# Patient Record
Sex: MALE | Race: WHITE | NOT HISPANIC OR LATINO | Employment: OTHER | URBAN - METROPOLITAN AREA
[De-identification: names, ages, dates, MRNs, and addresses within clinical notes are randomized per-mention and may not be internally consistent; named-entity substitution may affect disease eponyms.]

---

## 2017-01-16 DIAGNOSIS — N52.9 ERECTILE DYSFUNCTION, UNSPECIFIED ERECTILE DYSFUNCTION TYPE: Primary | ICD-10-CM

## 2017-01-16 NOTE — TELEPHONE ENCOUNTER
Sildenafil 20 mg      Last Written Prescription Date: 11/22/16  Last Fill Quantity: 30,  # refills: 0   Last Office Visit with [facility], P or [facility] prescribing provider: 11/15/16                                             Message:  **urgent** Patient is out of medication. Please high priority a refill/new rx; if denied please return request with denial and reason, thank you.

## 2017-01-18 RX ORDER — SILDENAFIL CITRATE 20 MG/1
40-100 TABLET ORAL PRN
Qty: 30 TABLET | Refills: 2 | Status: SHIPPED | OUTPATIENT
Start: 2017-01-18 | End: 2017-04-12

## 2017-01-18 NOTE — TELEPHONE ENCOUNTER
Prescription approved per [facility] Refill Protocol.  Stella Barker RN

## 2017-04-12 ENCOUNTER — DOCUMENTATION ONLY (OUTPATIENT)
Dept: CARDIOLOGY | Facility: CLINIC | Age: 62
End: 2017-04-12

## 2017-04-12 DIAGNOSIS — N52.9 ERECTILE DYSFUNCTION, UNSPECIFIED ERECTILE DYSFUNCTION TYPE: ICD-10-CM

## 2017-04-12 DIAGNOSIS — E78.2 MIXED HYPERLIPIDEMIA: Primary | ICD-10-CM

## 2017-04-13 RX ORDER — SILDENAFIL CITRATE 20 MG/1
TABLET ORAL
Qty: 90 TABLET | Refills: 1 | Status: SHIPPED | OUTPATIENT
Start: 2017-04-13 | End: 2017-08-17

## 2017-04-13 NOTE — TELEPHONE ENCOUNTER
sildenafil (REVATIO/VIAGRA) 20 MG      Last Written Prescription Date: 1/18/17  Last Fill Quantity: 30,  # refills: 2   Last Office Visit with [facility] or [facility] prescribing provider: 11/15/16                                         Next 5 appointments (look out 90 days)     May 02, 2017  8:45 AM CDT   Return Visit with Sander Galicia MD   [facility] ([facility])    [address] [place] [id number] [phone]

## 2017-04-13 NOTE — TELEPHONE ENCOUNTER
Prescription approved per [facility] Refill Protocol.  Patient requesting 90 days.  Heather Leach, RN  Triage Nurse

## 2017-04-26 ENCOUNTER — HOSPITAL ENCOUNTER (OUTPATIENT)
Dept: CARDIOLOGY | Facility: CLINIC | Age: 62
Discharge: HOME | End: 2017-04-26
Attending: INTERNAL MEDICINE | Admitting: INTERNAL MEDICINE
Payer: COMMERCIAL

## 2017-04-26 DIAGNOSIS — I35.0 AORTIC STENOSIS: ICD-10-CM

## 2017-04-26 DIAGNOSIS — E78.2 MIXED HYPERLIPIDEMIA: ICD-10-CM

## 2017-04-26 LAB
ALT SERPL W P-5'-P-CCNC: 43 U/L (ref 0–70)
CHOLEST SERPL-MCNC: 203 MG/DL
HDLC SERPL-MCNC: 67 MG/DL
LDLC SERPL CALC-MCNC: 124 MG/DL
NONHDLC SERPL-MCNC: 136 MG/DL
TRIGL SERPL-MCNC: 58 MG/DL

## 2017-04-26 PROCEDURE — 36415 COLL VENOUS BLD VENIPUNCTURE: CPT | Performed by: INTERNAL MEDICINE

## 2017-04-26 PROCEDURE — 93306 TTE W/DOPPLER COMPLETE: CPT

## 2017-04-26 PROCEDURE — 93306 TTE W/DOPPLER COMPLETE: CPT | Mod: 26 | Performed by: INTERNAL MEDICINE

## 2017-04-26 PROCEDURE — 80061 LIPID PANEL: CPT | Performed by: INTERNAL MEDICINE

## 2017-04-26 PROCEDURE — 84460 ALANINE AMINO (ALT) (SGPT): CPT | Performed by: INTERNAL MEDICINE

## 2017-05-02 ENCOUNTER — OFFICE VISIT (OUTPATIENT)
Dept: CARDIOLOGY | Facility: CLINIC | Age: 62
End: 2017-05-02
Attending: INTERNAL MEDICINE
Payer: COMMERCIAL

## 2017-05-02 VITALS
DIASTOLIC BLOOD PRESSURE: 66 MMHG | SYSTOLIC BLOOD PRESSURE: 110 MMHG | WEIGHT: 167 LBS | HEART RATE: 68 BPM | HEIGHT: 66 IN | BODY MASS INDEX: 26.84 KG/M2

## 2017-05-02 DIAGNOSIS — E78.2 MIXED HYPERLIPIDEMIA: Primary | ICD-10-CM

## 2017-05-02 DIAGNOSIS — I35.0 NONRHEUMATIC AORTIC VALVE STENOSIS: ICD-10-CM

## 2017-05-02 PROCEDURE — 99214 OFFICE O/P EST MOD 30 MIN: CPT | Performed by: INTERNAL MEDICINE

## 2017-05-02 NOTE — LETTER
5/2/2017    [facility], [place]  [address] [facility] [address]    RE: Johan FERNANDEZ Ramon       Dear Colleague,    I had the pleasure of seeing Johan Navarro in the [facility].    I had the pleasure of seeing Mr. Navarro in followup at the [facility] today.  He is a very pleasant 62-year-old gentleman who I last saw in 05/2016.  He has a history of coronary artery disease and is status post coronary angiography in 1998 at which point he underwent stenting of the distal and ostial circumflex as well as the first obtuse marginal.  His LDL was noted to be markedly elevated at 220 and he was seen at the [facility] where consideration was made for plasmapheresis.  Ultimately he was placed on Vytorin and niacin but for the last year or so has been on rosuvastatin and Zetia as well as niacin for optimal LDL reduction.      At his last office visit, he was doing well overall from a cardiac standpoint.  Given his subtherapeutic LDL, I did recommend initiation of PCSK9 inhibitor therapy.  Ultimately, the patient elected not to pursue this option.  He has been taking of his other medications as prescribed.      From a functional standpoint, he remains very active, taking care of his 18-year-old grandson.  He does walk on a fairly regular basis and denies any chest discomfort or shortness of breath while doing so.  He has gained about 7 pounds over the past year, however.  He denies any syncope or presyncope.      PHYSICAL EXAMINATION:   GENERAL:  Alert and oriented.   VITAL SIGNS:  Blood pressure was 110/66, pulse 68.   NECK:  Revealed no jugular venous distention or carotid bruits.   CHEST:  Clear to auscultation.   CARDIOVASCULAR:  Rhythm was appreciated.   ABDOMEN:  Soft, nontender.   EXTREMITIES:  Demonstrated no edema.      His lipid panel on a fasting lipid panel 04/26/2013 demonstrated total cholesterol 203, LDL of 124, HDL 67, triglycerides 458.      He had an  echocardiogram performed on 04/26/2017 which demonstrated normal left ventricular size and low normal systolic function with an LVEF of 55%.  Mild aortic stenosis was noted.      Of note, the patient has undergone stress echocardiography in 2015, which was negative for ischemia.  He exercised for 12 minutes according to Gab protocol at that time.     Outpatient Encounter Prescriptions as of 5/2/2017   Medication Sig Dispense Refill     [DISCONTINUED] evolocumab (REPATHA) 140 MG/ML prefilled autoinjector Inject 1 mL (140 mg) Subcutaneous every 14 days 2 mL 3     sildenafil (REVATIO/VIAGRA) 20 MG tablet TAKE TWO TO FIVE TABLETS BY MOUTH AS NEEDED PRIOR TO SXUAL INTERCOURSE NEVER USE WITH NITROGLYCERIN TERAZOSIN OR DOXAZOCIN 90 tablet 1     niacin (NIASPAN) 1000 MG CR tablet        CIALIS 20 MG tablet TK ONE T PRIOR TO ANTICIPATED SEXUAL ACTIVITY MAX PER DAY  11     tadalafil (CIALIS) 20 MG tablet Take 1 tablet (20 mg) by mouth daily as needed for erectile dysfunction 6 tablet 1     ezetimibe (ZETIA) 10 MG tablet Take 1 tablet (10 mg) by mouth daily 90 tablet 3     rosuvastatin (CRESTOR) 40 MG tablet Take 1 tablet (40 mg) by mouth daily 90 tablet 3     NEURONTIN 600 MG OR TABS 1 tab q day       ASPIRIN 81 MG OR TABS 1 tab po QD (Once per day)       No facility-administered encounter medications on file as of 5/2/2017.       IMPRESSION:   1.  Coronary artery disease, status post PCI to the circumflex and obtuse marginal in 1998.   2.  Significant dyslipidemia.      Mr. Navarro doing well overall from a cardiac standpoint.  There is no evidence of angina or congestive heart failure.  We again discussed the indications of PCSK9 inhibitors and the patient is agreeable to a therapeutic trial in this regard.  I have given him a prescription for Repatha to be taken every 2 weeks.  We will look into insurance coverage, but given that he is on maximal lipid-lowering therapy with subtherapeutic LDL, I believe this should be  authorized.                    It was a pleasure seeing him in followup today.  Assuming his clinical status remains stable, I will plan on followup in 1 year.      Sincerely,    Sander Galicia MD     [facility]

## 2017-05-02 NOTE — PROGRESS NOTES
HISTORY OF PRESENT ILLNESS:  I had the pleasure of seeing Mr. Navarro in followup at the [facility] today.  He is a very pleasant 62-year-old gentleman who I last saw in 05/2016.  He has a history of coronary artery disease and is status post coronary angiography in 1998 at which point he underwent stenting of the distal and ostial circumflex as well as the first obtuse marginal.  His LDL was noted to be markedly elevated at 220 and he was seen at the [facility] where consideration was made for plasmapheresis.  Ultimately he was placed on Vytorin and niacin but for the last year or so has been on rosuvastatin and Zetia as well as niacin for optimal LDL reduction.      At his last office visit, he was doing well overall from a cardiac standpoint.  Given his subtherapeutic LDL, I did recommend initiation of PCSK9 inhibitor therapy.  Ultimately, the patient elected not to pursue this option.  He has been taking of his other medications as prescribed.      From a functional standpoint, he remains very active, taking care of his 18-year-old grandson.  He does walk on a fairly regular basis and denies any chest discomfort or shortness of breath while doing so.  He has gained about 7 pounds over the past year, however.  He denies any syncope or presyncope.      PHYSICAL EXAMINATION:   GENERAL:  Alert and oriented.   VITAL SIGNS:  Blood pressure was 110/66, pulse 68.   NECK:  Revealed no jugular venous distention or carotid bruits.   CHEST:  Clear to auscultation.   CARDIOVASCULAR:  Rhythm was appreciated.   ABDOMEN:  Soft, nontender.   EXTREMITIES:  Demonstrated no edema.      His lipid panel on a fasting lipid panel 04/26/2013 demonstrated total cholesterol 203, LDL of 124, HDL 67, triglycerides 458.      He had an echocardiogram performed on 04/26/2017 which demonstrated normal left ventricular size and low normal systolic function with an LVEF of 55%.  Mild aortic stenosis was noted.      Of  note, the patient has undergone stress echocardiography in , which was negative for ischemia.  He exercised for 12 minutes according to Gab protocol at that time.      IMPRESSION:   1.  Coronary artery disease, status post PCI to the circumflex and obtuse marginal in .   2.  Significant dyslipidemia.      Mr. Chapa doing well overall from a cardiac standpoint.  There is no evidence of angina or congestive heart failure.  We again discussed the indications of PCSK9 inhibitors and the patient is agreeable to a therapeutic trial in this regard.  I have given him a prescription for Repatha to be taken every 2 weeks.  We will look into insurance coverage, but given that he is on maximal lipid-lowering therapy with subtherapeutic LDL, I believe this should be authorized.      It was a pleasure seeing him in followup today.  Assuming his clinical status remains stable, I will plan on followup in 1 year.         LATONYA NAPIER MD             D: 2017 09:44   T: 2017 14:13   MT: AVA      Name:     MARLENY CHAPA   MRN:      [id number]        Account:      [id number]   :      1955           Service Date: 2017      Document: [id number]

## 2017-05-02 NOTE — MR AVS SNAPSHOT
After Visit Summary   5/2/2017    Johan Navarro    MRN: [id number]           Patient Information     Date Of Birth          1955        Visit Information        Provider Department      5/2/2017 8:45 AM Sander Galicia MD [facility]        Today's Diagnoses     Mixed hyperlipidemia    -  1    Nonrheumatic aortic valve stenosis           Follow-ups after your visit        Additional Services     Follow-Up with Cardiologist                 Future tests that were ordered for you today     Open Future Orders        Priority Expected Expires Ordered    Echocardiogram Routine 5/2/2018 6/6/2018 5/2/2017    Follow-Up with Cardiologist Routine 5/2/2018 9/14/2018 5/2/2017            Who to contact     If you have questions or need follow up information about today's clinic visit or your schedule please contact [facility] directly at [phone].  Normal or non-critical lab and imaging results will be communicated to you by MyChart, letter or phone within 4 business days after the clinic has received the results. If you do not hear from us within 7 days, please contact the clinic through [email or web address] or phone. If you have a critical or abnormal lab result, we will notify you by phone as soon as possible.  Submit refill requests through [employer] or call your pharmacy and they will forward the refill request to us. Please allow 3 business days for your refill to be completed.          Additional Information About Your Visit        MyChart Information     People[employer] gives you secure access to your electronic health record. If you see a primary care provider, you can also send messages to your care team and make appointments. If you have questions, please call your primary care clinic.  If you do not have a primary care provider, please call [phone] and they will assist you.        Care EveryWhere ID     This is your Care  "EveryWhere ID. This could be used by other organizations to access your [place] medical records  [id number]        Your Vitals Were     Pulse Height BMI (Body Mass Index)             68 1.676 m (5' 6\") 26.95 kg/m2          Blood Pressure from Last 3 Encounters:   05/02/17 110/66   11/15/16 110/60   05/05/16 104/64    Weight from Last 3 Encounters:   05/02/17 75.8 kg (167 lb)   11/15/16 72.6 kg (160 lb)   05/05/16 74.4 kg (164 lb)              We Performed the Following     Follow-Up with Cardiologist          Today's Medication Changes          These changes are accurate as of: 5/2/17  9:17 AM.  If you have any questions, ask your nurse or doctor.               Start taking these medicines.        Dose/Directions    evolocumab 140 MG/ML prefilled autoinjector   Commonly known as:  REPATHA   Used for:  Mixed hyperlipidemia   Started by:  Sander Galicia MD        Dose:  140 mg   Inject 1 mL (140 mg) Subcutaneous every 14 days   Quantity:  2 mL   Refills:  3            Where to get your medicines      These medications were sent to [facility]  [place] - [address]  [address] [place] [address]     Phone:  [phone]     evolocumab 140 MG/ML prefilled autoinjector                Primary Care Provider Office Phone # Fax #    Darius Moon [phone]       [address] [place] [id number]        Thank you!     Thank you for choosing [facility] [place]  for your care. Our goal is always to provide you with excellent care. Hearing back from our patients is one way we can continue to improve our services. Please take a few minutes to complete the written survey that you may receive in the mail after your visit with us. Thank you!             Your Updated Medication List - Protect others around you: Learn how to safely use, store and throw away your medicines at www.disposemymeds.org.       "    This list is accurate as of: 5/2/17  9:17 AM.  Always use your most recent med list.                   Brand Name Dispense Instructions for use    aspirin 81 MG tablet      1 tab po QD (Once per day)       * CIALIS 20 MG tablet   Generic drug:  tadalafil      TK ONE T PRIOR TO ANTICIPATED SEXUAL ACTIVITY MAX PER DAY       * tadalafil 20 MG tablet    CIALIS    6 tablet    Take 1 tablet (20 mg) by mouth daily as needed for erectile dysfunction       evolocumab 140 MG/ML prefilled autoinjector    REPATHA    2 mL    Inject 1 mL (140 mg) Subcutaneous every 14 days       ezetimibe 10 MG tablet    ZETIA    90 tablet    Take 1 tablet (10 mg) by mouth daily       NEURONTIN 600 MG tablet   Generic drug:  gabapentin      1 tab q day       niacin 1000 MG CR tablet    NIASPAN         rosuvastatin 40 MG tablet    CRESTOR    90 tablet    Take 1 tablet (40 mg) by mouth daily       sildenafil 20 MG tablet    REVATIO/VIAGRA    90 tablet    TAKE TWO TO FIVE TABLETS BY MOUTH AS NEEDED PRIOR TO SXUAL INTERCOURSE NEVER USE WITH NITROGLYCERIN TERAZOSIN OR DOXAZOCIN       * Notice:  This list has 2 medication(s) that are the same as other medications prescribed for you. Read the directions carefully, and ask your doctor or other care provider to review them with you.

## 2017-05-02 NOTE — PROGRESS NOTES
HPI and Plan:   See dictation    No orders of the defined types were placed in this encounter.      Orders Placed This Encounter   Medications     evolocumab (REPATHA) 140 MG/ML prefilled autoinjector     Sig: Inject 1 mL (140 mg) Subcutaneous every 14 days     Dispense:  2 mL     Refill:  3       There are no discontinued medications.      Encounter Diagnoses   Name Primary?     Nonrheumatic aortic valve stenosis      Mixed hyperlipidemia Yes       CURRENT MEDICATIONS:  Current Outpatient Prescriptions   Medication Sig Dispense Refill     evolocumab (REPATHA) 140 MG/ML prefilled autoinjector Inject 1 mL (140 mg) Subcutaneous every 14 days 2 mL 3     sildenafil (REVATIO/VIAGRA) 20 MG tablet TAKE TWO TO FIVE TABLETS BY MOUTH AS NEEDED PRIOR TO SXUAL INTERCOURSE NEVER USE WITH NITROGLYCERIN TERAZOSIN OR DOXAZOCIN 90 tablet 1     niacin (NIASPAN) 1000 MG CR tablet        CIALIS 20 MG tablet TK ONE T PRIOR TO ANTICIPATED SEXUAL ACTIVITY MAX PER DAY  11     tadalafil (CIALIS) 20 MG tablet Take 1 tablet (20 mg) by mouth daily as needed for erectile dysfunction 6 tablet 1     ezetimibe (ZETIA) 10 MG tablet Take 1 tablet (10 mg) by mouth daily 90 tablet 3     rosuvastatin (CRESTOR) 40 MG tablet Take 1 tablet (40 mg) by mouth daily 90 tablet 3     NEURONTIN 600 MG OR TABS 1 tab q day       ASPIRIN 81 MG OR TABS 1 tab po QD (Once per day)         ALLERGIES     Allergies   Allergen Reactions     Dust Mites      Pollen Extract        PAST MEDICAL HISTORY:  Past Medical History:   Diagnosis Date     Aortic stenosis      CAD (coronary artery disease)     cardiac cath 1998: stents x 2 to circumflex     Carotid stenosis     left     Family history of early CAD      Hyperlipidaemia LDL goal < 70     familial hyperlipidemia with marked hypercholesterolemia before treatment; has been on some form of cholesterol-lowering medication since the 1970s       PAST SURGICAL HISTORY:  Past Surgical History:   Procedure Laterality Date      "CARDIAC SURGERY      coronary stents placed      DAVINC PROSTATECTOMY      2010     SURGICAL HISTORY OF -       Right hand Xanthoma removal     SURGICAL HISTORY OF -       Angioplasty/Stent placement x 2     SURGICAL HISTORY OF -       Right heel surgery - post football injury     SURGICAL HISTORY OF -       Stress Echo (-)       FAMILY HISTORY:  Family History   Problem Relation Age of Onset     Lipids Mother      C.A.D. Mother       at age 49 of MI     C.A.D. Sister      MI at age 48     Cancer - colorectal No family hx of      Prostate Cancer No family hx of        SOCIAL HISTORY:  Social History     Social History     Marital status:      Spouse name: N/A     Number of children: N/A     Years of education: N/A     Social History Main Topics     Smoking status: Former Smoker     Smokeless tobacco: None     Alcohol use Yes      Comment: very rare     Drug use: No     Sexual activity: Yes     Partners: Female     Other Topics Concern     Caffeine Concern Yes     coffee and soda     Special Diet No     Exercise Yes     regular      Seat Belt Yes     Parent/Sibling W/ Cabg, Mi Or Angioplasty Before 65f 55m? Yes     49     Social History Narrative       Review of Systems:  Skin:  Negative       Eyes:  Negative      ENT:  Negative      Respiratory:  Negative       Cardiovascular:  Negative;palpitations;chest pain;edema;lightheadedness;dizziness      Gastroenterology: Negative      Genitourinary:  Negative      Musculoskeletal:  Negative      Neurologic:  Negative      Psychiatric:  Negative      Heme/Lymph/Imm:  Positive for allergies seasonal   Endocrine:  Negative        Physical Exam:  Vitals: /66  Pulse 68  Ht 1.676 m (5' 6\")  Wt 75.8 kg (167 lb)  BMI 26.95 kg/m2    Constitutional:  cooperative        Skin:  warm and dry to the touch        Head:  normocephalic        Eyes:  pupils equal and round        ENT:  no pallor or cyanosis        Neck:  carotid pulses are full and equal " bilaterally        Chest:  clear to auscultation          Cardiac: regular rhythm       systolic murmur          Abdomen:  abdomen soft        Vascular: pulses full and equal, no bruits auscultated                                        Extremities and Back:  no edema              Neurological:  affect appropriate, oriented to time, person and place              CC  Sander Galicia MD   [facility]  [address]  [place] [address]

## 2017-05-04 ENCOUNTER — TELEPHONE (OUTPATIENT)
Dept: CARDIOLOGY | Facility: CLINIC | Age: 62
End: 2017-05-04

## 2017-05-04 DIAGNOSIS — E78.2 MIXED HYPERLIPIDEMIA: ICD-10-CM

## 2017-05-04 NOTE — TELEPHONE ENCOUNTER
Received fax from [facility] [address], [place] that Repatha is not covered on the plan and a PA needs to be started. I checked and Praluent isn't a covered med either. Call placed to [facility] beds at the [facility] at [phone] and they are to fax us the PA form to fill out. Awaiting same.

## 2017-05-09 ENCOUNTER — DOCUMENTATION ONLY (OUTPATIENT)
Dept: CARDIOLOGY | Facility: CLINIC | Age: 62
End: 2017-05-09

## 2017-05-09 NOTE — PROGRESS NOTES
Message from Dr. Galicia: n we look into the cost of repatha for this patient? He is on maximum therapy with a subtherapeutic LDL. Thanks.  Routed to Team 1/repatha team for f/u

## 2017-06-15 NOTE — TELEPHONE ENCOUNTER
PA Initiation    Medication: Repatha - PA PENDING  Insurance Company: [employer] - Phone [phone] Fax [phone]  Pharmacy Filling the Rx:    Filling Pharmacy Phone:    Filling Pharmacy Fax:    Start Date: 6/15/2017    I left a message for Johan to discuss filling his Repatha at  [facility] to having to fill [facility]. Initiated PA for Repatha through [employer] on Cover my meds.

## 2017-06-20 NOTE — TELEPHONE ENCOUNTER
Phoned Connor at [phone] stated his PA is still in progress and should have a response no later than 6/29 (can take up to 14 business days to review).

## 2017-06-22 NOTE — TELEPHONE ENCOUNTER
Prior Authorization Approval    Authorization Effective Date: 5/15/2017  Authorization Expiration Date: 9/15/2017  Medication: Repatha - PA Approved  Approved Dose/Quantity: 2 ML  Reference #: [id number] [id number]   Insurance Company: "[employer]" - Phone [phone] Fax [phone]  Expected CoPay:       CoPay Card Available:      Foundation Assistance Needed:    Which Pharmacy is filling the prescription (Not needed for infusion/clinic administered): [facility] - [place] [address]   Pharmacy Notified:  Yes   Patient Notified:  Yes    PA Approved for 3 months. The patient will need to come back in for lab work to prove his LDL has improved by 35%, then a continuation of therapy PA will need to be completed and submitted with supportive documentation at that time. Please, send a new Repatha RX to [facility].

## 2017-06-27 NOTE — TELEPHONE ENCOUNTER
Repatha [employer] approved. New Rx sent to  Specialty pharmacy so med can be filled, as requested by PA Team.

## 2017-07-26 ENCOUNTER — TELEPHONE (OUTPATIENT)
Dept: CARDIOLOGY | Facility: CLINIC | Age: 62
End: 2017-07-26

## 2017-07-26 DIAGNOSIS — E78.2 MIXED HYPERLIPIDEMIA: Primary | ICD-10-CM

## 2017-07-26 NOTE — TELEPHONE ENCOUNTER
He should stop the niacin and zetia, and cut back on the rosuvastatin to 20 MG daily. We can reassess this once his FLP is back. Thanks.

## 2017-07-26 NOTE — TELEPHONE ENCOUNTER
Patient called, Just received his Repatha injections and wondering if he continues taking the Crestor and zetia while using the Repatha or does he stop the Crestor and zetia?  After starting Repatha first FLP's due in 1 month, then 3 months, 6 months, and 9 months per insurance requirements.

## 2017-07-27 RX ORDER — ROSUVASTATIN CALCIUM 40 MG/1
20 TABLET, COATED ORAL DAILY
Qty: 1 TABLET | Refills: 0 | COMMUNITY
Start: 2017-07-27 | End: 2017-08-08

## 2017-07-27 NOTE — TELEPHONE ENCOUNTER
Spoke with patient and recommendation given to stop the Niacin and zetia and decrease the Rosuvastatin to 20 mg daily. Patient will start Repatha on 7-28-17.   First 1 month FLP's due 8-28-17. And per 's protocol repeat FLP's due every 3 months while on the medication. Patient aware of frequent lab draw. Patient states wife knows how to do the injections and is comfortable given it to him. Patient will call Repatha or clinic with any questions or concerns.

## 2017-08-08 DIAGNOSIS — E78.2 MIXED HYPERLIPIDEMIA: Primary | ICD-10-CM

## 2017-08-08 RX ORDER — ROSUVASTATIN CALCIUM 40 MG/1
20 TABLET, COATED ORAL DAILY
Qty: 45 TABLET | Refills: 3 | Status: SHIPPED | OUTPATIENT
Start: 2017-08-08 | End: 2017-12-11 | Stop reason: SINTOL

## 2017-08-10 DIAGNOSIS — N52.9 ERECTILE DYSFUNCTION, UNSPECIFIED ERECTILE DYSFUNCTION TYPE: ICD-10-CM

## 2017-08-10 NOTE — TELEPHONE ENCOUNTER
sildenafil (REVATIO/VIAGRA) 20 MG      Last Written Prescription Date: 4/13/17  Last Fill Quantity: 90,  # refills: 1   Last Office Visit with [facility], [facility] or [facility] prescribing provider: 11/15/16                                         Next 5 appointments (look out 90 days)     Aug 17, 2017  9:00 AM CDT   Pre-Op physical with [place] Yang PA-C   [facility] ([address] [address] [phone]

## 2017-08-11 RX ORDER — SILDENAFIL CITRATE 20 MG/1
TABLET ORAL
Qty: 30 TABLET | Refills: 2 | Status: SHIPPED | OUTPATIENT
Start: 2017-08-11 | End: 2017-08-17

## 2017-08-11 NOTE — TELEPHONE ENCOUNTER
Prescription approved per [facility] Refill Protocol.  Heather Leach, RN  Triage Nurse

## 2017-08-17 ENCOUNTER — OFFICE VISIT (OUTPATIENT)
Dept: FAMILY MEDICINE | Facility: CLINIC | Age: 62
End: 2017-08-17
Payer: COMMERCIAL

## 2017-08-17 VITALS
SYSTOLIC BLOOD PRESSURE: 104 MMHG | HEIGHT: 66 IN | HEART RATE: 73 BPM | DIASTOLIC BLOOD PRESSURE: 66 MMHG | BODY MASS INDEX: 26.41 KG/M2 | OXYGEN SATURATION: 95 % | WEIGHT: 164.3 LBS | TEMPERATURE: 98.1 F

## 2017-08-17 DIAGNOSIS — I25.10 CORONARY ARTERY DISEASE INVOLVING NATIVE CORONARY ARTERY OF NATIVE HEART WITHOUT ANGINA PECTORIS: ICD-10-CM

## 2017-08-17 DIAGNOSIS — Z01.818 PREOP GENERAL PHYSICAL EXAM: Primary | ICD-10-CM

## 2017-08-17 DIAGNOSIS — H02.403 PTOSIS, BILATERAL: ICD-10-CM

## 2017-08-17 DIAGNOSIS — Z12.11 SPECIAL SCREENING FOR MALIGNANT NEOPLASMS, COLON: ICD-10-CM

## 2017-08-17 PROBLEM — C61 MALIGNANT NEOPLASM OF PROSTATE (H): Status: ACTIVE | Noted: 2017-08-17

## 2017-08-17 PROCEDURE — 99214 OFFICE O/P EST MOD 30 MIN: CPT | Performed by: PHYSICIAN ASSISTANT

## 2017-08-17 PROCEDURE — 93000 ELECTROCARDIOGRAM COMPLETE: CPT | Performed by: PHYSICIAN ASSISTANT

## 2017-08-17 NOTE — NURSING NOTE
"Chief Complaint   Patient presents with     Pre-Op Exam       Initial /66 (BP Location: Right arm, Cuff Size: Adult Regular)  Pulse 73  Temp 98.1  F (36.7  C) (Oral)  Ht 5' 6\" (1.676 m)  Wt 164 lb 4.8 oz (74.5 kg)  SpO2 95%  BMI 26.52 kg/m2 Estimated body mass index is 26.52 kg/(m^2) as calculated from the following:    Height as of this encounter: 5' 6\" (1.676 m).    Weight as of this encounter: 164 lb 4.8 oz (74.5 kg).  Medication Reconciliation: complete   Ken Lester CMA      "

## 2017-08-17 NOTE — MR AVS SNAPSHOT
After Visit Summary   8/17/2017    Johan Navarro    MRN: [id number]           Patient Information     Date Of Birth          1955        Visit Information        Provider Department      8/17/2017 9:00 AM Washington Yang PA-C [facility]        Today's Diagnoses     Preop general physical exam    -  1    Ptosis, bilateral        Coronary artery disease involving native coronary artery of native heart without angina pectoris        Special screening for malignant neoplasms, colon          Care Instructions      Before Your Surgery      Call your surgeon if there is any change in your health. This includes signs of a cold or flu (such as a sore throat, runny nose, cough, rash or fever).    Do not smoke, drink alcohol or take over the counter medicine (unless your surgeon or primary care doctor tells you to) for the 24 hours before and after surgery.    If you take prescribed drugs: Follow your doctor s orders about which medicines to take and which to stop until after surgery.    Eating and drinking prior to surgery: follow the instructions from your surgeon    Take a shower or bath the night before surgery. Use the soap your surgeon gave you to gently clean your skin. If you do not have soap from your surgeon, use your regular soap. Do not shave or scrub the surgery site.  Wear clean pajamas and have clean sheets on your bed.           Follow-ups after your visit        Additional Services     GASTROENTEROLOGY ADULT REF PROCEDURE ONLY       Last Lab Result: Creatinine (mg/dL)       Date                     Value                 11/15/2016               1.01             ----------  Body mass index is 26.52 kg/(m^2).     Needed:  No  Language:  English    Patient will be contacted to schedule procedure.     Please be aware that coverage of these services is subject to the terms and limitations of your health insurance plan.  Call member services at your health plan  "with any benefit or coverage questions.  Any procedures must be performed at a [place] facility OR coordinated by your clinic's referral office.    Please bring the following with you to your appointment:    (1) Any X-Rays, CTs or MRIs which have been performed.  Contact the facility where they were done to arrange for  prior to your scheduled appointment.    (2) List of current medications   (3) This referral request   (4) Any documents/labs given to you for this referral                  Who to contact     If you have questions or need follow up information about today's clinic visit or your schedule please contact [facility] directly at [phone].  Normal or non-critical lab and imaging results will be communicated to you by MyChart, letter or phone within 4 business days after the clinic has received the results. If you do not hear from us within 7 days, please contact the clinic through [employer] or phone. If you have a critical or abnormal lab result, we will notify you by phone as soon as possible.  Submit refill requests through [employer] or call your pharmacy and they will forward the refill request to us. Please allow 3 business days for your refill to be completed.          Additional Information About Your Visit        [employer] Information     [employer] gives you secure access to your electronic health record. If you see a primary care provider, you can also send messages to your care team and make appointments. If you have questions, please call your primary care clinic.  If you do not have a primary care provider, please call [phone] and they will assist you.        Care EveryWhere ID     This is your Care EveryWhere ID. This could be used by other organizations to access your [place] medical records  [id number]        Your Vitals Were     Pulse Temperature Height Pulse Oximetry BMI (Body Mass Index)       73 98.1  F (36.7  C) (Oral) 5' 6\" (1.676 m) 95% 26.52 kg/m2        " Blood Pressure from Last 3 Encounters:   08/17/17 104/66   05/02/17 110/66   11/15/16 110/60    Weight from Last 3 Encounters:   08/17/17 164 lb 4.8 oz (74.5 kg)   05/02/17 167 lb (75.8 kg)   11/15/16 160 lb (72.6 kg)              We Performed the Following     EKG 12-lead complete w/read - Clinics     GASTROENTEROLOGY ADULT REF PROCEDURE ONLY        Primary Care Provider Office Phone # Fax #    Washington Yang PA-C [phone] [address] [facility] [address]        Equal Access to Services     [facility]: Hadii aad ku hadasho Soomaali, waaxda luqadaha, qaybta kaalmada adeegyada, waxay idiin hayaan leticia becerril . So [facility] [phone].    ATENCIÓN: Si habla español, tiene a polo disposición servicios gratuitos de asistencia lingüística. [facility] [phone].    We comply with applicable federal civil rights laws and [place] laws. We do not discriminate on the basis of race, color, national origin, age, disability sex, sexual orientation or gender identity.            Thank you!     Thank you for choosing [facility]  for your care. Our goal is always to provide you with excellent care. Hearing back from our patients is one way we can continue to improve our services. Please take a few minutes to complete the written survey that you may receive in the mail after your visit with us. Thank you!             Your Updated Medication List - Protect others around you: Learn how to safely use, store and throw away your medicines at www.disposemymeds.org.          This list is accurate as of: 8/17/17  9:54 AM.  Always use your most recent med list.                   Brand Name Dispense Instructions for use Diagnosis    aspirin 81 MG tablet      1 tab po QD (Once per day)        CIALIS 20 MG tablet   Generic drug:  tadalafil      TK ONE T PRIOR TO ANTICIPATED SEXUAL ACTIVITY MAX PER DAY        evolocumab 140 MG/ML prefilled autoinjector    REPATHA    2 mL    Inject 1  mL (140 mg) Subcutaneous every 14 days    Mixed hyperlipidemia       NEURONTIN 600 MG tablet   Generic drug:  gabapentin      1 tab q day        rosuvastatin 40 MG tablet    CRESTOR    45 tablet    Take 0.5 tablets (20 mg) by mouth daily    Mixed hyperlipidemia

## 2017-09-01 ENCOUNTER — NURSE TRIAGE (OUTPATIENT)
Dept: NURSING | Facility: CLINIC | Age: 62
End: 2017-09-01

## 2017-09-01 ENCOUNTER — RADIANT APPOINTMENT (OUTPATIENT)
Dept: GENERAL RADIOLOGY | Facility: CLINIC | Age: 62
End: 2017-09-01
Attending: PHYSICIAN ASSISTANT
Payer: COMMERCIAL

## 2017-09-01 ENCOUNTER — OFFICE VISIT (OUTPATIENT)
Dept: URGENT CARE | Facility: URGENT CARE | Age: 62
End: 2017-09-01
Payer: COMMERCIAL

## 2017-09-01 VITALS
TEMPERATURE: 98.3 F | DIASTOLIC BLOOD PRESSURE: 68 MMHG | SYSTOLIC BLOOD PRESSURE: 102 MMHG | HEART RATE: 87 BPM | OXYGEN SATURATION: 94 %

## 2017-09-01 DIAGNOSIS — J18.9 PNEUMONIA OF RIGHT LOWER LOBE DUE TO INFECTIOUS ORGANISM: Primary | ICD-10-CM

## 2017-09-01 DIAGNOSIS — R05.9 COUGH: ICD-10-CM

## 2017-09-01 PROCEDURE — 94640 AIRWAY INHALATION TREATMENT: CPT | Performed by: PHYSICIAN ASSISTANT

## 2017-09-01 PROCEDURE — 99214 OFFICE O/P EST MOD 30 MIN: CPT | Mod: 25 | Performed by: PHYSICIAN ASSISTANT

## 2017-09-01 PROCEDURE — 71020 XR CHEST 2 VW: CPT

## 2017-09-01 RX ORDER — ALBUTEROL SULFATE 0.83 MG/ML
1 SOLUTION RESPIRATORY (INHALATION) EVERY 6 HOURS
Qty: 1 BOX | Refills: 0
Start: 2017-09-01 | End: 2018-05-22

## 2017-09-01 RX ORDER — PREDNISONE 20 MG/1
40 TABLET ORAL DAILY
Qty: 10 TABLET | Refills: 0 | Status: SHIPPED | OUTPATIENT
Start: 2017-09-01 | End: 2017-09-06

## 2017-09-01 RX ORDER — ALBUTEROL SULFATE 0.83 MG/ML
1 SOLUTION RESPIRATORY (INHALATION) ONCE
Qty: 3 ML | Refills: 0 | Status: CANCELLED
Start: 2017-09-01 | End: 2017-09-01

## 2017-09-01 RX ORDER — IPRATROPIUM BROMIDE AND ALBUTEROL SULFATE 2.5; .5 MG/3ML; MG/3ML
1 SOLUTION RESPIRATORY (INHALATION) ONCE
Qty: 1 VIAL | Refills: 0
Start: 2017-09-01 | End: 2018-05-22

## 2017-09-01 RX ORDER — AZITHROMYCIN 250 MG/1
TABLET, FILM COATED ORAL
Qty: 6 TABLET | Refills: 0 | Status: SHIPPED | OUTPATIENT
Start: 2017-09-01 | End: 2018-05-22

## 2017-09-01 RX ORDER — DOXYCYCLINE 100 MG/1
100 CAPSULE ORAL 2 TIMES DAILY
Qty: 20 CAPSULE | Refills: 0 | Status: CANCELLED | OUTPATIENT
Start: 2017-09-01

## 2017-09-01 NOTE — MR AVS SNAPSHOT
After Visit Summary   9/1/2017    Johan Navarro    MRN: [id number]           Patient Information     Date Of Birth          1955        Visit Information        Provider Department      9/1/2017 4:55 PM Malinda Boone PA-C [facility]        Today's Diagnoses     Cough    -  1    Pneumonia of right lower lobe due to infectious organism (H)          Care Instructions      Treating Pneumonia  Pneumonia is an infection of one or both of the lungs. Pneumonia:    Is usually caused by either a virus or a bacteria    Can be very serious, especially in infants, young children, and older adults. It s also serious for those with other long-term health problems or weakened immune systems.    Is sometimes treated at home and sometimes in the hospital  Antibiotic medicines  Antibiotics may be prescribed for pneumonia caused by bacteria. They may be pills (oral medicines), or shots (injections). Or they may be given by IV (intravenously) into a vein. If you are taking oral medicines at home:    Fill your prescription and start taking your medicine as soon as you can.    You will likely start to feel better in a day or 2, but don t stop taking the antibiotic.    Use a pill organizer to help you remember to take your medicine.    Let your healthcare provider know if you have side effects.    Take your medicine exactly as directed on the label. Talk to your provider or pharmacist if you have any questions.  Antiviral medicines  Antiviral medicine may be prescribed for pneumonia caused by a virus. For example, antiviral medicine may be prescribed for pneumonia caused by the flu virus. Antibiotics do not work against viruses. If you are taking antiviral medicine at home:    Fill your prescription and start taking your medicine as soon as you can.    Talk with your provider or pharmacist about possible side effects.    Take the medicine exactly as instructed.  To relieve symptoms  There are many  medicines that can help relieve symptoms of pneumonia. Some are prescription and some are over-the-counter.  Your healthcare provider may recommend:    Acetaminophen or ibuprofen to lower your fever and to lessen headache or other pain    Cough medicine to loosen mucus or to reduce coughing  Make sure you check with your healthcare provider or pharmacist before taking any over-the-counter medicines.  Special treatments  If you are hospitalized for pneumonia, you may have other therapies, including:    Inhaled medicines to help with breathing or chest congestion    Supplemental oxygen to increase low oxygen levels  Drink fluids and eat healthy  You should eat healthy to help your body fight the infection. Drinking a lot of fluids helps to replace fluids lost from fever and to loosen mucus in your chest.    Diet. Make healthy food choices, including fruits and vegetables, lean meats and other proteins, 100% whole grain and low- or no-fat dairy products.    Fluids. Drink at least 6 to 8 tall glasses a day. Water and 100% fruit or vegetable juice are best.  Get plenty of rest and sleep  You may be more tired than usual for a while. It is important to get enough sleep at night. It s also important to rest during the day. Talk with your healthcare provider if coughing or other symptoms are interfering with your sleep.  Preventing the spread of germs  The best thing you can do to prevent spreading germs is to wash your hands often. You should:    Rub your hand with soap and water for 20 to 30 seconds.    Clean in between your fingers, the backs of your hands, and around your nails.    Dry your hands on a separate towel or use paper towels.  You should also:    Keep alcohol-based hand  nearby.    Make sure you also clean surfaces that you touch. Use a product that kills all types of germs.    Stay away from others until you are feeling better.  When to call your healthcare provider  Call your healthcare provider if  you have any of the following:    Symptoms get worse    Fever continues    Shortness of breath gets worse    Increased mucus or mucus that is darker in color    Coughing gets worse    Lips or fingers are bluish in color    Side effects from your medicine   Date Last Reviewed: 12/1/2016 2000-2017 [employer]. [address] [place] [address]. All rights reserved. This information is not intended as a substitute for professional medical care. Always follow your healthcare professional's instructions.                Follow-ups after your visit        Your next 10 appointments already scheduled     Sep 07, 2017 10:15 AM CDT   LAB with RU LAB   [facility]YSIC[facility] [place] ([facility])    [address]  [facility] [address] [phone]           Patient must bring picture ID. Patient should be prepared to give a urine specimen  Please do not eat 10-12 hours before your appointment if you are coming in fasting for labs on lipids, cholesterol, or glucose (sugar). Pregnant women should follow their Care Team instructions. Water with medications is okay. Do not drink coffee or other fluids. If you have concerns about taking  your medications, please ask at office or if scheduling via [employer], send a message by clicking on Secure Messaging, Message Your Care Team.            Sep 13, 2017   Procedure with Edson Orozco MD   [facility] ([facility])    [address] [place] [id number] [phone]           [facility] is located at [address] [place]              Who to contact     If you have questions or need follow up information about today's clinic visit or your schedule please contact [facility] directly at [phone].  Normal or non-critical lab and imaging results will be communicated to you by MyChart, letter or phone within 4 business days after the  clinic has received the results. If you do not hear from us within 7 days, please contact the clinic through [employer] or phone. If you have a critical or abnormal lab result, we will notify you by phone as soon as possible.  Submit refill requests through [employer] or call your pharmacy and they will forward the refill request to us. Please allow 3 business days for your refill to be completed.          Additional Information About Your Visit        [employer] Information     [employer] gives you secure access to your electronic health record. If you see a primary care provider, you can also send messages to your care team and make appointments. If you have questions, please call your primary care clinic.  If you do not have a primary care provider, please call [phone] and they will assist you.        Care EveryWhere ID     This is your Care EveryWhere ID. This could be used by other organizations to access your [place] medical records  [id number]        Your Vitals Were     Pulse Temperature Pulse Oximetry             87 98.3  F (36.8  C) (Tympanic) 94%          Blood Pressure from Last 3 Encounters:   09/01/17 102/68   08/17/17 104/66   05/02/17 110/66    Weight from Last 3 Encounters:   08/17/17 164 lb 4.8 oz (74.5 kg)   05/02/17 167 lb (75.8 kg)   11/15/16 160 lb (72.6 kg)              We Performed the Following     INHALATION/NEBULIZER TREATMENT, INITIAL          Today's Medication Changes          These changes are accurate as of: 9/1/17  6:50 PM.  If you have any questions, ask your nurse or doctor.               Start taking these medicines.        Dose/Directions    albuterol (2.5 MG/3ML) 0.083% neb solution   Used for:  Pneumonia of right lower lobe due to infectious organism (H)   Started by:  Malinda Boone PA-C        Dose:  1 vial   Take 1 vial (2.5 mg) by nebulization every 6 hours   Quantity:  1 Box   Refills:  0       azithromycin 250 MG tablet   Commonly known as:  ZITHROMAX   Used for:   Pneumonia of right lower lobe due to infectious organism (H)   Started by:  Malinda Boone PA-C        Two tablets first day, then one tablet daily for four days.   Quantity:  6 tablet   Refills:  0       ipratropium - albuterol 0.5 mg/2.5 mg/3 mL 0.5-2.5 (3) MG/3ML neb solution   Commonly known as:  DUONEB   Used for:  Cough   Started by:  Malinda Boone PA-C        Dose:  1 vial   Take 1 vial (3 mLs) by nebulization once for 1 dose   Quantity:  1 vial   Refills:  0       predniSONE 20 MG tablet   Commonly known as:  DELTASONE   Used for:  Pneumonia of right lower lobe due to infectious organism (H)   Started by:  Malinda Boone PA-C        Dose:  40 mg   Take 2 tablets (40 mg) by mouth daily for 5 days   Quantity:  10 tablet   Refills:  0            Where to get your medicines      These medications were sent to [id number]  KARRI SANCHEZ - [address] AT [facility] OF [address]  [address], [place] [address]     Phone:  [phone]     azithromycin 250 MG tablet    predniSONE 20 MG tablet         Some of these will need a paper prescription and others can be bought over the counter.  Ask your nurse if you have questions.     You don't need a prescription for these medications     albuterol (2.5 MG/3ML) 0.083% neb solution    ipratropium - albuterol 0.5 mg/2.5 mg/3 mL 0.5-2.5 (3) MG/3ML neb solution                Primary Care Provider Office Phone # Fax #    Washington Kannan Yang PA-C [phone] [address]  [facility] [address]        Equal Access to Services     [facility]: Bonnie Londono, wafredoda genevieve, qarheata kaalmada aderichda, carolyn Monzon [facility] [phone].    ATENCIÓN: Si habla español, tiene a polo disposición servicios gratuitos de asistencia lingüística. Llame al [phone].    We comply with applicable federal civil rights laws and [place] laws. We do not  discriminate on the basis of race, color, national origin, age, disability sex, sexual orientation or gender identity.            Thank you!     Thank you for choosing [facility]  for your care. Our goal is always to provide you with excellent care. Hearing back from our patients is one way we can continue to improve our services. Please take a few minutes to complete the written survey that you may receive in the mail after your visit with us. Thank you!             Your Updated Medication List - Protect others around you: Learn how to safely use, store and throw away your medicines at www.disposemymeds.org.          This list is accurate as of: 9/1/17  6:50 PM.  Always use your most recent med list.                   Brand Name Dispense Instructions for use Diagnosis    albuterol (2.5 MG/3ML) 0.083% neb solution     1 Box    Take 1 vial (2.5 mg) by nebulization every 6 hours    Pneumonia of right lower lobe due to infectious organism (H)       aspirin 81 MG tablet      1 tab po QD (Once per day)        azithromycin 250 MG tablet    ZITHROMAX    6 tablet    Two tablets first day, then one tablet daily for four days.    Pneumonia of right lower lobe due to infectious organism (H)       CIALIS 20 MG tablet   Generic drug:  tadalafil      TK ONE T PRIOR TO ANTICIPATED SEXUAL ACTIVITY MAX PER DAY        evolocumab 140 MG/ML prefilled autoinjector    REPATHA    2 mL    Inject 1 mL (140 mg) Subcutaneous every 14 days    Mixed hyperlipidemia       ipratropium - albuterol 0.5 mg/2.5 mg/3 mL 0.5-2.5 (3) MG/3ML neb solution    DUONEB    1 vial    Take 1 vial (3 mLs) by nebulization once for 1 dose    Cough       NEURONTIN 600 MG tablet   Generic drug:  gabapentin      1 tab q day        predniSONE 20 MG tablet    DELTASONE    10 tablet    Take 2 tablets (40 mg) by mouth daily for 5 days    Pneumonia of right lower lobe due to infectious organism (H)       rosuvastatin 40 MG tablet    CRESTOR    45 tablet     Take 0.5 tablets (20 mg) by mouth daily    Mixed hyperlipidemia

## 2017-09-01 NOTE — PROGRESS NOTES
SUBJECTIVE:  Johan Navarro is a 62 year old male who presents to the clinic today with a chief complaint of cough , shortness of breath. and wheezing. for 4 day(s).  His cough is described as persistent and productive of yellow sputum.    The patient's symptoms are moderate and worsening.  Associated symptoms include wheezing. The patient's symptoms are exacerbated by no particular triggers  Patient has been using one dose of Augmentin  to improve symptoms. He recently had eyelid surgery on Monday, and is unsure of whether or not he was intubated. He felt feverish the first two days. Those symptoms have resolved and he is now experiencing mostly cough with wheezing. He does not have a history of asthma.     Past Medical History:   Diagnosis Date     Aortic stenosis      CAD (coronary artery disease)     cardiac cath 1998: stents x 2 to circumflex     Carotid stenosis     left     Family history of early CAD      Hyperlipidaemia LDL goal < 70     familial hyperlipidemia with marked hypercholesterolemia before treatment; has been on some form of cholesterol-lowering medication since the 1970s       Current Outpatient Prescriptions   Medication Sig Dispense Refill     ipratropium - albuterol 0.5 mg/2.5 mg/3 mL (DUONEB) 0.5-2.5 (3) MG/3ML neb solution Take 1 vial (3 mLs) by nebulization once for 1 dose 1 vial 0     azithromycin (ZITHROMAX) 250 MG tablet Two tablets first day, then one tablet daily for four days. 6 tablet 0     predniSONE (DELTASONE) 20 MG tablet Take 2 tablets (40 mg) by mouth daily for 5 days 10 tablet 0     albuterol (2.5 MG/3ML) 0.083% neb solution Take 1 vial (2.5 mg) by nebulization every 6 hours 1 Box 0     rosuvastatin (CRESTOR) 40 MG tablet Take 0.5 tablets (20 mg) by mouth daily 45 tablet 3     evolocumab (REPATHA) 140 MG/ML prefilled autoinjector Inject 1 mL (140 mg) Subcutaneous every 14 days 2 mL 3     CIALIS 20 MG tablet TK ONE T PRIOR TO ANTICIPATED SEXUAL ACTIVITY MAX PER DAY  11      NEURONTIN 600 MG OR TABS 1 tab q day       ASPIRIN 81 MG OR TABS 1 tab po QD (Once per day) (Patient not taking: Reported on 9/1/2017)         Social History   Substance Use Topics     Smoking status: Former Smoker     Smokeless tobacco: Never Used     Alcohol use Yes      Comment: very rare       ROS  Review of systems negative except as stated above.    OBJECTIVE:  /68 (BP Location: Right arm, Patient Position: Chair, Cuff Size: Adult Regular)  Pulse 87  Temp 98.3  F (36.8  C) (Tympanic)  SpO2 94%  GENERAL APPEARANCE: healthy, alert and no distress  EYES: EOMI,  PERRL, conjunctiva clear  HENT: ear canals and TM's normal.  Nose and mouth without ulcers, erythema or lesions  NECK: supple, nontender, no lymphadenopathy  RESP: expiratory wheezes R upper anterior, R upper posterior, L upper anterior and L upper posterior. Cleared with DUO NEB  CV: regular rates and rhythm, normal S1 S2, no murmur noted  NEURO: Normal strength and tone, sensory exam grossly normal,  normal speech and mentation  SKIN: no suspicious lesions or rashes  DUO neb given in office -- patient is much improved following administration.     ASSESSMENT/PLAN:  1. Pneumonia of right lower lobe due to infectious organism (H)  Went over RED FLAG symptoms for pneumonia  Push fluids and rest   - azithromycin (ZITHROMAX) 250 MG tablet; Two tablets first day, then one tablet daily for four days.  Dispense: 6 tablet; Refill: 0  - predniSONE (DELTASONE) 20 MG tablet; Take 2 tablets (40 mg) by mouth daily for 5 days  Dispense: 10 tablet; Refill: 0  - albuterol (2.5 MG/3ML) 0.083% neb solution; Take 1 vial (2.5 mg) by nebulization every 6 hours  Dispense: 1 Box; Refill: 0    2. Cough  - XR Chest 2 Views; Future  - INHALATION/NEBULIZER TREATMENT, INITIAL  - ipratropium - albuterol 0.5 mg/2.5 mg/3 mL (DUONEB) 0.5-2.5 (3) MG/3ML neb solution; Take 1 vial (3 mLs) by nebulization once for 1 dose  Dispense: 1 vial; Refill: 0    Malinda Boone PA-C

## 2017-09-01 NOTE — NURSING NOTE
"Chief Complaint   Patient presents with     Urgent Care     URI     Eye surgery on monday, Congested yellow and green phlegm, constipated, SOB, gets tired really easily. Respiratory wheezing, chills, sweats tx: augmentin . Patient also has 3 stents in heart.       Initial /68 (BP Location: Right arm, Patient Position: Chair, Cuff Size: Adult Regular)  Pulse 87  Temp 98.3  F (36.8  C) (Tympanic)  SpO2 94% Estimated body mass index is 26.52 kg/(m^2) as calculated from the following:    Height as of 8/17/17: 5' 6\" (1.676 m).    Weight as of 8/17/17: 164 lb 4.8 oz (74.5 kg).  Medication Reconciliation: unable or not appropriate to perform   Philip Mansfield Medical Assistant      "

## 2017-09-01 NOTE — PATIENT INSTRUCTIONS
Treating Pneumonia  Pneumonia is an infection of one or both of the lungs. Pneumonia:    Is usually caused by either a virus or a bacteria    Can be very serious, especially in infants, young children, and older adults. It s also serious for those with other long-term health problems or weakened immune systems.    Is sometimes treated at home and sometimes in the hospital  Antibiotic medicines  Antibiotics may be prescribed for pneumonia caused by bacteria. They may be pills (oral medicines), or shots (injections). Or they may be given by IV (intravenously) into a vein. If you are taking oral medicines at home:    Fill your prescription and start taking your medicine as soon as you can.    You will likely start to feel better in a day or 2, but don t stop taking the antibiotic.    Use a pill organizer to help you remember to take your medicine.    Let your healthcare provider know if you have side effects.    Take your medicine exactly as directed on the label. Talk to your provider or pharmacist if you have any questions.  Antiviral medicines  Antiviral medicine may be prescribed for pneumonia caused by a virus. For example, antiviral medicine may be prescribed for pneumonia caused by the flu virus. Antibiotics do not work against viruses. If you are taking antiviral medicine at home:    Fill your prescription and start taking your medicine as soon as you can.    Talk with your provider or pharmacist about possible side effects.    Take the medicine exactly as instructed.  To relieve symptoms  There are many medicines that can help relieve symptoms of pneumonia. Some are prescription and some are over-the-counter.  Your healthcare provider may recommend:    Acetaminophen or ibuprofen to lower your fever and to lessen headache or other pain    Cough medicine to loosen mucus or to reduce coughing  Make sure you check with your healthcare provider or pharmacist before taking any over-the-counter medicines.  Special  treatments  If you are hospitalized for pneumonia, you may have other therapies, including:    Inhaled medicines to help with breathing or chest congestion    Supplemental oxygen to increase low oxygen levels  Drink fluids and eat healthy  You should eat healthy to help your body fight the infection. Drinking a lot of fluids helps to replace fluids lost from fever and to loosen mucus in your chest.    Diet. Make healthy food choices, including fruits and vegetables, lean meats and other proteins, 100% whole grain and low- or no-fat dairy products.    Fluids. Drink at least 6 to 8 tall glasses a day. Water and 100% fruit or vegetable juice are best.  Get plenty of rest and sleep  You may be more tired than usual for a while. It is important to get enough sleep at night. It s also important to rest during the day. Talk with your healthcare provider if coughing or other symptoms are interfering with your sleep.  Preventing the spread of germs  The best thing you can do to prevent spreading germs is to wash your hands often. You should:    Rub your hand with soap and water for 20 to 30 seconds.    Clean in between your fingers, the backs of your hands, and around your nails.    Dry your hands on a separate towel or use paper towels.  You should also:    Keep alcohol-based hand  nearby.    Make sure you also clean surfaces that you touch. Use a product that kills all types of germs.    Stay away from others until you are feeling better.  When to call your healthcare provider  Call your healthcare provider if you have any of the following:    Symptoms get worse    Fever continues    Shortness of breath gets worse    Increased mucus or mucus that is darker in color    Coughing gets worse    Lips or fingers are bluish in color    Side effects from your medicine   Date Last Reviewed: 12/1/2016 2000-2017 [employer]. [address] [place] [address], [place] [address]. All rights reserved. This information is  not intended as a substitute for professional medical care. Always follow your healthcare professional's instructions.

## 2017-09-02 NOTE — TELEPHONE ENCOUNTER
Wife at pharmacy seeking RX prescribed in  [facility]; Review of [facility] EMR reveals RX for albuterol neb that did not print or escribe from clinic;   Pharmacy called by FNA and telephone order read to pharmacist.      Additional Information    Pharmacy calling with prescription question and triager answers question    Protocols used: MEDICATION QUESTION CALL-ADULT-JERAMY Nj RN  FNA

## 2017-09-08 ENCOUNTER — OFFICE VISIT (OUTPATIENT)
Dept: FAMILY MEDICINE | Facility: CLINIC | Age: 62
End: 2017-09-08
Payer: COMMERCIAL

## 2017-09-08 VITALS
SYSTOLIC BLOOD PRESSURE: 116 MMHG | WEIGHT: 159.9 LBS | HEART RATE: 84 BPM | DIASTOLIC BLOOD PRESSURE: 66 MMHG | OXYGEN SATURATION: 96 % | BODY MASS INDEX: 25.7 KG/M2 | HEIGHT: 66 IN | TEMPERATURE: 98.1 F

## 2017-09-08 DIAGNOSIS — Z87.01 HISTORY OF PNEUMONIA: Primary | ICD-10-CM

## 2017-09-08 PROCEDURE — 99213 OFFICE O/P EST LOW 20 MIN: CPT | Performed by: PHYSICIAN ASSISTANT

## 2017-09-08 NOTE — MR AVS SNAPSHOT
After Visit Summary   9/8/2017    Johan Navarro    MRN: [id number]           Patient Information     Date Of Birth          1955        Visit Information        Provider Department      9/8/2017 10:40 AM Washington Yang PA-C [facility]        Today's Diagnoses     History of pneumonia    -  1       Follow-ups after your visit        Your next 10 appointments already scheduled     Sep 12, 2017 10:30 AM CDT   LAB with RU LAB   [facility]    [address]  [place] [address] [phone]           Patient must bring picture ID. Patient should be prepared to give a urine specimen  Please do not eat 10-12 hours before your appointment if you are coming in fasting for labs on lipids, cholesterol, or glucose (sugar). Pregnant women should follow their Care Team instructions. Water with medications is okay. Do not drink coffee or other fluids. If you have concerns about taking  your medications, please ask at office or if scheduling via I and love and you, send a message by clicking on Secure Messaging, Message Your Care Team.              Who to contact     If you have questions or need follow up information about today's clinic visit or your schedule please contact [facility] directly at [phone].  Normal or non-critical lab and imaging results will be communicated to you by MyChart, letter or phone within 4 business days after the clinic has received the results. If you do not hear from us within 7 days, please contact the clinic through [employer] or phone. If you have a critical or abnormal lab result, we will notify you by phone as soon as possible.  Submit refill requests through I and love and you or call your pharmacy and they will forward the refill request to us. Please allow 3 business days for your refill to be completed.          Additional Information About Your Visit        [facility] [employer] [facility]  "Information     [employer] gives you secure access to your electronic health record. If you see a primary care provider, you can also send messages to your care team and make appointments. If you have questions, please call your primary care clinic.  If you do not have a primary care provider, please call [phone] and they will assist you.        Care EveryWhere ID     This is your Care EveryWhere ID. This could be used by other organizations to access your [facility] medical records  [id number]        Your Vitals Were     Pulse Temperature Height Pulse Oximetry BMI (Body Mass Index)       84 98.1  F (36.7  C) (Oral) 5' 6\" (1.676 m) 96% 25.81 kg/m2        Blood Pressure from Last 3 Encounters:   09/08/17 116/66   09/01/17 102/68   08/17/17 104/66    Weight from Last 3 Encounters:   09/08/17 159 lb 14.4 oz (72.5 kg)   08/17/17 164 lb 4.8 oz (74.5 kg)   05/02/17 167 lb (75.8 kg)              Today, you had the following     No orders found for display       Primary Care Provider Office Phone # Fax #    Washington Yang PA-C [phone]       [address] [facility] [address]        Equal Access to Services     GEENA MONCADA AH: Hadii aad ku hadasho Soomaali, waaxda luqadaha, qaybta kaalmada adeegyada, waxay idiin hayaan adeeg kharash la'aan . So [facility] [phone].    ATENCIÓN: Si habla español, tiene a polo disposición servicios gratuitos de asistencia lingüística. Llame al [phone].    We comply with applicable federal civil rights laws and [place] laws. We do not discriminate on the basis of race, color, national origin, age, disability sex, sexual orientation or gender identity.            Thank you!     Thank you for choosing [facility]  for your care. Our goal is always to provide you with excellent care. Hearing back from our patients is one way we can continue to improve our services. Please take a few minutes to complete the written survey that you may receive in the " mail after your visit with us. Thank you!             Your Updated Medication List - Protect others around you: Learn how to safely use, store and throw away your medicines at www.disposemymeds.org.          This list is accurate as of: 9/8/17 11:22 AM.  Always use your most recent med list.                   Brand Name Dispense Instructions for use Diagnosis    albuterol (2.5 MG/3ML) 0.083% neb solution     1 Box    Take 1 vial (2.5 mg) by nebulization every 6 hours    Pneumonia of right lower lobe due to infectious organism (H)       aspirin 81 MG tablet      1 tab po QD (Once per day)        azithromycin 250 MG tablet    ZITHROMAX    6 tablet    Two tablets first day, then one tablet daily for four days.    Pneumonia of right lower lobe due to infectious organism (H)       CIALIS 20 MG tablet   Generic drug:  tadalafil      TK ONE T PRIOR TO ANTICIPATED SEXUAL ACTIVITY MAX PER DAY        evolocumab 140 MG/ML prefilled autoinjector    REPATHA    2 mL    Inject 1 mL (140 mg) Subcutaneous every 14 days    Mixed hyperlipidemia       ipratropium - albuterol 0.5 mg/2.5 mg/3 mL 0.5-2.5 (3) MG/3ML neb solution    DUONEB    1 vial    Take 1 vial (3 mLs) by nebulization once for 1 dose    Cough       NEURONTIN 600 MG tablet   Generic drug:  gabapentin      1 tab q day        rosuvastatin 40 MG tablet    CRESTOR    45 tablet    Take 0.5 tablets (20 mg) by mouth daily    Mixed hyperlipidemia

## 2017-09-08 NOTE — NURSING NOTE
"Chief Complaint   Patient presents with     Cough       Initial /66 (BP Location: Right arm, Cuff Size: Adult Regular)  Pulse 84  Temp 98.1  F (36.7  C) (Oral)  Ht 5' 6\" (1.676 m)  Wt 159 lb 14.4 oz (72.5 kg)  SpO2 96%  BMI 25.81 kg/m2 Estimated body mass index is 25.81 kg/(m^2) as calculated from the following:    Height as of this encounter: 5' 6\" (1.676 m).    Weight as of this encounter: 159 lb 14.4 oz (72.5 kg).  Medication Reconciliation: complete   Ken Lester CMA      "

## 2017-09-08 NOTE — PROGRESS NOTES
SUBJECTIVE:   Johan Navarro is a 62 year old male who presents to clinic today for the following health issues:    RESPIRATORY SYMPTOMS      Duration: 10 days    Description  cough and sweats    Severity: moderate    Accompanying signs and symptoms: SOB    History (predisposing factors):  none    Precipitating or alleviating factors: None    Therapies tried and outcome:  Prednisone, Z-pack, neb solution    Patient presents for follow up after dx of pneumonia  Was diagnosed on 17, started on zpack, prednisone and albuterol neb  He has continued on the nebs  Finished both zpack and prednisone, well tolerated  Shortness of breath is improved, still mildly symptomatic   -did actually mow the lawn a few days ago  -Coughing still at night, but improved  -no fevers or chills      Problem list and histories reviewed & adjusted, as indicated.  Additional history: as documented    Patient Active Problem List   Diagnosis     Depressive disorder, not elsewhere classified     Elevated prostate specific antigen (PSA)     Osteoarthritis of Hand      Mixed hyperlipidemia     Family history of early CAD     Aortic stenosis     Carotid stenosis     Coronary artery disease involving native coronary artery of native heart without angina pectoris     Malignant neoplasm of prostate (H)     Past Surgical History:   Procedure Laterality Date     CARDIAC SURGERY      coronary stents placed      DAVINCI PROSTATECTOMY           SURGICAL HISTORY OF -       Right hand Xanthoma removal     SURGICAL HISTORY OF -       Angioplasty/Stent placement x 2     SURGICAL HISTORY OF -       Right heel surgery - post football injury     SURGICAL HISTORY OF -       Stress Echo (-)       Social History   Substance Use Topics     Smoking status: Former Smoker     Smokeless tobacco: Never Used     Alcohol use Yes      Comment: very rare     Family History   Problem Relation Age of Onset     Lipids Mother      C.A.D. Mother       at age 49 of  "MI     C.A.D. Sister      MI at age 48     Cancer - colorectal No family hx of      Prostate Cancer No family hx of              Reviewed and updated as needed this visit by clinical staffTobacco  Allergies  Med Hx  Surg Hx  Fam Hx  Soc Hx      Reviewed and updated as needed this visit by Provider         ROS:  Constitutional, HEENT, cardiovascular, pulmonary, gi and gu systems are negative, except as otherwise noted.      OBJECTIVE:   /66 (BP Location: Right arm, Cuff Size: Adult Regular)  Pulse 84  Temp 98.1  F (36.7  C) (Oral)  Ht 5' 6\" (1.676 m)  Wt 159 lb 14.4 oz (72.5 kg)  SpO2 96%  BMI 25.81 kg/m2  Body mass index is 25.81 kg/(m^2).  GENERAL: healthy, alert and no distress  EYES: Eyes grossly normal to inspection, PERRL and conjunctivae and sclerae normal  HENT: ear canals and TM's normal, nose and mouth without ulcers or lesions  RESP: lungs clear to auscultation - no rales, rhonchi or wheezes  CV: regular rates and rhythm and soft systolic murmur    Diagnostic Test Results:  none     ASSESSMENT/PLAN:   1. History of pneumonia  Dx 9/1/17. Still mildly symptomatic but overall improved. Lungs clear today. His vitals look excellent. We'll have him add mucinex to his regimen and can continue albuterol nebs prn. He'll follow up if worsening or with any changes.     Washington Yang PA-C  [facility]  "

## 2017-09-12 ENCOUNTER — DOCUMENTATION ONLY (OUTPATIENT)
Dept: CARDIOLOGY | Facility: CLINIC | Age: 62
End: 2017-09-12

## 2017-09-12 DIAGNOSIS — E78.2 MIXED HYPERLIPIDEMIA: ICD-10-CM

## 2017-09-12 LAB
ALT SERPL W P-5'-P-CCNC: 26 U/L (ref 0–70)
CHOLEST SERPL-MCNC: 176 MG/DL
HDLC SERPL-MCNC: 60 MG/DL
LDLC SERPL CALC-MCNC: 94 MG/DL
NONHDLC SERPL-MCNC: 116 MG/DL
TRIGL SERPL-MCNC: 109 MG/DL

## 2017-09-12 PROCEDURE — 84460 ALANINE AMINO (ALT) (SGPT): CPT | Performed by: INTERNAL MEDICINE

## 2017-09-12 PROCEDURE — 80061 LIPID PANEL: CPT | Performed by: INTERNAL MEDICINE

## 2017-09-12 PROCEDURE — 36415 COLL VENOUS BLD VENIPUNCTURE: CPT | Performed by: INTERNAL MEDICINE

## 2017-09-12 NOTE — LETTER
September 12, 2017       TO: Johan Navarro   [address]  [place] [address]       Dear Mr. Navarro,    The results of your recent Laboratory tests.    Results for orders placed or performed in visit on 09/12/17   Lipid Profile   Result Value Ref Range    Cholesterol 176 <200 mg/dL    Triglycerides 109 <150 mg/dL    HDL Cholesterol 60 >39 mg/dL    LDL Cholesterol Calculated 94 <100 mg/dL    Non HDL Cholesterol 116 <130 mg/dL   ALT   Result Value Ref Range    ALT 26 0 - 70 U/L       Per Dr. Galicia - good results    Sincerely,    [facility]

## 2017-09-12 NOTE — PROGRESS NOTES
Lipids 9/12/17 noted, ordered for 3 month f/u of repatha therapy  Will message Dr. Galicia to review    Results letter mailed to patient

## 2017-09-25 DIAGNOSIS — E78.2 MIXED HYPERLIPIDEMIA: ICD-10-CM

## 2017-09-26 ENCOUNTER — TELEPHONE (OUTPATIENT)
Dept: PHARMACY | Facility: OTHER | Age: 62
End: 2017-09-26

## 2017-09-26 NOTE — TELEPHONE ENCOUNTER
Prior Authorization Approval    Authorization Effective Date: 8/20/2017  Authorization Expiration Date: 9/20/2018  Medication: Repatha  Approved Dose/Quantity:   Reference #:     Insurance Company: [employer] - Phone [phone] Fax [phone]  Expected CoPay:       CoPay Card Available:      Foundation Assistance Needed:    Which Pharmacy is filling the prescription (Not needed for infusion/clinic administered): [facility] - [place], [place] - [address]  Pharmacy Notified:    Patient Notified:

## 2017-10-11 ENCOUNTER — TELEPHONE (OUTPATIENT)
Dept: FAMILY MEDICINE | Facility: CLINIC | Age: 62
End: 2017-10-11

## 2017-10-11 NOTE — TELEPHONE ENCOUNTER
Patient requesting a refill of this arthitis medication, states that his [facility] provider previously prescribed this for him but would like Arben Yang to take it over if possible. I could not find the medication on his list.    DICLOFENAC SODIUM TOPICAL GEL 1% for Arthritis    -Zuri Pizarro

## 2017-10-11 NOTE — TELEPHONE ENCOUNTER
[facility] policy requires evisit or phone visit (or in person) for new medications. Please help set up. Thanks!

## 2017-10-27 ENCOUNTER — TELEPHONE (OUTPATIENT)
Dept: CARDIOLOGY | Facility: CLINIC | Age: 62
End: 2017-10-27

## 2017-10-27 NOTE — TELEPHONE ENCOUNTER
Patient approved for Repatha. RN called and left a VM to determine if he required assistance with injections. Left contact information.   Patient called back. He is on Repatha and is doing well. His wife is administering his injections. RN provided RepathaReady information to obtain the $5 co-pay card. Patient will call back with any difficulties obtaining the co-pay card.

## 2017-11-07 DIAGNOSIS — N52.9 ERECTILE DYSFUNCTION, UNSPECIFIED ERECTILE DYSFUNCTION TYPE: ICD-10-CM

## 2017-11-09 NOTE — TELEPHONE ENCOUNTER
Routing refill request to provider for review/approval because:  Drug not active on patient's medication list, note at last refill  Says therapy completed? Prescribed as Cialis in the past.   Please sign if ok.    Heather Leach RN  Triage Nurse

## 2017-11-10 RX ORDER — SILDENAFIL CITRATE 20 MG/1
TABLET ORAL
Qty: 30 TABLET | Refills: 1 | Status: SHIPPED | OUTPATIENT
Start: 2017-11-10 | End: 2018-01-08

## 2017-11-18 ENCOUNTER — HEALTH MAINTENANCE LETTER (OUTPATIENT)
Age: 62
End: 2017-11-18

## 2017-12-04 ENCOUNTER — DOCUMENTATION ONLY (OUTPATIENT)
Dept: CARDIOLOGY | Facility: CLINIC | Age: 62
End: 2017-12-04

## 2017-12-04 DIAGNOSIS — I25.10 CORONARY ARTERY DISEASE INVOLVING NATIVE CORONARY ARTERY OF NATIVE HEART WITHOUT ANGINA PECTORIS: Primary | ICD-10-CM

## 2017-12-04 DIAGNOSIS — E78.2 MIXED HYPERLIPIDEMIA: ICD-10-CM

## 2017-12-04 LAB
CHOLEST SERPL-MCNC: 399 MG/DL
HDLC SERPL-MCNC: 53 MG/DL
LDLC SERPL CALC-MCNC: 307 MG/DL
NONHDLC SERPL-MCNC: 346 MG/DL
TRIGL SERPL-MCNC: 197 MG/DL

## 2017-12-04 PROCEDURE — 80061 LIPID PANEL: CPT | Performed by: INTERNAL MEDICINE

## 2017-12-04 PROCEDURE — 36415 COLL VENOUS BLD VENIPUNCTURE: CPT | Performed by: INTERNAL MEDICINE

## 2017-12-04 NOTE — PROGRESS NOTES
Lipid panel 12/4/17 noted, ordered to f/u repatha therapy  Attempted to contact patient to verify if he has been consistent with medications, left message for patient to call back

## 2017-12-04 NOTE — PROGRESS NOTES
Lipids results:   Chol HDL LDL zoë Chol/HDL TG   12/04/17 0808 399 53 307 -- 197   09/12/17 1037 176 60 94 -- 109   04/26/17 0918 203 67 124 -- 58   Patient returned call, Patient states he is not taking the Crestor along with the Repatha , which may account for the value changes.   Per telephone note 7-26-17 = Spoke with patient and recommendation given to stop the Niacin and zetia and decrease the Rosuvastatin to 20 mg daily. Patient will start Repatha on 7-28-17.   Spoke with patient who states on his own in Sept he stopped the Crestor 20 mg daily due to some bloating. Bloating resolved after stopping. Patient states he has watched his diet somewhat but has been eating a lot of chocalate, does not drink ETOH, and has been doing some exercise.   Patient wonders if he should resume Crestor or change to back to  atorvastatin which he did not bloat ?.   Will message Dr. Galicia.

## 2017-12-10 NOTE — TELEPHONE ENCOUNTER
He should resume the crestor as soon as possible, and we should get a repeat lipid panel in 6 weeks after he restarts it. Thanks.

## 2017-12-11 RX ORDER — ATORVASTATIN CALCIUM 80 MG/1
80 TABLET, FILM COATED ORAL DAILY
Qty: 90 TABLET | Refills: 3 | COMMUNITY
Start: 2017-12-11 | End: 2017-12-12

## 2017-12-11 NOTE — PROGRESS NOTES
"Per Dr. Galicia's recommendation \"He should resume the crestor as soon as possible, and we should get a repeat lipid panel in 6 weeks after he restarts it. \"  Attempted to contact patient with Dr. Galicia's reply regarding last week's lipids results, left message for patient to call back    Spoke with patient, he states he felt very bloated and nauseous using crestor and asks if he can go back to lipitor, he was using lipitor 80mg before moving to crestor and thinks he tolerated that much better.  Will message Dr. Galicia to review    Per Dr. Galicia's recommendation \"Atorvastatin is fine\"    Attempted to contact patient with approval to return to lipitor 80mg daily with his repatha and lipids panel in 6 weeks. Left message to patient to call back and let us know if he needs a new script sent in.  Order placed for lipids, med list updated    12/12/17 Attempted to contact patient with approval to return to lipitor 80mg daily with his repatha and lipids panel in 6 weeks  Spoke with patient to review return to lipitor 80mg with repatha. He will recheck flp on 1/19/17 prior to trip to [place]. Rx escripted and labwork scheduled.    "

## 2017-12-12 RX ORDER — ATORVASTATIN CALCIUM 80 MG/1
80 TABLET, FILM COATED ORAL DAILY
Qty: 90 TABLET | Refills: 3 | Status: SHIPPED | OUTPATIENT
Start: 2017-12-12 | End: 2018-11-28

## 2017-12-29 DIAGNOSIS — E78.2 MIXED HYPERLIPIDEMIA: Primary | ICD-10-CM

## 2018-01-08 DIAGNOSIS — N52.9 ERECTILE DYSFUNCTION, UNSPECIFIED ERECTILE DYSFUNCTION TYPE: ICD-10-CM

## 2018-01-08 NOTE — TELEPHONE ENCOUNTER
Requested Prescriptions   Pending Prescriptions Disp Refills     sildenafil (REVATIO) 20 MG tablet [Pharmacy Med Name: SILDENAFIL 20MG TABLETS]  Last Written Prescription Date:  11/10/17  Last Fill Quantity: 30,  # refills: 1   Last Office Visit with [facility], [facility] or [facility] prescribing provider:  9/8/2017     Future Office Visit:      30 tablet 0     Sig: TAKE TWO TO FIVE TABLETS BY MOUTH AS NEEDED PRIOR TO SXUAL INTERCOURSE NEVER USE WITH NITROGLYCERIN TERAZOSIN OR DOXAZOCIN    Erectile Dysfuction Protocol Passed    1/8/2018  3:26 AM       Passed - Absence of nitrates on medication list       Passed - Absence of Alpha Blockers on Med list       Passed - Recent or future visit with authorizing provider    Patient had office visit in the last year or has a visit in the next 30 days with authorizing provider.  See chart review.              Passed - Patient is age 18 or older

## 2018-01-10 RX ORDER — SILDENAFIL CITRATE 20 MG/1
TABLET ORAL
Qty: 30 TABLET | Refills: 3 | Status: SHIPPED | OUTPATIENT
Start: 2018-01-10 | End: 2018-05-16

## 2018-01-10 NOTE — TELEPHONE ENCOUNTER
Prescription approved per [facility] Refill Protocol.  Heather Leach, RN  Triage Nurse

## 2018-01-19 ENCOUNTER — DOCUMENTATION ONLY (OUTPATIENT)
Dept: CARDIOLOGY | Facility: CLINIC | Age: 63
End: 2018-01-19

## 2018-01-19 DIAGNOSIS — I25.10 CORONARY ARTERY DISEASE INVOLVING NATIVE CORONARY ARTERY OF NATIVE HEART WITHOUT ANGINA PECTORIS: ICD-10-CM

## 2018-01-19 LAB
CHOLEST SERPL-MCNC: 146 MG/DL
HDLC SERPL-MCNC: 60 MG/DL
LDLC SERPL CALC-MCNC: 68 MG/DL
NONHDLC SERPL-MCNC: 86 MG/DL
TRIGL SERPL-MCNC: 91 MG/DL

## 2018-01-19 PROCEDURE — 80061 LIPID PANEL: CPT | Performed by: INTERNAL MEDICINE

## 2018-01-19 PROCEDURE — 36415 COLL VENOUS BLD VENIPUNCTURE: CPT | Performed by: INTERNAL MEDICINE

## 2018-01-19 NOTE — PROGRESS NOTES
FLP 1/19/18 noted:  Chol HDL LDL zoë Chol/HDL TG    01/19/18 0755 146 60 68 -- 91   12/04/17 0808 399 53 307 -- 197     Done after patient changed to atorvastatin 80 mg in combination with Repatha. Will message Dr. Galicia for review.

## 2018-01-19 NOTE — PROGRESS NOTES
Dr. Galciia's reply - Look much better. Thanks.   Patient called with results. Patient states he is tolerating the Lipitor better than the Crestor. Repatha is going fine. Patient states happiness with lab values. Will continue on current treatment.

## 2018-04-17 ENCOUNTER — TELEPHONE (OUTPATIENT)
Dept: CARDIOLOGY | Facility: CLINIC | Age: 63
End: 2018-04-17

## 2018-04-17 DIAGNOSIS — I35.0 NONRHEUMATIC AORTIC VALVE STENOSIS: Primary | ICD-10-CM

## 2018-05-16 ENCOUNTER — TELEPHONE (OUTPATIENT)
Dept: FAMILY MEDICINE | Facility: CLINIC | Age: 63
End: 2018-05-16

## 2018-05-16 DIAGNOSIS — N52.9 ERECTILE DYSFUNCTION, UNSPECIFIED ERECTILE DYSFUNCTION TYPE: ICD-10-CM

## 2018-05-16 RX ORDER — SILDENAFIL CITRATE 20 MG/1
TABLET ORAL
Qty: 30 TABLET | Refills: 0 | Status: SHIPPED | OUTPATIENT
Start: 2018-05-16 | End: 2018-10-04

## 2018-05-16 NOTE — TELEPHONE ENCOUNTER
"Requested Prescriptions   Pending Prescriptions Disp Refills     sildenafil (REVATIO) 20 MG tablet [Pharmacy Med Name: SILDENAFIL 20MG TABLETS]    Last Written Prescription Date:  1/10/2018  Last Fill Quantity: 30,  # refills: 3   Last office visit: 9/8/2017 with prescribing provider:  Washington Yang     Future Office Visit:     30 tablet 0     Sig: TAKE TWO TO FIVE TABLETS BY MOUTH AS NEEDED PRIOR TO SXUAL INTERCOURSE NEVER USE WITH NITROGLYCERIN TERAZOSIN OR DOXAZOCIN    Erectile Dysfuction Protocol Passed    5/16/2018  3:26 AM       Passed - Absence of nitrates on medication list       Passed - Absence of Alpha Blockers on Med list       Passed - Recent (12 mo) or future (30 days) visit within the authorizing provider's specialty    Patient had office visit in the last 12 months or has a visit in the next 30 days with authorizing provider or within the authorizing provider's specialty.  See \"Patient Info\" tab in inbasket, or \"Choose Columns\" in Meds & Orders section of the refill encounter.           Passed - Patient is age 18 or older          "

## 2018-05-16 NOTE — TELEPHONE ENCOUNTER
Medication is being filled for 1 time refill only due to:  due for annual physical      Prescription approved per [facility] Refill Protocol.    Taniya FLORES RN, BSN, PHN  [place] [facility] RN

## 2018-05-22 ENCOUNTER — OFFICE VISIT (OUTPATIENT)
Dept: FAMILY MEDICINE | Facility: CLINIC | Age: 63
End: 2018-05-22
Payer: COMMERCIAL

## 2018-05-22 VITALS
HEART RATE: 73 BPM | BODY MASS INDEX: 25.84 KG/M2 | DIASTOLIC BLOOD PRESSURE: 78 MMHG | OXYGEN SATURATION: 97 % | WEIGHT: 160.8 LBS | SYSTOLIC BLOOD PRESSURE: 119 MMHG | RESPIRATION RATE: 18 BRPM | TEMPERATURE: 97 F | HEIGHT: 66 IN

## 2018-05-22 DIAGNOSIS — C61 MALIGNANT NEOPLASM OF PROSTATE (H): ICD-10-CM

## 2018-05-22 DIAGNOSIS — Z00.00 ENCOUNTER FOR ROUTINE ADULT HEALTH EXAMINATION WITHOUT ABNORMAL FINDINGS: Primary | ICD-10-CM

## 2018-05-22 DIAGNOSIS — I25.10 CORONARY ARTERY DISEASE INVOLVING NATIVE CORONARY ARTERY OF NATIVE HEART WITHOUT ANGINA PECTORIS: ICD-10-CM

## 2018-05-22 DIAGNOSIS — N52.31 ERECTILE DYSFUNCTION FOLLOWING RADICAL PROSTATECTOMY: ICD-10-CM

## 2018-05-22 DIAGNOSIS — M79.644 PAIN OF FINGER OF RIGHT HAND: ICD-10-CM

## 2018-05-22 DIAGNOSIS — M79.671 FOOT PAIN, RIGHT: ICD-10-CM

## 2018-05-22 DIAGNOSIS — Z23 NEED FOR VACCINATION: ICD-10-CM

## 2018-05-22 PROCEDURE — 99213 OFFICE O/P EST LOW 20 MIN: CPT | Mod: 25 | Performed by: PHYSICIAN ASSISTANT

## 2018-05-22 PROCEDURE — 99396 PREV VISIT EST AGE 40-64: CPT | Performed by: PHYSICIAN ASSISTANT

## 2018-05-22 ASSESSMENT — ENCOUNTER SYMPTOMS
HEMATURIA: 0
ABDOMINAL PAIN: 0
DIZZINESS: 0
COUGH: 0
HEMATOCHEZIA: 0
NERVOUS/ANXIOUS: 0
DIARRHEA: 0
FEVER: 0
CONSTIPATION: 0
CHILLS: 0
EYE PAIN: 0
FREQUENCY: 0

## 2018-05-22 NOTE — PROGRESS NOTES
SUBJECTIVE:   CC: Johan Navarro is an 63 year old male who presents for preventative health visit.     Physical   Annual:     Getting at least 3 servings of Calcium per day::  Yes    Bi-annual eye exam::  Yes    Dental care twice a year::  Yes    Sleep apnea or symptoms of sleep apnea::  None    Diet::  Low fat/cholesterol    Taking medications regularly::  Yes    Additional concerns today::  YES (Medication refills, pain in right foot )          -Patient presents for annual physical  -Overall feels healthy  -No further follow up needed fr prostate  -He does have some aches/pains in the fingers/hands   -has had injections previously but now using diclofenac gel   -using off an on with good effect  -Remains active in the yard, no specific   -Also wondering about a painful lateral aspect of his right foot  -there is a bump, making wearing shoes difficult  -denies any trauma      Today's PHQ-2 Score:   PHQ-2 ( 1999 [employer]) 5/22/2018   Q1: Little interest or pleasure in doing things 0   Q2: Feeling down, depressed or hopeless 0   PHQ-2 Score 0   Q1: Little interest or pleasure in doing things Not at all   Q2: Feeling down, depressed or hopeless Not at all   PHQ-2 Score 0       Abuse: Current or Past(Physical, Sexual or Emotional)- No  Do you feel safe in your environment - Yes    Social History   Substance Use Topics     Smoking status: Former Smoker     Smokeless tobacco: Never Used     Alcohol use Yes      Comment: very rare     Alcohol Use 5/22/2018   If you drink alcohol do you typically have greater than 3 drinks per day OR greater than 7 drinks per week? Not Applicable       Last PSA: No results found for: PSA    Reviewed orders with patient. Reviewed health maintenance and updated orders accordingly - Yes  Labs reviewed in EPIC    Reviewed and updated as needed this visit by clinical staff  Tobacco  Allergies  Meds  Med Hx  Surg Hx  Fam Hx  Soc Hx        Reviewed and updated as needed this visit by  "Provider            Review of Systems   Constitutional: Negative for chills and fever.   HENT: Positive for congestion. Negative for ear pain.    Eyes: Negative for pain.   Respiratory: Negative for cough.    Cardiovascular: Negative for chest pain.   Gastrointestinal: Negative for abdominal pain, constipation, diarrhea and hematochezia.   Genitourinary: Negative for frequency, genital sores and hematuria.   Neurological: Negative for dizziness.   Psychiatric/Behavioral: The patient is not nervous/anxious.        OBJECTIVE:   /78 (BP Location: Right arm, Patient Position: Chair, Cuff Size: Adult Large)  Pulse 73  Temp 97  F (36.1  C) (Tympanic)  Resp 18  Ht 5' 5.5\" (1.664 m)  Wt 160 lb 12.8 oz (72.9 kg)  SpO2 97%  BMI 26.35 kg/m2    Physical Exam  GENERAL: healthy, alert and no distress  EYES: Eyes grossly normal to inspection, PERRL and conjunctivae and sclerae normal  HENT: ear canals and TM's normal, nose and mouth without ulcers or lesions  NECK: no adenopathy, no asymmetry, masses, or scars and thyroid normal to palpation  RESP: lungs clear to auscultation - no rales, rhonchi or wheezes  CV: regular rate and rhythm, normal S1 S2, no S3 or S4, no murmur, click or rub, no peripheral edema and peripheral pulses strong  ABDOMEN: soft, nontender, no hepatosplenomegaly, no masses and bowel sounds normal  RECTAL (male): deferred  MS: along the right lateral aspect of the foot along the 5th metatarsal is a tender, hard bony prominence  SKIN: no suspicious lesions or rashes  NEURO: Normal strength and tone, mentation intact and speech normal  PSYCH: mentation appears normal, affect normal/bright    ASSESSMENT/PLAN:   1. Encounter for routine adult health examination without abnormal findings  64yo male in stable health.. Updating colonoscopy in 5/30/18. Needs updated cmp.   - **Comprehensive metabolic panel FUTURE 2mo; Future    2. Coronary artery disease involving native coronary artery of native heart " "without angina pectoris  He does need a statin in addition to repatha as his labs have shown recently. Continue management with Dr. Galicia  - **Comprehensive metabolic panel FUTURE 2mo; Future    3. Erectile dysfunction following radical prostatectomy  4. Malignant neoplasm of prostate (H)  On further questioning he has not followed up with urology in quite some time. He is asymptomatic. We'll screen PSA today but i've asked him to check with his urologist to confirm he has no further follow up needed  - Prostate spec antigen screen; Future    5. Pain of finger of right hand  There is some arthritic component in the past, saw ortho and rec'd injections previously but then started doing gel. Refilling. Reviewed risk factors  - diclofenac (VOLTAREN) 1 % GEL topical gel; Apply 2 grams to hands/fingers four times daily using enclosed dosing card.  Dispense: 100 g; Refill: 1    6. Foot pain, right  It is unclear it this is a bunionette or if too proximal for that dx. I will hold on xray and defer to podiatry. It is tender to touch and causing him to change choices of footwear so certainly it is warranted to figure this out  - ORTHO  REFERRAL    7. Need for vaccination  Recommend Shingrix      COUNSELING:   Reviewed preventive health counseling, as reflected in patient instructions       Regular exercise       Healthy diet/nutrition       Consider Hep C screening for patients born between 1945 and 1965       HIV screeninx in teen years, 1x in adult years, and at intervals if high risk       Colon cancer screening       Prostate cancer screening         reports that he has quit smoking. He has never used smokeless tobacco.    Estimated body mass index is 26.35 kg/(m^2) as calculated from the following:    Height as of this encounter: 5' 5.5\" (1.664 m).    Weight as of this encounter: 160 lb 12.8 oz (72.9 kg).   Weight management plan: Discussed healthy diet and exercise guidelines and patient will follow up in 12 " months in clinic to re-evaluate.    Counseling Resources:  ATP IV Guidelines  Pooled Cohorts Equation Calculator  FRAX Risk Assessment  ICSI Preventive Guidelines  Dietary Guidelines for Americans, 2010  USDA's MyPlate  ASA Prophylaxis  Lung CA Screening    Washington Yang PA-C  [facility]  Answers for HPI/ROS submitted by the patient on 5/22/2018   PHQ-2 Score: 0

## 2018-05-22 NOTE — MR AVS SNAPSHOT
After Visit Summary   5/22/2018    Johan Navarro    MRN: [id number]           Patient Information     Date Of Birth          1955        Visit Information        Provider Department      5/22/2018 9:00 AM Washington Yang PA-C [facility]        Today's Diagnoses     Encounter for routine adult health examination without abnormal findings    -  1    Coronary artery disease involving native coronary artery of native heart without angina pectoris        Erectile dysfunction following radical prostatectomy        Malignant neoplasm of prostate (H)        Pain of finger of right hand        Foot pain, right        Need for vaccination          Care Instructions      Preventive Health Recommendations  Male Ages 50 - 64    Yearly exam:             See your health care provider every year in order to  o   Review health changes.   o   Discuss preventive care.    o   Review your medicines if your doctor has prescribed any.     Have a cholesterol test every 5 years, or more frequently if you are at risk for high cholesterol/heart disease.     Have a diabetes test (fasting glucose) every three years. If you are at risk for diabetes, you should have this test more often.     Have a colonoscopy at age 50, or have a yearly FIT test (stool test). These exams will check for colon cancer.      Talk with your health care provider about whether or not a prostate cancer screening test (PSA) is right for you.    You should be tested each year for STDs (sexually transmitted diseases), if you re at risk.     Shots: Get a flu shot each year. Get a tetanus shot every 10 years.     Nutrition:    Eat at least 5 servings of fruits and vegetables daily.     Eat whole-grain bread, whole-wheat pasta and brown rice instead of white grains and rice.     Talk to your provider about Calcium and Vitamin D.     Lifestyle    Exercise for at least 150 minutes a week (30 minutes a day, 5 days a week). This will help  you control your weight and prevent disease.     Limit alcohol to one drink per day.     No smoking.     Wear sunscreen to prevent skin cancer.     See your dentist every six months for an exam and cleaning.     See your eye doctor every 1 to 2 years.            Follow-ups after your visit        Additional Services     ORTHO  REFERRAL       [facility] is referring you to the Orthopedic  Services at [facility].       The  Representative will assist you in the coordination of your Orthopedic and Musculoskeletal Care as prescribed by your physician.    The  Representative will call you within 1 business day to help schedule your appointment, or you may contact the  Representative at:    All areas ~ [phone]     Type of Referral : [facility]       Timeframe requested: Routine    Coverage of these services is subject to the terms and limitations of your health insurance plan.  Please call member services at your health plan with any benefit or coverage questions.      If X-rays, CT or MRI's have been performed, please contact the facility where they were done to arrange for , prior to your scheduled appointment.  Please bring this referral request to your appointment and present it to your specialist.                  Follow-up notes from your care team     Return in about 1 year (around 5/22/2019) for Physical Exam.      Your next 10 appointments already scheduled     May 30, 2018   Procedure with Edson Orozco MD   [facility] ([facility]    [address] [facility] [address]   [phone]           [facility] is located at [address] [place]            Aug 13, 2018  8:45 AM CDT   Ech Complete with [id number]   [facility]chocardi[facility] ([facility])    [address]  [place]  [address]  [facility] [address] [phone]           1. Please bring or wear a comfortable two-piece outfit. 2. You may eat, drink and take your normal medicines. 3. For any questions that cannot be answered, please contact the ordering physician            Aug 20, 2018  9:45 AM CDT   Return Visit with Sander Galicia MD   [facility] ([facility])    [address]  [facility] [address] [phone] OPT 2              Future tests that were ordered for you today     Open Future Orders        Priority Expected Expires Ordered    **Comprehensive metabolic panel FUTURE 2mo Routine 7/21/2018 9/19/2018 5/22/2018            Who to contact     If you have questions or need follow up information about today's clinic visit or your schedule please contact [facility] directly at [phone].  Normal or non-critical lab and imaging results will be communicated to you by MyChart, letter or phone within 4 business days after the clinic has received the results. If you do not hear from us within 7 days, please contact the clinic through [employer] or phone. If you have a critical or abnormal lab result, we will notify you by phone as soon as possible.  Submit refill requests through [employer] or call your pharmacy and they will forward the refill request to us. Please allow 3 business days for your refill to be completed.          Additional Information About Your Visit        [employer] Information     [employer] gives you secure access to your electronic health record. If you see a primary care provider, you can also send messages to your care team and make appointments. If you have questions, please call your primary care clinic.  If you do not have a primary care provider, please call [phone] and they will assist you.        Care EveryWhere ID     This is your Care EveryWhere ID. This could be used by other organizations to access your  "[place] medical records  [id number]        Your Vitals Were     Pulse Temperature Respirations Height Pulse Oximetry BMI (Body Mass Index)    73 97  F (36.1  C) (Tympanic) 18 5' 5.5\" (1.664 m) 97% 26.35 kg/m2       Blood Pressure from Last 3 Encounters:   05/22/18 119/78   09/08/17 116/66   09/01/17 102/68    Weight from Last 3 Encounters:   05/22/18 160 lb 12.8 oz (72.9 kg)   09/08/17 159 lb 14.4 oz (72.5 kg)   08/17/17 164 lb 4.8 oz (74.5 kg)              We Performed the Following     ORTHO  REFERRAL     Prostate spec antigen screen          Today's Medication Changes          These changes are accurate as of 5/22/18  9:30 AM.  If you have any questions, ask your nurse or doctor.               Start taking these medicines.        Dose/Directions    diclofenac 1 % Gel topical gel   Commonly known as:  VOLTAREN   Used for:  Pain of finger of right hand   Started by:  Washington Yang PA-C        Apply 2 grams to hands/fingers four times daily using enclosed dosing card.   Quantity:  100 g   Refills:  1            Where to get your medicines      These medications were sent to [id number]  [place] - [address]  [address], [place] [address]     Phone:  [phone]     diclofenac 1 % Gel topical gel                Primary Care Provider Office Phone # Fax #    Washington Yang PA-C [phone] [address]  [facility] [address]        Equal Access to Services     [facility] AH: Hadii aad ku hadasho Soomaali, waaxda luqadaha, qaybta kaalmada adeegyada, carolyn barkley. So [facility] [phone].    ATENCIÓN: Si habla español, tiene a polo disposición servicios gratuitos de asistencia lingüística. Llame al [phone].    We comply with applicable federal civil rights laws and [place] laws. We do not discriminate on the basis of race, color, national origin, age, disability, sex, " sexual orientation, or gender identity.            Thank you!     Thank you for choosing [facility]  for your care. Our goal is always to provide you with excellent care. Hearing back from our patients is one way we can continue to improve our services. Please take a few minutes to complete the written survey that you may receive in the mail after your visit with us. Thank you!             Your Updated Medication List - Protect others around you: Learn how to safely use, store and throw away your medicines at www.disposemymeds.org.          This list is accurate as of 5/22/18  9:30 AM.  Always use your most recent med list.                   Brand Name Dispense Instructions for use Diagnosis    aspirin 81 MG tablet      1 tab po QD (Once per day)        atorvastatin 80 MG tablet    LIPITOR    90 tablet    Take 1 tablet (80 mg) by mouth daily    Coronary artery disease involving native coronary artery of native heart without angina pectoris       CIALIS 20 MG tablet   Generic drug:  tadalafil      TK ONE T PRIOR TO ANTICIPATED SEXUAL ACTIVITY MAX PER DAY        diclofenac 1 % Gel topical gel    VOLTAREN    100 g    Apply 2 grams to hands/fingers four times daily using enclosed dosing card.    Pain of finger of right hand       * evolocumab 140 MG/ML prefilled autoinjector    REPATHA    2 mL    Inject 1 mL (140 mg) Subcutaneous every 14 days    Mixed hyperlipidemia       * evolocumab 140 MG/ML prefilled autoinjector    REPATHA    2 mL    Inject 1 mL (140 mg) Subcutaneous every 14 days    Mixed hyperlipidemia       NEURONTIN 600 MG tablet   Generic drug:  gabapentin      1 tab q day        sildenafil 20 MG tablet    REVATIO    30 tablet    TAKE TWO TO FIVE TABLETS BY MOUTH AS NEEDED PRIOR TO SXUAL INTERCOURSE NEVER USE WITH NITROGLYCERIN TERAZOSIN OR DOXAZOCIN    Erectile dysfunction, unspecified erectile dysfunction type       * Notice:  This list has 2 medication(s) that are the same as other  medications prescribed for you. Read the directions carefully, and ask your doctor or other care provider to review them with you.

## 2018-05-29 DIAGNOSIS — I25.10 CORONARY ARTERY DISEASE INVOLVING NATIVE CORONARY ARTERY OF NATIVE HEART WITHOUT ANGINA PECTORIS: Primary | ICD-10-CM

## 2018-05-29 NOTE — TELEPHONE ENCOUNTER
Requested Prescriptions   Pending Prescriptions Disp Refills     gabapentin (NEURONTIN) 600 MG tablet [Pharmacy Med Name: GABAPENTIN 600MG TABLETS]  Last Written Prescription Date:  historical  Last Fill Quantity: na,  # refills: na   Last office visit: 5/22/2018 with prescribing provider:  5/22/2018     Future Office Visit:   Next 5 appointments (look out 90 days)     Aug 20, 2018  9:45 AM CDT   Return Visit with Sander Galicia MD   [facility]    [address] [place] [id number] [phone] OPT 2                  180 tablet 0     Sig: TAKE 1 TABLET BY MOUTH TWICE DAILY    There is no refill protocol information for this order

## 2018-05-30 ENCOUNTER — HOSPITAL ENCOUNTER (OUTPATIENT)
Facility: CLINIC | Age: 63
Discharge: HOME | End: 2018-05-30
Attending: INTERNAL MEDICINE | Admitting: INTERNAL MEDICINE
Payer: COMMERCIAL

## 2018-05-30 VITALS
RESPIRATION RATE: 12 BRPM | OXYGEN SATURATION: 95 % | SYSTOLIC BLOOD PRESSURE: 118 MMHG | DIASTOLIC BLOOD PRESSURE: 73 MMHG

## 2018-05-30 LAB — COLONOSCOPY: NORMAL

## 2018-05-30 PROCEDURE — 25000128 H RX IP 250 OP 636: Performed by: INTERNAL MEDICINE

## 2018-05-30 PROCEDURE — 88305 TISSUE EXAM BY PATHOLOGIST: CPT | Performed by: INTERNAL MEDICINE

## 2018-05-30 PROCEDURE — 45385 COLONOSCOPY W/LESION REMOVAL: CPT | Mod: PT | Performed by: INTERNAL MEDICINE

## 2018-05-30 PROCEDURE — 88305 TISSUE EXAM BY PATHOLOGIST: CPT | Mod: 26 | Performed by: INTERNAL MEDICINE

## 2018-05-30 PROCEDURE — G0500 MOD SEDAT ENDO SERVICE >5YRS: HCPCS | Performed by: INTERNAL MEDICINE

## 2018-05-30 RX ORDER — ONDANSETRON 4 MG/1
4 TABLET, ORALLY DISINTEGRATING ORAL EVERY 6 HOURS PRN
Status: DISCONTINUED | OUTPATIENT
Start: 2018-05-30 | End: 2018-05-30 | Stop reason: HOSPADM

## 2018-05-30 RX ORDER — NALOXONE HYDROCHLORIDE 0.4 MG/ML
.1-.4 INJECTION, SOLUTION INTRAMUSCULAR; INTRAVENOUS; SUBCUTANEOUS
Status: DISCONTINUED | OUTPATIENT
Start: 2018-05-30 | End: 2018-05-30 | Stop reason: HOSPADM

## 2018-05-30 RX ORDER — LIDOCAINE 40 MG/G
CREAM TOPICAL
Status: DISCONTINUED | OUTPATIENT
Start: 2018-05-30 | End: 2018-05-30 | Stop reason: HOSPADM

## 2018-05-30 RX ORDER — FENTANYL CITRATE 50 UG/ML
INJECTION, SOLUTION INTRAMUSCULAR; INTRAVENOUS PRN
Status: DISCONTINUED | OUTPATIENT
Start: 2018-05-30 | End: 2018-05-30 | Stop reason: HOSPADM

## 2018-05-30 RX ORDER — ONDANSETRON 2 MG/ML
4 INJECTION INTRAMUSCULAR; INTRAVENOUS
Status: DISCONTINUED | OUTPATIENT
Start: 2018-05-30 | End: 2018-05-30 | Stop reason: HOSPADM

## 2018-05-30 RX ORDER — FLUMAZENIL 0.1 MG/ML
0.2 INJECTION, SOLUTION INTRAVENOUS
Status: DISCONTINUED | OUTPATIENT
Start: 2018-05-30 | End: 2018-05-30 | Stop reason: HOSPADM

## 2018-05-30 RX ORDER — ONDANSETRON 2 MG/ML
4 INJECTION INTRAMUSCULAR; INTRAVENOUS EVERY 6 HOURS PRN
Status: DISCONTINUED | OUTPATIENT
Start: 2018-05-30 | End: 2018-05-30 | Stop reason: HOSPADM

## 2018-05-30 NOTE — DISCHARGE INSTRUCTIONS
Understanding Colon and Rectal Polyps     The colon has a smooth lining composed of millions of cells.     The colon (also called the large intestine) is a muscular tube that forms the last part of the digestive tract. It absorbs water and stores food waste. The colon is about 4 to 6 feet long. The rectum is the last 6 inches of the colon. The colon and rectum have a smooth lining composed of millions of cells. Changes in these cells can lead to growths in the colon that can become cancerous and should be removed.     When the Colon Lining Changes    Understanding Diverticulosis and Diverticulitis     Pouches or diverticula usually occur in the lower part of the colon called the sigmoid.      Diverticulitis occurs when the pouches become inflamed.     The colon (large intestine) is the last part of the digestive tract. It absorbs water from stool and changes it from a liquid to a solid. In certain cases, small pouches called diverticula can form in the colon wall. This condition is called diverticulosis. The pouches can become infected. If this happens, it becomes a more serious problem called diverticulitis. These problems can be painful. But they can be managed.   Managing Your Condition  Diet changes or taking medications are often tried first. These may be enough to bring relief. If the case is bad, surgery may be done. You and your doctor can discuss the plan that is best for you.  If You Have Diverticulosis  Diet changes are often enough to control symptoms. The main changes are adding fiber (roughage) and drinking more water. Fiber absorbs water as it travels through your colon. This helps your stool stay soft and move smoothly. Water helps this process. If needed, you may be told to take over-the-counter stool softeners. To help relieve pain, antispasmodic medications may be prescribed.  If You Have Diverticulitis  Treatment depends on how bad your symptoms are.  For mild symptoms: You may be put on a  liquid diet for a short time. You may also be prescribed antibiotics. If these two steps relieve your symptoms, you may then be prescribed a high-fiber diet. If you still have symptoms, your doctor will discuss further treatment options with you.  For severe symptoms: You may need to be admitted to the hospital. There, you can be given IV antibiotics and fluids. Once symptoms are under control, the above treatments may be tried. If these don t control your condition, your doctor may discuss the option of having surgery with you.  [place] to Colon Health  Help keep your colon healthy with a diet that includes plenty of high-fiber fruits, vegetables, and whole grains. Drink plenty of liquids like water and juice. Your doctor may also recommend avoiding seeds and nuts.          2487-2616 [facility] [place], [address] [place] [address], [place] [address]. All rights reserved. This information is not intended as a substitute for professional medical care. Always follow your healthcare professional's instructions.  Changes that occur in the cells that line the colon or rectum can lead to growths called polyps. Over a period of years, polyps can turn cancerous. Removing polyps early may prevent cancer from ever forming.      Polyps  Polyps are fleshy clumps of tissue that form on the lining of the colon or rectum. Small polyps are usually benign (not cancerous). However, over time, cells in a polyp can change and become cancerous. The larger a polyp grows, the more likely this is to happen. Also, certain types of polyps known as adenomatous polyps are considered premalignant. This means that they will almost always become cancerous if they re not removed.          Cancer  Almost all colorectal cancers start when polyp cells begin growing abnormally. As a cancerous tumor grows, it may involve more and more of the colon or rectum. In time, cancer can also grow beyond the colon or rectum and spread to nearby organs or to glands called  lymph nodes. The cells can also travel to other parts of the body. This is known as metastasis. The earlier a cancerous tumor is removed, the better the chance of preventing its spread.        9830-0943 [employer], [address] [place] [address], [place] [address]. All rights reserved. This information is not intended as a substitute for professional medical care. Always follow your healthcare professional's instructions.

## 2018-05-30 NOTE — IP AVS SNAPSHOT
MRN:[id number]                      After Visit Summary   5/30/2018    Johan Navarro    MRN: [id number]           Thank you!     Thank you for choosing [facility] for your care. Our goal is always to provide you with excellent care. Hearing back from our patients is one way we can continue to improve our services. Please take a few minutes to complete the written survey that you may receive in the mail after you visit. If you would like to speak to someone directly about your visit please contact Patient Relations at [phone]. Thank you!          Patient Information     Date Of Birth          1955        About your hospital stay     You were admitted on:  May 30, 2018 You last received care in the:  [facility]    You were discharged on:  May 30, 2018       Who to Call     For medical emergencies, please call 911.  For non-urgent questions about your medical care, please call your primary care provider or clinic, [phone]  For questions related to your surgery, please call your surgery clinic        Attending Provider     Provider Specialty    Edson Orozco MD Gastroenterology       Primary Care Provider Office Phone # Fax #    Washington Kannan Yang PA-C [phone]      Your next 10 appointments already scheduled     Jun 06, 2018  9:00 AM CDT   New Visit with Alber Tinsley DPM   [facility]    [address] [place] [address] [address]  [facility] [address] [phone]            Aug 13, 2018  8:45 AM CDT   Ech Complete with [id number]   [facility] CV Echocardiography ([facility])    [address] [place] [id number] [phone]           1. Please bring or wear a comfortable two-piece outfit. 2. You may eat, drink and take your normal medicines. 3. For any questions that cannot be answered, please contact the ordering  physician            Aug 20, 2018  9:45 AM CDT   Return Visit with Sander Galicia MD   [facility] ([facility])    [address]  [place] [address] [phone] OPT 2              Further instructions from your care team         Understanding Colon and Rectal Polyps     The colon has a smooth lining composed of millions of cells.     The colon (also called the large intestine) is a muscular tube that forms the last part of the digestive tract. It absorbs water and stores food waste. The colon is about 4 to 6 feet long. The rectum is the last 6 inches of the colon. The colon and rectum have a smooth lining composed of millions of cells. Changes in these cells can lead to growths in the colon that can become cancerous and should be removed.     When the Colon Lining Changes    Understanding Diverticulosis and Diverticulitis     Pouches or diverticula usually occur in the lower part of the colon called the sigmoid.      Diverticulitis occurs when the pouches become inflamed.     The colon (large intestine) is the last part of the digestive tract. It absorbs water from stool and changes it from a liquid to a solid. In certain cases, small pouches called diverticula can form in the colon wall. This condition is called diverticulosis. The pouches can become infected. If this happens, it becomes a more serious problem called diverticulitis. These problems can be painful. But they can be managed.   Managing Your Condition  Diet changes or taking medications are often tried first. These may be enough to bring relief. If the case is bad, surgery may be done. You and your doctor can discuss the plan that is best for you.  If You Have Diverticulosis  Diet changes are often enough to control symptoms. The main changes are adding fiber (roughage) and drinking more water. Fiber absorbs water as it travels through your colon. This helps your stool stay soft  and move smoothly. Water helps this process. If needed, you may be told to take over-the-counter stool softeners. To help relieve pain, antispasmodic medications may be prescribed.  If You Have Diverticulitis  Treatment depends on how bad your symptoms are.  For mild symptoms: You may be put on a liquid diet for a short time. You may also be prescribed antibiotics. If these two steps relieve your symptoms, you may then be prescribed a high-fiber diet. If you still have symptoms, your doctor will discuss further treatment options with you.  For severe symptoms: You may need to be admitted to the hospital. There, you can be given IV antibiotics and fluids. Once symptoms are under control, the above treatments may be tried. If these don t control your condition, your doctor may discuss the option of having surgery with you.  Chunky to Colon Health  Help keep your colon healthy with a diet that includes plenty of high-fiber fruits, vegetables, and whole grains. Drink plenty of liquids like water and juice. Your doctor may also recommend avoiding seeds and nuts.          7265-3135 [facility], [address] [place] [address], [place] [address]. All rights reserved. This information is not intended as a substitute for professional medical care. Always follow your healthcare professional's instructions.  Changes that occur in the cells that line the colon or rectum can lead to growths called polyps. Over a period of years, polyps can turn cancerous. Removing polyps early may prevent cancer from ever forming.      Polyps  Polyps are fleshy clumps of tissue that form on the lining of the colon or rectum. Small polyps are usually benign (not cancerous). However, over time, cells in a polyp can change and become cancerous. The larger a polyp grows, the more likely this is to happen. Also, certain types of polyps known as adenomatous polyps are considered premalignant. This means that they will almost always become cancerous if  they re not removed.          Cancer  Almost all colorectal cancers start when polyp cells begin growing abnormally. As a cancerous tumor grows, it may involve more and more of the colon or rectum. In time, cancer can also grow beyond the colon or rectum and spread to nearby organs or to glands called lymph nodes. The cells can also travel to other parts of the body. This is known as metastasis. The earlier a cancerous tumor is removed, the better the chance of preventing its spread.        5530-5021 [facility], [address] [place] [address], [place] [address]. All rights reserved. This information is not intended as a substitute for professional medical care. Always follow your healthcare professional's instructions.    Pending Results     No orders found from 5/28/2018 to 5/31/2018.            Admission Information     Date & Time Provider Department Dept. Phone    5/30/2018 Edson Orozco MD [facility] [phone]      Your Vitals Were     Blood Pressure Respirations Pulse Oximetry             115/77 12 97%         MyChart Information     [employer] gives you secure access to your electronic health record. If you see a primary care provider, you can also send messages to your care team and make appointments. If you have questions, please call your primary care clinic.  If you do not have a primary care provider, please call [phone] and they will assist you.        Care EveryWhere ID     This is your Care EveryWhere ID. This could be used by other organizations to access your [facility] medical records  [id number]        Equal Access to Services     GEENA MONCADA : Hadii hyun Londono, waaxda lualessandroadaha, qaybta kaalmacarolyn roberts . So [facility] [phone].    ATENCIÓN: Si habla español, tiene a polo disposición servicios gratuitos de asistencia lingüística. Llame al [phone].    We comply with applicable federal civil rights laws and [place]  laws. We do not discriminate on the basis of race, color, national origin, age, disability, sex, sexual orientation, or gender identity.               Review of your medicines      CONTINUE these medicines which have NOT CHANGED        Dose / Directions    aspirin 81 MG tablet        1 tab po QD (Once per day)   Refills:  0       atorvastatin 80 MG tablet   Commonly known as:  LIPITOR   Used for:  Coronary artery disease involving native coronary artery of native heart without angina pectoris        Dose:  80 mg   Take 1 tablet (80 mg) by mouth daily   Quantity:  90 tablet   Refills:  3       CIALIS 20 MG tablet   Generic drug:  tadalafil        TK ONE T PRIOR TO ANTICIPATED SEXUAL ACTIVITY MAX PER DAY   Refills:  11       diclofenac 1 % Gel topical gel   Commonly known as:  VOLTAREN   Used for:  Pain of finger of right hand        Apply 2 grams to hands/fingers four times daily using enclosed dosing card.   Quantity:  100 g   Refills:  1       * evolocumab 140 MG/ML prefilled autoinjector   Commonly known as:  REPATHA   Used for:  Mixed hyperlipidemia        Dose:  140 mg   Inject 1 mL (140 mg) Subcutaneous every 14 days   Quantity:  2 mL   Refills:  3       * evolocumab 140 MG/ML prefilled autoinjector   Commonly known as:  REPATHA   Used for:  Mixed hyperlipidemia        Dose:  140 mg   Inject 1 mL (140 mg) Subcutaneous every 14 days   Quantity:  2 mL   Refills:  5       NEURONTIN 600 MG tablet   Generic drug:  gabapentin        1 tab q day   Refills:  0       sildenafil 20 MG tablet   Commonly known as:  REVATIO   Used for:  Erectile dysfunction, unspecified erectile dysfunction type        TAKE TWO TO FIVE TABLETS BY MOUTH AS NEEDED PRIOR TO SXUAL INTERCOURSE NEVER USE WITH NITROGLYCERIN TERAZOSIN OR DOXAZOCIN   Quantity:  30 tablet   Refills:  0       * Notice:  This list has 2 medication(s) that are the same as other medications prescribed for you. Read the directions carefully, and ask your doctor or other  care provider to review them with you.             Protect others around you: Learn how to safely use, store and throw away your medicines at www.disposemymeds.org.             Medication List: This is a list of all your medications and when to take them. Check marks below indicate your daily home schedule. Keep this list as a reference.      Medications           Morning Afternoon Evening Bedtime As Needed    aspirin 81 MG tablet   1 tab po QD (Once per day)                                atorvastatin 80 MG tablet   Commonly known as:  LIPITOR   Take 1 tablet (80 mg) by mouth daily                                CIALIS 20 MG tablet   TK ONE T PRIOR TO ANTICIPATED SEXUAL ACTIVITY MAX PER DAY   Generic drug:  tadalafil                                diclofenac 1 % Gel topical gel   Commonly known as:  VOLTAREN   Apply 2 grams to hands/fingers four times daily using enclosed dosing card.                                * evolocumab 140 MG/ML prefilled autoinjector   Commonly known as:  REPATHA   Inject 1 mL (140 mg) Subcutaneous every 14 days                                * evolocumab 140 MG/ML prefilled autoinjector   Commonly known as:  REPATHA   Inject 1 mL (140 mg) Subcutaneous every 14 days                                NEURONTIN 600 MG tablet   1 tab q day   Generic drug:  gabapentin                                sildenafil 20 MG tablet   Commonly known as:  REVATIO   TAKE TWO TO FIVE TABLETS BY MOUTH AS NEEDED PRIOR TO SXUAL INTERCOURSE NEVER USE WITH NITROGLYCERIN TERAZOSIN OR DOXAZOCIN                                * Notice:  This list has 2 medication(s) that are the same as other medications prescribed for you. Read the directions carefully, and ask your doctor or other care provider to review them with you.

## 2018-05-30 NOTE — H&P
Pre-Endoscopy History and Physical     Johan Navarro MRN# [id number]   YOB: 1955 Age: 63 year old     Date of Procedure: 5/30/2018  Primary care provider: Washington Yang  Type of Endoscopy: Colonoscopy with possible biopsy, possible polypectomy  Reason for Procedure: screen  Type of Anesthesia Anticipated: Conscious Sedation    HPI:    Johan is a 63 year old male who will be undergoing the above procedure.      A history and physical has been performed. The patient's medications and allergies have been reviewed. The risks and benefits of the procedure and the sedation options and risks were discussed with the patient.  All questions were answered and informed consent was obtained.      He denies a personal or family history of anesthesia complications or bleeding disorders.     Patient Active Problem List   Diagnosis     Depressive disorder, not elsewhere classified     Elevated prostate specific antigen (PSA)     Osteoarthritis of Hand      Mixed hyperlipidemia     Family history of early CAD     Aortic stenosis     Carotid stenosis     Coronary artery disease involving native coronary artery of native heart without angina pectoris     Malignant neoplasm of prostate (H)        Past Medical History:   Diagnosis Date     Aortic stenosis      CAD (coronary artery disease)     cardiac cath 1998: stents x 2 to circumflex     Carotid stenosis     left     Family history of early CAD      Hyperlipidaemia LDL goal < 70     familial hyperlipidemia with marked hypercholesterolemia before treatment; has been on some form of cholesterol-lowering medication since the 1970s        Past Surgical History:   Procedure Laterality Date     CARDIAC SURGERY      coronary stents placed 1998     DAVINCI PROSTATECTOMY      2010     SURGICAL HISTORY OF -       Right hand Xanthoma removal     SURGICAL HISTORY OF -       Angioplasty/Stent placement x 2     SURGICAL HISTORY OF -       Right heel surgery - post football  injury     SURGICAL HISTORY OF -       Stress Echo (-)       Social History   Substance Use Topics     Smoking status: Former Smoker     Smokeless tobacco: Never Used     Alcohol use Yes      Comment: very rare       Family History   Problem Relation Age of Onset     Lipids Mother      C.A.D. Mother       at age 49 of MI     C.A.D. Sister      MI at age 48     Cancer - colorectal No family hx of      Prostate Cancer No family hx of      Colon Cancer No family hx of        Prior to Admission medications    Medication Sig Start Date End Date Taking? Authorizing Provider   atorvastatin (LIPITOR) 80 MG tablet Take 1 tablet (80 mg) by mouth daily 17  Yes Sander Galicia MD   NEURONTIN 600 MG OR TABS 1 tab q day   Yes    ASPIRIN 81 MG OR TABS 1 tab po QD (Once per day)       CIALIS 20 MG tablet TK ONE T PRIOR TO ANTICIPATED SEXUAL ACTIVITY MAX PER DAY 16   Reported, Patient   diclofenac (VOLTAREN) 1 % GEL topical gel Apply 2 grams to hands/fingers four times daily using enclosed dosing card. 18   Washington Yang PA-C   evolocumab (REPATHA) 140 MG/ML prefilled autoinjector Inject 1 mL (140 mg) Subcutaneous every 14 days 17   Sander Galicia MD   evolocumab (REPATHA) 140 MG/ML prefilled autoinjector Inject 1 mL (140 mg) Subcutaneous every 14 days 17   Sander Galicia MD   sildenafil (REVATIO) 20 MG tablet TAKE TWO TO FIVE TABLETS BY MOUTH AS NEEDED PRIOR TO SXUAL INTERCOURSE NEVER USE WITH NITROGLYCERIN TERAZOSIN OR DOXAZOCIN 18   Washington Yang PA-C       Allergies   Allergen Reactions     Crestor [Rosuvastatin] GI Disturbance     Dust Mites      Pollen Extract         REVIEW OF SYSTEMS:   5 point ROS negative except as noted above in HPI, including Gen., Resp., CV, GI &  system review.    PHYSICAL EXAM:   There were no vitals taken for this visit. Estimated body mass index is 26.35 kg/(m^2) as calculated from the following:    Height as of 18: 1.664 m  "(5' 5.5\").    Weight as of 5/22/18: 72.9 kg (160 lb 12.8 oz).   GENERAL APPEARANCE: alert, and oriented  MENTAL STATUS: alert  AIRWAY EXAM: Mallampatti Class I (visualization of the soft palate, fauces, uvula, anterior and posterior pillars)  RESP: lungs clear to auscultation - no rales, rhonchi or wheezes  CV: regular rates and rhythm  DIAGNOSTICS:    Not indicated    IMPRESSION   ASA Class 1 - Healthy patient, no medical problems    PLAN:   Plan for Colonoscopy with possible biopsy, possible polypectomy. We discussed the risks, benefits and alternatives and the patient wished to proceed.    The above has been forwarded to the consulting provider.      Signed Electronically by: Edson Orozco  May 30, 2018          "

## 2018-05-31 LAB — COPATH REPORT: NORMAL

## 2018-05-31 RX ORDER — GABAPENTIN 600 MG/1
TABLET ORAL
Qty: 180 TABLET | Refills: 0 | Status: SHIPPED | OUTPATIENT
Start: 2018-05-31 | End: 2018-08-26

## 2018-05-31 NOTE — TELEPHONE ENCOUNTER
Called patient and LM to return call to clinic regarding refill of medication.   Emily Peoples MA

## 2018-05-31 NOTE — TELEPHONE ENCOUNTER
Started taking after heart attack in 1998 to deal with the emotional aspects, anxiety, makes him happy.  He originally took 3 a day but now he takes 2 a day.    PATIENT IS OUT OF MEDS SO PLS STILL FILL TODAY IF POSSIBLE

## 2018-06-06 ENCOUNTER — RADIANT APPOINTMENT (OUTPATIENT)
Dept: GENERAL RADIOLOGY | Facility: CLINIC | Age: 63
End: 2018-06-06
Attending: PODIATRIST
Payer: COMMERCIAL

## 2018-06-06 ENCOUNTER — OFFICE VISIT (OUTPATIENT)
Dept: PODIATRY | Facility: CLINIC | Age: 63
End: 2018-06-06
Payer: COMMERCIAL

## 2018-06-06 VITALS
DIASTOLIC BLOOD PRESSURE: 70 MMHG | WEIGHT: 160.8 LBS | SYSTOLIC BLOOD PRESSURE: 116 MMHG | BODY MASS INDEX: 25.84 KG/M2 | HEIGHT: 66 IN

## 2018-06-06 DIAGNOSIS — M20.5X1 HALLUX LIMITUS OF RIGHT FOOT: ICD-10-CM

## 2018-06-06 DIAGNOSIS — G58.9 MONONEURITIS: ICD-10-CM

## 2018-06-06 DIAGNOSIS — M79.671 RIGHT FOOT PAIN: Primary | ICD-10-CM

## 2018-06-06 PROCEDURE — 73630 X-RAY EXAM OF FOOT: CPT | Mod: RT

## 2018-06-06 PROCEDURE — 99203 OFFICE O/P NEW LOW 30 MIN: CPT | Performed by: PODIATRIST

## 2018-06-06 NOTE — LETTER
2018         RE: Johan Navarro  [address]  [place] [address]        Dear Colleague,    [facility] Please see a copy of my visit note below.      ASSESSMENT/PLAN:    Encounter Diagnoses   Name Primary?     Right foot pain Yes     Hallux limitus of right foot      Mononeuritis, right foot    Body mass index is 25.95 kg/(m^2).    We reviewed the x-ray images together. The relevant anatomy was discussed.  The hallux limitus is not limiting activity.  I simply discussed what it is, showed him on x-ray and suggested stiffer soled shoes.      I think his chief complaint is sural neuritis secondary to pressure over the base of the right 5th metatarsal.  This too does not limit his activity. Pain only at night.      Foam heel lift suspension boot  Alter sleeping position  Ice and/or NSAIDs before bed    Steroid injection offered.  He will consider this.     Alber Tinsley, JULIO CESAR, FACFAS, MS    [place] [facility] Pod[facility]/Foot & Ankle Surgery      ____________________________________________________________________    HPI:       I was asked by Washington Yang PA-C  to evaluate this patient regarding right foot pain.     Chief Complaint: problems with bone protrusion  Onset of problem: 12 months  Pain/ discomfort is described as:  Throbbing, shooting  Ratin/10   Frequency:  nightly    The pain is made worse with sleeping  Previous treatment: wider shoes    *  Patient Active Problem List   Diagnosis     Depressive disorder, not elsewhere classified     Elevated prostate specific antigen (PSA)     Osteoarthritis of Hand      Mixed hyperlipidemia     Family history of early CAD     Aortic stenosis     Carotid stenosis     Coronary artery disease involving native coronary artery of native heart without angina pectoris     Malignant neoplasm of prostate (H)   *  *  Past Surgical History:   Procedure Laterality Date     CARDIAC SURGERY       coronary stents placed 1998     DAVINCI PROSTATECTOMY      2010     SURGICAL HISTORY OF -       Right hand Xanthoma removal     SURGICAL HISTORY OF -       Angioplasty/Stent placement x 2     SURGICAL HISTORY OF -       Right heel surgery - post football injury     SURGICAL HISTORY OF -       Stress Echo (-)   *  *  Current Outpatient Prescriptions   Medication Sig Dispense Refill     ASPIRIN 81 MG OR TABS 1 tab po QD (Once per day)       atorvastatin (LIPITOR) 80 MG tablet Take 1 tablet (80 mg) by mouth daily 90 tablet 3     CIALIS 20 MG tablet TK ONE T PRIOR TO ANTICIPATED SEXUAL ACTIVITY MAX PER DAY  11     diclofenac (VOLTAREN) 1 % GEL topical gel Apply 2 grams to hands/fingers four times daily using enclosed dosing card. 100 g 1     evolocumab (REPATHA) 140 MG/ML prefilled autoinjector Inject 1 mL (140 mg) Subcutaneous every 14 days 2 mL 5     evolocumab (REPATHA) 140 MG/ML prefilled autoinjector Inject 1 mL (140 mg) Subcutaneous every 14 days 2 mL 3     gabapentin (NEURONTIN) 600 MG tablet TAKE 1 TABLET BY MOUTH TWICE DAILY 180 tablet 0     sildenafil (REVATIO) 20 MG tablet TAKE TWO TO FIVE TABLETS BY MOUTH AS NEEDED PRIOR TO SXUAL INTERCOURSE NEVER USE WITH NITROGLYCERIN TERAZOSIN OR DOXAZOCIN 30 tablet 0       ROS:     A 10-point review of systems was performed and is positive for that noted in the HPI and as seen below.  All other areas are negative.     Numbness in feet?  no   Calf pain with walking? no  Recent foot/ankle injury? no  Weight change over past 12 months? no  Self perception as overweight? no  Recent flu-like symptoms? no  Joint pain other than feet ? arthritis    Social History: Employment:  retired;  Exercise/Physical activity:  golf;  Tobacco use:  no  Social History     Social History     Marital status:      Spouse name: N/A     Number of children: N/A     Years of education: N/A     Occupational History     Not on file.     Social History Main Topics     Smoking status: Former  "Smoker     Smokeless tobacco: Never Used     Alcohol use Yes      Comment: very rare     Drug use: No     Sexual activity: Yes     Partners: Female     Other Topics Concern     Caffeine Concern Yes     coffee and soda     Special Diet No     Exercise Yes     regular      Seat Belt Yes     Parent/Sibling W/ Cabg, Mi Or Angioplasty Before 65f 55m? Yes     49     Social History Narrative       Family history:  Family History   Problem Relation Age of Onset     Lipids Mother      C.A.D. Mother       at age 49 of MI     C.A.D. Sister      MI at age 48     Cancer - colorectal No family hx of      Prostate Cancer No family hx of      Colon Cancer No family hx of        Rheumatoid arthritis:  no  Foot Problems: no  Diabetes: no      EXAM:    Vitals: /70  Ht 5' 6\" (1.676 m)  Wt 160 lb 12.8 oz (72.9 kg)  BMI 25.95 kg/m2  BMI: Body mass index is 25.95 kg/(m^2).  Height: 5' 6\"    Constitutional/ general:  Pt is in no apparent distress, appears well-nourished.  Cooperative with history and physical exam.     Vascular:  Pedal pulses are palpable bilaterally for both the DP and PT arteries.  CFT < 3 sec.  No edema.  Pedal hair growth noted.     Neuro:  Alert and oriented x 3. Coordinated gait.  Light touch sensation is intact to the L4, L5, S1 distributions. No obvious deficits.  No evidence of neurological-based weakness, spasticity, or contracture in the lower extremities.     Derm: Normal texture and turgor.  No erythema, ecchymosis, or cyanosis.  No open lesions.     Musculoskeletal:    Lower extremity muscle strength is normal.  Patient is ambulatory without an assistive device or brace .  No gross deformities.  Fairly prominent styloid process, base of the right 5th met.  No pain with testing the peroneus brevis tendon.  Severely limited right 1st metatarsophalangeal joint dorsiflexion with dorsal eminence.      Radiographic Exam:  X-Ray Findings:  I personally reviewed the right foot films.  Degenerative " changes of the 1st metatarsophalangeal joint.  No osseous pathology seen near the base of th 5th meteatarsal.       Alber Tinsley DPM, JENNIFER, MS    [place] [facility] of Podiatry/Foot & Ankle Surgery                Again, thank you for allowing me to participate in the care of your patient.        Sincerely,        Alber Tinsley DPM

## 2018-06-06 NOTE — MR AVS SNAPSHOT
After Visit Summary   6/6/2018    Johan Navarro    MRN: [id number]           Patient Information     Date Of Birth          1955        Visit Information        Provider Department      6/6/2018 9:00 AM Alber Tinsley DPM [facility]        Today's Diagnoses     Right foot pain    -  1    Hallux limitus of right foot        Mononeuritis, right foot           Follow-ups after your visit        Your next 10 appointments already scheduled     Aug 13, 2018  8:45 AM CDT   Ech Complete with [id number]   [facility] CV Echocardiography (Card[facility]ovascular [facility] at [facility])    [address]  [address]  [facility] [id number] [phone]           1. Please bring or wear a comfortable two-piece outfit. 2. You may eat, drink and take your normal medicines. 3. For any questions that cannot be answered, please contact the ordering physician            Aug 20, 2018  9:45 AM CDT   Return Visit with Sander Galicia MD   [facility] ([facility])    [address]  [place] [id number] [phone] OPT 2              Who to contact     If you have questions or need follow up information about today's clinic visit or your schedule please contact [facility] directly at [phone].  Normal or non-critical lab and imaging results will be communicated to you by MyChart, letter or phone within 4 business days after the clinic has received the results. If you do not hear from us within 7 days, please contact the clinic through MyChart or phone. If you have a critical or abnormal lab result, we will notify you by phone as soon as possible.  Submit refill requests through [employer] or call your pharmacy and they will forward the refill request to us. Please allow 3 business days for your refill to be completed.          Additional Information About Your Visit        MyChart Information     [employer]  "gives you secure access to your electronic health record. If you see a primary care provider, you can also send messages to your care team and make appointments. If you have questions, please call your primary care clinic.  If you do not have a primary care provider, please call [phone] and they will assist you.        Care EveryWhere ID     This is your Care EveryWhere ID. This could be used by other organizations to access your [place] medical records  [id number]        Your Vitals Were     Height BMI (Body Mass Index)                5' 6\" (1.676 m) 25.95 kg/m2           Blood Pressure from Last 3 Encounters:   06/06/18 116/70   05/30/18 118/73   05/22/18 119/78    Weight from Last 3 Encounters:   06/06/18 160 lb 12.8 oz (72.9 kg)   05/22/18 160 lb 12.8 oz (72.9 kg)   09/08/17 159 lb 14.4 oz (72.5 kg)              We Performed the Following     XR Foot Right G/E 3 Views          Today's Medication Changes          These changes are accurate as of 6/6/18  9:40 AM.  If you have any questions, ask your nurse or doctor.               Start taking these medicines.        Dose/Directions    order for DME   Used for:  Right foot pain, Mononeuritis   Started by:  Alber iTnsley DPM        Equipment being ordered: Heel lift suspension boot - all foam boot to offload base of right 5ht metatarsal at night.   Quantity:  1 Device   Refills:  0            Where to get your medicines      Some of these will need a paper prescription and others can be bought over the counter.  Ask your nurse if you have questions.     Bring a paper prescription for each of these medications     order for DME                Primary Care Provider Office Phone # Fax #    Washington Yang PA-C [phone]       [id number]  [facility] [address]        Equal Access to Services     GEENA MONCADA AH: Hadii hyun ku hadasho Soomaali, waaxda luqadaha, qaybta kaalmada leticiayamacry, carolyn barkley. So " [facility] [phone].    ATENCIÓN: Si li luciano, tiene a polo disposición servicios gratuitos de asistencia lingüística. Maninder bethea [phone].    We comply with applicable federal civil rights laws and [place] laws. We do not discriminate on the basis of race, color, national origin, age, disability, sex, sexual orientation, or gender identity.            Thank you!     Thank you for choosing [facility]  for your care. Our goal is always to provide you with excellent care. Hearing back from our patients is one way we can continue to improve our services. Please take a few minutes to complete the written survey that you may receive in the mail after your visit with us. Thank you!             Your Updated Medication List - Protect others around you: Learn how to safely use, store and throw away your medicines at www.disposemymeds.org.          This list is accurate as of 6/6/18  9:40 AM.  Always use your most recent med list.                   Brand Name Dispense Instructions for use Diagnosis    aspirin 81 MG tablet      1 tab po QD (Once per day)        atorvastatin 80 MG tablet    LIPITOR    90 tablet    Take 1 tablet (80 mg) by mouth daily    Coronary artery disease involving native coronary artery of native heart without angina pectoris       CIALIS 20 MG tablet   Generic drug:  tadalafil      TK ONE T PRIOR TO ANTICIPATED SEXUAL ACTIVITY MAX PER DAY        diclofenac 1 % Gel topical gel    VOLTAREN    100 g    Apply 2 grams to hands/fingers four times daily using enclosed dosing card.    Pain of finger of right hand       * evolocumab 140 MG/ML prefilled autoinjector    REPATHA    2 mL    Inject 1 mL (140 mg) Subcutaneous every 14 days    Mixed hyperlipidemia       * evolocumab 140 MG/ML prefilled autoinjector    REPATHA    2 mL    Inject 1 mL (140 mg) Subcutaneous every 14 days    Mixed hyperlipidemia       gabapentin 600 MG tablet    NEURONTIN    180 tablet    TAKE 1 TABLET BY MOUTH TWICE DAILY     Coronary artery disease involving native coronary artery of native heart without angina pectoris       order for DME     1 Device    Equipment being ordered: Heel lift suspension boot - all foam boot to offload base of right 5ht metatarsal at night.    Right foot pain, Mononeuritis       sildenafil 20 MG tablet    REVATIO    30 tablet    TAKE TWO TO FIVE TABLETS BY MOUTH AS NEEDED PRIOR TO SXUAL INTERCOURSE NEVER USE WITH NITROGLYCERIN TERAZOSIN OR DOXAZOCIN    Erectile dysfunction, unspecified erectile dysfunction type       * Notice:  This list has 2 medication(s) that are the same as other medications prescribed for you. Read the directions carefully, and ask your doctor or other care provider to review them with you.

## 2018-06-06 NOTE — PROGRESS NOTES
ASSESSMENT/PLAN:    Encounter Diagnoses   Name Primary?     Right foot pain Yes     Hallux limitus of right foot      Mononeuritis, right foot    Body mass index is 25.95 kg/(m^2).    We reviewed the x-ray images together. The relevant anatomy was discussed.  The hallux limitus is not limiting activity.  I simply discussed what it is, showed him on x-ray and suggested stiffer soled shoes.      I think his chief complaint is sural neuritis secondary to pressure over the base of the right 5th metatarsal.  This too does not limit his activity. Pain only at night.      Foam heel lift suspension boot  Alter sleeping position  Ice and/or NSAIDs before bed    Steroid injection offered.  He will consider this.     Alber Tinsley DPM, FACFAS, MS    [facility] of Podiatry/Foot & Ankle Surgery      ____________________________________________________________________    HPI:       I was asked by Washington Yang PA-C  to evaluate this patient regarding right foot pain.     Chief Complaint: problems with bone protrusion  Onset of problem: 12 months  Pain/ discomfort is described as:  Throbbing, shooting  Ratin/10   Frequency:  nightly    The pain is made worse with sleeping  Previous treatment: wider shoes    *  Patient Active Problem List   Diagnosis     Depressive disorder, not elsewhere classified     Elevated prostate specific antigen (PSA)     Osteoarthritis of Hand      Mixed hyperlipidemia     Family history of early CAD     Aortic stenosis     Carotid stenosis     Coronary artery disease involving native coronary artery of native heart without angina pectoris     Malignant neoplasm of prostate (H)   *  *  Past Surgical History:   Procedure Laterality Date     CARDIAC SURGERY      coronary stents placed      DAVINCI PROSTATECTOMY           SURGICAL HISTORY OF -       Right hand Xanthoma removal     SURGICAL HISTORY OF -       Angioplasty/Stent placement x 2     SURGICAL HISTORY OF -       Right heel  surgery - post football injury     SURGICAL HISTORY OF -       Stress Echo (-)   *  *  Current Outpatient Prescriptions   Medication Sig Dispense Refill     ASPIRIN 81 MG OR TABS 1 tab po QD (Once per day)       atorvastatin (LIPITOR) 80 MG tablet Take 1 tablet (80 mg) by mouth daily 90 tablet 3     CIALIS 20 MG tablet TK ONE T PRIOR TO ANTICIPATED SEXUAL ACTIVITY MAX PER DAY  11     diclofenac (VOLTAREN) 1 % GEL topical gel Apply 2 grams to hands/fingers four times daily using enclosed dosing card. 100 g 1     evolocumab (REPATHA) 140 MG/ML prefilled autoinjector Inject 1 mL (140 mg) Subcutaneous every 14 days 2 mL 5     evolocumab (REPATHA) 140 MG/ML prefilled autoinjector Inject 1 mL (140 mg) Subcutaneous every 14 days 2 mL 3     gabapentin (NEURONTIN) 600 MG tablet TAKE 1 TABLET BY MOUTH TWICE DAILY 180 tablet 0     sildenafil (REVATIO) 20 MG tablet TAKE TWO TO FIVE TABLETS BY MOUTH AS NEEDED PRIOR TO SXUAL INTERCOURSE NEVER USE WITH NITROGLYCERIN TERAZOSIN OR DOXAZOCIN 30 tablet 0       ROS:     A 10-point review of systems was performed and is positive for that noted in the HPI and as seen below.  All other areas are negative.     Numbness in feet?  no   Calf pain with walking? no  Recent foot/ankle injury? no  Weight change over past 12 months? no  Self perception as overweight? no  Recent flu-like symptoms? no  Joint pain other than feet ? arthritis    Social History: Employment:  retired;  Exercise/Physical activity:  golf;  Tobacco use:  no  Social History     Social History     Marital status:      Spouse name: N/A     Number of children: N/A     Years of education: N/A     Occupational History     Not on file.     Social History Main Topics     Smoking status: Former Smoker     Smokeless tobacco: Never Used     Alcohol use Yes      Comment: very rare     Drug use: No     Sexual activity: Yes     Partners: Female     Other Topics Concern     Caffeine Concern Yes     coffee and soda     Special Diet  "No     Exercise Yes     regular      Seat Belt Yes     Parent/Sibling W/ Cabg, Mi Or Angioplasty Before 65f 55m? Yes     49     Social History Narrative       Family history:  Family History   Problem Relation Age of Onset     Lipids Mother      C.A.D. Mother       at age 49 of MI     C.A.D. Sister      MI at age 48     Cancer - colorectal No family hx of      Prostate Cancer No family hx of      Colon Cancer No family hx of        Rheumatoid arthritis:  no  Foot Problems: no  Diabetes: no      EXAM:    Vitals: /70  Ht 5' 6\" (1.676 m)  Wt 160 lb 12.8 oz (72.9 kg)  BMI 25.95 kg/m2  BMI: Body mass index is 25.95 kg/(m^2).  Height: 5' 6\"    Constitutional/ general:  Pt is in no apparent distress, appears well-nourished.  Cooperative with history and physical exam.     Vascular:  Pedal pulses are palpable bilaterally for both the DP and PT arteries.  CFT < 3 sec.  No edema.  Pedal hair growth noted.     Neuro:  Alert and oriented x 3. Coordinated gait.  Light touch sensation is intact to the L4, L5, S1 distributions. No obvious deficits.  No evidence of neurological-based weakness, spasticity, or contracture in the lower extremities.     Derm: Normal texture and turgor.  No erythema, ecchymosis, or cyanosis.  No open lesions.     Musculoskeletal:    Lower extremity muscle strength is normal.  Patient is ambulatory without an assistive device or brace .  No gross deformities.  Fairly prominent styloid process, base of the right 5th met.  No pain with testing the peroneus brevis tendon.  Severely limited right 1st metatarsophalangeal joint dorsiflexion with dorsal eminence.      Radiographic Exam:  X-Ray Findings:  I personally reviewed the right foot films.  Degenerative changes of the 1st metatarsophalangeal joint.  No osseous pathology seen near the base of th 5th meteatarsal.       Alber Tinsley DPM, FACANGIE, MS    [facility] of Podiatry/Foot & Ankle Surgery              "

## 2018-06-21 DIAGNOSIS — E78.2 MIXED HYPERLIPIDEMIA: ICD-10-CM

## 2018-07-12 ENCOUNTER — OFFICE VISIT (OUTPATIENT)
Dept: PEDIATRICS | Facility: CLINIC | Age: 63
End: 2018-07-12
Payer: COMMERCIAL

## 2018-07-12 VITALS
OXYGEN SATURATION: 97 % | HEIGHT: 66 IN | SYSTOLIC BLOOD PRESSURE: 100 MMHG | BODY MASS INDEX: 24.86 KG/M2 | DIASTOLIC BLOOD PRESSURE: 80 MMHG | HEART RATE: 64 BPM | TEMPERATURE: 97.9 F | WEIGHT: 154.7 LBS

## 2018-07-12 DIAGNOSIS — E78.2 MIXED HYPERLIPIDEMIA: ICD-10-CM

## 2018-07-12 DIAGNOSIS — S46.211A BICEPS RUPTURE, DISTAL, RIGHT, INITIAL ENCOUNTER: Primary | ICD-10-CM

## 2018-07-12 PROCEDURE — 99213 OFFICE O/P EST LOW 20 MIN: CPT | Mod: GE | Performed by: STUDENT IN AN ORGANIZED HEALTH CARE EDUCATION/TRAINING PROGRAM

## 2018-07-12 NOTE — PROGRESS NOTES
SUBJECTIVE:   Johan Navarro is a 63 year old male who presents to clinic today for the following health issues:      Joint Pain    Onset: 4 days ago    Description:   Location: right bicep   Character: sharp pain 10/10 with movement, no pain at rest.     Intensity: moderate to severe    Progression of Symptoms: no    Accompanying Signs & Symptoms:  Other symptoms: radiation of pain to up to right shoulder, weakness of right arm, visible asymmetry of bicep muscles right compared to left    History:   Previous similar pain: no       Precipitating factors:   Trauma or overuse: YES- trauma    Alleviating factors:  Improved by: rest     Therapies Tried and outcome: ice and Ibuprofen, did help    Patient cleaning up large tree branches over the weekend. Branch snapped back and he felt his arm hyperextended and felt pop in the arm. This happened twice. Had immediate pain. Bruise over the olecranon fossa. He can tell that the the biceps muscle on of the right arm is looser and hanging. He has good ROM still but has significantly decreased strength. He is otherwise in good health.       Problem list and histories reviewed & adjusted, as indicated.  Additional history: as documented    Patient Active Problem List   Diagnosis     Depressive disorder, not elsewhere classified     Elevated prostate specific antigen (PSA)     Osteoarthritis of Hand      Mixed hyperlipidemia     Family history of early CAD     Aortic stenosis     Carotid stenosis     Coronary artery disease involving native coronary artery of native heart without angina pectoris     Malignant neoplasm of prostate (H)     Past Surgical History:   Procedure Laterality Date     CARDIAC SURGERY      coronary stents placed 1998     DAVINCI PROSTATECTOMY      2010     SURGICAL HISTORY OF -       Right hand Xanthoma removal     SURGICAL HISTORY OF -       Angioplasty/Stent placement x 2     SURGICAL HISTORY OF -       Right heel surgery - post football injury      SURGICAL HISTORY OF -       Stress Echo (-)       Social History   Substance Use Topics     Smoking status: Former Smoker     Smokeless tobacco: Never Used     Alcohol use Yes      Comment: very rare     Family History   Problem Relation Age of Onset     Lipids Mother      C.A.D. Mother       at age 49 of MI     C.A.D. Sister      MI at age 48     Cancer - colorectal No family hx of      Prostate Cancer No family hx of      Colon Cancer No family hx of          Current Outpatient Prescriptions   Medication Sig Dispense Refill     ASPIRIN 81 MG OR TABS 1 tab po QD (Once per day)       atorvastatin (LIPITOR) 80 MG tablet Take 1 tablet (80 mg) by mouth daily 90 tablet 3     CIALIS 20 MG tablet TK ONE T PRIOR TO ANTICIPATED SEXUAL ACTIVITY MAX PER DAY  11     diclofenac (VOLTAREN) 1 % GEL topical gel Apply 2 grams to hands/fingers four times daily using enclosed dosing card. 100 g 1     evolocumab (REPATHA) 140 MG/ML prefilled autoinjector Inject 1 mL (140 mg) Subcutaneous every 14 days 2 mL 5     evolocumab (REPATHA) 140 MG/ML prefilled autoinjector Inject 1 mL (140 mg) Subcutaneous every 14 days 2 mL 4     gabapentin (NEURONTIN) 600 MG tablet TAKE 1 TABLET BY MOUTH TWICE DAILY 180 tablet 0     order for DME Equipment being ordered: Heel lift suspension boot - all foam boot to offload base of right 5ht metatarsal at night. 1 Device 0     sildenafil (REVATIO) 20 MG tablet TAKE TWO TO FIVE TABLETS BY MOUTH AS NEEDED PRIOR TO SXUAL INTERCOURSE NEVER USE WITH NITROGLYCERIN TERAZOSIN OR DOXAZOCIN 30 tablet 0     Allergies   Allergen Reactions     Crestor [Rosuvastatin] GI Disturbance     Dust Mites      Pollen Extract      BP Readings from Last 3 Encounters:   18 100/80   18 116/70   18 118/73    Wt Readings from Last 3 Encounters:   18 154 lb 11.2 oz (70.2 kg)   18 160 lb 12.8 oz (72.9 kg)   18 160 lb 12.8 oz (72.9 kg)                    ROS:  Constitutional, HEENT, cardiovascular,  "pulmonary, GI, , musculoskeletal, neuro, skin, endocrine and psych systems are negative, except as otherwise noted.    OBJECTIVE:     /80 (BP Location: Left arm, Patient Position: Chair, Cuff Size: Adult Regular)  Pulse 64  Temp 97.9  F (36.6  C) (Oral)  Ht 5' 6\" (1.676 m)  Wt 154 lb 11.2 oz (70.2 kg)  SpO2 97%  BMI 24.97 kg/m2  Body mass index is 24.97 kg/(m^2).    Physical Exam  General: awake, alert, in no acute distress  HEENT: NCAT, PERRL, EOMI, sclera anicteric, no nasal discharge, MMM, posterior pharynx without erythema or exudates, no cervical lymphadenopathy  CV: RRR, no murmurs noted, peripheral pulses strong  Resp: CTAB, no increased WOB  Abd: Soft, nondistended  MSK: No peripheral edema, extremities warm and well perfused, normal pulses. Right bicep muscle bulging, asymmetry noted compared to left. Ecchymosis over right olecranon fossa.   Skin: warm, dry, no jaundice  Neuro: CN II-XII grossly intact. No focal deficits. Alert and oriented x4.      Diagnostic Test Results:  none     ASSESSMENT/PLAN:     1. Biceps rupture, distal, right, initial encounter  Likely full or partial biceps tendon rupture.   - ORTHO  REFERRAL - may treat conservatively or may need surgery, will defer imaging to Orthopedics  - NSAIDs for pain management PRN    CONSULTATION/REFERRAL to Ortho    Patient was seen and discussed with attending, Dr. Richard Guerin, who agrees with the above assessment and plan.    Enriqueta Crouch MD  PGY - 3   Internal Medicine/Pediatrics  Pager [phone]  [facility]    ===========  STAFF NOTE:  Patient discussed with resident physician today.  We discussed [place] portions of the visit and I participated in the evaluation and management of the patient today.     Edson Guerin MD    "

## 2018-07-12 NOTE — PATIENT INSTRUCTIONS
1. Partial or full distal bicep tendon rupture  2. Orthopedic  referral made, should have appointment in 1-2 days  3. Pain control with ibuprofen, Tylenol as needed

## 2018-07-12 NOTE — MR AVS SNAPSHOT
After Visit Summary   7/12/2018    Johan Navarro    MRN: [id number]           Patient Information     Date Of Birth          1955        Visit Information        Provider Department      7/12/2018 11:15 AM Carole Crouch MD [facility] [place]        Today's Diagnoses     Biceps rupture, distal, right, initial encounter    -  1      Care Instructions    1. Partial or full distal bicep tendon rupture  2. Orthopedic  referral made, should have appointment in 1-2 days  3. Pain control with ibuprofen, Tylenol as needed            Follow-ups after your visit        Additional Services     ORTHO  REFERRAL       [facility] is referring you to the Orthopedic  Services at [facility].       The  Representative will assist you in the coordination of your Orthopedic and Musculoskeletal Care as prescribed by your physician.    The  Representative will call you within 1 business day to help schedule your appointment, or you may contact the  Representative at:    All areas ~ [phone]     Type of Referral : Surgical / Specialist       Timeframe requested: 1 - 2 days    Coverage of these services is subject to the terms and limitations of your health insurance plan.  Please call member services at your health plan with any benefit or coverage questions.      If X-rays, CT or MRI's have been performed, please contact the facility where they were done to arrange for , prior to your scheduled appointment.  Please bring this referral request to your appointment and present it to your specialist.                  Follow-up notes from your care team     Return in about 4 weeks (around 8/9/2018), or if symptoms worsen or fail to improve, for Follow up today's problem.      Your next 10 appointments already scheduled     Aug 13, 2018  8:45 AM CDT   Ech Complete with [id number]   [facility] (Cardiovascular Imaging at [facility])    [address]  [address]  [place] [address] [phone]           1.  Please bring or wear a comfortable two-piece outfit. 2.  You may eat, drink and take your normal medicines. 3.  For any questions that cannot be answered, please contact the ordering physician 4.  Please do not wear perfumes or scented lotions on the day of your exam.            Aug 20, 2018  9:45 AM CDT   Return Visit with Sander Galicia MD   [facility] ([facility])    [address]  [facility] [address] [phone] OPT 2              Who to contact     If you have questions or need follow up information about today's clinic visit or your schedule please contact [facility] directly at [phone].  Normal or non-critical lab and imaging results will be communicated to you by MyChart, letter or phone within 4 business days after the clinic has received the results. If you do not hear from us within 7 days, please contact the clinic through MSI[employer] or phone. If you have a critical or abnormal lab result, we will notify you by phone as soon as possible.  Submit refill requests through [employer] or call your pharmacy and they will forward the refill request to us. Please allow 3 business days for your refill to be completed.          Additional Information About Your Visit        MyChart Information     [employer] gives you secure access to your electronic health record. If you see a primary care provider, you can also send messages to your care team and make appointments. If you have questions, please call your primary care clinic.  If you do not have a primary care provider, please call [phone] and they will assist you.        Care EveryWhere ID     This is your Care EveryWhere ID. This could be used by other organizations to access your [place] medical records  [id number]    "     Your Vitals Were     Pulse Temperature Height Pulse Oximetry BMI (Body Mass Index)       64 97.9  F (36.6  C) (Oral) 5' 6\" (1.676 m) 97% 24.97 kg/m2        Blood Pressure from Last 3 Encounters:   07/12/18 100/80   06/06/18 116/70   05/30/18 118/73    Weight from Last 3 Encounters:   07/12/18 154 lb 11.2 oz (70.2 kg)   06/06/18 160 lb 12.8 oz (72.9 kg)   05/22/18 160 lb 12.8 oz (72.9 kg)              We Performed the Following     ORTHO  REFERRAL          Where to get your medicines      These medications were sent to [facility]  [address] [address]     Phone:  [phone]     evolocumab 140 MG/ML prefilled autoinjector          Primary Care Provider Office Phone # Fax #    [place] Kannan Yang PA-C [phone] [address] [facility] [address]        Equal Access to Services     [facility] AH: Hadii aad ku hadasho Soomaali, waaxda luqadaha, qaybta kaalmada adeegyada, carolyn becerril . So [facility] [phone].    ATENCIÓN: Si habla español, tiene a polo disposición servicios gratuitos de asistencia lingüística. [facility] [phone].    We comply with applicable federal civil rights laws and [place] laws. We do not discriminate on the basis of race, color, national origin, age, disability, sex, sexual orientation, or gender identity.            Thank you!     Thank you for choosing [facility]  for your care. Our goal is always to provide you with excellent care. Hearing back from our patients is one way we can continue to improve our services. Please take a few minutes to complete the written survey that you may receive in the mail after your visit with us. Thank you!             Your Updated Medication List - Protect others around you: Learn how to safely use, store and throw away your medicines at www.disposemymeds.org.          This list is accurate as of 7/12/18 " 12:14 PM.  Always use your most recent med list.                   Brand Name Dispense Instructions for use Diagnosis    aspirin 81 MG tablet      1 tab po QD (Once per day)        atorvastatin 80 MG tablet    LIPITOR    90 tablet    Take 1 tablet (80 mg) by mouth daily    Coronary artery disease involving native coronary artery of native heart without angina pectoris       CIALIS 20 MG tablet   Generic drug:  tadalafil      TK ONE T PRIOR TO ANTICIPATED SEXUAL ACTIVITY MAX PER DAY        diclofenac 1 % Gel topical gel    VOLTAREN    100 g    Apply 2 grams to hands/fingers four times daily using enclosed dosing card.    Pain of finger of right hand       * evolocumab 140 MG/ML prefilled autoinjector    REPATHA    2 mL    Inject 1 mL (140 mg) Subcutaneous every 14 days    Mixed hyperlipidemia       * evolocumab 140 MG/ML prefilled autoinjector    REPATHA    2 mL    Inject 1 mL (140 mg) Subcutaneous every 14 days    Mixed hyperlipidemia       gabapentin 600 MG tablet    NEURONTIN    180 tablet    TAKE 1 TABLET BY MOUTH TWICE DAILY    Coronary artery disease involving native coronary artery of native heart without angina pectoris       order for DME     1 Device    Equipment being ordered: Heel lift suspension boot - all foam boot to offload base of right 5ht metatarsal at night.    Right foot pain, Mononeuritis       sildenafil 20 MG tablet    REVATIO    30 tablet    TAKE TWO TO FIVE TABLETS BY MOUTH AS NEEDED PRIOR TO SXUAL INTERCOURSE NEVER USE WITH NITROGLYCERIN TERAZOSIN OR DOXAZOCIN    Erectile dysfunction, unspecified erectile dysfunction type       * Notice:  This list has 2 medication(s) that are the same as other medications prescribed for you. Read the directions carefully, and ask your doctor or other care provider to review them with you.

## 2018-07-13 ENCOUNTER — TELEPHONE (OUTPATIENT)
Dept: ORTHOPEDICS | Facility: CLINIC | Age: 63
End: 2018-07-13

## 2018-07-13 ENCOUNTER — RADIANT APPOINTMENT (OUTPATIENT)
Dept: GENERAL RADIOLOGY | Facility: CLINIC | Age: 63
End: 2018-07-13
Attending: ORTHOPAEDIC SURGERY
Payer: COMMERCIAL

## 2018-07-13 ENCOUNTER — TELEPHONE (OUTPATIENT)
Dept: FAMILY MEDICINE | Facility: CLINIC | Age: 63
End: 2018-07-13

## 2018-07-13 ENCOUNTER — OFFICE VISIT (OUTPATIENT)
Dept: FAMILY MEDICINE | Facility: CLINIC | Age: 63
End: 2018-07-13
Payer: COMMERCIAL

## 2018-07-13 ENCOUNTER — OFFICE VISIT (OUTPATIENT)
Dept: ORTHOPEDICS | Facility: CLINIC | Age: 63
End: 2018-07-13
Payer: COMMERCIAL

## 2018-07-13 VITALS
DIASTOLIC BLOOD PRESSURE: 68 MMHG | WEIGHT: 151.8 LBS | BODY MASS INDEX: 24.4 KG/M2 | HEIGHT: 66 IN | SYSTOLIC BLOOD PRESSURE: 110 MMHG | OXYGEN SATURATION: 97 % | HEART RATE: 65 BPM | TEMPERATURE: 97.9 F | RESPIRATION RATE: 15 BRPM

## 2018-07-13 VITALS — DIASTOLIC BLOOD PRESSURE: 60 MMHG | BODY MASS INDEX: 24.86 KG/M2 | WEIGHT: 154 LBS | SYSTOLIC BLOOD PRESSURE: 112 MMHG

## 2018-07-13 DIAGNOSIS — S46.211A TEAR OF RIGHT BICEPS MUSCLE, INITIAL ENCOUNTER: ICD-10-CM

## 2018-07-13 DIAGNOSIS — S46.211A TEAR OF BICEPS MUSCLE, RIGHT, INITIAL ENCOUNTER: ICD-10-CM

## 2018-07-13 DIAGNOSIS — I35.0 NONRHEUMATIC AORTIC VALVE STENOSIS: ICD-10-CM

## 2018-07-13 DIAGNOSIS — M25.521 RIGHT ELBOW PAIN: ICD-10-CM

## 2018-07-13 DIAGNOSIS — M25.521 RIGHT ELBOW PAIN: Primary | ICD-10-CM

## 2018-07-13 DIAGNOSIS — I25.10 CORONARY ARTERY DISEASE INVOLVING NATIVE CORONARY ARTERY OF NATIVE HEART WITHOUT ANGINA PECTORIS: ICD-10-CM

## 2018-07-13 DIAGNOSIS — Z01.818 PREOP GENERAL PHYSICAL EXAM: Primary | ICD-10-CM

## 2018-07-13 LAB
ERYTHROCYTE [DISTWIDTH] IN BLOOD BY AUTOMATED COUNT: 13.1 % (ref 10–15)
HCT VFR BLD AUTO: 41.8 % (ref 40–53)
HGB BLD-MCNC: 14.3 G/DL (ref 13.3–17.7)
MCH RBC QN AUTO: 29.7 PG (ref 26.5–33)
MCHC RBC AUTO-ENTMCNC: 34.2 G/DL (ref 31.5–36.5)
MCV RBC AUTO: 87 FL (ref 78–100)
PLATELET # BLD AUTO: 231 10E9/L (ref 150–450)
RBC # BLD AUTO: 4.82 10E12/L (ref 4.4–5.9)
WBC # BLD AUTO: 6 10E9/L (ref 4–11)

## 2018-07-13 PROCEDURE — 93000 ELECTROCARDIOGRAM COMPLETE: CPT | Performed by: FAMILY MEDICINE

## 2018-07-13 PROCEDURE — 36415 COLL VENOUS BLD VENIPUNCTURE: CPT | Performed by: FAMILY MEDICINE

## 2018-07-13 PROCEDURE — 99214 OFFICE O/P EST MOD 30 MIN: CPT | Performed by: FAMILY MEDICINE

## 2018-07-13 PROCEDURE — 73080 X-RAY EXAM OF ELBOW: CPT | Mod: RT

## 2018-07-13 PROCEDURE — 99203 OFFICE O/P NEW LOW 30 MIN: CPT | Performed by: ORTHOPAEDIC SURGERY

## 2018-07-13 PROCEDURE — 85027 COMPLETE CBC AUTOMATED: CPT | Performed by: FAMILY MEDICINE

## 2018-07-13 NOTE — MR AVS SNAPSHOT
After Visit Summary   7/13/2018    Johan Navarro    MRN: [id number]           Patient Information     Date Of Birth          1955        Visit Information        Provider Department      7/13/2018 4:10 PM Rabia Warner MD [facility]        Today's Diagnoses     Preop general physical exam    -  1      Care Instructions      Before Your Surgery      Call your surgeon if there is any change in your health. This includes signs of a cold or flu (such as a sore throat, runny nose, cough, rash or fever).    Do not smoke, drink alcohol or take over the counter medicine (unless your surgeon or primary care doctor tells you to) for the 24 hours before and after surgery.    If you take prescribed drugs: Follow your doctor s orders about which medicines to take and which to stop until after surgery.    Eating and drinking prior to surgery: follow the instructions from your surgeon    Take a shower or bath the night before surgery. Use the soap your surgeon gave you to gently clean your skin. If you do not have soap from your surgeon, use your regular soap. Do not shave or scrub the surgery site.  Wear clean pajamas and have clean sheets on your bed.           Follow-ups after your visit        Follow-up notes from your care team     Return in about 11 months (around 6/13/2019) for Physical Exam.      Your next 10 appointments already scheduled     Jul 16, 2018   Procedure with Alber Zabala MD   [facility] ([facility]    [address] [facility] [address]   [phone]            Jul 30, 2018  1:20 PM CDT   Return Visit with Victor M Pugh PA-C   [facility]    [address]  [address]  [facility] [address]   [phone]            Aug 13, 2018  8:45 AM CDT   Ech Complete with [id number]   [facility] Echocardiography (Cardiovascular Imaging at [facility])     [address]  [address]  [place] [address] [phone]           1.  Please bring or wear a comfortable two-piece outfit. 2.  You may eat, drink and take your normal medicines. 3.  For any questions that cannot be answered, please contact the ordering physician 4.  Please do not wear perfumes or scented lotions on the day of your exam.            Aug 20, 2018  9:45 AM CDT   Return Visit with Sander Galicia MD   [facility] ([facility])    [address]  [place] [place] [address] [phone] OPT 2              Who to contact     If you have questions or need follow up information about today's clinic visit or your schedule please contact [facility] directly at [phone].  Normal or non-critical lab and imaging results will be communicated to you by MyChart, letter or phone within 4 business days after the clinic has received the results. If you do not hear from us within 7 days, please contact the clinic through Youtop[employer]art or phone. If you have a critical or abnormal lab result, we will notify you by phone as soon as possible.  Submit refill requests through [employer] or call your pharmacy and they will forward the refill request to us. Please allow 3 business days for your refill to be completed.          Additional Information About Your Visit        Youtopiahart Information     [employer] gives you secure access to your electronic health record. If you see a primary care provider, you can also send messages to your care team and make appointments. If you have questions, please call your primary care clinic.  If you do not have a primary care provider, please call [phone] and they will assist you.        Care EveryWhere ID     This is your Care EveryWhere ID. This could be used by other organizations to access your [place] medical records  [id number]        Your Vitals Were     Pulse Temperature Respirations Height Pulse  "Oximetry BMI (Body Mass Index)    65 97.9  F (36.6  C) (Oral) 15 5' 6\" (1.676 m) 97% 24.5 kg/m2       Blood Pressure from Last 3 Encounters:   07/13/18 110/68   07/13/18 112/60   07/12/18 100/80    Weight from Last 3 Encounters:   07/13/18 151 lb 12.8 oz (68.9 kg)   07/13/18 154 lb (69.9 kg)   07/12/18 154 lb 11.2 oz (70.2 kg)              We Performed the Following     CBC with platelets     EKG 12-lead complete w/read - Clinics          Today's Medication Changes          These changes are accurate as of 7/13/18  4:54 PM.  If you have any questions, ask your nurse or doctor.               These medicines have changed or have updated prescriptions.        Dose/Directions    evolocumab 140 MG/ML prefilled autoinjector   Commonly known as:  REPATHA   This may have changed:  Another medication with the same name was removed. Continue taking this medication, and follow the directions you see here.   Used for:  Mixed hyperlipidemia   Changed by:  Rabia Warner MD        Dose:  140 mg   Inject 1 mL (140 mg) Subcutaneous every 14 days   Quantity:  2 mL   Refills:  5                Primary Care Provider Office Phone # Fax #    [place] Kannan Yang PA-C [phone] [address] [address] [address]        Equal Access to Services     [facility] AH: Hadii hyun bowers hadasho Somiko, waaxda luqadaha, qaybta kaalmada gurpreet, carolyn barkley. So [facility] [phone].    ATENCIÓN: Si habla español, tiene a polo disposición servicios gratuitos de asistencia lingüística. [facility] al [phone].    We comply with applicable federal civil rights laws and [place] laws. We do not discriminate on the basis of race, color, national origin, age, disability, sex, sexual orientation, or gender identity.            Thank you!     Thank you for choosing [facility]  for your care. Our goal is always to provide you with excellent care. Hearing back from our patients is one way we " can continue to improve our services. Please take a few minutes to complete the written survey that you may receive in the mail after your visit with us. Thank you!             Your Updated Medication List - Protect others around you: Learn how to safely use, store and throw away your medicines at www.disposemymeds.org.          This list is accurate as of 7/13/18  4:54 PM.  Always use your most recent med list.                   Brand Name Dispense Instructions for use Diagnosis    atorvastatin 80 MG tablet    LIPITOR    90 tablet    Take 1 tablet (80 mg) by mouth daily    Coronary artery disease involving native coronary artery of native heart without angina pectoris       diclofenac 1 % Gel topical gel    VOLTAREN    100 g    Apply 2 grams to hands/fingers four times daily using enclosed dosing card.    Pain of finger of right hand       evolocumab 140 MG/ML prefilled autoinjector    REPATHA    2 mL    Inject 1 mL (140 mg) Subcutaneous every 14 days    Mixed hyperlipidemia       gabapentin 600 MG tablet    NEURONTIN    180 tablet    TAKE 1 TABLET BY MOUTH TWICE DAILY    Coronary artery disease involving native coronary artery of native heart without angina pectoris       order for DME     1 Device    Equipment being ordered: Heel lift suspension boot - all foam boot to offload base of right 5ht metatarsal at night.    Right foot pain, Mononeuritis       sildenafil 20 MG tablet    REVATIO    30 tablet    TAKE TWO TO FIVE TABLETS BY MOUTH AS NEEDED PRIOR TO SXUAL INTERCOURSE NEVER USE WITH NITROGLYCERIN TERAZOSIN OR DOXAZOCIN    Erectile dysfunction, unspecified erectile dysfunction type

## 2018-07-13 NOTE — PROGRESS NOTES
[facility]  [address] [facility] [id number] [phone]  Dept: [phone]    PRE-OP EVALUATION:  Today's date: 2018    Johan Navarro (: 1955) presents for pre-operative evaluation assessment as requested by Dr. Santos.  He requires evaluation and anesthesia risk assessment prior to undergoing surgery/procedure for treatment of elbow tendon reattachment .    Fax number for surgical facility: [facility]   Primary Physician: Washington Yang  Type of Anesthesia Anticipated: General    Patient has a Health Care Directive or Living Will:  YES, Does not have it with him.    Preop Questions 2018   Who is doing your surgery? maulik   What are you having done? elbow tendon reattachment   Date of Surgery/Procedure: 2018   Facility or Hospital where procedure/surgery will be performed: [facility]   1.  Do you have a history of Heart attack, stroke, stent, coronary bypass surgery, or other heart surgery? YES  - in    2.  Do you ever have any pain or discomfort in your chest? No   3.  Do you have a history of  Heart Failure? No   4.   Are you troubled by shortness of breath when:  walking on a level surface, or up a slight hill, or at night? No   5.  Do you currently have a cold, bronchitis or other respiratory infection? No   6.  Do you have a cough, shortness of breath, or wheezing? No   7.  Do you sometimes get pains in the calves of your legs when you walk? No   8. Do you or anyone in your family have previous history of blood clots? No   9.  Do you or does anyone in your family have a serious bleeding problem such as prolonged bleeding following surgeries or cuts? No   10. Have you ever had problems with anemia or been told to take iron pills? No   11. Have you had any abnormal blood loss such as black, tarry or bloody stools? No   12. Have you ever had a blood transfusion? No   13. Have you or any of your relatives ever had  problems with anesthesia? No   14. Do you have sleep apnea, excessive snoring or daytime drowsiness? No   15. Do you have any prosthetic heart valves? No   16. Do you have prosthetic joints? No         HPI:     HPI related to upcoming procedure:   Right elbow injury while working with a tree branch.   Some pain, mild. Does note some weakness.     See problem list for active medical problems.  Problems all longstanding and stable, except as noted/documented.  See ROS for pertinent symptoms related to these conditions.                                                                                                                                                          .    MEDICAL HISTORY:     Patient Active Problem List    Diagnosis Date Noted     Malignant neoplasm of prostate (H) 08/17/2017     Priority: Medium     Hx of prostatectomy       Mixed hyperlipidemia      Priority: Medium     familial hyperlipidemia with marked hypercholesterolemia before treatment; has been on some form of cholesterol-lowering medication since the 1970s  Diagnosis updated by automated process. Provider to review and confirm.       Family history of early CAD      Priority: Medium     Aortic stenosis      Priority: Medium     Carotid stenosis      Priority: Medium     left       Coronary artery disease involving native coronary artery of native heart without angina pectoris      Priority: Medium     cardiac cath 1998: stents x 2 to circumflex       Depressive disorder, not elsewhere classified 03/02/2007     Priority: Medium              Elevated prostate specific antigen (PSA) 03/02/2007     Priority: Medium     Osteoarthritis of Hand  03/02/2007     Priority: Medium      Past Medical History:   Diagnosis Date     Aortic stenosis      Arthritis     hands     CAD (coronary artery disease)     cardiac cath 1998: stents x 2 to circumflex     Carotid stenosis     left     Family history of early CAD      Hyperlipidaemia LDL goal < 70      familial hyperlipidemia with marked hypercholesterolemia before treatment; has been on some form of cholesterol-lowering medication since the 1970s     Stented coronary artery      Past Surgical History:   Procedure Laterality Date     CARDIAC SURGERY  1998    coronary stents x2 placed 1998; angioplasty     DAVINCI PROSTATECTOMY      2010     EYE SURGERY Bilateral 2017    eyelids     ORTHOPEDIC SURGERY Right     achilles tendon; post football injury     ORTHOPEDIC SURGERY Right     hand; near thumb. has wires in there. cysts     SURGICAL HISTORY OF -       Right hand Xanthoma removal     SURGICAL HISTORY OF -       Stress Echo (-)     Current Outpatient Prescriptions   Medication Sig Dispense Refill     atorvastatin (LIPITOR) 80 MG tablet Take 1 tablet (80 mg) by mouth daily 90 tablet 3     diclofenac (VOLTAREN) 1 % GEL topical gel Apply 2 grams to hands/fingers four times daily using enclosed dosing card. 100 g 1     evolocumab (REPATHA) 140 MG/ML prefilled autoinjector Inject 1 mL (140 mg) Subcutaneous every 14 days 2 mL 5     gabapentin (NEURONTIN) 600 MG tablet TAKE 1 TABLET BY MOUTH TWICE DAILY 180 tablet 0     order for DME Equipment being ordered: Heel lift suspension boot - all foam boot to offload base of right 5ht metatarsal at night. 1 Device 0     sildenafil (REVATIO) 20 MG tablet TAKE TWO TO FIVE TABLETS BY MOUTH AS NEEDED PRIOR TO SXUAL INTERCOURSE NEVER USE WITH NITROGLYCERIN TERAZOSIN OR DOXAZOCIN 30 tablet 0     OTC products: some aleve this week.    Allergies   Allergen Reactions     Crestor [Rosuvastatin] GI Disturbance     Dust Mites      Pollen Extract       Latex Allergy: NO    Social History   Substance Use Topics     Smoking status: Former Smoker     Smokeless tobacco: Never Used     Alcohol use Yes      Comment: very rare     History   Drug Use No       REVIEW OF SYSTEMS:   CONSTITUTIONAL: NEGATIVE for fever, chills, change in weight  ENT/MOUTH: NEGATIVE for ear, mouth and throat problems.  "Mild chronic congestion related to allergies.   RESP: NEGATIVE for significant cough or SOB  CV: NEGATIVE for chest pain, palpitations or peripheral edema  No nausea, vomitting or change in bowel habits.  No urinary symptoms.    Huge yard. Does take 2 hours of steady pushing.        EXAM:   /68 (BP Location: Left arm, Patient Position: Chair, Cuff Size: Adult Regular)  Pulse 65  Temp 97.9  F (36.6  C) (Oral)  Resp 15  Ht 5' 6\" (1.676 m)  Wt 151 lb 12.8 oz (68.9 kg)  SpO2 97%  BMI 24.5 kg/m2  GENERAL APPEARANCE: alert and no distress  HENT: ear canals and TM's normal and nose and mouth without ulcers or lesions  RESP: lungs clear to auscultation - no rales, rhonchi or wheezes  CV: regular rates and rhythm  ABDOMEN: soft, nontender, no HSM or masses and bowel sounds normal  MS: he does have a bulge in his right upper arm with bruising near the elbow  NEURO: Normal strength and tone, sensory exam grossly normal, mentation intact and speech normal  PSYCH: mentation appears normal and affect normal/bright    DIAGNOSTICS:     EKG: appears normal, NSR, normal axis, normal intervals, no acute ST/T changes c/w ischemia, no LVH by voltage criteria  Labs Resulted Today:   Results for orders placed or performed in visit on 07/13/18   CBC with platelets   Result Value Ref Range    WBC 6.0 4.0 - 11.0 10e9/L    RBC Count 4.82 4.4 - 5.9 10e12/L    Hemoglobin 14.3 13.3 - 17.7 g/dL    Hematocrit 41.8 40.0 - 53.0 %    MCV 87 78 - 100 fl    MCH 29.7 26.5 - 33.0 pg    MCHC 34.2 31.5 - 36.5 g/dL    RDW 13.1 10.0 - 15.0 %    Platelet Count 231 150 - 450 10e9/L       Recent Labs   Lab Test  11/15/16   1028  04/17/15   0917  10/19/11   1255   HGB   --   13.5  14.3   PLT   --   194  218   NA  140  138  133   POTASSIUM  4.4  4.1  4.2   CR  1.01  0.93  0.97      Reviewed last Cardiology note.  Reviewed last ECHO report.    IMPRESSION:   Reason for surgery/procedure: tear of right bicepts tendon    The proposed surgical procedure " is considered INTERMEDIATE risk.    REVISED CARDIAC RISK INDEX  The patient has the following serious cardiovascular risks for perioperative complications such as (MI, PE, VFib and 3  AV Block):  Coronary Artery Disease (MI, positive stress test, angina, Qs on EKG)  INTERPRETATION: 1 risks: Class II (low risk - 0.9% complication rate)    The patient has the following additional risks for perioperative complications:  No identified additional risks    Preop general physical exam    - EKG 12-lead complete w/read - Clinics  - CBC with platelets    Tear of right biceps muscle, initial encounter  Anticipating surgery.    Nonrheumatic aortic valve stenosis  Noted to be mild last year on ECHO.  He does have a follow up with Cardiology in August.   He has no symptoms of dyspnea on exertion or palpitations. Very active; mows a large yard with push mower that takes 2 hours with no rest. OK to proceed with surgery.    Coronary artery disease involving native coronary artery of native heart without angina pectoris  As above. OK to proceed with surgery.   (His lawn sounds like a stress test equivalent.)      RECOMMENDATIONS:         He does not need to take any morning medications.   Recommend to avoid NSAID over the weekend    APPROVAL GIVEN to proceed with proposed procedure, without further diagnostic evaluation       Signed Electronically by: Rabia Warner MD, MD    Copy of this evaluation report is provided to requesting physician.    [place] Preop Guidelines    Revised Cardiac Risk Index

## 2018-07-13 NOTE — TELEPHONE ENCOUNTER
Patient has been scheduled for surgery. Details are below.    Surgery: Repair right bicep tendon  [facility]  Date/Time: 07/16/2018 @ 11:20AM  Surgeon: Dr. Zabala  Surgery Consult: N/A  Pre-Op Physical: 7/13/18 - Dr. Warner  Post-Op: 07/30/2018   Packet Mailed: Yes  Consent Faxed: yes  Added to [place]: yes      Felipe Garcia CMA (AAMA)  Podiatry / Foot & Ankle Surgery  [facility]

## 2018-07-13 NOTE — PROGRESS NOTES
HISTORY OF PRESENT ILLNESS:    Johan Navarro is a 63 year old male who is seen in consultation at the request of Dr. Crouch for acute right biceps rupture. Injury occurred 5 days ago while bending a tree branch back, the branch snapped back causing the arm to hyperextend.  Patient stated he felt a pop and tear.   Patient is right hand dominant, and retired.    Present symptoms:  Mild bruising at anterior and medial elbow, moderate swelling of elbow.  Dull anterior elbow pain, increased pain with use and palpation.  Noted deformity of biceps region. Generalized anterior and superior shoudler pain since date of injury. Current pain level 3/10, pain increases with use of arm. Denies numbness and tingling.  Treatments tried to this point: icing, Aleve, Tylenol, activity modification.   Orthopedic PMH: none.    Past Medical History:   Diagnosis Date     Aortic stenosis      CAD (coronary artery disease)     cardiac cath : stents x 2 to circumflex     Carotid stenosis     left     Family history of early CAD      Hyperlipidaemia LDL goal < 70     familial hyperlipidemia with marked hypercholesterolemia before treatment; has been on some form of cholesterol-lowering medication since the        Past Surgical History:   Procedure Laterality Date     CARDIAC SURGERY      coronary stents placed      DAVINCI PROSTATECTOMY           SURGICAL HISTORY OF -       Right hand Xanthoma removal     SURGICAL HISTORY OF -       Angioplasty/Stent placement x 2     SURGICAL HISTORY OF -       Right heel surgery - post football injury     SURGICAL HISTORY OF -       Stress Echo (-)       Family History   Problem Relation Age of Onset     Lipids Mother      C.A.D. Mother       at age 49 of MI     C.A.D. Sister      MI at age 48     Cancer - colorectal No family hx of      Prostate Cancer No family hx of      Colon Cancer No family hx of        Social History     Social History     Marital status:      Spouse name:  N/A     Number of children: N/A     Years of education: N/A     Occupational History     Not on file.     Social History Main Topics     Smoking status: Former Smoker     Smokeless tobacco: Never Used     Alcohol use Yes      Comment: very rare     Drug use: No     Sexual activity: Yes     Partners: Female     Other Topics Concern     Caffeine Concern Yes     coffee and soda     Special Diet No     Exercise Yes     regular      Seat Belt Yes     Parent/Sibling W/ Cabg, Mi Or Angioplasty Before 65f 55m? Yes     49     Social History Narrative       Current Outpatient Prescriptions   Medication Sig Dispense Refill     ASPIRIN 81 MG OR TABS 1 tab po QD (Once per day)       atorvastatin (LIPITOR) 80 MG tablet Take 1 tablet (80 mg) by mouth daily 90 tablet 3     CIALIS 20 MG tablet TK ONE T PRIOR TO ANTICIPATED SEXUAL ACTIVITY MAX PER DAY  11     diclofenac (VOLTAREN) 1 % GEL topical gel Apply 2 grams to hands/fingers four times daily using enclosed dosing card. 100 g 1     evolocumab (REPATHA) 140 MG/ML prefilled autoinjector Inject 1 mL (140 mg) Subcutaneous every 14 days 2 mL 5     evolocumab (REPATHA) 140 MG/ML prefilled autoinjector Inject 1 mL (140 mg) Subcutaneous every 14 days 2 mL 4     gabapentin (NEURONTIN) 600 MG tablet TAKE 1 TABLET BY MOUTH TWICE DAILY 180 tablet 0     order for DME Equipment being ordered: Heel lift suspension boot - all foam boot to offload base of right 5ht metatarsal at night. 1 Device 0     sildenafil (REVATIO) 20 MG tablet TAKE TWO TO FIVE TABLETS BY MOUTH AS NEEDED PRIOR TO SXUAL INTERCOURSE NEVER USE WITH NITROGLYCERIN TERAZOSIN OR DOXAZOCIN 30 tablet 0       Allergies   Allergen Reactions     Crestor [Rosuvastatin] GI Disturbance     Dust Mites      Pollen Extract        REVIEW OF SYSTEMS:  CONSTITUTIONAL:  NEGATIVE for fever, chills, change in weight  INTEGUMENTARY/SKIN:  NEGATIVE for worrisome rashes, moles or lesions  EYES:  NEGATIVE for vision changes or irritation  ENT/MOUTH:   NEGATIVE for ear, mouth and throat problems  RESP:  NEGATIVE for significant cough or SOB  BREAST:  NEGATIVE for masses, tenderness or discharge  CV:  NEGATIVE for chest pain, palpitations or peripheral edema  GI:  NEGATIVE for nausea, abdominal pain, heartburn, or change in bowel habits  :  Negative   MUSCULOSKELETAL:  See HPI above  NEURO:  NEGATIVE for weakness, dizziness or paresthesias  ENDOCRINE:  NEGATIVE for temperature intolerance, skin/hair changes  HEME/ALLERGY/IMMUNE:  NEGATIVE for bleeding problems  PSYCHIATRIC:  NEGATIVE for changes in mood or affect      PHYSICAL EXAM:  /60 (BP Location: Right arm, Patient Position: Chair, Cuff Size: Adult Regular)  Wt 154 lb (69.9 kg)  BMI 24.86 kg/m2  Body mass index is 24.86 kg/(m^2).   GENERAL APPEARANCE: healthy, alert and no distress   SKIN: no suspicious lesions or rashes  NEURO: Normal strength and tone, mentation intact and speech normal  VASCULAR: Good pulses, and capillary refill   LYMPH: no lymphadenopathy   PSYCH:  mentation appears normal and affect normal/bright    MSK:  Examination of his right elbow reveals moderate antecubital ecchymosis and tenderness to palpation.  He has mildly limited range of motion of the elbow in flexion and extension.  He has pain with pronation and supination about the radial head.  CMS is intact to his fingertips.               ASSESSMENT / PLAN: Right distal biceps rupture.  I had a long discussion with Otis regarding the pathology and treatment options.  I suggested that he undergo a distal biceps repair, describing the potential risks as well as benefits of this.  He agrees and will schedule this at his convenience.      Imaging Interpretation:         Chaparro Zabala MD  Department of Orthopedic Surgery

## 2018-07-13 NOTE — TELEPHONE ENCOUNTER
Patient calling in need pre-op appt for surgery on Monday of bicep tendon repair right elbow.    Huddled with Dr. Warner. Ok to add to schedule today. Scheduled in 4:10 slot.    Surgery at [facility].      Wilver Austin RN

## 2018-07-16 ENCOUNTER — ANESTHESIA EVENT (OUTPATIENT)
Dept: SURGERY | Facility: CLINIC | Age: 63
End: 2018-07-16
Payer: COMMERCIAL

## 2018-07-16 ENCOUNTER — HOSPITAL ENCOUNTER (OUTPATIENT)
Facility: CLINIC | Age: 63
Discharge: HOME | End: 2018-07-16
Attending: ORTHOPAEDIC SURGERY | Admitting: ORTHOPAEDIC SURGERY
Payer: COMMERCIAL

## 2018-07-16 ENCOUNTER — SURGERY (OUTPATIENT)
Age: 63
End: 2018-07-16

## 2018-07-16 ENCOUNTER — ANESTHESIA (OUTPATIENT)
Dept: SURGERY | Facility: CLINIC | Age: 63
End: 2018-07-16
Payer: COMMERCIAL

## 2018-07-16 VITALS
RESPIRATION RATE: 16 BRPM | TEMPERATURE: 97.6 F | SYSTOLIC BLOOD PRESSURE: 135 MMHG | HEIGHT: 66 IN | OXYGEN SATURATION: 96 % | WEIGHT: 150 LBS | DIASTOLIC BLOOD PRESSURE: 84 MMHG | BODY MASS INDEX: 24.11 KG/M2

## 2018-07-16 DIAGNOSIS — S46.219A TEAR OF BICEPS TENDON: Primary | ICD-10-CM

## 2018-07-16 PROCEDURE — 71000027 ZZH RECOVERY PHASE 2 EACH 15 MINS: Performed by: ORTHOPAEDIC SURGERY

## 2018-07-16 PROCEDURE — 36000058 ZZH SURGERY LEVEL 3 EA 15 ADDTL MIN: Performed by: ORTHOPAEDIC SURGERY

## 2018-07-16 PROCEDURE — 25000128 H RX IP 250 OP 636: Performed by: NURSE ANESTHETIST, CERTIFIED REGISTERED

## 2018-07-16 PROCEDURE — 37000008 ZZH ANESTHESIA TECHNICAL FEE, 1ST 30 MIN: Performed by: ORTHOPAEDIC SURGERY

## 2018-07-16 PROCEDURE — 27210995 ZZH RX 272: Performed by: ORTHOPAEDIC SURGERY

## 2018-07-16 PROCEDURE — 25000125 ZZHC RX 250: Performed by: NURSE ANESTHETIST, CERTIFIED REGISTERED

## 2018-07-16 PROCEDURE — 37000009 ZZH ANESTHESIA TECHNICAL FEE, EACH ADDTL 15 MIN: Performed by: ORTHOPAEDIC SURGERY

## 2018-07-16 PROCEDURE — 25000128 H RX IP 250 OP 636: Performed by: ANESTHESIOLOGY

## 2018-07-16 PROCEDURE — 24341 RPR TDN/MUSC UPR A/E EACH: CPT | Mod: RT | Performed by: ORTHOPAEDIC SURGERY

## 2018-07-16 PROCEDURE — 25000132 ZZH RX MED GY IP 250 OP 250 PS 637: Performed by: ORTHOPAEDIC SURGERY

## 2018-07-16 PROCEDURE — 71000013 ZZH RECOVERY PHASE 1 LEVEL 1 EA ADDTL HR: Performed by: ORTHOPAEDIC SURGERY

## 2018-07-16 PROCEDURE — 25000566 ZZH SEVOFLURANE, EA 15 MIN: Performed by: ORTHOPAEDIC SURGERY

## 2018-07-16 PROCEDURE — 25000128 H RX IP 250 OP 636: Performed by: ORTHOPAEDIC SURGERY

## 2018-07-16 PROCEDURE — 36000056 ZZH SURGERY LEVEL 3 1ST 30 MIN: Performed by: ORTHOPAEDIC SURGERY

## 2018-07-16 PROCEDURE — 71000012 ZZH RECOVERY PHASE 1 LEVEL 1 FIRST HR: Performed by: ORTHOPAEDIC SURGERY

## 2018-07-16 PROCEDURE — 40000306 ZZH STATISTIC PRE PROC ASSESS II: Performed by: ORTHOPAEDIC SURGERY

## 2018-07-16 PROCEDURE — 27210794 ZZH OR GENERAL SUPPLY STERILE: Performed by: ORTHOPAEDIC SURGERY

## 2018-07-16 RX ORDER — GLYCOPYRROLATE 0.2 MG/ML
INJECTION, SOLUTION INTRAMUSCULAR; INTRAVENOUS PRN
Status: DISCONTINUED | OUTPATIENT
Start: 2018-07-16 | End: 2018-07-16

## 2018-07-16 RX ORDER — LIDOCAINE 40 MG/G
CREAM TOPICAL
Status: DISCONTINUED | OUTPATIENT
Start: 2018-07-16 | End: 2018-07-16 | Stop reason: HOSPADM

## 2018-07-16 RX ORDER — SODIUM CHLORIDE, SODIUM LACTATE, POTASSIUM CHLORIDE, CALCIUM CHLORIDE 600; 310; 30; 20 MG/100ML; MG/100ML; MG/100ML; MG/100ML
INJECTION, SOLUTION INTRAVENOUS CONTINUOUS
Status: DISCONTINUED | OUTPATIENT
Start: 2018-07-16 | End: 2018-07-16 | Stop reason: HOSPADM

## 2018-07-16 RX ORDER — CEFAZOLIN SODIUM 2 G/100ML
2 INJECTION, SOLUTION INTRAVENOUS
Status: COMPLETED | OUTPATIENT
Start: 2018-07-16 | End: 2018-07-16

## 2018-07-16 RX ORDER — EPHEDRINE SULFATE 50 MG/ML
INJECTION, SOLUTION INTRAMUSCULAR; INTRAVENOUS; SUBCUTANEOUS PRN
Status: DISCONTINUED | OUTPATIENT
Start: 2018-07-16 | End: 2018-07-16

## 2018-07-16 RX ORDER — LABETALOL HYDROCHLORIDE 5 MG/ML
10 INJECTION, SOLUTION INTRAVENOUS
Status: DISCONTINUED | OUTPATIENT
Start: 2018-07-16 | End: 2018-07-16 | Stop reason: HOSPADM

## 2018-07-16 RX ORDER — CEFAZOLIN SODIUM 1 G/3ML
1 INJECTION, POWDER, FOR SOLUTION INTRAMUSCULAR; INTRAVENOUS SEE ADMIN INSTRUCTIONS
Status: DISCONTINUED | OUTPATIENT
Start: 2018-07-16 | End: 2018-07-16 | Stop reason: HOSPADM

## 2018-07-16 RX ORDER — FENTANYL CITRATE 50 UG/ML
25-50 INJECTION, SOLUTION INTRAMUSCULAR; INTRAVENOUS
Status: DISCONTINUED | OUTPATIENT
Start: 2018-07-16 | End: 2018-07-16 | Stop reason: HOSPADM

## 2018-07-16 RX ORDER — KETOROLAC TROMETHAMINE 30 MG/ML
INJECTION, SOLUTION INTRAMUSCULAR; INTRAVENOUS PRN
Status: DISCONTINUED | OUTPATIENT
Start: 2018-07-16 | End: 2018-07-16

## 2018-07-16 RX ORDER — PROPOFOL 10 MG/ML
INJECTION, EMULSION INTRAVENOUS PRN
Status: DISCONTINUED | OUTPATIENT
Start: 2018-07-16 | End: 2018-07-16

## 2018-07-16 RX ORDER — OXYCODONE AND ACETAMINOPHEN 5; 325 MG/1; MG/1
1 TABLET ORAL ONCE
Status: COMPLETED | OUTPATIENT
Start: 2018-07-16 | End: 2018-07-16

## 2018-07-16 RX ORDER — ONDANSETRON 4 MG/1
4 TABLET, ORALLY DISINTEGRATING ORAL EVERY 30 MIN PRN
Status: DISCONTINUED | OUTPATIENT
Start: 2018-07-16 | End: 2018-07-16 | Stop reason: HOSPADM

## 2018-07-16 RX ORDER — METOCLOPRAMIDE 10 MG/1
10 TABLET ORAL EVERY 6 HOURS PRN
Status: DISCONTINUED | OUTPATIENT
Start: 2018-07-16 | End: 2018-07-16 | Stop reason: HOSPADM

## 2018-07-16 RX ORDER — ONDANSETRON 2 MG/ML
INJECTION INTRAMUSCULAR; INTRAVENOUS PRN
Status: DISCONTINUED | OUTPATIENT
Start: 2018-07-16 | End: 2018-07-16

## 2018-07-16 RX ORDER — METOCLOPRAMIDE HYDROCHLORIDE 5 MG/ML
10 INJECTION INTRAMUSCULAR; INTRAVENOUS EVERY 6 HOURS PRN
Status: DISCONTINUED | OUTPATIENT
Start: 2018-07-16 | End: 2018-07-16 | Stop reason: HOSPADM

## 2018-07-16 RX ORDER — DIMENHYDRINATE 50 MG/ML
25 INJECTION, SOLUTION INTRAMUSCULAR; INTRAVENOUS
Status: DISCONTINUED | OUTPATIENT
Start: 2018-07-16 | End: 2018-07-16 | Stop reason: HOSPADM

## 2018-07-16 RX ORDER — OXYCODONE AND ACETAMINOPHEN 5; 325 MG/1; MG/1
1-2 TABLET ORAL EVERY 4 HOURS PRN
Qty: 30 TABLET | Refills: 0 | Status: SHIPPED | OUTPATIENT
Start: 2018-07-16 | End: 2019-07-16

## 2018-07-16 RX ORDER — HYDROMORPHONE HYDROCHLORIDE 1 MG/ML
.3-.5 INJECTION, SOLUTION INTRAMUSCULAR; INTRAVENOUS; SUBCUTANEOUS EVERY 10 MIN PRN
Status: DISCONTINUED | OUTPATIENT
Start: 2018-07-16 | End: 2018-07-16 | Stop reason: HOSPADM

## 2018-07-16 RX ORDER — NALOXONE HYDROCHLORIDE 0.4 MG/ML
.1-.4 INJECTION, SOLUTION INTRAMUSCULAR; INTRAVENOUS; SUBCUTANEOUS
Status: DISCONTINUED | OUTPATIENT
Start: 2018-07-16 | End: 2018-07-16 | Stop reason: HOSPADM

## 2018-07-16 RX ORDER — KETOROLAC TROMETHAMINE 30 MG/ML
30 INJECTION, SOLUTION INTRAMUSCULAR; INTRAVENOUS EVERY 6 HOURS PRN
Status: DISCONTINUED | OUTPATIENT
Start: 2018-07-16 | End: 2018-07-16 | Stop reason: HOSPADM

## 2018-07-16 RX ORDER — MAGNESIUM HYDROXIDE 1200 MG/15ML
LIQUID ORAL PRN
Status: DISCONTINUED | OUTPATIENT
Start: 2018-07-16 | End: 2018-07-16 | Stop reason: HOSPADM

## 2018-07-16 RX ORDER — LIDOCAINE HYDROCHLORIDE 10 MG/ML
INJECTION, SOLUTION INFILTRATION; PERINEURAL PRN
Status: DISCONTINUED | OUTPATIENT
Start: 2018-07-16 | End: 2018-07-16

## 2018-07-16 RX ORDER — DEXAMETHASONE SODIUM PHOSPHATE 4 MG/ML
INJECTION, SOLUTION INTRA-ARTICULAR; INTRALESIONAL; INTRAMUSCULAR; INTRAVENOUS; SOFT TISSUE PRN
Status: DISCONTINUED | OUTPATIENT
Start: 2018-07-16 | End: 2018-07-16

## 2018-07-16 RX ORDER — MEPERIDINE HYDROCHLORIDE 50 MG/ML
12.5 INJECTION INTRAMUSCULAR; INTRAVENOUS; SUBCUTANEOUS
Status: DISCONTINUED | OUTPATIENT
Start: 2018-07-16 | End: 2018-07-16 | Stop reason: HOSPADM

## 2018-07-16 RX ORDER — ONDANSETRON 2 MG/ML
4 INJECTION INTRAMUSCULAR; INTRAVENOUS EVERY 30 MIN PRN
Status: DISCONTINUED | OUTPATIENT
Start: 2018-07-16 | End: 2018-07-16 | Stop reason: HOSPADM

## 2018-07-16 RX ORDER — DEXAMETHASONE SODIUM PHOSPHATE 4 MG/ML
4 INJECTION, SOLUTION INTRA-ARTICULAR; INTRALESIONAL; INTRAMUSCULAR; INTRAVENOUS; SOFT TISSUE EVERY 10 MIN PRN
Status: DISCONTINUED | OUTPATIENT
Start: 2018-07-16 | End: 2018-07-16 | Stop reason: HOSPADM

## 2018-07-16 RX ORDER — HYDRALAZINE HYDROCHLORIDE 20 MG/ML
2.5-5 INJECTION INTRAMUSCULAR; INTRAVENOUS EVERY 10 MIN PRN
Status: DISCONTINUED | OUTPATIENT
Start: 2018-07-16 | End: 2018-07-16 | Stop reason: HOSPADM

## 2018-07-16 RX ORDER — FENTANYL CITRATE 50 UG/ML
INJECTION, SOLUTION INTRAMUSCULAR; INTRAVENOUS PRN
Status: DISCONTINUED | OUTPATIENT
Start: 2018-07-16 | End: 2018-07-16

## 2018-07-16 RX ADMIN — FENTANYL CITRATE 50 MCG: 50 INJECTION, SOLUTION INTRAMUSCULAR; INTRAVENOUS at 14:02

## 2018-07-16 RX ADMIN — MIDAZOLAM 2 MG: 1 INJECTION INTRAMUSCULAR; INTRAVENOUS at 11:36

## 2018-07-16 RX ADMIN — Medication 5 MG: at 11:47

## 2018-07-16 RX ADMIN — HYDRALAZINE HYDROCHLORIDE 5 MG: 20 INJECTION INTRAMUSCULAR; INTRAVENOUS at 13:48

## 2018-07-16 RX ADMIN — DEXAMETHASONE SODIUM PHOSPHATE 4 MG: 4 INJECTION, SOLUTION INTRA-ARTICULAR; INTRALESIONAL; INTRAMUSCULAR; INTRAVENOUS; SOFT TISSUE at 11:44

## 2018-07-16 RX ADMIN — Medication 5 MG: at 11:59

## 2018-07-16 RX ADMIN — ONDANSETRON 4 MG: 2 INJECTION INTRAMUSCULAR; INTRAVENOUS at 12:50

## 2018-07-16 RX ADMIN — GLYCOPYRROLATE 0.2 MG: 0.2 INJECTION, SOLUTION INTRAMUSCULAR; INTRAVENOUS at 11:50

## 2018-07-16 RX ADMIN — FENTANYL CITRATE 50 MCG: 50 INJECTION, SOLUTION INTRAMUSCULAR; INTRAVENOUS at 12:05

## 2018-07-16 RX ADMIN — PROPOFOL 200 MG: 10 INJECTION, EMULSION INTRAVENOUS at 11:44

## 2018-07-16 RX ADMIN — KETOROLAC TROMETHAMINE 30 MG: 30 INJECTION, SOLUTION INTRAMUSCULAR at 12:53

## 2018-07-16 RX ADMIN — OXYCODONE HYDROCHLORIDE AND ACETAMINOPHEN 1 TABLET: 5; 325 TABLET ORAL at 14:04

## 2018-07-16 RX ADMIN — SODIUM CHLORIDE, POTASSIUM CHLORIDE, SODIUM LACTATE AND CALCIUM CHLORIDE: 600; 310; 30; 20 INJECTION, SOLUTION INTRAVENOUS at 12:56

## 2018-07-16 RX ADMIN — CEFAZOLIN SODIUM 2 G: 2 INJECTION, SOLUTION INTRAVENOUS at 11:30

## 2018-07-16 RX ADMIN — HYDROCODONE BITARTRATE AND ACETAMINOPHEN 250 ML: 500; 5 TABLET ORAL at 12:50

## 2018-07-16 RX ADMIN — FENTANYL CITRATE 50 MCG: 50 INJECTION, SOLUTION INTRAMUSCULAR; INTRAVENOUS at 13:44

## 2018-07-16 RX ADMIN — FENTANYL CITRATE 50 MCG: 50 INJECTION, SOLUTION INTRAMUSCULAR; INTRAVENOUS at 13:26

## 2018-07-16 RX ADMIN — LIDOCAINE HYDROCHLORIDE 30 MG: 10 INJECTION, SOLUTION INFILTRATION; PERINEURAL at 11:43

## 2018-07-16 RX ADMIN — SODIUM CHLORIDE, POTASSIUM CHLORIDE, SODIUM LACTATE AND CALCIUM CHLORIDE: 600; 310; 30; 20 INJECTION, SOLUTION INTRAVENOUS at 11:30

## 2018-07-16 RX ADMIN — FENTANYL CITRATE 50 MCG: 50 INJECTION, SOLUTION INTRAMUSCULAR; INTRAVENOUS at 12:22

## 2018-07-16 RX ADMIN — HYDROMORPHONE HYDROCHLORIDE 0.5 MG: 1 INJECTION, SOLUTION INTRAMUSCULAR; INTRAVENOUS; SUBCUTANEOUS at 11:54

## 2018-07-16 RX ADMIN — FENTANYL CITRATE 100 MCG: 50 INJECTION, SOLUTION INTRAMUSCULAR; INTRAVENOUS at 11:43

## 2018-07-16 RX ADMIN — HYDROMORPHONE HYDROCHLORIDE 0.5 MG: 1 INJECTION, SOLUTION INTRAMUSCULAR; INTRAVENOUS; SUBCUTANEOUS at 11:57

## 2018-07-16 RX ADMIN — Medication 5 MG: at 11:52

## 2018-07-16 RX ADMIN — Medication 5 MG: at 12:14

## 2018-07-16 RX ADMIN — FENTANYL CITRATE 50 MCG: 50 INJECTION, SOLUTION INTRAMUSCULAR; INTRAVENOUS at 13:17

## 2018-07-16 NOTE — H&P (VIEW-ONLY)
[facility]  [address] [address] [address] [phone]  Dept: [phone]    PRE-OP EVALUATION:  Today's date: 2018    Johan Navarro (: 1955) presents for pre-operative evaluation assessment as requested by Dr. Santos.  He requires evaluation and anesthesia risk assessment prior to undergoing surgery/procedure for treatment of elbow tendon reattachment .    Fax number for surgical facility: [facility]   Primary Physician: Washington Yang  Type of Anesthesia Anticipated: General    Patient has a Health Care Directive or Living Will:  YES, Does not have it with him.    Preop Questions 2018   Who is doing your surgery? maulik   What are you having done? elbow tendon reattachment   Date of Surgery/Procedure: 2018   Facility or Hospital where procedure/surgery will be performed: [facility]   1.  Do you have a history of Heart attack, stroke, stent, coronary bypass surgery, or other heart surgery? YES  - in    2.  Do you ever have any pain or discomfort in your chest? No   3.  Do you have a history of  Heart Failure? No   4.   Are you troubled by shortness of breath when:  walking on a level surface, or up a slight hill, or at night? No   5.  Do you currently have a cold, bronchitis or other respiratory infection? No   6.  Do you have a cough, shortness of breath, or wheezing? No   7.  Do you sometimes get pains in the calves of your legs when you walk? No   8. Do you or anyone in your family have previous history of blood clots? No   9.  Do you or does anyone in your family have a serious bleeding problem such as prolonged bleeding following surgeries or cuts? No   10. Have you ever had problems with anemia or been told to take iron pills? No   11. Have you had any abnormal blood loss such as black, tarry or bloody stools? No   12. Have you ever had a blood transfusion? No   13. Have you or any of your relatives ever had  problems with anesthesia? No   14. Do you have sleep apnea, excessive snoring or daytime drowsiness? No   15. Do you have any prosthetic heart valves? No   16. Do you have prosthetic joints? No         HPI:     HPI related to upcoming procedure:   Right elbow injury while working with a tree branch.   Some pain, mild. Does note some weakness.     See problem list for active medical problems.  Problems all longstanding and stable, except as noted/documented.  See ROS for pertinent symptoms related to these conditions.                                                                                                                                                          .    MEDICAL HISTORY:     Patient Active Problem List    Diagnosis Date Noted     Malignant neoplasm of prostate (H) 08/17/2017     Priority: Medium     Hx of prostatectomy       Mixed hyperlipidemia      Priority: Medium     familial hyperlipidemia with marked hypercholesterolemia before treatment; has been on some form of cholesterol-lowering medication since the 1970s  Diagnosis updated by automated process. Provider to review and confirm.       Family history of early CAD      Priority: Medium     Aortic stenosis      Priority: Medium     Carotid stenosis      Priority: Medium     left       Coronary artery disease involving native coronary artery of native heart without angina pectoris      Priority: Medium     cardiac cath 1998: stents x 2 to circumflex       Depressive disorder, not elsewhere classified 03/02/2007     Priority: Medium              Elevated prostate specific antigen (PSA) 03/02/2007     Priority: Medium     Osteoarthritis of Hand  03/02/2007     Priority: Medium      Past Medical History:   Diagnosis Date     Aortic stenosis      Arthritis     hands     CAD (coronary artery disease)     cardiac cath 1998: stents x 2 to circumflex     Carotid stenosis     left     Family history of early CAD      Hyperlipidaemia LDL goal < 70      familial hyperlipidemia with marked hypercholesterolemia before treatment; has been on some form of cholesterol-lowering medication since the 1970s     Stented coronary artery      Past Surgical History:   Procedure Laterality Date     CARDIAC SURGERY  1998    coronary stents x2 placed 1998; angioplasty     DAVINCI PROSTATECTOMY      2010     EYE SURGERY Bilateral 2017    eyelids     ORTHOPEDIC SURGERY Right     achilles tendon; post football injury     ORTHOPEDIC SURGERY Right     hand; near thumb. has wires in there. cysts     SURGICAL HISTORY OF -       Right hand Xanthoma removal     SURGICAL HISTORY OF -       Stress Echo (-)     Current Outpatient Prescriptions   Medication Sig Dispense Refill     atorvastatin (LIPITOR) 80 MG tablet Take 1 tablet (80 mg) by mouth daily 90 tablet 3     diclofenac (VOLTAREN) 1 % GEL topical gel Apply 2 grams to hands/fingers four times daily using enclosed dosing card. 100 g 1     evolocumab (REPATHA) 140 MG/ML prefilled autoinjector Inject 1 mL (140 mg) Subcutaneous every 14 days 2 mL 5     gabapentin (NEURONTIN) 600 MG tablet TAKE 1 TABLET BY MOUTH TWICE DAILY 180 tablet 0     order for DME Equipment being ordered: Heel lift suspension boot - all foam boot to offload base of right 5ht metatarsal at night. 1 Device 0     sildenafil (REVATIO) 20 MG tablet TAKE TWO TO FIVE TABLETS BY MOUTH AS NEEDED PRIOR TO SXUAL INTERCOURSE NEVER USE WITH NITROGLYCERIN TERAZOSIN OR DOXAZOCIN 30 tablet 0     OTC products: some aleve this week.    Allergies   Allergen Reactions     Crestor [Rosuvastatin] GI Disturbance     Dust Mites      Pollen Extract       Latex Allergy: NO    Social History   Substance Use Topics     Smoking status: Former Smoker     Smokeless tobacco: Never Used     Alcohol use Yes      Comment: very rare     History   Drug Use No       REVIEW OF SYSTEMS:   CONSTITUTIONAL: NEGATIVE for fever, chills, change in weight  ENT/MOUTH: NEGATIVE for ear, mouth and throat problems.  "Mild chronic congestion related to allergies.   RESP: NEGATIVE for significant cough or SOB  CV: NEGATIVE for chest pain, palpitations or peripheral edema  No nausea, vomitting or change in bowel habits.  No urinary symptoms.    Huge yard. Does take 2 hours of steady pushing.        EXAM:   /68 (BP Location: Left arm, Patient Position: Chair, Cuff Size: Adult Regular)  Pulse 65  Temp 97.9  F (36.6  C) (Oral)  Resp 15  Ht 5' 6\" (1.676 m)  Wt 151 lb 12.8 oz (68.9 kg)  SpO2 97%  BMI 24.5 kg/m2  GENERAL APPEARANCE: alert and no distress  HENT: ear canals and TM's normal and nose and mouth without ulcers or lesions  RESP: lungs clear to auscultation - no rales, rhonchi or wheezes  CV: regular rates and rhythm  ABDOMEN: soft, nontender, no HSM or masses and bowel sounds normal  MS: he does have a bulge in his right upper arm with bruising near the elbow  NEURO: Normal strength and tone, sensory exam grossly normal, mentation intact and speech normal  PSYCH: mentation appears normal and affect normal/bright    DIAGNOSTICS:     EKG: appears normal, NSR, normal axis, normal intervals, no acute ST/T changes c/w ischemia, no LVH by voltage criteria  Labs Resulted Today:   Results for orders placed or performed in visit on 07/13/18   CBC with platelets   Result Value Ref Range    WBC 6.0 4.0 - 11.0 10e9/L    RBC Count 4.82 4.4 - 5.9 10e12/L    Hemoglobin 14.3 13.3 - 17.7 g/dL    Hematocrit 41.8 40.0 - 53.0 %    MCV 87 78 - 100 fl    MCH 29.7 26.5 - 33.0 pg    MCHC 34.2 31.5 - 36.5 g/dL    RDW 13.1 10.0 - 15.0 %    Platelet Count 231 150 - 450 10e9/L       Recent Labs   Lab Test  11/15/16   1028  04/17/15   0917  10/19/11   1255   HGB   --   13.5  14.3   PLT   --   194  218   NA  140  138  133   POTASSIUM  4.4  4.1  4.2   CR  1.01  0.93  0.97      Reviewed last Cardiology note.  Reviewed last ECHO report.    IMPRESSION:   Reason for surgery/procedure: tear of right bicepts tendon    The proposed surgical procedure " is considered INTERMEDIATE risk.    REVISED CARDIAC RISK INDEX  The patient has the following serious cardiovascular risks for perioperative complications such as (MI, PE, VFib and 3  AV Block):  Coronary Artery Disease (MI, positive stress test, angina, Qs on EKG)  INTERPRETATION: 1 risks: Class II (low risk - 0.9% complication rate)    The patient has the following additional risks for perioperative complications:  No identified additional risks    Preop general physical exam    - EKG 12-lead complete w/read - Clinics  - CBC with platelets    Tear of right biceps muscle, initial encounter  Anticipating surgery.    Nonrheumatic aortic valve stenosis  Noted to be mild last year on ECHO.  He does have a follow up with Cardiology in August.   He has no symptoms of dyspnea on exertion or palpitations. Very active; mows a large yard with push mower that takes 2 hours with no rest. OK to proceed with surgery.    Coronary artery disease involving native coronary artery of native heart without angina pectoris  As above. OK to proceed with surgery.   (His lawn sounds like a stress test equivalent.)      RECOMMENDATIONS:         He does not need to take any morning medications.   Recommend to avoid NSAID over the weekend    APPROVAL GIVEN to proceed with proposed procedure, without further diagnostic evaluation       Signed Electronically by: Rabia Warner MD, MD    Copy of this evaluation report is provided to requesting physician.    [place] Preop Guidelines    Revised Cardiac Risk Index

## 2018-07-16 NOTE — IP AVS SNAPSHOT
[facility] PreOP/PostOP    [address]    [facility] [address]    Phone:  [phone]    Fax:  [phone]                                       After Visit Summary   7/16/2018    Johan Navarro    MRN: [id number]           After Visit Summary Signature Page     I have received my discharge instructions, and my questions have been answered. I have discussed any challenges I see with this plan with the nurse or doctor.    ..........................................................................................................................................  Patient/Patient Representative Signature      ..........................................................................................................................................  Patient Representative Print Name and Relationship to Patient    ..................................................               ................................................  Date                                            Time    ..........................................................................................................................................  Reviewed by Signature/Title    ...................................................              ..............................................  Date                                                            Time

## 2018-07-16 NOTE — DISCHARGE INSTRUCTIONS
DISCHARGE INSTRUCTIONS  DR. BARRINGTON AMATO M.D.  [phone]    WOUND CARE:  May remove dressing and shower on post operative day 1 with incision uncovered.  Do not soak incision in water until healed (7-10 days)  Daily dressing changes starting post op day 1.  Cover incision(s) with bandaids or gauze and tape.  If ace wrap present after surgery, remove next day - no need to re-wrap incision with ace wrap.    ACTIVITY:  Activity as tolerated unless specific activity restrictions have been ordered.    FOLLOW-UP:  Call Dr. Amato s office to schedule a follow-up appointment (if not already scheduled)  Someone from Dr. Amato s office will call you tomorrow to see how you are doing.  If any questions come up after you are discharged, you will have an opportunity to discuss them during this phone call tomorrow.          GENERAL ANESTHESIA OR SEDATION ADULT DISCHARGE INSTRUCTIONS   SPECIAL PRECAUTIONS FOR 24 HOURS AFTER SURGERY    IT IS NOT UNUSUAL TO FEEL LIGHT-HEADED OR FAINT, UP TO 24 HOURS AFTER SURGERY OR WHILE TAKING PAIN MEDICATION.  IF YOU HAVE THESE SYMPTOMS; SIT FOR A FEW MINUTES BEFORE STANDING AND HAVE SOMEONE ASSIST YOU WHEN YOU GET UP TO WALK OR USE THE BATHROOM.    YOU SHOULD REST AND RELAX FOR THE NEXT 24 HOURS AND YOU MUST MAKE ARRANGEMENTS TO HAVE SOMEONE STAY WITH YOU FOR AT LEAST 24 HOURS AFTER YOUR DISCHARGE.  AVOID HAZARDOUS AND STRENUOUS ACTIVITIES.  DO NOT MAKE IMPORTANT DECISIONS FOR 24 HOURS.    DO NOT DRIVE ANY VEHICLE OR OPERATE MECHANICAL EQUIPMENT FOR 24 HOURS FOLLOWING THE END OF YOUR SURGERY.  EVEN THOUGH YOU MAY FEEL NORMAL, YOUR REACTIONS MAY BE AFFECTED BY THE MEDICATION YOU HAVE RECEIVED.    DO NOT DRINK ALCOHOLIC BEVERAGES FOR 24 HOURS FOLLOWING YOUR SURGERY.    DRINK CLEAR LIQUIDS (APPLE JUICE, GINGER ALE, 7-UP, BROTH, ETC.).  PROGRESS TO YOUR REGULAR DIET AS YOU FEEL ABLE.    YOU MAY HAVE A DRY MOUTH, A SORE THROAT, MUSCLES ACHES OR TROUBLE SLEEPING.  THESE SHOULD GO AWAY AFTER 24  HOURS.    CALL YOUR DOCTOR FOR ANY OF THE FOLLOWING:  SIGNS OF INFECTION (FEVER, GROWING TENDERNESS AT THE SURGERY SITE, A LARGE AMOUNT OF DRAINAGE OR BLEEDING, SEVERE PAIN, FOUL-SMELLING DRAINAGE, REDNESS OR SWELLING.    IT HAS BEEN OVER 8 TO 10 HOURS SINCE SURGERY AND YOU ARE STILL NOT ABLE TO URINATE (PASS WATER).       MEDICATION YOU RECEIVED:  You took 1 Percocet at 2:05 PM.   **This  Medication has Tylenol (acetominophen) in it. Maximum acetaminophen (Tylenol) dose from all      sources should not exceed 4 grams (4000 mg) per day.

## 2018-07-16 NOTE — BRIEF OP NOTE
[facility]  Orthopedics Brief Operative Note    Pre-operative diagnosis: Biceps tear   Post-operative diagnosis Same   Procedure: Procedure(s):  right open bicep tendon repair   - Wound Class: I-Clean   Surgeon: Alber Zabala MD   Assistants(s): Surgeon(s) and Role:     * Alber Zabala MD - Primary     * Victor M Pugh PA-C - Assisting   Anesthesia: General    Estimated blood loss: * No values recorded between 7/16/2018 11:58 AM and 7/16/2018  1:02 PM *    Total IV fluids: See anesthesia record   Blood transfusion: None       Drains: None   Specimens: * No specimens in log *   Implants: None   Findings: Dictated   Complications: None   Condition: Stable   Comments: See Dictated Operative report for full details

## 2018-07-16 NOTE — ANESTHESIA POSTPROCEDURE EVALUATION
Patient: Johan Navarro    Procedure(s):  right open bicep tendon repair   - Wound Class: I-Clean    Diagnosis:Biceps tear  Diagnosis Additional Information: Pre-operative diagnosis: Biceps tear  Post-operative diagnosis Same  Procedure: Procedure(s):  right open bicep tendon repair        Anesthesia Type:  General    Note:  Anesthesia Post Evaluation    Patient location during evaluation: PACU  Patient participation: Able to fully participate in evaluation  Level of consciousness: awake and alert  Pain management: adequate  Airway patency: patent  Cardiovascular status: acceptable  Respiratory status: acceptable  Hydration status: acceptable  PONV: none     Anesthetic complications: None          Last vitals:  Vitals:    07/16/18 1415 07/16/18 1447 07/16/18 1515   BP: (!) 149/95 141/84 135/84   Resp: 16 17 16   Temp:  97.6  F (36.4  C)    SpO2: 97% 96%          Electronically Signed By: David Barrera MD  July 16, 2018  4:59 PM

## 2018-07-16 NOTE — ANESTHESIA CARE TRANSFER NOTE
Patient: Johan Navarro    Procedure(s):  right open bicep tendon repair   - Wound Class: I-Clean    Diagnosis: Biceps tear  Diagnosis Additional Information: No value filed.    Anesthesia Type:   General     Note:  Airway :Face Mask  Patient transferred to:PACU  Comments: [email or web address].Handoff Report: Identifed the Patient, Identified the Reponsible Provider, Reviewed the pertinent medical history, Discussed the surgical course, Reviewed Intra-OP anesthesia mangement and issues during anesthesia, Set expectations for post-procedure period and Allowed opportunity for questions and acknowledgement of understanding      Vitals: (Last set prior to Anesthesia Care Transfer)    CRNA VITALS  7/16/2018 1235 - 7/16/2018 1311      7/16/2018             Pulse: 87    SpO2: 100 %    Resp Rate (observed): (!)  4                Electronically Signed By: ANGELINE Wagner CRNA  July 16, 2018  1:11 PM

## 2018-07-16 NOTE — IP AVS SNAPSHOT
MRN:[id number]                      After Visit Summary   7/16/2018    Johan Navarro    MRN: [id number]           Thank you!     Thank you for choosing [facility] for your care. Our goal is always to provide you with excellent care. Hearing back from our patients is one way we can continue to improve our services. Please take a few minutes to complete the written survey that you may receive in the mail after you visit. If you would like to speak to someone directly about your visit please contact Patient Relations at [phone]. Thank you!          Patient Information     Date Of Birth          1955        Designated Caregiver       Most Recent Value    Caregiver    Will someone help with your care after discharge? yes    Name of designated caregiver Wife    Phone number of caregiver home      About your hospital stay     You were admitted on:  July 16, 2018 You last received care in the:  [facility] PreOP/PostOP    You were discharged on:  July 16, 2018        Reason for your hospital stay       Elective surgery, uneventful                  Who to Call     For medical emergencies, please call 911.  For non-urgent questions about your medical care, please call your primary care provider or clinic, [phone]  For questions related to your surgery, please call your surgery clinic        Attending Provider     Provider Specialty    Alber Zabala MD Orthopedics       Primary Care Provider Office Phone # Fax #    Washington Yang PA-C [phone]      After Care Instructions     Activity       Your activity upon discharge: activity as tolerated            Diet       Follow this diet upon discharge: Regular            Wound care and dressings       Instructions to care for your wound at home: daily dressing changes.                  Follow-up Appointments     Follow-up and recommended labs and tests       10-14 days                  Your next 10  appointments already scheduled     Jul 30, 2018  1:20 PM CDT   Return Visit with Victor M Pugh PA-C   [facility] ([facility])    [address]  [address]  [place] [address]   [phone]            Aug 13, 2018  8:45 AM CDT   Ech Complete with SHCVECHR3   [facility] CV Echocardiography (Cardiovascular Imaging at [facility])    [address]  [address]  [place] [id number] [phone]           1.  Please bring or wear a comfortable two-piece outfit. 2.  You may eat, drink and take your normal medicines. 3.  For any questions that cannot be answered, please contact the ordering physician 4.  Please do not wear perfumes or scented lotions on the day of your exam.            Aug 20, 2018  9:45 AM CDT   Return Visit with Sander Galicia MD   [facility] ([facility])    [address]  [place] [id number] [phone] OPT 2              Further instructions from your care team       DISCHARGE INSTRUCTIONS  DR. BARRINGTON ZABALA M.D.  [phone]    WOUND CARE:  May remove dressing and shower on post operative day 1 with incision uncovered.  Do not soak incision in water until healed (7-10 days)  Daily dressing changes starting post op day 1.  Cover incision(s) with bandaids or gauze and tape.  If ace wrap present after surgery, remove next day - no need to re-wrap incision with ace wrap.    ACTIVITY:  Activity as tolerated unless specific activity restrictions have been ordered.    FOLLOW-UP:  Call Dr. Zabala s office to schedule a follow-up appointment (if not already scheduled)  Someone from Dr. Zabala s office will call you tomorrow to see how you are doing.  If any questions come up after you are discharged, you will have an opportunity to discuss them during this phone call tomorrow.          GENERAL ANESTHESIA OR SEDATION ADULT DISCHARGE INSTRUCTIONS    SPECIAL PRECAUTIONS FOR 24 HOURS AFTER SURGERY    IT IS NOT UNUSUAL TO FEEL LIGHT-HEADED OR FAINT, UP TO 24 HOURS AFTER SURGERY OR WHILE TAKING PAIN MEDICATION.  IF YOU HAVE THESE SYMPTOMS; SIT FOR A FEW MINUTES BEFORE STANDING AND HAVE SOMEONE ASSIST YOU WHEN YOU GET UP TO WALK OR USE THE BATHROOM.    YOU SHOULD REST AND RELAX FOR THE NEXT 24 HOURS AND YOU MUST MAKE ARRANGEMENTS TO HAVE SOMEONE STAY WITH YOU FOR AT LEAST 24 HOURS AFTER YOUR DISCHARGE.  AVOID HAZARDOUS AND STRENUOUS ACTIVITIES.  DO NOT MAKE IMPORTANT DECISIONS FOR 24 HOURS.    DO NOT DRIVE ANY VEHICLE OR OPERATE MECHANICAL EQUIPMENT FOR 24 HOURS FOLLOWING THE END OF YOUR SURGERY.  EVEN THOUGH YOU MAY FEEL NORMAL, YOUR REACTIONS MAY BE AFFECTED BY THE MEDICATION YOU HAVE RECEIVED.    DO NOT DRINK ALCOHOLIC BEVERAGES FOR 24 HOURS FOLLOWING YOUR SURGERY.    DRINK CLEAR LIQUIDS (APPLE JUICE, GINGER ALE, 7-UP, BROTH, ETC.).  PROGRESS TO YOUR REGULAR DIET AS YOU FEEL ABLE.    YOU MAY HAVE A DRY MOUTH, A SORE THROAT, MUSCLES ACHES OR TROUBLE SLEEPING.  THESE SHOULD GO AWAY AFTER 24 HOURS.    CALL YOUR DOCTOR FOR ANY OF THE FOLLOWING:  SIGNS OF INFECTION (FEVER, GROWING TENDERNESS AT THE SURGERY SITE, A LARGE AMOUNT OF DRAINAGE OR BLEEDING, SEVERE PAIN, FOUL-SMELLING DRAINAGE, REDNESS OR SWELLING.    IT HAS BEEN OVER 8 TO 10 HOURS SINCE SURGERY AND YOU ARE STILL NOT ABLE TO URINATE (PASS WATER).       MEDICATION YOU RECEIVED:  You took 1 Percocet at 2:05 PM.   **This  Medication has Tylenol (acetominophen) in it. Maximum acetaminophen (Tylenol) dose from all      sources should not exceed 4 grams (4000 mg) per day.              Pending Results     No orders found from 7/14/2018 to 7/17/2018.            Admission Information     Date & Time Provider Department Dept. Phone    7/16/2018 Alber Zabala MD [facility] PreOP/PostOP [phone]      Your Vitals Were     Blood Pressure Temperature Respirations Height Weight Pulse Oximetry    149/95  "96.8  F (36  C) (Temporal) 16 1.676 m (5' 6\") 68 kg (150 lb) 97%    BMI (Body Mass Index)                   24.21 kg/m2           [employer] Information     [employer] gives you secure access to your electronic health record. If you see a primary care provider, you can also send messages to your care team and make appointments. If you have questions, please call your primary care clinic.  If you do not have a primary care provider, please call [phone] and they will assist you.        Care EveryWhere ID     This is your Care EveryWhere ID. This could be used by other organizations to access your [place] medical records  [id number]        Equal Access to Services     GEENA MONCADA : Bonnie Londono, gunnar vallejo, lj vázquez, carolyn barkley. So [facility] [phone].    ATENCIÓN: Si habla español, tiene a polo disposición servicios gratuitos de asistencia lingüística. Llame al [phone].    We comply with applicable federal civil rights laws and [place] laws. We do not discriminate on the basis of race, color, national origin, age, disability, sex, sexual orientation, or gender identity.               Review of your medicines      START taking        Dose / Directions    oxyCODONE-acetaminophen 5-325 MG per tablet   Commonly known as:  PERCOCET   Used for:  Tear of biceps tendon        Dose:  1-2 tablet   Take 1-2 tablets by mouth every 4 hours as needed for pain   Quantity:  30 tablet   Refills:  0         CONTINUE these medicines which have NOT CHANGED        Dose / Directions    ASPIRIN NOT PRESCRIBED   Commonly known as:  INTENTIONAL        Please choose reason not prescribed, below   Quantity:  0 each   Refills:  0       atorvastatin 80 MG tablet   Commonly known as:  LIPITOR   Used for:  Coronary artery disease involving native coronary artery of native heart without angina pectoris        Dose:  80 mg   Take 1 tablet (80 mg) by mouth daily   Quantity:  " 90 tablet   Refills:  3       diclofenac 1 % Gel topical gel   Commonly known as:  VOLTAREN   Used for:  Pain of finger of right hand        Apply 2 grams to hands/fingers four times daily using enclosed dosing card.   Quantity:  100 g   Refills:  1       evolocumab 140 MG/ML prefilled autoinjector   Commonly known as:  REPATHA   Used for:  Mixed hyperlipidemia        Dose:  140 mg   Inject 1 mL (140 mg) Subcutaneous every 14 days   Quantity:  2 mL   Refills:  5       gabapentin 600 MG tablet   Commonly known as:  NEURONTIN   Used for:  Coronary artery disease involving native coronary artery of native heart without angina pectoris        TAKE 1 TABLET BY MOUTH TWICE DAILY   Quantity:  180 tablet   Refills:  0       order for DME   Used for:  Right foot pain, Mononeuritis        Equipment being ordered: Heel lift suspension boot - all foam boot to offload base of right 5ht metatarsal at night.   Quantity:  1 Device   Refills:  0       sildenafil 20 MG tablet   Commonly known as:  REVATIO   Used for:  Erectile dysfunction, unspecified erectile dysfunction type        TAKE TWO TO FIVE TABLETS BY MOUTH AS NEEDED PRIOR TO SXUAL INTERCOURSE NEVER USE WITH NITROGLYCERIN TERAZOSIN OR DOXAZOCIN   Quantity:  30 tablet   Refills:  0            Where to get your medicines      Some of these will need a paper prescription and others can be bought over the counter. Ask your nurse if you have questions.     Bring a paper prescription for each of these medications     oxyCODONE-acetaminophen 5-325 MG per tablet                Protect others around you: Learn how to safely use, store and throw away your medicines at www.disposemymeds.org.        Information about OPIOIDS     PRESCRIPTION OPIOIDS: WHAT YOU NEED TO KNOW   We gave you an opioid (narcotic) pain medicine. It is important to manage your pain, but opioids are not always the best choice. You should first try all the other options your care team gave you. Take this medicine  for as short a time (and as few doses) as possible.     These medicines have risks:    DO NOT drive when on new or higher doses of pain medicine. These medicines can affect your alertness and reaction times, and you could be arrested for driving under the influence (DUI). If you need to use opioids long-term, talk to your care team about driving.    DO NOT operate heave machinery    DO NOT do any other dangerous activities while taking these medicines.     DO NOT drink any alcohol while taking these medicines.      If the opioid prescribed includes acetaminophen, DO NOT take with any other medicines that contain acetaminophen. Read all labels carefully. Look for the word  acetaminophen  or  Tylenol.  Ask your pharmacist if you have questions or are unsure.    You can get addicted to pain medicines, especially if you have a history of addiction (chemical, alcohol or substance dependence). Talk to your care team about ways to reduce this risk.    Store your pills in a secure place, locked if possible. We will not replace any lost or stolen medicine. If you don t finish your medicine, please throw away (dispose) as directed by your pharmacist. The [place] Pollution Control Agency has more information about safe disposal: https://www.pca.[facility].mn.us/living-green/managing-unwanted-medications.     All opioids tend to cause constipation. Drink plenty of water and eat foods that have a lot of fiber, such as fruits, vegetables, prune juice, apple juice and high-fiber cereal. Take a laxative (Miralax, milk of magnesia, Colace, Senna) if you don t move your bowels at least every other day.              Medication List: This is a list of all your medications and when to take them. Check marks below indicate your daily home schedule. Keep this list as a reference.      Medications           Morning Afternoon Evening Bedtime As Needed    ASPIRIN NOT PRESCRIBED   Commonly known as:  INTENTIONAL   Please choose reason not  prescribed, below                                atorvastatin 80 MG tablet   Commonly known as:  LIPITOR   Take 1 tablet (80 mg) by mouth daily                                diclofenac 1 % Gel topical gel   Commonly known as:  VOLTAREN   Apply 2 grams to hands/fingers four times daily using enclosed dosing card.                                evolocumab 140 MG/ML prefilled autoinjector   Commonly known as:  REPATHA   Inject 1 mL (140 mg) Subcutaneous every 14 days                                gabapentin 600 MG tablet   Commonly known as:  NEURONTIN   TAKE 1 TABLET BY MOUTH TWICE DAILY                                order for DME   Equipment being ordered: Heel lift suspension boot - all foam boot to offload base of right 5ht metatarsal at night.                                oxyCODONE-acetaminophen 5-325 MG per tablet   Commonly known as:  PERCOCET   Take 1-2 tablets by mouth every 4 hours as needed for pain   Last time this was given:  1 tablet on 7/16/2018  2:04 PM                                sildenafil 20 MG tablet   Commonly known as:  REVATIO   TAKE TWO TO FIVE TABLETS BY MOUTH AS NEEDED PRIOR TO SXUAL INTERCOURSE NEVER USE WITH NITROGLYCERIN TERAZOSIN OR DOXAZOCIN

## 2018-07-17 NOTE — OP NOTE
Procedure Date: 07/16/2018      DATE OF SURGERY:  07/16/2018      SURGEON:  Alber Zabala MD.        ASSISTANT:  Amish Pugh PA-C.        ANESTHESIA:  General.      PREOPERATIVE DIAGNOSIS:  Right distal biceps rupture.      POSTOPERATIVE DIAGNOSIS:  Right distal biceps rupture.      PROCEDURE PERFORMED:  Right distal biceps repair.      INDICATIONS FOR PROCEDURE:  Otis is a 63-year-old gentleman who 5 days ago had torn his right distal biceps lifting something.  He elected to undergo the above-stated procedure, fully understanding the potential risks as well as benefits of this including anesthesia, bleeding, infection, ossification and nerve and vessel injury.        OPERATIVE NOTE:  Otis was brought to the operating room and placed in a supine position on the operating table, where general anesthesia was administered without difficulty.  His right upper extremity was scrubbed and prepped in the usual sterile fashion and draped sterilely.  A sterile tourniquet was then placed in his right upper arm.  An S-curved incision was made in the skin and the antecubital fossa.  Dissection was carried out bluntly to the distal torn bicep which was easily identified.  The torn ends were shaped and a #5 Ethibond suture was passed in a [place] stitch.  Using blunt dissection, the previous tract of the bicep tendon was mobilized with a small finger.  A Mitzi clamp was passed along the finger passing just medial to the tibial tubercle which was palpable.  It was then passed to the skin and a 2-inch skin incision was made directly along the lines of the radius where the Mitzi clamp was protruding.  Dissection was carried down bluntly to the proximal radius and the tubercle was identified and reamed with a pineapple bur and drilled for 2 drill holes.  Mitzi clamp was returned while retroactively progressing a second Mitzi clamp, which retrieved the Ethibond fibers.  The fibers were passed through the tubercle and out the drill  holes.  The bicep tendon was then reduced into the prepared tubercle and tied with the Ethibond suture.  The wounds were irrigated, tourniquet was taken down at 40 minutes to confirm meticulous hemostasis.  No bleeding was identified. The wounds were then closed with 2-0 Vicryl and staples and a long arm splint was applied  intraoperatively.  Otis tolerated the procedure well and left the operating room in satisfactory and stable condition.      ESTIMATED BLOOD LOSS:  None.      SPONGE AND NEEDLE COUNT:  Correct x2.         KENNETH AMATO MD             D: 2018   T: 2018   MT: WAYNE      Name:     MARLENY CHAPA   MRN:      [id number]        Account:        [id number]   :      1955           Procedure Date: 2018      Document: [id number]

## 2018-07-18 ENCOUNTER — TELEPHONE (OUTPATIENT)
Dept: ORTHOPEDICS | Facility: CLINIC | Age: 63
End: 2018-07-18

## 2018-07-18 NOTE — TELEPHONE ENCOUNTER
NA / LVM - Surgical follow up call: Left non descript, confirming follow up and left phone number for questions.    Victor M Pugh PA-C  [facility] - Surgery

## 2018-07-30 ENCOUNTER — OFFICE VISIT (OUTPATIENT)
Dept: ORTHOPEDICS | Facility: CLINIC | Age: 63
End: 2018-07-30
Payer: COMMERCIAL

## 2018-07-30 DIAGNOSIS — Z47.89 ORTHOPEDIC AFTERCARE: Primary | ICD-10-CM

## 2018-07-30 PROCEDURE — 99024 POSTOP FOLLOW-UP VISIT: CPT | Performed by: PHYSICIAN ASSISTANT

## 2018-07-30 NOTE — MR AVS SNAPSHOT
After Visit Summary   7/30/2018    Johan Navarro    MRN: [id number]           Patient Information     Date Of Birth          1955        Visit Information        Provider Department      7/30/2018 1:20 PM Victor M Pugh PA-C [facility] ORTHOPEDIC SURGERY        Care Instructions    Incision care instructions:  Keep dry 24 hours.  Showering is ok after that time, however no soaking or scrubbing of incision for 2 weeks.  Tape-strips will most likely fall off on their own, however they may be removed after 2 weeks with rubbing alcohol if they have not.    If draining or bleeding stops the tape-strips are enough coverage unless you were instructed otherwise or you would like to cover for comfort.  If drainage or bleeding continues please cover with clean dressings.     If incision continues to drain, also place a built up bulky dressing directly over the incision and wrap with an ace.  Continue this until drainage has stopped for 48 hours.    Sling and no active elbow bending for 2 weeks then taper sling.  After sling is tapered, may begin to lift up to 1.0 lbs at the elbow.    Multiple times daily, remove sling and allow arm to hand at the side for several minutes, do not force the elbow straight.    Follow up in 4 weeks.           Follow-ups after your visit        Follow-up notes from your care team     Return in about 4 weeks (around 8/27/2018).      Your next 10 appointments already scheduled     Aug 13, 2018  8:45 AM CDT   Ech Complete with SHCVECHR3   [facility] CV Echocardiography (Cardiovascular Imaging at [facility])    [address] [place] [id number] [phone]           1.  Please bring or wear a comfortable two-piece outfit. 2.  You may eat, drink and take your normal medicines. 3.  For any questions that cannot be answered, please contact the ordering physician 4.  Please do not wear perfumes or scented lotions on the day of  your exam.            Aug 20, 2018  9:45 AM CDT   Return Visit with Sander Galicia MD   [facility]    [id number]  [place] [facility] [address] [phone] OPT 2              Who to contact     If you have questions or need follow up information about today's clinic visit or your schedule please contact [facility] ORTHOPEDIC SURGERY directly at [phone].  Normal or non-critical lab and imaging results will be communicated to you by [employer], letter or phone within 4 business days after the clinic has received the results. If you do not hear from us within 7 days, please contact the clinic through [employer] or phone. If you have a critical or abnormal lab result, we will notify you by phone as soon as possible.  Submit refill requests through [employer] ([employer]) or call your pharmacy and they will forward the refill request to us. Please allow 3 business days for your refill to be completed.          Additional Information About Your Visit        [employer] Information     [employer] gives you secure access to your electronic health record. If you see a primary care provider, you can also send messages to your care team and make appointments. If you have questions, please call your primary care clinic.  If you do not have a primary care provider, please call [phone] and they will assist you.        Care EveryWhere ID     This is your Care EveryWhere ID. This could be used by other organizations to access your [place] medical records  [id number]         Blood Pressure from Last 3 Encounters:   07/16/18 135/84   07/13/18 110/68   07/13/18 112/60    Weight from Last 3 Encounters:   07/16/18 150 lb (68 kg)   07/13/18 151 lb 12.8 oz (68.9 kg)   07/13/18 154 lb (69.9 kg)              Today, you had the following     No orders found for display       Primary Care Provider Office Phone # Fax #    Washington Yang PA-C [phone]        [address] [address] [facility] [address]        Equal Access to Services     GEENA ANTWAN : Hadii aad ku hadrebeccao Somarielosali, waaxda luqadaha, qaybta kaalmada hernandorichmarcy, carolyn sanderson kristieharley ricolosroberto barkley. So [facility] [phone].    ATENCIÓN: Si habla español, tiene a polo disposición servicios gratuitos de asistencia lingüística. Llame al [phone].    We comply with applicable federal civil rights laws and [place] laws. We do not discriminate on the basis of race, color, national origin, age, disability, sex, sexual orientation, or gender identity.            Thank you!     Thank you for choosing [facility] ORTHOPEDIC SURGERY  for your care. Our goal is always to provide you with excellent care. Hearing back from our patients is one way we can continue to improve our services. Please take a few minutes to complete the written survey that you may receive in the mail after your visit with us. Thank you!             Your Updated Medication List - Protect others around you: Learn how to safely use, store and throw away your medicines at www.disposemymeds.org.          This list is accurate as of 7/30/18  1:45 PM.  Always use your most recent med list.                   Brand Name Dispense Instructions for use Diagnosis    ASPIRIN NOT PRESCRIBED    INTENTIONAL    0 each    Please choose reason not prescribed, below        atorvastatin 80 MG tablet    LIPITOR    90 tablet    Take 1 tablet (80 mg) by mouth daily    Coronary artery disease involving native coronary artery of native heart without angina pectoris       diclofenac 1 % Gel topical gel    VOLTAREN    100 g    Apply 2 grams to hands/fingers four times daily using enclosed dosing card.    Pain of finger of right hand       evolocumab 140 MG/ML prefilled autoinjector    REPATHA    2 mL    Inject 1 mL (140 mg) Subcutaneous every 14 days    Mixed hyperlipidemia       gabapentin 600 MG tablet    NEURONTIN    180 tablet    TAKE 1 TABLET BY MOUTH TWICE  DAILY    Coronary artery disease involving native coronary artery of native heart without angina pectoris       order for DME     1 Device    Equipment being ordered: Heel lift suspension boot - all foam boot to offload base of right 5ht metatarsal at night.    Right foot pain, Mononeuritis       oxyCODONE-acetaminophen 5-325 MG per tablet    PERCOCET    30 tablet    Take 1-2 tablets by mouth every 4 hours as needed for pain    Tear of biceps tendon       sildenafil 20 MG tablet    REVATIO    30 tablet    TAKE TWO TO FIVE TABLETS BY MOUTH AS NEEDED PRIOR TO SXUAL INTERCOURSE NEVER USE WITH NITROGLYCERIN TERAZOSIN OR DOXAZOCIN    Erectile dysfunction, unspecified erectile dysfunction type

## 2018-07-30 NOTE — PROGRESS NOTES
HISTORY OF PRESENT ILLNESS:    Johan Navarro is a 63 year old male who is seen in follow up for Right distal biceps tendon repair, ODS 7/16/18, DR. Zabala.  Present symptoms: Pt reports splint for 1-2 weeks, then really is not using sling.  Tried to use hedge clippers.  Noticing pain at biceps with supination.  Occasional OTC pain meds.  Some drainage at incision on anterior.  No new complaints or injuries.   Denies Chest pain, Calve pain, Fever, Chills.      PHYSICAL EXAM:  There were no vitals taken for this visit.  There is no height or weight on file to calculate BMI.   GENERAL APPEARANCE: healthy, alert and no distress   PSYCH:  mentation appears normal and affect normal/bright    MSK:  Right: Elbow, crease and dorsal incision.  .  Ambulates: WNG, no sling present. .  Incisions clean and dry, staples present, healing.  Appropriate incisional erythema.   No Ecchymosis.  Edema - Seroma confirmed with illumination at volar aspect.  Some serous drainage.  CMS: reddy incisional numbness, otherwise grossly intact.  AROM flexion .      IMAGING INTERPRETATION: none today.     ASSESSMENT:  Johan Navarro is a 63 year old male S/P Right distal biceps tendon repair, ODS 7/16/18, DR. Zabala.    Non complaint.  Seroma - may incur dehiscence.      PLAN:  - Surgery discussed, images reviewed if applicable, and all questions were answered at this time.  - staples removed with sterile technique, steri-strips applied in usual fashion, care instructions given and verbally acknowledged.  - sling total 4 weeks post op.  - gravity extension only.  - use at elbow 1 lb after 4 weeks post op.    Return to clinic 4, weeks.    Victor M Pugh PA-C    Dept. Orthopedic Surgery  [facility]   7/30/2018

## 2018-07-30 NOTE — PATIENT INSTRUCTIONS
Incision care instructions:  Keep dry 24 hours.  Showering is ok after that time, however no soaking or scrubbing of incision for 2 weeks.  Tape-strips will most likely fall off on their own, however they may be removed after 2 weeks with rubbing alcohol if they have not.    If draining or bleeding stops the tape-strips are enough coverage unless you were instructed otherwise or you would like to cover for comfort.  If drainage or bleeding continues please cover with clean dressings.     If incision continues to drain, also place a built up bulky dressing directly over the incision and wrap with an ace.  Continue this until drainage has stopped for 48 hours.    Sling and no active elbow bending for 2 weeks then taper sling.  After sling is tapered, may begin to lift up to 1.0 lbs at the elbow.    Multiple times daily, remove sling and allow arm to hand at the side for several minutes, do not force the elbow straight.    Follow up in 4 weeks.

## 2018-08-13 ENCOUNTER — HOSPITAL ENCOUNTER (OUTPATIENT)
Dept: CARDIOLOGY | Facility: CLINIC | Age: 63
Discharge: HOME | End: 2018-08-13
Attending: PHYSICIAN ASSISTANT | Admitting: PHYSICIAN ASSISTANT
Payer: COMMERCIAL

## 2018-08-13 ENCOUNTER — TELEPHONE (OUTPATIENT)
Dept: CARDIOLOGY | Facility: CLINIC | Age: 63
End: 2018-08-13

## 2018-08-13 DIAGNOSIS — I35.0 NONRHEUMATIC AORTIC VALVE STENOSIS: ICD-10-CM

## 2018-08-13 PROCEDURE — 93306 TTE W/DOPPLER COMPLETE: CPT

## 2018-08-13 PROCEDURE — 93306 TTE W/DOPPLER COMPLETE: CPT | Mod: 26 | Performed by: INTERNAL MEDICINE

## 2018-08-13 NOTE — TELEPHONE ENCOUNTER
Patient called asking if he needed FLP's prior to OV with Dr. Galicia 8-20-18  Last FP's done 1/2018. With in  [place]. No repeat needed.

## 2018-08-20 ENCOUNTER — OFFICE VISIT (OUTPATIENT)
Dept: CARDIOLOGY | Facility: CLINIC | Age: 63
End: 2018-08-20
Attending: INTERNAL MEDICINE
Payer: COMMERCIAL

## 2018-08-20 VITALS
DIASTOLIC BLOOD PRESSURE: 55 MMHG | HEIGHT: 66 IN | WEIGHT: 155 LBS | BODY MASS INDEX: 24.91 KG/M2 | SYSTOLIC BLOOD PRESSURE: 92 MMHG | HEART RATE: 75 BPM

## 2018-08-20 DIAGNOSIS — I35.0 NONRHEUMATIC AORTIC VALVE STENOSIS: ICD-10-CM

## 2018-08-20 DIAGNOSIS — I25.10 CORONARY ARTERY DISEASE INVOLVING NATIVE CORONARY ARTERY OF NATIVE HEART WITHOUT ANGINA PECTORIS: Primary | ICD-10-CM

## 2018-08-20 PROCEDURE — 99213 OFFICE O/P EST LOW 20 MIN: CPT | Mod: 24 | Performed by: INTERNAL MEDICINE

## 2018-08-20 RX ORDER — ASPIRIN 81 MG/1
81 TABLET, CHEWABLE ORAL DAILY
Qty: 90 TABLET | Refills: 3 | Status: SHIPPED | OUTPATIENT
Start: 2018-08-20 | End: 2019-12-02

## 2018-08-20 NOTE — MR AVS SNAPSHOT
After Visit Summary   8/20/2018    Johan Navarro    MRN: [id number]           Patient Information     Date Of Birth          1955        Visit Information        Provider Department      8/20/2018 9:45 AM Sander Galicia MD [facility]        Today's Diagnoses     Coronary artery disease involving native coronary artery of native heart without angina pectoris    -  1    Nonrheumatic aortic valve stenosis           Follow-ups after your visit        Additional Services     Follow-Up with Cardiologist                 Your next 10 appointments already scheduled     Aug 29, 2018 10:00 AM CDT   Ech Stress Test with RHSTRESS   [facility] ([facility])    [address]  [facility] [id number] [phone]           1. Please bring or wear a comfortable two-piece outfit and walking shoes. 2. Stop eating 3 hours before the test. You may drink water or juice. 3. Stop all caffeine 12 hours before the test. This includes coffee, tea, soda pop, chocolate and certain medicines (such as Anacin and Excederin). Also avoid decaf coffee and tea, as these contain small amounts of caffeine. 4. No alcohol, smoking or use of other tobacco products for 12 hours before the test. 5. Refer to your provider instructions to see if you need to stop any medications (such as beta-blockers or nitrates) for this test. 6. For patients with diabetes: - If you take insulin, call your diabetes care team. Ask if you should take a   dose the morning of your test. - If you take diabetes medicine by mouth, dont take it on the morning of your test. Bring it with you to take after the test. (If you have questions, call your diabetes care team) 7. When you arrive, please tell us if: - You have diabetes. - You have taken Viagra, Cialis or Levitra in the past 48 hours. 8. For any questions that cannot be answered, please contact the ordering physician 9.  "Please do not wear perfumes or scented lotions on the day of your exam.              Future tests that were ordered for you today     Open Future Orders        Priority Expected Expires Ordered    Follow-Up with Cardiologist Routine 8/20/2019 8/21/2019 8/20/2018    Exercise Stress Echocardiogram Routine 8/27/2018 8/20/2019 8/20/2018            Who to contact     If you have questions or need follow up information about today's clinic visit or your schedule please contact [facility] directly at [phone].  Normal or non-critical lab and imaging results will be communicated to you by [employer], letter or phone within 4 business days after the clinic has received the results. If you do not hear from us within 7 days, please contact the clinic through [employer] or phone. If you have a critical or abnormal lab result, we will notify you by phone as soon as possible.  Submit refill requests through "[employer] or call your pharmacy and they will forward the refill request to us. Please allow 3 business days for your refill to be completed.          Additional Information About Your Visit        [employer] Information     "[employer] gives you secure access to your electronic health record. If you see a primary care provider, you can also send messages to your care team and make appointments. If you have questions, please call your primary care clinic.  If you do not have a primary care provider, please call [phone] and they will assist you.        Care EveryWhere ID     This is your Care EveryWhere ID. This could be used by other organizations to access your [facility] medical records  [id number]        Your Vitals Were     Pulse Height BMI (Body Mass Index)             75 1.676 m (5' 6\") 25.02 kg/m2          Blood Pressure from Last 3 Encounters:   08/20/18 92/55   07/16/18 135/84   07/13/18 110/68    Weight from Last 3 Encounters:   08/20/18 70.3 kg (155 lb)   07/16/18 68 kg (150 lb) "   07/13/18 68.9 kg (151 lb 12.8 oz)              We Performed the Following     Follow-Up with Cardiologist          Today's Medication Changes          These changes are accurate as of 8/20/18 11:36 AM.  If you have any questions, ask your nurse or doctor.               Start taking these medicines.        Dose/Directions    aspirin 81 MG chewable tablet   Used for:  Coronary artery disease involving native coronary artery of native heart without angina pectoris   Started by:  Sander Galicia MD        Dose:  81 mg   Take 1 tablet (81 mg) by mouth daily   Quantity:  90 tablet   Refills:  3            Where to get your medicines      These medications were sent to [id number] - [place] - [address]  [address], [place] [address]     Phone:  [phone]     aspirin 81 MG chewable tablet                Primary Care Provider Office Phone # Fax #    Washington Yang PA-C [phone] [address]  [facility] [address]        Equal Access to Services     [facility]: Hadii aad ku hadasho Soomaali, waaxda luqadaha, qaybta kaalmada adeegyada, waxay idiin hayaan leticia khararoberto becerril . So [facility] [phone].    ATENCIÓN: Si habla español, tiene a polo disposición servicios gratuitos de asistencia lingüística. Llame al [phone].    We comply with applicable federal civil rights laws and [place] laws. We do not discriminate on the basis of race, color, national origin, age, disability, sex, sexual orientation, or gender identity.            Thank you!     Thank you for choosing [facility]  for your care. Our goal is always to provide you with excellent care. Hearing back from our patients is one way we can continue to improve our services. Please take a few minutes to complete the written survey that you may receive in the mail after your visit with us. Thank you!             Your  Updated Medication List - Protect others around you: Learn how to safely use, store and throw away your medicines at www.disposemymeds.org.          This list is accurate as of 8/20/18 11:36 AM.  Always use your most recent med list.                   Brand Name Dispense Instructions for use Diagnosis    aspirin 81 MG chewable tablet     90 tablet    Take 1 tablet (81 mg) by mouth daily    Coronary artery disease involving native coronary artery of native heart without angina pectoris       ASPIRIN NOT PRESCRIBED    INTENTIONAL    0 each    Please choose reason not prescribed, below        atorvastatin 80 MG tablet    LIPITOR    90 tablet    Take 1 tablet (80 mg) by mouth daily    Coronary artery disease involving native coronary artery of native heart without angina pectoris       diclofenac 1 % Gel topical gel    VOLTAREN    100 g    Apply 2 grams to hands/fingers four times daily using enclosed dosing card.    Pain of finger of right hand       evolocumab 140 MG/ML prefilled autoinjector    REPATHA    2 mL    Inject 1 mL (140 mg) Subcutaneous every 14 days    Mixed hyperlipidemia       gabapentin 600 MG tablet    NEURONTIN    180 tablet    TAKE 1 TABLET BY MOUTH TWICE DAILY    Coronary artery disease involving native coronary artery of native heart without angina pectoris       order for DME     1 Device    Equipment being ordered: Heel lift suspension boot - all foam boot to offload base of right 5ht metatarsal at night.    Right foot pain, Mononeuritis       oxyCODONE-acetaminophen 5-325 MG per tablet    PERCOCET    30 tablet    Take 1-2 tablets by mouth every 4 hours as needed for pain    Tear of biceps tendon       sildenafil 20 MG tablet    REVATIO    30 tablet    TAKE TWO TO FIVE TABLETS BY MOUTH AS NEEDED PRIOR TO SXUAL INTERCOURSE NEVER USE WITH NITROGLYCERIN TERAZOSIN OR DOXAZOCIN    Erectile dysfunction, unspecified erectile dysfunction type

## 2018-08-20 NOTE — LETTER
8/20/2018    Washington Yang PA-C  [address] [address] [facility] [address]    RE: Johan Navarro       Dear Colleague,    I had the pleasure of seeing Johan Navarro in the [facility].    HPI and Plan:   See dictation    Orders Placed This Encounter   Procedures     Exercise Stress Echocardiogram       Orders Placed This Encounter   Medications     aspirin 81 MG chewable tablet     Sig: Take 1 tablet (81 mg) by mouth daily     Dispense:  90 tablet     Refill:  3       There are no discontinued medications.      Encounter Diagnoses   Name Primary?     Nonrheumatic aortic valve stenosis      Coronary artery disease involving native coronary artery of native heart without angina pectoris Yes       CURRENT MEDICATIONS:  Current Outpatient Prescriptions   Medication Sig Dispense Refill     aspirin 81 MG chewable tablet Take 1 tablet (81 mg) by mouth daily 90 tablet 3     ASPIRIN NOT PRESCRIBED (INTENTIONAL) Please choose reason not prescribed, below 0 each 0     atorvastatin (LIPITOR) 80 MG tablet Take 1 tablet (80 mg) by mouth daily 90 tablet 3     diclofenac (VOLTAREN) 1 % GEL topical gel Apply 2 grams to hands/fingers four times daily using enclosed dosing card. 100 g 1     evolocumab (REPATHA) 140 MG/ML prefilled autoinjector Inject 1 mL (140 mg) Subcutaneous every 14 days 2 mL 5     gabapentin (NEURONTIN) 600 MG tablet TAKE 1 TABLET BY MOUTH TWICE DAILY 180 tablet 0     order for DME Equipment being ordered: Heel lift suspension boot - all foam boot to offload base of right 5ht metatarsal at night. 1 Device 0     oxyCODONE-acetaminophen (PERCOCET) 5-325 MG per tablet Take 1-2 tablets by mouth every 4 hours as needed for pain 30 tablet 0     sildenafil (REVATIO) 20 MG tablet TAKE TWO TO FIVE TABLETS BY MOUTH AS NEEDED PRIOR TO SXUAL INTERCOURSE NEVER USE WITH NITROGLYCERIN TERAZOSIN OR DOXAZOCIN 30 tablet 0       ALLERGIES     Allergies   Allergen Reactions     Crestor [Rosuvastatin]  GI Disturbance     Dust Mites      Pollen Extract        PAST MEDICAL HISTORY:  Past Medical History:   Diagnosis Date     Aortic stenosis      Arthritis     hands     CAD (coronary artery disease)     cardiac cath 1998: stents x 2 to circumflex     Carotid stenosis     left     Family history of early CAD      Hyperlipidaemia LDL goal < 70     familial hyperlipidemia with marked hypercholesterolemia before treatment; has been on some form of cholesterol-lowering medication since the        PAST SURGICAL HISTORY:  Past Surgical History:   Procedure Laterality Date     CARDIAC SURGERY      coronary stents x2 placed ; angioplasty     DAVINCI PROSTATECTOMY      2010     EYE SURGERY Bilateral 2017    eyelids     ORTHOPEDIC SURGERY Right     achilles tendon; post football injury     ORTHOPEDIC SURGERY Right     hand; near thumb. has wires in there. cysts     REPAIR TENDON BICEPS DISTAL UPPER EXTREMITY Right 2018    Procedure: REPAIR TENDON BICEPS DISTAL UPPER EXTREMITY;  Right open biceps tendon repair  ;  Surgeon: Alber Zabala MD;  Location: [facility] OR     SURGICAL HISTORY OF -       Right hand Xanthoma removal     SURGICAL HISTORY OF -       Stress Echo (-)       FAMILY HISTORY:  Family History   Problem Relation Age of Onset     Lipids Mother      C.A.D. Mother       at age 49 of MI     C.A.D. Sister      MI at age 48     Cancer - colorectal No family hx of      Prostate Cancer No family hx of      Colon Cancer No family hx of        SOCIAL HISTORY:  Social History     Social History     Marital status:      Spouse name: N/A     Number of children: N/A     Years of education: N/A     Social History Main Topics     Smoking status: Former Smoker     Smokeless tobacco: Never Used     Alcohol use Yes      Comment: very rare     Drug use: No     Sexual activity: Yes     Partners: Female     Other Topics Concern     Caffeine Concern Yes     coffee and soda     Special Diet No     Exercise  "Yes     regular      Seat Belt Yes     Parent/Sibling W/ Cabg, Mi Or Angioplasty Before 65f 55m? Yes     49     Social History Narrative       Review of Systems:  Skin:  Negative       Eyes:  Negative      ENT:  Negative      Respiratory:  Negative       Cardiovascular:  Negative;palpitations;chest pain;edema;lightheadedness;dizziness      Gastroenterology: Negative      Genitourinary:  Negative      Musculoskeletal:  Negative      Neurologic:  Negative      Psychiatric:  Negative      Heme/Lymph/Imm:  Positive for allergies seasonal   Endocrine:  Negative        Physical Exam:  Vitals: BP 92/55  Pulse 75  Ht 1.676 m (5' 6\")  Wt 70.3 kg (155 lb)  BMI 25.02 kg/m2    Constitutional:  cooperative        Skin:  warm and dry to the touch          Head:  normocephalic        Eyes:  pupils equal and round        Lymph:      ENT:  no pallor or cyanosis        Neck:  carotid pulses are full and equal bilaterally        Respiratory:  clear to auscultation         Cardiac: regular rhythm       systolic murmur        pulses full and equal, no bruits auscultated                                        GI:  abdomen soft        Extremities and Muscular Skeletal:  no edema              Neurological:           Psych:           CC  Sander Galicia MD  [address]  [place] [address]                Thank you for allowing me to participate in the care of your patient.      Sincerely,     Sander Galicia MD     [facility]    cc:   Sander Galicia MD  [address]  [place] [address]        "

## 2018-08-20 NOTE — PROGRESS NOTES
Service Date: 08/20/2018      HISTORY OF PRESENT ILLNESS:  I had the pleasure of seeing Mr. Navarro in followup at the [facility] today.  He is a very pleasant 63-year-old gentleman who I last saw in 05/2017.  He has a history of coronary artery disease and is status post coronary angiography in 1998 at which point he underwent stenting of the distal and ostial circumflex as well as the first obtuse marginal.  The other coronary arteries had no significant disease.  He has had markedly elevated LDL and has actually been considered for plasmapheresis in the past.  He had been on rosuvastatin and Zetia but last year initiated Repatha at my recommendation.  This has led to a dramatic improvement in his LDL levels.      He presents today feeling well overall from a cardiovascular standpoint.  Specifically, he denies any chest discomfort, dyspnea on exertion, PND, orthopnea or lower extremity edema.  Denies any syncope or presyncope.  He remains extremely active, working outdoors and has never experienced any chest discomfort or shortness of breath while doing so.  He has been taking all his medications as prescribed.  Unfortunately, the Repatha has not proved to be a prohibitive from a cost standpoint.      PHYSICAL EXAMINATION:  Dictated below.      LABORATORIES:  Lipids on 01/19/2018 demonstrated total cholesterol of 146, LDL of 68 and HDL of 60, triglycerides are 91.      IMPRESSION:   1.  Coronary artery disease status post PCI to the circumflex and obtuse marginal in 1998.   2.  Significant dyslipidemia characterized by severe LDL elevation.  This has improved dramatically with the use of Repatha in addition to atorvastatin.      Mr. Navarro is doing well overall from a cardiovascular standpoint.  There is no evidence of angina or congestive heart failure.  His medications are appropriate and the Repatha has led to a dramatic improvement in his LDL levels.      For some reason, he was not on  aspirin daily and I have recommended he restart this at a dose of 81 mg daily.  In addition, given that it has been over 5 years since he has had any kind of ischemic assessment, I have recommended a repeat stress echocardiogram.      It was a pleasure seeing him in followup today.         LATONYA NAPIER MD             D: 2018   T: 2018   MT: MIRELLA      Name:     MARLENY CHAPA   MRN:      [id number]        Account:      [id number]   :      1955           Service Date: 2018      Document: [id number]

## 2018-08-20 NOTE — LETTER
8/20/2018      Washington Yang PA-C  [address]  [facility] [address]      RE: Johan Britojigar       Dear Colleague,    I had the pleasure of seeing Johan Navarro in the [facility].    Service Date: 08/20/2018      HISTORY OF PRESENT ILLNESS:  I had the pleasure of seeing Mr. Navarro in followup at the [facility] today.  He is a very pleasant 63-year-old gentleman who I last saw in 05/2017.  He has a history of coronary artery disease and is status post coronary angiography in 1998 at which point he underwent stenting of the distal and ostial circumflex as well as the first obtuse marginal.  The other coronary arteries had no significant disease.  He has had markedly elevated LDL and has actually been considered for plasmapheresis in the past.  He had been on rosuvastatin and Zetia but last year initiated Repatha at my recommendation.  This has led to a dramatic improvement in his LDL levels.      He presents today feeling well overall from a cardiovascular standpoint.  Specifically, he denies any chest discomfort, dyspnea on exertion, PND, orthopnea or lower extremity edema.  Denies any syncope or presyncope.  He remains extremely active, working outdoors and has never experienced any chest discomfort or shortness of breath while doing so.  He has been taking all his medications as prescribed.  Unfortunately, the Repatha has not proved to be a prohibitive from a cost standpoint.      PHYSICAL EXAMINATION:  Dictated below.      LABORATORIES:  Lipids on 01/19/2018 demonstrated total cholesterol of 146, LDL of 68 and HDL of 60, triglycerides are 91.      IMPRESSION:   1.  Coronary artery disease status post PCI to the circumflex and obtuse marginal in 1998.   2.  Significant dyslipidemia characterized by severe LDL elevation.  This has improved dramatically with the use of Repatha in addition to atorvastatin.      Mr. Navarro is doing well overall from a  cardiovascular standpoint.  There is no evidence of angina or congestive heart failure.  His medications are appropriate and the Repatha has led to a dramatic improvement in his LDL levels.      For some reason, he was not on aspirin daily and I have recommended he restart this at a dose of 81 mg daily.  In addition, given that it has been over 5 years since he has had any kind of ischemic assessment, I have recommended a repeat stress echocardiogram.      It was a pleasure seeing him in followup today.         LATONYA NAPIER MD             D: 2018   T: 2018   MT: MIRELLA      Name:     MARLENY CHAPA   MRN:      [id number]        Account:      [id number]   :      1955           Service Date: 2018      Document: [id number]         Outpatient Encounter Prescriptions as of 2018   Medication Sig Dispense Refill     aspirin 81 MG chewable tablet Take 1 tablet (81 mg) by mouth daily 90 tablet 3     ASPIRIN NOT PRESCRIBED (INTENTIONAL) Please choose reason not prescribed, below 0 each 0     atorvastatin (LIPITOR) 80 MG tablet Take 1 tablet (80 mg) by mouth daily 90 tablet 3     diclofenac (VOLTAREN) 1 % GEL topical gel Apply 2 grams to hands/fingers four times daily using enclosed dosing card. 100 g 1     evolocumab (REPATHA) 140 MG/ML prefilled autoinjector Inject 1 mL (140 mg) Subcutaneous every 14 days 2 mL 5     gabapentin (NEURONTIN) 600 MG tablet TAKE 1 TABLET BY MOUTH TWICE DAILY 180 tablet 0     order for DME Equipment being ordered: Heel lift suspension boot - all foam boot to offload base of right 5ht metatarsal at night. 1 Device 0     oxyCODONE-acetaminophen (PERCOCET) 5-325 MG per tablet Take 1-2 tablets by mouth every 4 hours as needed for pain 30 tablet 0     sildenafil (REVATIO) 20 MG tablet TAKE TWO TO FIVE TABLETS BY MOUTH AS NEEDED PRIOR TO SXUAL INTERCOURSE NEVER USE WITH NITROGLYCERIN TERAZOSIN OR DOXAZOCIN 30 tablet 0     No facility-administered encounter medications  on file as of 8/20/2018.        Again, thank you for allowing me to participate in the care of your patient.      Sincerely,    Sander Galicia MD     [facility]

## 2018-08-20 NOTE — PROGRESS NOTES
HPI and Plan:   See dictation    Orders Placed This Encounter   Procedures     Exercise Stress Echocardiogram       Orders Placed This Encounter   Medications     aspirin 81 MG chewable tablet     Sig: Take 1 tablet (81 mg) by mouth daily     Dispense:  90 tablet     Refill:  3       There are no discontinued medications.      Encounter Diagnoses   Name Primary?     Nonrheumatic aortic valve stenosis      Coronary artery disease involving native coronary artery of native heart without angina pectoris Yes       CURRENT MEDICATIONS:  Current Outpatient Prescriptions   Medication Sig Dispense Refill     aspirin 81 MG chewable tablet Take 1 tablet (81 mg) by mouth daily 90 tablet 3     ASPIRIN NOT PRESCRIBED (INTENTIONAL) Please choose reason not prescribed, below 0 each 0     atorvastatin (LIPITOR) 80 MG tablet Take 1 tablet (80 mg) by mouth daily 90 tablet 3     diclofenac (VOLTAREN) 1 % GEL topical gel Apply 2 grams to hands/fingers four times daily using enclosed dosing card. 100 g 1     evolocumab (REPATHA) 140 MG/ML prefilled autoinjector Inject 1 mL (140 mg) Subcutaneous every 14 days 2 mL 5     gabapentin (NEURONTIN) 600 MG tablet TAKE 1 TABLET BY MOUTH TWICE DAILY 180 tablet 0     order for DME Equipment being ordered: Heel lift suspension boot - all foam boot to offload base of right 5ht metatarsal at night. 1 Device 0     oxyCODONE-acetaminophen (PERCOCET) 5-325 MG per tablet Take 1-2 tablets by mouth every 4 hours as needed for pain 30 tablet 0     sildenafil (REVATIO) 20 MG tablet TAKE TWO TO FIVE TABLETS BY MOUTH AS NEEDED PRIOR TO SXUAL INTERCOURSE NEVER USE WITH NITROGLYCERIN TERAZOSIN OR DOXAZOCIN 30 tablet 0       ALLERGIES     Allergies   Allergen Reactions     Crestor [Rosuvastatin] GI Disturbance     Dust Mites      Pollen Extract        PAST MEDICAL HISTORY:  Past Medical History:   Diagnosis Date     Aortic stenosis      Arthritis     hands     CAD (coronary artery disease)     cardiac cath 1998:  stents x 2 to circumflex     Carotid stenosis     left     Family history of early CAD      Hyperlipidaemia LDL goal < 70     familial hyperlipidemia with marked hypercholesterolemia before treatment; has been on some form of cholesterol-lowering medication since the 1970s       PAST SURGICAL HISTORY:  Past Surgical History:   Procedure Laterality Date     CARDIAC SURGERY      coronary stents x2 placed ; angioplasty     DAVINCI PROSTATECTOMY      2010     EYE SURGERY Bilateral 2017    eyelids     ORTHOPEDIC SURGERY Right     achilles tendon; post football injury     ORTHOPEDIC SURGERY Right     hand; near thumb. has wires in there. cysts     REPAIR TENDON BICEPS DISTAL UPPER EXTREMITY Right 2018    Procedure: REPAIR TENDON BICEPS DISTAL UPPER EXTREMITY;  Right open biceps tendon repair  ;  Surgeon: Alber Zabala MD;  Location: [facility] OR     SURGICAL HISTORY OF -       Right hand Xanthoma removal     SURGICAL HISTORY OF -       Stress Echo (-)       FAMILY HISTORY:  Family History   Problem Relation Age of Onset     Lipids Mother      C.A.D. Mother       at age 49 of MI     C.A.D. Sister      MI at age 48     Cancer - colorectal No family hx of      Prostate Cancer No family hx of      Colon Cancer No family hx of        SOCIAL HISTORY:  Social History     Social History     Marital status:      Spouse name: N/A     Number of children: N/A     Years of education: N/A     Social History Main Topics     Smoking status: Former Smoker     Smokeless tobacco: Never Used     Alcohol use Yes      Comment: very rare     Drug use: No     Sexual activity: Yes     Partners: Female     Other Topics Concern     Caffeine Concern Yes     coffee and soda     Special Diet No     Exercise Yes     regular      Seat Belt Yes     Parent/Sibling W/ Cabg, Mi Or Angioplasty Before 65f 55m? Yes     49     Social History Narrative       Review of Systems:  Skin:  Negative       Eyes:  Negative      ENT:  Negative   "    Respiratory:  Negative       Cardiovascular:  Negative;palpitations;chest pain;edema;lightheadedness;dizziness      Gastroenterology: Negative      Genitourinary:  Negative      Musculoskeletal:  Negative      Neurologic:  Negative      Psychiatric:  Negative      Heme/Lymph/Imm:  Positive for allergies seasonal   Endocrine:  Negative        Physical Exam:  Vitals: BP 92/55  Pulse 75  Ht 1.676 m (5' 6\")  Wt 70.3 kg (155 lb)  BMI 25.02 kg/m2    Constitutional:  cooperative        Skin:  warm and dry to the touch          Head:  normocephalic        Eyes:  pupils equal and round        Lymph:      ENT:  no pallor or cyanosis        Neck:  carotid pulses are full and equal bilaterally        Respiratory:  clear to auscultation         Cardiac: regular rhythm       systolic murmur        pulses full and equal, no bruits auscultated                                        GI:  abdomen soft        Extremities and Muscular Skeletal:  no edema              Neurological:           Psych:           CC  Sander Galicia MD  [id number]  [place] [id number]              "

## 2018-08-26 DIAGNOSIS — I25.10 CORONARY ARTERY DISEASE INVOLVING NATIVE CORONARY ARTERY OF NATIVE HEART WITHOUT ANGINA PECTORIS: ICD-10-CM

## 2018-08-26 NOTE — TELEPHONE ENCOUNTER
Requested Prescriptions   Pending Prescriptions Disp Refills     gabapentin (NEURONTIN) 600 MG tablet [Pharmacy Med Name: GABAPENTIN 600MG TABLETS]  Last Written Prescription Date:  05/31/2018  Last Fill Quantity: 180 tablet,  # refills: 0   Last office visit: 7/13/2018 with prescribing provider:  Rabia Warner MD    Future Office Visit:     180 tablet 0     Sig: TAKE 1 TABLET BY MOUTH TWICE DAILY    There is no refill protocol information for this order     Routing refill request to provider for review/approval because:  Drug not on the [facility] or [facility] refill protocol or controlled substance

## 2018-08-28 ENCOUNTER — OFFICE VISIT (OUTPATIENT)
Dept: ORTHOPEDICS | Facility: CLINIC | Age: 63
End: 2018-08-28
Payer: COMMERCIAL

## 2018-08-28 DIAGNOSIS — Z47.89 ORTHOPEDIC AFTERCARE: Primary | ICD-10-CM

## 2018-08-28 PROCEDURE — 99024 POSTOP FOLLOW-UP VISIT: CPT | Performed by: PHYSICIAN ASSISTANT

## 2018-08-28 RX ORDER — GABAPENTIN 600 MG/1
TABLET ORAL
Qty: 180 TABLET | Refills: 1 | Status: SHIPPED | OUTPATIENT
Start: 2018-08-28 | End: 2019-02-24

## 2018-08-28 NOTE — MR AVS SNAPSHOT
After Visit Summary   8/28/2018    Johan Navarro    MRN: [id number]           Patient Information     Date Of Birth          1955        Visit Information        Provider Department      8/28/2018 8:00 AM Victor M Pugh PA-C [facility] ORTHOPEDIC SURGERY        Today's Diagnoses     Orthopedic aftercare    -  1      Care Instructions    Work on flexibility, straightening under gravity.    Strengthening.  Start increasing by 2-3 lbs per week, working up to 25 repetitions then move up to the next level.    Perform all exercises with slow and controlled motion.  Perform all exercises with palm up, thumb up, and palm down.    Follow up as needed in 6 weeks.            Follow-ups after your visit        Follow-up notes from your care team     Return in about 6 weeks (around 10/9/2018).      Who to contact     If you have questions or need follow up information about today's clinic visit or your schedule please contact [facility] ORTHOPEDIC SURGERY directly at [phone].  Normal or non-critical lab and imaging results will be communicated to you by Best Before Mediahart, letter or phone within 4 business days after the clinic has received the results. If you do not hear from us within 7 days, please contact the clinic through Zando or phone. If you have a critical or abnormal lab result, we will notify you by phone as soon as possible.  Submit refill requests through Zando or call your pharmacy and they will forward the refill request to us. Please allow 3 business days for your refill to be completed.          Additional Information About Your Visit        Best Before Mediahart Information     Zando gives you secure access to your electronic health record. If you see a primary care provider, you can also send messages to your care team and make appointments. If you have questions, please call your primary care clinic.  If you do not have a primary care provider, please call [phone] and they will  assist you.        Care EveryWhere ID     This is your Care EveryWhere ID. This could be used by other organizations to access your [facility] medical records  [id number]         Blood Pressure from Last 3 Encounters:   08/20/18 92/55   07/16/18 135/84   07/13/18 110/68    Weight from Last 3 Encounters:   08/20/18 155 lb (70.3 kg)   07/16/18 150 lb (68 kg)   07/13/18 151 lb 12.8 oz (68.9 kg)              Today, you had the following     No orders found for display       Primary Care Provider Office Phone # Fax #    Washington Yang PA-C [phone] [id number]  [facility] [id number]        Equal Access to Services     GEENA MONCADA : Hadii aad ku hadasho Soomaali, waaxda luqadaha, qaybta kaalmada adeegyada, waxay idiin haykarisn leticia becerril . So [facility] [phone].    ATENCIÓN: Si habla español, tiene a polo disposición servicios gratuitos de asistencia lingüística. [facility] [phone].    We comply with applicable federal civil rights laws and [place] laws. We do not discriminate on the basis of race, color, national origin, age, disability, sex, sexual orientation, or gender identity.            Thank you!     Thank you for choosing [facility] ORTHOPEDIC SURGERY  for your care. Our goal is always to provide you with excellent care. Hearing back from our patients is one way we can continue to improve our services. Please take a few minutes to complete the written survey that you may receive in the mail after your visit with us. Thank you!             Your Updated Medication List - Protect others around you: Learn how to safely use, store and throw away your medicines at www.disposemymeds.org.          This list is accurate as of 8/28/18  8:18 AM.  Always use your most recent med list.                   Brand Name Dispense Instructions for use Diagnosis    aspirin 81 MG chewable tablet     90 tablet    Take 1 tablet (81 mg) by mouth daily    Coronary artery disease involving  native coronary artery of native heart without angina pectoris       ASPIRIN NOT PRESCRIBED    INTENTIONAL    0 each    Please choose reason not prescribed, below        atorvastatin 80 MG tablet    LIPITOR    90 tablet    Take 1 tablet (80 mg) by mouth daily    Coronary artery disease involving native coronary artery of native heart without angina pectoris       diclofenac 1 % Gel topical gel    VOLTAREN    100 g    Apply 2 grams to hands/fingers four times daily using enclosed dosing card.    Pain of finger of right hand       evolocumab 140 MG/ML prefilled autoinjector    REPATHA    2 mL    Inject 1 mL (140 mg) Subcutaneous every 14 days    Mixed hyperlipidemia       gabapentin 600 MG tablet    NEURONTIN    180 tablet    TAKE 1 TABLET BY MOUTH TWICE DAILY    Coronary artery disease involving native coronary artery of native heart without angina pectoris       order for DME     1 Device    Equipment being ordered: Heel lift suspension boot - all foam boot to offload base of right 5ht metatarsal at night.    Right foot pain, Mononeuritis       oxyCODONE-acetaminophen 5-325 MG per tablet    PERCOCET    30 tablet    Take 1-2 tablets by mouth every 4 hours as needed for pain    Tear of biceps tendon       sildenafil 20 MG tablet    REVATIO    30 tablet    TAKE TWO TO FIVE TABLETS BY MOUTH AS NEEDED PRIOR TO SXUAL INTERCOURSE NEVER USE WITH NITROGLYCERIN TERAZOSIN OR DOXAZOCIN    Erectile dysfunction, unspecified erectile dysfunction type

## 2018-08-28 NOTE — PROGRESS NOTES
HISTORY OF PRESENT ILLNESS:    Johan Navarro is a 63 year old male who is seen in follow up for Right distal biceps tendon repair, DOS 7/16/18, Dr. Zabala.  Present symptoms: Pt reports doing well.  No pain unless accidental strain.  Using for ADL, lifting up to 5 lbs.  Tapered sling and cares. No drainage after staple removal.   No forced extension. No ne complaints.  Denies Chest pain, Calve pain, Fever, Chills.    Current Treatment: postop, gravity stretching.     PHYSICAL EXAM:  There were no vitals taken for this visit.  There is no height or weight on file to calculate BMI.   GENERAL APPEARANCE: healthy, alert and no distress   PSYCH:  mentation appears normal and affect normal/bright    MSK:  Right: Elbow.  Incisions clean and dry, well healed.   No incisional erythema.   No Ecchymosis.  Edema min vs scar tissue.  Palpation: distal biceps appears intact and somewhat bulky with good tension at full extension.  CMS: reddy incisional numbness, otherwise grossly intact.  AROM:  Extension near full, flexion full.  Strength flexion 4+/5.      ASSESSMENT:  Johan Navarro is a 63 year old male S/P Right distal biceps tendon repair, DOS 7/16/18, Dr. Zabala..     Doing well.     PLAN:  - ROM exercises with palm positions.  - gradual increase lift 2.5 lbs per week, palm positions  - defers PT at this time.    Return to clinic 6, weeks, or PRN    Victor M Pugh PA-C    Dept. Orthopedic Surgery  [facility]   8/28/2018

## 2018-08-28 NOTE — PATIENT INSTRUCTIONS
Work on flexibility, straightening under gravity.    Strengthening.  Start increasing by 2-3 lbs per week, working up to 25 repetitions then move up to the next level.    Perform all exercises with slow and controlled motion.  Perform all exercises with palm up, thumb up, and palm down.    Follow up as needed in 6 weeks or as needed.

## 2018-10-04 DIAGNOSIS — N52.9 ERECTILE DYSFUNCTION, UNSPECIFIED ERECTILE DYSFUNCTION TYPE: ICD-10-CM

## 2018-10-04 NOTE — TELEPHONE ENCOUNTER
"Requested Prescriptions   Pending Prescriptions Disp Refills     sildenafil (REVATIO) 20 MG tablet [Pharmacy Med Name: SILDENAFIL 20MG TABLETS]  Last Written Prescription Date:  5/16/18  Last Fill Quantity: 30,  # refills: 0   Last office visit: 7/13/2018 with prescribing provider:  Rabia Warner MD    Future Office Visit:     30 tablet 0     Sig: TAKE 2-5 TABLETS BY MOUTH AS NEEDED PRIOR TO SEXUAL INTERCOURSE.    Erectile Dysfuction Protocol Passed    10/4/2018  4:40 AM       Passed - Absence of nitrates on medication list       Passed - Absence of Alpha Blockers on Med list       Passed - Recent (12 mo) or future (30 days) visit within the authorizing provider's specialty    Patient had office visit in the last 12 months or has a visit in the next 30 days with authorizing provider or within the authorizing provider's specialty.  See \"Patient Info\" tab in inbasket, or \"Choose Columns\" in Meds & Orders section of the refill encounter.           Passed - Patient is age 18 or older          "

## 2018-10-05 RX ORDER — SILDENAFIL CITRATE 20 MG/1
TABLET ORAL
Qty: 30 TABLET | Refills: 0 | Status: SHIPPED | OUTPATIENT
Start: 2018-10-05 | End: 2019-01-14

## 2018-10-05 NOTE — TELEPHONE ENCOUNTER
Prescription approved per [facility] Refill Protocol.    Cristina Gannon RN -- [facility]

## 2018-11-28 DIAGNOSIS — I25.10 CORONARY ARTERY DISEASE INVOLVING NATIVE CORONARY ARTERY OF NATIVE HEART WITHOUT ANGINA PECTORIS: ICD-10-CM

## 2018-11-28 RX ORDER — ATORVASTATIN CALCIUM 80 MG/1
80 TABLET, FILM COATED ORAL DAILY
Qty: 90 TABLET | Refills: 2 | Status: SHIPPED | OUTPATIENT
Start: 2018-11-28 | End: 2018-11-29

## 2018-11-29 DIAGNOSIS — I25.10 CORONARY ARTERY DISEASE INVOLVING NATIVE CORONARY ARTERY OF NATIVE HEART WITHOUT ANGINA PECTORIS: ICD-10-CM

## 2018-11-29 DIAGNOSIS — E78.5 HLD (HYPERLIPIDEMIA): Primary | ICD-10-CM

## 2018-11-29 RX ORDER — ATORVASTATIN CALCIUM 80 MG/1
80 TABLET, FILM COATED ORAL DAILY
Qty: 90 TABLET | Refills: 3 | Status: SHIPPED | OUTPATIENT
Start: 2018-11-29 | End: 2020-02-06

## 2018-12-10 ENCOUNTER — OFFICE VISIT (OUTPATIENT)
Dept: FAMILY MEDICINE | Facility: CLINIC | Age: 63
End: 2018-12-10
Payer: COMMERCIAL

## 2018-12-10 VITALS
HEART RATE: 89 BPM | SYSTOLIC BLOOD PRESSURE: 94 MMHG | TEMPERATURE: 98.2 F | DIASTOLIC BLOOD PRESSURE: 52 MMHG | BODY MASS INDEX: 24.8 KG/M2 | OXYGEN SATURATION: 95 % | WEIGHT: 154.3 LBS | RESPIRATION RATE: 16 BRPM | HEIGHT: 66 IN

## 2018-12-10 DIAGNOSIS — M20.001 FINGER DEFORMITY, RIGHT: ICD-10-CM

## 2018-12-10 DIAGNOSIS — Z23 NEED FOR PROPHYLACTIC VACCINATION AND INOCULATION AGAINST INFLUENZA: ICD-10-CM

## 2018-12-10 DIAGNOSIS — H66.001 ACUTE SUPPURATIVE OTITIS MEDIA OF RIGHT EAR WITHOUT SPONTANEOUS RUPTURE OF TYMPANIC MEMBRANE, RECURRENCE NOT SPECIFIED: Primary | ICD-10-CM

## 2018-12-10 PROCEDURE — 90471 IMMUNIZATION ADMIN: CPT | Performed by: PHYSICIAN ASSISTANT

## 2018-12-10 PROCEDURE — 99213 OFFICE O/P EST LOW 20 MIN: CPT | Mod: 25 | Performed by: PHYSICIAN ASSISTANT

## 2018-12-10 PROCEDURE — 90682 RIV4 VACC RECOMBINANT DNA IM: CPT | Performed by: PHYSICIAN ASSISTANT

## 2018-12-10 RX ORDER — AMOXICILLIN 875 MG
875 TABLET ORAL 2 TIMES DAILY
Qty: 20 TABLET | Refills: 0 | Status: SHIPPED | OUTPATIENT
Start: 2018-12-10 | End: 2018-12-20

## 2018-12-10 RX ORDER — FLUTICASONE PROPIONATE 50 MCG
2 SPRAY, SUSPENSION (ML) NASAL DAILY PRN
Refills: 3 | COMMUNITY
Start: 2018-03-01 | End: 2021-02-16

## 2018-12-10 ASSESSMENT — ANXIETY QUESTIONNAIRES
IF YOU CHECKED OFF ANY PROBLEMS ON THIS QUESTIONNAIRE, HOW DIFFICULT HAVE THESE PROBLEMS MADE IT FOR YOU TO DO YOUR WORK, TAKE CARE OF THINGS AT HOME, OR GET ALONG WITH OTHER PEOPLE: NOT DIFFICULT AT ALL
7. FEELING AFRAID AS IF SOMETHING AWFUL MIGHT HAPPEN: NOT AT ALL
1. FEELING NERVOUS, ANXIOUS, OR ON EDGE: NOT AT ALL
5. BEING SO RESTLESS THAT IT IS HARD TO SIT STILL: NOT AT ALL
3. WORRYING TOO MUCH ABOUT DIFFERENT THINGS: NOT AT ALL
6. BECOMING EASILY ANNOYED OR IRRITABLE: SEVERAL DAYS
2. NOT BEING ABLE TO STOP OR CONTROL WORRYING: NOT AT ALL
GAD7 TOTAL SCORE: 1

## 2018-12-10 ASSESSMENT — MIFFLIN-ST. JEOR: SCORE: 1437.65

## 2018-12-10 ASSESSMENT — PATIENT HEALTH QUESTIONNAIRE - PHQ9
SUM OF ALL RESPONSES TO PHQ QUESTIONS 1-9: 2
5. POOR APPETITE OR OVEREATING: NOT AT ALL

## 2018-12-10 NOTE — PROGRESS NOTES
SUBJECTIVE:   Johan Navarro is a 63 year old male who presents to clinic today for the following health issues:      1. Ear Pain      Duration: one week    Description (location/character/radiation): right ear fluid build-up started yesterday, other symptoms ongoing one week    Intensity:  moderate    Accompanying signs and symptoms: has had URI symptoms for the last week, had sinus problems, hacking cough for the last week    History (similar episodes/previous evaluation): did E-visit yesterday, advised flonase spray which he had already been using    Precipitating or alleviating factors: flonase helps a little    Therapies tried and outcome: flonase spray helped a little bit, swimmers ear drops didn't change anything     Patient is here today complaining of cold symptoms  Ongoing for 1 week  + wet productive cough, no fever or chills, right ear pain and now some left side neck gland swelling  Taking Flonase without relief      2. Finger Dislocation  Started: 1-2 months ago  Location: right pinky  Trauma: occurred while golfing  History: has not been seen for this  Therapies tried: tried to correct it himself but that didn't help  Wondering if he needs ortho referral    Patient notes that about 2 months ago was golfing and dislocated the right pinky  He tried to set it at home, however it isn't correctly set and now he can't bend it properly, it is painful and swollen in am    Problem list and histories reviewed & adjusted, as indicated.  Additional history: as documented    Patient Active Problem List   Diagnosis     Depressive disorder, not elsewhere classified     Elevated prostate specific antigen (PSA)     Osteoarthritis of Hand      Mixed hyperlipidemia     Family history of early CAD     Aortic stenosis     Carotid stenosis     Coronary artery disease involving native coronary artery of native heart without angina pectoris     Malignant neoplasm of prostate (H)     Past Surgical History:   Procedure  Laterality Date     CARDIAC SURGERY      coronary stents x2 placed ; angioplasty     DAVINCI PROSTATECTOMY      2010     EYE SURGERY Bilateral 2017    eyelids     ORTHOPEDIC SURGERY Right     achilles tendon; post football injury     ORTHOPEDIC SURGERY Right     hand; near thumb. has wires in there. cysts     REPAIR TENDON BICEPS DISTAL UPPER EXTREMITY Right 2018    Procedure: REPAIR TENDON BICEPS DISTAL UPPER EXTREMITY;  Right open biceps tendon repair  ;  Surgeon: Alber Zabala MD;  Location: [facility] OR     SURGICAL HISTORY OF -       Right hand Xanthoma removal     SURGICAL HISTORY OF -       Stress Echo (-)       Social History     Tobacco Use     Smoking status: Former Smoker     Types: Dip, chew, snus or snuff     Smokeless tobacco: Current User     Types: Chew     Tobacco comment: 12/10/18: occ chew, not every day   Substance Use Topics     Alcohol use: Yes     Comment: very rare     Family History   Problem Relation Age of Onset     Lipids Mother      C.A.D. Mother          at age 49 of MI     C.A.D. Sister         MI at age 48     Cancer - colorectal No family hx of      Prostate Cancer No family hx of      Colon Cancer No family hx of          Current Outpatient Medications   Medication Sig Dispense Refill     amoxicillin (AMOXIL) 875 MG tablet Take 1 tablet (875 mg) by mouth 2 times daily for 10 days 20 tablet 0     aspirin 81 MG chewable tablet Take 1 tablet (81 mg) by mouth daily 90 tablet 3     atorvastatin (LIPITOR) 80 MG tablet Take 1 tablet (80 mg) by mouth daily 90 tablet 3     diclofenac (VOLTAREN) 1 % GEL topical gel Apply 2 grams to hands/fingers four times daily using enclosed dosing card. (Patient taking differently: Apply topically as needed Apply 2 grams to hands/fingers four times daily using enclosed dosing card.) 100 g 1     evolocumab (REPATHA) 140 MG/ML prefilled autoinjector Inject 1 mL (140 mg) Subcutaneous every 14 days 2 mL 5     fluticasone (FLONASE) 50  "MCG/ACT nasal spray Spray in nostril as needed  3     gabapentin (NEURONTIN) 600 MG tablet TAKE 1 TABLET BY MOUTH TWICE DAILY 180 tablet 1     sildenafil (REVATIO) 20 MG tablet TAKE 2-5 TABLETS BY MOUTH AS NEEDED PRIOR TO SEXUAL INTERCOURSE. 30 tablet 0     ASPIRIN NOT PRESCRIBED (INTENTIONAL) Please choose reason not prescribed, below 0 each 0     order for DME Equipment being ordered: Heel lift suspension boot - all foam boot to offload base of right 5ht metatarsal at night. 1 Device 0     oxyCODONE-acetaminophen (PERCOCET) 5-325 MG per tablet Take 1-2 tablets by mouth every 4 hours as needed for pain 30 tablet 0     Allergies   Allergen Reactions     Crestor [Rosuvastatin] GI Disturbance     Dust Mites      No Clinical Screening - See Comments      Goose feathers     Pollen Extract        Reviewed and updated as needed this visit by clinical staff  Tobacco  Allergies  Meds  Med Hx  Surg Hx  Fam Hx  Soc Hx      Reviewed and updated as needed this visit by Provider         ROS:  Constitutional, HEENT, cardiovascular, pulmonary, gi and gu systems are negative, except as otherwise noted.    OBJECTIVE:     BP 94/52 (BP Location: Right arm, Patient Position: Chair, Cuff Size: Adult Regular)   Pulse 89   Temp 98.2  F (36.8  C) (Oral)   Resp 16   Ht 1.676 m (5' 6\")   Wt 70 kg (154 lb 4.8 oz)   SpO2 95%   BMI 24.90 kg/m    Body mass index is 24.9 kg/m .  GENERAL: healthy, alert and no distress  EYES: Eyes grossly normal to inspection, PERRL and conjunctivae and sclerae normal  HENT: normal cephalic/atraumatic, right ear: erythematous and mucopurulent effusion, left ear: normal: no effusions, no erythema, normal landmarks, nose and mouth without ulcers or lesions, oropharynx clear and oral mucous membranes moist  NECK: no adenopathy, no asymmetry, masses, or scars and thyroid normal to palpation  RESP: lungs clear to auscultation - no rales, rhonchi or wheezes  CV: regular rate and rhythm, normal S1 S2, no S3 " or S4, no murmur, click or rub, no peripheral edema and peripheral pulses strong  MS: right pinky is swollen at PIP joint, unable to fully flex    Diagnostic Test Results:  none     ASSESSMENT/PLAN:             1. Acute suppurative otitis media of right ear without spontaneous rupture of tympanic membrane, recurrence not specified  New problem, will treat with Amoxicillin BID x 10 days.  Advised rest, fluids and OTCs.  F/U if symptoms worsen or do not improve.  - amoxicillin (AMOXIL) 875 MG tablet; Take 1 tablet (875 mg) by mouth 2 times daily for 10 days  Dispense: 20 tablet; Refill: 0    2. Need for prophylactic vaccination and inoculation against influenza  - FLU VACCINE, (RIV4) RECOMBINANT HA  , IM (FluBlok, egg free) [22653]- >18 YRS (FM recommended  50-64 YRS)  - Vaccine Administration, Initial [70419]    3. Finger deformity, right  Chronic issue, new to me, likely need to have this addressed via ortho, referral placed.  - ORTHO  REFERRAL    Risks, benefits and alternatives were discussed with patient. Agreeable to the plan of care.      Rina Chowdhury PA-C  [facility]  Injectable Influenza Immunization Documentation    1.  Is the person to be vaccinated sick today?   No    2. Does the person to be vaccinated have an allergy to a component   of the vaccine?   No  Egg Allergy Algorithm Link    3. Has the person to be vaccinated ever had a serious reaction   to influenza vaccine in the past?   No    4. Has the person to be vaccinated ever had Guillain-Barré syndrome?   No    Form completed by Yassine Murillo CMA (AAMA)

## 2018-12-10 NOTE — LETTER
My Depression Action Plan  Name: Johan Navarro   Date of Birth 1955  Date: 12/10/2018    My doctor: Washington Yang   My clinic: [facility]  [address] [place] [id number] [phone]          GREEN    ZONE   Good Control    What it looks like:     Things are going generally well. You have normal up s and down s. You may even feel depressed from time to time, but bad moods usually last less than a day.   What you need to do:  1. Continue to care for yourself (see self care plan)  2. Check your depression survival kit and update it as needed  3. Follow your physician s recommendations including any medication.  4. Do not stop taking medication unless you consult with your physician first.           YELLOW         ZONE Getting Worse    What it looks like:     Depression is starting to interfere with your life.     It may be hard to get out of bed; you may be starting to isolate yourself from others.    Symptoms of depression are starting to last most all day and this has happened for several days.     You may have suicidal thoughts but they are not constant.   What you need to do:     1. Call your care team, your response to treatment will improve if you keep your care team informed of your progress. Yellow periods are signs an adjustment may need to be made.     2. Continue your self-care, even if you have to fake it!    3. Talk to someone in your support network    4. Open up your depression survival kit           RED    ZONE Medical Alert - Get Help    What it looks like:     Depression is seriously interfering with your life.     You may experience these or other symptoms: You can t get out of bed most days, can t work or engage in other necessary activities, you have trouble taking care of basic hygiene, or basic responsibilities, thoughts of suicide or death that will not go away, self-injurious behavior.     What you need to do:  1. Call your care team  and request a same-day appointment. If they are not available (weekends or after hours) call your local crisis line, emergency room or 911.            Depression Self Care Plan / Survival Kit    Self-Care for Depression  Here s the deal. Your body and mind are really not as separate as most people think.  What you do and think affects how you feel and how you feel influences what you do and think. This means if you do things that people who feel good do, it will help you feel better.  Sometimes this is all it takes.  There is also a place for medication and therapy depending on how severe your depression is, so be sure to consult with your medical provider and/ or Behavioral Health Consultant if your symptoms are worsening or not improving.     In order to better manage my stress, I will:    Exercise  Get some form of exercise, every day. This will help reduce pain and release endorphins, the  feel good  chemicals in your brain. This is almost as good as taking antidepressants!  This is not the same as joining a gym and then never going! (they count on that by the way ) It can be as simple as just going for a walk or doing some gardening, anything that will get you moving.      Hygiene   Maintain good hygiene (Get out of bed in the morning, Make your bed, Brush your teeth, Take a shower, and Get dressed like you were going to work, even if you are unemployed).  If your clothes don't fit try to get ones that do.    Diet  I will strive to eat foods that are good for me, drink plenty of water, and avoid excessive sugar, caffeine, alcohol, and other mood-altering substances.  Some foods that are helpful in depression are: complex carbohydrates, B vitamins, flaxseed, fish or fish oil, fresh fruits and vegetables.    Psychotherapy  I agree to participate in Individual Therapy (if recommended).    Medication  If prescribed medications, I agree to take them.  Missing doses can result in serious side effects.  I understand  that drinking alcohol, or other illicit drug use, may cause potential side effects.  I will not stop my medication abruptly without first discussing it with my provider.    Staying Connected With Others  I will stay in touch with my friends, family members, and my primary care provider/team.    Use your imagination  Be creative.  We all have a creative side; it doesn t matter if it s oil painting, sand castles, or mud pies! This will also kick up the endorphins.    Witness Beauty  (AKA stop and smell the roses) Take a look outside, even in mid-winter. Notice colors, textures. Watch the squirrels and birds.     Service to others  Be of service to others.  There is always someone else in need.  By helping others we can  get out of ourselves  and remember the really important things.  This also provides opportunities for practicing all the other parts of the program.    Humor  Laugh and be silly!  Adjust your TV habits for less news and crime-drama and more comedy.    Control your stress  Try breathing deep, massage therapy, biofeedback, and meditation. Find time to relax each day.     My support system    Clinic Contact:  Phone number:    Contact 1:  Phone number:    Contact 2:  Phone number:    Judaism/:  Phone number:    Therapist:  Phone number:    Local crisis center:    Phone number:    Other community support:  Phone number:

## 2018-12-11 ASSESSMENT — ANXIETY QUESTIONNAIRES: GAD7 TOTAL SCORE: 1

## 2018-12-14 ENCOUNTER — ANCILLARY PROCEDURE (OUTPATIENT)
Dept: GENERAL RADIOLOGY | Facility: CLINIC | Age: 63
End: 2018-12-14
Attending: FAMILY MEDICINE
Payer: COMMERCIAL

## 2018-12-14 ENCOUNTER — OFFICE VISIT (OUTPATIENT)
Dept: ORTHOPEDICS | Facility: CLINIC | Age: 63
End: 2018-12-14
Payer: COMMERCIAL

## 2018-12-14 VITALS
SYSTOLIC BLOOD PRESSURE: 104 MMHG | WEIGHT: 154 LBS | HEIGHT: 66 IN | BODY MASS INDEX: 24.75 KG/M2 | DIASTOLIC BLOOD PRESSURE: 62 MMHG

## 2018-12-14 DIAGNOSIS — S69.91XS FINGER INJURY, RIGHT, SEQUELA: Primary | ICD-10-CM

## 2018-12-14 DIAGNOSIS — S69.91XS FINGER INJURY, RIGHT, SEQUELA: ICD-10-CM

## 2018-12-14 DIAGNOSIS — M19.041 OSTEOARTHRITIS OF FINGER OF RIGHT HAND: ICD-10-CM

## 2018-12-14 PROCEDURE — 73140 X-RAY EXAM OF FINGER(S): CPT | Mod: RT

## 2018-12-14 PROCEDURE — 99204 OFFICE O/P NEW MOD 45 MIN: CPT | Performed by: FAMILY MEDICINE

## 2018-12-14 ASSESSMENT — MIFFLIN-ST. JEOR: SCORE: 1436.29

## 2018-12-14 NOTE — PATIENT INSTRUCTIONS
1. Finger injury, right, sequela    2. Osteoarthritis of finger of right hand      Rx for topical ointment that can be used as needed. Pharmacy will call you and mail it to you.  Can also consider Turmeric. Nordic Naturals (Omega Curcumin) or Pure Encapsulations (Curcurmin Complex 3)  Continue with  modification for golf  Reviewed xray - old fracture with now arthritis    Follow-up if pain is not well controlled

## 2018-12-14 NOTE — PROGRESS NOTES
"ASSESSMENT & PLAN    1. Finger injury, right, sequela    2. Osteoarthritis of finger of right hand      Rx for topical ointment that can be used as needed.   Can also consider Turmeric. Nordic Naturals (Omega Curcumin) or Pure Encapsulations (Curcurmin Complex 3)  Continue with  modification for golf  Reviewed xray - old fracture, posttraumatic osteoarthritis    Follow-up if pain is not well controlled    -----    SUBJECTIVE  Johan Navarro is a/an 63 year old Right handed male who is seen in consultation at the request of  Rina Chowdhury PA-C for evaluation of right little finger injury/deformity. The patient is seen by themselves.    Onset: 1+ years(s) ago. Patient states that he was golfing and accidentally hit the ground and he injured his finger, states that it was initially dislocated and he attempted to relocate it himself and never received any further treatment. Since that time he has had pain and now notices a deformity at the PIP joint of the right little finger  Location of Pain: right little finger- pip joint  Rating of Pain at worst: 6/10  Rating of Pain Currently: 2/10  Worsened by: golfing, cold weather, gripping  Better with: Voltaren gel  Treatments tried: no other treatment tried to date  Associated symptoms: swelling and deformity  Orthopedic history: NO  Relevant surgical history: NO  Patient Social History: retired    Patient's past medical, surgical, social, and family histories were reviewed today and no changes are noted.    REVIEW OF SYSTEMS:  10 point ROS is negative other than symptoms noted above in HPI, Past Medical History or as stated below  Constitutional: NEGATIVE for fever, chills, change in weight  Skin: NEGATIVE for worrisome rashes, moles or lesions  GI/: NEGATIVE for bowel or bladder changes  Neuro: NEGATIVE for weakness, dizziness or paresthesias    OBJECTIVE:  /62   Ht 1.676 m (5' 6\")   Wt 69.9 kg (154 lb)   BMI 24.86 kg/m     General: healthy, alert " and in no distress  HEENT: no scleral icterus or conjunctival erythema  Skin: no suspicious lesions or rash. No jaundice.  CV: regular rhythm by palpation  Resp: normal respiratory effort without conversational dyspnea   Psych: normal mood and affect  Gait: normal steady gait with appropriate coordination and balance  Neuro: normal light touch sensory exam of the bilateral hands.    MSK:  RIGHT HAND  Inspection:  Bony hypertrophy and slight radial deviation at the PIP joint of the fifth digit  Palpation:   Fingers: PIP > DIP joint  Range of Motion:  Lacking terminal extension at the PIP and terminal flexion secondary to pain  Strength:     full  Special Tests:    Positive: Grind at the DIP and PIP of the fifth digit    Independent visualization of the below image:  Recent Results (from the past 24 hour(s))   XR Finger Right G/E 2 Views    Narrative    THREE VIEWS LEFT LITTLE FINGER   12/14/2018 2:55 PM    HISTORY: Finger injury, right, sequela.    COMPARISON: None.      Impression    IMPRESSION: No fracture or osseous lesion is seen. There are mild  degenerative changes in the DIP and PIP joints. No other abnormality  is noted.     VAHID WISE MD     Patient's conditions were thoroughly discussed during today's visit with greater than 50% of the visit spent counseling the patient with total time spent face-to-face with the patient being 15 minutes.    Charmaine Oden, DO [facility] [place] [facility]

## 2018-12-14 NOTE — LETTER
12/14/2018         RE: Johan Navarro  [address]  [place] [address]        Dear Colleague,    Thank you for referring your patient, Johan Navarro, to the [facility]. Please see a copy of my visit note below.    ASSESSMENT & PLAN    1. Finger injury, right, sequela    2. Osteoarthritis of finger of right hand      Rx for topical ointment that can be used as needed.   Can also consider Turmeric. Nordic Naturals (Omega Curcumin) or Pure Encapsulations (Curcurmin Complex 3)  Continue with  modification for golf  Reviewed xray - old fracture, posttraumatic osteoarthritis    Follow-up if pain is not well controlled    -----    SUBJECTIVE  Johan Navarro is a/an 63 year old Right handed male who is seen in consultation at the request of  Rina Chowdhury PA-C for evaluation of right little finger injury/deformity. The patient is seen by themselves.    Onset: 1+ years(s) ago. Patient states that he was golfing and accidentally hit the ground and he injured his finger, states that it was initially dislocated and he attempted to relocate it himself and never received any further treatment. Since that time he has had pain and now notices a deformity at the PIP joint of the right little finger  Location of Pain: right little finger- pip joint  Rating of Pain at worst: 6/10  Rating of Pain Currently: 2/10  Worsened by: golfing, cold weather, gripping  Better with: Voltaren gel  Treatments tried: no other treatment tried to date  Associated symptoms: swelling and deformity  Orthopedic history: NO  Relevant surgical history: NO  Patient Social History: retired    Patient's past medical, surgical, social, and family histories were reviewed today and no changes are noted.    REVIEW OF SYSTEMS:  10 point ROS is negative other than symptoms noted above in HPI, Past Medical History or as stated below  Constitutional: NEGATIVE for fever, chills, change in weight  Skin: NEGATIVE for worrisome  "rashes, moles or lesions  GI/: NEGATIVE for bowel or bladder changes  Neuro: NEGATIVE for weakness, dizziness or paresthesias    OBJECTIVE:  /62   Ht 1.676 m (5' 6\")   Wt 69.9 kg (154 lb)   BMI 24.86 kg/m      General: healthy, alert and in no distress  HEENT: no scleral icterus or conjunctival erythema  Skin: no suspicious lesions or rash. No jaundice.  CV: regular rhythm by palpation  Resp: normal respiratory effort without conversational dyspnea   Psych: normal mood and affect  Gait: normal steady gait with appropriate coordination and balance  Neuro: normal light touch sensory exam of the bilateral hands.    MSK:  RIGHT HAND  Inspection:  Bony hypertrophy and slight radial deviation at the PIP joint of the fifth digit  Palpation:   Fingers: PIP > DIP joint  Range of Motion:  Lacking terminal extension at the PIP and terminal flexion secondary to pain  Strength:     full  Special Tests:    Positive: Grind at the DIP and PIP of the fifth digit    Independent visualization of the below image:  Recent Results (from the past 24 hour(s))   XR Finger Right G/E 2 Views    Narrative    THREE VIEWS LEFT LITTLE FINGER   12/14/2018 2:55 PM    HISTORY: Finger injury, right, sequela.    COMPARISON: None.      Impression    IMPRESSION: No fracture or osseous lesion is seen. There are mild  degenerative changes in the DIP and PIP joints. No other abnormality  is noted.     VAHID WISE MD     Patient's conditions were thoroughly discussed during today's visit with greater than 50% of the visit spent counseling the patient with total time spent face-to-face with the patient being 15 minutes.    Charmaine Oden DO [facility]        Again, thank you for allowing me to participate in the care of your patient.        Sincerely,        Charmaine Oden DO    "

## 2019-01-14 DIAGNOSIS — N52.9 ERECTILE DYSFUNCTION, UNSPECIFIED ERECTILE DYSFUNCTION TYPE: ICD-10-CM

## 2019-01-14 NOTE — TELEPHONE ENCOUNTER
"Requested Prescriptions   Pending Prescriptions Disp Refills     sildenafil (REVATIO) 20 MG tablet [Pharmacy Med Name: SILDENAFIL 20MG TABLETS]  Last Written Prescription Date:  10/5/18  Last Fill Quantity: 30,  # refills: 0   Last office visit: 12/10/2018 with prescribing provider:  Rina Chowdhury PA-C    Future Office Visit:     30 tablet 0     Sig: TAKE 2-5 TABLETS BY MOUTH AS NEEDED PRIOR TO SEXUAL INTERCOURSE.    Erectile Dysfuction Protocol Passed - 1/14/2019  8:20 AM       Passed - Absence of nitrates on medication list       Passed - Absence of Alpha Blockers on Med list       Passed - Recent (12 mo) or future (30 days) visit within the authorizing provider's specialty    Patient had office visit in the last 12 months or has a visit in the next 30 days with authorizing provider or within the authorizing provider's specialty.  See \"Patient Info\" tab in inbasket, or \"Choose Columns\" in Meds & Orders section of the refill encounter.             Passed - Medication is active on med list       Passed - Patient is age 18 or older          "

## 2019-01-16 RX ORDER — SILDENAFIL CITRATE 20 MG/1
TABLET ORAL
Qty: 30 TABLET | Refills: 2 | Status: SHIPPED | OUTPATIENT
Start: 2019-01-16 | End: 2019-07-02

## 2019-01-16 NOTE — TELEPHONE ENCOUNTER
Last physical 5/22/18. Ok to fill.  Prescription approved per [facility] Refill Protocol.  January Johnson RN  Message handled by Nurse Triage.

## 2019-01-22 DIAGNOSIS — E78.2 MIXED HYPERLIPIDEMIA: ICD-10-CM

## 2019-02-24 DIAGNOSIS — I25.10 CORONARY ARTERY DISEASE INVOLVING NATIVE CORONARY ARTERY OF NATIVE HEART WITHOUT ANGINA PECTORIS: ICD-10-CM

## 2019-02-24 NOTE — TELEPHONE ENCOUNTER
Requested Prescriptions   Pending Prescriptions Disp Refills     gabapentin (NEURONTIN) 600 MG tablet [Pharmacy Med Name: GABAPENTIN 600MG TABLETS]  Last Written Prescription Date:  08/28/2018  Last Fill Quantity: 180 tablet,  # refills: 1   Last office visit: 12/10/2018 with prescribing provider:  Rina Chowdhury PA-C    Future Office Visit:     180 tablet 0     Sig: TAKE 1 TABLET BY MOUTH TWICE DAILY    There is no refill protocol information for this order

## 2019-02-26 RX ORDER — GABAPENTIN 600 MG/1
TABLET ORAL
Qty: 180 TABLET | Refills: 0 | Status: SHIPPED | OUTPATIENT
Start: 2019-02-26 | End: 2019-05-26

## 2019-05-06 ENCOUNTER — PRE VISIT (OUTPATIENT)
Dept: CARDIOLOGY | Facility: CLINIC | Age: 64
End: 2019-05-06

## 2019-05-06 NOTE — TELEPHONE ENCOUNTER
Attempted to contact patient to remind him to set up stress echo prior to OV 5/28/19. Left message for patient to call scheduling. Message to scheduling to reach out to patient.

## 2019-05-13 ENCOUNTER — HOSPITAL ENCOUNTER (OUTPATIENT)
Dept: CARDIOLOGY | Facility: CLINIC | Age: 64
Discharge: HOME | End: 2019-05-13
Attending: INTERNAL MEDICINE | Admitting: INTERNAL MEDICINE
Payer: COMMERCIAL

## 2019-05-13 DIAGNOSIS — I25.10 CORONARY ARTERY DISEASE INVOLVING NATIVE CORONARY ARTERY OF NATIVE HEART WITHOUT ANGINA PECTORIS: ICD-10-CM

## 2019-05-13 DIAGNOSIS — I35.0 NONRHEUMATIC AORTIC VALVE STENOSIS: ICD-10-CM

## 2019-05-13 PROCEDURE — 93350 STRESS TTE ONLY: CPT | Mod: 26 | Performed by: INTERNAL MEDICINE

## 2019-05-13 PROCEDURE — 93321 DOPPLER ECHO F-UP/LMTD STD: CPT | Mod: 26 | Performed by: INTERNAL MEDICINE

## 2019-05-13 PROCEDURE — 93018 CV STRESS TEST I&R ONLY: CPT | Performed by: INTERNAL MEDICINE

## 2019-05-13 PROCEDURE — 93325 DOPPLER ECHO COLOR FLOW MAPG: CPT | Mod: 26 | Performed by: INTERNAL MEDICINE

## 2019-05-13 PROCEDURE — 93325 DOPPLER ECHO COLOR FLOW MAPG: CPT | Mod: TC

## 2019-05-13 PROCEDURE — 93016 CV STRESS TEST SUPVJ ONLY: CPT | Performed by: INTERNAL MEDICINE

## 2019-05-13 PROCEDURE — 25500064 ZZH RX 255 OP 636: Performed by: INTERNAL MEDICINE

## 2019-05-13 RX ADMIN — HUMAN ALBUMIN MICROSPHERES AND PERFLUTREN 9 ML: 10; .22 INJECTION, SOLUTION INTRAVENOUS at 09:45

## 2019-05-14 DIAGNOSIS — E78.5 HLD (HYPERLIPIDEMIA): ICD-10-CM

## 2019-05-14 DIAGNOSIS — I25.10 CORONARY ARTERY DISEASE INVOLVING NATIVE CORONARY ARTERY OF NATIVE HEART WITHOUT ANGINA PECTORIS: ICD-10-CM

## 2019-05-14 LAB
ALT SERPL W P-5'-P-CCNC: <5 U/L (ref 5–30)
CHOLEST SERPL-MCNC: 136 MG/DL
HDLC SERPL-MCNC: 61 MG/DL
LDLC SERPL CALC-MCNC: 62 MG/DL
NONHDLC SERPL-MCNC: 75 MG/DL
TRIGL SERPL-MCNC: 63 MG/DL

## 2019-05-14 PROCEDURE — 36415 COLL VENOUS BLD VENIPUNCTURE: CPT | Performed by: INTERNAL MEDICINE

## 2019-05-14 PROCEDURE — 84460 ALANINE AMINO (ALT) (SGPT): CPT | Performed by: INTERNAL MEDICINE

## 2019-05-14 PROCEDURE — 80061 LIPID PANEL: CPT | Performed by: INTERNAL MEDICINE

## 2019-05-26 DIAGNOSIS — I25.10 CORONARY ARTERY DISEASE INVOLVING NATIVE CORONARY ARTERY OF NATIVE HEART WITHOUT ANGINA PECTORIS: ICD-10-CM

## 2019-05-26 NOTE — LETTER
June 27, 2019      Johan Navarro  [address]  [place] [address]        Hi Johan,     For any further refills of your medication, you are due for an annual physical. Please contact the clinic via [employer] or by phone at [phone] to schedule this appointment.     Sincerely,     Washington Yang PA-C

## 2019-05-26 NOTE — TELEPHONE ENCOUNTER
Requested Prescriptions   Pending Prescriptions Disp Refills     gabapentin (NEURONTIN) 600 MG tablet [Pharmacy Med Name: GABAPENTIN 600MG TABLETS] 180 tablet 0     Sig: TAKE 1 TABLET BY MOUTH TWICE DAILY       There is no refill protocol information for this order        Last Written Prescription Date:  02/26/2019  Last Fill Quantity: 180 tablet,  # refills: 0    Last office visit: 12/10/2018 with prescribing provider:  Rina Chowdhury PA-C        Future Office Visit:   Next 5 appointments (look out 90 days)    May 28, 2019 10:15 AM CDT  Return Visit with Sander Galicia MD  [facility] [address] [place] [id number] [phone] OPT 2         Routing refill request to provider for review/approval because:  Drug not on the [facility], [facility] or [facility] refill protocol or controlled substance

## 2019-05-27 NOTE — PROGRESS NOTES
HPI and Plan:   See dictation    Orders Placed This Encounter   Procedures     US Carotid Bilateral     Follow-Up with Cardiologist     Echocardiogram Complete       No orders of the defined types were placed in this encounter.      Medications Discontinued During This Encounter   Medication Reason     ASPIRIN NOT PRESCRIBED (INTENTIONAL) Erroneous Entry         Encounter Diagnoses   Name Primary?     Bilateral carotid artery stenosis Yes     Nonrheumatic aortic valve stenosis        CURRENT MEDICATIONS:  Current Outpatient Medications   Medication Sig Dispense Refill     aspirin 81 MG chewable tablet Take 1 tablet (81 mg) by mouth daily 90 tablet 3     atorvastatin (LIPITOR) 80 MG tablet Take 1 tablet (80 mg) by mouth daily 90 tablet 3     COMPOUNDED NON-CONTROLLED SUBSTANCE (CMPD RX) - PHARMACY TO MIX COMPOUNDED MEDICATION Apply 1-2 grams to the affected area 3-4 times daily. Rub in well for 1-2 minutes. 240 g 2     diclofenac (VOLTAREN) 1 % GEL topical gel Apply 2 grams to hands/fingers four times daily using enclosed dosing card. (Patient taking differently: Apply topically as needed Apply 2 grams to hands/fingers four times daily using enclosed dosing card.) 100 g 1     evolocumab (REPATHA) 140 MG/ML prefilled autoinjector Inject 1 mL (140 mg) Subcutaneous every 14 days 6 mL 2     fluticasone (FLONASE) 50 MCG/ACT nasal spray Spray in nostril as needed  3     gabapentin (NEURONTIN) 600 MG tablet TAKE 1 TABLET BY MOUTH TWICE DAILY 180 tablet 0     order for DME Equipment being ordered: Heel lift suspension boot - all foam boot to offload base of right 5ht metatarsal at night. 1 Device 0     oxyCODONE-acetaminophen (PERCOCET) 5-325 MG per tablet Take 1-2 tablets by mouth every 4 hours as needed for pain 30 tablet 0     sildenafil (REVATIO) 20 MG tablet TAKE 2-5 TABLETS BY MOUTH AS NEEDED PRIOR TO SEXUAL INTERCOURSE. 30 tablet 2       ALLERGIES     Allergies   Allergen Reactions     Crestor [Rosuvastatin] GI  Disturbance     Dust Mites      No Clinical Screening - See Comments      Goose feathers     Pollen Extract        PAST MEDICAL HISTORY:  Past Medical History:   Diagnosis Date     Aortic stenosis      Arthritis     hands     CAD (coronary artery disease)     cardiac cath 1998: stents x 2 to circumflex     Carotid stenosis     left     Family history of early CAD      Hyperlipidaemia LDL goal < 70     familial hyperlipidemia with marked hypercholesterolemia before treatment; has been on some form of cholesterol-lowering medication since the 1970s       PAST SURGICAL HISTORY:  Past Surgical History:   Procedure Laterality Date     CARDIAC SURGERY      coronary stents x2 placed ; angioplasty     DAVINCI PROSTATECTOMY           EYE SURGERY Bilateral 2017    eyelids     ORTHOPEDIC SURGERY Right     achilles tendon; post football injury     ORTHOPEDIC SURGERY Right     hand; near thumb. has wires in there. cysts     REPAIR TENDON BICEPS DISTAL UPPER EXTREMITY Right 2018    Procedure: REPAIR TENDON BICEPS DISTAL UPPER EXTREMITY;  Right open biceps tendon repair  ;  Surgeon: Alber Zabala MD;  Location: [facility] OR     SURGICAL HISTORY OF -       Right hand Xanthoma removal     SURGICAL HISTORY OF -       Stress Echo (-)       FAMILY HISTORY:  Family History   Problem Relation Age of Onset     Lipids Mother      C.A.D. Mother          at age 49 of MI     C.A.D. Sister         MI at age 48     Cancer - colorectal No family hx of      Prostate Cancer No family hx of      Colon Cancer No family hx of        SOCIAL HISTORY:  Social History     Socioeconomic History     Marital status:      Spouse name: None     Number of children: None     Years of education: None     Highest education level: None   Occupational History     None   Social Needs     Financial resource strain: None     Food insecurity:     Worry: None     Inability: None     Transportation needs:     Medical: None     Non-medical:  "None   Tobacco Use     Smoking status: Former Smoker     Types: Dip, chew, snus or snuff     Smokeless tobacco: Current User     Types: Chew     Tobacco comment: 12/10/18: occ chew, not every day   Substance and Sexual Activity     Alcohol use: Yes     Comment: very rare     Drug use: No     Sexual activity: Yes     Partners: Female   Lifestyle     Physical activity:     Days per week: None     Minutes per session: None     Stress: None   Relationships     Social connections:     Talks on phone: None     Gets together: None     Attends Mormonism service: None     Active member of club or organization: None     Attends meetings of clubs or organizations: None     Relationship status: None     Intimate partner violence:     Fear of current or ex partner: None     Emotionally abused: None     Physically abused: None     Forced sexual activity: None   Other Topics Concern      Service Not Asked     Blood Transfusions Not Asked     Caffeine Concern Yes     Comment: coffee and soda     Occupational Exposure Not Asked     Hobby Hazards Not Asked     Sleep Concern Not Asked     Stress Concern Not Asked     Weight Concern Not Asked     Special Diet No     Back Care Not Asked     Exercise Yes     Comment: regular      Bike Helmet Not Asked     Seat Belt Yes     Self-Exams Not Asked     Parent/sibling w/ CABG, MI or angioplasty before 65F 55M? Yes     Comment: 49   Social History Narrative     None       Review of Systems:  Skin:  Negative       Eyes:  Negative      ENT:  Negative      Respiratory:  Negative       Cardiovascular:  Negative;palpitations;chest pain;edema;lightheadedness;dizziness      Gastroenterology: Negative      Genitourinary:  Negative      Musculoskeletal:  Negative      Neurologic:  Negative      Psychiatric:  Negative      Heme/Lymph/Imm:  Positive for allergies seasonal   Endocrine:  Negative        Physical Exam:  Vitals: /80   Pulse 70   Ht 1.676 m (5' 6\")   Wt 69.9 kg (154 lb)   BMI " 24.86 kg/m      Constitutional:  cooperative        Skin:  warm and dry to the touch          Head:  normocephalic        Eyes:  pupils equal and round        Lymph:      ENT:  no pallor or cyanosis        Neck:  carotid pulses are full and equal bilaterally        Respiratory:  clear to auscultation         Cardiac: regular rhythm       systolic murmur        pulses full and equal, no bruits auscultated                                        GI:  abdomen soft        Extremities and Muscular Skeletal:  no edema              Neurological:           Psych:           CC  Washington Yang, PAMicheleC  [address] [place] [address]

## 2019-05-28 ENCOUNTER — OFFICE VISIT (OUTPATIENT)
Dept: CARDIOLOGY | Facility: CLINIC | Age: 64
End: 2019-05-28
Payer: COMMERCIAL

## 2019-05-28 VITALS
HEIGHT: 66 IN | SYSTOLIC BLOOD PRESSURE: 122 MMHG | BODY MASS INDEX: 24.75 KG/M2 | HEART RATE: 70 BPM | DIASTOLIC BLOOD PRESSURE: 80 MMHG | WEIGHT: 154 LBS

## 2019-05-28 DIAGNOSIS — I65.23 BILATERAL CAROTID ARTERY STENOSIS: Primary | ICD-10-CM

## 2019-05-28 DIAGNOSIS — I35.0 NONRHEUMATIC AORTIC VALVE STENOSIS: ICD-10-CM

## 2019-05-28 PROCEDURE — 99214 OFFICE O/P EST MOD 30 MIN: CPT | Performed by: INTERNAL MEDICINE

## 2019-05-28 ASSESSMENT — MIFFLIN-ST. JEOR: SCORE: 1431.29

## 2019-05-28 NOTE — LETTER
5/28/2019      Washington Yang PA-C  [address]  [facility] [address]      RE: Johan Britojigar       Dear Colleague,    I had the pleasure of seeing Johan Navarro in the [facility].    Service Date: 05/28/2019      HISTORY OF PRESENT ILLNESS:  I had the pleasure of seeing Mr. Navarro in followup at the [facility] today.  He is a very pleasant 64-year-old gentleman who I last saw in 08/2018.  He has a history of coronary artery disease and is status post coronary angiography in 1998, at which point he underwent stenting of the distal and ostial circumflex as well as the first obtuse marginal.  His other coronary arteries had no significant disease.  He has a markedly elevated LDL, but this has been well controlled with use of atorvastatin and Repatha.  He also has history of mild aortic stenosis which is asymptomatic.      He presents today feeling well overall from a cardiac standpoint.  He did have a couple of episodes of substernal dyspnea while walking uphill in the past few weeks.  To work these up further, I ordered a stress echocardiogram which was performed on 05/13/2019.  The stress echocardiogram demonstrated no evidence of ischemia and the patient was able to exercise for 10 minutes and 45 seconds according to the Gab protocol.  He had no exertional chest pain or dyspnea.  The aortic valve stenosis was again confirmed to be mild with a mean gradient of 11.5 mmHg and a peak gradient of 22 mmHg.      PHYSICAL EXAMINATION:  Dictated below.      Lipids on 05/14/2019 demonstrated total cholesterol of 136, HDL 61, LDL 62 and triglycerides of 63.      IMPRESSION:   1.  Coronary artery disease, status post PCI to the circumflex and obtuse marginal in 1998.   2.  Significant dyslipidemia characterized by severe LDL elevation.  This has normalized with atorvastatin and Repatha therapy.   3.  History of mild aortic stenosis, asymptomatic.   4.   Moderate left-sided internal carotid artery disease on carotid ultrasound back in .      Mr. Chapa is doing well overall from a cardiovascular standpoint.  His stress echocardiographic findings are reassuring.  At this point, I have recommended a repeat carotid ultrasound for reassessment of his carotid artery disease.  Assuming the findings are unchanged, I will plan on followup in approximately 1 year.         LATONYA NAPIER MD             D: 2019   T: 2019   MT: PRIMO      Name:     MARLENY CHAPA   MRN:      [id number]        Account:      [id number]   :      1955           Service Date: 2019      Document: [id number]         Outpatient Encounter Medications as of 2019   Medication Sig Dispense Refill     aspirin 81 MG chewable tablet Take 1 tablet (81 mg) by mouth daily 90 tablet 3     atorvastatin (LIPITOR) 80 MG tablet Take 1 tablet (80 mg) by mouth daily 90 tablet 3     COMPOUNDED NON-CONTROLLED SUBSTANCE (CMPD RX) - PHARMACY TO MIX COMPOUNDED MEDICATION Apply 1-2 grams to the affected area 3-4 times daily. Rub in well for 1-2 minutes. 240 g 2     diclofenac (VOLTAREN) 1 % GEL topical gel Apply 2 grams to hands/fingers four times daily using enclosed dosing card. (Patient taking differently: Apply topically as needed Apply 2 grams to hands/fingers four times daily using enclosed dosing card.) 100 g 1     evolocumab (REPATHA) 140 MG/ML prefilled autoinjector Inject 1 mL (140 mg) Subcutaneous every 14 days 6 mL 2     fluticasone (FLONASE) 50 MCG/ACT nasal spray Spray in nostril as needed  3     order for DME Equipment being ordered: Heel lift suspension boot - all foam boot to offload base of right 5ht metatarsal at night. 1 Device 0     oxyCODONE-acetaminophen (PERCOCET) 5-325 MG per tablet Take 1-2 tablets by mouth every 4 hours as needed for pain 30 tablet 0     sildenafil (REVATIO) 20 MG tablet TAKE 2-5 TABLETS BY MOUTH AS NEEDED PRIOR TO SEXUAL INTERCOURSE. 30 tablet 2      [DISCONTINUED] gabapentin (NEURONTIN) 600 MG tablet TAKE 1 TABLET BY MOUTH TWICE DAILY 180 tablet 0     [] amoxicillin (AMOXIL) 875 MG tablet Take 1 tablet (875 mg) by mouth 2 times daily for 10 days 20 tablet 0     [DISCONTINUED] ASPIRIN NOT PRESCRIBED (INTENTIONAL) Please choose reason not prescribed, below 0 each 0     No facility-administered encounter medications on file as of 2019.        Again, thank you for allowing me to participate in the care of your patient.      Sincerely,    Sander Galicia MD     [facility]

## 2019-05-28 NOTE — PROGRESS NOTES
Service Date: 05/28/2019      HISTORY OF PRESENT ILLNESS:  I had the pleasure of seeing Mr. Navarro in followup at the [facility] today.  He is a very pleasant 64-year-old gentleman who I last saw in 08/2018.  He has a history of coronary artery disease and is status post coronary angiography in 1998, at which point he underwent stenting of the distal and ostial circumflex as well as the first obtuse marginal.  His other coronary arteries had no significant disease.  He has a markedly elevated LDL, but this has been well controlled with use of atorvastatin and Repatha.  He also has history of mild aortic stenosis which is asymptomatic.      He presents today feeling well overall from a cardiac standpoint.  He did have a couple of episodes of substernal dyspnea while walking uphill in the past few weeks.  To work these up further, I ordered a stress echocardiogram which was performed on 05/13/2019.  The stress echocardiogram demonstrated no evidence of ischemia and the patient was able to exercise for 10 minutes and 45 seconds according to the Gab protocol.  He had no exertional chest pain or dyspnea.  The aortic valve stenosis was again confirmed to be mild with a mean gradient of 11.5 mmHg and a peak gradient of 22 mmHg.      PHYSICAL EXAMINATION:  Dictated below.      Lipids on 05/14/2019 demonstrated total cholesterol of 136, HDL 61, LDL 62 and triglycerides of 63.      IMPRESSION:   1.  Coronary artery disease, status post PCI to the circumflex and obtuse marginal in 1998.   2.  Significant dyslipidemia characterized by severe LDL elevation.  This has normalized with atorvastatin and Repatha therapy.   3.  History of mild aortic stenosis, asymptomatic.   4.  Moderate left-sided internal carotid artery disease on carotid ultrasound back in 2008.      Mr. Navarro is doing well overall from a cardiovascular standpoint.  His stress echocardiographic findings are reassuring.  At this point,  I have recommended a repeat carotid ultrasound for reassessment of his carotid artery disease.  Assuming the findings are unchanged, I will plan on followup in approximately 1 year.         LATONYA NAPIER MD             D: 2019   T: 2019   MT: PRIMO      Name:     MARLENY CHAPA   MRN:      [id number]        Account:      [id number]   :      1955           Service Date: 2019      Document: [id number]

## 2019-05-28 NOTE — LETTER
5/28/2019    Washington Yang PA-C  [address] [facility] [address]    RE: Johan Navarro       Dear Colleague,    I had the pleasure of seeing Johan Navarro in the [facility].    HPI and Plan:   See dictation    Orders Placed This Encounter   Procedures     US Carotid Bilateral     Follow-Up with Cardiologist     Echocardiogram Complete       No orders of the defined types were placed in this encounter.      Medications Discontinued During This Encounter   Medication Reason     ASPIRIN NOT PRESCRIBED (INTENTIONAL) Erroneous Entry         Encounter Diagnoses   Name Primary?     Bilateral carotid artery stenosis Yes     Nonrheumatic aortic valve stenosis        CURRENT MEDICATIONS:  Current Outpatient Medications   Medication Sig Dispense Refill     aspirin 81 MG chewable tablet Take 1 tablet (81 mg) by mouth daily 90 tablet 3     atorvastatin (LIPITOR) 80 MG tablet Take 1 tablet (80 mg) by mouth daily 90 tablet 3     COMPOUNDED NON-CONTROLLED SUBSTANCE (CMPD RX) - PHARMACY TO MIX COMPOUNDED MEDICATION Apply 1-2 grams to the affected area 3-4 times daily. Rub in well for 1-2 minutes. 240 g 2     diclofenac (VOLTAREN) 1 % GEL topical gel Apply 2 grams to hands/fingers four times daily using enclosed dosing card. (Patient taking differently: Apply topically as needed Apply 2 grams to hands/fingers four times daily using enclosed dosing card.) 100 g 1     evolocumab (REPATHA) 140 MG/ML prefilled autoinjector Inject 1 mL (140 mg) Subcutaneous every 14 days 6 mL 2     fluticasone (FLONASE) 50 MCG/ACT nasal spray Spray in nostril as needed  3     gabapentin (NEURONTIN) 600 MG tablet TAKE 1 TABLET BY MOUTH TWICE DAILY 180 tablet 0     order for DME Equipment being ordered: Heel lift suspension boot - all foam boot to offload base of right 5ht metatarsal at night. 1 Device 0     oxyCODONE-acetaminophen (PERCOCET) 5-325 MG per tablet Take 1-2 tablets by mouth every 4 hours as needed  for pain 30 tablet 0     sildenafil (REVATIO) 20 MG tablet TAKE 2-5 TABLETS BY MOUTH AS NEEDED PRIOR TO SEXUAL INTERCOURSE. 30 tablet 2       ALLERGIES     Allergies   Allergen Reactions     Crestor [Rosuvastatin] GI Disturbance     Dust Mites      No Clinical Screening - See Comments      Goose feathers     Pollen Extract        PAST MEDICAL HISTORY:  Past Medical History:   Diagnosis Date     Aortic stenosis      Arthritis     hands     CAD (coronary artery disease)     cardiac cath 1998: stents x 2 to circumflex     Carotid stenosis     left     Family history of early CAD      Hyperlipidaemia LDL goal < 70     familial hyperlipidemia with marked hypercholesterolemia before treatment; has been on some form of cholesterol-lowering medication since the 1970s       PAST SURGICAL HISTORY:  Past Surgical History:   Procedure Laterality Date     CARDIAC SURGERY      coronary stents x2 placed ; angioplasty     DAVINCI PROSTATECTOMY      2010     EYE SURGERY Bilateral 2017    eyelids     ORTHOPEDIC SURGERY Right     achilles tendon; post football injury     ORTHOPEDIC SURGERY Right     hand; near thumb. has wires in there. cysts     REPAIR TENDON BICEPS DISTAL UPPER EXTREMITY Right 2018    Procedure: REPAIR TENDON BICEPS DISTAL UPPER EXTREMITY;  Right open biceps tendon repair  ;  Surgeon: Alber Zabala MD;  Location: [facility] OR     SURGICAL HISTORY OF -       Right hand Xanthoma removal     SURGICAL HISTORY OF -       Stress Echo (-)       FAMILY HISTORY:  Family History   Problem Relation Age of Onset     Lipids Mother      C.A.D. Mother          at age 49 of MI     C.A.D. Sister         MI at age 48     Cancer - colorectal No family hx of      Prostate Cancer No family hx of      Colon Cancer No family hx of        SOCIAL HISTORY:  Social History     Socioeconomic History     Marital status:      Spouse name: None     Number of children: None     Years of education: None     Highest  education level: None   Occupational History     None   Social Needs     Financial resource strain: None     Food insecurity:     Worry: None     Inability: None     Transportation needs:     Medical: None     Non-medical: None   Tobacco Use     Smoking status: Former Smoker     Types: Dip, chew, snus or snuff     Smokeless tobacco: Current User     Types: Chew     Tobacco comment: 12/10/18: occ chew, not every day   Substance and Sexual Activity     Alcohol use: Yes     Comment: very rare     Drug use: No     Sexual activity: Yes     Partners: Female   Lifestyle     Physical activity:     Days per week: None     Minutes per session: None     Stress: None   Relationships     Social connections:     Talks on phone: None     Gets together: None     Attends Hoahaoism service: None     Active member of club or organization: None     Attends meetings of clubs or organizations: None     Relationship status: None     Intimate partner violence:     Fear of current or ex partner: None     Emotionally abused: None     Physically abused: None     Forced sexual activity: None   Other Topics Concern      Service Not Asked     Blood Transfusions Not Asked     Caffeine Concern Yes     Comment: coffee and soda     Occupational Exposure Not Asked     Hobby Hazards Not Asked     Sleep Concern Not Asked     Stress Concern Not Asked     Weight Concern Not Asked     Special Diet No     Back Care Not Asked     Exercise Yes     Comment: regular      Bike Helmet Not Asked     Seat Belt Yes     Self-Exams Not Asked     Parent/sibling w/ CABG, MI or angioplasty before 65F 55M? Yes     Comment: 49   Social History Narrative     None       Review of Systems:  Skin:  Negative       Eyes:  Negative      ENT:  Negative      Respiratory:  Negative       Cardiovascular:  Negative;palpitations;chest pain;edema;lightheadedness;dizziness      Gastroenterology: Negative      Genitourinary:  Negative      Musculoskeletal:  Negative     "  Neurologic:  Negative      Psychiatric:  Negative      Heme/Lymph/Imm:  Positive for allergies seasonal   Endocrine:  Negative        Physical Exam:  Vitals: /80   Pulse 70   Ht 1.676 m (5' 6\")   Wt 69.9 kg (154 lb)   BMI 24.86 kg/m       Constitutional:  cooperative        Skin:  warm and dry to the touch          Head:  normocephalic        Eyes:  pupils equal and round        Lymph:      ENT:  no pallor or cyanosis        Neck:  carotid pulses are full and equal bilaterally        Respiratory:  clear to auscultation         Cardiac: regular rhythm       systolic murmur        pulses full and equal, no bruits auscultated                                        GI:  abdomen soft        Extremities and Muscular Skeletal:  no edema              Neurological:           Psych:           CC  Washington Yang PA-C  [address] [place] [address]                Thank you for allowing me to participate in the care of your patient.      Sincerely,     Sander Galicia MD     [facility] [place]    cc:   Washington Yang PA-C  [address] [place] [address]        "

## 2019-05-30 RX ORDER — GABAPENTIN 600 MG/1
TABLET ORAL
Qty: 180 TABLET | Refills: 0 | Status: SHIPPED | OUTPATIENT
Start: 2019-05-30 | End: 2019-07-16

## 2019-06-04 ENCOUNTER — TELEPHONE (OUTPATIENT)
Dept: CARDIOLOGY | Facility: CLINIC | Age: 64
End: 2019-06-04

## 2019-06-04 ENCOUNTER — HOSPITAL ENCOUNTER (OUTPATIENT)
Dept: ULTRASOUND IMAGING | Facility: CLINIC | Age: 64
Discharge: HOME | End: 2019-06-04
Attending: INTERNAL MEDICINE | Admitting: INTERNAL MEDICINE
Payer: COMMERCIAL

## 2019-06-04 DIAGNOSIS — I65.23 BILATERAL CAROTID ARTERY STENOSIS: ICD-10-CM

## 2019-06-04 PROCEDURE — 93880 EXTRACRANIAL BILAT STUDY: CPT | Mod: 26 | Performed by: INTERNAL MEDICINE

## 2019-06-04 PROCEDURE — 93880 EXTRACRANIAL BILAT STUDY: CPT

## 2019-06-04 NOTE — TELEPHONE ENCOUNTER
"Carotid US completed 6/4/19, results below. Ordered by Dr Galicia at OV 5/28/19 to reassess patient's carotid artery stenosis: \"At this point, I have recommended a repeat carotid ultrasound for reassessment of his carotid artery disease.  Assuming the findings are unchanged, I will plan on followup in approximately 1 year.\" Routed to Dr Koenig for review in Dr Galicia's absence.     CONCLUSION:  1. Right internal carotid artery (ICA): The flow velocities in the right internal carotid artery are within the normal range indicating less than 50% diameter stenosis.  2. Left internal carotid artery (ICA): The flow velocities in the left internal carotid artery suggest 50-69% stenosis.  Visually moderate heterogenous plaque around the bifurcation.  3. Common carotid arteries (CCA): The flow velocities in the right and left common carotid arteries are within the usual range indicating no significant stenosis.   4. Vertebral arteries (VA): Normal antegrade flow was observed in the right and left vertebral arteries.  "

## 2019-06-06 NOTE — TELEPHONE ENCOUNTER
Zack Koenig MD  You; [facility] 2 12 minutes ago (4:19 PM)      No significant increase in carotid stenosis.  Patient is already on Repatha.  Follow-up as indicated by Dr. Galicia.     Arya    Routing comment      Contacted patient to review results of carotid US, left message to call back.

## 2019-07-02 DIAGNOSIS — N52.9 ERECTILE DYSFUNCTION, UNSPECIFIED ERECTILE DYSFUNCTION TYPE: ICD-10-CM

## 2019-07-02 RX ORDER — SILDENAFIL CITRATE 20 MG/1
TABLET ORAL
Qty: 30 TABLET | Refills: 1 | Status: SHIPPED | OUTPATIENT
Start: 2019-07-02 | End: 2019-10-27

## 2019-07-02 NOTE — TELEPHONE ENCOUNTER
Prescription approved per [facility] Refill Protocol.  Rekha Gallardo RN

## 2019-07-02 NOTE — TELEPHONE ENCOUNTER
"Requested Prescriptions   Pending Prescriptions Disp Refills     sildenafil (REVATIO) 20 MG tablet [Pharmacy Med Name: SILDENAFIL 20MG TABLETS]  Last Written Prescription Date:  1/16/19  Last Fill Quantity: 30,  # refills: 2   Last office visit: 12/10/2018 with prescribing provider:  Rina Chowdhury PA-C    Future Office Visit:     30 tablet 0     Sig: TAKE 2-5 TABLETS BY MOUTH AS NEEDED PRIOR TO SEXUAL INTERCOURSE.       Erectile Dysfuction Protocol Passed - 7/2/2019  3:26 AM        Passed - Absence of nitrates on medication list        Passed - Absence of Alpha Blockers on Med list        Passed - Recent (12 mo) or future (30 days) visit within the authorizing provider's specialty     Patient had office visit in the last 12 months or has a visit in the next 30 days with authorizing provider or within the authorizing provider's specialty.  See \"Patient Info\" tab in inbasket, or \"Choose Columns\" in Meds & Orders section of the refill encounter.              Passed - Medication is active on med list        Passed - Patient is age 18 or older          "

## 2019-07-16 ENCOUNTER — OFFICE VISIT (OUTPATIENT)
Dept: FAMILY MEDICINE | Facility: CLINIC | Age: 64
End: 2019-07-16
Payer: COMMERCIAL

## 2019-07-16 VITALS
DIASTOLIC BLOOD PRESSURE: 70 MMHG | WEIGHT: 150.9 LBS | OXYGEN SATURATION: 95 % | HEART RATE: 71 BPM | TEMPERATURE: 97 F | RESPIRATION RATE: 16 BRPM | SYSTOLIC BLOOD PRESSURE: 110 MMHG | HEIGHT: 66 IN | BODY MASS INDEX: 24.25 KG/M2

## 2019-07-16 DIAGNOSIS — M19.049 ARTHRITIS OF HAND: ICD-10-CM

## 2019-07-16 DIAGNOSIS — I25.10 CORONARY ARTERY DISEASE INVOLVING NATIVE CORONARY ARTERY OF NATIVE HEART WITHOUT ANGINA PECTORIS: ICD-10-CM

## 2019-07-16 DIAGNOSIS — M79.644 PAIN OF FINGER OF RIGHT HAND: ICD-10-CM

## 2019-07-16 DIAGNOSIS — C61 MALIGNANT NEOPLASM OF PROSTATE (H): ICD-10-CM

## 2019-07-16 DIAGNOSIS — E78.2 MIXED HYPERLIPIDEMIA: ICD-10-CM

## 2019-07-16 DIAGNOSIS — Z00.00 ROUTINE GENERAL MEDICAL EXAMINATION AT A HEALTH CARE FACILITY: Primary | ICD-10-CM

## 2019-07-16 LAB
ALBUMIN SERPL-MCNC: 4 G/DL (ref 3.4–5)
ALP SERPL-CCNC: 75 U/L (ref 40–150)
ALT SERPL W P-5'-P-CCNC: 31 U/L (ref 0–70)
ANION GAP SERPL CALCULATED.3IONS-SCNC: 7 MMOL/L (ref 3–14)
AST SERPL W P-5'-P-CCNC: 38 U/L (ref 0–45)
BILIRUB SERPL-MCNC: 0.5 MG/DL (ref 0.2–1.3)
BUN SERPL-MCNC: 14 MG/DL (ref 7–30)
CALCIUM SERPL-MCNC: 8.9 MG/DL (ref 8.5–10.1)
CHLORIDE SERPL-SCNC: 109 MMOL/L (ref 94–109)
CO2 SERPL-SCNC: 24 MMOL/L (ref 20–32)
CREAT SERPL-MCNC: 0.9 MG/DL (ref 0.66–1.25)
GFR SERPL CREATININE-BSD FRML MDRD: 90 ML/MIN/{1.73_M2}
GLUCOSE SERPL-MCNC: 87 MG/DL (ref 70–99)
HCV AB SERPL QL IA: NONREACTIVE
POTASSIUM SERPL-SCNC: 4.2 MMOL/L (ref 3.4–5.3)
PROT SERPL-MCNC: 6.8 G/DL (ref 6.8–8.8)
PSA SERPL-ACNC: <0.01 UG/L (ref 0–4)
SODIUM SERPL-SCNC: 140 MMOL/L (ref 133–144)

## 2019-07-16 PROCEDURE — 86803 HEPATITIS C AB TEST: CPT | Performed by: PHYSICIAN ASSISTANT

## 2019-07-16 PROCEDURE — 36415 COLL VENOUS BLD VENIPUNCTURE: CPT | Performed by: PHYSICIAN ASSISTANT

## 2019-07-16 PROCEDURE — G0103 PSA SCREENING: HCPCS | Performed by: PHYSICIAN ASSISTANT

## 2019-07-16 PROCEDURE — 80053 COMPREHEN METABOLIC PANEL: CPT | Performed by: PHYSICIAN ASSISTANT

## 2019-07-16 PROCEDURE — 99213 OFFICE O/P EST LOW 20 MIN: CPT | Mod: 25 | Performed by: PHYSICIAN ASSISTANT

## 2019-07-16 PROCEDURE — 99396 PREV VISIT EST AGE 40-64: CPT | Performed by: PHYSICIAN ASSISTANT

## 2019-07-16 RX ORDER — GABAPENTIN 600 MG/1
600 TABLET ORAL 2 TIMES DAILY
Qty: 180 TABLET | Refills: 3 | Status: SHIPPED | OUTPATIENT
Start: 2019-07-16 | End: 2020-08-21

## 2019-07-16 ASSESSMENT — ENCOUNTER SYMPTOMS
PARESTHESIAS: 0
HEMATURIA: 0
HEARTBURN: 0
EYE PAIN: 0
DYSURIA: 0
NERVOUS/ANXIOUS: 0
PALPITATIONS: 0
MYALGIAS: 0
FEVER: 0
CHILLS: 0
SORE THROAT: 0
FREQUENCY: 0
HEADACHES: 0
DIZZINESS: 0
WEAKNESS: 0
HEMATOCHEZIA: 0
SHORTNESS OF BREATH: 0
ARTHRALGIAS: 1
COUGH: 0
ABDOMINAL PAIN: 0
CONSTIPATION: 0
NAUSEA: 0
JOINT SWELLING: 0
DIARRHEA: 0

## 2019-07-16 ASSESSMENT — ANXIETY QUESTIONNAIRES
3. WORRYING TOO MUCH ABOUT DIFFERENT THINGS: NOT AT ALL
2. NOT BEING ABLE TO STOP OR CONTROL WORRYING: NOT AT ALL
5. BEING SO RESTLESS THAT IT IS HARD TO SIT STILL: NOT AT ALL
1. FEELING NERVOUS, ANXIOUS, OR ON EDGE: NOT AT ALL
7. FEELING AFRAID AS IF SOMETHING AWFUL MIGHT HAPPEN: NOT AT ALL
6. BECOMING EASILY ANNOYED OR IRRITABLE: NOT AT ALL
IF YOU CHECKED OFF ANY PROBLEMS ON THIS QUESTIONNAIRE, HOW DIFFICULT HAVE THESE PROBLEMS MADE IT FOR YOU TO DO YOUR WORK, TAKE CARE OF THINGS AT HOME, OR GET ALONG WITH OTHER PEOPLE: NOT DIFFICULT AT ALL
GAD7 TOTAL SCORE: 0

## 2019-07-16 ASSESSMENT — PATIENT HEALTH QUESTIONNAIRE - PHQ9
SUM OF ALL RESPONSES TO PHQ QUESTIONS 1-9: 0
5. POOR APPETITE OR OVEREATING: NOT AT ALL

## 2019-07-16 ASSESSMENT — MIFFLIN-ST. JEOR: SCORE: 1417.23

## 2019-07-16 NOTE — PROGRESS NOTES
SUBJECTIVE:   CC: Johan Navarro is an 64 year old male who presents for preventative health visit.     Healthy Habits:     Getting at least 3 servings of Calcium per day:  Yes    Bi-annual eye exam:  NO    Dental care twice a year:  Yes    Sleep apnea or symptoms of sleep apnea:  None    Diet:  Low fat/cholesterol    Frequency of exercise:  4-5 days/week    Duration of exercise:  15-30 minutes    Taking medications regularly:  Yes    Medication side effects:  Muscle aches    PHQ-2 Total Score: 0    Additional concerns today:  No        Today's PHQ-2 Score:   PHQ-2 ( 1999 [employer]) 7/16/2019   Q1: Little interest or pleasure in doing things 0   Q2: Feeling down, depressed or hopeless 0   PHQ-2 Score 0   Q1: Little interest or pleasure in doing things Not at all   Q2: Feeling down, depressed or hopeless Not at all   PHQ-2 Score 0       Abuse: Current or Past(Physical, Sexual or Emotional)- No  Do you feel safe in your environment? Yes    Social History     Tobacco Use     Smoking status: Former Smoker     Types: Dip, chew, snus or snuff     Smokeless tobacco: Current User     Types: Chew     Tobacco comment: 12/10/18: occ chew, not every day   Substance Use Topics     Alcohol use: Yes     Comment: very rare       Alcohol Use 7/16/2019   Prescreen: >3 drinks/day or >7 drinks/week? Not Applicable   Prescreen: >3 drinks/day or >7 drinks/week? -     Last PSA: No results found for: PSA    Reviewed orders with patient. Reviewed health maintenance and updated orders accordingly - Yes  Labs reviewed in EPIC    Reviewed and updated as needed this visit by clinical staff  Tobacco  Allergies  Meds  Med Hx  Surg Hx  Fam Hx  Soc Hx        Reviewed and updated as needed this visit by Provider            Review of Systems   Constitutional: Negative for chills and fever.   HENT: Positive for congestion. Negative for ear pain, hearing loss and sore throat.    Eyes: Negative for pain and visual disturbance.   Respiratory: Negative  "for cough and shortness of breath.    Cardiovascular: Negative for chest pain, palpitations and peripheral edema.   Gastrointestinal: Negative for abdominal pain, constipation, diarrhea, heartburn, hematochezia and nausea.   Genitourinary: Negative for dysuria, frequency, genital sores, hematuria and urgency.   Musculoskeletal: Positive for arthralgias (general joint pain; worst in the hands). Negative for joint swelling and myalgias.   Skin: Negative for rash.   Neurological: Negative for dizziness, weakness, headaches and paresthesias.   Psychiatric/Behavioral: Negative for mood changes. The patient is not nervous/anxious.        OBJECTIVE:   /70   Pulse 71   Temp 97  F (36.1  C) (Tympanic)   Resp 16   Ht 1.676 m (5' 6\")   Wt 68.4 kg (150 lb 14.4 oz)   SpO2 95%   BMI 24.36 kg/m      Physical Exam  GENERAL: healthy, alert and no distress  EYES: Eyes grossly normal to inspection, PERRL and conjunctivae and sclerae normal  HENT: ear canals and TM's normal, nose and mouth without ulcers or lesions  NECK: no adenopathy, no asymmetry, masses, or scars and thyroid normal to palpation  RESP: lungs clear to auscultation - no rales, rhonchi or wheezes  CV: regular rate and rhythm, normal S1 S2, no S3 or S4, no murmur, click or rub, no peripheral edema and peripheral pulses strong  ABDOMEN: soft, nontender, no hepatosplenomegaly, no masses and bowel sounds normal  MS: no gross musculoskeletal defects noted, no edema  SKIN: WELL HEALED surgical scar to the right antecubital  PSYCH: mentation appears normal, affect normal/bright    Diagnostic Test Results:  See A/P    ASSESSMENT/PLAN:   1. Routine general medical examination at a health care facility  Reviewed personal and family history. Reviewed age appropriate screenings. Recommended any needed vaccinations - shingrix  - Hepatitis C Screen Reflex to HCV RNA Quant and Genotype    2. Malignant neoplasm of prostate (H)  asymptomatic  - Prostate spec antigen " "screen    3. Pain of finger of right hand  4. Arthritis of hand  Consider seeing specialty once again. Refilling  - diclofenac (VOLTAREN) 1 % topical gel; Apply 2 grams to hands/fingers four times daily using enclosed dosing card.  Dispense: 100 g; Refill: 1    5. Mixed hyperlipidemia  Well controlled on repatha, statin. Continue with Dr. Galicia  - Comprehensive metabolic panel    6. Coronary artery disease involving native coronary artery of native heart without angina pectoris  As above. Asymptomatic.   - gabapentin (NEURONTIN) 600 MG tablet; Take 1 tablet (600 mg) by mouth 2 times daily  Dispense: 180 tablet; Refill: 3  - Comprehensive metabolic panel    COUNSELING:   Reviewed preventive health counseling, as reflected in patient instructions    Estimated body mass index is 24.36 kg/m  as calculated from the following:    Height as of this encounter: 1.676 m (5' 6\").    Weight as of this encounter: 68.4 kg (150 lb 14.4 oz).          reports that he has quit smoking. His smoking use included dip, chew, snus or snuff. His smokeless tobacco use includes chew.  Tobacco Cessation Action Plan: Information offered: Patient not interested at this time    Counseling Resources:  ATP IV Guidelines  Pooled Cohorts Equation Calculator  FRAX Risk Assessment  ICSI Preventive Guidelines  Dietary Guidelines for Americans, 2010  USDA's MyPlate  ASA Prophylaxis  Lung CA Screening    Washington Yang PA-C  [facility]  "

## 2019-07-17 ASSESSMENT — ANXIETY QUESTIONNAIRES: GAD7 TOTAL SCORE: 0

## 2019-09-01 DIAGNOSIS — I25.10 CORONARY ARTERY DISEASE INVOLVING NATIVE CORONARY ARTERY OF NATIVE HEART WITHOUT ANGINA PECTORIS: ICD-10-CM

## 2019-09-01 NOTE — TELEPHONE ENCOUNTER
Requested Prescriptions   Pending Prescriptions Disp Refills     gabapentin (NEURONTIN) 600 MG tablet [Pharmacy Med Name: GABAPENTIN 600MG TABLETS] 180 tablet 0     Sig: TAKE 1 TABLET BY MOUTH TWICE DAILY       There is no refill protocol information for this order        Last Written Prescription Date:  07/16/2019  Last Fill Quantity: 180 tablet,  # refills: 3   Last office visit: 7/16/2019 with prescribing provider:  Washington Yang PA-C    Future Office Visit:       Routing refill request to provider for review/approval because:  Drug not on the [facility], [facility] or [facility] refill protocol or controlled substance

## 2019-09-03 RX ORDER — GABAPENTIN 600 MG/1
TABLET ORAL
Qty: 180 TABLET | Refills: 0 | OUTPATIENT
Start: 2019-09-03

## 2019-09-03 NOTE — TELEPHONE ENCOUNTER
Refills were sent 7/16/19 for gabapentin to the same pharmacy.  Dispense 180 with 3 refills.  Pt should still have some.  Will deny to the pharmacy with that note.

## 2019-10-03 ENCOUNTER — TELEPHONE (OUTPATIENT)
Dept: CARDIOLOGY | Facility: CLINIC | Age: 64
End: 2019-10-03

## 2019-10-03 DIAGNOSIS — E78.2 MIXED HYPERLIPIDEMIA: ICD-10-CM

## 2019-10-27 DIAGNOSIS — N52.9 ERECTILE DYSFUNCTION, UNSPECIFIED ERECTILE DYSFUNCTION TYPE: ICD-10-CM

## 2019-10-27 NOTE — TELEPHONE ENCOUNTER
"Requested Prescriptions   Pending Prescriptions Disp Refills     sildenafil (REVATIO) 20 MG tablet [Pharmacy Med Name: SILDENAFIL 20MG TABLETS] 30 tablet 0     Sig: TAKE 2-5 TABLETS BY MOUTH AS NEEDED PRIOR TO SEXUAL INTERCOURSE.  Last Written Prescription Date:  07/02/2019  Last Fill Quantity: 30 tablet,  # refills: 1   Last office visit: 7/16/2019 with prescribing provider:  Washington Yang PA-C   Future Office Visit:           Erectile Dysfuction Protocol Passed - 10/27/2019  3:26 AM        Passed - Absence of nitrates on medication list        Passed - Absence of Alpha Blockers on Med list        Passed - Recent (12 mo) or future (30 days) visit within the authorizing provider's specialty     Patient has had an office visit with the authorizing provider or a provider within the authorizing providers department within the previous 12 mos or has a future within next 30 days. See \"Patient Info\" tab in inbasket, or \"Choose Columns\" in Meds & Orders section of the refill encounter.              Passed - Medication is active on med list        Passed - Patient is age 18 or older          "

## 2019-10-29 RX ORDER — SILDENAFIL CITRATE 20 MG/1
TABLET ORAL
Qty: 30 TABLET | Refills: 1 | Status: SHIPPED | OUTPATIENT
Start: 2019-10-29 | End: 2019-12-30

## 2019-10-29 NOTE — TELEPHONE ENCOUNTER
Prescription approved per [facility] Refill Protocol.    Asmita Chung RN Flex

## 2019-11-20 ENCOUNTER — CARE COORDINATION (OUTPATIENT)
Dept: CARDIOLOGY | Facility: CLINIC | Age: 64
End: 2019-11-20

## 2019-11-20 NOTE — PROGRESS NOTES
Called mAy, no answer, LVM that new PA with pt's current insurance has been submitted and will let her know once determination is received.  Confirms that new PA will need to be submitted after the first of the year when pt's insurance changes and his new insurance will likely determine where Repatha can be filled.  Left callback number for questions/concerns.     LISSA Clay 10:33 AM 11/20/2019

## 2019-11-20 NOTE — PROGRESS NOTES
Called Amy and pt, no answer on either phone, LVM reviewing that PA for Repatha has been approved by his current insurance and recommended he order a 3mo supply so that he does not run out soon after 1/1/20.  Reviewed that will have to submit for new PA with his new insurance after 1/1/20.  Left callback number for questions/concerns.    LISSA Clay 11:06 AM 11/20/2019

## 2019-11-20 NOTE — TELEPHONE ENCOUNTER
Prior Authorization Approval    Authorization Effective Date: 10/20/2019  Authorization Expiration Date: 11/20/2020  Medication: Repatha 140mg/ mL Sureclick pens  Approved Dose/Quantity: 2 per 28 days  Reference #: [facility] key# [id number]   Insurance Company: [employer] - Phone [phone] Fax [phone]  Expected CoPay:       CoPay Card Available: Yes    Foundation Assistance Needed:    Which Pharmacy is filling the prescription (Not needed for infusion/clinic administered): [facility] [address] [place]  Pharmacy Notified: Yes  Patient Notified:

## 2019-11-20 NOTE — PROGRESS NOTES
Received VM from pt's wife, Amy, who reports that PA for Repatha has  and a new PA needs to be submitted before pt can order more Repatha from [facility] specialty pharmacy.      Per chart review, Repatha PA from [facility] was good 18- 10/19/19.      Amy also notes that pt will be switching from commercial insurance to Medicare with [employer] suppliment on 20 and will need another PA for Repatha submitted at that time too.     Amy also notes that they received a letter that [facility] specialty pharmacy will no longer be carrying Repatha as of 20 and she is unsure where to get Repatha Rx filled.      Routed to Specialty Pharmacy Clinical Liason, Buddy, for further information.    LISSA Clay 9:31 AM 2019

## 2019-11-20 NOTE — TELEPHONE ENCOUNTER
PA Initiation    Medication: Repatha  Insurance Company: [employer] - Phone [phone] Fax [phone]  Pharmacy [facility]illing the [facility] [place] [place]  Filling Pharmacy Phone: [phone]  Filling Pharmacy Fax:    Start Date: 2019    ** Unknown when previous PA  - renewing for continued fills thru 2019

## 2019-12-02 DIAGNOSIS — I25.10 CORONARY ARTERY DISEASE INVOLVING NATIVE CORONARY ARTERY OF NATIVE HEART WITHOUT ANGINA PECTORIS: ICD-10-CM

## 2019-12-02 RX ORDER — ASPIRIN 81 MG/1
81 TABLET, CHEWABLE ORAL DAILY
Qty: 90 TABLET | Refills: 3 | Status: SHIPPED | OUTPATIENT
Start: 2019-12-02 | End: 2021-01-06 | Stop reason: SINTOL

## 2019-12-28 DIAGNOSIS — N52.9 ERECTILE DYSFUNCTION, UNSPECIFIED ERECTILE DYSFUNCTION TYPE: ICD-10-CM

## 2019-12-30 DIAGNOSIS — N52.9 ERECTILE DYSFUNCTION, UNSPECIFIED ERECTILE DYSFUNCTION TYPE: ICD-10-CM

## 2019-12-30 RX ORDER — SILDENAFIL CITRATE 20 MG/1
TABLET ORAL
Qty: 30 TABLET | Refills: 1 | Status: SHIPPED | OUTPATIENT
Start: 2019-12-30 | End: 2019-12-31

## 2019-12-30 NOTE — TELEPHONE ENCOUNTER
"Requested Prescriptions   Pending Prescriptions Disp Refills     sildenafil (REVATIO) 20 MG tablet [Pharmacy Med Name: SILDENAFIL 20MG TABLETS] 30 tablet 1     Sig: TAKE 2-5 TABLETS BY MOUTH AS NEEDED PRIOR TO SEXUAL INTERCOURSE.   Last Written Prescription Date:  10/29/19  Last Fill Quantity: 30,  # refills: 1   Last office visit: 7/16/2019 with prescribing provider:  Washington Yang PA-C    Future Office Visit:        Erectile Dysfuction Protocol Passed - 12/28/2019  3:26 AM        Passed - Absence of nitrates on medication list        Passed - Absence of Alpha Blockers on Med list        Passed - Recent (12 mo) or future (30 days) visit within the authorizing provider's specialty     Patient has had an office visit with the authorizing provider or a provider within the authorizing providers department within the previous 12 mos or has a future within next 30 days. See \"Patient Info\" tab in inbasket, or \"Choose Columns\" in Meds & Orders section of the refill encounter.              Passed - Medication is active on med list        Passed - Patient is age 18 or older         "

## 2019-12-30 NOTE — TELEPHONE ENCOUNTER
Prescription approved per [facility] [place].    Judith Kelly RN   [facility]

## 2019-12-30 NOTE — TELEPHONE ENCOUNTER
"Requesting a 90 day supply.      Requested Prescriptions   Pending Prescriptions Disp Refills     sildenafil (REVATIO) 20 MG tablet [Pharmacy Med Name: SILDENAFIL 20MG TABLETS] 90 tablet      Sig: TAKE 2-5 TABLETS BY MOUTH AS NEEDED PRIOR TO SEXUAL INTERCOURSE   Last Written Prescription Date:  12/30/19  Last Fill Quantity: 30,  # refills: 1   Last office visit: 7/16/2019 with prescribing provider:  Washington Yang PA-C   Future Office Visit:        Erectile Dysfuction Protocol Passed - 12/30/2019 12:37 PM        Passed - Absence of nitrates on medication list        Passed - Absence of Alpha Blockers on Med list        Passed - Recent (12 mo) or future (30 days) visit within the authorizing provider's specialty     Patient has had an office visit with the authorizing provider or a provider within the authorizing providers department within the previous 12 mos or has a future within next 30 days. See \"Patient Info\" tab in inbasket, or \"Choose Columns\" in Meds & Orders section of the refill encounter.              Passed - Medication is active on med list        Passed - Patient is age 18 or older         "

## 2019-12-31 RX ORDER — SILDENAFIL CITRATE 20 MG/1
TABLET ORAL
Qty: 90 TABLET | Refills: 1 | Status: SHIPPED | OUTPATIENT
Start: 2019-12-31 | End: 2021-06-16

## 2020-01-02 NOTE — TELEPHONE ENCOUNTER
New HP part D plan appears to have grandfathered in pt's commercial HP PA. Unable to obtain copay info as pt is refill too soon until 1/13.    Assume high copay and pt no longer able to utilize copay assistance but unknown until 1/13 or after.

## 2020-01-17 NOTE — PROGRESS NOTES
LVM for pt and pt's wife, Amy, regarding updated Repatha information: copay is still coming back at $24.  Left callback number for questions/concerns.     LISSA Clay 4:21 PM 1/17/2020

## 2020-02-06 DIAGNOSIS — I25.10 CORONARY ARTERY DISEASE INVOLVING NATIVE CORONARY ARTERY OF NATIVE HEART WITHOUT ANGINA PECTORIS: ICD-10-CM

## 2020-02-06 DIAGNOSIS — E78.5 HLD (HYPERLIPIDEMIA): ICD-10-CM

## 2020-02-06 RX ORDER — ATORVASTATIN CALCIUM 80 MG/1
80 TABLET, FILM COATED ORAL DAILY
Qty: 90 TABLET | Refills: 0 | Status: SHIPPED | OUTPATIENT
Start: 2020-02-06 | End: 2020-05-12

## 2020-03-02 ENCOUNTER — CARE COORDINATION (OUTPATIENT)
Dept: CARDIOLOGY | Facility: CLINIC | Age: 65
End: 2020-03-02

## 2020-03-02 DIAGNOSIS — E78.2 MIXED HYPERLIPIDEMIA: ICD-10-CM

## 2020-03-02 NOTE — PROGRESS NOTES
Received VM from pt and pt's wife, Amy, who reports that they can no longer get Repatha from [facility] specialty pharmacy.  Amy said that their pharmacist at [facility] in [place] said they can get Repatha and requested that Repatha Rx be sent to [facility].    Rx sent as requested.      Called and updated Amy that Repatha Rx has been sent to [facility] in [place].  Advised her to call if any further issues arise.  She agrees with this plan.     LISSA Clay 11:31 AM 3/2/2020

## 2020-03-03 ENCOUNTER — CARE COORDINATION (OUTPATIENT)
Dept: CARDIOLOGY | Facility: CLINIC | Age: 65
End: 2020-03-03

## 2020-03-03 NOTE — PROGRESS NOTES
Received call from [facility] pharmacist, who reports that pt accidentally received Repatha prefilled syringes instead of the auto injectors.  Jethro states the pt and pt's wife are fine with using the prefilled syringes for now and he confirms that auto injectors will be ordered for pt's next fill.      LISSA Clay 4:16 PM 3/3/2020

## 2020-03-11 ENCOUNTER — CARE COORDINATION (OUTPATIENT)
Dept: CARDIOLOGY | Facility: CLINIC | Age: 65
End: 2020-03-11

## 2020-03-11 DIAGNOSIS — E78.2 MIXED HYPERLIPIDEMIA: ICD-10-CM

## 2020-03-11 NOTE — PROGRESS NOTES
Received VM from pt's wife, Amy, who reports they received letter from their [email or web address] insurance that a new PA needs to be submitted for Repatha.      Called pt, no answer, LVM that new PA will be submitted.     Routed to Specialty Pharmacy Clinical Liaison.    LISSA Clay 4:43 PM 3/11/2020

## 2020-03-12 NOTE — TELEPHONE ENCOUNTER
PA Initiation    Medication: new Repatha PA needed  Insurance Company: [employer] - Phone [phone] Fax [phone]  Pharmacy Filling the Rx: [facility] - [place], [place] - [facility] [address]  Filling Pharmacy Phone: [phone]  Filling Pharmacy Fax: [phone]  Start Date: 3/12/2020

## 2020-03-17 NOTE — TELEPHONE ENCOUNTER
Prior Authorization Approval    Authorization Effective Date: 2/16/2020  Authorization Expiration Date: 3/16/2021  Medication: new Repatha PA needed  Approved Dose/Quantity: 2/28ds  Reference #:   See letter  Insurance Company: [employer] - Phone [phone] Fax [phone]  Expected CoPay: $24     CoPay Card Available:   No   Foundation Assistance Needed:    Which Pharmacy is filling the prescription (Not needed for infusion/clinic administered): [facility]  Pharmacy Notified:  [facility]pt reques  Patient Notified:  LM for pt to inform

## 2020-03-17 NOTE — PROGRESS NOTES
Called pt, no answer, LVM that updated Repatha PA has been approved through 3/16/2021 and Rx will be sent to [facility] in [place]. Reviewed that expected copay is $24 for a one month supply. Left callback for any questions/concerns.     LISSA Clay 4:00 PM 3/17/2020

## 2020-03-25 NOTE — PROGRESS NOTES
Received fax from pt's [employer]:          Routed to Specialty Pharmacy Clinical Liaison to review further, as had previously been advised by pt's insurance that Manisha FELTON was approved.     LISSA Clay 2:06 PM 3/25/2020

## 2020-05-12 DIAGNOSIS — I25.10 CORONARY ARTERY DISEASE INVOLVING NATIVE CORONARY ARTERY OF NATIVE HEART WITHOUT ANGINA PECTORIS: ICD-10-CM

## 2020-05-12 DIAGNOSIS — E78.5 HLD (HYPERLIPIDEMIA): ICD-10-CM

## 2020-05-12 RX ORDER — ATORVASTATIN CALCIUM 80 MG/1
80 TABLET, FILM COATED ORAL DAILY
Qty: 90 TABLET | Refills: 0 | Status: SHIPPED | OUTPATIENT
Start: 2020-05-12 | End: 2020-08-17

## 2020-06-29 ENCOUNTER — APPOINTMENT (OUTPATIENT)
Dept: CT IMAGING | Facility: CLINIC | Age: 65
End: 2020-06-29
Attending: EMERGENCY MEDICINE
Payer: MEDICARE

## 2020-06-29 ENCOUNTER — HOSPITAL ENCOUNTER (EMERGENCY)
Facility: CLINIC | Age: 65
Discharge: HOME | End: 2020-06-29
Attending: EMERGENCY MEDICINE | Admitting: EMERGENCY MEDICINE
Payer: MEDICARE

## 2020-06-29 VITALS
DIASTOLIC BLOOD PRESSURE: 81 MMHG | SYSTOLIC BLOOD PRESSURE: 112 MMHG | TEMPERATURE: 98.2 F | HEART RATE: 96 BPM | OXYGEN SATURATION: 95 % | RESPIRATION RATE: 22 BRPM

## 2020-06-29 DIAGNOSIS — Z20.822 SUSPECTED COVID-19 VIRUS INFECTION: ICD-10-CM

## 2020-06-29 DIAGNOSIS — J20.9 ACUTE BRONCHITIS, UNSPECIFIED ORGANISM: ICD-10-CM

## 2020-06-29 LAB
ANION GAP SERPL CALCULATED.3IONS-SCNC: 8 MMOL/L (ref 3–14)
BASOPHILS # BLD AUTO: 0 10E9/L (ref 0–0.2)
BASOPHILS NFR BLD AUTO: 0 %
BUN SERPL-MCNC: 17 MG/DL (ref 7–30)
CALCIUM SERPL-MCNC: 8.7 MG/DL (ref 8.5–10.1)
CHLORIDE SERPL-SCNC: 103 MMOL/L (ref 94–109)
CO2 SERPL-SCNC: 23 MMOL/L (ref 20–32)
CREAT SERPL-MCNC: 1.05 MG/DL (ref 0.66–1.25)
D DIMER PPP FEU-MCNC: 2.7 UG/ML FEU (ref 0–0.5)
DIFFERENTIAL METHOD BLD: ABNORMAL
EOSINOPHIL # BLD AUTO: 0 10E9/L (ref 0–0.7)
EOSINOPHIL NFR BLD AUTO: 0 %
ERYTHROCYTE [DISTWIDTH] IN BLOOD BY AUTOMATED COUNT: 13.6 % (ref 10–15)
GFR SERPL CREATININE-BSD FRML MDRD: 74 ML/MIN/{1.73_M2}
GLUCOSE SERPL-MCNC: 142 MG/DL (ref 70–99)
HCT VFR BLD AUTO: 43.4 % (ref 40–53)
HGB BLD-MCNC: 14.1 G/DL (ref 13.3–17.7)
LYMPHOCYTES # BLD AUTO: 0.9 10E9/L (ref 0.8–5.3)
LYMPHOCYTES NFR BLD AUTO: 15 %
MCH RBC QN AUTO: 28.5 PG (ref 26.5–33)
MCHC RBC AUTO-ENTMCNC: 32.5 G/DL (ref 31.5–36.5)
MCV RBC AUTO: 88 FL (ref 78–100)
MONOCYTES # BLD AUTO: 0.5 10E9/L (ref 0–1.3)
MONOCYTES NFR BLD AUTO: 9 %
NEUTROPHILS # BLD AUTO: 4.5 10E9/L (ref 1.6–8.3)
NEUTROPHILS NFR BLD AUTO: 76 %
PLATELET # BLD AUTO: 136 10E9/L (ref 150–450)
PLATELET # BLD EST: ABNORMAL 10*3/UL
POTASSIUM SERPL-SCNC: 3.3 MMOL/L (ref 3.4–5.3)
RBC # BLD AUTO: 4.95 10E12/L (ref 4.4–5.9)
RBC MORPH BLD: ABNORMAL
SARS-COV-2 PCR COMMENT: NORMAL
SARS-COV-2 RNA SPEC QL NAA+PROBE: NEGATIVE
SARS-COV-2 RNA SPEC QL NAA+PROBE: NORMAL
SODIUM SERPL-SCNC: 134 MMOL/L (ref 133–144)
SPECIMEN SOURCE: NORMAL
SPECIMEN SOURCE: NORMAL
TROPONIN I SERPL-MCNC: <0.015 UG/L (ref 0–0.04)
TSH SERPL DL<=0.005 MIU/L-ACNC: 3.21 MU/L (ref 0.4–4)
VARIANT LYMPHS BLD QL SMEAR: PRESENT
WBC # BLD AUTO: 5.9 10E9/L (ref 4–11)

## 2020-06-29 PROCEDURE — 71275 CT ANGIOGRAPHY CHEST: CPT

## 2020-06-29 PROCEDURE — 85379 FIBRIN DEGRADATION QUANT: CPT | Performed by: EMERGENCY MEDICINE

## 2020-06-29 PROCEDURE — 80048 BASIC METABOLIC PNL TOTAL CA: CPT | Performed by: EMERGENCY MEDICINE

## 2020-06-29 PROCEDURE — 84443 ASSAY THYROID STIM HORMONE: CPT | Performed by: EMERGENCY MEDICINE

## 2020-06-29 PROCEDURE — U0003 INFECTIOUS AGENT DETECTION BY NUCLEIC ACID (DNA OR RNA); SEVERE ACUTE RESPIRATORY SYNDROME CORONAVIRUS 2 (SARS-COV-2) (CORONAVIRUS DISEASE [COVID-19]), AMPLIFIED PROBE TECHNIQUE, MAKING USE OF HIGH THROUGHPUT TECHNOLOGIES AS DESCRIBED BY CMS-2020-01-R: HCPCS | Performed by: EMERGENCY MEDICINE

## 2020-06-29 PROCEDURE — A9270 NON-COVERED ITEM OR SERVICE: HCPCS | Mod: GY | Performed by: EMERGENCY MEDICINE

## 2020-06-29 PROCEDURE — 99285 EMERGENCY DEPT VISIT HI MDM: CPT | Mod: 25

## 2020-06-29 PROCEDURE — 84484 ASSAY OF TROPONIN QUANT: CPT | Performed by: EMERGENCY MEDICINE

## 2020-06-29 PROCEDURE — 96360 HYDRATION IV INFUSION INIT: CPT | Mod: 59

## 2020-06-29 PROCEDURE — 94640 AIRWAY INHALATION TREATMENT: CPT

## 2020-06-29 PROCEDURE — 93005 ELECTROCARDIOGRAM TRACING: CPT

## 2020-06-29 PROCEDURE — 25000128 H RX IP 250 OP 636: Performed by: EMERGENCY MEDICINE

## 2020-06-29 PROCEDURE — C9803 HOPD COVID-19 SPEC COLLECT: HCPCS

## 2020-06-29 PROCEDURE — 25800030 ZZH RX IP 258 OP 636: Performed by: EMERGENCY MEDICINE

## 2020-06-29 PROCEDURE — 85025 COMPLETE CBC W/AUTO DIFF WBC: CPT | Performed by: EMERGENCY MEDICINE

## 2020-06-29 PROCEDURE — 25000125 ZZHC RX 250: Performed by: EMERGENCY MEDICINE

## 2020-06-29 PROCEDURE — 25000132 ZZH RX MED GY IP 250 OP 250 PS 637: Mod: GY | Performed by: EMERGENCY MEDICINE

## 2020-06-29 RX ORDER — ALBUTEROL SULFATE 90 UG/1
4-6 AEROSOL, METERED RESPIRATORY (INHALATION)
Qty: 1 INHALER | Refills: 1 | Status: SHIPPED | OUTPATIENT
Start: 2020-06-29 | End: 2021-10-08

## 2020-06-29 RX ORDER — IOPAMIDOL 755 MG/ML
500 INJECTION, SOLUTION INTRAVASCULAR ONCE
Status: COMPLETED | OUTPATIENT
Start: 2020-06-29 | End: 2020-06-29

## 2020-06-29 RX ORDER — ALBUTEROL SULFATE 90 UG/1
6 AEROSOL, METERED RESPIRATORY (INHALATION) ONCE
Status: COMPLETED | OUTPATIENT
Start: 2020-06-29 | End: 2020-06-29

## 2020-06-29 RX ADMIN — IOPAMIDOL 55 ML: 755 INJECTION, SOLUTION INTRAVENOUS at 15:17

## 2020-06-29 RX ADMIN — ALBUTEROL SULFATE 6 PUFF: 90 AEROSOL, METERED RESPIRATORY (INHALATION) at 14:23

## 2020-06-29 RX ADMIN — SODIUM CHLORIDE 76 ML: 9 INJECTION, SOLUTION INTRAVENOUS at 15:17

## 2020-06-29 RX ADMIN — SODIUM CHLORIDE, POTASSIUM CHLORIDE, SODIUM LACTATE AND CALCIUM CHLORIDE 1000 ML: 600; 310; 30; 20 INJECTION, SOLUTION INTRAVENOUS at 14:23

## 2020-06-29 NOTE — ED AVS SNAPSHOT
[facility] Emergency Department  [address]  [facility] [address]  Phone:  [phone]  Fax:  [phone]                                    Johan Navarro   MRN: [id number]    Department:  [facility] Emergency Department   Date of Visit:  6/29/2020           After Visit Summary Signature Page    I have received my discharge instructions, and my questions have been answered. I have discussed any challenges I see with this plan with the nurse or doctor.    ..........................................................................................................................................  Patient/Patient Representative Signature      ..........................................................................................................................................  Patient Representative Print Name and Relationship to Patient    ..................................................               ................................................  Date                                   Time    ..........................................................................................................................................  Reviewed by Signature/Title    ...................................................              ..............................................  Date                                               Time          22EPIC Rev 08/18

## 2020-06-29 NOTE — LETTER
June 30, 2020        Johan Navarro  [address]  [place] [address]    This letter provides a written record that you were tested for COVID-19 on 6/29/20.       Your result was negative. This means that we didn t find the virus that causes COVID-19 in your sample. A test may show negative when you do actually have the virus. This can happen when the virus is in the early stages of infection, before you feel illness symptoms.    If you have symptoms   Stay home and away from others (self-isolate) until you meet ALL of the guidelines below:    You ve had no fever--and no medicine that reduces fever--for 3 full days (72 hours). And      Your other symptoms have gotten better. For example, your cough or breathing has improved. And     At least 10 days have passed since your symptoms started.    During this time:    Stay home. Don t go to work, school or anywhere else.     Stay in your own room, including for meals. Use your own bathroom if you can.    Stay away from others in your home. No hugging, kissing or shaking hands. No visitors.    Clean  high touch  surfaces often (doorknobs, counters, handles, etc.). Use a household cleaning spray or wipes. You can find a full list on the EPA website at www.epa.gov/pesticide-registration/list-n-disinfectants-use-against-sars-cov-2.    Cover your mouth and nose with a mask, tissue or washcloth to avoid spreading germs.    Wash your hands and face often with soap and water.    Going back to work  Check with your employer for any guidelines to follow for going back to work.    Employers: This document serves as formal notice that your employee tested negative for COVID-19, as of the testing date shown above.

## 2020-06-29 NOTE — ED NOTES
Spoke with wife Amy on the phone and provided update. Cell [phone]. Will call when we have disposition.

## 2020-06-29 NOTE — ED PROVIDER NOTES
History     Chief Complaint:  Shortness of Breath      The history is provided by the patient.      Johan Navarro is a 65 year old male with a history of coronary artery disease, myocardial infarction, and hyperlipidemia who presents for evaluation of shortness of breath. The patient reports three days of shortness of breath with associated diaphoresis. He notes a history of coronary stent placement.    3 days of symptoms beginning with fatigue and decreased energy level.  He then developed a nonproductive cough dyspnea on exertion and shortness of breath.  Denies any chest pain or chest discomfort.  His most profound symptom is loss of energy and fatigue.  He does have associated fever, chills, body wide myalgias.  Has had a couple of loose stools but no melena or hematochezia.  Urinary pattern is unchanged.  Daughter and granddaughter work in a  center where they have had positive cases of COVID-19.    Patient's cardiac risk factors include:     CAD/CVA/TIA/PAD: Yes  Hypertension:   No  Hyperlipidemia:  Yes  Diabetes:   No  Obesity (BMI>30): No  Hx tobacco use:  Yes  Male Sex:   Yes  Age >45:  Yes  Familial Hx of CAD:  Yes     Allergies:  Crestor [Rosuvastatin]  Dust Mites  No Clinical Screening - See Comments  Pollen Extract    Medications:    Aspirin 81 mg  Atorvastatin   Evolocumab   Gabapentin   Sildenafil     Past Medical History:    Aortic stenosis  Arthritis  Coronary artery disease  Carotic stenosis  Depression   Elevated prostate specific antigen   Hyperlipidemia  Malignant neoplasm of prostate  Myocardial infarction  Murmur   Osteoarthritis   Stent placement    Past Surgical History:    Coronary stent placement  Da Mouna prostatectomy  Eyelid surgery  Orthopedic surgery, achilles tendon  Orthopedic surgery, hand   Repair tendon biceps distal upper extremity  Right hand Xanthoma removal  Stress echocardiogram     Family History:    Coronary artery disease  Hyperlipidemia     Social  History:  Marital Status:   [2]  PCP: Washington Yang PA-C  Negative for tobacco use. Former  Positive for smokeless tobacco use. Chew  Positive for alcohol use.   Negative for drug use.      Review of Systems   ROS: 10 point ROS neg other than the symptoms noted above in the HPI.    Physical Exam     Patient Vitals for the past 24 hrs:   BP Temp Temp src Pulse Heart Rate Resp SpO2   06/29/20 1500 (!) 149/95 -- -- 102 105 25 98 %   06/29/20 1445 137/87 -- -- 107 107 15 95 %   06/29/20 1430 110/82 -- -- 98 101 26 98 %   06/29/20 1415 108/76 -- -- 96 97 13 95 %   06/29/20 1400 109/78 -- -- 96 95 13 97 %   06/29/20 1332 111/64 98.2  F (36.8  C) Oral -- 105 18 100 %       Physical Exam  Gen: in no acute distress  HEENT:  mmm, no rhinorrhea  Neck: supple, no abnormal swelling  Lungs: Scattered coarse breath sounds and slightly prolonged expiratory phase no rales no significant wheezing no accessory muscle use speaking in full sentences  CV: rrr, no m/r/g, ppi  Abd: soft, nontender, nondistended, no rebound/masses/guarding/hsm  Ext: no peripheral edema  Skin: warm, dry, well perfused, no rashes/bruising/lesions on exposed skin  Neuro: alert, MAEE, no gross motor or sensory deficits, gait stable  Psych: Normal mood, normal affect    Emergency Department Course     ECG:  Indication: Shortness of breath   Time: 1337  Vent. Rate 96 bpm. TN interval 132. QRS duration 82. QT/QTc 352/444. P-R-T axis 80 4 55.  Sinus rhythm. Possible left atrial enlargement. Possible Inferior infarct, age undetermined. Abnormal ECG. No significant change compared to ECG dated 5/13/2019. Read time: 1400      Imaging:  Radiology findings were communicated with the patient who voiced understanding of the findings.    CT Chest Pulmonary Embolism w/ IV contrast:   1.  No evidence of pulmonary embolus. No evidence of thoracic aortic   dissection.   2.  Mild bibasilar atelectasis.   3.  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 This is an incidental finding, as per radiology.     Laboratory:  Laboratory findings were communicated with the patient who voiced understanding of the findings.    CBC: WBC: 5.9, HGB: 14.1, PLT: 136(L)  BMP: Glucose 142(H), Potassium: 3.3(L), o/w WNL (Creatinine: 1.05)    1352 Troponin: <0.015   D dimer: 2.7(H)  TSH with free T4 reflex: 3.21     Symptomatic COVID-19 Virus by PCR: In process    Procedures:  None    Interventions:  1423 Albuterol inhaler, 6 puff, Inhalation  1423 Lactated ringers bolus, 1,000 ml, IV    Emergency Department Course:  Past medical records, nursing notes, and vitals reviewed.    1410 I performed an exam of the patient as documented above.     EKG obtained in the ED, see results above.   IV was inserted and blood was drawn for laboratory testing, results above.  The patient's nose was swabbed and this sample was sent for COVID-19 antigen, findings above.   The patient was sent for a chest PE CT while in the emergency department, results above.     1615 I rechecked the patient and discussed the results of his workup thus far.     Findings and plan explained to the Patient. Patient discharged home with instructions regarding supportive care, medications, and reasons to return. The importance of close follow-up was reviewed. The patient was prescribed an albuterol inhaler.    I personally reviewed the laboratory and imaging results with the Patient and answered all related questions prior to discharge.     Impression & Plan     Covid-19  Johan Navarro was evaluated during a global COVID-19 pandemic, which necessitated consideration that the patient might be at risk for infection with the SARS-CoV-2 virus that causes COVID-19.   Applicable protocols for evaluation were followed during the patient's care.   COVID-19 was considered as part of the patient's evaluation. The plan for testing is:  a test was obtained during this visit.     Medical Decision  Makin 5-year-old male presenting with respiratory tract infectious symptoms concern here was for pneumonia, COVID-19, bronchitis.  Work-up here shows no evidence of pulmonary embolism myocarditis or acute coronary syndrome.  CT also does not show any signs of lobar pneumonia.  COVID test is pending and certainly would fit his clinical presentation.  He felt better after interventions here in the emergency department he is stable for discharge home and understands when to return to the ED.      Diagnosis:    ICD-10-CM    1. Acute bronchitis, unspecified organism  J20.9    2. Suspected COVID-19 virus infection  Z20.828        Disposition:  Discharged to home.    Discharge Medications:  New Prescriptions    ALBUTEROL (PROAIR HFA/PROVENTIL HFA/VENTOLIN HFA) 108 (90 BASE) MCG/ACT INHALER    Inhale 4-6 puffs into the lungs every 2 hours as needed for shortness of breath / dyspnea       Scribe Disclosure:  EH, Lae Espinosa, am serving as a scribe at 2:10 PM on 2020 to document services personally performed by Fazal Herrera,  based on my observations and the provider's statements to me.     [facility] EMERGENCY DEPARTMENT       Fazal Herrera MD  [id number]

## 2020-06-29 NOTE — ED TRIAGE NOTES
A&O x4, ABCs intact. Pt presents with SOB for the past 3 days and some heaviness that comes and goes. Pt states that he has hx of stents. Pt is noted to be diaphoretic in triage.      Mom requesting to speak with nurse regarding eye discharge

## 2020-06-30 ENCOUNTER — PATIENT OUTREACH (OUTPATIENT)
Dept: CARE COORDINATION | Facility: CLINIC | Age: 65
End: 2020-06-30

## 2020-06-30 DIAGNOSIS — Z20.822 COVID-19 RULED OUT: Primary | ICD-10-CM

## 2020-06-30 LAB — INTERPRETATION ECG - MUSE: NORMAL

## 2020-06-30 NOTE — PROGRESS NOTES
Clinic Care Coordination Contact    Situation: Patient chart reviewed by care coordinator.    Background: Patient was seen in the ED yesterday, 06/29 with COVID symptoms.     Assessment: COVID test was negative.     Plan/Recommendations: Get well loop referral placed. No outreach will be completed at this time. RNCC will remain available as needed.     Skye Vuong RN Care Coordinator  [facility]  Email: [email or web address]  Phone: [phone]

## 2020-07-06 ENCOUNTER — APPOINTMENT (OUTPATIENT)
Dept: GENERAL RADIOLOGY | Facility: CLINIC | Age: 65
End: 2020-07-06
Attending: EMERGENCY MEDICINE
Payer: MEDICARE

## 2020-07-06 ENCOUNTER — TELEPHONE (OUTPATIENT)
Dept: CARDIOLOGY | Facility: CLINIC | Age: 65
End: 2020-07-06

## 2020-07-06 ENCOUNTER — OFFICE VISIT (OUTPATIENT)
Dept: URGENT CARE | Facility: URGENT CARE | Age: 65
End: 2020-07-06
Payer: MEDICARE

## 2020-07-06 ENCOUNTER — HOSPITAL ENCOUNTER (EMERGENCY)
Facility: CLINIC | Age: 65
Discharge: HOME | End: 2020-07-06
Attending: EMERGENCY MEDICINE | Admitting: EMERGENCY MEDICINE
Payer: MEDICARE

## 2020-07-06 VITALS
DIASTOLIC BLOOD PRESSURE: 77 MMHG | SYSTOLIC BLOOD PRESSURE: 124 MMHG | RESPIRATION RATE: 18 BRPM | OXYGEN SATURATION: 96 % | HEART RATE: 90 BPM | TEMPERATURE: 100 F

## 2020-07-06 VITALS
TEMPERATURE: 99 F | OXYGEN SATURATION: 97 % | DIASTOLIC BLOOD PRESSURE: 34 MMHG | SYSTOLIC BLOOD PRESSURE: 65 MMHG | HEART RATE: 69 BPM

## 2020-07-06 DIAGNOSIS — S99.912A INJURY OF LEFT ANKLE, INITIAL ENCOUNTER: ICD-10-CM

## 2020-07-06 DIAGNOSIS — I25.10 CORONARY ARTERY DISEASE INVOLVING NATIVE CORONARY ARTERY OF NATIVE HEART WITHOUT ANGINA PECTORIS: Primary | ICD-10-CM

## 2020-07-06 DIAGNOSIS — S90.02XA CONTUSION OF LEFT ANKLE, INITIAL ENCOUNTER: ICD-10-CM

## 2020-07-06 DIAGNOSIS — R55 SYNCOPE, UNSPECIFIED SYNCOPE TYPE: Primary | ICD-10-CM

## 2020-07-06 DIAGNOSIS — I95.9 HYPOTENSION, UNSPECIFIED HYPOTENSION TYPE: ICD-10-CM

## 2020-07-06 DIAGNOSIS — R55 VASOVAGAL SYNCOPE: ICD-10-CM

## 2020-07-06 DIAGNOSIS — I35.0 AORTIC VALVE STENOSIS, ETIOLOGY OF CARDIAC VALVE DISEASE UNSPECIFIED: ICD-10-CM

## 2020-07-06 LAB
ALBUMIN SERPL-MCNC: 3 G/DL (ref 3.4–5)
ALP SERPL-CCNC: 69 U/L (ref 40–150)
ALT SERPL W P-5'-P-CCNC: 26 U/L (ref 0–70)
ANION GAP SERPL CALCULATED.3IONS-SCNC: 5 MMOL/L (ref 3–14)
AST SERPL W P-5'-P-CCNC: 27 U/L (ref 0–45)
BASOPHILS # BLD AUTO: 0 10E9/L (ref 0–0.2)
BASOPHILS NFR BLD AUTO: 0.3 %
BILIRUB SERPL-MCNC: 0.6 MG/DL (ref 0.2–1.3)
BUN SERPL-MCNC: 16 MG/DL (ref 7–30)
CALCIUM SERPL-MCNC: 7.8 MG/DL (ref 8.5–10.1)
CHLORIDE SERPL-SCNC: 105 MMOL/L (ref 94–109)
CO2 SERPL-SCNC: 25 MMOL/L (ref 20–32)
CREAT SERPL-MCNC: 0.98 MG/DL (ref 0.66–1.25)
DIFFERENTIAL METHOD BLD: ABNORMAL
EOSINOPHIL # BLD AUTO: 0 10E9/L (ref 0–0.7)
EOSINOPHIL NFR BLD AUTO: 0.2 %
ERYTHROCYTE [DISTWIDTH] IN BLOOD BY AUTOMATED COUNT: 14.4 % (ref 10–15)
GFR SERPL CREATININE-BSD FRML MDRD: 81 ML/MIN/{1.73_M2}
GLUCOSE SERPL-MCNC: 88 MG/DL (ref 70–99)
HCT VFR BLD AUTO: 34.9 % (ref 40–53)
HGB BLD-MCNC: 11.7 G/DL (ref 13.3–17.7)
IMM GRANULOCYTES # BLD: 0 10E9/L (ref 0–0.4)
IMM GRANULOCYTES NFR BLD: 0.2 %
INTERPRETATION ECG - MUSE: NORMAL
LYMPHOCYTES # BLD AUTO: 1.5 10E9/L (ref 0.8–5.3)
LYMPHOCYTES NFR BLD AUTO: 24 %
MCH RBC QN AUTO: 29 PG (ref 26.5–33)
MCHC RBC AUTO-ENTMCNC: 33.5 G/DL (ref 31.5–36.5)
MCV RBC AUTO: 86 FL (ref 78–100)
MONOCYTES # BLD AUTO: 0.6 10E9/L (ref 0–1.3)
MONOCYTES NFR BLD AUTO: 9.8 %
NEUTROPHILS # BLD AUTO: 4.1 10E9/L (ref 1.6–8.3)
NEUTROPHILS NFR BLD AUTO: 65.5 %
NRBC # BLD AUTO: 0 10*3/UL
NRBC BLD AUTO-RTO: 0 /100
PLATELET # BLD AUTO: 249 10E9/L (ref 150–450)
POTASSIUM SERPL-SCNC: 3.7 MMOL/L (ref 3.4–5.3)
PROT SERPL-MCNC: 6.6 G/DL (ref 6.8–8.8)
RBC # BLD AUTO: 4.04 10E12/L (ref 4.4–5.9)
SODIUM SERPL-SCNC: 135 MMOL/L (ref 133–144)
TROPONIN I SERPL-MCNC: <0.015 UG/L (ref 0–0.04)
WBC # BLD AUTO: 6.2 10E9/L (ref 4–11)

## 2020-07-06 PROCEDURE — 73610 X-RAY EXAM OF ANKLE: CPT | Mod: LT

## 2020-07-06 PROCEDURE — 84484 ASSAY OF TROPONIN QUANT: CPT | Performed by: EMERGENCY MEDICINE

## 2020-07-06 PROCEDURE — 99285 EMERGENCY DEPT VISIT HI MDM: CPT

## 2020-07-06 PROCEDURE — 73630 X-RAY EXAM OF FOOT: CPT | Mod: LT

## 2020-07-06 PROCEDURE — 99214 OFFICE O/P EST MOD 30 MIN: CPT

## 2020-07-06 PROCEDURE — 80053 COMPREHEN METABOLIC PANEL: CPT | Performed by: EMERGENCY MEDICINE

## 2020-07-06 PROCEDURE — 85025 COMPLETE CBC W/AUTO DIFF WBC: CPT | Performed by: EMERGENCY MEDICINE

## 2020-07-06 RX ORDER — HYDROCODONE BITARTRATE AND ACETAMINOPHEN 5; 325 MG/1; MG/1
1 TABLET ORAL EVERY 6 HOURS PRN
Qty: 10 TABLET | Refills: 0 | Status: SHIPPED | OUTPATIENT
Start: 2020-07-06 | End: 2020-07-28

## 2020-07-06 ASSESSMENT — ENCOUNTER SYMPTOMS
DIZZINESS: 1
CHILLS: 0
FEVER: 0

## 2020-07-06 NOTE — ED AVS SNAPSHOT
Emergency Department  [address] [place] [address]  Phone:  [phone]  Fax:  [phone]                                    Johan Navarro   MRN: [id number]    Department:   Emergency Department   Date of Visit:  7/6/2020           After Visit Summary Signature Page    I have received my discharge instructions, and my questions have been answered. I have discussed any challenges I see with this plan with the nurse or doctor.    ..........................................................................................................................................  Patient/Patient Representative Signature      ..........................................................................................................................................  Patient Representative Print Name and Relationship to Patient    ..................................................               ................................................  Date                                   Time    ..........................................................................................................................................  Reviewed by Signature/Title    ...................................................              ..............................................  Date                                               Time          22EPIC Rev 08/18

## 2020-07-06 NOTE — ED TRIAGE NOTES
Yesterday pt had shelving fell on left ankle - pt went to clinic today having syncopal episodes in the clinic - BP 65/p upon EMS arrival   400cc of NS given BP up to 120's  Pt continues to have left ankle pain   Denies chest pain

## 2020-07-06 NOTE — ED PROVIDER NOTES
History     Chief Complaint:  Loss of Consciousness      The history is provided by the patient.      Johan Navarro is a 65 year old male who had a shelf fall on his ankle a couple days ago, and he suffered a small abrasion. He went in to the clinic today and while he was waiting in a chair he noticed he was having intense pain and then the room started to blur, the patient got dizzy and passed out. He came back without any CPR on his own. Patient initially told me he did not have heart disease, but later admitted he had three stents put in some years ago. There is a note in his chart about aortic stenosis but he is unaware of any heart murmurs or other problems. He denies fevers, chills, or other illness.    Allergies:  Rosuvastatin    Medications:    Albuterol inhaler  Aspirin 81 mg  Atorvastatin  Diclofenac  Evolocumab  Fluticasone  Gabapentin  Sildenafil  Niacin  Cialis    Past Medical History:    Malignant neoplasm of prostate  Mixed hyperlipidemia  Aortic and carotid stenosis  Coronary Artery Disease  Depressive disorder  Elevated PSA  Osteoarthritis of hand  Myocardial infarction  Murmur  Hyperliidemia    Past Surgical History:    Coronary stent placement x2  Prostatectomy  Eyelid surgery  Achilles tendon repair  Right bicep tendon repair  Xanthoma removal right hand  Stress echo  Foot surgery    Family History:    CAD: Mother, Sister  Hyperlipidemia: Mother    Social History:  The patient was not accompanied to the ED.  Smoking Status: Former smoker  Smokeless Tobacco: Current user  Alcohol Use: Yes  Drug Use: No  Marital Status:      Review of Systems   Constitutional: Negative for chills and fever.   Eyes: Positive for visual disturbance (blurry vision preceding syncope).   Neurological: Positive for dizziness (preceding syncope) and syncope.   All other systems reviewed and are negative.    Physical Exam     Patient Vitals for the past 24 hrs:   BP Temp Temp src Pulse Heart Rate Resp SpO2    07/06/20 1430 124/77 -- -- 90 92 18 96 %   07/06/20 1330 (!) 147/88 -- -- 93 92 12 97 %   07/06/20 1300 117/72 -- -- 81 85 (!) 37 97 %   07/06/20 1242 -- -- -- -- -- 18 --   07/06/20 1234 120/88 100  F (37.8  C) Oral 88 -- -- 97 %       Physical Exam  Constitutional: White male, supine  HENT: No signs of trauma.   Eyes: EOM are normal. Pupils are equal, round, and reactive to light.   Neck: Normal range of motion. No JVD present. No cervical adenopathy.  Cardiovascular: Regular rhythm.  Exam reveals no gallop and no friction rub.    No murmur heard. Heart reveals a 3/6 systolic murmur radiating from the right upper sternal border to the apex and to the carotids.  Pulmonary/Chest: Bilateral breath sounds normal. No wheezes, rhonchi or rales.  Abdominal: Soft. No tenderness. No rebound or guarding.   Musculoskeletal: No edema. No tenderness. The left leg reveals a superficial abrasion over the anterior ankle. There is tenderness on palpation there, as well as the dorsum of the foot. Patient is able to dorsi and plantar flex but when he dorsi flex it is very uncomfortable. There is no achilles tenderness, there is no effusion in the ankle itself and distal sensation and capillary fill are intact.  Lymphadenopathy: No lymphadenopathy.   Neurological: Alert and oriented to person, place, and time. Normal strength. Coordination normal.   Skin: Skin is warm and dry. No rash noted. No erythema.     Emergency Department Course     ECG:  Indication: Loss of consciousness  Completed at 1234.  Sinus rhythm  Normal ECG   Rate 84 bpm. SD interval 140. QRS duration 80. QT/QTc 364/430. P-R-T axes 21 12 45.  Agree with computer interpretation.  No significant change when compared to EKG dated 6/29/2020.    Imaging:  Radiology findings were communicated with the patient who voiced understanding of the findings.    XR Foot Port Left 3 Views:  Negative exam.  Report per radiology.    XR Ankle Port Left G/E 3 Views:  Radiodense debris  versus calcification overlapping the  Achilles tendon on the lateral view. No evidence of acute fracture.  The ankle mortise appears congruent.  Report per radiology.    Laboratory:  Laboratory findings were communicated with the patient who voiced understanding of the findings.    CBC: WBC 6.2, HGB 11.7,   CMP: Calcium 7.8, Albumin 3.0, Protein total 6.6, Creatinine 0.98    1314 Troponin I: <0.015     Emergency Department Course:  Past medical records, nursing notes, and vitals reviewed.    1244 I performed an exam of the patient as documented above.     EKG obtained in the ED, see results above.     IV was inserted and blood was drawn for laboratory testing, results above.    The patient was sent for X-rays of the left foot and ankle while in the emergency department, results above.     1437 I rechecked the patient and discussed the results of his workup thus far.     Findings and plan explained to the Patient. Patient discharged home with instructions regarding supportive care, medications, and reasons to return. The importance of close follow-up was reviewed. The patient was prescribed Norco.    I personally reviewed the laboratory and imaging results with the Patient and answered all related questions prior to dc     Impression & Plan     Medical Decision Making:  Johan Navarro is a 65 year old male who presents to the emergency department today after syncope. The patient was placed on monitoring andEKG was obtained as well as blood work, including Troponin and ankle and foot X-rays, all of which were unremarkable. The patient was place in an ace wrap of his foot and ankle and given air stirrups to wear for support. I did talk to him about his heart disease and his probable aortic stenosis murmur. His wife was extremely concerned about his going home with this, and I told the patient we could admit him for observation, but he was adamant that he did not want to do this. He would contact Dr. Galicia, his  cardiologist, and he would follow-up and make an appointment for evaluation and recommended echocardiogram. Patient is also referred to orthopedics for his ankle. Impression is noted.    Diagnosis:    ICD-10-CM    1. Vasovagal syncope  R55    2. Contusion of left ankle, initial encounter  S90.02XA    3. Aortic valve stenosis, etiology of cardiac valve disease unspecified  I35.0        Disposition:  Discharged to home.    Discharge Medications:  New Prescriptions    HYDROCODONE-ACETAMINOPHEN (NORCO) 5-325 MG TABLET    Take 1 tablet by mouth every 6 hours as needed for severe pain       Scribe Disclosure:  Aylin STAUFFER, am serving as a scribe at 12:44 PM on 7/6/2020 to document services personally performed by Aryan Arriaza MD based on my observations and the provider's statements to me.       Aylin Smith  7/6/2020    EMERGENCY DEPARTMENT       Aryan Arriaza MD  07/07/20 9380

## 2020-07-06 NOTE — PROGRESS NOTES
Patient presents with:  Ankle Pain: left ankle pain, dropped board on ankle last pm        Subjective     Johan Navarro is a 65 year old male who presents to clinic today for the following health issues:    HPI   Patient is a 65-year-old gentleman presented to the [facility] care for left ankle injury.  Dropped a board on his left ankle last p.m.  While patient was in the waiting area, CMA went into waiting room to call him back to the room.  She witnessed patient turning, pale, then had a syncopal event, lasted seconds. He opened his eyes, able to state name and place,  noted to be diaphoretic. When this MD arrived in the waiting room the patient was conscious and was  able to respond appropriately to commands. Of note, patient's ankel was not moved/no acute onset of pain at time of syncopal event. Vital signs were taken patient noted to be hypotensive, 60s/30s, normal pulse 60-70 and O2 sats on room air 97% Temp 99   . 911 was called. Patient remained in wheelchair/waiting room, was still sleepy, but would respond appropriately to commands. Per spouse (who is a RN), he was seen in ER on 6/29/20 and told he was dehydrated, given fluids, covid testing negative  and discharged home. Per chart review, d dimer elevated, CT of chest neg for PE, troponin negative/nml EKG, no acute changes.  Past medical history remarkable for CAD with 3 stents place approximately 15 years ago. Per wife, energy has not been the same since ER evaluation 6/30/20, ate a doughnut for breakfast, no n/v/d., no cough/fever.   At time of arrival of EMS, patient still hypotensive, 60/25 HR in low 70s and no acute changes on ekg.  IV was placed by EMS and IVF fluids started. Patient transported to [facility] ER.         Patient Active Problem List   Diagnosis     Depressive disorder, not elsewhere classified     Elevated prostate specific antigen (PSA)     Osteoarthritis of Hand      Mixed hyperlipidemia     Family history of early CAD     Aortic  stenosis     Carotid stenosis     Coronary artery disease involving native coronary artery of native heart without angina pectoris     Malignant neoplasm of prostate (H)     Past Surgical History:   Procedure Laterality Date     CARDIAC SURGERY      coronary stents x2 placed ; angioplasty     DAVINCI PROSTATECTOMY           EYE SURGERY Bilateral 2017    eyelids     ORTHOPEDIC SURGERY Right     achilles tendon; post football injury     ORTHOPEDIC SURGERY Right     hand; near thumb. has wires in there. cysts     REPAIR TENDON BICEPS DISTAL UPPER EXTREMITY Right 2018    Procedure: REPAIR TENDON BICEPS DISTAL UPPER EXTREMITY;  Right open biceps tendon repair  ;  Surgeon: Alber Zabala MD;  Location: [facility] OR     SURGICAL HISTORY OF -       Right hand Xanthoma removal     SURGICAL HISTORY OF -       Stress Echo (-)       Social History     Tobacco Use     Smoking status: Former Smoker     Types: Dip, chew, snus or snuff     Smokeless tobacco: Current User     Types: Chew     Tobacco comment: 12/10/18: occ chew, not every day   Substance Use Topics     Alcohol use: Yes     Comment: very rare     Family History   Problem Relation Age of Onset     Lipids Mother      C.A.D. Mother          at age 49 of [place]     C.A.D. Sister         MI at age 48     Cancer - colorectal No family hx of      Prostate Cancer No family hx of      Colon Cancer No family hx of          Current Outpatient Medications   Medication Sig Dispense Refill     albuterol (PROAIR HFA/PROVENTIL HFA/VENTOLIN HFA) 108 (90 Base) MCG/ACT inhaler Inhale 4-6 puffs into the lungs every 2 hours as needed for shortness of breath / dyspnea 1 Inhaler 1     aspirin (ASA) 81 MG chewable tablet Take 1 tablet (81 mg) by mouth daily 90 tablet 3     atorvastatin (LIPITOR) 80 MG tablet Take 1 tablet (80 mg) by mouth daily 90 tablet 0     diclofenac (VOLTAREN) 1 % topical gel Apply 2 grams to hands/fingers four times daily using enclosed dosing card.  100 g 1     evolocumab (REPATHA) 140 MG/ML prefilled autoinjector Inject 1 mL (140 mg) Subcutaneous every 14 days 6 mL 3     fluticasone (FLONASE) 50 MCG/ACT nasal spray Spray in nostril as needed  3     gabapentin (NEURONTIN) 600 MG tablet Take 1 tablet (600 mg) by mouth 2 times daily 180 tablet 3     sildenafil (REVATIO) 20 MG tablet TAKE 2-5 TABLETS BY MOUTH AS NEEDED PRIOR TO SEXUAL INTERCOURSE 90 tablet 1     Allergies   Allergen Reactions     Crestor [Rosuvastatin] GI Disturbance     Dust Mites      No Clinical Screening - See Comments      Goose feathers     Pollen Extract      Recent Labs   Lab Test 06/29/20  1352 07/16/19  0928 05/14/19  0936 01/19/18  0755 12/04/17  0808 09/12/17  1037   LDL  --   --  62 68 307* 94   HDL  --   --  61 60 53 60   TRIG  --   --  63 91 197* 109   ALT  --  31 <5*  --   --  26   CR 1.05 0.90  --   --   --   --    GFRESTIMATED 74 90  --   --   --   --    GFRESTBLACK 86 >90  --   --   --   --    POTASSIUM 3.3* 4.2  --   --   --   --    TSH 3.21  --   --   --   --   --       BP Readings from Last 3 Encounters:   07/06/20 (!) 65/34   06/29/20 112/81   07/16/19 110/70    Wt Readings from Last 3 Encounters:   07/16/19 68.4 kg (150 lb 14.4 oz)   05/28/19 69.9 kg (154 lb)   12/14/18 69.9 kg (154 lb)                      Reviewed and updated as needed this visit by Provider         Review of Systems   Constitutional, HEENT, cardiovascular, pulmonary, gi and gu systems are negative, except as otherwise noted.      Objective    BP (!) 65/34   Pulse 69   Temp 99  F (37.2  C)   SpO2 97%   There is no height or weight on file to calculate BMI.  Physical Exam   GENERAL: sleepy, but responsive to commands only after syncopal event, diaphoretic   RESP: lungs clear to auscultation - no rales, rhonchi or wheezes  CV: regular rate and rhythm,  no peripheral edema   MS: left ankle painful to move, small abrasion on anterior ankle with mild swelling and no gross deformity or dislocation       Diagnostic Test Results:  Labs reviewed in Epic  none         Assessment & Plan       ICD-10-CM    1. Syncope, unspecified syncope type  R55    2. Hypotension, unspecified hypotension type  I95.9    3. Injury of left ankle, initial encounter  S99.912A      Patient transferred to [facility] ER via EMS  Please see complete history of event- syncope,hypotension in clinic waiting room as outlined in HPI above     MD LAURA Glaser  PROVIDER  [facility]  .

## 2020-07-06 NOTE — ED NOTES
Spoke wit pt's wife - she is very concerned that this patient is not thinking clearly and that he should stay to be observed - Md has also spoken to wife - pt refuses to stay and is alert and orientated and able to make his own decisions at this time . I stressed to the wife that he follows through with cardiology follow up .

## 2020-07-06 NOTE — ED NOTES
Bed: ED08  Expected date:   Expected time:   Means of arrival:   Comments:  HE - 65 syncope eta 1230 negative covid

## 2020-07-06 NOTE — TELEPHONE ENCOUNTER
"Call from patient's spouse, they were at an urgent care for a possible broken ankle. Patient passed out with no readable BP. He was transported to [facility] ED. Spouse is waiting on a bench and called to update Dr. Galicia. She is very worried that patient has a hidden cardiac issue that won't be found easily. She notes patient was in the ER last week for c/o SOB that attributed to bronchitis. He was COVID-19 negative last week.  Reviewed with spouse that ED staff can contact cardiology at any time for further assessment. Reviewed with her to let the staff know she wants an update. She notes \" I'm so worried he'll say he's fine and they'll let him go\".Reviewed with patient that the pain from the ankle could have causes a vagal response but possibly something else is contributing to the episode.  Reviewed with spouse that Dr. Galicia would be updated.     Message and page to Dr. Galicia      5340 reply from Dr. Galicia:  Send an update once ED plan is available, will consider cardiac testing and/or visit depending on what is done at [facility].      7980 spouse called back, they expect to be sent home later this afternoon. The ankle is not fractured. Patient has a slight temperature elevated and he was retested for COVID-19. Wife states the provider recommended an echo.  Her cell is [phone]  "

## 2020-07-06 NOTE — TELEPHONE ENCOUNTER
OK let set up a lexiscan stress and echo as well as a 7 day Zio patch and then follow up with me in 2 weeks. Thanks.

## 2020-07-07 NOTE — TELEPHONE ENCOUNTER
Orders placed for nuclear study, echo, 7-day zio and OV with Dr. Galicia.  Attempted to contact patient and spouse with update and plan for tests followed by OV. Left a message for them to call back or call scheduling.  Message to scheduling to reach out to patient also.    1040 per scheduling: nuclear study, echo and ziopatch scheduled for 7/15/2020. To see Dr. Galicia on 8/31/2020.

## 2020-07-09 NOTE — PROGRESS NOTES
"Subjective     Johan Navarro is a 65 year old male who presents to clinic today for the following health issues:    HPI   Musculoskeletal problem/pain      Duration: ***    Description  Location: ***    Intensity:  {mild,moderate,severe:348366}    Accompanying signs and symptoms: {OTHER MS SYMPTOMS:326462::\"none\"}    History  Previous similar problem: { :610179}  Previous evaluation:  {PREVIOUS ms evaluation:560861::\"none\"}    Precipitating or alleviating factors:  Trauma or overuse: { :674318}  Aggravating factors include: {AGGRAVATING MS FACTORS CHRONIC PROB:844390::\"none\"}    Therapies tried and outcome: {MS RELIEF ITEMS:376385::\"nothing\"}    {additonal problems for provider to add (Optional):172827}    {HIST REVIEW/ LINKS 2 (Optional):480099}    {Additional problems for the provider to add (optional):648115}  Reviewed and updated as needed this visit by Provider         Review of Systems   {ROS COMP (Optional):589901}      Objective    There were no vitals taken for this visit.  There is no height or weight on file to calculate BMI.  Physical Exam   {Exam List (Optional):571826}    {Diagnostic Test Results (Optional):775848::\"Diagnostic Test Results:\",\"Labs reviewed in Epic\"}        {PROVIDER CHARTING PREFERENCE:659700}      "

## 2020-07-13 ENCOUNTER — OFFICE VISIT (OUTPATIENT)
Dept: FAMILY MEDICINE | Facility: CLINIC | Age: 65
End: 2020-07-13
Payer: MEDICARE

## 2020-07-13 VITALS
BODY MASS INDEX: 24.15 KG/M2 | WEIGHT: 150.3 LBS | DIASTOLIC BLOOD PRESSURE: 72 MMHG | TEMPERATURE: 97 F | HEIGHT: 66 IN | HEART RATE: 83 BPM | OXYGEN SATURATION: 98 % | SYSTOLIC BLOOD PRESSURE: 130 MMHG | RESPIRATION RATE: 16 BRPM

## 2020-07-13 DIAGNOSIS — R55 SYNCOPE, UNSPECIFIED SYNCOPE TYPE: ICD-10-CM

## 2020-07-13 DIAGNOSIS — C61 MALIGNANT NEOPLASM OF PROSTATE (H): ICD-10-CM

## 2020-07-13 DIAGNOSIS — I25.10 CORONARY ARTERY DISEASE INVOLVING NATIVE CORONARY ARTERY OF NATIVE HEART WITHOUT ANGINA PECTORIS: ICD-10-CM

## 2020-07-13 DIAGNOSIS — S99.922A INJURY OF LEFT FOOT, INITIAL ENCOUNTER: ICD-10-CM

## 2020-07-13 DIAGNOSIS — D17.30 LIPOMA OF SKIN AND SUBCUTANEOUS TISSUE: Primary | ICD-10-CM

## 2020-07-13 DIAGNOSIS — R06.02 SHORTNESS OF BREATH: ICD-10-CM

## 2020-07-13 PROCEDURE — 99214 OFFICE O/P EST MOD 30 MIN: CPT | Performed by: PHYSICIAN ASSISTANT

## 2020-07-13 ASSESSMENT — ANXIETY QUESTIONNAIRES
3. WORRYING TOO MUCH ABOUT DIFFERENT THINGS: NOT AT ALL
5. BEING SO RESTLESS THAT IT IS HARD TO SIT STILL: NOT AT ALL
GAD7 TOTAL SCORE: 0
2. NOT BEING ABLE TO STOP OR CONTROL WORRYING: NOT AT ALL
IF YOU CHECKED OFF ANY PROBLEMS ON THIS QUESTIONNAIRE, HOW DIFFICULT HAVE THESE PROBLEMS MADE IT FOR YOU TO DO YOUR WORK, TAKE CARE OF THINGS AT HOME, OR GET ALONG WITH OTHER PEOPLE: NOT DIFFICULT AT ALL
7. FEELING AFRAID AS IF SOMETHING AWFUL MIGHT HAPPEN: NOT AT ALL
1. FEELING NERVOUS, ANXIOUS, OR ON EDGE: NOT AT ALL
6. BECOMING EASILY ANNOYED OR IRRITABLE: NOT AT ALL

## 2020-07-13 ASSESSMENT — MIFFLIN-ST. JEOR: SCORE: 1409.51

## 2020-07-13 ASSESSMENT — PATIENT HEALTH QUESTIONNAIRE - PHQ9
5. POOR APPETITE OR OVEREATING: NOT AT ALL
SUM OF ALL RESPONSES TO PHQ QUESTIONS 1-9: 1

## 2020-07-13 NOTE — PROGRESS NOTES
Subjective     Johan Navarro is a 65 year old male who presents to clinic today for the following health issues:    HPI   ED Followup:    Facility:  [facility]   Date of visit: 6/29/2020 and 7/06/2020  Reason for visit: Shortness of breath, Vasovagal/foot problem   Current Status: Patient states that he has been doing fine since being seen.     Patient also wants to follow up on lipoma on right shoulder, also states he has been having problems with left shoulder.      Otis presents for follow up after multiple recent ED visits   First visit is ultimately felt to be from heat exhaustion (COVID was negative)   Had been shortness of breath, feeling feverish and chills; excess sweating    Few days later was working at son's house when a piece of wood fell on his foot   -while waiting for imaging he had a syncopal event   -work up at ED was normal  Has heart studies on Wednesday and seeing Dr. Galicia  He admits today to some left sided chest pain off/on when mowing the lawn  He otherwise feels quite healthy    He has an incidental lipoma noted to the right shoulder  He thought maybe this was missed diagnosed because he does have some left shoulder pain periodically  He feels strong in both shoulders  There is no limitation of movement      Patient Active Problem List   Diagnosis     Depressive disorder, not elsewhere classified     Elevated prostate specific antigen (PSA)     Osteoarthritis of Hand      Mixed hyperlipidemia     Family history of early CAD     Aortic stenosis     Carotid stenosis     Coronary artery disease involving native coronary artery of native heart without angina pectoris     Malignant neoplasm of prostate (H)     Past Surgical History:   Procedure Laterality Date     CARDIAC SURGERY  1998    coronary stents x2 placed 1998; angioplasty     DAVINCI PROSTATECTOMY      2010     EYE SURGERY Bilateral 2017    eyelids     ORTHOPEDIC SURGERY Right     achilles tendon; post football injury      "ORTHOPEDIC SURGERY Right     hand; near thumb. has wires in there. cysts     REPAIR TENDON BICEPS DISTAL UPPER EXTREMITY Right 2018    Procedure: REPAIR TENDON BICEPS DISTAL UPPER EXTREMITY;  Right open biceps tendon repair  ;  Surgeon: Alber Zabala MD;  Location: [facility] OR     SURGICAL HISTORY OF -       Right hand Xanthoma removal     SURGICAL HISTORY OF -       Stress Echo (-)       Social History     Tobacco Use     Smoking status: Former Smoker     Types: Dip, chew, snus or snuff     Smokeless tobacco: Current User     Types: Chew     Tobacco comment: 12/10/18: occ chew, not every day   Substance Use Topics     Alcohol use: Yes     Comment: very rare     Family History   Problem Relation Age of Onset     Lipids Mother      C.A.D. Mother          at age 49 of [place]     C.A.D. Sister         MI at age 48     Cancer - colorectal No family hx of      Prostate Cancer No family hx of      Colon Cancer No family hx of            Reviewed and updated as needed this visit by Provider         Review of Systems   Constitutional, HEENT, cardiovascular, pulmonary, gi and gu systems are negative, except as otherwise noted.      Objective    /72   Pulse 83   Temp 97  F (36.1  C) (Tympanic)   Resp 16   Ht 1.676 m (5' 6\")   Wt 68.2 kg (150 lb 4.8 oz)   SpO2 98%   BMI 24.26 kg/m    Body mass index is 24.26 kg/m .  Physical Exam   GENERAL: healthy, alert and no distress  EYES: Eyes grossly normal to inspection, PERRL and conjunctivae and sclerae normal  NECK: no adenopathy and thyroid normal to palpation  RESP: lungs clear to auscultation - no rales, rhonchi or wheezes  CV: regular rates and rhythm, sys murmur at RUSB  MS: there is a soft, non tender mass along the right shoulder, posterior aspect; the left and right shoulders were otherwise normal to exam   NEURO: Normal strength and tone, mentation intact and speech normal  PSYCH: mentation appears normal, affect normal/bright    Diagnostic Test " Results:  Labs reviewed in Epic        Assessment & Plan     1. Lipoma of skin and subcutaneous tissue  Reassurance. Multiple schedulers had written lymphoma but I was able to reassure him today. He is symptom free so we can simply watch this    2. Syncope, unspecified syncope type  Work up grossly negative. Very likely this was a vasovagal event but he does have extensive work up on Wednesday with cardiology. I am repeating CMP to recheck albumin  - Comprehensive metabolic panel (BMP + Alb, Alk Phos, ALT, AST, Total. Bili, TP); Future    3. Shortness of breath  This has largely improved. It was the reason he was initially seen. This may be been 2/2 heat exhaustion. COVID testing was negative  4. Injury of left foot, initial encounter  improved    5. Malignant neoplasm of prostate (H)  He is due for this. He is without symptoms.  - **Prostate spec antigen screen FUTURE anytime; Future    6. Coronary artery disease involving native coronary artery of native heart without angina pectoris  He needs updated lipids. He has had some recent EKGs but does verbalize off/on left sided chest pain with activity. He will see cardiology on Wednesday  - Lipid panel reflex to direct LDL Fasting; Future       Return in about 6 months (around 1/13/2021) for Physical Exam, Lab Work.    Washington Yang PA-C  [facility]

## 2020-07-14 ENCOUNTER — TELEPHONE (OUTPATIENT)
Dept: CARDIOLOGY | Facility: CLINIC | Age: 65
End: 2020-07-14

## 2020-07-14 ASSESSMENT — ANXIETY QUESTIONNAIRES: GAD7 TOTAL SCORE: 0

## 2020-07-15 ENCOUNTER — HOSPITAL ENCOUNTER (OUTPATIENT)
Dept: CARDIOLOGY | Facility: CLINIC | Age: 65
End: 2020-07-15
Attending: INTERNAL MEDICINE
Payer: MEDICARE

## 2020-07-15 ENCOUNTER — TELEPHONE (OUTPATIENT)
Dept: CARDIOLOGY | Facility: CLINIC | Age: 65
End: 2020-07-15

## 2020-07-15 VITALS
WEIGHT: 153.2 LBS | DIASTOLIC BLOOD PRESSURE: 72 MMHG | BODY MASS INDEX: 24.62 KG/M2 | SYSTOLIC BLOOD PRESSURE: 130 MMHG | HEART RATE: 71 BPM | OXYGEN SATURATION: 98 % | HEIGHT: 66 IN

## 2020-07-15 DIAGNOSIS — I25.10 CORONARY ARTERY DISEASE INVOLVING NATIVE CORONARY ARTERY OF NATIVE HEART WITHOUT ANGINA PECTORIS: ICD-10-CM

## 2020-07-15 DIAGNOSIS — I25.10 CORONARY ARTERY DISEASE INVOLVING NATIVE CORONARY ARTERY OF NATIVE HEART WITHOUT ANGINA PECTORIS: Primary | ICD-10-CM

## 2020-07-15 LAB
CV STRESS MAX HR HE: 99
RATE PRESSURE PRODUCT: NORMAL
STRESS ECHO BASELINE DIASTOLIC HE: 72
STRESS ECHO BASELINE HR: 71
STRESS ECHO BASELINE SYSTOLIC BP: 130
STRESS ECHO CALCULATED PERCENT HR: 64 %
STRESS ECHO LAST STRESS DIASTOLIC BP: 80
STRESS ECHO LAST STRESS SYSTOLIC BP: 140
STRESS ECHO TARGET HR: 155

## 2020-07-15 PROCEDURE — 93306 TTE W/DOPPLER COMPLETE: CPT | Mod: 26 | Performed by: INTERNAL MEDICINE

## 2020-07-15 PROCEDURE — A9502 TC99M TETROFOSMIN: HCPCS | Performed by: INTERNAL MEDICINE

## 2020-07-15 PROCEDURE — 78452 HT MUSCLE IMAGE SPECT MULT: CPT | Mod: 26 | Performed by: INTERNAL MEDICINE

## 2020-07-15 PROCEDURE — 93016 CV STRESS TEST SUPVJ ONLY: CPT | Performed by: INTERNAL MEDICINE

## 2020-07-15 PROCEDURE — 0296T LEADLESS EKG MONITOR 3 TO 14 DAYS: CPT

## 2020-07-15 PROCEDURE — 25000128 H RX IP 250 OP 636: Performed by: INTERNAL MEDICINE

## 2020-07-15 PROCEDURE — 93306 TTE W/DOPPLER COMPLETE: CPT

## 2020-07-15 PROCEDURE — 0298T LEADLESS EKG MONITOR 3 TO 14 DAYS: CPT | Performed by: INTERNAL MEDICINE

## 2020-07-15 PROCEDURE — 93017 CV STRESS TEST TRACING ONLY: CPT

## 2020-07-15 PROCEDURE — 93018 CV STRESS TEST I&R ONLY: CPT | Performed by: INTERNAL MEDICINE

## 2020-07-15 PROCEDURE — 34300033 ZZH RX 343: Performed by: INTERNAL MEDICINE

## 2020-07-15 RX ORDER — REGADENOSON 0.08 MG/ML
0.4 INJECTION, SOLUTION INTRAVENOUS ONCE
Status: COMPLETED | OUTPATIENT
Start: 2020-07-15 | End: 2020-07-15

## 2020-07-15 RX ORDER — AMINOPHYLLINE 25 MG/ML
50-100 INJECTION, SOLUTION INTRAVENOUS
Status: DISCONTINUED | OUTPATIENT
Start: 2020-07-15 | End: 2020-07-16 | Stop reason: HOSPADM

## 2020-07-15 RX ORDER — ACYCLOVIR 200 MG/1
0-1 CAPSULE ORAL
Status: DISCONTINUED | OUTPATIENT
Start: 2020-07-15 | End: 2020-07-16 | Stop reason: HOSPADM

## 2020-07-15 RX ORDER — ALBUTEROL SULFATE 90 UG/1
2 AEROSOL, METERED RESPIRATORY (INHALATION) EVERY 5 MIN PRN
Status: DISCONTINUED | OUTPATIENT
Start: 2020-07-15 | End: 2020-07-16 | Stop reason: HOSPADM

## 2020-07-15 RX ADMIN — TETROFOSMIN 9.68 MCI.: 1.38 INJECTION, POWDER, LYOPHILIZED, FOR SOLUTION INTRAVENOUS at 10:08

## 2020-07-15 RX ADMIN — TETROFOSMIN 3.11 MCI.: 1.38 INJECTION, POWDER, LYOPHILIZED, FOR SOLUTION INTRAVENOUS at 08:25

## 2020-07-15 RX ADMIN — REGADENOSON 0.4 MG: 0.08 INJECTION, SOLUTION INTRAVENOUS at 10:05

## 2020-07-15 ASSESSMENT — MIFFLIN-ST. JEOR: SCORE: 1422.66

## 2020-07-15 NOTE — TELEPHONE ENCOUNTER
Left message for Patient to call  -   Reviewed Nuclear test with Dr. Mendoza. Recommendation is to have ERIC visit next week and possible angiogram.   ERIC virtual  visit scheduled for 7- with [facility].     Next Dr. Galicia visit 8-    Echo and Nuclear stress test 7-  Echo results-  The visual ejection fraction is estimated at 55-60%.  The left ventricle is normal in structure, function and size.  No regional wall motion abnormalities noted.  The right ventricle is mildly dilated.  The right ventricular systolic function is normal.  Moderate valvular aortic stenosis. Mean gradient is 17 mmHg. Visually the  valve appears more stenotic. DI is 0.27  There is mild (1+) aortic regurgitation.  There is no pericardial effusion.  Progression in Aortic Stenosis compared to 2018 study    Nuclear stress test 7-     The nuclear stress test is abnormal.     There is a medium sized area of moderate ischemia in the mid to distal anterior and anterolateral segment(s) of the left ventricle.     There is a small area of moderate ischemia in the mid to basal lateral segment(s) of the left ventricle.     Left ventricular function is normal.     A prior study was conducted on 7/23/2009.  This study has changes noted when compared with the prior study.  No LAD/Circ ischemia present in previous study.    ZIO Patch  started today.     ** Note to be sent to Dr. Galicia after speaking with Patient.for recommendation of possible angiogram

## 2020-07-15 NOTE — TELEPHONE ENCOUNTER
Spoke with patient and patient's spouse to review abnormal echo and nuclear study. Reviewed recommendation from Dr. Mendoza to expect to plan for angiogram. Patient is available on Monday for ERIC visit at 7:30.   Patient states he is feeling well today. He has noticed some SOB and chest tightness lately, so he is not surprised at the results.  Reviewed with patient and spouse to see ER assessment for increase in cardiac symptoms prior to angiogram. Patient verbalized understanding and agreed with plan.    Reviewed that COVID-19 testing is needed within the 3-day window of procedure. Will message Dr. Galicia for final approval to plan angiogram and then will schedule after ERIC visit.

## 2020-07-16 NOTE — TELEPHONE ENCOUNTER
Order placed for cardiac angiogram per Dr. Galicia.  Message left for scheduling team to contact patient.  Message left for patient with update to expect a call.

## 2020-07-17 ENCOUNTER — DOCUMENTATION ONLY (OUTPATIENT)
Dept: CARDIOLOGY | Facility: CLINIC | Age: 65
End: 2020-07-17

## 2020-07-17 ENCOUNTER — TELEPHONE (OUTPATIENT)
Dept: CARDIOLOGY | Facility: CLINIC | Age: 65
End: 2020-07-17

## 2020-07-17 DIAGNOSIS — Z11.59 ENCOUNTER FOR SCREENING FOR OTHER VIRAL DISEASES: Primary | ICD-10-CM

## 2020-07-17 NOTE — PROGRESS NOTES
"  Cardiology Video Visit Progress Note    Service Date: July 20, 2020    PRIMARY CARDIOLOGIST: Dr. Sander Galicia      REASON FOR VISIT: Follow up post coronary angiogram    Mr. Johan Navarro is a 65 year old male who is being evaluated via a billable video visit to minimize risk of exposure with the current COVID-19 pandemic.    The patient has been notified of following:     \"This video visit will be conducted via a call between you and your provider. We have found that certain health care needs can be provided without the need for an in-person physical exam. This service lets us provide the care you need with a video conversation. If a prescription is necessary we can send it directly to your pharmacy. If lab work is needed we can place an order for that and you can then stop by our lab to have the test done at a later time.    Video visits are billed at different rates depending on your insurance coverage. Please reach out to your insurance provider with any questions.    If during the course of the call the provider feels a video visit is not appropriate, you will not be charged for this service.\"    Patient has given verbal consent for Video visit? Yes  How would you like to obtain your AVS? [facility]  Patient would like the video invitation sent by: Text to cell phone: [phone]  Will anyone else be joining your video visit? No    I had the pleasure of speaking with Mr. Johan Navarro via video visit today for follow up of his recent Lexiscan nuclear stress test. He is a very pleasant 65 year old male with a past medical history of hyperlipidemia, moderate aortic valve stenosis, and carotid artery stenosis.  He also has a history of coronary artery disease and underwent percutaneous coronary intervention with stenting of the distal and ostial circumflex as well as in the first obtuse marginal artery in 1998. He has followed with Dr. Sander Galicia for many years for his cardiology care. He has had a markedly elevated " LDL cholesterol in the past, which has since been well controlled with the use of atorvastatin and repatha.    He was last seen in the clinic by Dr. Galicia in May 2019. He was doing quite well at that time with a stress echocardiogram before the visit which was negative for inducible ischemia. A repeat carotid ultrasound was also completed in June 2019 which showed moderate left-sided internal carotid artery disease which appeared stable with minimal progression from prior study in 2008.    Mr. Navarro recently presented to the emergency department [facility] on 7/6/2020 following an episode of loss of consciousness.  He initially had gone into an urgent care to have his ankle looked at after injuring his ankle moving a shelf.  While he was waiting in urgent care he had an episode of dizziness in the setting of intense pain where he felt like the room was spinning and subsequently passed out. He was taken into the emergency department where his work-up was largely unremarkable with an undetectable troponin level and an EKG showing normal sinus rhythm without acute ischemic changes. This was felt to be a vasovagal episode in the setting of his extreme musculoskeletal pain.    He was discharged home and was subsequently set up for a repeat echocardiogram, Lexiscan nuclear stress test, and a 1 week cardiac event monitor. His echocardiogram was completed on 7/15/2020 and showed normal LV size and systolic function with an EF of 55 to 60%. The RV was noted to be mildly dilated with normal RV function. Moderate aortic valve stenosis was noted with a mean gradient of 17 mmHg. The Lexiscan nuclear stress test shows a medium sized area of moderate ischemia in the mid to distal anterior and anterolateral segments of the left ventricle and a small area of moderate ischemia in the mid to basal lateral segments. The stress test findings were reviewed by Dr. Galicia and a coronary angiogram was recommended based  on the findings. The patient is also still currently wearing the 7-day event monitor and will be turning this into the next couple of days.    Today, Mr. Navarro tells me that he has been feeling generally quite well. He has had a few episodes of mild chest tightness with exertion over the past couple of months while working in his large yard. He also feels that he hasn't has quite as much enegry as he usually does over the past month or two and that he tires out more quickly than usual with his yard work. He otherwise denies shortness of breath, dyspnea on exertion, lower extremity edema, palpitations, dizziness, or recurrent presyncope or syncopal episodes.    The recommendation for the coronary angiogram was discussed with the patient based on his abnormal stress test findings and exertional symptoms. Risks and benefits of coronary angiography were discussed today including, bleeding, bruising, infection, allergic reaction, kidney damage (including need for dialysis), stroke, heart attack, vascular damage, emergency open heart surgery, up to and including death. Patient indicates understanding and is agreeable to proceed. He has not had any recent issues with bleeding, hematuria, or dark stools. He does not have any upcoming surgeries planned. He has no history of prior allergy to contrast dye.    ASSESSMENT AND PLAN:  1. Coronary artery disease  - Status post PCI to the circumflex and obtuse marginal in 1998.  - Lexiscan nuclear stress test on 7/15/2020 showing a medium sized area of moderate ischemia in the mid to distal anterior and anterolateral segments of the left ventricle and a small area of moderate ischemia in the mid to basal lateral segments.  - He continues to have occasional exertional chest tightness and decreased exercise tolerance.  - Plan for coronary angiogram tomorrow as noted above.    2. Mixed hyperlipidemia  - Treated on atorvastatin 80 mg daily and Repatha.    3. Moderate aortic valve  stenosis  - Mean gradient of 17 mmHg on most recent echocardiogram on 7/15/2020.    4. Carotid artery stenosis  - Moderate left-sided internal carotid artery disease on carotid ultrasound in 2008 with minimal progression on repeat carotid ultrasound in June 2019.    Video-Visit Details  Type of service: Video Visit    Video Start Time: 7:37 AM  Video End Time (time video stopped): 7:55 AM  Total time of Video: 18 minutes     Originating Location (pt. Location): Home  Distant Location (provider location): [facility]    Platform used for Video Visit: Arslan    Thank you for the opportunity to participate in this pleasant patient's care. He will see Alexsandra Guzman NP in follow up in about 1 week following the angiogram as previously scheduled and Dr. Galicia in about 1 month.      ANGELINE Church, CNP  Text Page  (8am - 5pm, M-F)    Orders this Visit:  No orders of the defined types were placed in this encounter.    No orders of the defined types were placed in this encounter.    There are no discontinued medications.  No diagnosis found.    CURRENT MEDICATIONS:  Current Outpatient Medications   Medication Sig Dispense Refill     albuterol (PROAIR HFA/PROVENTIL HFA/VENTOLIN HFA) 108 (90 Base) MCG/ACT inhaler Inhale 4-6 puffs into the lungs every 2 hours as needed for shortness of breath / dyspnea 1 Inhaler 1     aspirin (ASA) 81 MG chewable tablet Take 1 tablet (81 mg) by mouth daily 90 tablet 3     atorvastatin (LIPITOR) 80 MG tablet Take 1 tablet (80 mg) by mouth daily 90 tablet 0     diclofenac (VOLTAREN) 1 % topical gel Apply 2 grams to hands/fingers four times daily using enclosed dosing card. 100 g 1     evolocumab (REPATHA) 140 MG/ML prefilled autoinjector Inject 1 mL (140 mg) Subcutaneous every 14 days 6 mL 3     fluticasone (FLONASE) 50 MCG/ACT nasal spray Spray in nostril as needed  3     gabapentin (NEURONTIN) 600 MG tablet Take 1 tablet (600 mg) by mouth 2 times daily 180 tablet 3      sildenafil (REVATIO) 20 MG tablet TAKE 2-5 TABLETS BY MOUTH AS NEEDED PRIOR TO SEXUAL INTERCOURSE 90 tablet 1     ALLERGIES  Allergies   Allergen Reactions     Crestor [Rosuvastatin] GI Disturbance     Dust Mites      No Clinical Screening - See Comments      Goose feathers     Pollen Extract      PAST MEDICAL, SURGICAL, FAMILY HISTORY:  History was reviewed and updated as needed, see medical record.    SOCIAL HISTORY:  Social History     Socioeconomic History     Marital status:      Spouse name: Not on file     Number of children: Not on file     Years of education: Not on file     Highest education level: Not on file   Occupational History     Not on file   Social Needs     Financial resource strain: Not on file     Food insecurity     Worry: Not on file     Inability: Not on file     Transportation needs     Medical: Not on file     Non-medical: Not on file   Tobacco Use     Smoking status: Former Smoker     Types: Dip, chew, snus or snuff     Smokeless tobacco: Current User     Types: Chew     Tobacco comment: 12/10/18: occ chew, not every day   Substance and Sexual Activity     Alcohol use: Yes     Comment: very rare     Drug use: No     Sexual activity: Yes     Partners: Female   Lifestyle     Physical activity     Days per week: Not on file     Minutes per session: Not on file     Stress: Not on file   Relationships     Social connections     Talks on phone: Not on file     Gets together: Not on file     Attends [employer] service: Not on file     Active member of club or organization: Not on file     Attends meetings of clubs or organizations: Not on file     Relationship status: Not on file     Intimate partner violence     Fear of current or ex partner: Not on file     Emotionally abused: Not on file     Physically abused: Not on file     Forced sexual activity: Not on file   Other Topics Concern      Service Not Asked     Blood Transfusions Not Asked     Caffeine Concern Yes     Comment:  coffee and soda     Occupational Exposure Not Asked     Hobby Hazards Not Asked     Sleep Concern Not Asked     Stress Concern Not Asked     Weight Concern Not Asked     Special Diet No     Back Care Not Asked     Exercise Yes     Comment: regular      Bike Helmet Not Asked     Seat Belt Yes     Self-Exams Not Asked     Parent/sibling w/ CABG, MI or angioplasty before 65F 55M? Yes     Comment: 49   Social History Narrative     Not on file     Review of Systems:  Skin: NEGATIVE  Eyes:Ears/Nose/Throat: NEGATIVE  Respiratory: NEGATIVE  Cardiovascular: energy level decreased  Gastrointestinal: NEGATIVE  Genitourinary:NEGATIVE   Musculoskeletal: NEGATIVE  Neurologic: NEGATIVE  Psychiatric: NEGATIVE  Hematologic/Lymphatic/Immunologic: NEGATIVE  Endocrine:  NEGATIVE    Xiomara Clarke LPN    PHYSICAL EXAM:  Patient Reported Vitals:  BP: N/A  Pulse: N/A  Weight: N/A    There were no vitals taken for this visit.   Wt Readings from Last 4 Encounters:   07/15/20 69.5 kg (153 lb 3.2 oz)   07/13/20 68.2 kg (150 lb 4.8 oz)   07/16/19 68.4 kg (150 lb 14.4 oz)   05/28/19 69.9 kg (154 lb)     General Appearance:  No distress, normal body habitus, upright.  ENT/Mouth:  Membranes moist, no nasal discharge or bleeding gums. Normal head shape, no evidence of injury or laceration.  Eyes:  No scleral icterus, normal conjunctivae.  Neck:  No evidence of thyromegaly.  Chest/Lungs:  No audible wheezing equal chest wall expansion. Non labored breathing. No cough.  Cardiovascular:  Difficult to assess JVP due to video quality and angle, but no obvious JVD appreciated.  Abdomen:  No evidence of abdominal distention. No observed jaundice.  Extremities:  No cyanosis or clubbing noted.  Skin:  No xanthelasma, normal skin color. No evidence of facial lacerations.  Neurologic:  Normal arm motion bilateral, no tremors. No evidence of focal defect.  Psychiatric:  Alert and oriented x3, calm.    Recent Lab Results:  LIPID RESULTS:  Lab Results    Component Value Date    CHOL 136 05/14/2019    HDL 61 05/14/2019    LDL 62 05/14/2019    TRIG 63 05/14/2019    CHOLHDLRATIO 3.3 09/15/2015     LIVER ENZYME RESULTS:  Lab Results   Component Value Date    AST 27 07/06/2020    ALT 26 07/06/2020     CBC RESULTS:  Lab Results   Component Value Date    WBC 6.2 07/06/2020    RBC 4.04 (L) 07/06/2020    HGB 11.7 (L) 07/06/2020    HCT 34.9 (L) 07/06/2020    MCV 86 07/06/2020    MCH 29.0 07/06/2020    MCHC 33.5 07/06/2020    RDW 14.4 07/06/2020     07/06/2020     BMP RESULTS:  Lab Results   Component Value Date     07/06/2020    POTASSIUM 3.7 07/06/2020    CHLORIDE 105 07/06/2020    CO2 25 07/06/2020    ANIONGAP 5 07/06/2020    GLC 88 07/06/2020    BUN 16 07/06/2020    CR 0.98 07/06/2020    GFRESTIMATED 81 07/06/2020    GFRESTBLACK >90 07/06/2020    ONDINA 7.8 (L) 07/06/2020      CC  Washington Yang, PA-C  [address] [address] [place] [address]    This note was completed in part using Dragon voice recognition software. Although reviewed after completion, some word and grammatical errors may occur.

## 2020-07-17 NOTE — PATIENT INSTRUCTIONS
Thank you for your video visit with the [facility] today.    Today's plan:   1. Continue with your current medications.  2. Complete the coronary angiogram tomorrow and follow up with Alexsandra Guzman NP in 1 week post procedure as scheduled.  3. Follow up with Dr. Galicia in about 1 month.    If you have questions or concerns, please call my nurse at [phone].    Scheduling phone number: [phone]    It was a pleasure speaking with you today!     Hector Rosas, Nurse Practitioner  [facility]  July 20, 2020  _____________________________________________________

## 2020-07-17 NOTE — TELEPHONE ENCOUNTER
Attempted to contact patient and spouse to plan cardiac angiogram. Left message for patient or spouse to call back to pick a procedure date.    11:36 Spoke with patient and spouse. He is scheduled for angiogram on 7/21/2020. ERIC visit is on Monday. He was able to schedule a COVID-19 test on Sunday.    Contacted patient to review pre-cath procedures: patient is scheduled for coronary angiogram (left)  on 7/21/2020 . Patient's arrival time is 0800 and procedure time is 1000. Patient is aware to be NPO except for medications. Patient will hold the medication Viagra for 48 hrs. . Patient has arranged for transportation and 24 hour f/u care. Patient has no known contract dye allergy. Patient is not diabetic. Patient is not taking anti-coagulation medication. Patient's renal function is WNL for procedure. Patient will continue daily aspirin dose 81.     Travel screening completed.    WELLNESS SCREENING TOOL  Symptom screening  Do you have one of the following NEW symptoms:       Fever (subjective or >100.0)? no     New cough: no     Shortness of breath  no      Chills? no      New loss of teste or smell? no     Generalized body aches? no     New persistent headache? no     New sore throat? no        Nausea, vomiting or diarrhea?  no        Within the past 3 weeks, have you been exposed to someone with a known positive illness below? no       COVID - 19 (known or suspected) no     Pertussis? no      Chicken pox? no      Measles? no     Patient notified of visitor restriction: yes    Patient informed to wear mask? yes    Patient's appointment status: Patient will be seen as scheduled on 7/21/2020

## 2020-07-19 DIAGNOSIS — Z11.59 ENCOUNTER FOR SCREENING FOR OTHER VIRAL DISEASES: ICD-10-CM

## 2020-07-19 LAB
SARS-COV-2 RNA SPEC QL NAA+PROBE: NORMAL
SPECIMEN SOURCE: NORMAL

## 2020-07-19 PROCEDURE — U0003 INFECTIOUS AGENT DETECTION BY NUCLEIC ACID (DNA OR RNA); SEVERE ACUTE RESPIRATORY SYNDROME CORONAVIRUS 2 (SARS-COV-2) (CORONAVIRUS DISEASE [COVID-19]), AMPLIFIED PROBE TECHNIQUE, MAKING USE OF HIGH THROUGHPUT TECHNOLOGIES AS DESCRIBED BY CMS-2020-01-R: HCPCS | Performed by: INTERNAL MEDICINE

## 2020-07-20 ENCOUNTER — VIRTUAL VISIT (OUTPATIENT)
Dept: CARDIOLOGY | Facility: CLINIC | Age: 65
End: 2020-07-20
Payer: MEDICARE

## 2020-07-20 DIAGNOSIS — E78.2 MIXED HYPERLIPIDEMIA: ICD-10-CM

## 2020-07-20 DIAGNOSIS — I35.0 NONRHEUMATIC AORTIC VALVE STENOSIS: ICD-10-CM

## 2020-07-20 DIAGNOSIS — I65.23 BILATERAL CAROTID ARTERY STENOSIS: ICD-10-CM

## 2020-07-20 DIAGNOSIS — I25.10 CORONARY ARTERY DISEASE INVOLVING NATIVE CORONARY ARTERY OF NATIVE HEART WITHOUT ANGINA PECTORIS: Primary | ICD-10-CM

## 2020-07-20 LAB
LABORATORY COMMENT REPORT: NORMAL
SARS-COV-2 RNA SPEC QL NAA+PROBE: NEGATIVE
SPECIMEN SOURCE: NORMAL

## 2020-07-20 PROCEDURE — 99213 OFFICE O/P EST LOW 20 MIN: CPT | Mod: 95 | Performed by: NURSE PRACTITIONER

## 2020-07-20 RX ORDER — SODIUM CHLORIDE 9 MG/ML
INJECTION, SOLUTION INTRAVENOUS CONTINUOUS
Status: CANCELLED | OUTPATIENT
Start: 2020-07-20

## 2020-07-20 RX ORDER — ASPIRIN 81 MG/1
81 TABLET ORAL DAILY
Status: CANCELLED | OUTPATIENT
Start: 2020-07-20 | End: 2020-07-22

## 2020-07-20 RX ORDER — POTASSIUM CHLORIDE 1500 MG/1
20 TABLET, EXTENDED RELEASE ORAL
Status: CANCELLED | OUTPATIENT
Start: 2020-07-20

## 2020-07-20 RX ORDER — LIDOCAINE 40 MG/G
CREAM TOPICAL
Status: CANCELLED | OUTPATIENT
Start: 2020-07-20

## 2020-07-20 NOTE — TELEPHONE ENCOUNTER
7/20/2020 called [facility] to verify that angiogram is covered by his insurance.    Message to patient that his covid-19 test was negative.

## 2020-07-20 NOTE — LETTER
7/20/2020    [place] Yang PA-C  [id number]  [facility] [address]    RE: Johan Navarro       Dear Colleague,    I had the pleasure of seeing Johan Navarro in the [facility].      Cardiology Video Visit Progress Note    Service Date: July 20, 2020    PRIMARY CARDIOLOGIST: Dr. Sander Galicia      REASON FOR VISIT: Follow up post coronary angiogram    Mr. Johan Navarro is a 65 year old male who is being evaluated via a billable video visit to minimize risk of exposure with the current COVID-19 pandemic.    I had the pleasure of speaking with Mr. Johan Navarro via video visit today for follow up of his recent Lexiscan nuclear stress test. He is a very pleasant 65 year old male with a past medical history of hyperlipidemia, moderate aortic valve stenosis, and carotid artery stenosis.  He also has a history of coronary artery disease and underwent percutaneous coronary intervention with stenting of the distal and ostial circumflex as well as in the first obtuse marginal artery in 1998. He has followed with Dr. Sander Galicia for many years for his cardiology care. He has had a markedly elevated LDL cholesterol in the past, which has since been well controlled with the use of atorvastatin and repatha.    He was last seen in the clinic by Dr. Galicia in May 2019. He was doing quite well at that time with a stress echocardiogram before the visit which was negative for inducible ischemia. A repeat carotid ultrasound was also completed in June 2019 which showed moderate left-sided internal carotid artery disease which appeared stable with minimal progression from prior study in 2008.    Mr. Navarro recently presented to the emergency department [facility] on 7/6/2020 following an episode of loss of consciousness.  He initially had gone into an urgent care to have his ankle looked at after injuring his ankle moving a shelf.  While he was waiting in urgent care he had an  episode of dizziness in the setting of intense pain where he felt like the room was spinning and subsequently passed out. He was taken into the emergency department where his work-up was largely unremarkable with an undetectable troponin level and an EKG showing normal sinus rhythm without acute ischemic changes. This was felt to be a vasovagal episode in the setting of his extreme musculoskeletal pain.    He was discharged home and was subsequently set up for a repeat echocardiogram, Lexiscan nuclear stress test, and a 1 week cardiac event monitor. His echocardiogram was completed on 7/15/2020 and showed normal LV size and systolic function with an EF of 55 to 60%. The RV was noted to be mildly dilated with normal RV function. Moderate aortic valve stenosis was noted with a mean gradient of 17 mmHg. The Lexiscan nuclear stress test shows a medium sized area of moderate ischemia in the mid to distal anterior and anterolateral segments of the left ventricle and a small area of moderate ischemia in the mid to basal lateral segments. The stress test findings were reviewed by Dr. Galicia and a coronary angiogram was recommended based on the findings. The patient is also still currently wearing the 7-day event monitor and will be turning this into the next couple of days.    Today, Mr. Navarro tells me that he has been feeling generally quite well. He has had a few episodes of mild chest tightness with exertion over the past couple of months while working in his large yard. He also feels that he hasn't has quite as much enegry as he usually does over the past month or two and that he tires out more quickly than usual with his yard work. He otherwise denies shortness of breath, dyspnea on exertion, lower extremity edema, palpitations, dizziness, or recurrent presyncope or syncopal episodes.    The recommendation for the coronary angiogram was discussed with the patient based on his abnormal stress test findings and exertional  symptoms. Risks and benefits of coronary angiography were discussed today including, bleeding, bruising, infection, allergic reaction, kidney damage (including need for dialysis), stroke, heart attack, vascular damage, emergency open heart surgery, up to and including death. Patient indicates understanding and is agreeable to proceed. He has not had any recent issues with bleeding, hematuria, or dark stools. He does not have any upcoming surgeries planned. He has no history of prior allergy to contrast dye.    ASSESSMENT AND PLAN:  1. Coronary artery disease  - Status post PCI to the circumflex and obtuse marginal in 1998.  - Lexiscan nuclear stress test on 7/15/2020 showing a medium sized area of moderate ischemia in the mid to distal anterior and anterolateral segments of the left ventricle and a small area of moderate ischemia in the mid to basal lateral segments.  - He continues to have occasional exertional chest tightness and decreased exercise tolerance.  - Plan for coronary angiogram tomorrow as noted above.    2. Mixed hyperlipidemia  - Treated on atorvastatin 80 mg daily and Repatha.    3. Moderate aortic valve stenosis  - Mean gradient of 17 mmHg on most recent echocardiogram on 7/15/2020.    4. Carotid artery stenosis  - Moderate left-sided internal carotid artery disease on carotid ultrasound in 2008 with minimal progression on repeat carotid ultrasound in June 2019.    Video-Visit Details  Type of service: Video Visit    Video Start Time: 7:37 AM  Video End Time (time video stopped): 7:55 AM  Total time of Video: 18 minutes     Originating Location (pt. Location): Home  Distant Location (provider location): [facility]    Platform used for Video Visit: Arslan    Thank you for the opportunity to participate in this pleasant patient's care. He will see Alexsandra Guzman NP in follow up in about 1 week following the angiogram as previously scheduled and Dr. Galicia in about 1 month.       ANGELINE Church, CNP  Text Page  (8am - 5pm, M-F)    Orders this Visit:  No orders of the defined types were placed in this encounter.    No orders of the defined types were placed in this encounter.    There are no discontinued medications.  No diagnosis found.    CURRENT MEDICATIONS:  Current Outpatient Medications   Medication Sig Dispense Refill     albuterol (PROAIR HFA/PROVENTIL HFA/VENTOLIN HFA) 108 (90 Base) MCG/ACT inhaler Inhale 4-6 puffs into the lungs every 2 hours as needed for shortness of breath / dyspnea 1 Inhaler 1     aspirin (ASA) 81 MG chewable tablet Take 1 tablet (81 mg) by mouth daily 90 tablet 3     atorvastatin (LIPITOR) 80 MG tablet Take 1 tablet (80 mg) by mouth daily 90 tablet 0     diclofenac (VOLTAREN) 1 % topical gel Apply 2 grams to hands/fingers four times daily using enclosed dosing card. 100 g 1     evolocumab (REPATHA) 140 MG/ML prefilled autoinjector Inject 1 mL (140 mg) Subcutaneous every 14 days 6 mL 3     fluticasone (FLONASE) 50 MCG/ACT nasal spray Spray in nostril as needed  3     gabapentin (NEURONTIN) 600 MG tablet Take 1 tablet (600 mg) by mouth 2 times daily 180 tablet 3     sildenafil (REVATIO) 20 MG tablet TAKE 2-5 TABLETS BY MOUTH AS NEEDED PRIOR TO SEXUAL INTERCOURSE 90 tablet 1     ALLERGIES  Allergies   Allergen Reactions     Crestor [Rosuvastatin] GI Disturbance     Dust Mites      No Clinical Screening - See Comments      Goose feathers     Pollen Extract      PAST MEDICAL, SURGICAL, FAMILY HISTORY:  History was reviewed and updated as needed, see medical record.    SOCIAL HISTORY:  Social History     Socioeconomic History     Marital status:      Spouse name: Not on file     Number of children: Not on file     Years of education: Not on file     Highest education level: Not on file   Occupational History     Not on file   Social Needs     Financial resource strain: Not on file     Food insecurity     Worry: Not on file     Inability: Not on file      Transportation needs     Medical: Not on file     Non-medical: Not on file   Tobacco Use     Smoking status: Former Smoker     Types: Dip, chew, snus or snuff     Smokeless tobacco: Current User     Types: Chew     Tobacco comment: 12/10/18: occ chew, not every day   Substance and Sexual Activity     Alcohol use: Yes     Comment: very rare     Drug use: No     Sexual activity: Yes     Partners: Female   Lifestyle     Physical activity     Days per week: Not on file     Minutes per session: Not on file     Stress: Not on file   Relationships     Social connections     Talks on phone: Not on file     Gets together: Not on file     Attends Restorationism service: Not on file     Active member of club or organization: Not on file     Attends meetings of clubs or organizations: Not on file     Relationship status: Not on file     Intimate partner violence     Fear of current or ex partner: Not on file     Emotionally abused: Not on file     Physically abused: Not on file     Forced sexual activity: Not on file   Other Topics Concern      Service Not Asked     Blood Transfusions Not Asked     Caffeine Concern Yes     Comment: coffee and soda     Occupational Exposure Not Asked     Hobby Hazards Not Asked     Sleep Concern Not Asked     Stress Concern Not Asked     Weight Concern Not Asked     Special Diet No     Back Care Not Asked     Exercise Yes     Comment: regular      Bike Helmet Not Asked     Seat Belt Yes     Self-Exams Not Asked     Parent/sibling w/ CABG, MI or angioplasty before 65F 55M? Yes     Comment: 49   Social History Narrative     Not on file     Review of Systems:  Skin: NEGATIVE  Eyes:Ears/Nose/Throat: NEGATIVE  Respiratory: NEGATIVE  Cardiovascular: energy level decreased  Gastrointestinal: NEGATIVE  Genitourinary:NEGATIVE   Musculoskeletal: NEGATIVE  Neurologic: NEGATIVE  Psychiatric: NEGATIVE  Hematologic/Lymphatic/Immunologic: NEGATIVE  Endocrine:  NEGATIVE    Xiomara Clarke LPN    PHYSICAL  EXAM:  Patient Reported Vitals:  BP: N/A  Pulse: N/A  Weight: N/A    There were no vitals taken for this visit.   Wt Readings from Last 4 Encounters:   07/15/20 69.5 kg (153 lb 3.2 oz)   07/13/20 68.2 kg (150 lb 4.8 oz)   07/16/19 68.4 kg (150 lb 14.4 oz)   05/28/19 69.9 kg (154 lb)     General Appearance:  No distress, normal body habitus, upright.  ENT/Mouth:  Membranes moist, no nasal discharge or bleeding gums. Normal head shape, no evidence of injury or laceration.  Eyes:  No scleral icterus, normal conjunctivae.  Neck:  No evidence of thyromegaly.  Chest/Lungs:  No audible wheezing equal chest wall expansion. Non labored breathing. No cough.  Cardiovascular:  Difficult to assess JVP due to video quality and angle, but no obvious JVD appreciated.  Abdomen:  No evidence of abdominal distention. No observed jaundice.  Extremities:  No cyanosis or clubbing noted.  Skin:  No xanthelasma, normal skin color. No evidence of facial lacerations.  Neurologic:  Normal arm motion bilateral, no tremors. No evidence of focal defect.  Psychiatric:  Alert and oriented x3, calm.    Recent Lab Results:  LIPID RESULTS:  Lab Results   Component Value Date    CHOL 136 05/14/2019    HDL 61 05/14/2019    LDL 62 05/14/2019    TRIG 63 05/14/2019    CHOLHDLRATIO 3.3 09/15/2015     LIVER ENZYME RESULTS:  Lab Results   Component Value Date    AST 27 07/06/2020    ALT 26 07/06/2020     CBC RESULTS:  Lab Results   Component Value Date    WBC 6.2 07/06/2020    RBC 4.04 (L) 07/06/2020    HGB 11.7 (L) 07/06/2020    HCT 34.9 (L) 07/06/2020    MCV 86 07/06/2020    MCH 29.0 07/06/2020    MCHC 33.5 07/06/2020    RDW 14.4 07/06/2020     07/06/2020     BMP RESULTS:  Lab Results   Component Value Date     07/06/2020    POTASSIUM 3.7 07/06/2020    CHLORIDE 105 07/06/2020    CO2 25 07/06/2020    ANIONGAP 5 07/06/2020    GLC 88 07/06/2020    BUN 16 07/06/2020    CR 0.98 07/06/2020    GFRESTIMATED 81 07/06/2020    GFRESTBLACK >90 07/06/2020     ONDINA 7.8 (L) 07/06/2020        This note was completed in part using Dragon voice recognition software. Although reviewed after completion, some word and grammatical errors may occur.     Thank you for allowing me to participate in the care of your patient.    Sincerely,     Albert oRsas NP     [facility]

## 2020-07-21 ENCOUNTER — SURGERY (OUTPATIENT)
Age: 65
End: 2020-07-21
Payer: MEDICARE

## 2020-07-21 ENCOUNTER — HOSPITAL ENCOUNTER (OUTPATIENT)
Facility: CLINIC | Age: 65
Discharge: HOME | End: 2020-07-21
Admitting: INTERNAL MEDICINE
Payer: MEDICARE

## 2020-07-21 VITALS
RESPIRATION RATE: 16 BRPM | BODY MASS INDEX: 24.51 KG/M2 | TEMPERATURE: 98 F | SYSTOLIC BLOOD PRESSURE: 148 MMHG | HEART RATE: 67 BPM | HEIGHT: 66 IN | WEIGHT: 152.5 LBS | OXYGEN SATURATION: 97 % | DIASTOLIC BLOOD PRESSURE: 94 MMHG

## 2020-07-21 DIAGNOSIS — I25.10 CORONARY ARTERY DISEASE INVOLVING NATIVE CORONARY ARTERY OF NATIVE HEART WITHOUT ANGINA PECTORIS: ICD-10-CM

## 2020-07-21 PROBLEM — Z98.890 STATUS POST CORONARY ANGIOGRAM: Status: ACTIVE | Noted: 2020-07-21

## 2020-07-21 LAB
ANION GAP SERPL CALCULATED.3IONS-SCNC: 4 MMOL/L (ref 3–14)
APTT PPP: 31 SEC (ref 22–37)
BUN SERPL-MCNC: 17 MG/DL (ref 7–30)
CALCIUM SERPL-MCNC: 8.3 MG/DL (ref 8.5–10.1)
CHLORIDE SERPL-SCNC: 111 MMOL/L (ref 94–109)
CO2 SERPL-SCNC: 25 MMOL/L (ref 20–32)
CREAT SERPL-MCNC: 0.92 MG/DL (ref 0.66–1.25)
ERYTHROCYTE [DISTWIDTH] IN BLOOD BY AUTOMATED COUNT: 14.7 % (ref 10–15)
GFR SERPL CREATININE-BSD FRML MDRD: 86 ML/MIN/{1.73_M2}
GLUCOSE SERPL-MCNC: 92 MG/DL (ref 70–99)
HCT VFR BLD AUTO: 36.7 % (ref 40–53)
HGB BLD-MCNC: 11.9 G/DL (ref 13.3–17.7)
INR PPP: 0.96 (ref 0.86–1.14)
KCT BLD-ACNC: 323 SEC (ref 75–150)
MCH RBC QN AUTO: 28.1 PG (ref 26.5–33)
MCHC RBC AUTO-ENTMCNC: 32.4 G/DL (ref 31.5–36.5)
MCV RBC AUTO: 87 FL (ref 78–100)
PLATELET # BLD AUTO: 246 10E9/L (ref 150–450)
POTASSIUM SERPL-SCNC: 4.2 MMOL/L (ref 3.4–5.3)
RBC # BLD AUTO: 4.23 10E12/L (ref 4.4–5.9)
SODIUM SERPL-SCNC: 140 MMOL/L (ref 133–144)
WBC # BLD AUTO: 4.5 10E9/L (ref 4–11)

## 2020-07-21 PROCEDURE — C1874 STENT, COATED/COV W/DEL SYS: HCPCS | Performed by: INTERNAL MEDICINE

## 2020-07-21 PROCEDURE — 25000128 H RX IP 250 OP 636: Performed by: INTERNAL MEDICINE

## 2020-07-21 PROCEDURE — 93571 IV DOP VEL&/PRESS C FLO 1ST: CPT | Mod: 26 | Performed by: INTERNAL MEDICINE

## 2020-07-21 PROCEDURE — 85027 COMPLETE CBC AUTOMATED: CPT | Performed by: INTERNAL MEDICINE

## 2020-07-21 PROCEDURE — 93005 ELECTROCARDIOGRAM TRACING: CPT

## 2020-07-21 PROCEDURE — 93454 CORONARY ARTERY ANGIO S&I: CPT | Performed by: INTERNAL MEDICINE

## 2020-07-21 PROCEDURE — 25000132 ZZH RX MED GY IP 250 OP 250 PS 637: Mod: GY | Performed by: INTERNAL MEDICINE

## 2020-07-21 PROCEDURE — 93454 CORONARY ARTERY ANGIO S&I: CPT | Mod: 26 | Performed by: INTERNAL MEDICINE

## 2020-07-21 PROCEDURE — 40000235 ZZH STATISTIC TELEMETRY

## 2020-07-21 PROCEDURE — C1725 CATH, TRANSLUMIN NON-LASER: HCPCS | Performed by: INTERNAL MEDICINE

## 2020-07-21 PROCEDURE — C1887 CATHETER, GUIDING: HCPCS | Performed by: INTERNAL MEDICINE

## 2020-07-21 PROCEDURE — C9600 PERC DRUG-EL COR STENT SING: HCPCS | Performed by: INTERNAL MEDICINE

## 2020-07-21 PROCEDURE — 93571 IV DOP VEL&/PRESS C FLO 1ST: CPT | Mod: 52,LC | Performed by: INTERNAL MEDICINE

## 2020-07-21 PROCEDURE — 85347 COAGULATION TIME ACTIVATED: CPT

## 2020-07-21 PROCEDURE — C1769 GUIDE WIRE: HCPCS | Performed by: INTERNAL MEDICINE

## 2020-07-21 PROCEDURE — 80048 BASIC METABOLIC PNL TOTAL CA: CPT | Performed by: INTERNAL MEDICINE

## 2020-07-21 PROCEDURE — C1894 INTRO/SHEATH, NON-LASER: HCPCS | Performed by: INTERNAL MEDICINE

## 2020-07-21 PROCEDURE — 25800030 ZZH RX IP 258 OP 636: Performed by: INTERNAL MEDICINE

## 2020-07-21 PROCEDURE — 99152 MOD SED SAME PHYS/QHP 5/>YRS: CPT | Mod: 59 | Performed by: INTERNAL MEDICINE

## 2020-07-21 PROCEDURE — 99152 MOD SED SAME PHYS/QHP 5/>YRS: CPT | Performed by: INTERNAL MEDICINE

## 2020-07-21 PROCEDURE — 93010 ELECTROCARDIOGRAM REPORT: CPT | Mod: 76 | Performed by: INTERNAL MEDICINE

## 2020-07-21 PROCEDURE — 25000125 ZZHC RX 250: Performed by: INTERNAL MEDICINE

## 2020-07-21 PROCEDURE — 27210794 ZZH OR GENERAL SUPPLY STERILE: Performed by: INTERNAL MEDICINE

## 2020-07-21 PROCEDURE — 92928 PRQ TCAT PLMT NTRAC ST 1 LES: CPT | Mod: LC | Performed by: INTERNAL MEDICINE

## 2020-07-21 PROCEDURE — 85610 PROTHROMBIN TIME: CPT | Performed by: INTERNAL MEDICINE

## 2020-07-21 PROCEDURE — 99153 MOD SED SAME PHYS/QHP EA: CPT | Performed by: INTERNAL MEDICINE

## 2020-07-21 PROCEDURE — 40000852 ZZH STATISTIC HEART CATH LAB OR EP LAB

## 2020-07-21 PROCEDURE — 36415 COLL VENOUS BLD VENIPUNCTURE: CPT

## 2020-07-21 PROCEDURE — 85730 THROMBOPLASTIN TIME PARTIAL: CPT | Performed by: INTERNAL MEDICINE

## 2020-07-21 DEVICE — STENT SYNERGY DRUG ELUTING 3.00X32MM  H7493926032300: Type: IMPLANTABLE DEVICE | Status: FUNCTIONAL

## 2020-07-21 RX ORDER — FENTANYL CITRATE 50 UG/ML
25-50 INJECTION, SOLUTION INTRAMUSCULAR; INTRAVENOUS
Status: DISCONTINUED | OUTPATIENT
Start: 2020-07-21 | End: 2020-07-21 | Stop reason: HOSPADM

## 2020-07-21 RX ORDER — POTASSIUM CHLORIDE 1500 MG/1
20 TABLET, EXTENDED RELEASE ORAL
Status: DISCONTINUED | OUTPATIENT
Start: 2020-07-21 | End: 2020-07-21 | Stop reason: HOSPADM

## 2020-07-21 RX ORDER — SODIUM CHLORIDE 9 MG/ML
INJECTION, SOLUTION INTRAVENOUS CONTINUOUS
Status: DISCONTINUED | OUTPATIENT
Start: 2020-07-21 | End: 2020-07-21 | Stop reason: HOSPADM

## 2020-07-21 RX ORDER — ASPIRIN 81 MG/1
81 TABLET ORAL DAILY
Status: DISCONTINUED | OUTPATIENT
Start: 2020-07-21 | End: 2020-07-21 | Stop reason: HOSPADM

## 2020-07-21 RX ORDER — ATROPINE SULFATE 0.1 MG/ML
0.5 INJECTION INTRAVENOUS EVERY 5 MIN PRN
Status: DISCONTINUED | OUTPATIENT
Start: 2020-07-21 | End: 2020-07-21 | Stop reason: HOSPADM

## 2020-07-21 RX ORDER — HEPARIN SODIUM 1000 [USP'U]/ML
INJECTION, SOLUTION INTRAVENOUS; SUBCUTANEOUS
Status: DISCONTINUED | OUTPATIENT
Start: 2020-07-21 | End: 2020-07-21 | Stop reason: HOSPADM

## 2020-07-21 RX ORDER — NALOXONE HYDROCHLORIDE 0.4 MG/ML
.2-.4 INJECTION, SOLUTION INTRAMUSCULAR; INTRAVENOUS; SUBCUTANEOUS
Status: DISCONTINUED | OUTPATIENT
Start: 2020-07-21 | End: 2020-07-21 | Stop reason: HOSPADM

## 2020-07-21 RX ORDER — IOPAMIDOL 755 MG/ML
INJECTION, SOLUTION INTRAVASCULAR
Status: DISCONTINUED | OUTPATIENT
Start: 2020-07-21 | End: 2020-07-21 | Stop reason: HOSPADM

## 2020-07-21 RX ORDER — ACETAMINOPHEN 325 MG/1
650 TABLET ORAL EVERY 4 HOURS PRN
Status: DISCONTINUED | OUTPATIENT
Start: 2020-07-21 | End: 2020-07-21 | Stop reason: HOSPADM

## 2020-07-21 RX ORDER — FLUMAZENIL 0.1 MG/ML
0.2 INJECTION, SOLUTION INTRAVENOUS
Status: DISCONTINUED | OUTPATIENT
Start: 2020-07-21 | End: 2020-07-21 | Stop reason: HOSPADM

## 2020-07-21 RX ORDER — VERAPAMIL HYDROCHLORIDE 2.5 MG/ML
INJECTION, SOLUTION INTRAVENOUS
Status: DISCONTINUED | OUTPATIENT
Start: 2020-07-21 | End: 2020-07-21 | Stop reason: HOSPADM

## 2020-07-21 RX ORDER — LIDOCAINE 40 MG/G
CREAM TOPICAL
Status: DISCONTINUED | OUTPATIENT
Start: 2020-07-21 | End: 2020-07-21 | Stop reason: HOSPADM

## 2020-07-21 RX ORDER — FENTANYL CITRATE 50 UG/ML
INJECTION, SOLUTION INTRAMUSCULAR; INTRAVENOUS
Status: DISCONTINUED | OUTPATIENT
Start: 2020-07-21 | End: 2020-07-21 | Stop reason: HOSPADM

## 2020-07-21 RX ORDER — NITROGLYCERIN 5 MG/ML
VIAL (ML) INTRAVENOUS
Status: DISCONTINUED | OUTPATIENT
Start: 2020-07-21 | End: 2020-07-21 | Stop reason: HOSPADM

## 2020-07-21 RX ORDER — NITROGLYCERIN 0.4 MG/1
0.4 TABLET SUBLINGUAL EVERY 5 MIN PRN
Status: DISCONTINUED | OUTPATIENT
Start: 2020-07-21 | End: 2020-07-21 | Stop reason: HOSPADM

## 2020-07-21 RX ORDER — NALOXONE HYDROCHLORIDE 0.4 MG/ML
.1-.4 INJECTION, SOLUTION INTRAMUSCULAR; INTRAVENOUS; SUBCUTANEOUS
Status: DISCONTINUED | OUTPATIENT
Start: 2020-07-21 | End: 2020-07-21 | Stop reason: HOSPADM

## 2020-07-21 RX ADMIN — TICAGRELOR 180 MG: 90 TABLET ORAL at 10:48

## 2020-07-21 RX ADMIN — FENTANYL CITRATE 25 MCG: 50 INJECTION, SOLUTION INTRAMUSCULAR; INTRAVENOUS at 10:04

## 2020-07-21 RX ADMIN — FENTANYL CITRATE 50 MCG: 50 INJECTION, SOLUTION INTRAMUSCULAR; INTRAVENOUS at 10:50

## 2020-07-21 RX ADMIN — VERAPAMIL HYDROCHLORIDE 2.5 MG: 2.5 INJECTION, SOLUTION INTRAVENOUS at 10:08

## 2020-07-21 RX ADMIN — MIDAZOLAM 1 MG: 1 INJECTION INTRAMUSCULAR; INTRAVENOUS at 10:50

## 2020-07-21 RX ADMIN — IOPAMIDOL 135 ML: 755 INJECTION, SOLUTION INTRAVENOUS at 11:13

## 2020-07-21 RX ADMIN — ASPIRIN 81 MG: 81 TABLET, COATED ORAL at 08:57

## 2020-07-21 RX ADMIN — HEPARIN SODIUM 5000 UNITS: 1000 INJECTION INTRAVENOUS; SUBCUTANEOUS at 10:09

## 2020-07-21 RX ADMIN — FENTANYL CITRATE 25 MCG: 50 INJECTION, SOLUTION INTRAMUSCULAR; INTRAVENOUS at 10:43

## 2020-07-21 RX ADMIN — MIDAZOLAM 0.5 MG: 1 INJECTION INTRAMUSCULAR; INTRAVENOUS at 10:27

## 2020-07-21 RX ADMIN — NITROGLYCERIN 200 MCG: 5 INJECTION, SOLUTION INTRAVENOUS at 10:08

## 2020-07-21 RX ADMIN — FENTANYL CITRATE 25 MCG: 50 INJECTION, SOLUTION INTRAMUSCULAR; INTRAVENOUS at 11:02

## 2020-07-21 RX ADMIN — FENTANYL CITRATE 50 MCG: 50 INJECTION, SOLUTION INTRAMUSCULAR; INTRAVENOUS at 10:00

## 2020-07-21 RX ADMIN — FENTANYL CITRATE 25 MCG: 50 INJECTION, SOLUTION INTRAMUSCULAR; INTRAVENOUS at 10:11

## 2020-07-21 RX ADMIN — SODIUM CHLORIDE: 9 INJECTION, SOLUTION INTRAVENOUS at 08:20

## 2020-07-21 RX ADMIN — MIDAZOLAM 0.5 MG: 1 INJECTION INTRAMUSCULAR; INTRAVENOUS at 11:01

## 2020-07-21 RX ADMIN — HEPARIN SODIUM 4000 UNITS: 1000 INJECTION INTRAVENOUS; SUBCUTANEOUS at 10:46

## 2020-07-21 RX ADMIN — MIDAZOLAM 0.5 MG: 1 INJECTION INTRAMUSCULAR; INTRAVENOUS at 10:04

## 2020-07-21 RX ADMIN — MIDAZOLAM 0.5 MG: 1 INJECTION INTRAMUSCULAR; INTRAVENOUS at 10:12

## 2020-07-21 RX ADMIN — MIDAZOLAM 1 MG: 1 INJECTION INTRAMUSCULAR; INTRAVENOUS at 10:00

## 2020-07-21 ASSESSMENT — MIFFLIN-ST. JEOR: SCORE: 1419.49

## 2020-07-21 NOTE — PRE-PROCEDURE
GENERAL PRE-PROCEDURE:   Procedure:  Coronary angiogram, possible intervention  Date/Time:  7/21/2020 9:15 AM    Written consent obtained?: Yes    Risks and benefits: Risks, benefits and alternatives were discussed    DC Plan: Appropriate discharge home plan in place for patients who are going home after procedure   Consent given by:  Patient  Patient states understanding of procedure being performed: Yes    Patient's understanding of procedure matches consent: Yes    Procedure consent matches procedure scheduled: Yes    Expected level of sedation:  Moderate  Appropriately NPO:  Yes  ASA Class:  Class 3- Severe systemic disease, definite functional limitations  Mallampati  :  Grade 2- soft palate, base of uvula, tonsillar pillars, and portion of posterior pharyngeal wall visible  Lungs:  Lungs clear with good breath sounds bilaterally  Heart:  Normal heart sounds and rate  History & Physical reviewed:  History and physical reviewed and no updates needed  Statement of review:  I have reviewed the lab findings, diagnostic data, medications, and the plan for sedation

## 2020-07-21 NOTE — PROVIDER NOTIFICATION
Dr. Bentley: 's/90's HR 60's post procedure. Denies pain. Not on antihypertensive at home. Please advise PRN.    1318  Received call back from Dr. Bentley.  He states no change in treatment for now as patient is post procedure and not critical hypertension at this time. Plan to check and assess his blood pressure at his post procedure follow-up appointment.  If continues to be elevated patient may need to start an antihypertensive.

## 2020-07-21 NOTE — Clinical Note
Max pressure = 16 ishmael. Total duration = 35 seconds.     Max pressure = 14 ishmael. Total duration = 30 seconds.    Balloon reinflated a second time: Max pressure = 14 ishmael. Total duration = 30 seconds.

## 2020-07-21 NOTE — PROGRESS NOTES
Care Suites Admission Nursing Note    Patient Information  Name: Johan Navarro  Age: 65 year old  Reason for admission: Coronary angiogram  Care Suites arrival time: 0755    Patient Admission/Assessment   Pre-procedure assessment complete: Yes  If abnormal assessment/labs, provider notified: N/A  NPO: Yes  Medications held per instructions/orders: N/A  Consent: deferred  If applicable, pregnancy test status: deferred  Patient oriented to room: Yes  Education/questions answered: Yes, A/O. Denies pain. Labs WNL. IV flds infusing. Contrast D/C instructions reviewed with pt with verbal understanding received. Copy given to Amy. Procedure explained. All questions & concerns addressed.     Pt resting in bed, denies additional needs at this time, call light within reach. Family at bedside.  Amy will stay with pt overnight.    0905  Dr. Bentley at bedside.  Informed consent obtained.      Plan/other: Proceed as scheduled      Discharge Planning  Accompanied by: Amy-wife  Discharge name/phone number: Amy [phone]  Overnight post sedation caregiver: Amy  Discharge location: home    Mona Lizarraga RN

## 2020-07-21 NOTE — PROGRESS NOTES
"Johan Navarro is a 65 year old male who is being evaluated via a billable video visit.  This visit is being conducted as a virtual visit due to the emphasis on mitigation of the COVID-19 virus pandemic. The clinician has decided that the risk of an in-office visit outweighs the benefit for this patient.     The patient has been notified of following:   \"This video visit will be conducted via a call between you and your physician/provider. We have found that certain health care needs can be provided without the need for an in-person physical exam.  This service lets us provide the care you need with a video conversation.  If a prescription is necessary we can send it directly to your pharmacy.  If lab work is needed we can place an order for that and you can then stop by our lab to have the test done at a later time.  If during the course of the call the physician/provider feels a video visit is not appropriate, you will not be charged for this service.\"     Patient has given verbal consent for Video visit? Yes    Patient would like the video invitation sent by: Text to cell phone: [phone]    Video Start Time: 07:40 am        Review Of Systems  Skin: negative  Eyes: negative  Ears/Nose/Throat: negative  Respiratory: No shortness of breath, dyspnea on exertion, cough, or hemoptysis  Cardiovascular: negative  Gastrointestinal: negative  Genitourinary: negative  Musculoskeletal:  arthritis  Neurologic: negative  Psychiatric: negative and positive for negative  Hematologic/Lymphatic/Immunologic: negative  Endocrine: negative       I have reviewed and updated the patient's Past Medical History, Social History, Family History and Medication List.    PROBLEM LIST  Patient Active Problem List   Diagnosis     Depressive disorder, not elsewhere classified     Elevated prostate specific antigen (PSA)     Osteoarthritis of Hand      Mixed hyperlipidemia     Family history of early CAD     Aortic stenosis     Carotid " stenosis     Coronary artery disease involving native coronary artery of native heart without angina pectoris     Malignant neoplasm of prostate (H)     Status post coronary angiogram       ALLERGIES  Crestor [rosuvastatin]; Dust mites; No clinical screening - see comments; and Pollen extract    MEDICATIONS  Current Outpatient Medications   Medication Sig Dispense Refill     ticagrelor (BRILINTA) 90 MG tablet Take 1 tablet (90 mg) by mouth 2 times daily Start this evening. 180 tablet 3       ROS:  12-pt ROS is negative except for as noted above.      VITAL SIGNS:  BP: 120/90 mmHg  HR: not done  Weight: 153 lbs (stable)    EXAM:  A limited exam was conducted via video.  Constitutional: Patient is pleasant, alert, in no distress. Normal body habitus, upright.  ENT: Membranes moist, no nasal discharge or bleeding gums. Normal head shape, no evidence of injury or laceration.  EYES: No scleral icterus, normal conjunctivae. EOM's appear intact. No observed jaundice  Neck: No evidence of thyromegaly.   Chest/Lungs: Appears to have normal respiratory effort. Normal breathing while talking. No audible wheezing, no cough  Equal chest wall expansion.   Cardiovascular: No obvious elevated JVP. No apparent pitting edema bilaterally.  Abdomen: No evidence of abdominal distention.   Extremities: No edema, erythema, cyanosis noted.  Skin: No rashes or lesions appreciated on visualized skin, normal skin color.  Neurologic: Normal mentation. Normal arm motion bilaterally, no tremors. No evidence of focal defect.  Psychiatric: Appropriate affect. A&Ox3.  Calm demeanor      HISTORY OF PRESENT ILLNESS  This is a 65 year old male who follows with [facility].  He is a patient of Dr. Galicia seen in our clinic for CAD, aortic stenosis, hyperlipidemia, PAD, former smoker, and recent vasovagal syncope.    Mr Navarro underwent stenting to distal and ostial CFX and OM1 (1998)  Last year, his STRECO showed no evidence of ischemia.   LVEF 65-70%    A carotid ultrasound (5/2019)  showed moderate left ICA disease, which has minimally progressed since 2008.    He suffered a vasovagal syncopal episode when being evaluated for an ankle injury (7/6/20).  He ruled out for a MI and an outpatient NUC, ZioPatch monitor, and ECHO were performed.    His ECHO (7/15/20) showed LVEF 55% without RWMA, mild RVE with normal RV function, moderate aortic stenosis (mean AV gradient 17 mmHg, DI 0.27) mild AR    His Elma NUC (7/15/20) showed medium sized area of moderate mid to distal anterolateral ischemia.    His ZioPatch monitor results are still pending    He underwent coronary angiography (7/21/20)  He required 3.0 x 32 mm Synergy ANNETTE placement due to in-stent restenosis of OM2  Otherwise, he was noted to have moderate in-stent stenosis of CFX stents and  70% sidease in a small RCA.to be treated medically   Our visit today is for further review.    Mr Navarro admits to better energy since his coronary stenting.  He denies any CP or SOB.  He is anxious about returning to his usual activities.  He is contemplating attending cardiac rehab and we discussed this today.  He denies any palpitations, orthopnea, or peripheral edema.  His wife is a nurse practitioner and they keep an eye on his blood pressure.              IMPRESSION AND PLAN  CAD  -s/p stenting to ostial and distal CFX and OM2 (1998)  -NUC (7/15/20) medium sized area of moderate mid to distal anterolateral ischemia.  LVEF 55%  -s/p ANNETTE to OM2 due to in-stent restenosis (7/21/20)  Remaining moderate in-stent restenosis of CFX stents and 70% dx in small ostial RCA.  -preserved LVEF  -denies any angina  -brilinta for 1 yr /  ASA indefinitely    Hyperlipidemia:  -On Atorvastatin 80 mg and Repatha  -LDL 62 (5/2019)  -will arrange for a lipid panel in the near future    Moderate Aortic Stenosis  -mean AV gradient 17 mmHg, DI 0.27  -repeat ECHO July 2021    Moderate left ICA disease  -stable per ultrasound  (6/2019)                Video-Visit Details  Type of service:  Video Visit  Video End Time (time video stopped): 07:51 am  Originating Location (pt. Location): Home  Distant Location (provider location):  [facility]   Mode of Communication:  Video Conference via AmericanANGELINE Renner, NGUYỄN  M [facility]

## 2020-07-21 NOTE — Clinical Note
Max pressure = 10 ishmael. Total duration = 15 seconds.     Max pressure = 12 ishmael. Total duration = 20 seconds.    Balloon reinflated a second time: Max pressure = 12 ishmael. Total duration = 20 seconds.  Balloon reinflated a third time: Max pressure = 12 ishmael. Total duration = 20 seconds.  Balloon reinflated a fourth time: Max pressure = 14 ishmael. Total duration = 20 seconds.

## 2020-07-21 NOTE — PROGRESS NOTES
Care Suites Post Procedure Note    Patient Information  Name: Johan Navarro  Age: 65 year old    Post Procedure  Time patient returned to Care Suites: 1124  Concerns/abnormal assessment: TR band to (R) radial site with 12ml air. Site C/D/I without drng or hematoma present.  If abnormal assessment, provider notified: N/A  Plan/Other: Per protocol post procedure.    Jemima Catherine RN

## 2020-07-21 NOTE — PROGRESS NOTES
1525 Report received from Mona Lizarraga RN.  1530 Pt A/O. TR band intact to right wrist.  No oozing or hematoma noted. Area soft & flat. Armboard intact. Right arm elevated on pillows. Pt denies pain. Pt instructed on activity restrictions with rght wrist/arm. Verbal understanding received from pt. Pt's wife at bedside. Air released from TR band per protocol.  1600 TR band removed. Bandaid applied to site. Area soft & flat. Discharge instructions reinforced with both pt & wife. All questions & concerns addressed.  1630 OOB - steady on feet. Ambulated in halls to bathroom with good adama. No change in puncture site assessment with activity.  1645 Pt discharged per w/c to private vehicle. All personal belongings taken w/ pt.

## 2020-07-21 NOTE — DISCHARGE INSTRUCTIONS
Cardiac Angioplasty/Stent Discharge Instructions - Radial    After you go home:      Have an adult stay with you until tomorrow.    Drink extra fluids for 2 days.    You may resume your normal diet.    No smoking       For 24 hours - due to the sedation you received:    Relax and take it easy.    Do NOT make any important or legal decisions.    Do NOT drive or operate machines at home or at work.    Do NOT drink alcohol.    Care of Wrist Puncture Site:      For the first 24 hrs - check the puncture site every 1-2 hours while awake.    It is normal to have soreness at the puncture site and mild tingling in your hand for up to 3 days.    Remove the bandaid after 24 hours. If there is minor oozing, apply another bandaid and remove it after 12 hours.    You may shower tomorrow.  Do NOT take a bath, or use a hot tub or pool for at least 3 days. Do NOT scrub the site. Do not use lotion or powder near the puncture site.           Activity:          For 2 days:     do not use your hand or arm to support your weight (such as rising from a chair)     do not bend your wrist (such as lifting a garage door).    do not lift more than 5 pounds or exercise your arm (such as tennis, golf or bowling).    Do NOT do any heavy activity such as exercise, lifting, or straining.     Bleeding:      If you start bleeding from the site in your wrist, sit down and press firmly on/above the site for 10 minutes.     Once bleeding stops, keep arm still for 2 hours.     Call [facility] as soon as you can.       Call 911 right away if you have heavy bleeding or bleeding that does not stop.      Medicines:      You are taking an antiplatelet medication called Brilinta (generic name is ticalgrelor) do not stop taking it until you talk to your cardiologist.       Take your medications, including blood thinners, unless your provider tells you not to.      If you have stopped any medicines, check with your provider about when to restart them.    Follow  Up Appointments:      Follow up with [facility] Nurse Practitioner at [facility] of patient preference in 7-10 days.    Cardiac Rehab will contact you for follow up care.    Call the clinic if:      You have a large or growing hard lump around the site.    The site is red, swollen, hot or tender.    Blood or fluid is draining from the site.    You have chills or a fever greater than 101 F (38 C).    Your arm feels numb, cool or changes color.    You have hives, a rash or unusual itching.    Any questions or concerns.    Other Instructions:      You received a stent - carry your stent card with you at all times.      [facility] [place]:    [phone] [facility] (7 days a week)

## 2020-07-21 NOTE — PROGRESS NOTES
PATIENT/VISITOR WELLNESS SCREENING    Step 1 Patient Screening    1. In the last month, have you been in contact with someone who was confirmed or suspected to have Coronavirus/COVID-19? No    2. Do you have the following symptoms?  Fever/Chills? No   Cough? No   Shortness of breath? No   New loss of taste or smell? No  Sore throat? No  Muscle or body aches? No  Headaches? No  Fatigue? No  Vomiting or diarrhea? No    Step 2 Visitor Screening    1. Name of Visitor (1 visitor per patient): Amy    2. In the last month, have you been in contact with someone who was confirmed or suspected to have Coronavirus/COVID-19? No    3. Do you have the following symptoms?  Fever/Chills? No   Cough? No   Shortness of breath? No   Skin rash? No   Loss of taste or smell? No  Sore throat? No  Runny or stuffy nose? No  Muscle or body aches? No  Headaches? No  Fatigue? No  Vomiting or diarrhea? No    If the visitor has positive symptoms, notify supervisor/manger  Per policy, the visitor will need to leave the facility     Step 3 Refer to logic grid below for actions    NO SYMPTOM(S)    ACTIONS:  1. Standard rooming process  2. Provider to assess per normal protocol  3. Implement precautions as needed and per guidelines     POSITIVE SYMPTOM(S)  If positive for ANY of the following symptoms: fever, cough, shortness of breath, rash    ACTION:  1. Continue to have the patient wear a mask   2. Room patient as soon as possible  3. Don appropriate PPE when entering room  4. Provider evaluation

## 2020-07-21 NOTE — PROGRESS NOTES
1230  Faxed his new prescription to [facility] pharmacy in [place] per client's request.  Eli left for lunch and then drove to [facility] to  his new Rx.    1400 AVS/Discharge instructions including stent card given and reviewed with patient and his wife, Amy.  Also, educated on Brilinta including reason for use and possible side-effects.  All questions and concerns addressed at this time with verbal understanding received.

## 2020-07-22 ENCOUNTER — TELEPHONE (OUTPATIENT)
Dept: CARDIOLOGY | Facility: CLINIC | Age: 65
End: 2020-07-22

## 2020-07-22 NOTE — TELEPHONE ENCOUNTER
Called patient after discharge from care suites after angioplasty and stenting of an OM2 lesion. Access was obtained via the right wrist. No answer. Left message asking him to call with questions or concerns and confirmed follow up with ANGELINE Brand on 7/28.     JULIO CESAR Mercer PA-C 9:22 AM 7/22/2020

## 2020-07-28 ENCOUNTER — VIRTUAL VISIT (OUTPATIENT)
Dept: CARDIOLOGY | Facility: CLINIC | Age: 65
End: 2020-07-28
Payer: MEDICARE

## 2020-07-28 DIAGNOSIS — I25.10 CORONARY ARTERY DISEASE INVOLVING NATIVE CORONARY ARTERY OF NATIVE HEART WITHOUT ANGINA PECTORIS: Primary | ICD-10-CM

## 2020-07-28 DIAGNOSIS — I35.0 NONRHEUMATIC AORTIC VALVE STENOSIS: ICD-10-CM

## 2020-07-28 PROCEDURE — 99214 OFFICE O/P EST MOD 30 MIN: CPT | Mod: 95 | Performed by: NURSE PRACTITIONER

## 2020-07-28 NOTE — LETTER
7/28/2020    Washington Yang PA-C  [address] [facility] [address]    RE: Johan Navarro       Dear Colleague,    I had the pleasure of seeing Johan Navarro in the [facility].        Review Of Systems  Skin: negative  Eyes: negative  Ears/Nose/Throat: negative  Respiratory: No shortness of breath, dyspnea on exertion, cough, or hemoptysis  Cardiovascular: negative  Gastrointestinal: negative  Genitourinary: negative  Musculoskeletal:  arthritis  Neurologic: negative  Psychiatric: negative and positive for negative  Hematologic/Lymphatic/Immunologic: negative  Endocrine: negative       I have reviewed and updated the patient's Past Medical History, Social History, Family History and Medication List.    PROBLEM LIST  Patient Active Problem List   Diagnosis     Depressive disorder, not elsewhere classified     Elevated prostate specific antigen (PSA)     Osteoarthritis of Hand      Mixed hyperlipidemia     Family history of early CAD     Aortic stenosis     Carotid stenosis     Coronary artery disease involving native coronary artery of native heart without angina pectoris     Malignant neoplasm of prostate (H)     Status post coronary angiogram       ALLERGIES  Crestor [rosuvastatin]; Dust mites; No clinical screening - see comments; and Pollen extract    MEDICATIONS  Current Outpatient Medications   Medication Sig Dispense Refill     ticagrelor (BRILINTA) 90 MG tablet Take 1 tablet (90 mg) by mouth 2 times daily Start this evening. 180 tablet 3       ROS:  12-pt ROS is negative except for as noted above.      VITAL SIGNS:  BP: 120/90 mmHg  HR: not done  Weight: 153 lbs (stable)    EXAM:  A limited exam was conducted via video.  Constitutional: Patient is pleasant, alert, in no distress. Normal body habitus, upright.  ENT: Membranes moist, no nasal discharge or bleeding gums. Normal head shape, no evidence of injury or laceration.  EYES: No scleral icterus, normal  conjunctivae. EOM's appear intact. No observed jaundice  Neck: No evidence of thyromegaly.   Chest/Lungs: Appears to have normal respiratory effort. Normal breathing while talking. No audible wheezing, no cough  Equal chest wall expansion.   Cardiovascular: No obvious elevated JVP. No apparent pitting edema bilaterally.  Abdomen: No evidence of abdominal distention.   Extremities: No edema, erythema, cyanosis noted.  Skin: No rashes or lesions appreciated on visualized skin, normal skin color.  Neurologic: Normal mentation. Normal arm motion bilaterally, no tremors. No evidence of focal defect.  Psychiatric: Appropriate affect. A&Ox3.  Calm demeanor      HISTORY OF PRESENT ILLNESS  This is a 65 year old male who follows with [facility].  He is a patient of Dr. Galicia seen in our clinic for CAD, aortic stenosis, hyperlipidemia, PAD, former smoker, and recent vasovagal syncope.    Mr Navarro underwent stenting to distal and ostial CFX and OM1 (1998)  Last year, his STRECO showed no evidence of ischemia.  LVEF 65-70%    A carotid ultrasound (5/2019)  showed moderate left ICA disease, which has minimally progressed since 2008.    He suffered a vasovagal syncopal episode when being evaluated for an ankle injury (7/6/20).  He ruled out for a MI and an outpatient NUC, ZioPatch monitor, and ECHO were performed.    His ECHO (7/15/20) showed LVEF 55% without RWMA, mild RVE with normal RV function, moderate aortic stenosis (mean AV gradient 17 mmHg, DI 0.27) mild AR    His Elma NUC (7/15/20) showed medium sized area of moderate mid to distal anterolateral ischemia.    His ZioPatch monitor results are still pending    He underwent coronary angiography (7/21/20)  He required 3.0 x 32 mm Synergy ANNETTE placement due to in-stent restenosis of OM2  Otherwise, he was noted to have moderate in-stent stenosis of CFX stents and  70% sidease in a small RCA.to be treated medically   Our visit today is for further review.      Ramon admits to better energy since his coronary stenting.  He denies any CP or SOB.  He is anxious about returning to his usual activities.  He is contemplating attending cardiac rehab and we discussed this today.  He denies any palpitations, orthopnea, or peripheral edema.  His wife is a nurse practitioner and they keep an eye on his blood pressure.              IMPRESSION AND PLAN  CAD  -s/p stenting to ostial and distal CFX and OM2 (1998)  -NUC (7/15/20) medium sized area of moderate mid to distal anterolateral ischemia.  LVEF 55%  -s/p ANNETTE to OM2 due to in-stent restenosis (7/21/20)  Remaining moderate in-stent restenosis of CFX stents and 70% dx in small ostial RCA.  -preserved LVEF  -denies any angina  -brilinta for 1 yr /  ASA indefinitely    Hyperlipidemia:  -On Atorvastatin 80 mg and Repatha  -LDL 62 (5/2019)  -will arrange for a lipid panel in the near future    Moderate Aortic Stenosis  -mean AV gradient 17 mmHg, DI 0.27  -repeat ECHO July 2021    Moderate left ICA disease  -stable per ultrasound (6/2019)      Thank you for allowing me to participate in the care of your patient.    Sincerely,     ANGELINE Morales [facility]

## 2020-07-28 NOTE — PROGRESS NOTES
"Johan Navarro is a 65 year old male who is being evaluated via a billable video visit.      The patient has been notified of following:     \"This video visit will be conducted via a call between you and your physician/provider. We have found that certain health care needs can be provided without the need for an in-person physical exam.  This service lets us provide the care you need with a video conversation.  If a prescription is necessary we can send it directly to your pharmacy.  If lab work is needed we can place an order for that and you can then stop by our lab to have the test done at a later time.    Video visits are billed at different rates depending on your insurance coverage.  Please reach out to your insurance provider with any questions.    If during the course of the call the physician/provider feels a video visit is not appropriate, you will not be charged for this service.\"    Patient has given verbal consent for Video visit? Yes  How would you like to obtain your AVS? Mail a copy  If you are dropped from the video visit, the video invite should be resent to: Text to cell phone: [phone]  Will anyone else be joining your video visit? No       Review Of Systems  Skin: Negative  Eyes: Negative  Ears/Nose/Throat: Negative  Respiratory: Negative  Cardiovascular: Negative  Gastrointestinal: Negative  Genitourinary: Negative  Musculoskeletal: Positive for arthritis  Neurologic: Negative  Psychiatric: Negative  Hematologic/Lymphatic/Immunologic: Negative  Endocrine: Negative    Patient reported vitals:  BP: 120/90  Heart rate: N/A  Weight: 153 lbs    Hanane Seymour CMA        "

## 2020-07-29 LAB
INTERPRETATION ECG - MUSE: NORMAL
INTERPRETATION ECG - MUSE: NORMAL

## 2020-07-31 ENCOUNTER — TELEPHONE (OUTPATIENT)
Dept: CARDIOLOGY | Facility: CLINIC | Age: 65
End: 2020-07-31

## 2020-07-31 NOTE — TELEPHONE ENCOUNTER
Abrahamo Monitor -7 day 7- 15 to 7-   Predominant rhythm SR average HR 76 bpm, range 51 to 154 bpm  10 runs of PSVT longest was 16 beats rate 113 bpm. Shortest run 4 beats rate 154 bpm.     Next Dr. Galicia visit 8-  Dr. Galicia to review

## 2020-07-31 NOTE — TELEPHONE ENCOUNTER
Message reviewed and doned by Dr Galicia. Called patient to review Ziopatch results, left message to call back. Follow up OV 8/31/20.     Addendum 8/6: Left another message for patient.

## 2020-08-10 NOTE — TELEPHONE ENCOUNTER
My chart message sent to patient:  Mani Navarro,  Dr. Galicia has reviewed your monitor report. He did not recommend any changes at this time. He will go over the report with you at your visit on 8/31/2020.    If you have any questions before that visit, you can call the Team 2 RNs at [phone]. These reports do not flow into My Chart as they are scanned into a different type of program.      Thank you  Team 2 RNs

## 2020-08-17 DIAGNOSIS — E78.5 HLD (HYPERLIPIDEMIA): ICD-10-CM

## 2020-08-17 DIAGNOSIS — I25.10 CORONARY ARTERY DISEASE INVOLVING NATIVE CORONARY ARTERY OF NATIVE HEART WITHOUT ANGINA PECTORIS: ICD-10-CM

## 2020-08-17 RX ORDER — ATORVASTATIN CALCIUM 80 MG/1
80 TABLET, FILM COATED ORAL DAILY
Qty: 90 TABLET | Refills: 2 | Status: SHIPPED | OUTPATIENT
Start: 2020-08-17 | End: 2021-06-15

## 2020-08-21 DIAGNOSIS — I25.10 CORONARY ARTERY DISEASE INVOLVING NATIVE CORONARY ARTERY OF NATIVE HEART WITHOUT ANGINA PECTORIS: ICD-10-CM

## 2020-08-21 RX ORDER — GABAPENTIN 600 MG/1
TABLET ORAL
Qty: 180 TABLET | Refills: 0 | Status: SHIPPED | OUTPATIENT
Start: 2020-08-21 | End: 2020-11-30

## 2020-08-21 NOTE — TELEPHONE ENCOUNTER
gabapentin (NEURONTIN) 600 MG tablet  Last Written Prescription Date:  7/16/19  Last Fill Quantity: 180,   # refills: 3  Last Office Visit: 7/13/20  Future Office visit:    Next 5 appointments (look out 90 days)    Aug 31, 2020  9:45 AM CDT  Return Visit with Sander Galicia MD  [facility] [address] [place] [id number] [phone] OPT 2           Routing refill request to provider for review/approval because:  Drug not on the [facility], [facility] or [facility] refill protocol or controlled substance    :   6/4/20, 3/13/20, 12/1/19    Alicia Franco RN on 8/21/2020 at 4:29 PM

## 2020-08-28 ENCOUNTER — TELEPHONE (OUTPATIENT)
Dept: CARDIOLOGY | Facility: CLINIC | Age: 65
End: 2020-08-28

## 2020-08-31 ENCOUNTER — OFFICE VISIT (OUTPATIENT)
Dept: CARDIOLOGY | Facility: CLINIC | Age: 65
End: 2020-08-31
Attending: INTERNAL MEDICINE
Payer: MEDICARE

## 2020-08-31 VITALS
BODY MASS INDEX: 24.27 KG/M2 | SYSTOLIC BLOOD PRESSURE: 122 MMHG | DIASTOLIC BLOOD PRESSURE: 88 MMHG | HEART RATE: 89 BPM | WEIGHT: 151 LBS | HEIGHT: 66 IN

## 2020-08-31 DIAGNOSIS — I65.23 BILATERAL CAROTID ARTERY STENOSIS: Primary | ICD-10-CM

## 2020-08-31 DIAGNOSIS — I25.10 CORONARY ARTERY DISEASE INVOLVING NATIVE CORONARY ARTERY OF NATIVE HEART WITHOUT ANGINA PECTORIS: ICD-10-CM

## 2020-08-31 PROCEDURE — 99214 OFFICE O/P EST MOD 30 MIN: CPT | Performed by: INTERNAL MEDICINE

## 2020-08-31 ASSESSMENT — MIFFLIN-ST. JEOR: SCORE: 1412.68

## 2020-08-31 NOTE — LETTER
8/31/2020      Washington Yang PA-C  [address]  [facility] [address]      RE: Johan REBECCA Navarro       Dear Colleague,    I had the pleasure of seeing Johan Navarro in the [facility].    Service Date: 08/31/2020      CARDIOLOGY FOLLOWUP       HISTORY OF PRESENT ILLNESS:  I had the pleasure of seeing Mr. Navarro in followup at the [facility] today.  He is a very pleasant 65-year-old gentleman who I last saw in 05/2019.  He has a history of coronary artery disease and is status post stenting of the distal and ostial circumflex as well as the first obtuse marginal in 1998.  He has severe, probably familial, dyslipidemia with a markedly elevated LDL, although we have been able to control this well with Repatha and atorvastatin.  He also has a history of mild aortic stenosis.      He was recently seen in the emergency department at [facility] on 06/29/2020 for dyspnea on exertion.  He underwent COVID-19 testing which was negative.  A CT of the chest was also within normal limits.  He subsequently returned to the emergency department on 07/06/2020 with a syncopal episode that occurred in the context of ankle pain after a shelf fell on his ankle.  Once again, his evaluation was essentially unremarkable but given the ongoing dyspnea on exertion and syncopal episode, the patient and his family were concerned about a potential cardiac etiology and contacted our office for further evaluation.      He underwent a Lexiscan nuclear stress test on 07/15/2020.  This demonstrated a medium-sized area of moderate ischemia involving the [id number] anterior and anterolateral segments of the left ventricle.  Given these findings, I recommended coronary angiography.  He also underwent an echocardiogram which demonstrated mild progression in his aortic stenosis which was now in the moderate range.      He underwent coronary angiography on 07/21/2020 and was found  to have significant stenosis of the ostium of the second obtuse marginal.  He underwent successful intervention with a drug-eluting stent at the time.  His other coronary arteries demonstrated mild-to-moderate nonobstructive disease in a left dominant system.      Since his intervention, he has done remarkably well.  He states that his dyspnea on exertion has essentially subsided.  He denies any palpitations, syncope or presyncope.  Denies any PND, orthopnea or lower extremity edema.      PHYSICAL EXAMINATION:  Dictated below.      LABORATORY STUDIES:  Lipids on 2019 demonstrated total cholesterol 136, HDL 61, LDL 62 and triglycerides of 63.      IMPRESSION:   1.  Coronary artery disease status post recent PCI to the second obtuse marginal and prior circumflex/obtuse marginal revascularization in .   2.  Significant dyslipidemia characterized by severe LDL elevation.  This has normalized with the use of Repatha and atorvastatin.   3.  History of moderate aortic stenosis.   4.  Moderate left internal carotid artery stenosis on carotid ultrasound which has been stable.      Mr. Chapa is doing well post revascularization.  There is no evidence of angina or congestive heart failure.  His medications are appropriate and I will make no changes today.      We did go over the rationale for dual antiplatelet therapy as prescribed for 1 year.  Regarding his aortic stenosis/carotid disease, I will order a followup echocardiogram and ultrasound of the carotids for approximately 1 year.  Assuming his clinical status remains stable, I will see him in followup at that time.  It was a pleasure seeing him in followup.         LATONYA NAPIER MD             D: 2020   T: 2020   MT: MIRELLA      Name:     MARLENY CHAPA   MRN:      [id number]        Account:      [id number]   :      1955           Service Date: 2020      Document: [id number]         Outpatient Encounter Medications as of 2020    Medication Sig Dispense Refill     albuterol (PROAIR HFA/PROVENTIL HFA/VENTOLIN HFA) 108 (90 Base) MCG/ACT inhaler Inhale 4-6 puffs into the lungs every 2 hours as needed for shortness of breath / dyspnea 1 Inhaler 1     aspirin (ASA) 81 MG chewable tablet Take 1 tablet (81 mg) by mouth daily 90 tablet 3     atorvastatin (LIPITOR) 80 MG tablet Take 1 tablet (80 mg) by mouth daily 90 tablet 2     diclofenac (VOLTAREN) 1 % topical gel Apply 2 grams to hands/fingers four times daily using enclosed dosing card. 100 g 1     evolocumab (REPATHA) 140 MG/ML prefilled autoinjector Inject 1 mL (140 mg) Subcutaneous every 14 days 6 mL 3     fluticasone (FLONASE) 50 MCG/ACT nasal spray Spray in nostril as needed  3     gabapentin (NEURONTIN) 600 MG tablet TAKE ONE TABLET BY MOUTH TWICE DAILY  180 tablet 0     sildenafil (REVATIO) 20 MG tablet TAKE 2-5 TABLETS BY MOUTH AS NEEDED PRIOR TO SEXUAL INTERCOURSE 90 tablet 1     ticagrelor (BRILINTA) 90 MG tablet Take 1 tablet (90 mg) by mouth 2 times daily Start this evening. 180 tablet 3     No facility-administered encounter medications on file as of 8/31/2020.        Again, thank you for allowing me to participate in the care of your patient.      Sincerely,    Sander Galicia MD     [facility]

## 2020-08-31 NOTE — PROGRESS NOTES
HPI and Plan:   See dictation    No orders of the defined types were placed in this encounter.      No orders of the defined types were placed in this encounter.      There are no discontinued medications.      Encounter Diagnosis   Name Primary?     Coronary artery disease involving native coronary artery of native heart without angina pectoris        CURRENT MEDICATIONS:  Current Outpatient Medications   Medication Sig Dispense Refill     albuterol (PROAIR HFA/PROVENTIL HFA/VENTOLIN HFA) 108 (90 Base) MCG/ACT inhaler Inhale 4-6 puffs into the lungs every 2 hours as needed for shortness of breath / dyspnea 1 Inhaler 1     aspirin (ASA) 81 MG chewable tablet Take 1 tablet (81 mg) by mouth daily 90 tablet 3     atorvastatin (LIPITOR) 80 MG tablet Take 1 tablet (80 mg) by mouth daily 90 tablet 2     diclofenac (VOLTAREN) 1 % topical gel Apply 2 grams to hands/fingers four times daily using enclosed dosing card. 100 g 1     evolocumab (REPATHA) 140 MG/ML prefilled autoinjector Inject 1 mL (140 mg) Subcutaneous every 14 days 6 mL 3     fluticasone (FLONASE) 50 MCG/ACT nasal spray Spray in nostril as needed  3     gabapentin (NEURONTIN) 600 MG tablet TAKE ONE TABLET BY MOUTH TWICE DAILY  180 tablet 0     sildenafil (REVATIO) 20 MG tablet TAKE 2-5 TABLETS BY MOUTH AS NEEDED PRIOR TO SEXUAL INTERCOURSE 90 tablet 1     ticagrelor (BRILINTA) 90 MG tablet Take 1 tablet (90 mg) by mouth 2 times daily Start this evening. 180 tablet 3       ALLERGIES     Allergies   Allergen Reactions     Crestor [Rosuvastatin] GI Disturbance     Dust Mites      No Clinical Screening - See Comments      Goose feathers     Pollen Extract        PAST MEDICAL HISTORY:  Past Medical History:   Diagnosis Date     Aortic stenosis      Arthritis     hands     CAD (coronary artery disease)     cardiac cath 1998: stents x 2 to circumflex     Carotid stenosis     left     Family history of early CAD      Hyperlipidaemia LDL goal < 70     familial  hyperlipidemia with marked hypercholesterolemia before treatment; has been on some form of cholesterol-lowering medication since the 1970s     Syncope        PAST SURGICAL HISTORY:  Past Surgical History:   Procedure Laterality Date     CARDIAC SURGERY      coronary stents x2 placed ; angioplasty     CV HEART CATHETERIZATION WITH POSSIBLE INTERVENTION N/A 2020    Procedure: Heart Catheterization with Possible Intervention;  Surgeon: Alber Bentley MD;  Location:  [facility]ATH [facility]     DAVINCI PROSTATECTOMY      2010     EYE SURGERY Bilateral 2017    eyelids     ORTHOPEDIC SURGERY Right     achilles tendon; post football injury     ORTHOPEDIC SURGERY Right     hand; near thumb. has wires in there. cysts     REPAIR TENDON BICEPS DISTAL UPPER EXTREMITY Right 2018    Procedure: REPAIR TENDON BICEPS DISTAL UPPER EXTREMITY;  Right open biceps tendon repair  ;  Surgeon: Alber Zabala MD;  Location: [facility]     SURGICAL HISTORY OF -       Right hand Xanthoma removal     SURGICAL HISTORY OF -       Stress Echo (-)       FAMILY HISTORY:  Family History   Problem Relation Age of Onset     Lipids Mother      C.A.D. Mother          at age 49 of [place]     C.A.D. Sister         MI at age 48     Cancer - colorectal No family hx of      Prostate Cancer No family hx of      Colon Cancer No family hx of        SOCIAL HISTORY:  Social History     Socioeconomic History     Marital status:      Spouse name: None     Number of children: None     Years of education: None     Highest education level: None   Occupational History     None   Social Needs     Financial resource strain: None     Food insecurity     Worry: None     Inability: None     Transportation needs     Medical: None     Non-medical: None   Tobacco Use     Smoking status: Current Every Day Smoker     Types: Dip, chew, snus or snuff, Cigars     Last attempt to quit: 2000     Years since quittin.6     Smokeless tobacco:  "Current User     Types: Chew     Tobacco comment: 12/10/18: occ chew, not every day   Substance and Sexual Activity     Alcohol use: Not Currently     Comment: very rare     Drug use: Yes     Types: Marijuana     Comment: daily      Sexual activity: Yes     Partners: Female   Lifestyle     Physical activity     Days per week: None     Minutes per session: None     Stress: None   Relationships     Social connections     Talks on phone: None     Gets together: None     Attends Confucianist service: None     Active member of club or organization: None     Attends meetings of clubs or organizations: None     Relationship status: None     Intimate partner violence     Fear of current or ex partner: None     Emotionally abused: None     Physically abused: None     Forced sexual activity: None   Other Topics Concern      Service Not Asked     Blood Transfusions Not Asked     Caffeine Concern Yes     Comment: coffee and soda     Occupational Exposure Not Asked     Hobby Hazards Not Asked     Sleep Concern Not Asked     Stress Concern Not Asked     Weight Concern Not Asked     Special Diet No     Back Care Not Asked     Exercise Yes     Comment: regular      Bike Helmet Not Asked     Seat Belt Yes     Self-Exams Not Asked     Parent/sibling w/ CABG, MI or angioplasty before 65F 55M? Yes     Comment: 49   Social History Narrative     None       Review of Systems:  Skin:  Negative       Eyes:  Negative      ENT:  Negative      Respiratory:  Negative       Cardiovascular:  Negative;palpitations;chest pain;edema;lightheadedness;dizziness      Gastroenterology: Negative      Genitourinary:  Negative      Musculoskeletal:  Negative      Neurologic:  Negative      Psychiatric:  Negative      Heme/Lymph/Imm:  Positive for allergies seasonal   Endocrine:  Negative        Physical Exam:  Vitals: /88   Pulse 89   Ht 1.676 m (5' 6\")   Wt 68.5 kg (151 lb)   BMI 24.37 kg/m      Constitutional:  cooperative        Skin:  " warm and dry to the touch, no apparent skin lesions or masses noted          Head:  normocephalic, no masses or lesions        Eyes:  pupils equal and round, conjunctivae and lids unremarkable, sclera white, no xanthalasma, EOMS intact, no nystagmus        Lymph:      ENT:  no pallor or cyanosis, dentition good        Neck:  carotid pulses are full and equal bilaterally, JVP normal, no carotid bruit        Respiratory:  normal breath sounds, clear to auscultation, normal A-P diameter, normal symmetry, normal respiratory excursion, no use of accessory muscles         Cardiac: regular rhythm       systolic murmur        pulses full and equal, no bruits auscultated                                        GI:  abdomen soft        Extremities and Muscular Skeletal:  no deformities, clubbing, cyanosis, erythema observed              Neurological:           Psych:  Alert and Oriented x 3        CC  Bilal Juan Antonio Galicia MD  [address]  [place] [id number]

## 2020-08-31 NOTE — LETTER
8/31/2020    Washington Yang PA-C  [address] [place] [address]    RE: Johan Navarro       Dear Colleague,    I had the pleasure of seeing Johan Navarro in the [facility].    HPI and Plan:   See dictation    No orders of the defined types were placed in this encounter.      No orders of the defined types were placed in this encounter.      There are no discontinued medications.      Encounter Diagnosis   Name Primary?     Coronary artery disease involving native coronary artery of native heart without angina pectoris        CURRENT MEDICATIONS:  Current Outpatient Medications   Medication Sig Dispense Refill     albuterol (PROAIR HFA/PROVENTIL HFA/VENTOLIN HFA) 108 (90 Base) MCG/ACT inhaler Inhale 4-6 puffs into the lungs every 2 hours as needed for shortness of breath / dyspnea 1 Inhaler 1     aspirin (ASA) 81 MG chewable tablet Take 1 tablet (81 mg) by mouth daily 90 tablet 3     atorvastatin (LIPITOR) 80 MG tablet Take 1 tablet (80 mg) by mouth daily 90 tablet 2     diclofenac (VOLTAREN) 1 % topical gel Apply 2 grams to hands/fingers four times daily using enclosed dosing card. 100 g 1     evolocumab (REPATHA) 140 MG/ML prefilled autoinjector Inject 1 mL (140 mg) Subcutaneous every 14 days 6 mL 3     fluticasone (FLONASE) 50 MCG/ACT nasal spray Spray in nostril as needed  3     gabapentin (NEURONTIN) 600 MG tablet TAKE ONE TABLET BY MOUTH TWICE DAILY  180 tablet 0     sildenafil (REVATIO) 20 MG tablet TAKE 2-5 TABLETS BY MOUTH AS NEEDED PRIOR TO SEXUAL INTERCOURSE 90 tablet 1     ticagrelor (BRILINTA) 90 MG tablet Take 1 tablet (90 mg) by mouth 2 times daily Start this evening. 180 tablet 3       ALLERGIES     Allergies   Allergen Reactions     Crestor [Rosuvastatin] GI Disturbance     Dust Mites      No Clinical Screening - See Comments      Goose feathers     Pollen Extract        PAST MEDICAL HISTORY:  Past Medical History:   Diagnosis Date     Aortic stenosis       Arthritis     hands     CAD (coronary artery disease)     cardiac cath : stents x 2 to circumflex     Carotid stenosis     left     Family history of early CAD      Hyperlipidaemia LDL goal < 70     familial hyperlipidemia with marked hypercholesterolemia before treatment; has been on some form of cholesterol-lowering medication since the 1970s     Syncope        PAST SURGICAL HISTORY:  Past Surgical History:   Procedure Laterality Date     CARDIAC SURGERY      coronary stents x2 placed ; angioplasty     CV HEART CATHETERIZATION WITH POSSIBLE INTERVENTION N/A 2020    Procedure: Heart Catheterization with Possible Intervention;  Surgeon: Alber Bentley MD;  Location:  HEART CARD[facility] CATH LAB     DAVINCI PROSTATECTOMY      2010     EYE SURGERY Bilateral 2017    eyelids     ORTHOPEDIC SURGERY Right     achilles tendon; post football injury     ORTHOPEDIC SURGERY Right     hand; near thumb. has wires in there. cysts     REPAIR TENDON BICEPS DISTAL UPPER EXTREMITY Right 2018    Procedure: REPAIR TENDON BICEPS DISTAL UPPER EXTREMITY;  Right open biceps tendon repair  ;  Surgeon: Alber Zabala MD;  Location: [facility]     SURGICAL HISTORY OF -       Right hand Xanthoma removal     SURGICAL HISTORY OF -       Stress Echo (-)       FAMILY HISTORY:  Family History   Problem Relation Age of Onset     Lipids Mother      C.A.D. Mother          at age 49 of [place]     C.A.D. Sister         MI at age 48     Cancer - colorectal No family hx of      Prostate Cancer No family hx of      Colon Cancer No family hx of        SOCIAL HISTORY:  Social History     Socioeconomic History     Marital status:      Spouse name: None     Number of children: None     Years of education: None     Highest education level: None   Occupational History     None   Social Needs     Financial resource strain: None     Food insecurity     Worry: None     Inability: None     Transportation needs     Medical:  None     Non-medical: None   Tobacco Use     Smoking status: Current Every Day Smoker     Types: Dip, chew, snus or snuff, Cigars     Last attempt to quit: 2000     Years since quittin.6     Smokeless tobacco: Current User     Types: Chew     Tobacco comment: 12/10/18: occ chew, not every day   Substance and Sexual Activity     Alcohol use: Not Currently     Comment: very rare     Drug use: Yes     Types: Marijuana     Comment: daily      Sexual activity: Yes     Partners: Female   Lifestyle     Physical activity     Days per week: None     Minutes per session: None     Stress: None   Relationships     Social connections     Talks on phone: None     Gets together: None     Attends Scientologist service: None     Active member of club or organization: None     Attends meetings of clubs or organizations: None     Relationship status: None     Intimate partner violence     Fear of current or ex partner: None     Emotionally abused: None     Physically abused: None     Forced sexual activity: None   Other Topics Concern      Service Not Asked     Blood Transfusions Not Asked     Caffeine Concern Yes     Comment: coffee and soda     Occupational Exposure Not Asked     Hobby Hazards Not Asked     Sleep Concern Not Asked     Stress Concern Not Asked     Weight Concern Not Asked     Special Diet No     Back Care Not Asked     Exercise Yes     Comment: regular      Bike Helmet Not Asked     Seat Belt Yes     Self-Exams Not Asked     Parent/sibling w/ CABG, MI or angioplasty before 65F 55M? Yes     Comment: 49   Social History Narrative     None       Review of Systems:  Skin:  Negative       Eyes:  Negative      ENT:  Negative      Respiratory:  Negative       Cardiovascular:  Negative;palpitations;chest pain;edema;lightheadedness;dizziness      Gastroenterology: Negative      Genitourinary:  Negative      Musculoskeletal:  Negative      Neurologic:  Negative      Psychiatric:  Negative      Heme/Lymph/Imm:   "Positive for allergies seasonal   Endocrine:  Negative        Physical Exam:  Vitals: /88   Pulse 89   Ht 1.676 m (5' 6\")   Wt 68.5 kg (151 lb)   BMI 24.37 kg/m      Constitutional:  cooperative        Skin:  warm and dry to the touch, no apparent skin lesions or masses noted          Head:  normocephalic, no masses or lesions        Eyes:  pupils equal and round, conjunctivae and lids unremarkable, sclera white, no xanthalasma, EOMS intact, no nystagmus        Lymph:      ENT:  no pallor or cyanosis, dentition good        Neck:  carotid pulses are full and equal bilaterally, JVP normal, no carotid bruit        Respiratory:  normal breath sounds, clear to auscultation, normal A-P diameter, normal symmetry, normal respiratory excursion, no use of accessory muscles         Cardiac: regular rhythm       systolic murmur        pulses full and equal, no bruits auscultated                                        GI:  abdomen soft        Extremities and Muscular Skeletal:  no deformities, clubbing, cyanosis, erythema observed              Neurological:           Psych:  Alert and Oriented x 3        CC  Sander Galicia MD  [address]  [place] [id number]                Thank you for allowing me to participate in the care of your patient.      Sincerely,     Sander Galicia MD     [facility]    cc:   Sander Galicia MD  [address]  [place] [address]        "

## 2020-10-22 ENCOUNTER — TELEPHONE (OUTPATIENT)
Dept: CARDIOLOGY | Facility: CLINIC | Age: 65
End: 2020-10-22

## 2020-10-22 NOTE — TELEPHONE ENCOUNTER
PA Initiation    Medication: Repatha SureClick 140MG/ML (PENDING)  Insurance Company: [email or web address] - Phone [phone] Fax [phone]  Pharmacy Filling the Rx: [id number] - [place], [place] - [address]  Filling Pharmacy Phone:    Filling Pharmacy Fax:    Start Date: 10/22/2020

## 2020-10-22 NOTE — TELEPHONE ENCOUNTER
Prior Authorization Not Needed per Insurance    Medication: Repatha SureClick 140MG/ML (PA ON FILE)  Insurance Company: [employer] - Phone [phone] Fax [phone]  Expected CoPay:      Pharmacy Filling the Rx: [id number] - [place], [place] - [address]  Pharmacy Notified:    Patient Notified:      No PA needed at this time. PA on file.       no

## 2020-11-28 DIAGNOSIS — I25.10 CORONARY ARTERY DISEASE INVOLVING NATIVE CORONARY ARTERY OF NATIVE HEART WITHOUT ANGINA PECTORIS: ICD-10-CM

## 2020-11-30 RX ORDER — GABAPENTIN 600 MG/1
TABLET ORAL
Qty: 180 TABLET | Refills: 1 | Status: SHIPPED | OUTPATIENT
Start: 2020-11-30 | End: 2021-06-16

## 2020-11-30 NOTE — TELEPHONE ENCOUNTER
gabapentin (NEURONTIN) 600 MG tablet  Last Written Prescription Date:  8/21/20  Last Fill Quantity: 180,   # refills: 0  Last Office Visit: 7/13/20  Future Office visit:       Routing refill request to provider for review/approval because:  Drug not on the [facility], [facility] or [facility] refill protocol or controlled substance    :    8/21/20, 6/4/20, 3/13/20    Alicia Franco RN on 11/30/2020 at 3:25 PM

## 2020-12-21 ENCOUNTER — TELEPHONE (OUTPATIENT)
Dept: CARDIOLOGY | Facility: CLINIC | Age: 65
End: 2020-12-21

## 2020-12-21 DIAGNOSIS — I25.10 CORONARY ARTERY DISEASE INVOLVING NATIVE CORONARY ARTERY OF NATIVE HEART WITHOUT ANGINA PECTORIS: Primary | ICD-10-CM

## 2020-12-21 DIAGNOSIS — R06.09 DYSPNEA ON EXERTION: ICD-10-CM

## 2020-12-21 DIAGNOSIS — I50.1 LEFT VENTRICULAR FAILURE, UNSPECIFIED (H): ICD-10-CM

## 2020-12-21 NOTE — TELEPHONE ENCOUNTER
Attempted to contact patient to review message, left message on patient's cell phone that we are trying to get through and asked that he also call back to the Team 2 RN phone tomorrow.

## 2020-12-21 NOTE — TELEPHONE ENCOUNTER
Patient called to report that he has noticed some daily chest discomfort for the past month or so.  VSS-  -67 HR 82.    Attempted to return call. Message  Left to return call.    Heart cath done 7-    Last Dr. Galicia visit  8-.

## 2020-12-22 NOTE — TELEPHONE ENCOUNTER
Spoke with patient. He states about 3-4 weeks ago his wife (a nurse practitioner) noticed that he was getting SOB and losing endurance. Patient states about that time he noticed a tight heavy feeling in his left chest and SOB with activity - steps, walking, running, etc. He also noticed that his stools were black-colored. Patient has a fitbit to watch his heart rate and states that has not been too fast or irregular. He notes that recently he can feel or hear his heart pounding in his ear when he is having chest discomfort or dyspnea.  Patient does not have NTG at home.  Patient states he is able to relieve his symptoms with rest.   Patient notes that his stools were a normal color for the last 3-4 days. He states he does not recall having a history of GI issues.  Patient is very reluctant to go to the ER today. He is concerned about COVID-19 exposure.    Patient has a history of coronary angiography on 07/21/2020 and was found to have significant stenosis of the ostium of the second obtuse marginal.  He underwent successful intervention with a drug-eluting stent at the time.  His other coronary arteries demonstrated mild-to-moderate nonobstructive disease in a left dominant system.      He is taking brilinta 90mg BID and aspirin 81mg daily consistently.    Will message Dr. Galicia to review

## 2020-12-22 NOTE — TELEPHONE ENCOUNTER
Pt returned call, states he thinks he got his phone fixed and should be able to answer now. Requests callback to discuss symptoms. Returned patient's call but call went straight to voicemail. Left another message for patient asking that he leave a detailed message on our voicemail regarding his symptoms as we haven't been able to connect with him.    0948: Pt returned call, left message saying that he gets chest pain going up stairs and gets winded more easily. He describes it as chest fatigue on the left side. He states he can audible hear his heart beat in his ears when he goes up the stairs. He also reports black stools though not as much over the last few days. HR/BP ok per patient. He wonders if he needs a stress echo or cardiac rehab. Called patient back x3 but no answer, left a message that if he is having black stools that he should be evaluated in the emergency room for bleeding. Discussed that if he is bleeding, sometimes low hemoglobin can cause chest pain and this should be ruled out. Could also evaluate him for his chest pain at that time. Requested callback to confirm understanding of plan/recommendations.     Addendum 1445: Left another message for patient.     Addendum 1600: Attempted to reach patient again, no answer, left another message for patient reiterating that he should go to ED for evaluation of black stools. Called patient's wife given no reply from patient today, also left a message for her with this information.

## 2020-12-23 NOTE — TELEPHONE ENCOUNTER
Left a message for patient to expect a call from scheduling to set up the lexiscan stress test, CXR and labs per Dr. Galicia.  Message to scheduling team to contact patient

## 2020-12-29 ENCOUNTER — HOSPITAL ENCOUNTER (OUTPATIENT)
Dept: CARDIOLOGY | Facility: CLINIC | Age: 65
End: 2020-12-29
Attending: INTERNAL MEDICINE
Payer: MEDICARE

## 2020-12-29 ENCOUNTER — TELEPHONE (OUTPATIENT)
Dept: CARDIOLOGY | Facility: CLINIC | Age: 65
End: 2020-12-29

## 2020-12-29 ENCOUNTER — HOSPITAL ENCOUNTER (OUTPATIENT)
Dept: GENERAL RADIOLOGY | Facility: CLINIC | Age: 65
End: 2020-12-29
Attending: INTERNAL MEDICINE
Payer: MEDICARE

## 2020-12-29 VITALS
DIASTOLIC BLOOD PRESSURE: 86 MMHG | OXYGEN SATURATION: 99 % | HEIGHT: 66 IN | WEIGHT: 163.4 LBS | HEART RATE: 98 BPM | BODY MASS INDEX: 26.26 KG/M2 | SYSTOLIC BLOOD PRESSURE: 148 MMHG

## 2020-12-29 DIAGNOSIS — I25.10 CORONARY ARTERY DISEASE INVOLVING NATIVE CORONARY ARTERY OF NATIVE HEART WITHOUT ANGINA PECTORIS: ICD-10-CM

## 2020-12-29 DIAGNOSIS — R06.09 DYSPNEA ON EXERTION: ICD-10-CM

## 2020-12-29 DIAGNOSIS — R94.39 ABNORMAL CARDIOVASCULAR STRESS TEST: ICD-10-CM

## 2020-12-29 DIAGNOSIS — I25.10 CORONARY ARTERY DISEASE INVOLVING NATIVE CORONARY ARTERY OF NATIVE HEART WITHOUT ANGINA PECTORIS: Primary | ICD-10-CM

## 2020-12-29 DIAGNOSIS — I50.1 LEFT VENTRICULAR FAILURE, UNSPECIFIED (H): ICD-10-CM

## 2020-12-29 LAB
ALT SERPL W P-5'-P-CCNC: 19 U/L (ref 0–70)
ANION GAP SERPL CALCULATED.3IONS-SCNC: 4 MMOL/L (ref 3–14)
BUN SERPL-MCNC: 16 MG/DL (ref 7–30)
CALCIUM SERPL-MCNC: 8.4 MG/DL (ref 8.5–10.1)
CHLORIDE SERPL-SCNC: 111 MMOL/L (ref 94–109)
CHOLEST SERPL-MCNC: 126 MG/DL
CO2 SERPL-SCNC: 25 MMOL/L (ref 20–32)
CREAT SERPL-MCNC: 0.97 MG/DL (ref 0.66–1.25)
CV STRESS MAX HR HE: 105
D DIMER PPP FEU-MCNC: 0.5 UG/ML FEU (ref 0–0.5)
GFR SERPL CREATININE-BSD FRML MDRD: 81 ML/MIN/{1.73_M2}
GLUCOSE SERPL-MCNC: 86 MG/DL (ref 70–99)
HDLC SERPL-MCNC: 64 MG/DL
LDLC SERPL CALC-MCNC: 55 MG/DL
NONHDLC SERPL-MCNC: 62 MG/DL
NT-PROBNP SERPL-MCNC: 134 PG/ML (ref 0–125)
NUC STRESS EJECTION FRACTION: 56 %
POTASSIUM SERPL-SCNC: 4.2 MMOL/L (ref 3.4–5.3)
RATE PRESSURE PRODUCT: NORMAL
SODIUM SERPL-SCNC: 140 MMOL/L (ref 133–144)
STRESS ECHO BASELINE DIASTOLIC HE: 86
STRESS ECHO BASELINE HR: 98
STRESS ECHO BASELINE SYSTOLIC BP: 148
STRESS ECHO CALCULATED PERCENT HR: 68 %
STRESS ECHO LAST STRESS DIASTOLIC BP: 82
STRESS ECHO LAST STRESS SYSTOLIC BP: 158
STRESS ECHO TARGET HR: 155
TRIGL SERPL-MCNC: 34 MG/DL

## 2020-12-29 PROCEDURE — A9502 TC99M TETROFOSMIN: HCPCS | Performed by: INTERNAL MEDICINE

## 2020-12-29 PROCEDURE — 36415 COLL VENOUS BLD VENIPUNCTURE: CPT | Performed by: INTERNAL MEDICINE

## 2020-12-29 PROCEDURE — 84460 ALANINE AMINO (ALT) (SGPT): CPT | Performed by: INTERNAL MEDICINE

## 2020-12-29 PROCEDURE — 80061 LIPID PANEL: CPT | Performed by: INTERNAL MEDICINE

## 2020-12-29 PROCEDURE — 93016 CV STRESS TEST SUPVJ ONLY: CPT | Performed by: INTERNAL MEDICINE

## 2020-12-29 PROCEDURE — 78452 HT MUSCLE IMAGE SPECT MULT: CPT | Mod: 26 | Performed by: INTERNAL MEDICINE

## 2020-12-29 PROCEDURE — 343N000001 HC RX 343: Performed by: INTERNAL MEDICINE

## 2020-12-29 PROCEDURE — 80048 BASIC METABOLIC PNL TOTAL CA: CPT | Performed by: INTERNAL MEDICINE

## 2020-12-29 PROCEDURE — 71046 X-RAY EXAM CHEST 2 VIEWS: CPT

## 2020-12-29 PROCEDURE — 85379 FIBRIN DEGRADATION QUANT: CPT | Performed by: INTERNAL MEDICINE

## 2020-12-29 PROCEDURE — 93018 CV STRESS TEST I&R ONLY: CPT | Mod: ME | Performed by: INTERNAL MEDICINE

## 2020-12-29 PROCEDURE — 250N000011 HC RX IP 250 OP 636: Performed by: INTERNAL MEDICINE

## 2020-12-29 PROCEDURE — 78452 HT MUSCLE IMAGE SPECT MULT: CPT | Mod: ME

## 2020-12-29 PROCEDURE — 83880 ASSAY OF NATRIURETIC PEPTIDE: CPT | Performed by: INTERNAL MEDICINE

## 2020-12-29 RX ORDER — CAFFEINE CITRATE 20 MG/ML
60 SOLUTION INTRAVENOUS
Status: DISCONTINUED | OUTPATIENT
Start: 2020-12-29 | End: 2020-12-30 | Stop reason: HOSPADM

## 2020-12-29 RX ORDER — AMINOPHYLLINE 25 MG/ML
50-100 INJECTION, SOLUTION INTRAVENOUS
Status: DISCONTINUED | OUTPATIENT
Start: 2020-12-29 | End: 2020-12-30 | Stop reason: HOSPADM

## 2020-12-29 RX ORDER — ACYCLOVIR 200 MG/1
0-1 CAPSULE ORAL
Status: DISCONTINUED | OUTPATIENT
Start: 2020-12-29 | End: 2020-12-30 | Stop reason: HOSPADM

## 2020-12-29 RX ORDER — ALBUTEROL SULFATE 90 UG/1
2 AEROSOL, METERED RESPIRATORY (INHALATION) EVERY 5 MIN PRN
Status: DISCONTINUED | OUTPATIENT
Start: 2020-12-29 | End: 2020-12-30 | Stop reason: HOSPADM

## 2020-12-29 RX ORDER — REGADENOSON 0.08 MG/ML
0.4 INJECTION, SOLUTION INTRAVENOUS ONCE
Status: COMPLETED | OUTPATIENT
Start: 2020-12-29 | End: 2020-12-29

## 2020-12-29 RX ADMIN — REGADENOSON 0.4 MG: 0.08 INJECTION, SOLUTION INTRAVENOUS at 13:52

## 2020-12-29 RX ADMIN — TETROFOSMIN 3.6 MCI.: 1.38 INJECTION, POWDER, LYOPHILIZED, FOR SOLUTION INTRAVENOUS at 12:35

## 2020-12-29 RX ADMIN — TETROFOSMIN 10.15 MCI.: 1.38 INJECTION, POWDER, LYOPHILIZED, FOR SOLUTION INTRAVENOUS at 13:57

## 2020-12-29 ASSESSMENT — MIFFLIN-ST. JEOR: SCORE: 1468.93

## 2020-12-29 NOTE — TELEPHONE ENCOUNTER
CXR, D-dimer, BNP & Lexiscan completed today, results below. Ordered by Dr Galicia for patient reports of chest discomfort. Routed to Dr Galicia for review.     CXR: There are no acute infiltrates. The cardiac silhouette is not enlarged. Pulmonary vasculature is unremarkable.    D-dimer = 0.5, BNP = 134    Lexiscan:      The nuclear stress test is abnormal.     There is a small area of mild ischemia in the mid to basal anterior segment(s) of the left ventricle.     There is a medium sized area of nontransmural infarction in the mid to basal inferior and inferolateral segment(s) of the left ventricle associated with reddy-infarct ischemia.     Left ventricular function is normal.     The left ventricular ejection fraction at stress is 56%.     A prior study was conducted on 7/15/2020. Compared to that study, basal and mid anterior ischemis is again seen but mild in degree. There is now mid to basal inferior/inferolateral scar with mild perinifarct ischemia.

## 2020-12-30 ENCOUNTER — DOCUMENTATION ONLY (OUTPATIENT)
Dept: CARDIOLOGY | Facility: CLINIC | Age: 65
End: 2020-12-30

## 2020-12-30 ENCOUNTER — HOSPITAL ENCOUNTER (OUTPATIENT)
Facility: CLINIC | Age: 65
End: 2020-12-30
Payer: COMMERCIAL

## 2020-12-30 DIAGNOSIS — R94.39 ABNORMAL CARDIOVASCULAR STRESS TEST: ICD-10-CM

## 2020-12-30 DIAGNOSIS — I25.119 CORONARY ARTERY DISEASE INVOLVING NATIVE CORONARY ARTERY OF NATIVE HEART WITH ANGINA PECTORIS (H): Primary | ICD-10-CM

## 2020-12-30 DIAGNOSIS — I25.10 CORONARY ARTERY DISEASE INVOLVING NATIVE CORONARY ARTERY OF NATIVE HEART WITHOUT ANGINA PECTORIS: ICD-10-CM

## 2020-12-30 NOTE — TELEPHONE ENCOUNTER
Called patient to review test results and Dr. Galicia's recommendation to repeat angiogram. Patient states he was expecting this result. He has not had a positive COVID-19 test within 90 days. Patient states his schedule is open for whenever he can be set up.    Order placed for left heart catheterization and ERIC visit    Message to scheduling to contact patient and schedule coronary angiogram.

## 2021-01-03 NOTE — PROGRESS NOTES
"  Cardiology Video Visit Progress Note    Service Date: January 4, 2021    Primary Cardiologist: Dr. Galicia      Reason for Visit: Follow up to discuss coronary angiogram    Mr. Johan Navarro is a 66 year old male who is being evaluated via a billable video visit to minimize risk of exposure with the current COVID-19 pandemic.    The patient has been notified of following:     \"This video visit will be conducted via a call between you and your physician/provider. We have found that certain health care needs can be provided without the need for an in-person physical exam. This service lets us provide the care you need with a video conversation. If a prescription is necessary we can send it directly to your pharmacy. If lab work is needed we can place an order for that and you can then stop by our lab to have the test done at a later time.    Video visits are billed at different rates depending on your insurance coverage.  Please reach out to your insurance provider with any questions.    If during the course of the call the physician/provider feels a video visit is not appropriate, you will not be charged for this service.\"    Patient has given verbal consent for Video visit? Yes  How would you like to obtain your AVS? MyChart  If you are dropped from the video visit, the video invite should be resent to: Text to cell phone: [phone]  Will anyone else be joining your video visit? No  {If patient encounters technical issues they should call [phone]    I had the pleasure of speaking with Mr. Johan Navarro via video visit today for follow up to discuss his recent stress test findings and proceeding with a coronary angiogram. He is a very pleasant now 66 year old male with a past medical history notable for hyperlipidemia, moderate aortic valve stenosis, and carotid artery stenosis. He also has a history of coronary artery disease, having undergone PCI with stenting of the distal and ostial circumflex as well as " in the first obtuse marginal artery in 1998. He has followed with Dr. Sander Galicia for many years for his cardiology care. He has had a markedly elevated LDL cholesterol in the past, which has since been well controlled with the use of atorvastatin and repatha.     I met Mr. Navarro initially this past summer for a virtual visit in July 2020 after he was in the emergency department [facility] initially for worsening exertional dyspnea and subsequently returning following an episode of loss of consciousness. His work-up at that time was largely unremarkable with an undetectable troponin level and an EKG showing normal sinus rhythm without acute ischemic changes. He was discharged home and was subsequently set up for a repeat echocardiogram, Lexiscan nuclear stress test, and a 1 week cardiac event monitor.     His echocardiogram was completed on 7/15/2020 and showed normal LV size and systolic function with an EF of 55 to 60%. The RV was noted to be mildly dilated with normal RV function. Moderate aortic valve stenosis was noted with a mean gradient of 17 mmHg. The Lexiscan nuclear stress test showed a medium sized area of moderate ischemia in the mid to distal anterior and anterolateral segments of the left ventricle and a small area of moderate ischemia in the mid to basal lateral segments. Coronary angiography was subsequently completed on 7/21/2020 showing significant stenosis of the ostium of the second obtuse marginal. He underwent successful intervention with placement of a drug-eluting stent at that time. His other coronary arteries demonstrated mild to moderate nonobstructive disease in the left dominant system.    He did quite well for around 6 months following his intervention. However, he called into the clinic about 2 weeks ago noting exertional dyspnea, chest discomfort, and exercise intolerance over the past month. He also noted occasional dark stools recently.  A chest x-ray,  nuclear stress test, NT pro BNP, and D-dimer were recommended. The chest x-ray was clear, D-dimer was within normal limits, and NT pro BNP was very mildly elevated at 134. The Lexiscan nuclear stress test was completed on 12/09/2020 demonstrating a small area of mild ischemia in the mid to basal anterior segments of the left ventricle, as well as a medium sized area of nontransmural infarction in the mid to basal inferior and inferolateral segments of the LV associated with reddy-infarct ischemia. Ejection fraction is 56%.    Today, Mr. Navarro tells me that he has been feeling quite poorly over the past month or so. He tells me that he continues to have significant fatigue, dyspnea with minimal exertion, and associated exertional chest discomfort. He is joined on the video visit by his wife Amy who is a nurse practitioner. He otherwise denies symptoms of palpitations, dizziness, orthopnea, PND, or lower extremity edema. He has continued to have dark stools intermittently. He also noted having an upset stomach and heartburn more frequently than normal over the past few weeks. He has not had any bright red blood in his stool or other bleeding.      Coronary angiography has been recommended for further evaluation and possible PCI given the abnormal stress test findings. However, given the recent history of dark tarry stools on DAPT with aspirin and Brilinta I am concerned about the possibility of GI bleeding. The patient does appear somewhat pale on the video today and his wife notes concern for worsening fatigue and weakness over the past few weeks.    ASSESSMENT AND PLAN:  1. Coronary artery disease   - Status post PCI to the second obtuse marginal in 07/2020 and prior circumflex/obtuse marginal revascularization in 1998.   - He has been on DAPT with aspirin and brilinta since his PCI in July.   - Recently with exertional dyspnea, chest pain, and exercise intolerance concerning for angina with an abnormal lexiscan  nuclear stress test on 12/09/2020 as outlined above. Plan for repeat coronary angiography on 01/08/2020 for definitive evaluation.    2. Dyslipidemia   - History of severe LDL elevation. This has normalized with the use of Repatha and atorvastatin with a recent fasting lipid profile showing an LDL well-controlled at 55.     3. Moderate aortic stenosis   - Stable on most recent echocardiogram in 07/2020 with a mean gradient of 17 mmHg.    4. Moderate left internal carotid artery stenosis  - Stable on most recent carotid ultrasound.    5. Dark stools  - Symptoms overall worrisome for the possibility of significant anemia and GI bleed.   - Recommended evaluation in the ER for a CBC and possible admission for GI workup if significantly anemic, however, with the pandemic the patient strongly prefers to avoid going into the ER at this time.   - We will plan to check labs for CBC tomorrow with his routine COVID test as planned prior to his scheduled coronary angiogram on Friday. We reviewed that if this shows significant anemia, this would likely require a hospitalization for GI workup prior to proceeding with an angiogram.     Video-Visit Details  Type of service: Video Visit    Video Start Time: 10:19 AM  Video End Time (time video stopped): 10:45 AM  Total time of Video: 26 minutes    Originating Location (pt. Location): Home  Distant Location (provider location): Home    Platform used for Video Visit: Haseeb    Thank you for the opportunity to participate in this pleasant patient's care.  I will plan to see him back in follow-up in 1 to 2 weeks following his coronary angiogram. We reviewed to not wait and to seek evaluation in the emergency department in the meantime if he develops any worsening shortness of breath or chest pain which does not improve at rest.    ANGELINE Church, CNP   Nurse Practitioner  [facility]  Pager: [phone]  Text Page  (8am - 5pm, M-F)    Orders this Visit:  No orders  of the defined types were placed in this encounter.    No orders of the defined types were placed in this encounter.    There are no discontinued medications.  Encounter Diagnoses   Name Primary?     Coronary artery disease involving native coronary artery of native heart with angina pectoris (H) Yes     Abnormal cardiovascular stress test      Dyspnea on exertion      Bilateral carotid artery stenosis      Mixed hyperlipidemia      Nonrheumatic aortic valve stenosis      CURRENT MEDICATIONS:  Current Outpatient Medications   Medication Sig Dispense Refill     albuterol (PROAIR HFA/PROVENTIL HFA/VENTOLIN HFA) 108 (90 Base) MCG/ACT inhaler Inhale 4-6 puffs into the lungs every 2 hours as needed for shortness of breath / dyspnea 1 Inhaler 1     aspirin (ASA) 81 MG chewable tablet Take 1 tablet (81 mg) by mouth daily 90 tablet 3     atorvastatin (LIPITOR) 80 MG tablet Take 1 tablet (80 mg) by mouth daily 90 tablet 2     diclofenac (VOLTAREN) 1 % topical gel Apply 2 grams to hands/fingers four times daily using enclosed dosing card. 100 g 1     evolocumab (REPATHA) 140 MG/ML prefilled autoinjector Inject 1 mL (140 mg) Subcutaneous every 14 days 6 mL 3     fluticasone (FLONASE) 50 MCG/ACT nasal spray Spray in nostril as needed  3     gabapentin (NEURONTIN) 600 MG tablet TAKE ONE TABLET BY MOUTH TWICE DAILY  180 tablet 1     sildenafil (REVATIO) 20 MG tablet TAKE 2-5 TABLETS BY MOUTH AS NEEDED PRIOR TO SEXUAL INTERCOURSE 90 tablet 1     ticagrelor (BRILINTA) 90 MG tablet Take 1 tablet (90 mg) by mouth 2 times daily Start this evening. 180 tablet 3     ALLERGIES  Allergies   Allergen Reactions     Crestor [Rosuvastatin] GI Disturbance     Dust Mites      No Clinical Screening - See Comments      Goose feathers     Pollen Extract      PAST MEDICAL, SURGICAL, FAMILY HISTORY:  History was reviewed and updated as needed, see medical record.    SOCIAL HISTORY:  Social History     Socioeconomic History     Marital status:       Spouse name: Not on file     Number of children: Not on file     Years of education: Not on file     Highest education level: Not on file   Occupational History     Not on file   Social Needs     Financial resource strain: Not on file     Food insecurity     Worry: Not on file     Inability: Not on file     Transportation needs     Medical: Not on file     Non-medical: Not on file   Tobacco Use     Smoking status: Current Every Day Smoker     Types: Dip, chew, snus or snuff, Cigars     Last attempt to quit: 2000     Years since quittin.0     Smokeless tobacco: Current User     Types: Chew     Tobacco comment: 12/10/18: occ chew, not every day   Substance and Sexual Activity     Alcohol use: Not Currently     Comment: very rare     Drug use: Yes     Types: Marijuana     Comment: daily      Sexual activity: Yes     Partners: Female   Lifestyle     Physical activity     Days per week: Not on file     Minutes per session: Not on file     Stress: Not on file   Relationships     Social connections     Talks on phone: Not on file     Gets together: Not on file     Attends [employer] service: Not on file     Active member of club or organization: Not on file     Attends meetings of clubs or organizations: Not on file     Relationship status: Not on file     Intimate partner violence     Fear of current or ex partner: Not on file     Emotionally abused: Not on file     Physically abused: Not on file     Forced sexual activity: Not on file   Other Topics Concern      Service Not Asked     Blood Transfusions Not Asked     Caffeine Concern Yes     Comment: coffee and soda     Occupational Exposure Not Asked     Hobby Hazards Not Asked     Sleep Concern Not Asked     Stress Concern Not Asked     Weight Concern Not Asked     Special Diet No     Back Care Not Asked     Exercise Yes     Comment: regular      Bike Helmet Not Asked     Seat Belt Yes     Self-Exams Not Asked     Parent/sibling w/ CABG, MI or  "angioplasty before 65F 55M? Yes     Comment: 49   Social History Narrative     Not on file     Review of Systems:  Review Of Systems:  Skin: NEGATIVE  Eyes:Ears/Nose/Throat: NEGATIVE  Respiratory: Shortness of breath  Cardiovascular: Tightness in chest, light headedness  Gastrointestinal: Heartburn, black stools  Genitourinary:NEGATIVE  Musculoskeletal: NEGATIVE  Neurologic: Headaches  Psychiatric: NEGATIVE  Hematologic/Lymphatic/Immunologic: Cold hands and feet  Endocrine:  NEGATIVE    PHYSICAL EXAM:  Patient Reported Vitals:  Vitals - Patient Reported  Systolic (Patient Reported): (!) 141  Diastolic (Patient Reported): 74  Height (Patient Reported): 66 cm (2' 1.98\")  SpO2 (Patient Reported): 99  Temperature (Patient Reported): 96.7  F (35.9  C)  Pulse (Patient Reported): 82    There were no vitals taken for this visit.   Wt Readings from Last 4 Encounters:   12/29/20 74.1 kg (163 lb 6.4 oz)   08/31/20 68.5 kg (151 lb)   07/21/20 69.2 kg (152 lb 8 oz)   07/15/20 69.5 kg (153 lb 3.2 oz)     General Appearance:  Pleasant gentleman with mild pallor and no acute distress.  ENT/Mouth:  Membranes moist, no nasal discharge or bleeding gums. Normal head shape, no evidence of injury or laceration.  Eyes:  No scleral icterus, normal conjunctivae.  Neck:  No evidence of thyromegaly.  Chest/Lungs:  No audible wheezing equal chest wall expansion. Non labored breathing. No cough.  Cardiovascular:  No evidence of JVD.  Abdomen:  No evidence of abdominal distention. No observed jaundice.  Extremities:  No cyanosis or clubbing noted.  Skin:  No xanthelasma. No evidence of facial lacerations.  Neurologic:  Normal arm motion bilateral, no tremors. No evidence of focal defect.  Psychiatric:  Alert and oriented x3, calm.    The rest of the comprehensive physical examination is deferred due to the current pandemic and restrictions due to this visit being completed via video call.    Recent Lab Results:  LIPID RESULTS:  Lab Results "   Component Value Date    CHOL 126 12/29/2020    HDL 64 12/29/2020    LDL 55 12/29/2020    TRIG 34 12/29/2020    CHOLHDLRATIO 3.3 09/15/2015     LIVER ENZYME RESULTS:  Lab Results   Component Value Date    AST 27 07/06/2020    ALT 19 12/29/2020     CBC RESULTS:  Lab Results   Component Value Date    WBC 4.5 07/21/2020    RBC 4.23 (L) 07/21/2020    HGB 11.9 (L) 07/21/2020    HCT 36.7 (L) 07/21/2020    MCV 87 07/21/2020    MCH 28.1 07/21/2020    MCHC 32.4 07/21/2020    RDW 14.7 07/21/2020     07/21/2020     BMP RESULTS:  Lab Results   Component Value Date     12/29/2020    POTASSIUM 4.2 12/29/2020    CHLORIDE 111 (H) 12/29/2020    CO2 25 12/29/2020    ANIONGAP 4 12/29/2020    GLC 86 12/29/2020    BUN 16 12/29/2020    CR 0.97 12/29/2020    GFRESTIMATED 81 12/29/2020    GFRESTBLACK >90 12/29/2020    ONDINA 8.4 (L) 12/29/2020      A1C RESULTS:  No results found for: A1C  INR RESULTS:  Lab Results   Component Value Date    INR 0.96 07/21/2020     CC  Sander Galicia MD  [id number]  [place] [id number]    This note was completed in part using Dragon voice recognition software. Although reviewed after completion, some word and grammatical errors may occur.    Implemented All Universal Safety Interventions:  Laura to call system. Call bell, personal items and telephone within reach. Instruct patient to call for assistance. Room bathroom lighting operational. Non-slip footwear when patient is off stretcher. Physically safe environment: no spills, clutter or unnecessary equipment. Stretcher in lowest position, wheels locked, appropriate side rails in place.

## 2021-01-03 NOTE — PATIENT INSTRUCTIONS
Thank you for your video visit with the [facility] today.    Today's plan:   1. Check labs to check your blood counts, hemoglobin, and platelets.   2. We will update you on the results and any further recommendations by phone.  3. We will have you come in for the angiogram on Friday as planned as long as the labs look okay.  4. Follow up in about 1 week after the angiogram is completed.     If you have questions or concerns, please call my nurse team at [phone].    Scheduling phone number: [phone]    It was a pleasure speaking with you today!     Hector Rosas, Nurse Practitioner  [facility]  January 4, 2021  _____________________________________________________

## 2021-01-04 ENCOUNTER — VIRTUAL VISIT (OUTPATIENT)
Dept: CARDIOLOGY | Facility: CLINIC | Age: 66
End: 2021-01-04
Attending: INTERNAL MEDICINE
Payer: COMMERCIAL

## 2021-01-04 DIAGNOSIS — R19.5 DARK STOOLS: ICD-10-CM

## 2021-01-04 DIAGNOSIS — E78.2 MIXED HYPERLIPIDEMIA: ICD-10-CM

## 2021-01-04 DIAGNOSIS — R06.09 DYSPNEA ON EXERTION: ICD-10-CM

## 2021-01-04 DIAGNOSIS — I25.119 CORONARY ARTERY DISEASE INVOLVING NATIVE CORONARY ARTERY OF NATIVE HEART WITH ANGINA PECTORIS (H): Primary | ICD-10-CM

## 2021-01-04 DIAGNOSIS — I25.10 CORONARY ARTERY DISEASE INVOLVING NATIVE CORONARY ARTERY OF NATIVE HEART WITHOUT ANGINA PECTORIS: ICD-10-CM

## 2021-01-04 DIAGNOSIS — I35.0 NONRHEUMATIC AORTIC VALVE STENOSIS: ICD-10-CM

## 2021-01-04 DIAGNOSIS — I65.23 BILATERAL CAROTID ARTERY STENOSIS: ICD-10-CM

## 2021-01-04 DIAGNOSIS — R94.39 ABNORMAL CARDIOVASCULAR STRESS TEST: ICD-10-CM

## 2021-01-04 PROCEDURE — 99214 OFFICE O/P EST MOD 30 MIN: CPT | Mod: 95 | Performed by: NURSE PRACTITIONER

## 2021-01-04 NOTE — LETTER
"1/4/2021    [place] Yang PA-C  [address]  [facility] [address]    RE: Johan Navarro       Dear Colleague,    I had the pleasure of seeing Johan Navarro in the [facility].    Cardiology Video Visit Progress Note    Service Date: January 4, 2021    Primary Cardiologist: Dr. Galicia      Reason for Visit: Follow up to discuss coronary angiogram    Mr. Johan Navarro is a 66 year old male who is being evaluated via a billable video visit to minimize risk of exposure with the current COVID-19 pandemic.    The patient has been notified of following:     \"This video visit will be conducted via a call between you and your physician/provider. We have found that certain health care needs can be provided without the need for an in-person physical exam. This service lets us provide the care you need with a video conversation. If a prescription is necessary we can send it directly to your pharmacy. If lab work is needed we can place an order for that and you can then stop by our lab to have the test done at a later time.    Video visits are billed at different rates depending on your insurance coverage.  Please reach out to your insurance provider with any questions.    If during the course of the call the physician/provider feels a video visit is not appropriate, you will not be charged for this service.\"    Patient has given verbal consent for Video visit? Yes  How would you like to obtain your AVS? MyChart  If you are dropped from the video visit, the video invite should be resent to: Text to cell phone: [phone]  Will anyone else be joining your video visit? No  {If patient encounters technical issues they should call [phone]    I had the pleasure of speaking with Mr. Johan Navarro via video visit today for follow up to discuss his recent stress test findings and proceeding with a coronary angiogram. He is a very pleasant now 66 year old male with a past medical history " notable for hyperlipidemia, moderate aortic valve stenosis, and carotid artery stenosis. He also has a history of coronary artery disease, having undergone PCI with stenting of the distal and ostial circumflex as well as in the first obtuse marginal artery in 1998. He has followed with Dr. Sander Galicia for many years for his cardiology care. He has had a markedly elevated LDL cholesterol in the past, which has since been well controlled with the use of atorvastatin and repatha.     I met Mr. Navarro initially this past summer for a virtual visit in July 2020 after he was in the emergency department [facility] initially for worsening exertional dyspnea and subsequently returning following an episode of loss of consciousness. His work-up at that time was largely unremarkable with an undetectable troponin level and an EKG showing normal sinus rhythm without acute ischemic changes. He was discharged home and was subsequently set up for a repeat echocardiogram, Lexiscan nuclear stress test, and a 1 week cardiac event monitor.     His echocardiogram was completed on 7/15/2020 and showed normal LV size and systolic function with an EF of 55 to 60%. The RV was noted to be mildly dilated with normal RV function. Moderate aortic valve stenosis was noted with a mean gradient of 17 mmHg. The Lexiscan nuclear stress test showed a medium sized area of moderate ischemia in the mid to distal anterior and anterolateral segments of the left ventricle and a small area of moderate ischemia in the mid to basal lateral segments. Coronary angiography was subsequently completed on 7/21/2020 showing significant stenosis of the ostium of the second obtuse marginal. He underwent successful intervention with placement of a drug-eluting stent at that time. His other coronary arteries demonstrated mild to moderate nonobstructive disease in the left dominant system.    He did quite well for around 6 months following his  intervention. However, he called into the clinic about 2 weeks ago noting exertional dyspnea, chest discomfort, and exercise intolerance over the past month. He also noted occasional dark stools recently.  A chest x-ray, nuclear stress test, NT pro BNP, and D-dimer were recommended. The chest x-ray was clear, D-dimer was within normal limits, and NT pro BNP was very mildly elevated at 134. The Lexiscan nuclear stress test was completed on 12/09/2020 demonstrating a small area of mild ischemia in the mid to basal anterior segments of the left ventricle, as well as a medium sized area of nontransmural infarction in the mid to basal inferior and inferolateral segments of the LV associated with reddy-infarct ischemia. Ejection fraction is 56%.    Today, Mr. Navarro tells me that he has been feeling quite poorly over the past month or so. He tells me that he continues to have significant fatigue, dyspnea with minimal exertion, and associated exertional chest discomfort. He is joined on the video visit by his wife Amy who is a nurse practitioner. He otherwise denies symptoms of palpitations, dizziness, orthopnea, PND, or lower extremity edema. He has continued to have dark stools intermittently. He also noted having an upset stomach and heartburn more frequently than normal over the past few weeks. He has not had any bright red blood in his stool or other bleeding.      Coronary angiography has been recommended for further evaluation and possible PCI given the abnormal stress test findings. However, given the recent history of dark tarry stools on DAPT with aspirin and Brilinta I am concerned about the possibility of GI bleeding. The patient does appear somewhat pale on the video today and his wife notes concern for worsening fatigue and weakness over the past few weeks.    ASSESSMENT AND PLAN:  1. Coronary artery disease   - Status post PCI to the second obtuse marginal in 07/2020 and prior circumflex/obtuse marginal  revascularization in 1998.   - He has been on DAPT with aspirin and brilinta since his PCI in July.   - Recently with exertional dyspnea, chest pain, and exercise intolerance concerning for angina with an abnormal lexiscan nuclear stress test on 12/09/2020 as outlined above. Plan for repeat coronary angiography on 01/08/2020 for definitive evaluation.    2. Dyslipidemia   - History of severe LDL elevation. This has normalized with the use of Repatha and atorvastatin with a recent fasting lipid profile showing an LDL well-controlled at 55.     3. Moderate aortic stenosis   - Stable on most recent echocardiogram in 07/2020 with a mean gradient of 17 mmHg.    4. Moderate left internal carotid artery stenosis  - Stable on most recent carotid ultrasound.    5. Dark stools  - Symptoms overall worrisome for the possibility of significant anemia and GI bleed.   - Recommended evaluation in the ER for a CBC and possible admission for GI workup if significantly anemic, however, with the pandemic the patient strongly prefers to avoid going into the ER at this time.   - We will plan to check labs for CBC tomorrow with his routine COVID test as planned prior to his scheduled coronary angiogram on Friday. We reviewed that if this shows significant anemia, this would likely require a hospitalization for GI workup prior to proceeding with an angiogram.     Video-Visit Details  Type of service: Video Visit    Video Start Time: 10:19 AM  Video End Time (time video stopped): 10:45 AM  Total time of Video: 26 minutes    Originating Location (pt. Location): Home  Distant Location (provider location): Home    Platform used for Video Visit: Haseeb    Thank you for the opportunity to participate in this pleasant patient's care.  I will plan to see him back in follow-up in 1 to 2 weeks following his coronary angiogram. We reviewed to not wait and to seek evaluation in the emergency department in the meantime if he develops any worsening  shortness of breath or chest pain which does not improve at rest.    ANGELINE Church, CNP   Nurse Practitioner  [facility]  Pager: [phone]  Text Page  (8am - 5pm, M-F)    Orders this Visit:  No orders of the defined types were placed in this encounter.    No orders of the defined types were placed in this encounter.    There are no discontinued medications.  Encounter Diagnoses   Name Primary?     Coronary artery disease involving native coronary artery of native heart with angina pectoris (H) Yes     Abnormal cardiovascular stress test      Dyspnea on exertion      Bilateral carotid artery stenosis      Mixed hyperlipidemia      Nonrheumatic aortic valve stenosis      CURRENT MEDICATIONS:  Current Outpatient Medications   Medication Sig Dispense Refill     albuterol (PROAIR HFA/PROVENTIL HFA/VENTOLIN HFA) 108 (90 Base) MCG/ACT inhaler Inhale 4-6 puffs into the lungs every 2 hours as needed for shortness of breath / dyspnea 1 Inhaler 1     aspirin (ASA) 81 MG chewable tablet Take 1 tablet (81 mg) by mouth daily 90 tablet 3     atorvastatin (LIPITOR) 80 MG tablet Take 1 tablet (80 mg) by mouth daily 90 tablet 2     diclofenac (VOLTAREN) 1 % topical gel Apply 2 grams to hands/fingers four times daily using enclosed dosing card. 100 g 1     evolocumab (REPATHA) 140 MG/ML prefilled autoinjector Inject 1 mL (140 mg) Subcutaneous every 14 days 6 mL 3     fluticasone (FLONASE) 50 MCG/ACT nasal spray Spray in nostril as needed  3     gabapentin (NEURONTIN) 600 MG tablet TAKE ONE TABLET BY MOUTH TWICE DAILY  180 tablet 1     sildenafil (REVATIO) 20 MG tablet TAKE 2-5 TABLETS BY MOUTH AS NEEDED PRIOR TO SEXUAL INTERCOURSE 90 tablet 1     ticagrelor (BRILINTA) 90 MG tablet Take 1 tablet (90 mg) by mouth 2 times daily Start this evening. 180 tablet 3     ALLERGIES  Allergies   Allergen Reactions     Crestor [Rosuvastatin] GI Disturbance     Dust Mites      No Clinical Screening - See Comments       Goose feathers     Pollen Extract      PAST MEDICAL, SURGICAL, FAMILY HISTORY:  History was reviewed and updated as needed, see medical record.    SOCIAL HISTORY:  Social History     Socioeconomic History     Marital status:      Spouse name: Not on file     Number of children: Not on file     Years of education: Not on file     Highest education level: Not on file   Occupational History     Not on file   Social Needs     Financial resource strain: Not on file     Food insecurity     Worry: Not on file     Inability: Not on file     Transportation needs     Medical: Not on file     Non-medical: Not on file   Tobacco Use     Smoking status: Current Every Day Smoker     Types: Dip, chew, snus or snuff, Cigars     Last attempt to quit: 2000     Years since quittin.0     Smokeless tobacco: Current User     Types: Chew     Tobacco comment: 12/10/18: occ chew, not every day   Substance and Sexual Activity     Alcohol use: Not Currently     Comment: very rare     Drug use: Yes     Types: Marijuana     Comment: daily      Sexual activity: Yes     Partners: Female   Lifestyle     Physical activity     Days per week: Not on file     Minutes per session: Not on file     Stress: Not on file   Relationships     Social connections     Talks on phone: Not on file     Gets together: Not on file     Attends [employer] service: Not on file     Active member of club or organization: Not on file     Attends meetings of clubs or organizations: Not on file     Relationship status: Not on file     Intimate partner violence     Fear of current or ex partner: Not on file     Emotionally abused: Not on file     Physically abused: Not on file     Forced sexual activity: Not on file   Other Topics Concern      Service Not Asked     Blood Transfusions Not Asked     Caffeine Concern Yes     Comment: coffee and soda     Occupational Exposure Not Asked     Hobby Hazards Not Asked     Sleep Concern Not Asked     Stress Concern  "Not Asked     Weight Concern Not Asked     Special Diet No     Back Care Not Asked     Exercise Yes     Comment: regular      Bike Helmet Not Asked     Seat Belt Yes     Self-Exams Not Asked     Parent/sibling w/ CABG, MI or angioplasty before 65F 55M? Yes     Comment: 49   Social History Narrative     Not on file     Review of Systems:  Review Of Systems:  Skin: NEGATIVE  Eyes:Ears/Nose/Throat: NEGATIVE  Respiratory: Shortness of breath  Cardiovascular: Tightness in chest, light headedness  Gastrointestinal: Heartburn, black stools  Genitourinary:NEGATIVE  Musculoskeletal: NEGATIVE  Neurologic: Headaches  Psychiatric: NEGATIVE  Hematologic/Lymphatic/Immunologic: Cold hands and feet  Endocrine:  NEGATIVE    PHYSICAL EXAM:  Patient Reported Vitals:  Vitals - Patient Reported  Systolic (Patient Reported): (!) 141  Diastolic (Patient Reported): 74  Height (Patient Reported): 66 cm (2' 1.98\")  SpO2 (Patient Reported): 99  Temperature (Patient Reported): 96.7  F (35.9  C)  Pulse (Patient Reported): 82    There were no vitals taken for this visit.   Wt Readings from Last 4 Encounters:   12/29/20 74.1 kg (163 lb 6.4 oz)   08/31/20 68.5 kg (151 lb)   07/21/20 69.2 kg (152 lb 8 oz)   07/15/20 69.5 kg (153 lb 3.2 oz)     General Appearance:  Pleasant gentleman with mild pallor and no acute distress.  ENT/Mouth:  Membranes moist, no nasal discharge or bleeding gums. Normal head shape, no evidence of injury or laceration.  Eyes:  No scleral icterus, normal conjunctivae.  Neck:  No evidence of thyromegaly.  Chest/Lungs:  No audible wheezing equal chest wall expansion. Non labored breathing. No cough.  Cardiovascular:  No evidence of JVD.  Abdomen:  No evidence of abdominal distention. No observed jaundice.  Extremities:  No cyanosis or clubbing noted.  Skin:  No xanthelasma. No evidence of facial lacerations.  Neurologic:  Normal arm motion bilateral, no tremors. No evidence of focal defect.  Psychiatric:  Alert and oriented x3, " calm.    The rest of the comprehensive physical examination is deferred due to the current pandemic and restrictions due to this visit being completed via video call.    Recent Lab Results:  LIPID RESULTS:  Lab Results   Component Value Date    CHOL 126 12/29/2020    HDL 64 12/29/2020    LDL 55 12/29/2020    TRIG 34 12/29/2020    CHOLHDLRATIO 3.3 09/15/2015     LIVER ENZYME RESULTS:  Lab Results   Component Value Date    AST 27 07/06/2020    ALT 19 12/29/2020     CBC RESULTS:  Lab Results   Component Value Date    WBC 4.5 07/21/2020    RBC 4.23 (L) 07/21/2020    HGB 11.9 (L) 07/21/2020    HCT 36.7 (L) 07/21/2020    MCV 87 07/21/2020    MCH 28.1 07/21/2020    MCHC 32.4 07/21/2020    RDW 14.7 07/21/2020     07/21/2020     BMP RESULTS:  Lab Results   Component Value Date     12/29/2020    POTASSIUM 4.2 12/29/2020    CHLORIDE 111 (H) 12/29/2020    CO2 25 12/29/2020    ANIONGAP 4 12/29/2020    GLC 86 12/29/2020    BUN 16 12/29/2020    CR 0.97 12/29/2020    GFRESTIMATED 81 12/29/2020    GFRESTBLACK >90 12/29/2020    ONDINA 8.4 (L) 12/29/2020      A1C RESULTS:  No results found for: A1C  INR RESULTS:  Lab Results   Component Value Date    INR 0.96 07/21/2020       This note was completed in part using Dragon voice recognition software. Although reviewed after completion, some word and grammatical errors may occur.       Thank you for allowing me to participate in the care of your patient.    Sincerely,     Albert Rosas NP     [facility]

## 2021-01-06 ENCOUNTER — APPOINTMENT (OUTPATIENT)
Dept: GENERAL RADIOLOGY | Facility: CLINIC | Age: 66
DRG: 812 | End: 2021-01-06
Attending: EMERGENCY MEDICINE
Payer: COMMERCIAL

## 2021-01-06 ENCOUNTER — HOSPITAL ENCOUNTER (INPATIENT)
Facility: CLINIC | Age: 66
LOS: 2 days | Discharge: HOME | DRG: 812 | End: 2021-01-08
Attending: EMERGENCY MEDICINE | Admitting: INTERNAL MEDICINE
Payer: COMMERCIAL

## 2021-01-06 ENCOUNTER — TELEPHONE (OUTPATIENT)
Dept: CARDIOLOGY | Facility: CLINIC | Age: 66
End: 2021-01-06

## 2021-01-06 ENCOUNTER — ANESTHESIA EVENT (OUTPATIENT)
Dept: GASTROENTEROLOGY | Facility: CLINIC | Age: 66
DRG: 812 | End: 2021-01-06
Payer: COMMERCIAL

## 2021-01-06 ENCOUNTER — ANESTHESIA (OUTPATIENT)
Dept: GASTROENTEROLOGY | Facility: CLINIC | Age: 66
DRG: 812 | End: 2021-01-06
Payer: COMMERCIAL

## 2021-01-06 DIAGNOSIS — C61 MALIGNANT NEOPLASM OF PROSTATE (H): ICD-10-CM

## 2021-01-06 DIAGNOSIS — D64.9 ANEMIA, UNSPECIFIED TYPE: ICD-10-CM

## 2021-01-06 DIAGNOSIS — R55 SYNCOPE, UNSPECIFIED SYNCOPE TYPE: ICD-10-CM

## 2021-01-06 DIAGNOSIS — I25.10 CORONARY ARTERY DISEASE INVOLVING NATIVE CORONARY ARTERY OF NATIVE HEART WITHOUT ANGINA PECTORIS: Primary | ICD-10-CM

## 2021-01-06 DIAGNOSIS — R19.5 DARK STOOLS: ICD-10-CM

## 2021-01-06 DIAGNOSIS — I25.10 CORONARY ARTERY DISEASE INVOLVING NATIVE CORONARY ARTERY OF NATIVE HEART WITHOUT ANGINA PECTORIS: ICD-10-CM

## 2021-01-06 DIAGNOSIS — K92.2 GASTROINTESTINAL HEMORRHAGE, UNSPECIFIED GASTROINTESTINAL HEMORRHAGE TYPE: ICD-10-CM

## 2021-01-06 DIAGNOSIS — R06.09 DYSPNEA ON EXERTION: ICD-10-CM

## 2021-01-06 DIAGNOSIS — I20.0 UNSTABLE ANGINA (H): ICD-10-CM

## 2021-01-06 DIAGNOSIS — I25.119 CORONARY ARTERY DISEASE INVOLVING NATIVE CORONARY ARTERY OF NATIVE HEART WITH ANGINA PECTORIS (H): ICD-10-CM

## 2021-01-06 LAB
ABO + RH BLD: NORMAL
ABO + RH BLD: NORMAL
ALBUMIN SERPL-MCNC: 3.9 G/DL (ref 3.4–5)
ALBUMIN SERPL-MCNC: 3.9 G/DL (ref 3.4–5)
ALP SERPL-CCNC: 72 U/L (ref 40–150)
ALP SERPL-CCNC: 80 U/L (ref 40–150)
ALT SERPL W P-5'-P-CCNC: 19 U/L (ref 0–70)
ALT SERPL W P-5'-P-CCNC: 19 U/L (ref 0–70)
ANION GAP SERPL CALCULATED.3IONS-SCNC: 5 MMOL/L (ref 3–14)
ANION GAP SERPL CALCULATED.3IONS-SCNC: 9 MMOL/L (ref 3–14)
ANISOCYTOSIS BLD QL SMEAR: SLIGHT
AST SERPL W P-5'-P-CCNC: 18 U/L (ref 0–45)
AST SERPL W P-5'-P-CCNC: 18 U/L (ref 0–45)
BASOPHILS # BLD AUTO: 0 10E9/L (ref 0–0.2)
BASOPHILS # BLD AUTO: 0.1 10E9/L (ref 0–0.2)
BASOPHILS NFR BLD AUTO: 1.1 %
BASOPHILS NFR BLD AUTO: 1.1 %
BILIRUB SERPL-MCNC: 0.6 MG/DL (ref 0.2–1.3)
BILIRUB SERPL-MCNC: 0.7 MG/DL (ref 0.2–1.3)
BLD GP AB SCN SERPL QL: NORMAL
BLD PROD TYP BPU: NORMAL
BLD UNIT ID BPU: 0
BLOOD BANK CMNT PATIENT-IMP: NORMAL
BLOOD PRODUCT CODE: NORMAL
BPU ID: NORMAL
BUN SERPL-MCNC: 21 MG/DL (ref 7–30)
BUN SERPL-MCNC: 22 MG/DL (ref 7–30)
CALCIUM SERPL-MCNC: 8.5 MG/DL (ref 8.5–10.1)
CALCIUM SERPL-MCNC: 8.8 MG/DL (ref 8.5–10.1)
CHLORIDE SERPL-SCNC: 108 MMOL/L (ref 94–109)
CHLORIDE SERPL-SCNC: 108 MMOL/L (ref 94–109)
CHOLEST SERPL-MCNC: 113 MG/DL
CO2 SERPL-SCNC: 20 MMOL/L (ref 20–32)
CO2 SERPL-SCNC: 23 MMOL/L (ref 20–32)
CREAT SERPL-MCNC: 1.02 MG/DL (ref 0.66–1.25)
CREAT SERPL-MCNC: 1.03 MG/DL (ref 0.66–1.25)
DACRYOCYTES BLD QL SMEAR: SLIGHT
DIFFERENTIAL METHOD BLD: ABNORMAL
DIFFERENTIAL METHOD BLD: ABNORMAL
EOSINOPHIL # BLD AUTO: 0 10E9/L (ref 0–0.7)
EOSINOPHIL # BLD AUTO: 0.1 10E9/L (ref 0–0.7)
EOSINOPHIL NFR BLD AUTO: 0.8 %
EOSINOPHIL NFR BLD AUTO: 1.8 %
ERYTHROCYTE [DISTWIDTH] IN BLOOD BY AUTOMATED COUNT: 17.5 % (ref 10–15)
ERYTHROCYTE [DISTWIDTH] IN BLOOD BY AUTOMATED COUNT: 17.6 % (ref 10–15)
FERRITIN SERPL-MCNC: 1 NG/ML (ref 26–388)
FOLATE SERPL-MCNC: 18 NG/ML
GFR SERPL CREATININE-BSD FRML MDRD: 75 ML/MIN/{1.73_M2}
GFR SERPL CREATININE-BSD FRML MDRD: 76 ML/MIN/{1.73_M2}
GLUCOSE SERPL-MCNC: 132 MG/DL (ref 70–99)
GLUCOSE SERPL-MCNC: 89 MG/DL (ref 70–99)
HCT VFR BLD AUTO: 19.8 % (ref 40–53)
HCT VFR BLD AUTO: 20.9 % (ref 40–53)
HDLC SERPL-MCNC: 55 MG/DL
HGB BLD-MCNC: 5.3 G/DL (ref 13.3–17.7)
HGB BLD-MCNC: 5.6 G/DL (ref 13.3–17.7)
HGB BLD-MCNC: 7.4 G/DL (ref 13.3–17.7)
HYPOCHROMIA BLD QL: PRESENT
IMM GRANULOCYTES # BLD: 0 10E9/L (ref 0–0.4)
IMM GRANULOCYTES NFR BLD: 0.3 %
INR PPP: 1.03 (ref 0.86–1.14)
INTERPRETATION ECG - MUSE: NORMAL
IRON SATN MFR SERPL: 3 % (ref 15–46)
IRON SERPL-MCNC: 13 UG/DL (ref 35–180)
LABORATORY COMMENT REPORT: NORMAL
LDLC SERPL CALC-MCNC: 46 MG/DL
LYMPHOCYTES # BLD AUTO: 0.9 10E9/L (ref 0.8–5.3)
LYMPHOCYTES # BLD AUTO: 1.5 10E9/L (ref 0.8–5.3)
LYMPHOCYTES NFR BLD AUTO: 23.3 %
LYMPHOCYTES NFR BLD AUTO: 32.6 %
MAGNESIUM SERPL-MCNC: 2.2 MG/DL (ref 1.6–2.3)
MCH RBC QN AUTO: 17 PG (ref 26.5–33)
MCH RBC QN AUTO: 17 PG (ref 26.5–33)
MCHC RBC AUTO-ENTMCNC: 26.8 G/DL (ref 31.5–36.5)
MCHC RBC AUTO-ENTMCNC: 26.8 G/DL (ref 31.5–36.5)
MCV RBC AUTO: 63 FL (ref 78–100)
MCV RBC AUTO: 64 FL (ref 78–100)
MONOCYTES # BLD AUTO: 0.5 10E9/L (ref 0–1.3)
MONOCYTES # BLD AUTO: 0.6 10E9/L (ref 0–1.3)
MONOCYTES NFR BLD AUTO: 13 %
MONOCYTES NFR BLD AUTO: 13.4 %
NEUTROPHILS # BLD AUTO: 2.3 10E9/L (ref 1.6–8.3)
NEUTROPHILS # BLD AUTO: 2.3 10E9/L (ref 1.6–8.3)
NEUTROPHILS NFR BLD AUTO: 51.1 %
NEUTROPHILS NFR BLD AUTO: 61.5 %
NONHDLC SERPL-MCNC: 58 MG/DL
NRBC # BLD AUTO: 0 10*3/UL
NRBC BLD AUTO-RTO: 0 /100
NUM BPU REQUESTED: 3
OVALOCYTES BLD QL SMEAR: SLIGHT
PLATELET # BLD AUTO: 208 10E9/L (ref 150–450)
PLATELET # BLD AUTO: 248 10E9/L (ref 150–450)
PLATELET # BLD EST: ABNORMAL 10*3/UL
POTASSIUM SERPL-SCNC: 3.7 MMOL/L (ref 3.4–5.3)
POTASSIUM SERPL-SCNC: 3.8 MMOL/L (ref 3.4–5.3)
PROT SERPL-MCNC: 7.2 G/DL (ref 6.8–8.8)
PROT SERPL-MCNC: 7.3 G/DL (ref 6.8–8.8)
PSA SERPL-ACNC: <0.01 UG/L (ref 0–4)
RBC # BLD AUTO: 3.12 10E12/L (ref 4.4–5.9)
RBC # BLD AUTO: 3.3 10E12/L (ref 4.4–5.9)
SARS-COV-2 RNA SPEC QL NAA+PROBE: NEGATIVE
SODIUM SERPL-SCNC: 136 MMOL/L (ref 133–144)
SODIUM SERPL-SCNC: 137 MMOL/L (ref 133–144)
SPECIMEN EXP DATE BLD: NORMAL
SPECIMEN SOURCE: NORMAL
TARGETS BLD QL SMEAR: SLIGHT
TIBC SERPL-MCNC: 439 UG/DL (ref 240–430)
TRANSFERRIN SERPL-MCNC: 332 MG/DL (ref 210–360)
TRANSFUSION STATUS PATIENT QL: NORMAL
TRIGL SERPL-MCNC: 61 MG/DL
TROPONIN I SERPL-MCNC: <0.015 UG/L (ref 0–0.04)
UPPER GI ENDOSCOPY: NORMAL
VIT B12 SERPL-MCNC: 396 PG/ML (ref 193–986)
WBC # BLD AUTO: 3.8 10E9/L (ref 4–11)
WBC # BLD AUTO: 4.5 10E9/L (ref 4–11)

## 2021-01-06 PROCEDURE — P9016 RBC LEUKOCYTES REDUCED: HCPCS | Performed by: EMERGENCY MEDICINE

## 2021-01-06 PROCEDURE — 999N000010 HC STATISTIC ANES STAT CODE-CRNA PER MINUTE: Performed by: INTERNAL MEDICINE

## 2021-01-06 PROCEDURE — 85018 HEMOGLOBIN: CPT | Performed by: PHYSICIAN ASSISTANT

## 2021-01-06 PROCEDURE — 86900 BLOOD TYPING SEROLOGIC ABO: CPT | Performed by: EMERGENCY MEDICINE

## 2021-01-06 PROCEDURE — 210N000002 HC R&B HEART CARE

## 2021-01-06 PROCEDURE — 83550 IRON BINDING TEST: CPT | Performed by: EMERGENCY MEDICINE

## 2021-01-06 PROCEDURE — 250N000013 HC RX MED GY IP 250 OP 250 PS 637: Performed by: INTERNAL MEDICINE

## 2021-01-06 PROCEDURE — 250N000011 HC RX IP 250 OP 636: Performed by: EMERGENCY MEDICINE

## 2021-01-06 PROCEDURE — 99223 1ST HOSP IP/OBS HIGH 75: CPT | Performed by: INTERNAL MEDICINE

## 2021-01-06 PROCEDURE — 85610 PROTHROMBIN TIME: CPT | Performed by: EMERGENCY MEDICINE

## 2021-01-06 PROCEDURE — 250N000011 HC RX IP 250 OP 636: Performed by: REGISTERED NURSE

## 2021-01-06 PROCEDURE — 80053 COMPREHEN METABOLIC PANEL: CPT | Performed by: EMERGENCY MEDICINE

## 2021-01-06 PROCEDURE — 82746 ASSAY OF FOLIC ACID SERUM: CPT | Performed by: EMERGENCY MEDICINE

## 2021-01-06 PROCEDURE — 83735 ASSAY OF MAGNESIUM: CPT | Performed by: PHYSICIAN ASSISTANT

## 2021-01-06 PROCEDURE — 93005 ELECTROCARDIOGRAM TRACING: CPT

## 2021-01-06 PROCEDURE — 99223 1ST HOSP IP/OBS HIGH 75: CPT | Mod: AI | Performed by: INTERNAL MEDICINE

## 2021-01-06 PROCEDURE — 85025 COMPLETE CBC W/AUTO DIFF WBC: CPT | Performed by: EMERGENCY MEDICINE

## 2021-01-06 PROCEDURE — 258N000003 HC RX IP 258 OP 636: Performed by: PHYSICIAN ASSISTANT

## 2021-01-06 PROCEDURE — 82728 ASSAY OF FERRITIN: CPT | Performed by: EMERGENCY MEDICINE

## 2021-01-06 PROCEDURE — 80053 COMPREHEN METABOLIC PANEL: CPT | Performed by: NURSE PRACTITIONER

## 2021-01-06 PROCEDURE — 84484 ASSAY OF TROPONIN QUANT: CPT | Performed by: EMERGENCY MEDICINE

## 2021-01-06 PROCEDURE — 96374 THER/PROPH/DIAG INJ IV PUSH: CPT

## 2021-01-06 PROCEDURE — 99285 EMERGENCY DEPT VISIT HI MDM: CPT | Mod: 25

## 2021-01-06 PROCEDURE — 84466 ASSAY OF TRANSFERRIN: CPT | Performed by: EMERGENCY MEDICINE

## 2021-01-06 PROCEDURE — 84153 ASSAY OF PSA TOTAL: CPT | Performed by: NURSE PRACTITIONER

## 2021-01-06 PROCEDURE — C9803 HOPD COVID-19 SPEC COLLECT: HCPCS

## 2021-01-06 PROCEDURE — 87635 SARS-COV-2 COVID-19 AMP PRB: CPT | Performed by: EMERGENCY MEDICINE

## 2021-01-06 PROCEDURE — C9113 INJ PANTOPRAZOLE SODIUM, VIA: HCPCS | Performed by: PHYSICIAN ASSISTANT

## 2021-01-06 PROCEDURE — 36415 COLL VENOUS BLD VENIPUNCTURE: CPT | Performed by: PHYSICIAN ASSISTANT

## 2021-01-06 PROCEDURE — C9113 INJ PANTOPRAZOLE SODIUM, VIA: HCPCS | Performed by: EMERGENCY MEDICINE

## 2021-01-06 PROCEDURE — 36415 COLL VENOUS BLD VENIPUNCTURE: CPT | Performed by: NURSE PRACTITIONER

## 2021-01-06 PROCEDURE — 250N000009 HC RX 250: Performed by: REGISTERED NURSE

## 2021-01-06 PROCEDURE — 86850 RBC ANTIBODY SCREEN: CPT | Performed by: EMERGENCY MEDICINE

## 2021-01-06 PROCEDURE — 80061 LIPID PANEL: CPT | Performed by: NURSE PRACTITIONER

## 2021-01-06 PROCEDURE — 82607 VITAMIN B-12: CPT | Performed by: EMERGENCY MEDICINE

## 2021-01-06 PROCEDURE — 43235 EGD DIAGNOSTIC BRUSH WASH: CPT | Performed by: INTERNAL MEDICINE

## 2021-01-06 PROCEDURE — 250N000011 HC RX IP 250 OP 636: Performed by: PHYSICIAN ASSISTANT

## 2021-01-06 PROCEDURE — 85025 COMPLETE CBC W/AUTO DIFF WBC: CPT | Performed by: NURSE PRACTITIONER

## 2021-01-06 PROCEDURE — 86923 COMPATIBILITY TEST ELECTRIC: CPT | Performed by: EMERGENCY MEDICINE

## 2021-01-06 PROCEDURE — 86901 BLOOD TYPING SEROLOGIC RH(D): CPT | Performed by: EMERGENCY MEDICINE

## 2021-01-06 PROCEDURE — 83540 ASSAY OF IRON: CPT | Performed by: EMERGENCY MEDICINE

## 2021-01-06 PROCEDURE — 370N000017 HC ANESTHESIA TECHNICAL FEE, PER MIN: Performed by: INTERNAL MEDICINE

## 2021-01-06 PROCEDURE — 250N000013 HC RX MED GY IP 250 OP 250 PS 637: Performed by: PHYSICIAN ASSISTANT

## 2021-01-06 PROCEDURE — 71046 X-RAY EXAM CHEST 2 VIEWS: CPT

## 2021-01-06 PROCEDURE — 258N000003 HC RX IP 258 OP 636: Performed by: REGISTERED NURSE

## 2021-01-06 PROCEDURE — 0DJ08ZZ INSPECTION OF UPPER INTESTINAL TRACT, VIA NATURAL OR ARTIFICIAL OPENING ENDOSCOPIC: ICD-10-PCS | Performed by: INTERNAL MEDICINE

## 2021-01-06 RX ORDER — NALOXONE HYDROCHLORIDE 0.4 MG/ML
0.4 INJECTION, SOLUTION INTRAMUSCULAR; INTRAVENOUS; SUBCUTANEOUS
Status: DISCONTINUED | OUTPATIENT
Start: 2021-01-06 | End: 2021-01-08 | Stop reason: HOSPADM

## 2021-01-06 RX ORDER — ASPIRIN 81 MG/1
81 TABLET, CHEWABLE ORAL EVERY EVENING
COMMUNITY
End: 2022-02-18

## 2021-01-06 RX ORDER — PROPOFOL 10 MG/ML
INJECTION, EMULSION INTRAVENOUS PRN
Status: DISCONTINUED | OUTPATIENT
Start: 2021-01-06 | End: 2021-01-06

## 2021-01-06 RX ORDER — HYDROMORPHONE HYDROCHLORIDE 1 MG/ML
0.2 INJECTION, SOLUTION INTRAMUSCULAR; INTRAVENOUS; SUBCUTANEOUS
Status: DISCONTINUED | OUTPATIENT
Start: 2021-01-06 | End: 2021-01-08 | Stop reason: HOSPADM

## 2021-01-06 RX ORDER — BISACODYL 5 MG
5 TABLET, DELAYED RELEASE (ENTERIC COATED) ORAL DAILY PRN
Status: DISCONTINUED | OUTPATIENT
Start: 2021-01-06 | End: 2021-01-08 | Stop reason: HOSPADM

## 2021-01-06 RX ORDER — FLUTICASONE PROPIONATE 50 MCG
2 SPRAY, SUSPENSION (ML) NASAL DAILY PRN
Status: DISCONTINUED | OUTPATIENT
Start: 2021-01-06 | End: 2021-01-08 | Stop reason: HOSPADM

## 2021-01-06 RX ORDER — ACETAMINOPHEN 325 MG/1
650 TABLET ORAL EVERY 4 HOURS PRN
Status: DISCONTINUED | OUTPATIENT
Start: 2021-01-06 | End: 2021-01-08 | Stop reason: HOSPADM

## 2021-01-06 RX ORDER — POLYETHYLENE GLYCOL 3350 17 G/17G
17 POWDER, FOR SOLUTION ORAL DAILY PRN
Status: DISCONTINUED | OUTPATIENT
Start: 2021-01-06 | End: 2021-01-08 | Stop reason: HOSPADM

## 2021-01-06 RX ORDER — PROCHLORPERAZINE 25 MG
12.5 SUPPOSITORY, RECTAL RECTAL EVERY 12 HOURS PRN
Status: DISCONTINUED | OUTPATIENT
Start: 2021-01-06 | End: 2021-01-08 | Stop reason: HOSPADM

## 2021-01-06 RX ORDER — BISACODYL 10 MG
10 SUPPOSITORY, RECTAL RECTAL DAILY PRN
Status: DISCONTINUED | OUTPATIENT
Start: 2021-01-06 | End: 2021-01-08 | Stop reason: HOSPADM

## 2021-01-06 RX ORDER — SODIUM CHLORIDE, SODIUM LACTATE, POTASSIUM CHLORIDE, CALCIUM CHLORIDE 600; 310; 30; 20 MG/100ML; MG/100ML; MG/100ML; MG/100ML
INJECTION, SOLUTION INTRAVENOUS CONTINUOUS
Status: DISCONTINUED | OUTPATIENT
Start: 2021-01-06 | End: 2021-01-06

## 2021-01-06 RX ORDER — AMOXICILLIN 250 MG
1 CAPSULE ORAL 2 TIMES DAILY
Status: DISCONTINUED | OUTPATIENT
Start: 2021-01-06 | End: 2021-01-08 | Stop reason: HOSPADM

## 2021-01-06 RX ORDER — BISACODYL 5 MG
10 TABLET, DELAYED RELEASE (ENTERIC COATED) ORAL DAILY PRN
Status: DISCONTINUED | OUTPATIENT
Start: 2021-01-06 | End: 2021-01-08 | Stop reason: HOSPADM

## 2021-01-06 RX ORDER — LIDOCAINE 40 MG/G
CREAM TOPICAL
Status: DISCONTINUED | OUTPATIENT
Start: 2021-01-06 | End: 2021-01-08 | Stop reason: HOSPADM

## 2021-01-06 RX ORDER — NALOXONE HYDROCHLORIDE 0.4 MG/ML
0.2 INJECTION, SOLUTION INTRAMUSCULAR; INTRAVENOUS; SUBCUTANEOUS
Status: DISCONTINUED | OUTPATIENT
Start: 2021-01-06 | End: 2021-01-08 | Stop reason: HOSPADM

## 2021-01-06 RX ORDER — MAGNESIUM CARB/ALUMINUM HYDROX 105-160MG
296 TABLET,CHEWABLE ORAL ONCE
Status: COMPLETED | OUTPATIENT
Start: 2021-01-07 | End: 2021-01-07

## 2021-01-06 RX ORDER — ALBUTEROL SULFATE 90 UG/1
4-6 AEROSOL, METERED RESPIRATORY (INHALATION)
Status: DISCONTINUED | OUTPATIENT
Start: 2021-01-06 | End: 2021-01-08 | Stop reason: HOSPADM

## 2021-01-06 RX ORDER — PROCHLORPERAZINE MALEATE 5 MG
5 TABLET ORAL EVERY 6 HOURS PRN
Status: DISCONTINUED | OUTPATIENT
Start: 2021-01-06 | End: 2021-01-08 | Stop reason: HOSPADM

## 2021-01-06 RX ORDER — BISACODYL 5 MG
15 TABLET, DELAYED RELEASE (ENTERIC COATED) ORAL DAILY PRN
Status: DISCONTINUED | OUTPATIENT
Start: 2021-01-06 | End: 2021-01-08 | Stop reason: HOSPADM

## 2021-01-06 RX ORDER — ONDANSETRON 4 MG/1
4 TABLET, ORALLY DISINTEGRATING ORAL EVERY 6 HOURS PRN
Status: DISCONTINUED | OUTPATIENT
Start: 2021-01-06 | End: 2021-01-08 | Stop reason: HOSPADM

## 2021-01-06 RX ORDER — ATORVASTATIN CALCIUM 80 MG/1
80 TABLET, FILM COATED ORAL EVERY EVENING
Status: DISCONTINUED | OUTPATIENT
Start: 2021-01-06 | End: 2021-01-08 | Stop reason: HOSPADM

## 2021-01-06 RX ORDER — AMOXICILLIN 250 MG
2 CAPSULE ORAL 2 TIMES DAILY
Status: DISCONTINUED | OUTPATIENT
Start: 2021-01-06 | End: 2021-01-08 | Stop reason: HOSPADM

## 2021-01-06 RX ORDER — ONDANSETRON 2 MG/ML
4 INJECTION INTRAMUSCULAR; INTRAVENOUS EVERY 6 HOURS PRN
Status: DISCONTINUED | OUTPATIENT
Start: 2021-01-06 | End: 2021-01-08 | Stop reason: HOSPADM

## 2021-01-06 RX ORDER — PROPOFOL 10 MG/ML
INJECTION, EMULSION INTRAVENOUS CONTINUOUS PRN
Status: DISCONTINUED | OUTPATIENT
Start: 2021-01-06 | End: 2021-01-06

## 2021-01-06 RX ORDER — GABAPENTIN 600 MG/1
600 TABLET ORAL 2 TIMES DAILY
Status: DISCONTINUED | OUTPATIENT
Start: 2021-01-06 | End: 2021-01-08 | Stop reason: HOSPADM

## 2021-01-06 RX ORDER — HYDROCODONE BITARTRATE AND ACETAMINOPHEN 5; 325 MG/1; MG/1
1-2 TABLET ORAL EVERY 4 HOURS PRN
Status: DISCONTINUED | OUTPATIENT
Start: 2021-01-06 | End: 2021-01-08 | Stop reason: HOSPADM

## 2021-01-06 RX ORDER — LIDOCAINE HYDROCHLORIDE 20 MG/ML
INJECTION, SOLUTION INFILTRATION; PERINEURAL PRN
Status: DISCONTINUED | OUTPATIENT
Start: 2021-01-06 | End: 2021-01-06

## 2021-01-06 RX ORDER — ACETAMINOPHEN 650 MG/1
650 SUPPOSITORY RECTAL EVERY 4 HOURS PRN
Status: DISCONTINUED | OUTPATIENT
Start: 2021-01-06 | End: 2021-01-08 | Stop reason: HOSPADM

## 2021-01-06 RX ADMIN — SODIUM CHLORIDE, POTASSIUM CHLORIDE, SODIUM LACTATE AND CALCIUM CHLORIDE: 600; 310; 30; 20 INJECTION, SOLUTION INTRAVENOUS at 13:48

## 2021-01-06 RX ADMIN — ONDANSETRON 4 MG: 2 INJECTION INTRAMUSCULAR; INTRAVENOUS at 14:00

## 2021-01-06 RX ADMIN — LIDOCAINE HYDROCHLORIDE 100 MG: 20 INJECTION, SOLUTION INFILTRATION; PERINEURAL at 13:50

## 2021-01-06 RX ADMIN — PROPOFOL 100 MG: 10 INJECTION, EMULSION INTRAVENOUS at 13:51

## 2021-01-06 RX ADMIN — PROPOFOL 100 MCG/KG/MIN: 10 INJECTION, EMULSION INTRAVENOUS at 13:50

## 2021-01-06 RX ADMIN — POLYETHYLENE GLYCOL 3350, SODIUM SULFATE ANHYDROUS, SODIUM BICARBONATE, SODIUM CHLORIDE, POTASSIUM CHLORIDE 4000 ML: 236; 22.74; 6.74; 5.86; 2.97 POWDER, FOR SOLUTION ORAL at 16:43

## 2021-01-06 RX ADMIN — PROPOFOL 10 MG: 10 INJECTION, EMULSION INTRAVENOUS at 13:52

## 2021-01-06 RX ADMIN — PHENYLEPHRINE HYDROCHLORIDE 0.3 MCG/KG/MIN: 10 INJECTION INTRAVENOUS at 13:47

## 2021-01-06 RX ADMIN — PANTOPRAZOLE SODIUM 80 MG: 40 INJECTION, POWDER, FOR SOLUTION INTRAVENOUS at 10:42

## 2021-01-06 RX ADMIN — ATORVASTATIN CALCIUM 80 MG: 80 TABLET, FILM COATED ORAL at 23:33

## 2021-01-06 RX ADMIN — PROPOFOL 10 MG: 10 INJECTION, EMULSION INTRAVENOUS at 13:53

## 2021-01-06 RX ADMIN — FLUTICASONE PROPIONATE 2 SPRAY: 50 SPRAY, METERED NASAL at 23:33

## 2021-01-06 RX ADMIN — PANTOPRAZOLE SODIUM 40 MG: 40 INJECTION, POWDER, FOR SOLUTION INTRAVENOUS at 23:32

## 2021-01-06 RX ADMIN — DEXMEDETOMIDINE HYDROCHLORIDE 8 MCG: 100 INJECTION, SOLUTION INTRAVENOUS at 13:51

## 2021-01-06 SDOH — HEALTH STABILITY: MENTAL HEALTH: HOW OFTEN DO YOU HAVE A DRINK CONTAINING ALCOHOL?: NEVER

## 2021-01-06 SDOH — HEALTH STABILITY: MENTAL HEALTH: HOW OFTEN DO YOU HAVE 6 OR MORE DRINKS ON ONE OCCASION?: NEVER

## 2021-01-06 SDOH — HEALTH STABILITY: MENTAL HEALTH: HOW MANY STANDARD DRINKS CONTAINING ALCOHOL DO YOU HAVE ON A TYPICAL DAY?: NOT ASKED

## 2021-01-06 ASSESSMENT — ENCOUNTER SYMPTOMS
FEVER: 0
VOMITING: 0
ABDOMINAL PAIN: 1
COUGH: 0
DIZZINESS: 1
NAUSEA: 1
LIGHT-HEADEDNESS: 1
BLOOD IN STOOL: 1
PALPITATIONS: 0

## 2021-01-06 ASSESSMENT — MIFFLIN-ST. JEOR: SCORE: 1462.11

## 2021-01-06 ASSESSMENT — ACTIVITIES OF DAILY LIVING (ADL)
ADLS_ACUITY_SCORE: 12
ADLS_ACUITY_SCORE: 12

## 2021-01-06 ASSESSMENT — LIFESTYLE VARIABLES: TOBACCO_USE: 1

## 2021-01-06 NOTE — H&P
[facility]    History and Physical - Hospitalist Service       Date of Admission:  1/6/2021    Assessment & Plan   Johan Navarro is a 66 year old male with PMH significant for coronary artery disease status post PCI July 2020, left carotid stenosis, aortic stenosis, hyperlipidemia, history of prostate cancer status post prostatectomy 2010 who was admitted on 1/6/21 with intermittent melena and chest pain found to have profound anemia concerning for upper gastrointestinal bleed. Hemodynamically stable on admission and [facility] notified from the ED.    Asymptomatic COVID-19 screen: Negative 1/6. Low risk, no acute respiratory concerns.    Upper GI bleed with profound anemia  Chest pain  Severe iron deficiency anemia  Patient has had intermittent chest pain tightness lasting 5 minutes on exertion for the past 4 to 5 weeks and exercise intolerance.  Also noted intermittent melena 3 to 4 weeks prior to admission with associated lightheadedness and nausea, no vomiting. Occasional epigastric pain after eating, relieved by tums. No NSAID use other than DAPT. Chest pain was scheduled for coronary angiogram on 1/8 had preoperative labs completed due to report of melena,  finding profound anemia of 5.6 and sent to ER.  In the ER hemoglobin 5.3, hemodynamically stable.  Severe iron deficiency anemia noted. EKG NSR without acute ST-T changes. CXR without acute pathology. [facility] notified from the ED, for EGD later today.  -[facility] admission, telemetry  -[facility] consultation, per Dr. Laws planning EGD this afternoon  -Cardiology consultation given chest pain and planning for coronary angiogram on 1/8, holding DAPT due to anemia/bleed  -IV Protonix 40 mg twice daily, given 80 mg x 1 in the ED  -Type and screen, consented for blood in the ED  -Ordered for 2 units of PRBC in the ED, conditional orders to transfuse for hemoglobin less than 8 given coronary artery disease and profound symptomatic anemia  -Monitor  hemoglobin every 6 hours chest x-ray unremarkable.    Recent Labs   Lab 01/06/21  1027 01/06/21  0825   HGB 5.3* 5.6*       Coronary artery disease status post PCI 1998, 7/2020  Hyperlipidemia  Stenting of distal and ostial circumflex as well as first obtuse marginal in 1998 followed by stenting to ostium and second obtuse marginal 7/21/2020 mild to moderate nonobstructive disease in the left dominant system occluding 70% stenosis of the RCA.  -Continue PTA statin when verified by pharmacy  -Holding dual antiplatelet therapy as above, PTA aspirin and Brilinta    Moderate aortic valve stenosis, stable  Left ICA stenosis, stable  -Following with cardiology    Prostate cancer s/p prostatectomy 2010, stable    Seasonal allergies: Continue PTA Flonase and as needed albuterol when verified by pharmacy    Diet:  NPO  DVT Prophylaxis: Ambulate every shift  Salvador Catheter: not present  Code Status:  Full code    Disposition Plan   Expected discharge: 4 - 7 days, recommended to prior living arrangement once hemoglobin stable and cleared by cardiology and [facility].  Entered: Roseanne Reich PA-C 01/06/2021, 12:06 PM     The patient's care was discussed with the Attending Physician, Dr. Waldron, Patient and [facility] Consultant.    Roseanne CunhaMishraMILANA mckinney  Hospitalist ERIC  [facility]    ______________________________________________________________________    Chief Complaint   Chest pain, abnormal labs, melena    History is obtained from the patient, electronic health record and emergency department physician    History of Present Illness   Johan Navarro is a 66 year old male with PMH significant for coronary artery disease status post PCI July 2020, left carotid stenosis, aortic stenosis, hyperlipidemia, history of prostate cancer status post prostatectomy 2010 who presented to the emergency department with complaints of abnormal labs.  Patient reports he has had intermittent melena for the past 3  to 4 weeks described as dark tarry stools for at least 1 week which he felt was improving.  He alternates diarrhea and constipation and has been having approximately 3 bowel movements daily.  He does not take any NSAIDs however he does take dual antiplatelet therapy with Brilinta and aspirin due to his coronary stenting 7/2020.  Reports after meals he has had some occasional heartburn and epigastric pain which he tried Tums with some relief.  He notes associated lightheadedness and nausea without any vomiting or syncopal episodes.  He also states he has had chest pain for approximately 4 to 5 weeks lasting about 5 minutes is a chest tightness.  He reports this is primarily with exertion.  No palpitations.  He does note at times he has felt short of breath and had decreased exercise tolerance.  He has been seen by cardiology and had a abnormal [place] scan on 12/29/2020 and recommended for coronary angiogram which is scheduled for 1/8/21. Had labs done this morning which showed a hemoglobin level of 5.6 therefore he was sent to the emergency department for further evaluation.    In the ER, patient was hemodynamically stable, afebrile, and without hypoxia.  Chest x-ray without acute pathology. EKG NSR without acute ST-T changes. Type and screened in the ER.  Blood consent obtained.  Labs were repeated including a CMP, CBC, and iron studies revealing normal electrolytes, iron level 13, TIBC 439, iron saturation 3%, ferritin 1.  Negative troponin.  Hemoglobin repeat 5.3.  INR 1.03.  Stat Covid test negative.  He was ordered for 2 units of blood [facility] from the ER who was present at bedside during my evaluation.    Review of Systems    The 10 point Review of Systems is negative other than noted in the HPI or here.     Past Medical History    I have reviewed this patient's medical history and updated it with pertinent information if needed.   Past Medical History:   Diagnosis Date     Aortic stenosis      Arthritis      hands     CAD (coronary artery disease)     cardiac cath : stents x 2 to circumflex     Carotid stenosis     left     Family history of early CAD      Hyperlipidaemia LDL goal < 70     familial hyperlipidemia with marked hypercholesterolemia before treatment; has been on some form of cholesterol-lowering medication since the 1970s     Prostate cancer (H) 2010     Syncope        Past Surgical History   I have reviewed this patient's surgical history and updated it with pertinent information if needed.  Past Surgical History:   Procedure Laterality Date     CARDIAC SURGERY      coronary stents x2 placed ; angioplasty     CV HEART CATHETERIZATION WITH POSSIBLE INTERVENTION N/A 2020    Procedure: Heart Catheterization with Possible Intervention;  Surgeon: Alber Bentley MD;  Location:  HEART CARDIAC CATH LAB     DAVINCI PROSTATECTOMY      2010     EYE SURGERY Bilateral 2017    eyelids     ORTHOPEDIC SURGERY Right     achilles tendon; post football injury     ORTHOPEDIC SURGERY Right     hand; near thumb. has wires in there. cysts     REPAIR TENDON BICEPS DISTAL UPPER EXTREMITY Right 2018    Procedure: REPAIR TENDON BICEPS DISTAL UPPER EXTREMITY;  Right open biceps tendon repair  ;  Surgeon: Alber Zabala MD;  Location: [facility] OR     SURGICAL HISTORY OF -       Right hand Xanthoma removal     SURGICAL HISTORY OF -       Stress Echo (-)       Social History   I have reviewed this patient's social history and updated it with pertinent information if needed.  Social History     Tobacco Use     Smoking status: Former Smoker     Packs/day: 0.25     Years: 20.00     Pack years: 5.00     Types: Cigars     Quit date:      Years since quittin.0     Smokeless tobacco: Former User     Types: Chew, Snuff     Quit date:      Tobacco comment: 12/10/18: occ chew, not every day   Substance Use Topics     Alcohol use: Not Currently     Frequency: Never     Binge frequency: Never      Comment: very rare, none for 8 years     Drug use: Yes     Types: Marijuana     Comment: daily        Family History   I have reviewed this patient's family history and updated it with pertinent information if needed.   Family History   Problem Relation Age of Onset     Lipids Mother      C.A.D. Mother          at age 49 of MI     Dementia Father      C.A.D. Sister         MI at age 48     Cancer - colorectal No family hx of      Prostate Cancer No family hx of      Colon Cancer No family hx of        Prior to Admission Medications   Prior to Admission Medications   Prescriptions Last Dose Informant Patient Reported? Taking?   albuterol (PROAIR HFA/PROVENTIL HFA/VENTOLIN HFA) 108 (90 Base) MCG/ACT inhaler prn  No Yes   Sig: Inhale 4-6 puffs into the lungs every 2 hours as needed for shortness of breath / dyspnea   aspirin (ASA) 81 MG chewable tablet 2021 at pm  Yes Yes   Sig: Take 81 mg by mouth every other day Supposed to be taking daily.   atorvastatin (LIPITOR) 80 MG tablet 2021 at pm  No Yes   Sig: Take 1 tablet (80 mg) by mouth daily   diclofenac (VOLTAREN) 1 % topical gel prn  Yes Yes   Sig: Apply 2 g topically 4 times daily as needed for moderate pain (apply to hands and fingers)   evolocumab (REPATHA) 140 MG/ML prefilled autoinjector 2020  No Yes   Sig: Inject 1 mL (140 mg) Subcutaneous every 14 days   fluticasone (FLONASE) 50 MCG/ACT nasal spray prn  Yes Yes   Sig: Spray 2 sprays into both nostrils daily as needed    gabapentin (NEURONTIN) 600 MG tablet 2021 at afternoon  No Yes   Sig: TAKE ONE TABLET BY MOUTH TWICE DAILY    sildenafil (REVATIO) 20 MG tablet prn  No Yes   Sig: TAKE 2-5 TABLETS BY MOUTH AS NEEDED PRIOR TO SEXUAL INTERCOURSE   ticagrelor (BRILINTA) 90 MG tablet 2021 at pm  No Yes   Sig: Take 1 tablet (90 mg) by mouth 2 times daily Start this evening.      Facility-Administered Medications: None     Allergies   Allergies   Allergen Reactions     Crestor [Rosuvastatin]  GI Disturbance     Dust Mites      No Clinical Screening - See Comments      Goose feathers     Pollen Extract        Physical Exam   Vital Signs: Temp: 98.4  F (36.9  C) Temp src: Oral BP: 137/82 Pulse: 77   Resp: 12 SpO2: 100 % O2 Device: None (Room air)    Weight: 0 lbs 0 oz    Johan Navarro was evaluated during a global COVID-19 pandemic, which necessitated consideration that the patient might be at risk for infection with the SARS-CoV-2 virus that causes COVID-19. Applicable protocols for evaluation were followed during the patient's care. COVID-19 was considered as part of the patient's evaluation.     Physical Exam  Constitutional:       Appearance: Normal appearance.   Eyes:      Extraocular Movements: Extraocular movements intact.   Cardiovascular:      Rate and Rhythm: Normal rate and regular rhythm.      Heart sounds: Murmur present. Systolic murmur present with a grade of 2/6.   Pulmonary:      Effort: Pulmonary effort is normal. No respiratory distress.      Breath sounds: Normal breath sounds. No wheezing, rhonchi or rales.   Abdominal:      General: Abdomen is flat. Bowel sounds are normal. There is no distension.      Palpations: Abdomen is soft.      Tenderness: There is no abdominal tenderness. There is no guarding.   Musculoskeletal: Normal range of motion.   Skin:     General: Skin is warm and dry.      Coloration: Skin is pale.   Neurological:      General: No focal deficit present.      Mental Status: He is alert and oriented to person, place, and time.      Cranial Nerves: No cranial nerve deficit.   Psychiatric:         Mood and Affect: Mood normal.         Behavior: Behavior normal.         Data   Data reviewed today: I reviewed all medications, new labs and imaging results over the last 24 hours. I personally reviewed no images or EKG's today.    Recent Labs   Lab 01/06/21  1027 01/06/21  0825   WBC 3.8* 4.5   HGB 5.3* 5.6*   MCV 64* 63*    248   INR 1.03  --      --     POTASSIUM 3.8  --    CHLORIDE 108  --    CO2 23  --    BUN 21  --    CR 1.02  --    ANIONGAP 5  --    ONDINA 8.5  --    GLC 89  --    ALBUMIN 3.9  --    PROTTOTAL 7.3  --    BILITOTAL 0.7  --    ALKPHOS 72  --    ALT 19  --    AST 18  --    TROPI <0.015  --      Recent Results (from the past 24 hour(s))   XR Chest 2 Views    Narrative    CHEST TWO VIEWS   1/6/2021 10:52 AM     HISTORY: Chest pain.    COMPARISON: Chest x-ray 12/29/2020.      Impression    IMPRESSION: PA and lateral views of the chest. Lungs are clear. Heart  is normal in size. No effusions are evident. No pneumothorax. Coronary  artery stent overlies the left heart border, unchanged.    CORTNEY FARMER MD

## 2021-01-06 NOTE — TELEPHONE ENCOUNTER
Message sent to scheduling to cancel angiogram for 1/8/2021  Aspirin 81mg removed from med list    Spoke with patient's spouse to review that he has been taken to the ER at [facility]. Reviewed with her that Dr. Galicia has spoken with the ER provider today as requested. Reviewed that ASA is to be stopped for now. Reviewed that angiogram for 1/8/2021 will be canceled and rescheduled after patient has been stabilized. Spouse  verbalized understanding and agreed with plan. She noted that patient did not take his AM pills yet today.

## 2021-01-06 NOTE — ED PROVIDER NOTES
History   Chief Complaint:  Abnormal Labs     HPI   Johan Navarro is a 66 year old male with history of CAD and hyperlipidemia s/p stent placement on Brilinta and aspirin who presents with melena and abdominal cramping. Approximately 3-4 weeks ago, he developed intermittent episodes of melena along with alternating episodes of diarrhea and constipation. This has somewhat improved, however, he continues to experience these symptoms. He also notes that he has been experiencing abdominal cramping and pain his lower abdomen following eating. He also notes associated lightheadedness and dizziness with exertion, although he denies these symptoms while at rest. He reports nausea, but no vomiting.  About 1 month ago, he also developed intermittent episodes of chest pain, which last approximately 5 minutes, and are associated with shortness of breath.  He spoke with his cardiologist to scheduled an outpatient angiogram for January 8.  He was seen earlier today for a preoperative CBC and Covid test, when it was noted that his hemoglobin was low and he was sent to the ED for further evaluation. He denies fever, cough, anosmia, and ageusia.     Results 1/6/20  CBC: WBC 3.8 (L),HGB 5.6 (L!) o/w WNL. ()     Review of Systems   Constitutional: Negative for fever.   Respiratory: Negative for cough.    Cardiovascular: Positive for chest pain. Negative for palpitations.   Gastrointestinal: Positive for abdominal pain, blood in stool and nausea. Negative for vomiting.   Neurological: Positive for dizziness and light-headedness.   All other systems reviewed and are negative.    Allergies:  Crestor    Medications:  Albuterol inhaler  Aspirin  Lipitor  Repatha  Flonase  Neurontin  Revatio  Brilinta     Past Medical History:    Aortic stenosis  Arthritis   CAD  Carotid stenosis  Hyperlipidemia   Malignant neoplasm of prostate  Osteoarthritis of hand     Past Surgical History:    Coronary stents x2  CV heart  catheterization  Davinci prostatectomy  Eye surgery  Achilles tendon surgery  Unspecified orthopedic surgery  Repair tendon biceps distal upper extremity   Right hand xanthoma removal    Family History:    Lipids   CAD  MI    Social History:     Presents unaccompanied to ED     Physical Exam     Patient Vitals for the past 24 hrs:   BP Temp Temp src Pulse Resp SpO2   01/06/21 1330 -- 98.2  F (36.8  C) -- -- -- --   01/06/21 1320 (!) 156/91 97.7  F (36.5  C) -- -- 16 100 %   01/06/21 1239 138/78 98  F (36.7  C) Oral 62 14 100 %   01/06/21 1200 (!) 155/80 -- -- 80 18 100 %   01/06/21 1145 -- 98.4  F (36.9  C) -- 77 12 100 %   01/06/21 1135 137/82 98.4  F (36.9  C) -- 77 17 --   01/06/21 1014 135/69 97.9  F (36.6  C) Oral 98 16 99 %     Physical Exam  Nursing note and vitals reviewed.  Constitutional:  Oriented to person, place, and time. Cooperative.   HENT:   Nose:    Nose normal.   Mouth/Throat:   Mucous membranes are normal.   Eyes:    Conjunctivae normal and EOM are normal.      Pupils are equal, round, and reactive to light.   Neck:    Trachea normal.   Cardiovascular:  Normal rate, regular rhythm, normal heart sounds and normal pulses. No murmur heard.  Pulmonary/Chest:  Effort normal and breath sounds normal.   Abdominal:   Soft. Normal appearance and bowel sounds are normal.      There is no tenderness.      There is no rebound and no CVA tenderness.   Musculoskeletal:  Extremities atraumatic x 4.   Lymphadenopathy:  No cervical adenopathy.   Neurological:   Alert and oriented to person, place, and time. Normal strength.      No cranial nerve deficit or sensory deficit. GCS eye subscore is 4. GCS verbal subscore is 5. GCS motor subscore is 6.   Skin:    Skin is intact. No rash noted.   Psychiatric:   Normal mood and affect.  Rectal:   No stool in the vault.     Emergency Department Course   ECG:   ECG taken at 1039, ECG read at 1047  Normal sinus rhythm  Normal ECG  Rate 79 bpm. GA interval 142 ms. QRS  duration 84 ms. QT/QTc 374/428 ms. P-R-T axes 72 -7 37.    Imaging:  XR Chest 2 views   IMPRESSION: PA and lateral views of the chest. Lungs are clear. Heart   is normal in size. No effusions are evident. No pneumothorax. Coronary   artery stent overlies the left heart border, unchanged  Reading per radiology    Laboratory:  CBC: WBC 3.8 (L), HGB 5.3 (L!) o/w WNL. ()   CMP: Glucose 89, o/w WNL (Creatinine: 1.02)    Troponin (Collected 1027): <0.015    INR: 1.03  Vitamin B12: pending  Folate: pending  Iron and iron binding capacity: iron 13 (L), iron binding capacity 439 (H), iron saturation 3 (L)  Transferrin: pending  Ferritin: 1 (L)    ABO/Rh type and screen ABO: O +, Antibody neg    Asymptomatic SARS-CoV-2 by PCR: negatve    Emergency Department Course:  Reviewed:  I reviewed nursing notes, vitals, past medical history and care everywhere    Assessments:  1025: I performed an exam of the patient and obtained history, as documented above.     Consults:   1049: I discussed the patient with the admitting hospitalist, Dr. Waldron    1057: I spoke with Dr. Anguiano of GI regarding this patient.    Interventions:  1042 Protonix, 80 mg, IV    Disposition:  The patient was admitted to the hospital under the care of Dr. Waldron.     Impression & Plan   Covid-19  Johan Navarro was evaluated during a global COVID-19 pandemic, which necessitated consideration that the patient might be at risk for infection with the SARS-CoV-2 virus that causes COVID-19.   Applicable protocols for evaluation were followed during the patient's care. COVID-19 was considered as part of the patient's evaluation. The plan for testing is:  a test was obtained during this visit.    Medical Decision Making:  This is a 66-year-old male who was instructed to come in due to a very low hemoglobin as well as what sounds like unstable angina.  My suspicion was that this would be an upper GI bleed given the fact that he is on aspirin and Brilinta.  Therefore  he was provided IV Protonix.  I also ordered 2 units of PRBCs for him.  I suspect that his cardiac symptoms are likely secondary to his severe anemia. I subsequently spoke with Dr. Anguiano from [facility], who agreed to see the patient in consultation.  I then spoke with Dr. Waldron, who will be admitting him.    Diagnosis:    ICD-10-CM    1. Gastrointestinal hemorrhage, unspecified gastrointestinal hemorrhage type  K92.2 CBC with platelets differential     ABO/Rh type and screen     INR     Comprehensive metabolic panel     Troponin I     Asymptomatic SARS-CoV-2 COVID-19 Virus (Coronavirus) by PCR     Blood component     Blood component     Blood component     Blood component     Iron and iron binding capacity     Iron and iron binding capacity     Ferritin     Ferritin     Folate     Folate     Transferrin     Transferrin     Vitamin B12     Vitamin B12     Magnesium (1200)     CANCELED: Iron and iron binding capacity     CANCELED: Transferrin     CANCELED: Ferritin     CANCELED: Vitamin B12     CANCELED: Folate   2. Unstable angina (H)  I20.0    3. Anemia, unspecified type  D64.9      Scribe Disclosure:  I, Gabriela Abarca, am serving as a scribe at 10:14 AM on 1/6/2021 to document services personally performed by Taco Ann MD based on my observations and the provider's statements to me.              Taco Ann MD  01/06/21 [id number]

## 2021-01-06 NOTE — TELEPHONE ENCOUNTER
Received call from [place] with [facility] lab alerting us to an abnormal Hgb of 5.6. This was ordered by Hector Rosas on 1/4 when he saw patient for risk- benefit for 1/8 angiogram.Per his clinic note if patient remained anemic he is to go to the ED and be admitted for a GI workup. Team 2 noticed of abnormal lab.

## 2021-01-06 NOTE — ED NOTES
DATE:  1/6/2021   TIME OF RECEIPT FROM LAB:  1102  LAB TEST:  Hemoglobin  LAB VALUE:  5.3  RESULTS GIVEN WITH READ-BACK TO (PROVIDER):  Dr. Ann.  TIME LAB VALUE REPORTED TO PROVIDER:   1108

## 2021-01-06 NOTE — ANESTHESIA CARE TRANSFER NOTE
Patient: Johan Navarro    Procedure(s):  ESOPHAGOGASTRODUODENOSCOPY (EGD)    Diagnosis: GI bleed [K92.2]  Diagnosis Additional Information: No value filed.    Anesthesia Type:   MAC     Note:    Patient transferred to:PACU  Comments: At end of procedure, spontaneous respirations, patient alert to voice, able to follow commands. Oxygen via nasal cannula at 2 liters per minute to PACU. Oxygen tubing connected to wall O2 in PACU, SpO2, NiBP, and EKG monitors and alarms on and functioning, report on patient's clinical status given to PACU RN, RN questions answered.Handoff Report: Identifed the Patient, Identified the Reponsible Provider, Reviewed the pertinent medical history, Discussed the surgical course, Reviewed Intra-OP anesthesia mangement and issues during anesthesia, Set expectations for post-procedure period and Allowed opportunity for questions and acknowledgement of understanding      Vitals: (Last set prior to Anesthesia Care Transfer)  89/57 (68)  Hr 66  100%  rr 12  CRNA VITALS  1/6/2021 1333 - 1/6/2021 1407      1/6/2021             Ht Rate:  74    Resp Rate (set):  10                Electronically Signed By: ANGELINE Elizabeth CRNA  January 6, 2021  2:07 PM

## 2021-01-06 NOTE — TELEPHONE ENCOUNTER
I spoke with Dr Ann in the ED. We should cancel the angio and stop aspirin. Ticagrelor alone should be continued. Thanks.

## 2021-01-06 NOTE — ED NOTES
[facility]  ED Nurse Handoff Report    ED Chief complaint: Abnormal Labs      ED Diagnosis:   Final diagnoses:   Gastrointestinal hemorrhage, unspecified gastrointestinal hemorrhage type   Unstable angina (H)   Anemia, unspecified type       Code Status: Not addressed by ED MD.    Allergies:   Allergies   Allergen Reactions     Crestor [Rosuvastatin] GI Disturbance     Dust Mites      No Clinical Screening - See Comments      Goose feathers     Pollen Extract        Patient Story: Patient sent by clinic for low hemoglobin.  Focused Assessment:  Patient was at pre op visit and had labs drawn. Hemoglobin was 5.6 and patient sent to ED for eval. Patient states intermittent dizziness and dyspnea on exertion for one month. States black stool for about one week. Complains of low abdominal pain off and on. Patient is alert and oriented times 4.    Treatments and/or interventions provided: IV is ordered and type and cross is pending.  Patient's response to treatments and/or interventions: Patient is stable.    To be done/followed up on inpatient unit:  Continue to monitor    Does this patient have any cognitive concerns?: Alert and oriented times four.    Activity level - Baseline/Home:  Independent  Activity Level - Current:   Stand with Assist    Patient's Preferred language: English   Needed?: No    Isolation: None  Infection: Not Applicable  Patient tested for COVID 19 prior to admission: YES  Bariatric?: No    Vital Signs:   Vitals:    01/06/21 1014   BP: 135/69   Pulse: 98   Resp: 16   Temp: 97.9  F (36.6  C)   TempSrc: Oral   SpO2: 99%       Cardiac Rhythm:     Was the PSS-3 completed:   Yes  What interventions are required if any?               Family Comments: Not in attendance.  in contact with spouse.  OBS brochure/video discussed/provided to patient/family: No              Name of person given brochure if not patient: N/A              Relationship to patient: N/A    For the  majority of the shift this patient's behavior was Green.   Behavioral interventions performed were N/A.    ED NURSE PHONE NUMBER: [phone]

## 2021-01-06 NOTE — PHARMACY-ADMISSION MEDICATION HISTORY
Pharmacy Medication History  Admission medication history interview status for the 1/6/2021  admission is complete. See EPIC admission navigator for prior to admission medications       Medication history sources: Patient, Surescripts and Care Everywhere  Location of interview: Phone  Adherence Assessment: Good    Significant changes made to the medication list:  - Changed aspirin to every other day (written instructions for daily administration)  - Changed diclofenac to prn    Additional medication history information:   - None    Medication reconciliation completed by provider prior to medication history? No    Time spent in this activity: 20 minutes      Prior to Admission medications    Medication Sig Last Dose Taking? Auth Provider   albuterol (PROAIR HFA/PROVENTIL HFA/VENTOLIN HFA) 108 (90 Base) MCG/ACT inhaler Inhale 4-6 puffs into the lungs every 2 hours as needed for shortness of breath / dyspnea prn Yes Fazal Herrera MD   aspirin (ASA) 81 MG chewable tablet Take 81 mg by mouth every other day Supposed to be taking daily. 1/5/2021 at pm Yes Unknown, Entered By History   atorvastatin (LIPITOR) 80 MG tablet Take 1 tablet (80 mg) by mouth daily 1/5/2021 at pm Yes Sander Galicia MD   diclofenac (VOLTAREN) 1 % topical gel Apply 2 g topically 4 times daily as needed for moderate pain (apply to hands and fingers) prn Yes Unknown, Entered By History   evolocumab (REPATHA) 140 MG/ML prefilled autoinjector Inject 1 mL (140 mg) Subcutaneous every 14 days 12/30/2020 Yes Sander Galicia MD   fluticasone (FLONASE) 50 MCG/ACT nasal spray Spray 2 sprays into both nostrils daily as needed  prn Yes Reported, Patient   gabapentin (NEURONTIN) 600 MG tablet TAKE ONE TABLET BY MOUTH TWICE DAILY  1/5/2021 at afternoon Yes Washington Yang PA-C   sildenafil (REVATIO) 20 MG tablet TAKE 2-5 TABLETS BY MOUTH AS NEEDED PRIOR TO SEXUAL INTERCOURSE prn Yes Washington Yang PA-C   ticagrelor (BRILINTA) 90  MG tablet Take 1 tablet (90 mg) by mouth 2 times daily Start this evening. 1/5/2021 at pm Yes Holger Gaytan MD       The information provided in this note is only as accurate as the sources available at the time of the update(s).

## 2021-01-06 NOTE — TELEPHONE ENCOUNTER
Called patient's spouse with Hgb reading from today of 5.6. Spouse is a nurse practitioner and is willing to take the patient to [facility] ER. She asks that we call the ER with an update of patient's condition so he does not wait in the waiting room with COVID-19 patients.    Team 3 called the ER team with advance update up patient's arrival. ERIC Hector Rosas updated by Team 3.    paged Dr. Galicia with update that spouse is hoping he will also call the ER team.  Message to Dr. Galicia with today's update and to review plan for angiogram on 1/8/2021

## 2021-01-06 NOTE — CONSULTS
GASTROENTEROLOGY CONSULTATION     Johan Navaror  [address]  [place] [address]  66 year old male    Admission Date/Time: 1/6/2021  Primary Care Provider: Washington Yang    We were asked to see the patient in consultation by Dr. Ann for evaluation of anemia.        HPI:  Johan Navarro is a 66 year old male who was admitted to [facility] 1/6/21 after outpatient labs showed a hemoglobin of 5.6.  He has a past medical history significant for coronary artery disease with stenting in 1998 and again in July 2020.  Also has moderate aortic stenosis.    Reports feeling great after placement of his stents in July. Over past month has had increasing shortness of breath with exertion and chest pain.  Also has had multiple dark bowel movements intermittently.  He has had nausea and upper abdominal discomfort with eating.  Since his last stent he has been on daily ASA and Brilinta, no NSAIDs.    Was scheduled for cardiac stress test 1/8, also had been concern for GI bleeding so labs were checked this morning. Patient was sent directly from his clinic to the ER when hemoglobin resulted.    Last colonoscopy in 2018 (Dr. Orozco) with 10mm sigmoid adenoma and diverticular disease.    ROS: A comprehensive ten point review of systems was negative aside from those in mentioned in the HPI.      MEDICATIONS: No current facility-administered medications on file prior to encounter.        albuterol (PROAIR HFA/PROVENTIL HFA/VENTOLIN HFA) 108 (90 Base) MCG/ACT inhaler, Inhale 4-6 puffs into the lungs every 2 hours as needed for shortness of breath / dyspnea       aspirin (ASA) 81 MG chewable tablet, Take 81 mg by mouth every other day Supposed to be taking daily.       atorvastatin (LIPITOR) 80 MG tablet, Take 1 tablet (80 mg) by mouth daily       diclofenac (VOLTAREN) 1 % topical gel, Apply 2 g topically 4 times daily as needed for moderate pain (apply to hands and fingers)       evolocumab (REPATHA) 140 MG/ML  prefilled autoinjector, Inject 1 mL (140 mg) Subcutaneous every 14 days       fluticasone (FLONASE) 50 MCG/ACT nasal spray, Spray 2 sprays into both nostrils daily as needed        gabapentin (NEURONTIN) 600 MG tablet, TAKE ONE TABLET BY MOUTH TWICE DAILY        sildenafil (REVATIO) 20 MG tablet, TAKE 2-5 TABLETS BY MOUTH AS NEEDED PRIOR TO SEXUAL INTERCOURSE       ticagrelor (BRILINTA) 90 MG tablet, Take 1 tablet (90 mg) by mouth 2 times daily Start this evening.        ALLERGIES:   Allergies   Allergen Reactions     Crestor [Rosuvastatin] GI Disturbance     Dust Mites      No Clinical Screening - See Comments      Goose feathers     Pollen Extract        Past Medical History:   Diagnosis Date     Aortic stenosis      Arthritis     hands     CAD (coronary artery disease)     cardiac cath 1998: stents x 2 to circumflex     Carotid stenosis     left     Family history of early CAD      Hyperlipidaemia LDL goal < 70     familial hyperlipidemia with marked hypercholesterolemia before treatment; has been on some form of cholesterol-lowering medication since the 1970s     Prostate cancer (H) 2010     Syncope        Past Surgical History:   Procedure Laterality Date     CARDIAC SURGERY  1998    coronary stents x2 placed 1998; angioplasty     CV HEART CATHETERIZATION WITH POSSIBLE INTERVENTION N/A 7/21/2020    Procedure: Heart Catheterization with Possible Intervention;  Surgeon: Alber Bentley MD;  Location:  HEART CARDIAC CATH LAB     DAVINCI PROSTATECTOMY      2010     EYE SURGERY Bilateral 2017    eyelids     ORTHOPEDIC SURGERY Right     achilles tendon; post football injury     ORTHOPEDIC SURGERY Right     hand; near thumb. has wires in there. cysts     REPAIR TENDON BICEPS DISTAL UPPER EXTREMITY Right 7/16/2018    Procedure: REPAIR TENDON BICEPS DISTAL UPPER EXTREMITY;  Right open biceps tendon repair  ;  Surgeon: Alber Zabala MD;  Location: [facility] OR     SURGICAL HISTORY OF -       Right hand  Xanthoma removal     SURGICAL HISTORY OF -       Stress Echo (-)         SOCIAL HISTORY:  Social History     Tobacco Use     Smoking status: Former Smoker     Packs/day: 0.25     Years: 20.00     Pack years: 5.00     Types: Cigars     Quit date:      Years since quittin.0     Smokeless tobacco: Former User     Types: Chew, Snuff     Quit date:      Tobacco comment: 12/10/18: occ chew, not every day   Substance Use Topics     Alcohol use: Not Currently     Frequency: Never     Binge frequency: Never     Comment: very rare, none for 8 years     Drug use: Yes     Types: Marijuana     Comment: daily        FAMILY HISTORY:  No known family history of GI disease    PHYSICAL EXAM:   /82   Pulse 77   Temp 98.4  F (36.9  C)   Resp 12   SpO2 100%     Constitutional: NAD, comfortable, pale  Cardiovascular: RRR  Respiratory: CTAB  Psychiatric: mentation appears normal and affect normal/bright  Head: Normocephalic. Atraumatic.    Neck: normal size, trachea midline  Eyes:   no icterus  ENT:  hearing adequate, pharynx normal without erythema or exudate  Abdomen:   Auscultation: normal  Appearance: normal  Palpation: normal  NEURO: grossly negative  SKIN: no suspicious lesions or rashes            ADDITIONAL COMMENTS:   I reviewed the patient's new clinical lab test results.   Recent Labs   Lab Test 21  1027 21  0825 20  0815   WBC 3.8* 4.5 4.5   HGB 5.3* 5.6* 11.9*   MCV 64* 63* 87    248 246   INR 1.03  --  0.96     Recent Labs   Lab Test 21  1027 20  1158 20  0815    140 140   POTASSIUM 3.8 4.2 4.2   CHLORIDE 108 111* 111*   CO2 23 25 25   BUN 21 16 17   CR 1.02 0.97 0.92   ANIONGAP 5 4 4   ONDINA 8.5 8.4* 8.3*   GLC 89 86 92     Recent Labs   Lab Test 21  1027 20  1158 20  1314 19  0928   ALBUMIN 3.9  --  3.0* 4.0   BILITOTAL 0.7  --  0.6 0.5   ALT 19 19 26 31   AST 18  --  27 38   ALKPHOS 72  --  69 75             CONSULTATION  ASSESSMENT AND PLAN:    Active Problems:    Anemia    Assessment: Patient with history of moderate aortic stenosis and CAD s/p stenting in July 2020, presents with worsening chest pain, dyspnea and severe anemia.  Reports of melena over the past month. On ASA and Brilinta, concerning for GI bleeding. Never had EGD.  With dark stools suspicious for upper GI source. Last colonoscopy 2018 (Dr. Orozco) 10mm sigmoid polyp and diverticulosis.  COVID negative.     Plan:  - 2 units RBCs ordered in ER  - IV PPI  - EGD today, further recommendations to follow, if EGD negative will likely need a colonoscopy.          Rosemarie Laws MD  [facility]  Office:  [phone]

## 2021-01-06 NOTE — ANESTHESIA PREPROCEDURE EVALUATION
Anesthesia Pre-Procedure Evaluation    Patient: Johan Navarro   MRN: [id number] : 1955          Preoperative Diagnosis: GI bleed [K92.2]    Procedure(s):  ESOPHAGOGASTRODUODENOSCOPY (EGD)    Past Medical History:   Diagnosis Date     Aortic stenosis      Arthritis     hands     CAD (coronary artery disease)     cardiac cath : stents x 2 to circumflex     Carotid stenosis     left     Family history of early CAD      Hyperlipidaemia LDL goal < 70     familial hyperlipidemia with marked hypercholesterolemia before treatment; has been on some form of cholesterol-lowering medication since the      Prostate cancer (H) 2010     Syncope      Past Surgical History:   Procedure Laterality Date     CARDIAC SURGERY      coronary stents x2 placed ; angioplasty     CV HEART CATHETERIZATION WITH POSSIBLE INTERVENTION N/A 2020    Procedure: Heart Catheterization with Possible Intervention;  Surgeon: Alber Bentley MD;  Location:  HEART CARDIAC CATH LAB     DAVINCI PROSTATECTOMY      2010     EYE SURGERY Bilateral 2017    eyelids     ORTHOPEDIC SURGERY Right     achilles tendon; post football injury     ORTHOPEDIC SURGERY Right     hand; near thumb. has wires in there. cysts     REPAIR TENDON BICEPS DISTAL UPPER EXTREMITY Right 2018    Procedure: REPAIR TENDON BICEPS DISTAL UPPER EXTREMITY;  Right open biceps tendon repair  ;  Surgeon: Alber Zabala MD;  Location: [facility] OR     SURGICAL HISTORY OF -       Right hand Xanthoma removal     SURGICAL HISTORY OF -       Stress Echo (-)       Anesthesia Evaluation     . Pt has had prior anesthetic.     No history of anesthetic complications          ROS/MED HX    ENT/Pulmonary:     (+)tobacco use, Past use , . .   (-) sleep apnea   Neurologic:  - neg neurologic ROS     Cardiovascular:     (+) hypertension-Peripheral Vascular Disease-- Carotid Stenosis and Non Symptomatic, CAD, angina-past MI,-stent,Drug Eluting Stent .. Taking blood  "thinners : . . WRIGHT, fainting (syncope). :. valvular problems/murmurs type: AS moderate:.       METS/Exercise Tolerance:     Hematologic:     (+) Anemia, History of Transfusion -      Musculoskeletal:   (+) arthritis,  - biceps tear      GI/Hepatic:     (+) Other GI/Hepatic GI bleed     (-) GERD   Renal/Genitourinary:  - ROS Renal section negative       Endo:  - neg endo ROS       Psychiatric:  - neg psychiatric ROS       Infectious Disease:  - neg infectious disease ROS       Malignancy:   (+) Malignancy History of Prostate          Other:    (+) No chance of pregnancy C-spine cleared: N/A, no H/O Chronic Pain,no other significant disability                         Physical Exam  Normal systems: pulmonary and dental    Airway   Mallampati: II  TM distance: >3 FB  Neck ROM: full    Dental     Cardiovascular   Rhythm and rate: regular      Pulmonary             Lab Results   Component Value Date    WBC 3.8 (L) 01/06/2021    HGB 5.3 (LL) 01/06/2021    HCT 19.8 (L) 01/06/2021     01/06/2021     01/06/2021    POTASSIUM 3.8 01/06/2021    CHLORIDE 108 01/06/2021    CO2 23 01/06/2021    BUN 21 01/06/2021    CR 1.02 01/06/2021    GLC 89 01/06/2021    ONDINA 8.5 01/06/2021    ALBUMIN 3.9 01/06/2021    PROTTOTAL 7.3 01/06/2021    ALT 19 01/06/2021    AST 18 01/06/2021    ALKPHOS 72 01/06/2021    BILITOTAL 0.7 01/06/2021    PTT 31 07/21/2020    INR 1.03 01/06/2021    TSH 3.21 06/29/2020       Preop Vitals  BP Readings from Last 3 Encounters:   01/06/21 138/78   12/29/20 (!) 148/86   08/31/20 122/88    Pulse Readings from Last 3 Encounters:   01/06/21 62   12/29/20 98   08/31/20 89      Resp Readings from Last 3 Encounters:   01/06/21 14   07/21/20 16   07/13/20 16    SpO2 Readings from Last 3 Encounters:   01/06/21 100%   12/29/20 99%   07/21/20 97%      Temp Readings from Last 1 Encounters:   01/06/21 36.7  C (98  F) (Oral)    Ht Readings from Last 1 Encounters:   12/29/20 1.676 m (5' 6\")      Wt Readings from Last 1 " "Encounters:   12/29/20 74.1 kg (163 lb 6.4 oz)    Estimated body mass index is 26.37 kg/m  as calculated from the following:    Height as of 12/29/20: 1.676 m (5' 6\").    Weight as of 12/29/20: 74.1 kg (163 lb 6.4 oz).       Anesthesia Plan      History & Physical Review  History and physical reviewed and following examination; no interval change.    ASA Status:  4 .    NPO Status:  > 8 hours    Plan for MAC Reason for MAC:  Procedure to face, neck, head or breast  PONV prophylaxis:  Ondansetron (or other 5HT-3)         Postoperative Care  Postoperative pain management:  Multi-modal analgesia.      Consents  Anesthetic plan, risks, benefits and alternatives discussed with:  Patient..                 Jacques Marroquin MD  "

## 2021-01-06 NOTE — PLAN OF CARE
VSS, no pain or complaints. Has felt great improvement since receiving blood. EGD today was normal. Colonoscopy tomorrow, prep started this evening. Clear liquid. Independent with self cares. Checking hgb every 6 hours with orders to transfuse if less than 8.0

## 2021-01-06 NOTE — ANESTHESIA POSTPROCEDURE EVALUATION
Patient: Johan Navarro    Procedure(s):  ESOPHAGOGASTRODUODENOSCOPY (EGD)    Diagnosis:GI bleed [K92.2]  Diagnosis Additional Information: No value filed.    Anesthesia Type:  MAC    Note:  Anesthesia Post Evaluation    Patient location during evaluation: PACU  Patient participation: Able to fully participate in evaluation  Level of consciousness: awake and alert  Pain management: adequate  Airway patency: patent  Cardiovascular status: acceptable  Respiratory status: acceptable and unassisted  Hydration status: acceptable  PONV: none             Last vitals:  Vitals:    01/06/21 1239 01/06/21 1320 01/06/21 1330   BP: 138/78 (!) 156/91    Pulse: 62     Resp: 14 16    Temp: 36.7  C (98  F) 36.5  C (97.7  F) 36.8  C (98.2  F)   SpO2: 100% 100%          Electronically Signed By: Jacques Marroquin MD  January 6, 2021  2:12 PM

## 2021-01-06 NOTE — PROGRESS NOTES
GI Brief Note    EGD completed.  Anesthesiology Service provided MAC sedation.    Findings:  - normal esophagus  - normal stomach  - normal duodenum  Bilious, non-bloody fluid in the stomach and duodenum.    No signs at all of upper GI bleeding.    A/P:  Severe microcytic anemia. No findings on EGD.  - proceed with colonoscopy tomorrow  - clear liquid diet  - NPO at midnight    Rosemarie Laws MD  [facility]  [phone]

## 2021-01-06 NOTE — PROCEDURES
PRE-PROCEDURE NOTE    CHIEF COMPLAINT / REASON FOR PROCEDURE:  Severe anemia    History and Physical Reviewed:  yes    PRE-SEDATION ASSESSMENT:    Lung Exam:  normal  Heart Exam:  BECCA  Previous reaction to anesthesia/sedation:   no  Sedation plan based on assessment:  Per anesthesia, likely MAC  Comment(s):  Risks of procedure explained. Patient already feeling better after 1 unit RBC.  ASA Classification: 3    IMPRESSION:  Rule out upper GI bleed    PLAN:  egd    Rosemarie Laws MD, MD

## 2021-01-06 NOTE — PROGRESS NOTES
RECEIVING UNIT ED HANDOFF REVIEW    ED Nurse Handoff Report was reviewed by: Nereida Hill RN on January 6, 2021 at 11:50 AM

## 2021-01-06 NOTE — PROGRESS NOTES
Service Date: 01/06/2021      Mr. Navarro is a 66-year-old gentleman who follows very closely with Dr. Galicia with a past medical history significant for coronary artery disease, undergoing treatment most recently to the circumflex OM in 07/2020.  He also has a history of PCI and stenting of the ostial circumflex as well in 1998.  He has mild to moderate aortic valve stenosis with a mean gradient of 17 and actually did well following his stent up until about a month ago.      Mr. Navarro began to have symptoms of chest pain and shortness of breath.  He saw Dr. Galicia and they decided to do a stress test that showed a basal and mid anterior ischemia that was seen previously that was mild, but they also saw mid and basal inferior, inferolateral scar with mild reddy-infarct ischemia that was new.  Ultimately, they decided to do a coronary angiogram, but when he saw Hector Rosas in clinic for [facility], there was concern that he had dark stools and he looked pale, so it was decided to do a hemoglobin.  That was performed on 01/06 and he was found to have a hemoglobin of 5.6, which had dropped to 5.3 by the time he was admitted.  He underwent an EGD.  They did not find a cause.  They are planning to do a colonoscopy tonight.  He has been transfused 2 units.  SARS is negative.  Troponin was also checked and negative.  Temporarily, his Brilinta has been held.  He does note that Brilinta has been causing him significant shortness of breath.  He gets shortness of breath 10 minutes after taking it.  In the past, he had tolerated Plavix.      PAST MEDICAL HISTORY:  Includes coronary artery disease, hypertension and hyperlipidemia.      SOCIAL HISTORY:  He is .  He uses smokeless tobacco and smoking tobacco.      FAMILY HISTORY:  Positive for coronary artery disease.      REVIEW OF SYSTEMS:  Negative beyond that noted in the HPI.      ALLERGIES:  CRESTOR.      PHYSICAL EXAMINATION:   VITAL SIGNS:  His blood pressure is 117/58 with a  pulse of 57, satting 99% on room air.  He is afebrile.  He is 163 pounds.    GENERAL:  He is a male appearing stated age, in no acute distress.   CARDIOVASCULAR:  Regular rate and rhythm without any significant rubs, murmurs or gallops.   LUNGS:  Bilaterally clear.   ABDOMEN:  Soft, nontender.   EXTREMITIES:  No clubbing, cyanosis or edema.   VASCULAR:  No elevation of his JVP.   PSYCHIATRIC:  Normal affect.   SKIN:  No rashes.   HEENT:  Reasonable dentition.  No icterus.      LABORATORY EVALUATION:  As noted in the HPI.      MEDICATIONS:  Include Lipitor 80 daily, aspirin 81 mg daily, Brilinta 90 mg twice a day, Sildenafil as needed, Neurontin 600 mg twice a day.      IMPRESSION, REPORT, PLAN:   1.  Coronary artery disease with stenting to the circumflex in 1998 and to the obtuse marginal in 07/2020 with a 3.0 Promus stent.   2.  Gastrointestinal bleed, with hemoglobin of 5.3.   3.  Hypertension.   4.  Hyperlipidemia.   5.  Nicotine dependence, ongoing.      DISCUSSION:  It was a pleasure being involved in the care of Mr. Navarro.  I reviewed his history and symptoms in detail.  Fortunately, at this time, he is not having any chest pain.  His Brilinta has been held.  They are going to do a colonoscopy today.  Hopefully, we will be able to restart his antiplatelet tomorrow.  In this situation, with the shortness of breath, we will switch him from Brilinta to Plavix and we will do a lot of 600 mg assuming that he is okay to restart.  He is a little bit lower risk given that he is greater than 6 months out since his stent and it was a 3.0 stent, so on the larger side, but he will let us know, of course, if he has any symptoms of chest pain or discomfort.  I think we will hold off on a coronary angiogram.  It is weight likely that the anemia is a cause of his symptoms in the past month and that can be reassessed as an outpatient.  He understands and is in agreement with the plan as outlined.  All questions were answered.         It was a pleasure being involved in his care.  Please do not hesitate to contact me with any questions or concerns.         KAYE CHAUDHRY MD             D: 2021   T: 2021   MT: DAVID      Name:     MARLENY CHAPA   MRN:      [id number]        Account:      [id number]   :      1955           Service Date: 2021      Document: [id number]

## 2021-01-07 ENCOUNTER — RESULTS ONLY (OUTPATIENT)
Dept: ENDOSCOPY | Facility: CLINIC | Age: 66
End: 2021-01-07

## 2021-01-07 ENCOUNTER — APPOINTMENT (OUTPATIENT)
Dept: CT IMAGING | Facility: CLINIC | Age: 66
DRG: 812 | End: 2021-01-07
Attending: HOSPITALIST
Payer: COMMERCIAL

## 2021-01-07 ENCOUNTER — ANESTHESIA EVENT (OUTPATIENT)
Dept: GASTROENTEROLOGY | Facility: CLINIC | Age: 66
DRG: 812 | End: 2021-01-07
Payer: COMMERCIAL

## 2021-01-07 ENCOUNTER — ANESTHESIA (OUTPATIENT)
Dept: GASTROENTEROLOGY | Facility: CLINIC | Age: 66
DRG: 812 | End: 2021-01-07
Payer: COMMERCIAL

## 2021-01-07 LAB
ALBUMIN SERPL-MCNC: 3.7 G/DL (ref 3.4–5)
ALP SERPL-CCNC: 67 U/L (ref 40–150)
ALT SERPL W P-5'-P-CCNC: 17 U/L (ref 0–70)
ANION GAP SERPL CALCULATED.3IONS-SCNC: 5 MMOL/L (ref 3–14)
AST SERPL W P-5'-P-CCNC: 19 U/L (ref 0–45)
BILIRUB SERPL-MCNC: 1.5 MG/DL (ref 0.2–1.3)
BUN SERPL-MCNC: 15 MG/DL (ref 7–30)
CALCIUM SERPL-MCNC: 8.4 MG/DL (ref 8.5–10.1)
CHLORIDE SERPL-SCNC: 107 MMOL/L (ref 94–109)
CO2 SERPL-SCNC: 26 MMOL/L (ref 20–32)
COLONOSCOPY: NORMAL
CREAT SERPL-MCNC: 0.99 MG/DL (ref 0.66–1.25)
ERYTHROCYTE [DISTWIDTH] IN BLOOD BY AUTOMATED COUNT: 23.6 % (ref 10–15)
GFR SERPL CREATININE-BSD FRML MDRD: 79 ML/MIN/{1.73_M2}
GLUCOSE SERPL-MCNC: 82 MG/DL (ref 70–99)
HCT VFR BLD AUTO: 26.8 % (ref 40–53)
HGB BLD-MCNC: 8 G/DL (ref 13.3–17.7)
HGB BLD-MCNC: 8.4 G/DL (ref 13.3–17.7)
HGB BLD-MCNC: 8.6 G/DL (ref 13.3–17.7)
MAGNESIUM SERPL-MCNC: 2.1 MG/DL (ref 1.6–2.3)
MCH RBC QN AUTO: 20.8 PG (ref 26.5–33)
MCHC RBC AUTO-ENTMCNC: 29.9 G/DL (ref 31.5–36.5)
MCV RBC AUTO: 70 FL (ref 78–100)
PLATELET # BLD AUTO: 199 10E9/L (ref 150–450)
POTASSIUM SERPL-SCNC: 3.5 MMOL/L (ref 3.4–5.3)
PROT SERPL-MCNC: 6.3 G/DL (ref 6.8–8.8)
RBC # BLD AUTO: 3.84 10E12/L (ref 4.4–5.9)
SODIUM SERPL-SCNC: 138 MMOL/L (ref 133–144)
WBC # BLD AUTO: 3.7 10E9/L (ref 4–11)

## 2021-01-07 PROCEDURE — 80053 COMPREHEN METABOLIC PANEL: CPT | Performed by: PHYSICIAN ASSISTANT

## 2021-01-07 PROCEDURE — 250N000013 HC RX MED GY IP 250 OP 250 PS 637: Performed by: PHYSICIAN ASSISTANT

## 2021-01-07 PROCEDURE — 370N000017 HC ANESTHESIA TECHNICAL FEE, PER MIN: Performed by: INTERNAL MEDICINE

## 2021-01-07 PROCEDURE — 85018 HEMOGLOBIN: CPT | Performed by: HOSPITALIST

## 2021-01-07 PROCEDURE — 88305 TISSUE EXAM BY PATHOLOGIST: CPT | Mod: TC | Performed by: INTERNAL MEDICINE

## 2021-01-07 PROCEDURE — 36415 COLL VENOUS BLD VENIPUNCTURE: CPT | Performed by: HOSPITALIST

## 2021-01-07 PROCEDURE — 250N000013 HC RX MED GY IP 250 OP 250 PS 637: Performed by: HOSPITALIST

## 2021-01-07 PROCEDURE — 250N000011 HC RX IP 250 OP 636: Performed by: PHYSICIAN ASSISTANT

## 2021-01-07 PROCEDURE — 250N000013 HC RX MED GY IP 250 OP 250 PS 637: Performed by: NURSE PRACTITIONER

## 2021-01-07 PROCEDURE — 85018 HEMOGLOBIN: CPT | Performed by: PHYSICIAN ASSISTANT

## 2021-01-07 PROCEDURE — 250N000009 HC RX 250: Performed by: NURSE ANESTHETIST, CERTIFIED REGISTERED

## 2021-01-07 PROCEDURE — 85027 COMPLETE CBC AUTOMATED: CPT | Performed by: PHYSICIAN ASSISTANT

## 2021-01-07 PROCEDURE — 36415 COLL VENOUS BLD VENIPUNCTURE: CPT | Performed by: PHYSICIAN ASSISTANT

## 2021-01-07 PROCEDURE — 258N000003 HC RX IP 258 OP 636: Performed by: HOSPITALIST

## 2021-01-07 PROCEDURE — 999N000010 HC STATISTIC ANES STAT CODE-CRNA PER MINUTE: Performed by: INTERNAL MEDICINE

## 2021-01-07 PROCEDURE — 74177 CT ABD & PELVIS W/CONTRAST: CPT

## 2021-01-07 PROCEDURE — 250N000011 HC RX IP 250 OP 636: Performed by: INTERNAL MEDICINE

## 2021-01-07 PROCEDURE — 250N000011 HC RX IP 250 OP 636: Performed by: HOSPITALIST

## 2021-01-07 PROCEDURE — 250N000013 HC RX MED GY IP 250 OP 250 PS 637: Performed by: INTERNAL MEDICINE

## 2021-01-07 PROCEDURE — 210N000002 HC R&B HEART CARE

## 2021-01-07 PROCEDURE — 99232 SBSQ HOSP IP/OBS MODERATE 35: CPT | Performed by: HOSPITALIST

## 2021-01-07 PROCEDURE — 0DBK8ZZ EXCISION OF ASCENDING COLON, VIA NATURAL OR ARTIFICIAL OPENING ENDOSCOPIC: ICD-10-PCS | Performed by: INTERNAL MEDICINE

## 2021-01-07 PROCEDURE — 45385 COLONOSCOPY W/LESION REMOVAL: CPT | Performed by: INTERNAL MEDICINE

## 2021-01-07 PROCEDURE — 88305 TISSUE EXAM BY PATHOLOGIST: CPT | Mod: 26 | Performed by: PATHOLOGY

## 2021-01-07 PROCEDURE — C9113 INJ PANTOPRAZOLE SODIUM, VIA: HCPCS | Performed by: PHYSICIAN ASSISTANT

## 2021-01-07 PROCEDURE — 99232 SBSQ HOSP IP/OBS MODERATE 35: CPT | Performed by: INTERNAL MEDICINE

## 2021-01-07 PROCEDURE — 250N000011 HC RX IP 250 OP 636: Performed by: NURSE ANESTHETIST, CERTIFIED REGISTERED

## 2021-01-07 PROCEDURE — 83735 ASSAY OF MAGNESIUM: CPT | Performed by: PHYSICIAN ASSISTANT

## 2021-01-07 PROCEDURE — 258N000003 HC RX IP 258 OP 636: Performed by: NURSE ANESTHETIST, CERTIFIED REGISTERED

## 2021-01-07 PROCEDURE — 250N000009 HC RX 250: Performed by: INTERNAL MEDICINE

## 2021-01-07 RX ORDER — ONDANSETRON 2 MG/ML
INJECTION INTRAMUSCULAR; INTRAVENOUS PRN
Status: DISCONTINUED | OUTPATIENT
Start: 2021-01-07 | End: 2021-01-07

## 2021-01-07 RX ORDER — PROPOFOL 10 MG/ML
INJECTION, EMULSION INTRAVENOUS PRN
Status: DISCONTINUED | OUTPATIENT
Start: 2021-01-07 | End: 2021-01-07

## 2021-01-07 RX ORDER — LIDOCAINE HYDROCHLORIDE 20 MG/ML
INJECTION, SOLUTION INFILTRATION; PERINEURAL PRN
Status: DISCONTINUED | OUTPATIENT
Start: 2021-01-07 | End: 2021-01-07

## 2021-01-07 RX ORDER — IOPAMIDOL 755 MG/ML
78 INJECTION, SOLUTION INTRAVASCULAR ONCE
Status: COMPLETED | OUTPATIENT
Start: 2021-01-07 | End: 2021-01-07

## 2021-01-07 RX ORDER — SODIUM CHLORIDE, SODIUM LACTATE, POTASSIUM CHLORIDE, CALCIUM CHLORIDE 600; 310; 30; 20 MG/100ML; MG/100ML; MG/100ML; MG/100ML
INJECTION, SOLUTION INTRAVENOUS CONTINUOUS PRN
Status: DISCONTINUED | OUTPATIENT
Start: 2021-01-07 | End: 2021-01-07

## 2021-01-07 RX ORDER — ASPIRIN 81 MG/1
81 TABLET ORAL DAILY
Status: DISCONTINUED | OUTPATIENT
Start: 2021-01-07 | End: 2021-01-08 | Stop reason: HOSPADM

## 2021-01-07 RX ORDER — CLOPIDOGREL 300 MG/1
600 TABLET, FILM COATED ORAL DAILY
Status: COMPLETED | OUTPATIENT
Start: 2021-01-07 | End: 2021-01-07

## 2021-01-07 RX ORDER — CLOPIDOGREL BISULFATE 75 MG/1
75 TABLET ORAL DAILY
Status: DISCONTINUED | OUTPATIENT
Start: 2021-01-08 | End: 2021-01-08 | Stop reason: HOSPADM

## 2021-01-07 RX ORDER — PROPOFOL 10 MG/ML
INJECTION, EMULSION INTRAVENOUS CONTINUOUS PRN
Status: DISCONTINUED | OUTPATIENT
Start: 2021-01-07 | End: 2021-01-07

## 2021-01-07 RX ADMIN — ONDANSETRON 4 MG: 2 INJECTION INTRAMUSCULAR; INTRAVENOUS at 11:57

## 2021-01-07 RX ADMIN — PROPOFOL 10 MG: 10 INJECTION, EMULSION INTRAVENOUS at 12:17

## 2021-01-07 RX ADMIN — PROPOFOL 20 MG: 10 INJECTION, EMULSION INTRAVENOUS at 12:01

## 2021-01-07 RX ADMIN — IOPAMIDOL 78 ML: 755 INJECTION, SOLUTION INTRAVENOUS at 14:31

## 2021-01-07 RX ADMIN — ASPIRIN 81 MG: 81 TABLET, DELAYED RELEASE ORAL at 15:15

## 2021-01-07 RX ADMIN — IRON SUCROSE 300 MG: 20 INJECTION, SOLUTION INTRAVENOUS at 14:52

## 2021-01-07 RX ADMIN — GABAPENTIN 600 MG: 600 TABLET, FILM COATED ORAL at 08:18

## 2021-01-07 RX ADMIN — PROPOFOL 20 MG: 10 INJECTION, EMULSION INTRAVENOUS at 12:13

## 2021-01-07 RX ADMIN — Medication 1 MG: at 01:00

## 2021-01-07 RX ADMIN — MAGNESIUM CITRATE 296 ML: 1.75 LIQUID ORAL at 06:28

## 2021-01-07 RX ADMIN — Medication 1 SPRAY: at 01:01

## 2021-01-07 RX ADMIN — SODIUM CHLORIDE 62 ML: 9 INJECTION, SOLUTION INTRAVENOUS at 14:31

## 2021-01-07 RX ADMIN — SODIUM CHLORIDE, POTASSIUM CHLORIDE, SODIUM LACTATE AND CALCIUM CHLORIDE: 600; 310; 30; 20 INJECTION, SOLUTION INTRAVENOUS at 11:56

## 2021-01-07 RX ADMIN — DEXMEDETOMIDINE HYDROCHLORIDE 20 MCG: 100 INJECTION, SOLUTION INTRAVENOUS at 11:57

## 2021-01-07 RX ADMIN — ATORVASTATIN CALCIUM 80 MG: 80 TABLET, FILM COATED ORAL at 21:02

## 2021-01-07 RX ADMIN — PROPOFOL 20 MG: 10 INJECTION, EMULSION INTRAVENOUS at 12:04

## 2021-01-07 RX ADMIN — LIDOCAINE HYDROCHLORIDE 60 MG: 20 INJECTION, SOLUTION INFILTRATION; PERINEURAL at 11:57

## 2021-01-07 RX ADMIN — PROPOFOL 30 MG: 10 INJECTION, EMULSION INTRAVENOUS at 11:59

## 2021-01-07 RX ADMIN — PANTOPRAZOLE SODIUM 40 MG: 40 INJECTION, POWDER, FOR SOLUTION INTRAVENOUS at 08:18

## 2021-01-07 RX ADMIN — PROPOFOL 125 MCG/KG/MIN: 10 INJECTION, EMULSION INTRAVENOUS at 11:57

## 2021-01-07 RX ADMIN — Medication 1 MG: at 21:02

## 2021-01-07 RX ADMIN — CLOPIDOGREL BISULFATE 600 MG: 300 TABLET, FILM COATED ORAL at 15:15

## 2021-01-07 ASSESSMENT — MIFFLIN-ST. JEOR: SCORE: 1421.74

## 2021-01-07 ASSESSMENT — LIFESTYLE VARIABLES: TOBACCO_USE: 1

## 2021-01-07 ASSESSMENT — ACTIVITIES OF DAILY LIVING (ADL)
ADLS_ACUITY_SCORE: 12

## 2021-01-07 ASSESSMENT — COPD QUESTIONNAIRES: COPD: 0

## 2021-01-07 NOTE — PROGRESS NOTES
GI Brief Note    Colonoscopy completed, please see full note under Procedure tab.    Findings:  - 2 small ascending colon polyps (removed)  - normal terminal ileum  - moderate diverticulosis on the left  - medium sized internal hemorrhoids  - light brown fluid in the colon    A/P:  Nothing on EGD or colonoscopy to account for such a severe anemia.  Light brown fluid in the colon - nothing to suggest blood in the GI tract.  To finish the GI work up we should do a small bowel pill capsule test. This can be done as an outpatient.      - resume diet  - ok by me resume ASA/Plavix  - stay on ppi once daily (orally) such as omeprazole 20mg daily  - outpatient small bowel pill capsule study (we will arrange and contact patient)  - consider CT abdomen/Pelvis to rule out hematoma or other cause of anemia other than luminal GI tract  - if pill capsule normal would refer to hematology  - I agree with IV iron replacement.  - his hemoglobin should be watched fairly closely as an outpatient over the next few weeks    Rosemarie Laws MD  [facility]  [phone]

## 2021-01-07 NOTE — PROCEDURES
PRE-PROCEDURE NOTE    CHIEF COMPLAINT / REASON FOR PROCEDURE:  anemia    History and Physical Reviewed:  yes    PRE-SEDATION ASSESSMENT:    Lung Exam:  normal  Heart Exam:  chandu  Previous reaction to anesthesia/sedation:   no  Sedation plan based on assessment:  MAC  Comment(s):  Risks of colonoscopy explained  ASA Classification:  3    IMPRESSION:  Severe anemia, last colonoscopy May 2018. Normal EGD 1/6/21.    PLAN:  colonoscopy    Rosemarie Laws MD, MD

## 2021-01-07 NOTE — PROGRESS NOTES
[facility]  Medicine Progress Note - Hospitalist Service       Date of Admission:  1/6/2021    Assessment & Plan       Johan Navarro is a 66 year old male with PMH significant for CAD s/p PCI July 2020, left carotid stenosis, aortic stenosis, hyperlipidemia, history of prostate cancer status post prostatectomy 2010 who was admitted on 1/6/21 with intermittent melena and chest pain found to have profound anemia concerning for upper gastrointestinal bleed. Hemodynamically stable on admission.    Severe symptomatic, iron deficiency anemia, suspect chronic GI loss   Patient has had intermittent chest pain/tightness lasting 5 minutes on exertion for the past 4 to 5 weeks and exercise intolerance.  Also noted intermittent melena 3 to 4 weeks prior to admission with associated lightheadedness and nausea, no vomiting. Occasional epigastric pain after eating, relieved by tums. No NSAID but he is on DAPT. Chest pain was scheduled for coronary angiogram on 1/8 had preoperative labs completed due to report of melena and found profound anemia- Hb of 5.6 and sent to ER. In the ER hemoglobin 5.3, hemodynamically stable. EKG NSR without acute ST-T changes. CXR without acute pathology.  -[facility] consulted, s/p EGD which was normal. Getting colonoscopy done today.  -continue to follow H&H BID now that there stable rise with PRBC transfusion, 3 units so far and hb 8.0 today.  -Given severe iron deficiency, though he received PRBCs, will infuse IV iron x2 (will order after he is back from colonoscopy)  -On PPI given normal EGD, change to daily if GI is ok or even discontinue   -Transfuse for hb< 8 given CAD and profound symptomatic anemia     Coronary artery disease status post PCI 1998, 7/2020  Hyperlipidemia  Chest pain  Stenting of distal and ostial circumflex as well as first obtuse marginal in 1998 followed by stenting to ostium and second obtuse marginal 7/21/2020 mild to moderate nonobstructive disease in  the left dominant system occluding 70% stenosis of the RCA.  -had plan for angiogram as outpatient. His dyspnea is better after PRBC transfusion and so suspect symptom could be due to severe anemia. Getting GI work up done, defer further cardiac work up to cardiology after GI work up completed.  - PTA was on ASA and Brillinta, DAPT on hold, plan is to resume ASA and Plavix with load (instead of brilinta per cardiology) if GI ok.  - Resume PTA statin      Moderate aortic valve stenosis, stable  Left ICA stenosis, stable  -Following with cardiology  - BP control  - on statin    Prostate cancer s/p prostatectomy 2010, stable    Seasonal allergies: Continue PTA Flonase and as needed albuterol when verified by pharmacy    Asymptomatic COVID-19 screen: Negative 1/6     Diet:  NPO  DVT Prophylaxis: Ambulate every shift  Salvador Catheter: not present  Code Status:  Full code     Diet: Clear Liquid Diet    DVT Prophylaxis: Pneumatic Compression Devices  Salvador Catheter: not present  Code Status: Full Code           Disposition Plan   Expected discharge: likely tomorrow, recommended to prior living arrangement once GI work up complete, plan on resuming DAPT and made sure Hb stable after DAPT resumed.  Entered: Donovan Torre MD 01/07/2021, 11:30 AM       The patient's care was discussed with the Bedside Nurse and Patient. Will update family after more information available with colonoscopy.    Donovan Torre MD  Hospitalist Service  [facility]  Contact information available via [facility] Paging/Directory    ______________________________________________________________________    Interval History    Chart reviewed, evaluated patient this am. Feels better since PRBC transfusion, specially no chest tightness. Mild abdominal discomfort. No nausea.     Data reviewed today: I reviewed all medications, new labs and imaging results over the last 24 hours. I personally reviewed no images or EKG's  today.    Physical Exam   Vital Signs: Temp: 97.5  F (36.4  C) Temp src: Oral BP: 139/86 Pulse: 77   Resp: 16 SpO2: 99 % O2 Device: None (Room air)    Weight: 154 lbs 1.6 oz    General: AAOx3, appears comfortable.  HEENT: PERRLA EOMI. Mucosa moist.   Lungs: Bilateral equal air entry. Clear to auscultation, normal work of breathing.   CVS: S1S2 regular, no tachycardia or murmur.   Abdomen: Soft, NT, ND. BS heard.  MSK: No edema or deformities.  Neuro: AAOX3. CN 2-12 normal. Strength symmetrical.  Skin: No rash.       Data   Recent Labs   Lab 01/07/21  1119 01/07/21  0430 01/06/21  1824 01/06/21  1027 01/06/21  0825   WBC  --  3.7*  --  3.8* 4.5   HGB 8.4* 8.0* 7.4* 5.3* 5.6*   MCV  --  70*  --  64* 63*   PLT  --  199  --  208 248   INR  --   --   --  1.03  --    NA  --  138  --  136 137   POTASSIUM  --  3.5  --  3.8 3.7   CHLORIDE  --  107  --  108 108   CO2  --  26  --  23 20   BUN  --  15  --  21 22   CR  --  0.99  --  1.02 1.03   ANIONGAP  --  5  --  5 9   ONDINA  --  8.4*  --  8.5 8.8   GLC  --  82  --  89 132*   ALBUMIN  --  3.7  --  3.9 3.9   PROTTOTAL  --  6.3*  --  7.3 7.2   BILITOTAL  --  1.5*  --  0.7 0.6   ALKPHOS  --  67  --  72 80   ALT  --  17  --  19 19   AST  --  19  --  18 18   TROPI  --   --   --  <0.015  --      No results found for this or any previous visit (from the past 24 hour(s)).  Medications       atorvastatin  80 mg Oral QPM     gabapentin  600 mg Oral BID     pantoprazole (PROTONIX) IV  40 mg Intravenous BID     senna-docusate  1 tablet Oral BID    Or     senna-docusate  2 tablet Oral BID     sodium chloride (PF)  3 mL Intracatheter Q8H

## 2021-01-07 NOTE — ANESTHESIA PREPROCEDURE EVALUATION
Anesthesia Pre-Procedure Evaluation    Patient: Johan Navarro   MRN: [id number] : 1955          Preoperative Diagnosis: Anemia [D64.9]    Procedure(s):  COLONOSCOPY    Past Medical History:   Diagnosis Date     Aortic stenosis      Arthritis     hands     CAD (coronary artery disease)     cardiac cath : stents x 2 to circumflex     Carotid stenosis     left     Family history of early CAD      Hyperlipidaemia LDL goal < 70     familial hyperlipidemia with marked hypercholesterolemia before treatment; has been on some form of cholesterol-lowering medication since the      Prostate cancer (H)      Syncope      Past Surgical History:   Procedure Laterality Date     CARDIAC SURGERY      coronary stents x2 placed ; angioplasty     CV HEART CATHETERIZATION WITH POSSIBLE INTERVENTION N/A 2020    Procedure: Heart Catheterization with Possible Intervention;  Surgeon: Alber Bentley MD;  Location:  HEART CARDIAC CATH LAB     DAVINCI PROSTATECTOMY           ESOPHAGOSCOPY, GASTROSCOPY, DUODENOSCOPY (EGD), COMBINED N/A 2021    Procedure: ESOPHAGOGASTRODUODENOSCOPY (EGD);  Surgeon: Rosemarie Laws MD;  Location:  GI     EYE SURGERY Bilateral 2017    eyelids     GENITOURINARY SURGERY       ORTHOPEDIC SURGERY Right     achilles tendon; post football injury     ORTHOPEDIC SURGERY Right     hand; near thumb. has wires in there. cysts     REPAIR TENDON BICEPS DISTAL UPPER EXTREMITY Right 2018    Procedure: REPAIR TENDON BICEPS DISTAL UPPER EXTREMITY;  Right open biceps tendon repair  ;  Surgeon: Alber Zabala MD;  Location: [facility] OR     SURGICAL HISTORY OF -       Right hand Xanthoma removal     SURGICAL HISTORY OF -       Stress Echo (-)       Anesthesia Evaluation     . Pt has had prior anesthetic. Type: MAC    No history of anesthetic complications          ROS/MED HX    ENT/Pulmonary:     (+)tobacco use, Past use , . .   (-) asthma, COPD and sleep apnea    Neurologic:  - neg neurologic ROS     Cardiovascular:     (+) hypertension-Peripheral Vascular Disease-- Carotid Stenosis and Non Symptomatic, CAD, angina-past MI,-stent,1998, 2020  4 Drug Eluting Stent .. Taking blood thinners : . . WRIGHT, fainting (syncope). :. valvular problems/murmurs type: AS moderate:.       METS/Exercise Tolerance:     Hematologic:     (+) Anemia, History of Transfusion -      Musculoskeletal:   (+) arthritis,  - biceps tear      GI/Hepatic:     (+) Other GI/Hepatic GI bleed     (-) GERD   Renal/Genitourinary:  - ROS Renal section negative       Endo:  - neg endo ROS       Psychiatric:         Infectious Disease:  - neg infectious disease ROS       Malignancy:   (+) Malignancy History of Prostate          Other:    (+) No chance of pregnancy C-spine cleared: N/A, no H/O Chronic Pain,no other significant disability                         Physical Exam  Normal systems: pulmonary and dental    Airway   Mallampati: II  TM distance: >3 FB  Neck ROM: full    Dental     Cardiovascular   Rhythm and rate: regular and normal  (+) murmur       Pulmonary             Lab Results   Component Value Date    WBC 3.7 (L) 01/07/2021    HGB 8.0 (L) 01/07/2021    HCT 26.8 (L) 01/07/2021     01/07/2021     01/07/2021    POTASSIUM 3.5 01/07/2021    CHLORIDE 107 01/07/2021    CO2 26 01/07/2021    BUN 15 01/07/2021    CR 0.99 01/07/2021    GLC 82 01/07/2021    ONDINA 8.4 (L) 01/07/2021    MAG 2.1 01/07/2021    ALBUMIN 3.7 01/07/2021    PROTTOTAL 6.3 (L) 01/07/2021    ALT 17 01/07/2021    AST 19 01/07/2021    ALKPHOS 67 01/07/2021    BILITOTAL 1.5 (H) 01/07/2021    PTT 31 07/21/2020    INR 1.03 01/06/2021    TSH 3.21 06/29/2020       Preop Vitals  BP Readings from Last 3 Encounters:   01/07/21 139/86   12/29/20 (!) 148/86   08/31/20 122/88    Pulse Readings from Last 3 Encounters:   01/07/21 77   12/29/20 98   08/31/20 89      Resp Readings from Last 3 Encounters:   01/07/21 16   07/21/20 16   07/13/20 16  "   SpO2 Readings from Last 3 Encounters:   01/07/21 99%   12/29/20 99%   07/21/20 97%      Temp Readings from Last 1 Encounters:   01/07/21 36.4  C (97.5  F) (Oral)    Ht Readings from Last 1 Encounters:   01/06/21 1.676 m (5' 6\")      Wt Readings from Last 1 Encounters:   01/07/21 69.9 kg (154 lb 1.6 oz)    Estimated body mass index is 24.87 kg/m  as calculated from the following:    Height as of this encounter: 1.676 m (5' 6\").    Weight as of this encounter: 69.9 kg (154 lb 1.6 oz).       Anesthesia Plan      History & Physical Review  History and physical reviewed and following examination; no interval change.    ASA Status:  3 .    NPO Status:  > 8 hours    Plan for MAC Reason for MAC:  Deep or markedly invasive procedure (G8)  PONV prophylaxis:  Ondansetron (or other 5HT-3)         Postoperative Care      Consents  Anesthetic plan, risks, benefits and alternatives discussed with:  Patient..                 Elpidio Perez MD  "

## 2021-01-07 NOTE — PLAN OF CARE
"/85   Pulse 80   Temp 97.8  F (36.6  C) (Oral)   Resp 16   Ht 1.676 m (5' 6\")   Wt 69.9 kg (154 lb 1.6 oz)   SpO2 99%   BMI 24.87 kg/m      Patient is alert and oriented, independent in room. Denies chest pain and shortness of breath. One unit of blood transfused this shift, hemoglobin recheck 8.0. Tele SR. Plan for colonoscopy today.   "

## 2021-01-07 NOTE — ANESTHESIA CARE TRANSFER NOTE
Patient: Johan Navarro    Procedure(s):  COLONOSCOPY    Diagnosis: Anemia [D64.9]  Diagnosis Additional Information: No value filed.    Anesthesia Type:   MAC     Note:  Airway :Room Air  Patient transferred to:Phase II  Comments: After procedure in [facility] under monitored anesthesia care (MAC), patient exhibited spontaneous respirations, patient breathing room air with oxygen saturation maintained greater than 95%, patient brought to [facility] recovery bay for postprocedure recovery, SpO2, NiBP, and EKG monitors and alarms on and functioning, report on patient's clinical status given to PACU RN.    /55  HR 70  RR 16  O2 97%      Handoff Report: Identifed the Patient, Identified the Reponsible Provider, Reviewed the pertinent medical history, Discussed the surgical course, Reviewed Intra-OP anesthesia mangement and issues during anesthesia, Set expectations for post-procedure period and Allowed opportunity for questions and acknowledgement of understanding      Vitals: (Last set prior to Anesthesia Care Transfer)    CRNA VITALS  1/7/2021 1152 - 1/7/2021 1222      1/7/2021             Resp Rate (set):  10                Electronically Signed By: Christine Marie Volp Hodgkins, CRNA, APRN CRNA  January 7, 2021  12:22 PM

## 2021-01-07 NOTE — ANESTHESIA POSTPROCEDURE EVALUATION
Patient: Johan Navarro    Procedure(s):  COLONOSCOPY    Diagnosis:Anemia [D64.9]  Diagnosis Additional Information: No value filed.    Anesthesia Type:  MAC    Note:  Anesthesia Post Evaluation    Patient location during evaluation: PACU  Patient participation: Able to fully participate in evaluation  Level of consciousness: awake and alert  Pain management: adequate  Airway patency: patent  Cardiovascular status: acceptable  Respiratory status: acceptable  Hydration status: acceptable  PONV: none     Anesthetic complications: None          Last vitals:  Vitals:    01/07/21 1226 01/07/21 1230 01/07/21 1234   BP: 94/76 96/62 104/63   Pulse: 76 70 71   Resp: (!) 0 19 23   Temp:      SpO2: 97% 97% 98%         Electronically Signed By: Elpidio Perez MD  January 7, 2021  1:33 PM

## 2021-01-07 NOTE — PLAN OF CARE
VSS, hgb stable, independent in room, egd and colonoscopy were normal, need OP eval with GI capsule study. Needs to restart plavix with caution per cardiology.

## 2021-01-07 NOTE — PROGRESS NOTES
[facility]  Cardiology Progress Note  Date of Service: 01/07/2021  Primary Cardiologist: Dr. Galicia    Assessment & Plan    Johan Navarro is a 66 year old male with past medical history significant for CAD with Cx PCI in 7/2020 and PCI to ostial Cx in 1998, HTN, HLP and ongoing nicotine dependance admitted on 1/6/2021 with low hemoglobin (5.6) with reported dark stool and in need of a coronary angiogram due to SOB and chest pain and abnormal stress test.     Due to SOB and chest pain he underwent a stress test that showed basal and mid anterior ischemia that was seen previously that was mild, also saw mid and basal inferior, inferolateral scar with mild reddy-infarct ischemia that was new. It     COVID negative    Assessment:   1. CAD    PTA: [DAPT: ASA and Brilinta     History of stenting to Cx in 1998 and OM in 7/2020 with ANNETTE    Stress test that showed basal and mid anterior ischemia that was seen previously that was mild, also saw mid and basal inferior, inferolateral scar with mild reddy-infarct ischemia that was new. It     Plan was for coronary angiogram, but will defer until cleared from GI standpoint.    2. GI bleed    Admission Hgb 5.3 and received transfusion with Hbg improving to 8    EGD negative     Brilinta held    3. Hyperlipidemia    [PTA: atorvastatin 80 mg daily an d Repatha]    4. Nicotine dependance    Ongoing    Plan:  1. Colonoscopy today  2. Defer LHC as symptoms likely due to profound anemia  3. Will transition to Plavix 75 mg daily from Brilinta with 600 mg loading dose when cleared from a GI standpoint.  4. Follow up with cardiology next week as scheduled.     ANGELINE BLEDSOE CNP  Pager:  [phone]   (7am - 5pm, M-F)    ATTESTATION:  Mr. Navarro was seen and examined. Agree with note and mutually determined plan of care. No source of bleed was found, but cleared from GI to start ASA.Plavix, which we will start now. Cardiology will sign off. Please call with  brittany.   EWRNER Weaver MD     Interval History   Symptomatically improved    Physical Exam   Temp: 97.5  F (36.4  C) Temp src: Oral BP: 139/86 Pulse: 77   Resp: 16 SpO2: 99 % O2 Device: None (Room air)    Vitals:    01/06/21 1830 01/07/21 0643   Weight: 73.9 kg (163 lb) 69.9 kg (154 lb 1.6 oz)       Constitutional:   NAD   Skin:   Warm and dry   Head:   Nontraumatic   Neck:   no JVD   Lungs:   symmetric, clear to auscultation   Cardiovascular:   regular rate and rhythm and normal S1 and S2   Abdomen:   Benign   Extremities and Back:   No edema   Neurological:   Grossly nonfocal       Medications       atorvastatin  80 mg Oral QPM     gabapentin  600 mg Oral BID     pantoprazole (PROTONIX) IV  40 mg Intravenous BID     senna-docusate  1 tablet Oral BID    Or     senna-docusate  2 tablet Oral BID     sodium chloride (PF)  3 mL Intracatheter Q8H       Data     Most Recent 3 CBC's:  Recent Labs   Lab Test 01/07/21  0430 01/06/21  1824 01/06/21  1027 01/06/21  0825   WBC 3.7*  --  3.8* 4.5   HGB 8.0* 7.4* 5.3* 5.6*   MCV 70*  --  64* 63*     --  208 248     Most Recent 3 BMP's:  Recent Labs   Lab Test 01/07/21  0430 01/06/21  1027 01/06/21  0825    136 137   POTASSIUM 3.5 3.8 3.7   CHLORIDE 107 108 108   CO2 26 23 20   BUN 15 21 22   CR 0.99 1.02 1.03   ANIONGAP 5 5 9   ONDINA 8.4* 8.5 8.8   GLC 82 89 132*     Most Recent 3 Hemoglobins:  Recent Labs   Lab Test 01/07/21  0430 01/06/21  1824 01/06/21  1027   HGB 8.0* 7.4* 5.3*     Most Recent 3 Troponin's:  Recent Labs   Lab Test 01/06/21  1027 07/06/20  1314 06/29/20  1352   TROPI <0.015 <0.015 <0.015     Most Recent Cholesterol Panel:  Recent Labs   Lab Test 01/06/21  0825   CHOL 113   LDL 46   HDL 55   TRIG 61

## 2021-01-08 VITALS
BODY MASS INDEX: 24.32 KG/M2 | TEMPERATURE: 98 F | OXYGEN SATURATION: 96 % | HEIGHT: 66 IN | SYSTOLIC BLOOD PRESSURE: 133 MMHG | HEART RATE: 86 BPM | WEIGHT: 151.3 LBS | DIASTOLIC BLOOD PRESSURE: 83 MMHG | RESPIRATION RATE: 16 BRPM

## 2021-01-08 LAB
ANION GAP SERPL CALCULATED.3IONS-SCNC: 7 MMOL/L (ref 3–14)
BUN SERPL-MCNC: 14 MG/DL (ref 7–30)
CALCIUM SERPL-MCNC: 8.8 MG/DL (ref 8.5–10.1)
CHLORIDE SERPL-SCNC: 105 MMOL/L (ref 94–109)
CO2 SERPL-SCNC: 24 MMOL/L (ref 20–32)
COPATH REPORT: NORMAL
CREAT SERPL-MCNC: 0.97 MG/DL (ref 0.66–1.25)
GFR SERPL CREATININE-BSD FRML MDRD: 81 ML/MIN/{1.73_M2}
GLUCOSE SERPL-MCNC: 96 MG/DL (ref 70–99)
HGB BLD-MCNC: 9.2 G/DL (ref 13.3–17.7)
IGA SERPL-MCNC: 125 MG/DL (ref 84–499)
IGG SERPL-MCNC: 765 MG/DL (ref 610–1616)
IGM SERPL-MCNC: 97 MG/DL (ref 35–242)
POTASSIUM SERPL-SCNC: 3.5 MMOL/L (ref 3.4–5.3)
SODIUM SERPL-SCNC: 136 MMOL/L (ref 133–144)

## 2021-01-08 PROCEDURE — 82784 ASSAY IGA/IGD/IGG/IGM EACH: CPT | Performed by: NURSE PRACTITIONER

## 2021-01-08 PROCEDURE — 36415 COLL VENOUS BLD VENIPUNCTURE: CPT | Performed by: NURSE PRACTITIONER

## 2021-01-08 PROCEDURE — 80048 BASIC METABOLIC PNL TOTAL CA: CPT | Performed by: HOSPITALIST

## 2021-01-08 PROCEDURE — 83516 IMMUNOASSAY NONANTIBODY: CPT | Performed by: NURSE PRACTITIONER

## 2021-01-08 PROCEDURE — 258N000003 HC RX IP 258 OP 636: Performed by: HOSPITALIST

## 2021-01-08 PROCEDURE — 250N000013 HC RX MED GY IP 250 OP 250 PS 637: Performed by: NURSE PRACTITIONER

## 2021-01-08 PROCEDURE — 250N000013 HC RX MED GY IP 250 OP 250 PS 637: Performed by: PHYSICIAN ASSISTANT

## 2021-01-08 PROCEDURE — 250N000011 HC RX IP 250 OP 636: Performed by: HOSPITALIST

## 2021-01-08 PROCEDURE — 250N000013 HC RX MED GY IP 250 OP 250 PS 637: Performed by: HOSPITALIST

## 2021-01-08 PROCEDURE — 85018 HEMOGLOBIN: CPT | Performed by: HOSPITALIST

## 2021-01-08 PROCEDURE — 99239 HOSP IP/OBS DSCHRG MGMT >30: CPT | Performed by: HOSPITALIST

## 2021-01-08 PROCEDURE — 36415 COLL VENOUS BLD VENIPUNCTURE: CPT | Performed by: HOSPITALIST

## 2021-01-08 RX ORDER — CLOPIDOGREL BISULFATE 75 MG/1
75 TABLET ORAL DAILY
Qty: 30 TABLET | Refills: 0 | Status: SHIPPED | OUTPATIENT
Start: 2021-01-08 | End: 2021-01-21

## 2021-01-08 RX ORDER — FOLIC ACID 1 MG/1
1 TABLET ORAL DAILY
Qty: 30 TABLET | Refills: 0 | Status: SHIPPED | OUTPATIENT
Start: 2021-01-08 | End: 2021-02-03

## 2021-01-08 RX ORDER — FOLIC ACID 1 MG/1
1 TABLET ORAL DAILY
Status: DISCONTINUED | OUTPATIENT
Start: 2021-01-08 | End: 2021-01-08 | Stop reason: HOSPADM

## 2021-01-08 RX ADMIN — ASPIRIN 81 MG: 81 TABLET, DELAYED RELEASE ORAL at 07:48

## 2021-01-08 RX ADMIN — FOLIC ACID 1 MG: 1 TABLET ORAL at 10:36

## 2021-01-08 RX ADMIN — OMEPRAZOLE 20 MG: 20 CAPSULE, DELAYED RELEASE ORAL at 07:48

## 2021-01-08 RX ADMIN — IRON SUCROSE 300 MG: 20 INJECTION, SOLUTION INTRAVENOUS at 07:51

## 2021-01-08 RX ADMIN — CLOPIDOGREL BISULFATE 75 MG: 75 TABLET, FILM COATED ORAL at 07:48

## 2021-01-08 RX ADMIN — GABAPENTIN 600 MG: 600 TABLET, FILM COATED ORAL at 07:47

## 2021-01-08 ASSESSMENT — ACTIVITIES OF DAILY LIVING (ADL)
ADLS_ACUITY_SCORE: 12

## 2021-01-08 ASSESSMENT — MIFFLIN-ST. JEOR: SCORE: 1409.04

## 2021-01-08 NOTE — DISCHARGE SUMMARY
[facility]  Hospitalist Discharge Summary      Date of Admission:  1/6/2021  Date of Discharge:  1/8/2021  Discharging Provider: Donovan Torre MD      Discharge Diagnoses     Severe symptomatic, iron deficiency anemia, suspect chronic GI loss    Coronary artery disease status post PCI 1998, 7/2020    Hyperlipidemia    Chest pain    Moderate aortic valve stenosis, stable    Left ICA stenosis, stable    Prostate cancer s/p prostatectomy 2010, stable    Seasonal allergies    Incidental finding:  Indeterminate liver lesion, needs outpatient follow up.    Follow-ups Needed After Discharge   Follow-up Appointments     Follow-up and recommended labs and tests       Follow up with primary care provider, Washington Yang, within 7 days   to evaluate medication change and for hospital follow- up.  The following   labs/tests are recommended: H&H in a week.    Please call [facility] office in a week if you do not hear from the   office to schedule follow up visit. Office: [phone]               Unresulted Labs Ordered in the Past 30 Days of this Admission     Date and Time Order Name Status Description    1/8/2021 0811 Immunoglobulins A G and M In process     1/8/2021 0811 Tissue transglutaminase clare IgA and IgG In process     1/7/2021 1213 Surgical pathology exam In process       These results will be followed up by [facility]    Discharge Disposition   Discharged to home  Condition at discharge: Stable     Hospital Course         Johan Navarro is a 66 year old male with PMH significant for CAD s/p PCI July 2020, left carotid stenosis, aortic stenosis, hyperlipidemia, history of prostate cancer status post prostatectomy 2010 who was admitted on 1/6/21 with intermittent melena and chest pain found to have profound anemia concerning for upper gastrointestinal bleed. Hemodynamically stable on admission.    Severe symptomatic, iron deficiency anemia, suspect chronic GI loss  Patient with  intermittent chest pain/tightness lasting 5 minutes on exertion for the past 4 to 5 weeks and exercise intolerance.  Also noted intermittent melena 3 to 4 weeks prior to admission with associated lightheadedness and nausea, no vomiting. Occasional epigastric pain after eating, relieved by tums. No NSAID but he is on DAPT. Chest pain was scheduled for coronary angiogram on 1/8 had preoperative labs completed due to report of melena and found profound anemia- Hb of 5.6 and sent to ER. In the ER hemoglobin 5.3, hemodynamically stable. EKG NSR without acute ST-T changes. CXR without acute pathology.  -[facility] consulted, s/p EGD which were normal.  Plan is outpatient PillCam study.  [facility] will follow up to arrange this as outpatient.  -s/p 3 units PRBC transfusion, appropriate rise in hemoglobin, 9.2 this a.m.     -Given severe iron deficiency, though he received PRBCs, infused IV iron x2   -Continuing with daily PPI.  -CT abdomen pelvis was done and any intra-abdominal hematoma was ruled out.  Incidentally, it showed 3 liver lesions, 2 of them were likely hemangiomas, third indeterminate.  Liver MRI recommended. [facility] will follow up on this and obtain MRI as outpatient, discussed with Dr. Laws.     Coronary artery disease status post PCI 1998, 7/2020  Hyperlipidemia  Chest pain  Stenting of distal and ostial circumflex as well as first obtuse marginal in 1998 followed by stenting to ostium and second obtuse marginal 7/21/2020 mild to moderate nonobstructive disease in the left dominant system occluding 70% stenosis of the RCA.  -had plan for angiogram as outpatient. His dyspnea is better after PRBC transfusion and so suspect symptom could be due to severe anemia. Getting GI work up done, further cardiac work per cardiology as outpatient.    - PTA was on ASA and Brillinta, DAPT on hold at the time of admission, after GI work-up as above, resumed ASA and Plavix with load (instead of brilinta per cardiology)   - Resumed  PTA statin      Moderate aortic valve stenosis, stable  Left ICA stenosis, stable  -Following with cardiology  - BP control  - on statin    Prostate cancer s/p prostatectomy 2010, stable    Seasonal allergies: Continue PTA Flonase and as needed albuterol when verified by pharmacy    Asymptomatic COVID-19 screen: Negative 1/6      Consultations This Hospital Stay   CARE MANAGEMENT / SOCIAL WORK IP CONSULT  SMOKING CESSATION PROGRAM IP CONSULT  CARDIOLOGY IP CONSULT  GASTROENTEROLOGY IP CONSULT    Code Status   Full Code    Time Spent on this Encounter   I, Donovan Torre MD, personally saw the patient today and spent greater than 30 minutes discharging this patient.       Donovan Torre MD  [facility]  [address], [address]  [place] [address]  Phone: [phone]  ______________________________________________________________________    Physical Exam   Vital Signs: Temp: 98  F (36.7  C) Temp src: Oral BP: 133/83 Pulse: 86   Resp: 16 SpO2: 96 % O2 Device: None (Room air)    Weight: 151 lbs 4.8 oz    General: AAOx3, appears comfortable.  HEENT: PERRLA EOMI. Mucosa moist.   Lungs: Bilateral equal air entry. Clear to auscultation, normal work of breathing.   CVS: S1S2 regular, no tachycardia or murmur.   Abdomen: Soft, NT, ND. BS heard.  MSK: No edema or deformities.  Neuro: AAOX3. CN 2-12 normal. Strength symmetrical.  Skin: No rash.          Primary Care Physician   Washington Yang    Discharge Orders      Reason for your hospital stay    Severe symptomatic anemia     Follow-up and recommended labs and tests     Follow up with primary care provider, Washington Yang, within 7 days to evaluate medication change and for hospital follow- up.  The following labs/tests are recommended: H&H in a week.    Please call [facility] office in a week if you do not hear from the office to schedule follow up visit. Office: [phone]     Activity    Your activity upon  discharge: activity as tolerated     Full Code     Diet    Follow this diet upon discharge: Orders Placed This Encounter      Regular Diet Adult       Significant Results and Procedures   Most Recent 3 CBC's:  Recent Labs   Lab Test 01/08/21  0551 01/07/21  1813 01/07/21  1119 01/07/21  0430 01/06/21  1027 01/06/21  1027 01/06/21  0825   WBC  --   --   --  3.7*  --  3.8* 4.5   HGB 9.2* 8.6* 8.4* 8.0*   < > 5.3* 5.6*   MCV  --   --   --  70*  --  64* 63*   PLT  --   --   --  199  --  208 248    < > = values in this interval not displayed.     Most Recent 3 BMP's:  Recent Labs   Lab Test 01/08/21  0551 01/07/21  0430 01/06/21  1027    138 136   POTASSIUM 3.5 3.5 3.8   CHLORIDE 105 107 108   CO2 24 26 23   BUN 14 15 21   CR 0.97 0.99 1.02   ANIONGAP 7 5 5   ONDINA 8.8 8.4* 8.5   GLC 96 82 89     Most Recent 2 LFT's:  Recent Labs   Lab Test 01/07/21  0430 01/06/21  1027   AST 19 18   ALT 17 19   ALKPHOS 67 72   BILITOTAL 1.5* 0.7     Most Recent 3 Hemoglobins:  Recent Labs   Lab Test 01/08/21  0551 01/07/21  1813 01/07/21  1119   HGB 9.2* 8.6* 8.4*     Most Recent 3 Troponin's:  Recent Labs   Lab Test 01/06/21  1027 07/06/20  1314 06/29/20  1352   TROPI <0.015 <0.015 <0.015     Most Recent 3 BNP's:  Recent Labs   Lab Test 12/29/20  1158   NTBNP 134*   ,   Results for orders placed or performed during the hospital encounter of 01/06/21   XR Chest 2 Views    Narrative    CHEST TWO VIEWS   1/6/2021 10:52 AM     HISTORY: Chest pain.    COMPARISON: Chest x-ray 12/29/2020.      Impression    IMPRESSION: PA and lateral views of the chest. Lungs are clear. Heart  is normal in size. No effusions are evident. No pneumothorax. Coronary  artery stent overlies the left heart border, unchanged.    CORTNEY FARMER MD   CT Abdomen Pelvis w Contrast    Narrative    CT ABDOMEN PELVIS W CONTRAST 1/7/2021 2:37 PM    CLINICAL HISTORY: Anemia. Concerns for intra-abdominal hematoma.    TECHNIQUE: CT scan of the abdomen and pelvis was performed  following  injection of IV contrast. Multiplanar reformats were obtained. Dose  reduction techniques were used.  CONTRAST: 78mL [id number]    COMPARISON: CT of the chest dated 6/29/2020    FINDINGS:   LOWER CHEST: Linear atelectasis and/or scarring in the lower lobes.    HEPATOBILIARY: There is a 1.4 cm hypodense lesion in the right lower  liver. This has a Hounsfield value of 47 and is indeterminate. There  is a 2.4 cm hypodense lesion with surrounding enhancement in the right  lower liver. This most likely represents a hemangioma. There is a 1.9  cm hypodense lesion with surrounding enhancement in the right lower  liver. This also most likely represents a hemangioma.    PANCREAS: Unremarkable    SPLEEN: Unremarkable    ADRENAL GLANDS: Unremarkable    KIDNEYS/BLADDER: There is a 2 mm nonobstructing stone at the midpole  of the left kidney. There is no hydronephrosis bilaterally.    BOWEL: There are a few scattered colonic diverticuli. The bowel is  otherwise grossly unremarkable.    PELVIC ORGANS: Unremarkable.    LYMPH NODES: No enlarged lymph nodes are identified in the abdomen or  pelvis.    VASCULATURE: Unremarkable.    ADDITIONAL FINDINGS: No evidence for intra-abdominal hematoma.     MUSCULOSKELETAL: Facet hypertrophic changes at L4-5 and L5-S1.      Impression    IMPRESSION:   1.  Hypodense lesions in the liver. 2 of these most likely represent  hemangiomas. A third is indeterminate. Consider an MRI for further  evaluation.  2.  No evidence for an intra-abdominal hematoma.  3.  2 mm nonobstructing stone in the left kidney.    SPENCER JACKSON MD       Discharge Medications   Current Discharge Medication List      START taking these medications    Details   clopidogrel (PLAVIX) 75 MG tablet Take 1 tablet (75 mg) by mouth daily  Qty: 30 tablet, Refills: 0    Comments: Future refills by PCP Dr. Washington Yang with phone number [phone].  Associated Diagnoses: Coronary artery disease involving native  coronary artery of native heart without angina pectoris      folic acid (FOLVITE) 1 MG tablet Take 1 tablet (1 mg) by mouth daily  Qty: 30 tablet, Refills: 0    Associated Diagnoses: Anemia, unspecified type      omeprazole (PRILOSEC) 20 MG DR capsule Take 1 capsule (20 mg) by mouth every morning (before breakfast)  Qty: 30 capsule, Refills: 0    Comments: Future refills by PCP Dr. Washington Yang with phone number [phone].  Associated Diagnoses: Anemia, unspecified type         CONTINUE these medications which have NOT CHANGED    Details   albuterol (PROAIR HFA/PROVENTIL HFA/VENTOLIN HFA) 108 (90 Base) MCG/ACT inhaler Inhale 4-6 puffs into the lungs every 2 hours as needed for shortness of breath / dyspnea  Qty: 1 Inhaler, Refills: 1    Comments: WITH SPACER      aspirin (ASA) 81 MG chewable tablet Take 81 mg by mouth every other day Supposed to be taking daily.      atorvastatin (LIPITOR) 80 MG tablet Take 1 tablet (80 mg) by mouth daily  Qty: 90 tablet, Refills: 2    Associated Diagnoses: Coronary artery disease involving native coronary artery of native heart without angina pectoris; HLD (hyperlipidemia)      diclofenac (VOLTAREN) 1 % topical gel Apply 2 g topically 4 times daily as needed for moderate pain (apply to hands and fingers)      evolocumab (REPATHA) 140 MG/ML prefilled autoinjector Inject 1 mL (140 mg) Subcutaneous every 14 days  Qty: 6 mL, Refills: 3    Associated Diagnoses: Mixed hyperlipidemia      fluticasone (FLONASE) 50 MCG/ACT nasal spray Spray 2 sprays into both nostrils daily as needed   Refills: 3      gabapentin (NEURONTIN) 600 MG tablet TAKE ONE TABLET BY MOUTH TWICE DAILY   Qty: 180 tablet, Refills: 1    Associated Diagnoses: Coronary artery disease involving native coronary artery of native heart without angina pectoris      sildenafil (REVATIO) 20 MG tablet TAKE 2-5 TABLETS BY MOUTH AS NEEDED PRIOR TO SEXUAL INTERCOURSE  Qty: 90 tablet, Refills: 1    Comments: **Patient  requests 90 days supply**  Associated Diagnoses: Erectile dysfunction, unspecified erectile dysfunction type         STOP taking these medications       ticagrelor (BRILINTA) 90 MG tablet Comments:   Reason for Stopping:             Allergies   Allergies   Allergen Reactions     Crestor [Rosuvastatin] GI Disturbance     Dust Mites      No Clinical Screening - See Comments      Goose feathers     Pollen Extract

## 2021-01-08 NOTE — PROGRESS NOTES
Assumed pt care at 1530 from day RN. Pt is AAO, calm/cooperative, follows commands, up ad nakita. Lungs CTA. HR reg. Voids/BM independently. Diet. Discussed changes to medications and instructed pt to report to nursing staff if he stools. No major complaints or concerns at this time. Pt instructed to use call light for assistance, call light is within reach.

## 2021-01-08 NOTE — PROGRESS NOTES
"[facility] Progress Note     Interval History:    Feels well. Getting IV iron. No complaints.     Physical Exam:    /83 (BP Location: Left arm)   Pulse 86   Temp 98  F (36.7  C) (Oral)   Resp 16   Ht 1.676 m (5' 6\")   Wt 68.6 kg (151 lb 4.8 oz)   SpO2 96%   BMI 24.42 kg/m    Temp (24hrs), Av.1  F (36.7  C), Min:98  F (36.7  C), Max:98.2  F (36.8  C)    Patient Vitals for the past 72 hrs:   Weight   21 0500 68.6 kg (151 lb 4.8 oz)   21 0643 69.9 kg (154 lb 1.6 oz)   21 1830 73.9 kg (163 lb)       Intake/Output Summary (Last 24 hours) at 2021 0856  Last data filed at 2021 0800  Gross per 24 hour   Intake 440 ml   Output --   Net 440 ml       Constitutional: No acute distress  Cardiovascular: RRR, normal S1/S2, +soft murmur  Respiratory: Effort normal, CTA bilaterally  Abdomen: Soft, nondistended, nontender      Laboratory Data  Recent Labs   Lab Test 21  0551 21  1813 21  1119 21  0430 21  1027 21  1027 21  0825 20  0815   WBC  --   --   --  3.7*  --  3.8* 4.5 4.5   HGB 9.2* 8.6* 8.4* 8.0*   < > 5.3* 5.6* 11.9*   MCV  --   --   --  70*  --  64* 63* 87   PLT  --   --   --  199  --  208 248 246   INR  --   --   --   --   --  1.03  --  0.96    < > = values in this interval not displayed.     Recent Labs   Lab Test 21  0551 21  0430 21  1027    138 136   POTASSIUM 3.5 3.5 3.8   CHLORIDE 105 107 108   CO2 24 26 23   BUN 14 15 21   CR 0.97 0.99 1.02   ANIONGAP 7 5 5   ONDINA 8.8 8.4* 8.5     Recent Labs   Lab Test 21  0430 21  1027 21  0825   ALBUMIN 3.7 3.9 3.9   BILITOTAL 1.5* 0.7 0.6   ALT 17 19 19   AST 19 18 18   ALKPHOS 67 72 80       Imaging  CT Abdomen/Pelvis 2021  IMPRESSION:   1.  Hypodense lesions in the liver. 2 of these most likely represent  hemangiomas. A third is indeterminate. Consider an MRI for further  evaluation.  2.  No evidence for an intra-abdominal hematoma.  3.  2 mm " nonobstructing stone in the left kidney.    Endoscopic Workup  EGD 1/6/2021  Impression:               - Z-line regular, 38 cm from the incisors.                             - Normal stomach.                             - Normal examined duodenum. Not biopsied given low                             hemoglobin and question of GI bleed.     Colonoscopy 1/7/2021  Impression:               - Two 1 to 4 mm polyps in the ascending colon,                             removed with a cold snare. Resected and retrieved.                             - Moderate diverticulosis in the sigmoid colon and                             in the descending colon.                             - Internal hemorrhoids.                             - The examined portion of the ileum was normal.                             Approximately 10cm of ileum seen.                             - The examination was otherwise normal on direct                             and retroflexion views.                             No findings to explain anemia.                             Light brown fluid in the colon, nothing to suggest                             small bowel bleeding.    Assessment & Plan:  66 year old admitted with severe iron deficiency anemia without any findings on EGD/colonoscopy to account for severe anemia. Hemoglobin 9.2 today; received 3 units of PRBCs since admission. CT scan showed 3 lesions in the liver, 2 likely hemangiomas, 1 indeterminate. Discussed with the patient that we will compete an MRI for further evaluation, but these are most likely benign lesions. He can complete the remainder of the GI workup for anemia outpatient.     Plan  -OK to resume ASA/Plavix from our standpoint   -Stay on ppi once daily (orally) such as omeprazole 20mg daily  -Will need monitoring of hemoglobin over the next few weeks with PCP   -Outpatient PillCam study (we will call to arrange)   -Added on celiac panel given GLORIA. We will follow up on this. We  will follow up on pathology from polyps removed   -MRI to be completed outpatient (we will arrange)   -Follow up clinic appointment at [facility] to review findings after PillCam is complete. If pill capsule is normal, would refer to hematology   -Cleared to discharge from GI perspective. We will sign off. Please call with any concerns or questions.    Discussed with Dr. Laws.    Radha Valentine, [facility]  Cell: [phone]  Office: [phone]

## 2021-01-08 NOTE — PLAN OF CARE
"BP (!) 145/78 (BP Location: Left arm)   Pulse 72   Temp 98  F (36.7  C) (Oral)   Resp 16   Ht 1.676 m (5' 6\")   Wt 68.6 kg (151 lb 4.8 oz)   SpO2 99%   BMI 24.42 kg/m      Patient is alert and oriented, independent in room. Denies chest pain and shortness of breath. Hemoglobin stable. Plan for follow up with GI and cardiology outpatient. Plan for probable discharge today.   "

## 2021-01-08 NOTE — PLAN OF CARE
Patient stable and cleared for discharge. A+OX4, all VSS on RA. Tele NSR. Denies pain. Hemoglobin stable. AVS printed and reviewed with patient, patient verbalized understanding of all instructions. Patient left unit at 1145 with wheelchair escort. All personal belongings with patient at time of discharge.

## 2021-01-10 LAB
TTG IGA SER-ACNC: <1 U/ML
TTG IGG SER-ACNC: <1 U/ML

## 2021-01-11 ENCOUNTER — TELEPHONE (OUTPATIENT)
Dept: FAMILY MEDICINE | Facility: CLINIC | Age: 66
End: 2021-01-11

## 2021-01-11 ENCOUNTER — TELEPHONE (OUTPATIENT)
Dept: CARDIOLOGY | Facility: CLINIC | Age: 66
End: 2021-01-11

## 2021-01-11 NOTE — TELEPHONE ENCOUNTER
Patients wife Amy calling about the IV site being red, and painful. She is worried he needs a antibiotic. Please advise. Ok to call and  [phone]

## 2021-01-11 NOTE — TELEPHONE ENCOUNTER
Patient was evaluated by cardiology while inpatient for SOB and chest pain with abnormal stress test, dark stools and anemia with Hgb 5.3. PMH: CAD with PCI 7/20, PCI ost cFX '98, HTN, HLD, smoking. Admission Hgb 5.3 and received transfusion with Hgb improving to 8. EGD negative and no other source of bleeding found. Cleared by GI to start ASA and transition pt from Brilinta to Plavix. Defer LHC as symptoms likely due to profound anemia. Writer attempted to call patient to discuss any post hospital d/c questions he may have, review medication changes, and confirm f/u appts, but no answer. VM left to return my phone call. RN left reminder with patient that he needs to reschedule cardiology ERIC appt that has since been cancelled. Scheduling phone number provided. MASHA Garza RN.

## 2021-01-11 NOTE — TELEPHONE ENCOUNTER
If not feverish or weeping could consider waiting until tomorrows appointment otherwise see UC or see if someone can see in a clinic that has openings

## 2021-01-11 NOTE — PROGRESS NOTES
"  Assessment & Plan     Iron deficiency anemia, unspecified iron deficiency anemia type  Coronary artery disease involving native coronary artery of native heart without angina pectoris  Unclear etiology at this point. Pill cam planned. I suspect less likely this is cardiac in nature. I will take the liberty of adding otc iron. Should continue on ppi for now. Hematology if GI not fruitful. Discussed abnormal imaging of liver, plan for GI follow up as well  - CBC with platelets and differential      Infection of injection site, initial encounter  Begin below. Elevation of extremity and ice. Follow up if worsening.  - cephALEXin (KEFLEX) 500 MG capsule; Take 1 capsule (500 mg) by mouth 3 times daily    Malignant neoplasm of prostate (H)  PSA in 01/2021 undetectable    BMI:   Estimated body mass index is 25.84 kg/m  as calculated from the following:    Height as of this encounter: 1.676 m (5' 6\").    Weight as of this encounter: 72.6 kg (160 lb 1.6 oz).       Return in about 1 week (around 1/19/2021) for if arm symptoms worsening.    Washington Yang PA-C  [facility]    Jaymie Prado is a 66 year old who presents to clinic today for the following health issues  accompanied by his spouse:    HPI       Hospital Follow-up Visit:    Hospital/Nursing Home/IP Rehab Facility: [facility]  Date of Admission: 1/6/2020  Date of Discharge: 1/8/2020  Reason(s) for Admission: Severe Anemia     Johan Navarro is a 66 year old male who presents today for HOSPITAL Follow-up following detection of severe anemia  See hospital discharge note for further details     He reports feeling better than he has in months over the past few days  He mentions no further darkened stools  There is no shortness of breath or chest pain  No epigastric pain  Normal BMs and urine  Concern for infection at injection site    Was your hospitalization related to COVID-19? No   Problems taking medications " "regularly:  None  Medication changes since discharge: See med list   Problems adhering to non-medication therapy:  None    Summary of hospitalization:  [facility] discharge summary reviewed  Diagnostic Tests/Treatments reviewed.  Follow up needed: CBC  Other Healthcare Providers Involved in Patient s Care:         Specialist appointment - Needs to establish with GI for pill cam  Update since discharge: improved.       Post Discharge Medication Reconciliation: discharge medications reconciled, continue medications without change. Add IRON supplement  Plan of care communicated with patient and family                  Review of Systems   Constitutional, HEENT, cardiovascular, pulmonary, gi and gu systems are negative, except as otherwise noted.      Objective    /74   Pulse 76   Temp 97  F (36.1  C) (Tympanic)   Resp 16   Ht 1.676 m (5' 6\")   Wt 72.6 kg (160 lb 1.6 oz)   SpO2 96%   BMI 25.84 kg/m    Body mass index is 25.84 kg/m .  Physical Exam   GENERAL: healthy, alert and no distress  EYES: Eyes grossly normal to inspection; there is severe conjunctival pallor  RESP: lungs clear to auscultation - no rales, rhonchi or wheezes  CV: regular rates and rhythm; soft systolic murmur  ABDOMEN: soft, nontender, no hepatosplenomegaly, no masses and bowel sounds normal  MS: No peripheral edema   SKIN: nail bed pallor; along the right antecubital fossa injection site is a warm, erythematous indurated area. It is ttp. No streaking  NEURO: Normal strength and tone, mentation intact and speech normal  PSYCH: mentation appears normal, affect normal/bright    Reviewed HOSP hx and labs at length  UPDATING CBC        "

## 2021-01-11 NOTE — TELEPHONE ENCOUNTER
Called and adv pt. He says it is feeling better now. He has it elevated.   Going to wait for appt tomorrow  Rosemarie MANUEL RN

## 2021-01-11 NOTE — TELEPHONE ENCOUNTER
Please contact patient for In-patient follow up.  [phone] (home)     Visit date: 1/8/2021  Diagnosis listed:Gastrointestinal Hemorrhage, Unspecified Gastrointestinal Hemorrhage Type, Coronary Artery Disease Involving Native  Number of visits in past 12 months:1/1

## 2021-01-11 NOTE — TELEPHONE ENCOUNTER
ED / Discharge Outreach Protocol    Patient Contact    Attempt # 1    Was call answered?  No.  Left message on voicemail with information to call me back.    Alicia Franco RN on 1/11/2021 at 9:08 AM

## 2021-01-12 ENCOUNTER — OFFICE VISIT (OUTPATIENT)
Dept: FAMILY MEDICINE | Facility: CLINIC | Age: 66
End: 2021-01-12
Payer: COMMERCIAL

## 2021-01-12 VITALS
BODY MASS INDEX: 25.73 KG/M2 | SYSTOLIC BLOOD PRESSURE: 121 MMHG | TEMPERATURE: 97 F | WEIGHT: 160.1 LBS | RESPIRATION RATE: 16 BRPM | HEART RATE: 76 BPM | HEIGHT: 66 IN | OXYGEN SATURATION: 96 % | DIASTOLIC BLOOD PRESSURE: 74 MMHG

## 2021-01-12 DIAGNOSIS — C61 MALIGNANT NEOPLASM OF PROSTATE (H): ICD-10-CM

## 2021-01-12 DIAGNOSIS — D50.9 IRON DEFICIENCY ANEMIA, UNSPECIFIED IRON DEFICIENCY ANEMIA TYPE: Primary | ICD-10-CM

## 2021-01-12 DIAGNOSIS — T80.29XA INFECTION OF INJECTION SITE, INITIAL ENCOUNTER: ICD-10-CM

## 2021-01-12 DIAGNOSIS — I25.10 CORONARY ARTERY DISEASE INVOLVING NATIVE CORONARY ARTERY OF NATIVE HEART WITHOUT ANGINA PECTORIS: ICD-10-CM

## 2021-01-12 PROCEDURE — 99495 TRANSJ CARE MGMT MOD F2F 14D: CPT | Performed by: PHYSICIAN ASSISTANT

## 2021-01-12 PROCEDURE — 36415 COLL VENOUS BLD VENIPUNCTURE: CPT | Performed by: PHYSICIAN ASSISTANT

## 2021-01-12 PROCEDURE — 85025 COMPLETE CBC W/AUTO DIFF WBC: CPT | Performed by: PHYSICIAN ASSISTANT

## 2021-01-12 RX ORDER — CEPHALEXIN 500 MG/1
500 CAPSULE ORAL 3 TIMES DAILY
Qty: 30 CAPSULE | Refills: 0 | Status: SHIPPED | OUTPATIENT
Start: 2021-01-12 | End: 2021-09-09

## 2021-01-12 ASSESSMENT — ANXIETY QUESTIONNAIRES
2. NOT BEING ABLE TO STOP OR CONTROL WORRYING: NOT AT ALL
GAD7 TOTAL SCORE: 0
IF YOU CHECKED OFF ANY PROBLEMS ON THIS QUESTIONNAIRE, HOW DIFFICULT HAVE THESE PROBLEMS MADE IT FOR YOU TO DO YOUR WORK, TAKE CARE OF THINGS AT HOME, OR GET ALONG WITH OTHER PEOPLE: NOT DIFFICULT AT ALL
7. FEELING AFRAID AS IF SOMETHING AWFUL MIGHT HAPPEN: NOT AT ALL
3. WORRYING TOO MUCH ABOUT DIFFERENT THINGS: NOT AT ALL
1. FEELING NERVOUS, ANXIOUS, OR ON EDGE: NOT AT ALL
6. BECOMING EASILY ANNOYED OR IRRITABLE: NOT AT ALL
5. BEING SO RESTLESS THAT IT IS HARD TO SIT STILL: NOT AT ALL

## 2021-01-12 ASSESSMENT — PATIENT HEALTH QUESTIONNAIRE - PHQ9
5. POOR APPETITE OR OVEREATING: NOT AT ALL
SUM OF ALL RESPONSES TO PHQ QUESTIONS 1-9: 0

## 2021-01-12 ASSESSMENT — MIFFLIN-ST. JEOR: SCORE: 1448.96

## 2021-01-12 NOTE — TELEPHONE ENCOUNTER
Writer returned pt's phone message. Pt has no complaints of increased SOB, chest pain, fever or lightheadedness. Pt is scheduled for capsule endoscopy and will reschedule cardiology ERIC f/u after that. Pt has no further questions. MASHA Garza RN.

## 2021-01-13 LAB
ANISOCYTOSIS BLD QL SMEAR: ABNORMAL
BASOPHILS # BLD AUTO: 0.1 10E9/L (ref 0–0.2)
BASOPHILS NFR BLD AUTO: 1 %
DACRYOCYTES BLD QL SMEAR: SLIGHT
DIFFERENTIAL METHOD BLD: ABNORMAL
ELLIPTOCYTES BLD QL SMEAR: SLIGHT
EOSINOPHIL # BLD AUTO: 0.1 10E9/L (ref 0–0.7)
EOSINOPHIL NFR BLD AUTO: 1.5 %
ERYTHROCYTE [DISTWIDTH] IN BLOOD BY AUTOMATED COUNT: 27.5 % (ref 10–15)
HCT VFR BLD AUTO: 31.9 % (ref 40–53)
HGB BLD-MCNC: 9.4 G/DL (ref 13.3–17.7)
HYPOCHROMIA BLD QL: PRESENT
LYMPHOCYTES # BLD AUTO: 1.4 10E9/L (ref 0.8–5.3)
LYMPHOCYTES NFR BLD AUTO: 20.2 %
MCH RBC QN AUTO: 21.4 PG (ref 26.5–33)
MCHC RBC AUTO-ENTMCNC: 29.5 G/DL (ref 31.5–36.5)
MCV RBC AUTO: 73 FL (ref 78–100)
MICROCYTES BLD QL SMEAR: PRESENT
MONOCYTES # BLD AUTO: 1 10E9/L (ref 0–1.3)
MONOCYTES NFR BLD AUTO: 14.5 %
NEUTROPHILS # BLD AUTO: 4.2 10E9/L (ref 1.6–8.3)
NEUTROPHILS NFR BLD AUTO: 62.8 %
PLATELET # BLD AUTO: 184 10E9/L (ref 150–450)
PLATELET # BLD EST: ABNORMAL 10*3/UL
RBC # BLD AUTO: 4.4 10E12/L (ref 4.4–5.9)
WBC # BLD AUTO: 6.8 10E9/L (ref 4–11)

## 2021-01-13 ASSESSMENT — ANXIETY QUESTIONNAIRES: GAD7 TOTAL SCORE: 0

## 2021-01-15 ENCOUNTER — HEALTH MAINTENANCE LETTER (OUTPATIENT)
Age: 66
End: 2021-01-15

## 2021-01-19 ENCOUNTER — TRANSFERRED RECORDS (OUTPATIENT)
Dept: HEALTH INFORMATION MANAGEMENT | Facility: CLINIC | Age: 66
End: 2021-01-19

## 2021-01-21 ENCOUNTER — OFFICE VISIT (OUTPATIENT)
Dept: FAMILY MEDICINE | Facility: CLINIC | Age: 66
End: 2021-01-21
Payer: COMMERCIAL

## 2021-01-21 VITALS
HEIGHT: 66 IN | DIASTOLIC BLOOD PRESSURE: 72 MMHG | SYSTOLIC BLOOD PRESSURE: 124 MMHG | WEIGHT: 160.3 LBS | HEART RATE: 71 BPM | TEMPERATURE: 97.7 F | RESPIRATION RATE: 16 BRPM | OXYGEN SATURATION: 96 % | BODY MASS INDEX: 25.76 KG/M2

## 2021-01-21 DIAGNOSIS — I25.10 CORONARY ARTERY DISEASE INVOLVING NATIVE CORONARY ARTERY OF NATIVE HEART WITHOUT ANGINA PECTORIS: ICD-10-CM

## 2021-01-21 DIAGNOSIS — D50.9 IRON DEFICIENCY ANEMIA, UNSPECIFIED IRON DEFICIENCY ANEMIA TYPE: Primary | ICD-10-CM

## 2021-01-21 LAB
ERYTHROCYTE [DISTWIDTH] IN BLOOD BY AUTOMATED COUNT: ABNORMAL % (ref 10–15)
FERRITIN SERPL-MCNC: 34 NG/ML (ref 26–388)
HCT VFR BLD AUTO: 33.5 % (ref 40–53)
HGB BLD-MCNC: 9.8 G/DL (ref 13.3–17.7)
MCH RBC QN AUTO: 22.3 PG (ref 26.5–33)
MCHC RBC AUTO-ENTMCNC: 29.3 G/DL (ref 31.5–36.5)
MCV RBC AUTO: 76 FL (ref 78–100)
PLATELET # BLD AUTO: 437 10E9/L (ref 150–450)
RBC # BLD AUTO: 4.4 10E12/L (ref 4.4–5.9)
WBC # BLD AUTO: 4.9 10E9/L (ref 4–11)

## 2021-01-21 PROCEDURE — 99214 OFFICE O/P EST MOD 30 MIN: CPT | Performed by: PHYSICIAN ASSISTANT

## 2021-01-21 PROCEDURE — 36415 COLL VENOUS BLD VENIPUNCTURE: CPT | Performed by: PHYSICIAN ASSISTANT

## 2021-01-21 PROCEDURE — 82728 ASSAY OF FERRITIN: CPT | Performed by: PHYSICIAN ASSISTANT

## 2021-01-21 PROCEDURE — 85027 COMPLETE CBC AUTOMATED: CPT | Performed by: PHYSICIAN ASSISTANT

## 2021-01-21 RX ORDER — CLOPIDOGREL BISULFATE 75 MG/1
75 TABLET ORAL DAILY
Qty: 90 TABLET | Refills: 1 | Status: SHIPPED | OUTPATIENT
Start: 2021-01-21 | End: 2021-09-09

## 2021-01-21 ASSESSMENT — MIFFLIN-ST. JEOR: SCORE: 1449.87

## 2021-01-21 NOTE — PROGRESS NOTES
"  Assessment & Plan     Iron deficiency anemia, unspecified iron deficiency anemia type  Symptoms improving. We'll continue to trend. Follow-up per pill cam. May need hematology if that results negative. He'll start iron now. Reviewed best practice for supplementation.   - CBC with platelets  - Ferritin    Coronary artery disease involving native coronary artery of native heart without angina pectoris  Refilling below. Switched from [facility] in hospital. He'll need until July. Reviewed that unlikely his most recent symptoms were related to his cardiac status but given the lexiscan, should still consider the angio down the road. Can be decided per cardiology. For now, he's not have any of the symptoms he was previously  - clopidogrel (PLAVIX) 75 MG tablet; Take 1 tablet (75 mg) by mouth daily     BMI:   Estimated body mass index is 25.87 kg/m  as calculated from the following:    Height as of this encounter: 1.676 m (5' 6\").    Weight as of this encounter: 72.7 kg (160 lb 4.8 oz).       Return in about 3 months (around 4/21/2021).    MILANA Sutherland [facility] JUAN A Prado is a 66 year old who presents to clinic today for the following health issues     HPI     Here to follow up with recent hospitalization for significant anemia.    Has not started the iron supplement as he had to wait until completing his pill cam study which he completed yesterday.   He has an MRI scheduled for tomorrow (liver blebs)  He continues to feel significantly improve  Energy is much better though he has not done much  He denies any cehest pain or shortness of breath   There is no melenic stooling  Spouse is checking BP, O2 sats and pulse regularly and these have been good  Celiac testing was NEG per GI    Review of Systems   Constitutional, HEENT, cardiovascular, pulmonary, gi and gu systems are negative, except as otherwise noted.      Objective    /72 (BP Location: Right arm, Cuff " "Size: Adult Regular)   Pulse 71   Temp 97.7  F (36.5  C) (Oral)   Resp 16   Ht 1.676 m (5' 6\")   Wt 72.7 kg (160 lb 4.8 oz)   SpO2 96%   BMI 25.87 kg/m    Body mass index is 25.87 kg/m .  Physical Exam   GENERAL: healthy, alert and no distress  EYES: Eyes grossly normal to inspection; his conjunctival pallor is improving  RESP: lungs clear to auscultation - no rales, rhonchi or wheezes  CV: regular rates and rhythm and harsh systolic murmur at RUSB  SKIN: nails are still pallorous  NEURO: Normal strength and tone, mentation intact and speech normal  PSYCH: mentation appears normal, affect normal/bright          "

## 2021-01-25 ENCOUNTER — TRANSFERRED RECORDS (OUTPATIENT)
Dept: HEALTH INFORMATION MANAGEMENT | Facility: CLINIC | Age: 66
End: 2021-01-25

## 2021-01-26 ENCOUNTER — TRANSFERRED RECORDS (OUTPATIENT)
Dept: HEALTH INFORMATION MANAGEMENT | Facility: CLINIC | Age: 66
End: 2021-01-26

## 2021-02-02 DIAGNOSIS — D64.9 ANEMIA, UNSPECIFIED TYPE: ICD-10-CM

## 2021-02-03 RX ORDER — FOLIC ACID 1 MG/1
1 TABLET ORAL DAILY
Qty: 90 TABLET | Refills: 0 | Status: SHIPPED | OUTPATIENT
Start: 2021-02-03 | End: 2021-05-03

## 2021-02-03 NOTE — TELEPHONE ENCOUNTER
Prescription approved per [facility] Refill Protocol.  Folic acid    Rosemarie MANUEL RN

## 2021-02-03 NOTE — TELEPHONE ENCOUNTER
Routing refill request to provider for review/approval because:  Protocol failed- pt on Plavix    Rosemarie MANUEL RN

## 2021-02-15 ENCOUNTER — TELEPHONE (OUTPATIENT)
Dept: CARDIOLOGY | Facility: CLINIC | Age: 66
End: 2021-02-15

## 2021-02-15 NOTE — TELEPHONE ENCOUNTER
PA Initiation    Medication: Repatha Sureclick 140MG/ML auto-injectors (PENDING)  Insurance Company: [employer] - Phone [phone] Fax [phone]  Pharmacy Filling the Rx: [id number] - [place], [place] - [address]  Filling Pharmacy Phone:    Filling Pharmacy Fax:    Start Date: 2/15/2021        Thank you,    Callie Hartman [facility]  [facility] [address] [address]  Ph: [phone] Fax: [phone]  [email or web address]

## 2021-02-16 DIAGNOSIS — R09.81 NASAL CONGESTION: Primary | ICD-10-CM

## 2021-02-16 NOTE — TELEPHONE ENCOUNTER
Prior Authorization Approval    Authorization Effective Date: 1/16/2021  Authorization Expiration Date: 2/15/2022  Medication: Repatha Sureclick 140MG/ML auto-injectors (APPROVED)  Approved Dose/Quantity: 2ML  Reference #:     Insurance Company: [employer] - Phone [phone] Fax [phone]  Expected CoPay:       CoPay Card Available: No    Foundation Assistance Needed:    Which Pharmacy is filling the prescription (Not needed for infusion/clinic administered): [id number] - [place], [place] - [address] [facility]  Pharmacy Notified:    Patient Notified: No      PA renewal approved

## 2021-02-17 NOTE — TELEPHONE ENCOUNTER
Routing refill request to provider for review/approval because:  Medication is reported/historical 3/1/2018.    Amanda Tao RN

## 2021-02-18 RX ORDER — FLUTICASONE PROPIONATE 50 MCG
2 SPRAY, SUSPENSION (ML) NASAL DAILY PRN
Qty: 16 G | Refills: 3 | Status: SHIPPED | OUTPATIENT
Start: 2021-02-18 | End: 2022-07-27

## 2021-03-12 ENCOUNTER — TRANSFERRED RECORDS (OUTPATIENT)
Dept: HEALTH INFORMATION MANAGEMENT | Facility: CLINIC | Age: 66
End: 2021-03-12

## 2021-03-12 ENCOUNTER — MEDICAL CORRESPONDENCE (OUTPATIENT)
Dept: HEALTH INFORMATION MANAGEMENT | Facility: CLINIC | Age: 66
End: 2021-03-12

## 2021-03-24 DIAGNOSIS — D50.8 OTHER IRON DEFICIENCY ANEMIAS: Primary | ICD-10-CM

## 2021-03-24 DIAGNOSIS — E83.118 OTHER HEMOCHROMATOSIS: ICD-10-CM

## 2021-03-24 LAB
ERYTHROCYTE [DISTWIDTH] IN BLOOD BY AUTOMATED COUNT: 19.4 % (ref 10–15)
FERRITIN SERPL-MCNC: 4 NG/ML (ref 26–388)
HCT VFR BLD AUTO: 35.6 % (ref 40–53)
HGB BLD-MCNC: 11.2 G/DL (ref 13.3–17.7)
IRON SATN MFR SERPL: 4 % (ref 15–46)
IRON SERPL-MCNC: 18 UG/DL (ref 35–180)
MCH RBC QN AUTO: 25.1 PG (ref 26.5–33)
MCHC RBC AUTO-ENTMCNC: 31.5 G/DL (ref 31.5–36.5)
MCV RBC AUTO: 80 FL (ref 78–100)
PLATELET # BLD AUTO: 206 10E9/L (ref 150–450)
RBC # BLD AUTO: 4.46 10E12/L (ref 4.4–5.9)
TIBC SERPL-MCNC: 416 UG/DL (ref 240–430)
WBC # BLD AUTO: 4.3 10E9/L (ref 4–11)

## 2021-03-24 PROCEDURE — 81256 HFE GENE: CPT | Performed by: INTERNAL MEDICINE

## 2021-03-24 PROCEDURE — 36415 COLL VENOUS BLD VENIPUNCTURE: CPT | Performed by: INTERNAL MEDICINE

## 2021-03-24 PROCEDURE — 83540 ASSAY OF IRON: CPT | Performed by: INTERNAL MEDICINE

## 2021-03-24 PROCEDURE — G0452 MOLECULAR PATHOLOGY INTERPR: HCPCS | Performed by: PATHOLOGY

## 2021-03-24 PROCEDURE — 85027 COMPLETE CBC AUTOMATED: CPT | Performed by: INTERNAL MEDICINE

## 2021-03-24 PROCEDURE — 83550 IRON BINDING TEST: CPT | Performed by: INTERNAL MEDICINE

## 2021-03-24 PROCEDURE — 82728 ASSAY OF FERRITIN: CPT | Performed by: INTERNAL MEDICINE

## 2021-04-02 LAB — COPATH REPORT: NORMAL

## 2021-04-05 ENCOUNTER — TRANSFERRED RECORDS (OUTPATIENT)
Dept: HEALTH INFORMATION MANAGEMENT | Facility: CLINIC | Age: 66
End: 2021-04-05

## 2021-04-12 ENCOUNTER — TRANSFERRED RECORDS (OUTPATIENT)
Dept: HEALTH INFORMATION MANAGEMENT | Facility: CLINIC | Age: 66
End: 2021-04-12

## 2021-04-21 ENCOUNTER — TELEPHONE (OUTPATIENT)
Dept: CARDIOLOGY | Facility: CLINIC | Age: 66
End: 2021-04-21

## 2021-04-21 NOTE — TELEPHONE ENCOUNTER
Mr. Navarro ,   Your message was reviewed by Dr. Galicia.  Dr. Galicia's reply is: Yes he can start aspirin 81 MG daily and stop the plavix.     Please call Team with any questions or concerns or reply back that you received this message,    Thank you  Team 2 Nurses.

## 2021-04-21 NOTE — TELEPHONE ENCOUNTER
Patient called, left message that about 2+ weeks ago on his own he stopped the plavix as he felt is was making him to fatigued and was not agreeing with him.  Patient had a infusion at [facility] last week and will be having another infusion next week . For iron deficiency anemia, hemachromatosis.   Patient wonders if he should start a ASA 81 mg daily?     Patient states heart cath was 7-  1.  Diffuse coronary artery atherosclerosis in a left dominant system as described above.  2.  Patent stents with mild ISR in the LCx coronary artery.  3.  Non-hemodynamically significant stenosis of the distal LCx based on iFR of 1.0.  4.  Hemodynamically significant stenosis of the OM2 ostium/proximal vessel with iFR 0.56.  5.  Successful intervention of the OM2 lesion without complication.  6.  Heavily calcified aortic root noted on angiography.

## 2021-04-21 NOTE — TELEPHONE ENCOUNTER
Patient's wife called requesting call back. Did not leave reason.  Attempted to contact , message left to return call.

## 2021-04-22 NOTE — TELEPHONE ENCOUNTER
Left message that Dr. Galicia reply is he can stop plavix and resume ASA 81 mg daily.  Patient will call back with any questions or concerns.

## 2021-05-03 DIAGNOSIS — E78.2 MIXED HYPERLIPIDEMIA: ICD-10-CM

## 2021-05-03 DIAGNOSIS — D64.9 ANEMIA, UNSPECIFIED TYPE: ICD-10-CM

## 2021-05-03 RX ORDER — FOLIC ACID 1 MG/1
TABLET ORAL
Qty: 90 TABLET | Refills: 0 | Status: SHIPPED | OUTPATIENT
Start: 2021-05-03 | End: 2021-08-04

## 2021-05-03 NOTE — TELEPHONE ENCOUNTER
Routing refill request to provider for review/approval because:    PPI Protocol [id number]/03/2021 09:22 AM   Not on Clopidogrel (unless Pantoprazole ordered)       Garo FERRIS RN

## 2021-05-03 NOTE — TELEPHONE ENCOUNTER
Prescription for folic acid approved per [facility] Refill Protocol.  Garo FRERIS RN

## 2021-05-20 ENCOUNTER — TRANSFERRED RECORDS (OUTPATIENT)
Dept: HEALTH INFORMATION MANAGEMENT | Facility: CLINIC | Age: 66
End: 2021-05-20

## 2021-06-14 DIAGNOSIS — I25.10 CORONARY ARTERY DISEASE INVOLVING NATIVE CORONARY ARTERY OF NATIVE HEART WITHOUT ANGINA PECTORIS: ICD-10-CM

## 2021-06-14 DIAGNOSIS — N52.9 ERECTILE DYSFUNCTION, UNSPECIFIED ERECTILE DYSFUNCTION TYPE: ICD-10-CM

## 2021-06-15 DIAGNOSIS — I25.10 CORONARY ARTERY DISEASE INVOLVING NATIVE CORONARY ARTERY OF NATIVE HEART WITHOUT ANGINA PECTORIS: ICD-10-CM

## 2021-06-15 DIAGNOSIS — E78.5 HLD (HYPERLIPIDEMIA): ICD-10-CM

## 2021-06-15 RX ORDER — ATORVASTATIN CALCIUM 80 MG/1
80 TABLET, FILM COATED ORAL DAILY
Qty: 90 TABLET | Refills: 0 | Status: SHIPPED | OUTPATIENT
Start: 2021-06-15 | End: 2021-08-09

## 2021-06-15 NOTE — TELEPHONE ENCOUNTER
gabapentin (NEURONTIN) 600 MG tablet  Last Written Prescription Date:  11/30/20  Last Fill Quantity: 180,   # refills: 1  Last Office Visit: 1/12/21  Future Office visit:       Routing refill request to provider for review/approval because:  Drug not on the [facility], [facility] or [facility] refill protocol or controlled substance    Alicia Franco RN on 6/15/2021 at 4:27 PM

## 2021-06-16 RX ORDER — GABAPENTIN 600 MG/1
TABLET ORAL
Qty: 180 TABLET | Refills: 1 | Status: SHIPPED | OUTPATIENT
Start: 2021-06-16 | End: 2021-12-20

## 2021-06-16 RX ORDER — SILDENAFIL CITRATE 20 MG/1
TABLET ORAL
Qty: 90 TABLET | Refills: 0 | Status: SHIPPED | OUTPATIENT
Start: 2021-06-16 | End: 2022-11-02

## 2021-07-29 DIAGNOSIS — D64.9 ANEMIA, UNSPECIFIED TYPE: ICD-10-CM

## 2021-07-30 NOTE — TELEPHONE ENCOUNTER
Patient due for a follow-up will forward to TC's to assist with scheduling.     Alicia Franco RN on 7/30/2021 at 11:13 AM

## 2021-08-04 RX ORDER — FOLIC ACID 1 MG/1
TABLET ORAL
Qty: 30 TABLET | Refills: 0 | Status: SHIPPED | OUTPATIENT
Start: 2021-08-04 | End: 2021-08-16

## 2021-08-05 NOTE — Clinical Note
----- Message from JENNY Burgos Wire Prepped.

## 2021-08-09 ENCOUNTER — TELEPHONE (OUTPATIENT)
Dept: CARDIOLOGY | Facility: CLINIC | Age: 66
End: 2021-08-09

## 2021-08-09 DIAGNOSIS — I25.10 CORONARY ARTERY DISEASE INVOLVING NATIVE CORONARY ARTERY OF NATIVE HEART WITHOUT ANGINA PECTORIS: ICD-10-CM

## 2021-08-09 DIAGNOSIS — E78.5 HLD (HYPERLIPIDEMIA): ICD-10-CM

## 2021-08-09 RX ORDER — ATORVASTATIN CALCIUM 80 MG/1
80 TABLET, FILM COATED ORAL DAILY
Qty: 90 TABLET | Refills: 0 | Status: SHIPPED | OUTPATIENT
Start: 2021-08-09 | End: 2021-11-22

## 2021-08-09 NOTE — TELEPHONE ENCOUNTER
Wife call, left message regarding atorvastatin refill. Message left to return call.    Next Dr. Galicia visit 9-15-21.

## 2021-08-16 ENCOUNTER — TELEPHONE (OUTPATIENT)
Dept: FAMILY MEDICINE | Facility: CLINIC | Age: 66
End: 2021-08-16

## 2021-08-16 DIAGNOSIS — D64.9 ANEMIA, UNSPECIFIED TYPE: ICD-10-CM

## 2021-08-16 RX ORDER — FOLIC ACID 1 MG/1
1000 TABLET ORAL DAILY
Qty: 30 TABLET | Refills: 0 | Status: SHIPPED | OUTPATIENT
Start: 2021-08-16 | End: 2021-09-15

## 2021-08-16 NOTE — TELEPHONE ENCOUNTER
Patient calling stating that he did not get the medications that were sent in on 8/4/21. Was re-sent.     Alicia Franco RN on 8/16/2021 at 10:41 AM     no persistent infections/no recurring infections

## 2021-09-04 ENCOUNTER — HEALTH MAINTENANCE LETTER (OUTPATIENT)
Age: 66
End: 2021-09-04

## 2021-09-09 ENCOUNTER — OFFICE VISIT (OUTPATIENT)
Dept: FAMILY MEDICINE | Facility: CLINIC | Age: 66
End: 2021-09-09
Payer: COMMERCIAL

## 2021-09-09 VITALS
RESPIRATION RATE: 19 BRPM | BODY MASS INDEX: 24.07 KG/M2 | SYSTOLIC BLOOD PRESSURE: 125 MMHG | HEART RATE: 71 BPM | OXYGEN SATURATION: 98 % | TEMPERATURE: 97.7 F | DIASTOLIC BLOOD PRESSURE: 60 MMHG | WEIGHT: 149.1 LBS

## 2021-09-09 DIAGNOSIS — Z98.890 STATUS POST CORONARY ANGIOGRAM: ICD-10-CM

## 2021-09-09 DIAGNOSIS — R06.81 WITNESSED EPISODE OF APNEA: ICD-10-CM

## 2021-09-09 DIAGNOSIS — I25.10 CORONARY ARTERY DISEASE INVOLVING NATIVE CORONARY ARTERY OF NATIVE HEART WITHOUT ANGINA PECTORIS: ICD-10-CM

## 2021-09-09 DIAGNOSIS — Z23 NEED FOR PROPHYLACTIC VACCINATION AND INOCULATION AGAINST INFLUENZA: ICD-10-CM

## 2021-09-09 DIAGNOSIS — D50.9 IRON DEFICIENCY ANEMIA, UNSPECIFIED IRON DEFICIENCY ANEMIA TYPE: Primary | ICD-10-CM

## 2021-09-09 LAB
BASOPHILS # BLD AUTO: 0 10E3/UL (ref 0–0.2)
BASOPHILS NFR BLD AUTO: 1 %
EOSINOPHIL # BLD AUTO: 0.2 10E3/UL (ref 0–0.7)
EOSINOPHIL NFR BLD AUTO: 5 %
ERYTHROCYTE [DISTWIDTH] IN BLOOD BY AUTOMATED COUNT: 13.9 % (ref 10–15)
FERRITIN SERPL-MCNC: 17 NG/ML (ref 26–388)
HCT VFR BLD AUTO: 40.3 % (ref 40–53)
HGB BLD-MCNC: 13.4 G/DL (ref 13.3–17.7)
IRON SATN MFR SERPL: 19 % (ref 15–46)
IRON SERPL-MCNC: 58 UG/DL (ref 35–180)
LYMPHOCYTES # BLD AUTO: 1.5 10E3/UL (ref 0.8–5.3)
LYMPHOCYTES NFR BLD AUTO: 29 %
MCH RBC QN AUTO: 29.1 PG (ref 26.5–33)
MCHC RBC AUTO-ENTMCNC: 33.3 G/DL (ref 31.5–36.5)
MCV RBC AUTO: 88 FL (ref 78–100)
MONOCYTES # BLD AUTO: 0.6 10E3/UL (ref 0–1.3)
MONOCYTES NFR BLD AUTO: 12 %
NEUTROPHILS # BLD AUTO: 2.8 10E3/UL (ref 1.6–8.3)
NEUTROPHILS NFR BLD AUTO: 54 %
PLATELET # BLD AUTO: 204 10E3/UL (ref 150–450)
RBC # BLD AUTO: 4.6 10E6/UL (ref 4.4–5.9)
TIBC SERPL-MCNC: 305 UG/DL (ref 240–430)
WBC # BLD AUTO: 5.2 10E3/UL (ref 4–11)

## 2021-09-09 PROCEDURE — 82728 ASSAY OF FERRITIN: CPT | Performed by: PHYSICIAN ASSISTANT

## 2021-09-09 PROCEDURE — 36415 COLL VENOUS BLD VENIPUNCTURE: CPT | Performed by: PHYSICIAN ASSISTANT

## 2021-09-09 PROCEDURE — 83550 IRON BINDING TEST: CPT | Performed by: PHYSICIAN ASSISTANT

## 2021-09-09 PROCEDURE — 99214 OFFICE O/P EST MOD 30 MIN: CPT | Mod: 25 | Performed by: PHYSICIAN ASSISTANT

## 2021-09-09 PROCEDURE — 85025 COMPLETE CBC W/AUTO DIFF WBC: CPT | Performed by: PHYSICIAN ASSISTANT

## 2021-09-09 PROCEDURE — G0008 ADMIN INFLUENZA VIRUS VAC: HCPCS | Performed by: PHYSICIAN ASSISTANT

## 2021-09-09 PROCEDURE — 90662 IIV NO PRSV INCREASED AG IM: CPT | Performed by: PHYSICIAN ASSISTANT

## 2021-09-09 ASSESSMENT — ANXIETY QUESTIONNAIRES
1. FEELING NERVOUS, ANXIOUS, OR ON EDGE: NOT AT ALL
2. NOT BEING ABLE TO STOP OR CONTROL WORRYING: NOT AT ALL
3. WORRYING TOO MUCH ABOUT DIFFERENT THINGS: NOT AT ALL
6. BECOMING EASILY ANNOYED OR IRRITABLE: NOT AT ALL
5. BEING SO RESTLESS THAT IT IS HARD TO SIT STILL: NOT AT ALL
GAD7 TOTAL SCORE: 0
7. FEELING AFRAID AS IF SOMETHING AWFUL MIGHT HAPPEN: NOT AT ALL

## 2021-09-09 ASSESSMENT — PATIENT HEALTH QUESTIONNAIRE - PHQ9
SUM OF ALL RESPONSES TO PHQ QUESTIONS 1-9: 0
5. POOR APPETITE OR OVEREATING: NOT AT ALL

## 2021-09-09 ASSESSMENT — PAIN SCALES - GENERAL: PAINLEVEL: NO PAIN (0)

## 2021-09-10 ASSESSMENT — ANXIETY QUESTIONNAIRES: GAD7 TOTAL SCORE: 0

## 2021-09-13 DIAGNOSIS — D64.9 ANEMIA, UNSPECIFIED TYPE: ICD-10-CM

## 2021-09-15 ENCOUNTER — VIRTUAL VISIT (OUTPATIENT)
Dept: CARDIOLOGY | Facility: CLINIC | Age: 66
End: 2021-09-15
Payer: COMMERCIAL

## 2021-09-15 VITALS — DIASTOLIC BLOOD PRESSURE: 84 MMHG | SYSTOLIC BLOOD PRESSURE: 127 MMHG | HEART RATE: 87 BPM | OXYGEN SATURATION: 96 %

## 2021-09-15 DIAGNOSIS — I65.23 BILATERAL CAROTID ARTERY STENOSIS: ICD-10-CM

## 2021-09-15 DIAGNOSIS — I25.10 CORONARY ARTERY DISEASE INVOLVING NATIVE CORONARY ARTERY OF NATIVE HEART WITHOUT ANGINA PECTORIS: Primary | ICD-10-CM

## 2021-09-15 PROCEDURE — 99214 OFFICE O/P EST MOD 30 MIN: CPT | Mod: GT | Performed by: INTERNAL MEDICINE

## 2021-09-15 RX ORDER — FOLIC ACID 1 MG/1
TABLET ORAL
Qty: 90 TABLET | Refills: 1 | Status: SHIPPED | OUTPATIENT
Start: 2021-09-15 | End: 2022-03-23

## 2021-09-15 NOTE — PROGRESS NOTES
Johan is a 66 year old who is being evaluated via a billable video visit.      [phone]  Rabia ConleyLIBAN bhatt    How would you like to obtain your AVS? Mail a copy  If the video visit is dropped, the invitation should be resent by: Text to cell phone: [phone]  Will anyone else be joining your video visit? No      Video Start Time: 10:03 AM    Video-Visit Details    Type of service:  Video Visit    Video End Time:10:17 AM    Originating Location (pt. Location): Home    Distant Location (provider location):  [facility]     Platform used for Video Visit: [employer]        HPI and Plan:     I had the pleasure of seeing Mr. Navarro in followup at the [facility] today.  He is a very pleasant 66-year-old gentleman who I last saw in 08/2020.  He has a history of coronary artery disease and is status post stenting of the distal and ostial circumflex as well as the first obtuse marginal in 1998.  He has severe, probably familial, dyslipidemia with a markedly elevated LDL, although we have been able to control this well with Repatha and atorvastatin.  He also has a history of moderate aortic stenosis.      Due to dyspnea on exertion and syncopal episode, he underwent a Lexiscan nuclear stress test on 07/15/2020. This demonstrated a medium-sized area of moderate ischemia involving the [id number] anterior and anterolateral segments of the left ventricle.  Given these findings, I recommended coronary angiography.  He also underwent an echocardiogram which demonstrated mild progression in his aortic stenosis which was now in the moderate range.      He underwent coronary angiography on 07/21/2020 and was found to have significant stenosis of the ostium of the second obtuse marginal.  He underwent successful intervention with a drug-eluting stent at the time.  His other coronary arteries demonstrated mild-to-moderate nonobstructive disease in a left dominant system.      Unfortunately in January of this year he was experiencing significant fatigue and dyspnea on exertion and was found to have significant anemia in the context of dark stools for which a comprehensive gastrointestinal work-up was performed but was ultimately negative.  His clopidogrel was discontinued in April and fortunately his GI bleeding does not appear to have recurred.  His hemoglobin has normalized, although his ferritin is still low and he is taking regular oral iron supplementation.     From a cardiovascular standpoint he feels well.  He denies any chest discomfort or dyspnea on exertion. He denies any palpitations, syncope or presyncope.  Denies any PND, orthopnea or lower extremity edema.  He has been quite active at home without any limitations.      PHYSICAL EXAMINATION:  Dictated below.      LABORATORY STUDIES:  Lipids on 01/06/2021 demonstrated total cholesterol 113, HDL 55, LDL 46 and triglycerides of 61.      IMPRESSION:   1.  Coronary artery disease status post recent PCI to the second obtuse marginal in 07/2020 and prior circumflex/obtuse marginal revascularization in 1998.   2.  Significant dyslipidemia characterized by severe LDL elevation.  This has normalized with the use of Repatha and atorvastatin.   3.  History of moderate aortic stenosis.   4.  Moderate left internal carotid artery stenosis on carotid ultrasound in 2019.    PLAN  -The patient is doing well overall from a cardiovascular standpoint without evidence of angina or congestive heart failure.  Fortunately his GI bleeding issues appear to have resolved.  At this point in time I would recommend continuation of his current medical therapy as prescribed.  I will order a follow-up echocardiogram for reassessment of his aortic stenosis as well as a carotid ultrasound to evaluate for carotid artery disease progression.  -Assuming his clinical status remains stable, I will plan on seeing him in approximately 1  year.                  CURRENT MEDICATIONS:  Current Outpatient Medications   Medication Sig Dispense Refill     albuterol (PROAIR HFA/PROVENTIL HFA/VENTOLIN HFA) 108 (90 Base) MCG/ACT inhaler Inhale 4-6 puffs into the lungs every 2 hours as needed for shortness of breath / dyspnea (Patient not taking: Reported on 9/9/2021) 1 Inhaler 1     aspirin (ASA) 81 MG chewable tablet Take 81 mg by mouth every other day Supposed to be taking daily.       atorvastatin (LIPITOR) 80 MG tablet Take 1 tablet (80 mg) by mouth daily 90 tablet 0     diclofenac (VOLTAREN) 1 % topical gel Apply 2 g topically 4 times daily as needed for moderate pain (apply to hands and fingers)       evolocumab (REPATHA) 140 MG/ML prefilled autoinjector Inject 1 mL (140 mg) Subcutaneous every 14 days 6 mL 3     fluticasone (FLONASE) 50 MCG/ACT nasal spray Spray 2 sprays into both nostrils daily as needed for rhinitis or allergies 16 g 3     folic acid (FOLVITE) 1 MG tablet Take 1 tablet (1,000 mcg) by mouth daily 30 tablet 0     gabapentin (NEURONTIN) 600 MG tablet TAKE ONE TABLET BY MOUTH TWICE DAILY  180 tablet 1     sildenafil (REVATIO) 20 MG tablet TAKE 2 TO 5 TABLETS BY MOUTH AS NEEDED PRIOR TO SEXUAL INTERCOURSE 90 tablet 0       ALLERGIES     Allergies   Allergen Reactions     Crestor [Rosuvastatin] GI Disturbance     Dust Mites      Other [No Clinical Screening - See Comments]      Goose feathers     Pollen Extract        PAST MEDICAL HISTORY:  Past Medical History:   Diagnosis Date     Aortic stenosis      Arthritis     hands     CAD (coronary artery disease)     cardiac cath 1998: stents x 2 to circumflex     Carotid stenosis     left     Family history of early CAD      Hyperlipidaemia LDL goal < 70     familial hyperlipidemia with marked hypercholesterolemia before treatment; has been on some form of cholesterol-lowering medication since the 1970s     Prostate cancer (H) 2010     Syncope        PAST SURGICAL HISTORY:  Past Surgical History:    Procedure Laterality Date     CARDIAC SURGERY      coronary stents x2 placed ; angioplasty     CV HEART CATHETERIZATION WITH POSSIBLE INTERVENTION N/A 2020    Procedure: Heart Catheterization with Possible Intervention;  Surgeon: Alber Bentley MD;  Location:  HEART CARDIAC CATH LAB     DAVINCI PROSTATECTOMY           ESOPHAGOSCOPY, GASTROSCOPY, DUODENOSCOPY (EGD), COMBINED N/A 2021    Procedure: ESOPHAGOGASTRODUODENOSCOPY (EGD);  Surgeon: Rosemarie Laws MD;  Location:  GI     EYE SURGERY Bilateral 2017    eyelids     GENITOURINARY SURGERY       ORTHOPEDIC SURGERY Right     achilles tendon; post football injury     ORTHOPEDIC SURGERY Right     hand; near thumb. has wires in there. cysts     REPAIR TENDON BICEPS DISTAL UPPER EXTREMITY Right 2018    Procedure: REPAIR TENDON BICEPS DISTAL UPPER EXTREMITY;  Right open biceps tendon repair  ;  Surgeon: Alber Zabala MD;  Location: [facility] OR     SURGICAL HISTORY OF -       Right hand Xanthoma removal     SURGICAL HISTORY OF -       Stress Echo (-)       FAMILY HISTORY:  Family History   Problem Relation Age of Onset     Lipids Mother      C.A.D. Mother          at age 49 of MI     Dementia Father      C.A.D. Sister         MI at age 48     Cancer - colorectal No family hx of      Prostate Cancer No family hx of      Colon Cancer No family hx of        SOCIAL HISTORY:  Social History     Socioeconomic History     Marital status:      Spouse name: Not on file     Number of children: Not on file     Years of education: Not on file     Highest education level: Not on file   Occupational History     Not on file   Tobacco Use     Smoking status: Former Smoker     Packs/day: 0.25     Years: 20.00     Pack years: 5.00     Types: Cigars     Quit date:      Years since quittin.7     Smokeless tobacco: Former User     Types: Chew, Snuff     Quit date:      Tobacco comment: 12/10/18: occ chew, not every day    Substance and Sexual Activity     Alcohol use: Not Currently     Comment: very rare, none for 8 years     Drug use: Yes     Types: Marijuana     Comment: daily      Sexual activity: Yes     Partners: Female   Other Topics Concern      Service Not Asked     Blood Transfusions Not Asked     Caffeine Concern Yes     Comment: coffee and soda     Occupational Exposure Not Asked     Hobby Hazards Not Asked     Sleep Concern Not Asked     Stress Concern Not Asked     Weight Concern Not Asked     Special Diet No     Back Care Not Asked     Exercise Yes     Comment: regular      Bike Helmet Not Asked     Seat Belt Yes     Self-Exams Not Asked     Parent/sibling w/ CABG, MI or angioplasty before 65F 55M? Yes     Comment: 49   Social History Narrative     Not on file     Social Determinants of Health     Financial Resource Strain:      Difficulty of Paying Living Expenses:    Food Insecurity:      Worried About Running Out of Food in the Last Year:      Ran Out of Food in the Last Year:    Transportation Needs:      Lack of Transportation (Medical):      Lack of Transportation (Non-Medical):    Physical Activity:      Days of Exercise per Week:      Minutes of Exercise per Session:    Stress:      Feeling of Stress :    Social Connections:      Frequency of Communication with Friends and Family:      Frequency of Social Gatherings with Friends and Family:      Attends [employer] Services:      Active Member of Clubs or Organizations:      Attends Club or Organization Meetings:      Marital Status:    Intimate Partner Violence:      Fear of Current or Ex-Partner:      Emotionally Abused:      Physically Abused:      Sexually Abused:        Review of Systems:  Skin:          Eyes:         ENT:         Respiratory:          Cardiovascular:         Gastroenterology:        Genitourinary:         Musculoskeletal:         Neurologic:         Psychiatric:         Heme/Lymph/Imm:         Endocrine:           Physical  Exam:  Vitals: There were no vitals taken for this visit.    Constitutional:  cooperative        Skin:  warm and dry to the touch          Head:           Eyes:           Lymph:      ENT:  no pallor or cyanosis        Neck:  JVP normal        Respiratory:  clear to auscultation         Cardiac: regular rhythm                pulses full and equal, no bruits auscultated                                        GI:  abdomen soft        Extremities and Muscular Skeletal:  no edema              Neurological:  affect appropriate        Psych:  affect appropriate, oriented to time, person and place        CC  No referring provider defined for this encounter.

## 2021-09-15 NOTE — LETTER
9/15/2021    [place] Yang PA-C  [id number]  [facility] [address]    RE: Johan Navarro       Dear Colleague,    I had the pleasure of seeing Johan Navarro in the [facility].    Johan is a 66 year old who is being evaluated via a billable video visit.      [phone]  LIBAN Michelle    How would you like to obtain your AVS? Mail a copy  If the video visit is dropped, the invitation should be resent by: Text to cell phone: [phone]  Will anyone else be joining your video visit? No      Video Start Time: 10:03 AM    Video-Visit Details    Type of service:  Video Visit    Video End Time:10:17 AM    Originating Location (pt. Location): Home    Distant Location (provider location):  [facility] [place]     Platform used for Video Visit: [employer]        HPI and Plan:     I had the pleasure of seeing Mr. Navarro in followup at the [facility] today.  He is a very pleasant 66-year-old gentleman who I last saw in 08/2020.  He has a history of coronary artery disease and is status post stenting of the distal and ostial circumflex as well as the first obtuse marginal in 1998.  He has severe, probably familial, dyslipidemia with a markedly elevated LDL, although we have been able to control this well with Repatha and atorvastatin.  He also has a history of moderate aortic stenosis.      Due to dyspnea on exertion and syncopal episode, he underwent a Lexiscan nuclear stress test on 07/15/2020. This demonstrated a medium-sized area of moderate ischemia involving the [id number] anterior and anterolateral segments of the left ventricle.  Given these findings, I recommended coronary angiography.  He also underwent an echocardiogram which demonstrated mild progression in his aortic stenosis which was now in the moderate range.      He underwent coronary angiography on 07/21/2020 and was found to have  significant stenosis of the ostium of the second obtuse marginal.  He underwent successful intervention with a drug-eluting stent at the time.  His other coronary arteries demonstrated mild-to-moderate nonobstructive disease in a left dominant system.     Unfortunately in January of this year he was experiencing significant fatigue and dyspnea on exertion and was found to have significant anemia in the context of dark stools for which a comprehensive gastrointestinal work-up was performed but was ultimately negative.  His clopidogrel was discontinued in April and fortunately his GI bleeding does not appear to have recurred.  His hemoglobin has normalized, although his ferritin is still low and he is taking regular oral iron supplementation.     From a cardiovascular standpoint he feels well.  He denies any chest discomfort or dyspnea on exertion. He denies any palpitations, syncope or presyncope.  Denies any PND, orthopnea or lower extremity edema.  He has been quite active at home without any limitations.      PHYSICAL EXAMINATION:  Dictated below.      LABORATORY STUDIES:  Lipids on 01/06/2021 demonstrated total cholesterol 113, HDL 55, LDL 46 and triglycerides of 61.      IMPRESSION:   1.  Coronary artery disease status post recent PCI to the second obtuse marginal in 07/2020 and prior circumflex/obtuse marginal revascularization in 1998.   2.  Significant dyslipidemia characterized by severe LDL elevation.  This has normalized with the use of Repatha and atorvastatin.   3.  History of moderate aortic stenosis.   4.  Moderate left internal carotid artery stenosis on carotid ultrasound in 2019.    PLAN  -The patient is doing well overall from a cardiovascular standpoint without evidence of angina or congestive heart failure.  Fortunately his GI bleeding issues appear to have resolved.  At this point in time I would recommend continuation of his current medical therapy as prescribed.  I will order a follow-up  echocardiogram for reassessment of his aortic stenosis as well as a carotid ultrasound to evaluate for carotid artery disease progression.  -Assuming his clinical status remains stable, I will plan on seeing him in approximately 1 year.                  CURRENT MEDICATIONS:  Current Outpatient Medications   Medication Sig Dispense Refill     albuterol (PROAIR HFA/PROVENTIL HFA/VENTOLIN HFA) 108 (90 Base) MCG/ACT inhaler Inhale 4-6 puffs into the lungs every 2 hours as needed for shortness of breath / dyspnea (Patient not taking: Reported on 9/9/2021) 1 Inhaler 1     aspirin (ASA) 81 MG chewable tablet Take 81 mg by mouth every other day Supposed to be taking daily.       atorvastatin (LIPITOR) 80 MG tablet Take 1 tablet (80 mg) by mouth daily 90 tablet 0     diclofenac (VOLTAREN) 1 % topical gel Apply 2 g topically 4 times daily as needed for moderate pain (apply to hands and fingers)       evolocumab (REPATHA) 140 MG/ML prefilled autoinjector Inject 1 mL (140 mg) Subcutaneous every 14 days 6 mL 3     fluticasone (FLONASE) 50 MCG/ACT nasal spray Spray 2 sprays into both nostrils daily as needed for rhinitis or allergies 16 g 3     folic acid (FOLVITE) 1 MG tablet Take 1 tablet (1,000 mcg) by mouth daily 30 tablet 0     gabapentin (NEURONTIN) 600 MG tablet TAKE ONE TABLET BY MOUTH TWICE DAILY  180 tablet 1     sildenafil (REVATIO) 20 MG tablet TAKE 2 TO 5 TABLETS BY MOUTH AS NEEDED PRIOR TO SEXUAL INTERCOURSE 90 tablet 0       ALLERGIES     Allergies   Allergen Reactions     Crestor [Rosuvastatin] GI Disturbance     Dust Mites      Other [No Clinical Screening - See Comments]      Goose feathers     Pollen Extract        PAST MEDICAL HISTORY:  Past Medical History:   Diagnosis Date     Aortic stenosis      Arthritis     hands     CAD (coronary artery disease)     cardiac cath 1998: stents x 2 to circumflex     Carotid stenosis     left     Family history of early CAD      Hyperlipidaemia LDL goal < 70     familial  hyperlipidemia with marked hypercholesterolemia before treatment; has been on some form of cholesterol-lowering medication since the 1970s     Prostate cancer (H) 2010     Syncope        PAST SURGICAL HISTORY:  Past Surgical History:   Procedure Laterality Date     CARDIAC SURGERY      coronary stents x2 placed ; angioplasty     CV HEART CATHETERIZATION WITH POSSIBLE INTERVENTION N/A 2020    Procedure: Heart Catheterization with Possible Intervention;  Surgeon: Alber Bentley MD;  Location:  HEART CARDIAC CATH LAB     DAVINCI PROSTATECTOMY           ESOPHAGOSCOPY, GASTROSCOPY, DUODENOSCOPY (EGD), COMBINED N/A 2021    Procedure: ESOPHAGOGASTRODUODENOSCOPY (EGD);  Surgeon: Rosemarie Laws MD;  Location:  GI     EYE SURGERY Bilateral 2017    eyelids     GENITOURINARY SURGERY       ORTHOPEDIC SURGERY Right     achilles tendon; post football injury     ORTHOPEDIC SURGERY Right     hand; near thumb. has wires in there. cysts     REPAIR TENDON BICEPS DISTAL UPPER EXTREMITY Right 2018    Procedure: REPAIR TENDON BICEPS DISTAL UPPER EXTREMITY;  Right open biceps tendon repair  ;  Surgeon: Alber Zabala MD;  Location: [facility] OR     SURGICAL HISTORY OF -       Right hand Xanthoma removal     SURGICAL HISTORY OF -       Stress Echo (-)       FAMILY HISTORY:  Family History   Problem Relation Age of Onset     Lipids Mother      C.A.D. Mother          at age 49 of MI     Dementia Father      C.A.D. Sister         MI at age 48     Cancer - colorectal No family hx of      Prostate Cancer No family hx of      Colon Cancer No family hx of        SOCIAL HISTORY:  Social History     Socioeconomic History     Marital status:      Spouse name: Not on file     Number of children: Not on file     Years of education: Not on file     Highest education level: Not on file   Occupational History     Not on file   Tobacco Use     Smoking status: Former Smoker     Packs/day: 0.25      Years: 20.00     Pack years: 5.00     Types: Cigars     Quit date:      Years since quittin.7     Smokeless tobacco: Former User     Types: Chew, Snuff     Quit date:      Tobacco comment: 12/10/18: occ chew, not every day   Substance and Sexual Activity     Alcohol use: Not Currently     Comment: very rare, none for 8 years     Drug use: Yes     Types: Marijuana     Comment: daily      Sexual activity: Yes     Partners: Female   Other Topics Concern      Service Not Asked     Blood Transfusions Not Asked     Caffeine Concern Yes     Comment: coffee and soda     Occupational Exposure Not Asked     Hobby Hazards Not Asked     Sleep Concern Not Asked     Stress Concern Not Asked     Weight Concern Not Asked     Special Diet No     Back Care Not Asked     Exercise Yes     Comment: regular      Bike Helmet Not Asked     Seat Belt Yes     Self-Exams Not Asked     Parent/sibling w/ CABG, MI or angioplasty before 65F 55M? Yes     Comment: 49   Social History Narrative     Not on file     Social Determinants of Health     Financial Resource Strain:      Difficulty of Paying Living Expenses:    Food Insecurity:      Worried About Running Out of Food in the Last Year:      Ran Out of Food in the Last Year:    Transportation Needs:      Lack of Transportation (Medical):      Lack of Transportation (Non-Medical):    Physical Activity:      Days of Exercise per Week:      Minutes of Exercise per Session:    Stress:      Feeling of Stress :    Social Connections:      Frequency of Communication with Friends and Family:      Frequency of Social Gatherings with Friends and Family:      Attends Anabaptism Services:      Active Member of Clubs or Organizations:      Attends Club or Organization Meetings:      Marital Status:    Intimate Partner Violence:      Fear of Current or Ex-Partner:      Emotionally Abused:      Physically Abused:      Sexually Abused:        Review of Systems:  Skin:          Eyes:          ENT:         Respiratory:          Cardiovascular:         Gastroenterology:        Genitourinary:         Musculoskeletal:         Neurologic:         Psychiatric:         Heme/Lymph/Imm:         Endocrine:           Physical Exam:  Vitals: There were no vitals taken for this visit.    Constitutional:  cooperative        Skin:  warm and dry to the touch          Head:           Eyes:           Lymph:      ENT:  no pallor or cyanosis        Neck:  JVP normal        Respiratory:  clear to auscultation         Cardiac: regular rhythm                pulses full and equal, no bruits auscultated                                        GI:  abdomen soft        Extremities and Muscular Skeletal:  no edema              Neurological:  affect appropriate        Psych:  affect appropriate, oriented to time, person and place        CC  No referring provider defined for this encounter.                  Thank you for allowing me to participate in the care of your patient.      Sincerely,     Sander Galicia MD     [facility]  cc:   No referring provider defined for this encounter.

## 2021-09-15 NOTE — TELEPHONE ENCOUNTER
Prescription approved per [facility] Refill Protocol.  Folic acid    Rosemarie MANUEL RN

## 2021-10-08 ENCOUNTER — APPOINTMENT (OUTPATIENT)
Dept: GENERAL RADIOLOGY | Facility: CLINIC | Age: 66
DRG: 247 | End: 2021-10-08
Attending: EMERGENCY MEDICINE
Payer: COMMERCIAL

## 2021-10-08 ENCOUNTER — HOSPITAL ENCOUNTER (INPATIENT)
Facility: CLINIC | Age: 66
LOS: 4 days | Discharge: HOME | DRG: 247 | End: 2021-10-12
Attending: EMERGENCY MEDICINE | Admitting: HOSPITALIST
Payer: COMMERCIAL

## 2021-10-08 DIAGNOSIS — I21.4 NSTEMI (NON-ST ELEVATED MYOCARDIAL INFARCTION) (H): ICD-10-CM

## 2021-10-08 DIAGNOSIS — I65.29 STENOSIS OF CAROTID ARTERY, UNSPECIFIED LATERALITY: ICD-10-CM

## 2021-10-08 DIAGNOSIS — I25.10 CORONARY ARTERY DISEASE INVOLVING NATIVE CORONARY ARTERY OF NATIVE HEART WITHOUT ANGINA PECTORIS: Primary | ICD-10-CM

## 2021-10-08 DIAGNOSIS — Z95.5 PRESENCE OF DRUG-ELUTING STENT IN LEFT CIRCUMFLEX CORONARY ARTERY: ICD-10-CM

## 2021-10-08 DIAGNOSIS — I20.0 UNSTABLE ANGINA (H): ICD-10-CM

## 2021-10-08 DIAGNOSIS — I35.0 NONRHEUMATIC AORTIC VALVE STENOSIS: ICD-10-CM

## 2021-10-08 LAB
ANION GAP SERPL CALCULATED.3IONS-SCNC: 17 MMOL/L (ref 3–14)
ATRIAL RATE - MUSE: 93 BPM
BASOPHILS # BLD AUTO: 0.1 10E3/UL (ref 0–0.2)
BASOPHILS NFR BLD AUTO: 1 %
BUN SERPL-MCNC: 22 MG/DL (ref 7–30)
CALCIUM SERPL-MCNC: 8.9 MG/DL (ref 8.5–10.1)
CHLORIDE BLD-SCNC: 108 MMOL/L (ref 94–109)
CO2 SERPL-SCNC: 17 MMOL/L (ref 20–32)
CREAT SERPL-MCNC: 1.32 MG/DL (ref 0.66–1.25)
DIASTOLIC BLOOD PRESSURE - MUSE: NORMAL MMHG
EOSINOPHIL # BLD AUTO: 0.1 10E3/UL (ref 0–0.7)
EOSINOPHIL NFR BLD AUTO: 0 %
ERYTHROCYTE [DISTWIDTH] IN BLOOD BY AUTOMATED COUNT: 14.1 % (ref 10–15)
GFR SERPL CREATININE-BSD FRML MDRD: 56 ML/MIN/1.73M2
GLUCOSE BLD-MCNC: 97 MG/DL (ref 70–99)
HCT VFR BLD AUTO: 46.9 % (ref 40–53)
HGB BLD-MCNC: 15.2 G/DL (ref 13.3–17.7)
HOLD SPECIMEN: NORMAL
IMM GRANULOCYTES # BLD: 0.1 10E3/UL
IMM GRANULOCYTES NFR BLD: 0 %
INTERPRETATION ECG - MUSE: NORMAL
LYMPHOCYTES # BLD AUTO: 1 10E3/UL (ref 0.8–5.3)
LYMPHOCYTES NFR BLD AUTO: 8 %
MCH RBC QN AUTO: 29.3 PG (ref 26.5–33)
MCHC RBC AUTO-ENTMCNC: 32.4 G/DL (ref 31.5–36.5)
MCV RBC AUTO: 90 FL (ref 78–100)
MONOCYTES # BLD AUTO: 0.4 10E3/UL (ref 0–1.3)
MONOCYTES NFR BLD AUTO: 3 %
NEUTROPHILS # BLD AUTO: 11.1 10E3/UL (ref 1.6–8.3)
NEUTROPHILS NFR BLD AUTO: 88 %
NRBC # BLD AUTO: 0 10E3/UL
NRBC BLD AUTO-RTO: 0 /100
P AXIS - MUSE: 81 DEGREES
PLATELET # BLD AUTO: 315 10E3/UL (ref 150–450)
POTASSIUM BLD-SCNC: 3.9 MMOL/L (ref 3.4–5.3)
PR INTERVAL - MUSE: 140 MS
QRS DURATION - MUSE: 80 MS
QT - MUSE: 386 MS
QTC - MUSE: 479 MS
R AXIS - MUSE: 54 DEGREES
RBC # BLD AUTO: 5.19 10E6/UL (ref 4.4–5.9)
SARS-COV-2 RNA RESP QL NAA+PROBE: NEGATIVE
SODIUM SERPL-SCNC: 142 MMOL/L (ref 133–144)
SYSTOLIC BLOOD PRESSURE - MUSE: NORMAL MMHG
T AXIS - MUSE: 70 DEGREES
TROPONIN I SERPL-MCNC: <0.015 UG/L (ref 0–0.04)
VENTRICULAR RATE- MUSE: 93 BPM
WBC # BLD AUTO: 12.7 10E3/UL (ref 4–11)

## 2021-10-08 PROCEDURE — 87635 SARS-COV-2 COVID-19 AMP PRB: CPT | Performed by: EMERGENCY MEDICINE

## 2021-10-08 PROCEDURE — 85025 COMPLETE CBC W/AUTO DIFF WBC: CPT | Performed by: EMERGENCY MEDICINE

## 2021-10-08 PROCEDURE — 96376 TX/PRO/DX INJ SAME DRUG ADON: CPT

## 2021-10-08 PROCEDURE — 99207 PR CDG-CHARGE REQUIRED MANUAL ENTRY: CPT | Performed by: HOSPITALIST

## 2021-10-08 PROCEDURE — 84484 ASSAY OF TROPONIN QUANT: CPT | Performed by: EMERGENCY MEDICINE

## 2021-10-08 PROCEDURE — 96366 THER/PROPH/DIAG IV INF ADDON: CPT

## 2021-10-08 PROCEDURE — 71046 X-RAY EXAM CHEST 2 VIEWS: CPT

## 2021-10-08 PROCEDURE — 250N000011 HC RX IP 250 OP 636: Performed by: EMERGENCY MEDICINE

## 2021-10-08 PROCEDURE — 96365 THER/PROPH/DIAG IV INF INIT: CPT

## 2021-10-08 PROCEDURE — 99223 1ST HOSP IP/OBS HIGH 75: CPT | Mod: AI | Performed by: HOSPITALIST

## 2021-10-08 PROCEDURE — 258N000003 HC RX IP 258 OP 636: Performed by: EMERGENCY MEDICINE

## 2021-10-08 PROCEDURE — C9803 HOPD COVID-19 SPEC COLLECT: HCPCS

## 2021-10-08 PROCEDURE — 210N000002 HC R&B HEART CARE

## 2021-10-08 PROCEDURE — 250N000013 HC RX MED GY IP 250 OP 250 PS 637: Performed by: EMERGENCY MEDICINE

## 2021-10-08 PROCEDURE — 99285 EMERGENCY DEPT VISIT HI MDM: CPT | Mod: 25

## 2021-10-08 PROCEDURE — 36415 COLL VENOUS BLD VENIPUNCTURE: CPT | Performed by: EMERGENCY MEDICINE

## 2021-10-08 PROCEDURE — 93005 ELECTROCARDIOGRAM TRACING: CPT

## 2021-10-08 PROCEDURE — 84145 PROCALCITONIN (PCT): CPT | Performed by: HOSPITALIST

## 2021-10-08 PROCEDURE — 99207 PR MOONLIGHTING INDICATOR: CPT | Performed by: HOSPITALIST

## 2021-10-08 PROCEDURE — 80048 BASIC METABOLIC PNL TOTAL CA: CPT | Performed by: EMERGENCY MEDICINE

## 2021-10-08 RX ORDER — HEPARIN SODIUM 10000 [USP'U]/100ML
0-5000 INJECTION, SOLUTION INTRAVENOUS CONTINUOUS
Status: DISCONTINUED | OUTPATIENT
Start: 2021-10-08 | End: 2021-10-11

## 2021-10-08 RX ORDER — NITROGLYCERIN 0.4 MG/1
0.4 TABLET SUBLINGUAL EVERY 5 MIN PRN
Status: DISCONTINUED | OUTPATIENT
Start: 2021-10-08 | End: 2021-10-11

## 2021-10-08 RX ORDER — NITROGLYCERIN 20 MG/100ML
10-200 INJECTION INTRAVENOUS CONTINUOUS
Status: DISCONTINUED | OUTPATIENT
Start: 2021-10-08 | End: 2021-10-11

## 2021-10-08 RX ADMIN — SODIUM CHLORIDE 1000 ML: 9 INJECTION, SOLUTION INTRAVENOUS at 23:16

## 2021-10-08 RX ADMIN — NITROGLYCERIN 10 MCG/MIN: 20 INJECTION INTRAVENOUS at 21:32

## 2021-10-08 RX ADMIN — NITROGLYCERIN 0.4 MG: 0.4 TABLET SUBLINGUAL at 21:09

## 2021-10-08 RX ADMIN — HEPARIN SODIUM 850 UNITS/HR: 10000 INJECTION, SOLUTION INTRAVENOUS at 23:16

## 2021-10-08 ASSESSMENT — ENCOUNTER SYMPTOMS
NAUSEA: 1
DIAPHORESIS: 1
SHORTNESS OF BREATH: 1

## 2021-10-08 ASSESSMENT — MIFFLIN-ST. JEOR: SCORE: 1448.51

## 2021-10-08 NOTE — Clinical Note
The first balloon was inserted into the circumflex.Max pressure = 18 ishmael. Total duration = 20 seconds.

## 2021-10-08 NOTE — Clinical Note
The first balloon was inserted into the circumflex.Max pressure = 6 ishmael. Total duration = 15 seconds.     Max pressure = 12 ishmael. Total duration = 15 seconds.    Balloon reinflated a second time: Max pressure = 12 ishmael. Total duration = 15 seconds.  Balloon reinflated a third time: Max pressure = 12 ishmael. Total duration = 10 seconds.  Balloon reinflated a fourth time: Max pressure = 6 ishmael. Total duration = 5 seconds.

## 2021-10-08 NOTE — Clinical Note
Prepped: right groin and right radial. Prepped with: ChloraPrep. The patient was draped. .Pre-procedure site marking:N/A

## 2021-10-08 NOTE — Clinical Note
Stent deployed in the circumflex. Max pressure = 16 ishmael. Total duration = 15 seconds. Balloon reinflated a second time: Max pressure = 16 ishmael. Total duration = 5 seconds.

## 2021-10-08 NOTE — Clinical Note
Max pressure = 16 ishmael. Total duration = 10 seconds.     Max pressure = 16 ishmael. Total duration = 10 seconds.    Balloon reinflated a second time: Max pressure = 16 ishmael. Total duration = 10 seconds.  Balloon reinflated a third time: Max pressure = 16 ishmael. Total duration = 15 seconds.

## 2021-10-09 LAB
ALBUMIN SERPL-MCNC: 3.6 G/DL (ref 3.4–5)
ALP SERPL-CCNC: 75 U/L (ref 40–150)
ALT SERPL W P-5'-P-CCNC: 36 U/L (ref 0–70)
ANION GAP SERPL CALCULATED.3IONS-SCNC: 8 MMOL/L (ref 3–14)
AST SERPL W P-5'-P-CCNC: 52 U/L (ref 0–45)
BILIRUB SERPL-MCNC: 0.5 MG/DL (ref 0.2–1.3)
BUN SERPL-MCNC: 27 MG/DL (ref 7–30)
CALCIUM SERPL-MCNC: 8.2 MG/DL (ref 8.5–10.1)
CHLORIDE BLD-SCNC: 105 MMOL/L (ref 94–109)
CO2 SERPL-SCNC: 24 MMOL/L (ref 20–32)
CREAT SERPL-MCNC: 1.21 MG/DL (ref 0.66–1.25)
ERYTHROCYTE [DISTWIDTH] IN BLOOD BY AUTOMATED COUNT: 14 % (ref 10–15)
GFR SERPL CREATININE-BSD FRML MDRD: 62 ML/MIN/1.73M2
GLUCOSE BLD-MCNC: 79 MG/DL (ref 70–99)
HCT VFR BLD AUTO: 38 % (ref 40–53)
HGB BLD-MCNC: 12.6 G/DL (ref 13.3–17.7)
HGB BLD-MCNC: 12.8 G/DL (ref 13.3–17.7)
MCH RBC QN AUTO: 29.2 PG (ref 26.5–33)
MCHC RBC AUTO-ENTMCNC: 33.2 G/DL (ref 31.5–36.5)
MCV RBC AUTO: 88 FL (ref 78–100)
PLATELET # BLD AUTO: 268 10E3/UL (ref 150–450)
POTASSIUM BLD-SCNC: 4 MMOL/L (ref 3.4–5.3)
PROCALCITONIN SERPL-MCNC: <0.05 NG/ML
PROT SERPL-MCNC: 6.8 G/DL (ref 6.8–8.8)
RBC # BLD AUTO: 4.32 10E6/UL (ref 4.4–5.9)
SODIUM SERPL-SCNC: 137 MMOL/L (ref 133–144)
TROPONIN I SERPL-MCNC: 2.93 UG/L (ref 0–0.04)
TROPONIN I SERPL-MCNC: 3.79 UG/L (ref 0–0.04)
TROPONIN I SERPL-MCNC: 3.92 UG/L (ref 0–0.04)
UFH PPP CHRO-ACNC: 0.23 IU/ML
UFH PPP CHRO-ACNC: 0.27 IU/ML
UFH PPP CHRO-ACNC: 0.53 IU/ML
WBC # BLD AUTO: 8 10E3/UL (ref 4–11)

## 2021-10-09 PROCEDURE — 36415 COLL VENOUS BLD VENIPUNCTURE: CPT | Performed by: INTERNAL MEDICINE

## 2021-10-09 PROCEDURE — 250N000013 HC RX MED GY IP 250 OP 250 PS 637: Performed by: INTERNAL MEDICINE

## 2021-10-09 PROCEDURE — 210N000002 HC R&B HEART CARE

## 2021-10-09 PROCEDURE — 36415 COLL VENOUS BLD VENIPUNCTURE: CPT | Performed by: HOSPITALIST

## 2021-10-09 PROCEDURE — 99233 SBSQ HOSP IP/OBS HIGH 50: CPT | Performed by: INTERNAL MEDICINE

## 2021-10-09 PROCEDURE — 85520 HEPARIN ASSAY: CPT | Performed by: HOSPITALIST

## 2021-10-09 PROCEDURE — 84484 ASSAY OF TROPONIN QUANT: CPT | Performed by: HOSPITALIST

## 2021-10-09 PROCEDURE — 99222 1ST HOSP IP/OBS MODERATE 55: CPT | Performed by: INTERNAL MEDICINE

## 2021-10-09 PROCEDURE — 250N000011 HC RX IP 250 OP 636: Performed by: HOSPITALIST

## 2021-10-09 PROCEDURE — 84484 ASSAY OF TROPONIN QUANT: CPT | Performed by: INTERNAL MEDICINE

## 2021-10-09 PROCEDURE — 80053 COMPREHEN METABOLIC PANEL: CPT | Performed by: HOSPITALIST

## 2021-10-09 PROCEDURE — 85520 HEPARIN ASSAY: CPT | Performed by: INTERNAL MEDICINE

## 2021-10-09 PROCEDURE — 85018 HEMOGLOBIN: CPT | Performed by: INTERNAL MEDICINE

## 2021-10-09 PROCEDURE — 250N000013 HC RX MED GY IP 250 OP 250 PS 637: Performed by: HOSPITALIST

## 2021-10-09 PROCEDURE — 85027 COMPLETE CBC AUTOMATED: CPT | Performed by: HOSPITALIST

## 2021-10-09 RX ORDER — ATORVASTATIN CALCIUM 80 MG/1
80 TABLET, FILM COATED ORAL EVERY EVENING
Status: DISCONTINUED | OUTPATIENT
Start: 2021-10-09 | End: 2021-10-12 | Stop reason: HOSPADM

## 2021-10-09 RX ORDER — LIDOCAINE 40 MG/G
CREAM TOPICAL
Status: DISCONTINUED | OUTPATIENT
Start: 2021-10-09 | End: 2021-10-12 | Stop reason: HOSPADM

## 2021-10-09 RX ORDER — FOLIC ACID 1 MG/1
1 TABLET ORAL EVERY EVENING
Status: DISCONTINUED | OUTPATIENT
Start: 2021-10-09 | End: 2021-10-12 | Stop reason: HOSPADM

## 2021-10-09 RX ORDER — ACETAMINOPHEN 650 MG/1
650 SUPPOSITORY RECTAL EVERY 6 HOURS PRN
Status: DISCONTINUED | OUTPATIENT
Start: 2021-10-09 | End: 2021-10-12 | Stop reason: HOSPADM

## 2021-10-09 RX ORDER — PROCHLORPERAZINE MALEATE 5 MG
5 TABLET ORAL EVERY 6 HOURS PRN
Status: DISCONTINUED | OUTPATIENT
Start: 2021-10-09 | End: 2021-10-12 | Stop reason: HOSPADM

## 2021-10-09 RX ORDER — LORAZEPAM 2 MG/ML
1 INJECTION INTRAMUSCULAR EVERY 8 HOURS PRN
Status: DISCONTINUED | OUTPATIENT
Start: 2021-10-09 | End: 2021-10-12 | Stop reason: HOSPADM

## 2021-10-09 RX ORDER — PROCHLORPERAZINE 25 MG
12.5 SUPPOSITORY, RECTAL RECTAL EVERY 12 HOURS PRN
Status: DISCONTINUED | OUTPATIENT
Start: 2021-10-09 | End: 2021-10-12 | Stop reason: HOSPADM

## 2021-10-09 RX ORDER — ASPIRIN 81 MG/1
81 TABLET, CHEWABLE ORAL EVERY EVENING
Status: DISCONTINUED | OUTPATIENT
Start: 2021-10-09 | End: 2021-10-12 | Stop reason: HOSPADM

## 2021-10-09 RX ORDER — ONDANSETRON 4 MG/1
4 TABLET, ORALLY DISINTEGRATING ORAL EVERY 6 HOURS PRN
Status: DISCONTINUED | OUTPATIENT
Start: 2021-10-09 | End: 2021-10-12 | Stop reason: HOSPADM

## 2021-10-09 RX ORDER — ASPIRIN 81 MG/1
81 TABLET ORAL DAILY
Status: DISCONTINUED | OUTPATIENT
Start: 2021-10-09 | End: 2021-10-09

## 2021-10-09 RX ORDER — GABAPENTIN 300 MG/1
600 CAPSULE ORAL 2 TIMES DAILY
Status: DISCONTINUED | OUTPATIENT
Start: 2021-10-09 | End: 2021-10-12 | Stop reason: HOSPADM

## 2021-10-09 RX ORDER — ONDANSETRON 2 MG/ML
4 INJECTION INTRAMUSCULAR; INTRAVENOUS EVERY 6 HOURS PRN
Status: DISCONTINUED | OUTPATIENT
Start: 2021-10-09 | End: 2021-10-12 | Stop reason: HOSPADM

## 2021-10-09 RX ORDER — ACETAMINOPHEN 325 MG/1
650 TABLET ORAL EVERY 6 HOURS PRN
Status: DISCONTINUED | OUTPATIENT
Start: 2021-10-09 | End: 2021-10-11

## 2021-10-09 RX ORDER — LORAZEPAM 0.5 MG/1
0.5 TABLET ORAL EVERY 4 HOURS PRN
Status: DISCONTINUED | OUTPATIENT
Start: 2021-10-09 | End: 2021-10-12 | Stop reason: HOSPADM

## 2021-10-09 RX ADMIN — FOLIC ACID 1 MG: 1 TABLET ORAL at 20:42

## 2021-10-09 RX ADMIN — METOPROLOL TARTRATE 12.5 MG: 25 TABLET, FILM COATED ORAL at 20:42

## 2021-10-09 RX ADMIN — ASPIRIN 81 MG CHEWABLE TABLET 81 MG: 81 TABLET CHEWABLE at 20:42

## 2021-10-09 RX ADMIN — ACETAMINOPHEN 650 MG: 325 TABLET, FILM COATED ORAL at 01:14

## 2021-10-09 RX ADMIN — METOPROLOL TARTRATE 12.5 MG: 25 TABLET, FILM COATED ORAL at 07:59

## 2021-10-09 RX ADMIN — GABAPENTIN 600 MG: 300 CAPSULE ORAL at 01:13

## 2021-10-09 RX ADMIN — ATORVASTATIN CALCIUM 80 MG: 80 TABLET, FILM COATED ORAL at 01:14

## 2021-10-09 RX ADMIN — ATORVASTATIN CALCIUM 80 MG: 80 TABLET, FILM COATED ORAL at 20:42

## 2021-10-09 RX ADMIN — LORAZEPAM 0.5 MG: 0.5 TABLET ORAL at 21:34

## 2021-10-09 RX ADMIN — HEPARIN SODIUM 800 UNITS/HR: 10000 INJECTION, SOLUTION INTRAVENOUS at 23:59

## 2021-10-09 RX ADMIN — LORAZEPAM 0.5 MG: 0.5 TABLET ORAL at 13:09

## 2021-10-09 ASSESSMENT — ACTIVITIES OF DAILY LIVING (ADL)
TOILETING_ISSUES: NO
FALL_HISTORY_WITHIN_LAST_SIX_MONTHS: NO
DIFFICULTY_COMMUNICATING: NO
HEARING_DIFFICULTY_OR_DEAF: NO
DIFFICULTY_EATING/SWALLOWING: NO
WEAR_GLASSES_OR_BLIND: NO
DRESSING/BATHING_DIFFICULTY: NO
CONCENTRATING,_REMEMBERING_OR_MAKING_DECISIONS_DIFFICULTY: NO
DOING_ERRANDS_INDEPENDENTLY_DIFFICULTY: NO
WALKING_OR_CLIMBING_STAIRS_DIFFICULTY: NO

## 2021-10-09 NOTE — CONSULTS
[facility]    Cardiology Consultation     Date of Admission:  10/8/2021    Assessment & Plan   Johan Navarro is a 66 year old male who was admitted on 10/8/2021. I was asked to see the patient for a non-ST elevation myocardial infarction.    The patient is currently frustrated that he has been in the ER for prolonged period of time due to lack of bed availability.  He stated multiple times that he wants to leave.  He is frustrated that the bed is not comfortable.  He became increasingly agitated and anxious during my visit.    Overall the patient is very pleasant but is frustrated about his experience.    Patient has a known history of coronary artery disease and is status post multiple PCI's.  He had an acute presentation of diaphoresis, chest pain and shortness of breath.  Troponin is elevated with a trend that would be consistent with a non-ST elevation myocardial infarction.  EKG does not note significant ST elevation.  Troponin has trended to 3.7.      The patient has been started on heparin and is currently asymptomatic.    The patient has residual disease after I have reviewed his coronary angiogram.  I suspect that this represents a new acute coronary syndrome.  He needs to have an angiogram on Monday.    Further complicating matters would be the fact that the patient has issues with anemia.  He has had presumed GI bleeding from a possible upper GI source in the past.  Over the past 2 days he has had bright red blood per rectum with stooling.  He states he has 4 bowel movements per day on occasion and these have been associated with bright red blood.    #1 NSTEMI, prior history of CAD with PCI to OM2 in 2020, prior PCI to the distal circumflex which supplies a LPDA    During his last PCI his distal circumflex which supplies his large left PDA was jailed by a stent.  The ostium of this branch appears to be somewhat compromised.  Additionally the patient has potential significant  lesion at the ostium of his right coronary.  It should be noted he has residual disease and has substrate for further acute coronary syndromes.    #2 Lower GI bleeding - Bright red blood per rectum with bowel movements yesterday  #3 Concern for history of upper GI Bleeding - prior history of melena with multiple endoscopies and colonoscopies and pill endoscopy - no source found  #4 Patient reported history of familial hypercholesterolemia  #5 MARCUS    Plan:  Continue aspirin, heparin    Continue Lipitor    Continue metoprolol    Echocardiogram is pending.    Trend troponin until peak    Recommend GI consult for GI bleeding.  This will be helpful prior to proceeding with coronary angiogram on Monday to determine the safety of dual antiplatelet therapy.  The patient is somewhat hesitant to proceed with dual antiplatelet therapy as he has had bleeding on this in the past.  Therefore we will need a multidisciplinary approach.    Patient needs to have a coronary angiogram hopefully on Monday.  The patient stated multiple times he may leave AGAINST MEDICAL ADVICE.  Part of the rationale for him leaving was that he was uncomfortable in the bed in the ER.  He is also frustrated that there is no bed availability.  He understands that we are in the midst of a Covid pandemic but remains frustrated.    Edson Hagan MD     Code Status    Prior    Reason for Consult   Reason for consult: Non-ST elevation myocardial infarction    Primary Care Physician   Washington Yang    Chief Complaint   Chest pain    History is obtained from the patient    History of Present Illness   Johan Navarro is a 66 year old male who was admitted on 10/8/2021. I was asked to see the patient for a non-ST elevation myocardial infarction.    The patient is currently frustrated that he has been in the ER for prolonged period of time due to lack of bed availability.  He stated multiple times that he wants to leave.  He is frustrated that the bed  is not comfortable.  He became increasingly agitated and anxious during my visit.    Overall the patient is very pleasant but is frustrated about his experience.    Patient has a known history of coronary artery disease and is status post multiple PCI's.  He had an acute presentation of diaphoresis, chest pain and shortness of breath.  Troponin is elevated with a trend that would be consistent with a non-ST elevation myocardial infarction.  EKG does not note significant ST elevation.  Troponin has trended to 3.7.      The patient has been started on heparin and is currently asymptomatic.    The patient has residual disease after I have reviewed his coronary angiogram.  I suspect that this represents a new acute coronary syndrome.  He needs to have an angiogram on Monday.    Further complicating matters would be the fact that the patient has issues with anemia.  He has had presumed GI bleeding from a possible upper GI source in the past.  Over the past 2 days he has had bright red blood per rectum with stooling.  He states he has 4 bowel movements per day on occasion and these have been associated with bright red blood.    Past Medical History   I have reviewed this patient's medical history and updated it with pertinent information if needed.   Past Medical History:   Diagnosis Date     Aortic stenosis      Arthritis     hands     CAD (coronary artery disease)     cardiac cath 1998: stents x 2 to circumflex     Carotid stenosis     left     Family history of early CAD      Hyperlipidaemia LDL goal < 70     familial hyperlipidemia with marked hypercholesterolemia before treatment; has been on some form of cholesterol-lowering medication since the 1970s     Prostate cancer ([facility]) 2010     Syncope        Past Surgical History   I have reviewed this patient's surgical history and updated it with pertinent information if needed.  Past Surgical History:   Procedure Laterality Date     CARDIAC SURGERY  1998    coronary stents  x2 placed 1998; angioplasty     CV HEART CATHETERIZATION WITH POSSIBLE INTERVENTION N/A 7/21/2020    Procedure: Heart Catheterization with Possible Intervention;  Surgeon: Alber Bentley MD;  Location:  HEART CARDIAC CATH LAB     DAVINCI PROSTATECTOMY      2010     ESOPHAGOSCOPY, GASTROSCOPY, DUODENOSCOPY (EGD), COMBINED N/A 1/6/2021    Procedure: ESOPHAGOGASTRODUODENOSCOPY (EGD);  Surgeon: Rosemarie Laws MD;  Location:  GI     EYE SURGERY Bilateral 2017    eyelids     GENITOURINARY SURGERY       ORTHOPEDIC SURGERY Right     achilles tendon; post football injury     ORTHOPEDIC SURGERY Right     hand; near thumb. has wires in there. cysts     REPAIR TENDON BICEPS DISTAL UPPER EXTREMITY Right 7/16/2018    Procedure: REPAIR TENDON BICEPS DISTAL UPPER EXTREMITY;  Right open biceps tendon repair  ;  Surgeon: Alber Zabala MD;  Location: [facility] OR     SURGICAL HISTORY OF -       Right hand Xanthoma removal     SURGICAL HISTORY OF -       Stress Echo (-)       Prior to Admission Medications   Prior to Admission Medications   Prescriptions Last Dose Informant Patient Reported? Taking?   aspirin (ASA) 81 MG chewable tablet 10/7/2021 at Unknown time Self Yes Yes   Sig: Take 81 mg by mouth every evening    atorvastatin (LIPITOR) 80 MG tablet 10/7/2021 at Unknown time Self No Yes   Sig: Take 1 tablet (80 mg) by mouth daily   Patient taking differently: Take 80 mg by mouth every evening    diclofenac (VOLTAREN) 1 % topical gel Past Week at Unknown time Self Yes Yes   Sig: Apply 2 g topically 4 times daily as needed for moderate pain (apply to hands and fingers)   evolocumab (REPATHA) 140 MG/ML prefilled autoinjector 10/1/2021 Self No No   Sig: Inject 1 mL (140 mg) Subcutaneous every 14 days   fluticasone (FLONASE) 50 MCG/ACT nasal spray Past Week at Unknown time Self No Yes   Sig: Spray 2 sprays into both nostrils daily as needed for rhinitis or allergies   folic acid (FOLVITE) 1 MG tablet 10/7/2021 at  Unknown time Self No Yes   Sig: TAKE ONE TABLET BY MOUTH ONE TIME DAILY    Patient taking differently: Take 1 mg by mouth every evening    gabapentin (NEURONTIN) 600 MG tablet 10/8/2021 at Unknown time Self No Yes   Sig: TAKE ONE TABLET BY MOUTH TWICE DAILY    sildenafil (REVATIO) 20 MG tablet Past Week at Unknown time Self No Yes   Sig: TAKE 2 TO 5 TABLETS BY MOUTH AS NEEDED PRIOR TO SEXUAL INTERCOURSE      Facility-Administered Medications: None     Allergies   Allergies   Allergen Reactions     Crestor [Rosuvastatin] GI Disturbance     Dust Mites      Other [No Clinical Screening - See Comments]      Goose feathers     Pollen Extract        Social History   I have reviewed this patient's social history and updated it with pertinent information if needed. Johan Navarro  reports that he quit smoking about 21 years ago. His smoking use included cigars. He has a 5.00 pack-year smoking history. He quit smokeless tobacco use about 31 years ago.  His smokeless tobacco use included chew and snuff. He reports previous alcohol use. He reports current drug use. Drug: Marijuana.    Family History   I have reviewed this patient's family history and updated it with pertinent information if needed.   Family History   Problem Relation Age of Onset     Lipids Mother      C.A.D. Mother          at age 49 of MI     Dementia Father      C.A.D. Sister         MI at age 48     Cancer - colorectal No family hx of      Prostate Cancer No family hx of      Colon Cancer No family hx of        Review of Systems   The 12 point Review of Systems is negative other than noted in the HPI or here.     Physical Exam   Temp: 97.5  F (36.4  C) Temp src: Temporal BP: (!) 146/90 Pulse: 89   Resp: 12 SpO2: 95 % O2 Device: None (Room air)    Vital Signs with Ranges  Temp:  [96.7  F (35.9  C)-97.5  F (36.4  C)] 97.5  F (36.4  C)  Pulse:  [] 89  Resp:  [6-29] 12  BP: (110-175)/() 146/90  SpO2:  [92 %-99 %] 95 %  160 lbs 0 oz    GENERAL  APPEARANCE: Alert and oriented x3. No acute distress.  SKIN: Inspection of the skin reveals no rashes, ulcerations, warm, dry  NECK: Supple and symmetric.   Normal JVP  LUNGS: Clear breath sounds throughout bilaterally, no wheezes, no rhonchi  CARDIOVASCULAR: S1, S2, regular rate and rhythm without any murmurs, gallops, rubs. The carotid pulses were normal and 2+ bilaterally without bruits. Peripheral pulses were 2+ and symmetric.  ABDOMEN: Soft, non-tender, non-distended with normal bowel sounds.  No ascites noted.  EXTREMITIES: No edema.  NEUROLOGIC:  Normal mood and affect.  Sensation to touch was normal.      Data   Results for orders placed or performed during the hospital encounter of 10/08/21 (from the past 24 hour(s))   Basic metabolic panel   Result Value Ref Range    Sodium 142 133 - 144 mmol/L    Potassium 3.9 3.4 - 5.3 mmol/L    Chloride 108 94 - 109 mmol/L    Carbon Dioxide (CO2) 17 (L) 20 - 32 mmol/L    Anion Gap 17 (H) 3 - 14 mmol/L    Urea Nitrogen 22 7 - 30 mg/dL    Creatinine 1.32 (H) 0.66 - 1.25 mg/dL    Calcium 8.9 8.5 - 10.1 mg/dL    Glucose 97 70 - 99 mg/dL    GFR Estimate 56 (L) >60 mL/min/1.73m2   Troponin I   Result Value Ref Range    Troponin I <0.015 0.000 - 0.045 ug/L   Procalcitonin   Result Value Ref Range    Procalcitonin <0.05 <0.05 ng/mL   EKG 12-lead, tracing only   Result Value Ref Range    Systolic Blood Pressure  mmHg    Diastolic Blood Pressure  mmHg    Ventricular Rate 93 BPM    Atrial Rate 93 BPM    SC Interval 140 ms    QRS Duration 80 ms     ms    QTc 479 ms    P Axis 81 degrees    R AXIS 54 degrees    T Axis 70 degrees    Interpretation ECG       Sinus rhythm  Right atrial enlargement  Cannot rule out Anterior infarct , age undetermined  Abnormal ECG  When compared with ECG of 06-JAN-2021 10:39,  Questionable change in QRS axis  Nonspecific T wave abnormality no longer evident in Inferior leads  Confirmed by GENERATED REPORT, COMPUTER ([id number]),  Kassandra Chery ([id number])  on 10/8/2021 9:00:59 PM     CBC with platelets differential    Narrative    The following orders were created for panel order CBC with platelets differential.  Procedure                               Abnormality         Status                     ---------                               -----------         ------                     CBC with platelets and d...[[id number]]  Abnormal            Final result                 Please view results for these tests on the individual orders.   CBC with platelets and differential   Result Value Ref Range    WBC Count 12.7 (H) 4.0 - 11.0 10e3/uL    RBC Count 5.19 4.40 - 5.90 10e6/uL    Hemoglobin 15.2 13.3 - 17.7 g/dL    Hematocrit 46.9 40.0 - 53.0 %    MCV 90 78 - 100 fL    MCH 29.3 26.5 - 33.0 pg    MCHC 32.4 31.5 - 36.5 g/dL    RDW 14.1 10.0 - 15.0 %    Platelet Count 315 150 - 450 10e3/uL    % Neutrophils 88 %    % Lymphocytes 8 %    % Monocytes 3 %    % Eosinophils 0 %    % Basophils 1 %    % Immature Granulocytes 0 %    NRBCs per 100 WBC 0 <1 /100    Absolute Neutrophils 11.1 (H) 1.6 - 8.3 10e3/uL    Absolute Lymphocytes 1.0 0.8 - 5.3 10e3/uL    Absolute Monocytes 0.4 0.0 - 1.3 10e3/uL    Absolute Eosinophils 0.1 0.0 - 0.7 10e3/uL    Absolute Basophils 0.1 0.0 - 0.2 10e3/uL    Absolute Immature Granulocytes 0.1 (H) <=0.0 10e3/uL    Absolute NRBCs 0.0 10e3/uL   [place] Draw    Narrative    The following orders were created for panel order [place] Draw.  Procedure                               Abnormality         Status                     ---------                               -----------         ------                     Extra Blue Top Tube[[id number]]                              Final result                 Please view results for these tests on the individual orders.   Extra Blue Top Tube   Result Value Ref Range    Hold Specimen [facility]    Asymptomatic COVID-19 Virus (Coronavirus) by PCR Nasopharyngeal    Specimen: Nasopharyngeal; Swab   Result Value Ref Range    SARS CoV2  PCR Negative Negative    Narrative    Testing was performed using the patricio  SARS-CoV-2 & Influenza A/B Assay on the patricio  Talia  System.  This test should be ordered for the detection of SARS-COV-2 in individuals who meet SARS-CoV-2 clinical and/or epidemiological criteria. Test performance is unknown in asymptomatic patients.  This test is for in vitro diagnostic use under the FDA EUA for laboratories certified under CLIA to perform moderate and/or high complexity testing. This test has not been FDA cleared or approved.  A negative test does not rule out the presence of PCR inhibitors in the specimen or target RNA in concentration below the limit of detection for the assay. The possibility of a false negative should be considered if the patient's recent exposure or clinical presentation suggests COVID-19.  [facility] are certified under the Clinical Laboratory Improvement Amendments of 1988 (CLIA-88) as qualified to perform moderate and/or high complexity laboratory testing.   XR Chest 2 Views    Narrative    EXAM: XR CHEST 2 VW  LOCATION: [facility]  DATE/TIME: 10/8/2021 10:13 PM    INDICATION: Chest pain.  COMPARISON: 12/29/2020.    FINDINGS: The heart size is normal. The lungs are clear. No pneumothorax or pleural effusion. Degenerative disease in the spine.      Impression    IMPRESSION: No acute abnormality.   Heparin Unfractionated Anti Xa Level   Result Value Ref Range    Anti Xa Unfractionated Heparin 0.53 For Reference Range, See Comment IU/mL    Narrative    Therapeutic Range: UFH: 0.25-0.50 IU/mL for low intensity dosing,  0.30-0.70 IU/mL for high intensity dosing DVT and PE.  This test is not validated for other direct factor X inhibitors (e.g. rivaroxaban, apixaban, edoxaban, betrixaban, fondaparinux) and should not be used for monitoring of other medications.   Troponin I   Result Value Ref Range    Troponin I 3.794 (HH) 0.000 - 0.045 ug/L   Comprehensive  metabolic panel   Result Value Ref Range    Sodium 137 133 - 144 mmol/L    Potassium 4.0 3.4 - 5.3 mmol/L    Chloride 105 94 - 109 mmol/L    Carbon Dioxide (CO2) 24 20 - 32 mmol/L    Anion Gap 8 3 - 14 mmol/L    Urea Nitrogen 27 7 - 30 mg/dL    Creatinine 1.21 0.66 - 1.25 mg/dL    Calcium 8.2 (L) 8.5 - 10.1 mg/dL    Glucose 79 70 - 99 mg/dL    Alkaline Phosphatase 75 40 - 150 U/L    AST 52 (H) 0 - 45 U/L    ALT 36 0 - 70 U/L    Protein Total 6.8 6.8 - 8.8 g/dL    Albumin 3.6 3.4 - 5.0 g/dL    Bilirubin Total 0.5 0.2 - 1.3 mg/dL    GFR Estimate 62 >60 mL/min/1.73m2   CBC with platelets   Result Value Ref Range    WBC Count 8.0 4.0 - 11.0 10e3/uL    RBC Count 4.32 (L) 4.40 - 5.90 10e6/uL    Hemoglobin 12.6 (L) 13.3 - 17.7 g/dL    Hematocrit 38.0 (L) 40.0 - 53.0 %    MCV 88 78 - 100 fL    MCH 29.2 26.5 - 33.0 pg    MCHC 33.2 31.5 - 36.5 g/dL    RDW 14.0 10.0 - 15.0 %    Platelet Count 268 150 - 450 10e3/uL

## 2021-10-09 NOTE — PROGRESS NOTES
X-cover    Troponin positive 3.794. received full dose ASA, on heparin gtt. Echo ordered, cardiology consulted. Continue to trend troponin. Was having CP and on NTG gtt but now off. Will give 12.5 mg BID metoprolol. Hold on ACE I with MARCUS.     Walter Purcell MD

## 2021-10-09 NOTE — PROGRESS NOTES
[facility]    Medicine Progress Note - Hospitalist Service       Date of Admission:  10/8/2021  Assessment & Plan   Johan Navarro is a 66 year-old male with history of coronary artery disease (with previous PCI in both 1998 and 2020), iron deficiency anemia with history of GI bleed of unclear source who presents with chest pain and diaphoresis.  Admitted on 10/8/2021.       NSTEMI  Coronary artery disease with history of PCI   *Presents with a 1 day history of chest pressure and diaphoresis, worsening at rest, improved with nitroglycerin.   *Initial troponin <0.015, subsequent 3.794  - Cardiology consulted, plan for angiogram 10/11  - Continue heparin drip  - Initially required nitroglycerin drip, no chest pain with nitroglycerin drip now titrated off  - Troponin 3.794->3.915, appears to be plateauing, continue to trend  - Echocardiogram  - Continue aspirin, atorvastatin    Bright red blood per rectum, suspect hemorrhoidal bleeding  Internal hemorrhoids  Iron deficiency anemia  History of GI bleed with unclear source  Admission for GI bleed 1/2021.  EGD unremarkable.  Colonoscopy with diverticulosis, internal hemorrhoids, no clear source of active bleeding.  Reportedly underwent PillCam which was negative.  Also saw gastroenterology through [facility] and had CT enterography which again was reportedly unremarkable.  Has since had dark stools which he attributes to his iron supplementation, hemoglobin has otherwise been most recently 13.5 g/dL 9/9/2021.  - Reports 2-3 days of bright red blood in bowl and on wipe, none mixed with stool.  Reports some firmer stools, suspect this is secondary to known internal hemorrhoids  - [facility] consulted per cardiology recommendations to risk stratify for DAPT ahead of planned angiogram  - Suspect hemoglobin of 15.2 g/dl on admission was hemoconcentrated, as he has not been this high since 2004. Subsequent 12.6 g/dl after IVF more in line with recent values.    - Check hemoglobin again this evening  - Consented for blood if needed     Leukocytosis  Suspect stress response in setting of NSTEMI.  - WBC normalized  - Procalcitonin undetectable  - Discontinue blood cultures, low suspicion for occult infection     Acute kidney injury  Baseline creatinine around 1.  Creatinine 1.32 on admission.  Likely prerenal due to diaphoresis, both in the setting of NSTEMI and patient reported he has frequent hot tub sessions, including two on the date of his symptoms.   - Creatinine improving with IVF  - Monitor BMP    Anxiety  Situational due to acute illness, prolonged time in emergency department waiting for bed.  - Lorazepam PRN    Chronic:  1.  Prostate cancer  2.  Arthritis.  Continue Voltaren gel, gabapentin  3.  History of depression      Clinically Significant Risk Factors Present on Admission          # Hypocalcemia: Ca = 8.2 mg/dL (Ref range: 8.5 - 10.1 mg/dL) and/or iCa = N/A on admission, will replace as needed     # Platelet Defect: home medication list includes an antiplatelet medication         Diet: NPO for Medical/Clinical Reasons Except for: Meds, Ice Chips    DVT Prophylaxis: Heparin drip   Salvador Catheter: Not present  Code Status: Full Code      Disposition Plan   Expected discharge: Pending angiogram 10/11  Entered: Ferny Boyd MD 10/09/2021, 1:57 PM       The patient's care was discussed with the Bedside Nurse, Patient and Patient's wife.    Ferny Boyd MD  Hospitalist Service  [facility]    ______________________________________________________________________    Interval History   No acute events overnight.  Patient very frustrated this morning due to prolonged time in the emergency department waiting for a bed on the inpatient unit, currently feeling better and less anxious after receiving some lorazepam.  Denies any chest pain at this time.  Reports some mild stomach upset after eating.  Reports having a very firm bowel  "movement today, also with 2 to 3 days of bright red blood in the bowl and on his wipe which he attributed to a \"hemorrhoid popping\".  Has had dark stools, but he is on iron chronically and these do not appear changed, has not noticed any blood mixed in his stools.    Data reviewed today: I reviewed all medications, new labs and imaging results over the last 24 hours. I personally reviewed no images or EKG's today.    Physical Exam   Vital Signs: Temp: 97.5  F (36.4  C) Temp src: Temporal BP: (!) 146/90 Pulse: 90   Resp: 9 SpO2: 96 % O2 Device: None (Room air)    Weight: 160 lbs 0 oz    Constitutional: Well-appearing, NAD  Respiratory: Clear to auscultation bilaterally, good air movement bilaterally  Cardiovascular: RRR. No peripheral edema.  GI: Soft, non-tender, non-distended.   Skin/Integumen: Warm, dry  Other:     Data   Recent Labs   Lab 10/09/21  0530 10/08/21  2059 10/08/21  2058   WBC 8.0 12.7*  --    HGB 12.6* 15.2  --    MCV 88 90  --     315  --      --  142   POTASSIUM 4.0  --  3.9   CHLORIDE 105  --  108   CO2 24  --  17*   BUN 27  --  22   CR 1.21  --  1.32*   ANIONGAP 8  --  17*   ONDINA 8.2*  --  8.9   GLC 79  --  97   ALBUMIN 3.6  --   --    PROTTOTAL 6.8  --   --    BILITOTAL 0.5  --   --    ALKPHOS 75  --   --    ALT 36  --   --    AST 52*  --   --    TROPONIN 3.794*  --  <0.015       Recent Results (from the past 24 hour(s))   XR Chest 2 Views    Narrative    EXAM: XR CHEST 2 VW  LOCATION: [facility]  DATE/TIME: 10/8/2021 10:13 PM    INDICATION: Chest pain.  COMPARISON: 12/29/2020.    FINDINGS: The heart size is normal. The lungs are clear. No pneumothorax or pleural effusion. Degenerative disease in the spine.      Impression    IMPRESSION: No acute abnormality.     Medications     heparin 650 Units/hr (10/09/21 1232)     nitroGLYcerin Stopped (10/09/21 0530)     - MEDICATION INSTRUCTIONS -         aspirin  81 mg Oral QPM     atorvastatin  80 mg Oral QPM     " folic acid  1 mg Oral QPM     gabapentin  600 mg Oral BID     metoprolol tartrate  12.5 mg Oral BID     sodium chloride (PF)  3 mL Intracatheter Q8H

## 2021-10-09 NOTE — ED PROVIDER NOTES
History   Chief Complaint:  Chest Pain     HPI   Johan Navarro is a 66 year old male with history of CAD status post  PCI to the second obtuse marginal in 07/2020 and prior circumflex/obtuse marginal revascularization in 1998. He presents to the ED via EMS with chest discomfort, worsening after he sat in a whirlpool for about 45 minutes. 2 hours ago his chest pain turned into a pressure, he became diaphoretic, nauseous, and short of breath. Denies any radiation to his back, arms, or jaw. His wife gave him 324 mg of aspirin, a dose of nitroglycerin and called EMS. En route, he was given 200 mcg of Fentanyl. He reports that his pain is better than earlier but not quite gone. Reports a history of coronary stents x3. Denies any leg swelling. No fever, cough, pleuritic pain. He denies any other symptoms.    Cardiac catheterization July 2020:    Ost RCA to Prox RCA lesion is 70% stenosed.    Prox RCA lesion is 40% stenosed.    Prox RCA to Dist RCA lesion is 20% stenosed.    Ost LM to Ost LAD lesion is 20% stenosed.    Prox Cx to Mid Cx lesion is 40% stenosed.    2nd Mrg lesion is 75% stenosed.    1st Mrg lesion is 30% stenosed.    Prox LAD lesion is 30% stenosed.    Dist Cx-1 lesion is 60% stenosed.    Dist Cx-2 lesion is 40% stenosed.     1.  Diffuse coronary artery atherosclerosis in a left dominant system as described above.  2.  Patent stents with mild ISR in the LCx coronary artery.  3.  Non-hemodynamically significant stenosis of the distal LCx based on iFR of 1.0.  4.  Hemodynamically significant stenosis of the OM2 ostium/proximal vessel with iFR 0.56.  5.  Successful intervention of the OM2 lesion without complication.  6.  Heavily calcified aortic root noted on angiography.    Review of Systems   Constitutional: Positive for diaphoresis (since resolved).   Respiratory: Positive for shortness of breath (since resolved).    Cardiovascular: Positive for chest pain. Negative for leg swelling.   Gastrointestinal:  "Positive for nausea.   All other systems reviewed and are negative.    Allergies:  Crestor [Rosuvastatin]  Dust Mites  Pollen Extract    Medications:  Lipitor  Repatha  Neurontin  Revatio  Aspirin 81 mg    Past Medical History:     Aortic stenosis  Arthritis  CAD  Carotid stenosis  Prostate cancer  Depression  Hyperlipidemia  Unstable angina  GI hemorrhage    Past Surgical History:    Coronary stents x2  Heart catheterization   Prostatectomy  EGD combined  Eye surgery  Achilles tendon repair  Repair tendon biceps  Right hand xanthoma removal    Family History:    Mother: Hyperlipidemia, CAD  Father: Dementia  Sister: CAD    Social History:  The patient presents to the ED alone via EMS.  ; wife is nurse practitioner.     Physical Exam     Patient Vitals for the past 24 hrs:   BP Temp Temp src Pulse Resp SpO2 Height Weight   10/08/21 2245 136/83 -- -- 81 12 95 % -- --   10/08/21 2230 (!) 137/95 -- -- 78 13 96 % -- --   10/08/21 2200 126/84 -- -- 66 11 94 % -- --   10/08/21 2130 (!) 125/90 97.5  F (36.4  C) Temporal 96 29 96 % -- --   10/08/21 2100 -- -- -- 96 10 97 % -- --   10/08/21 2051 (!) 168/107 (!) 96.7  F (35.9  C) Temporal 101 18 98 % 1.676 m (5' 6\") 72.6 kg (160 lb)       Physical Exam  General: Nontoxic. Appears mildly uncomfortable.   Head:  Scalp, face, and head appear normal  Eyes:  Pupils are equal, round    Conjunctivae non-injected and sclerae white  ENT:    The external nose is normal    Pinnae are normal  Neck:  Normal range of motion    There is no rigidity noted    Trachea is in the midline  CV:  Regular rate and rhythm     Normal S1/S2, no S3/S4    2/6 systolic murmur. No rub. Radial pulses 2+ bilaterally.  Resp:  Lungs are clear and equal bilaterally  There is no tachypnea    No increased work of breathing    No rales, wheezing, or rhonchi  GI:  Abdomen is soft, no rigidity or guarding    No distension, or mass    No tenderness or rebound tenderness   MS:  Normal muscular tone    Symmetric " motor strength    No lower extremity edema. No calf swelling or tenderness.  Skin:  No rash or acute skin lesions noted  Neuro: Awake and alert  Speech is normal and fluent  Moves all extremities spontaneously  Psych:  Normal affect. Appropriate interactions.      Emergency Department Course   ECG  ECG obtained at 2058, ECG read at 2104  Normal sinus rhythm. Right atrial enlargement. Cannot rule out anterior infarct, age undetermined. Abnormal ECG.   Rate 93 bpm. IL interval 140 ms. QRS duration 80 ms. QT/QTc 386/479 ms. P-R-T axes 81 54 70.     Imaging:  XR Chest, G/E 2 views:   No acute abnormality. As per radiology.    Report per radiology    Laboratory:  CBC: WBC: 12.7 (H), HGB: 15.2, PLT: 315    BMP: Glucose 97, Carbon Dioxide: 17 (L), Anion Gap: 17 (H), Creatinine: 1.32 (H), GFR: 56 (L), o/w WNL    Troponin (Collected 2058): <0.015    Asymptomatic COVID-19 PCR: Negative    Heparin Unfractionated Anti Xa Level: Ordered    Emergency Department Course:  Reviewed:  I reviewed nursing notes, vitals, past medical history and Care Everywhere    ED Course as of Oct 08 2311   Fri Oct 08, 2021   2058 I obtained history and examined the patient as noted above.       2207 I rechecked and updated the patient.       2249 I spoke with Dr. Fontana, hospitalist, who accepts the patient.       2301 I rechecked and updated the patient. Chest pain is now gone.         Interventions:  2109 Nitrostat 0.4 mg Sublingual  2132 Nitroglycerin in D5W infusion 10 mcg/min IV  2247 Nitroglycerin in D5W infusion 30 mcg/min IV  2316 Heparin infusion 850 units/hr IV  2319 Heparin loading dose 4,350 units IV    Disposition:  The patient was admitted to the hospital under the care of Dr. Fontana.     Impression & Plan     Medical Decision Making:  Johan Navarro is a 66 year old male with a history of hyperlipidemia and extensive coronary artery disease who presents with typical signs and symptoms of cardiac related chest pain.  On my evaluation he  is well-appearing, hemodynamically stable and afebrile.  Chest pain is improved after interventions provided by EMS including nitro and fentanyl but not completely gone.  Initial EKG reveals normal sinus rhythm without evidence of acute ischemia or dysrhythmia.  Broad differential diagnosis is considered.  Work-up in the emergency department is ultimately consistent with unstable angina in the setting of known significant coronary artery disease.  Patient was started on nitroglycerin infusion and chest pain resolved.  Given the presenting signs and symptoms patient was started on heparin.  He took a full dose aspirin prior to arrival to the emergency department.  Initial troponin is negative.  Chest x-ray does not reveal any mediastinal abnormalities or other acute thoracic process.  BMP with mildly elevated creatinine.  IV fluids were provided.  CBC with mild leukocytosis.  Acute infectious process is felt to be unlikely given the clinical picture.  Given the patient's history, clinical symptoms and ED evaluation I feel that he requires admission to the hospital for ongoing monitoring evaluation and treatment.  The case was discussed with the hospitalist and the patient was accepted for admission.  He was admitted in stable condition.        Diagnosis:    ICD-10-CM    1. Unstable angina (H)  I20.0        Scribe Disclosure:  EH, Hector Woods, am serving as a scribe at 8:52 PM on 10/8/2021 to document services personally performed by Herrera Granados MD based on my observations and the provider's statements to me.                  Herrera Granados MD  10/08/21 [id number]

## 2021-10-09 NOTE — ED NOTES
Cardiologist was in patient room to consult with patient and spouse. He indicated that he would continue current treatment, with plan to do angio on Monday. Ok for regular diet at this time

## 2021-10-09 NOTE — H&P
[facility]    History and Physical - Hospitalist Service       Date of Admission:  10/8/2021    Assessment & Plan      Johan Navarro is a 66 year old male admitted on 10/8/2021. He has a past medical history most remarkable for coronary artery disease (with previous PCI in both 1998 and 2020), who presents with chest pain.  He was admitted for unstable angina.    1.  Coronary artery disease with unstable angina.  Patient presented with a 1 day history of chest pressure and diaphoresis that was worsening at rest.  Pain improved with sublingual nitroglycerin, and resolved with continuous infusion.  Initial troponin was negative.  -Patient was started on nitroglycerin and heparin infusions in the ER, will continue these when he is admitted  -I have not ordered a stress test for the morning, instead we will see how the night goes as far as symptoms and biomarkers (troponin), and defer to cardiology whether stress testing or catheterization is appropriate  -Patient did have an elevated white count, but no obvious signs or symptoms of infection. Chest XRay was negative.  I have ordered blood cultures and procalcitonin to help risk stratify and identify any sort of potential infection  -Trend troponins  -Echocardiogram in the morning  -Cardiology consult  -Continue home aspirin, Lipitor.  Of note patient is also on Repatha as an outpatient    2.  Acute kidney injury.  Most likely secondary to dehydration from diaphoresis all day  -Patient was given 1 L fluid bolus in the ER, will trend creatinine and perform further work-up if not resolved by the morning    Chronic:  1.  Prostate cancer  2.  Arthritis.  Continue Voltaren gel, gabapentin  3.  History of depression  4.  Hyperlipidemia  5.  History of GI bleed, ticagrelor was discontinued because of this     Diet:  N.p.o. for now  DVT Prophylaxis: Heparin  Salvador Catheter: Not present  Central Lines: None  Code Status:   Full Code, Discussed and  confirmed with patient on admission    Clinically Significant Risk Factors Present on Admission              # Platelet Defect: home medication list includes an antiplatelet medication      Disposition Plan   Expected discharge: Patient is currently admitted to the hospital with unstable angina.  Anticipate he will be admitted 2+ midnights for treatment.  I also anticipate he will discharge to his prior living situation, I have consulted PT for formal recommendation.     The patient's care was discussed with the Bedside Nurse and Patient.    Jaime Fontana MD PhD  [facility]  Securely message with the Vocera Web Console (learn more here)  Text page via [facility] Paging/Directory      ______________________________________________________________________    Chief Complaint   Chest pressure, diaphoresis    History is obtained from the patient    History of Present Illness   Johan Navarro is a 66 year old male admitted on 10/8/2021. He has a past medical history most remarkable for coronary artery disease (with previous PCI in both 1998 and 2020), who presents with chest pain.  He was admitted for unstable angina.    Patient tells me that he has a history of coronary artery disease dating back to 1998.  More recently he developed symptoms of chest pressure and diaphoresis which was from summer 2020 ultimately culminating in him going for cardiac catheterization receiving multiple stents.  Additionally he was on dual antiplatelet therapy including Brilinta, but ultimately had to be taken off Brilinta due to GI bleed.  His blood counts stabilized and he has not had symptoms.  He generally gets about 15,000 steps a day and his main form of activity is yard work.    Patient notes this morning when he was in the hot tub he developed some mild chest discomfort.  It felt like a mild pressure, but he did not think too much of it.  Throughout the day it progressed having profuse diaphoresis throughout  the day.  This afternoon he was laying in a chair relaxing and the discomfort in his chest increased substantially, which he describes as a squeezing.  He states that the symptoms feel similar to how his chest pain felt about a year ago when he received multiple stents.  His wife is a nurse, she recommended that he call 911.  She gave him 325 mg of aspirin and a dose of sublingual nitroglycerin.  EMS arrived and gave him 200 mcg of fentanyl.  These treatments improved his symptoms but not resolved him.  In the emergency department he was started on continuous infusion of nitroglycerin which resolved his symptoms.  On interview he tells me that he is chest pain-free now.    Review of Systems    The 10 point Review of Systems is negative other than noted in the HPI or here.    Past Medical History    I have reviewed this patient's medical history and updated it with pertinent information if needed.   Past Medical History:   Diagnosis Date     Aortic stenosis      Arthritis     hands     CAD (coronary artery disease)     cardiac cath 1998: stents x 2 to circumflex     Carotid stenosis     left     Family history of early CAD      Hyperlipidaemia LDL goal < 70     familial hyperlipidemia with marked hypercholesterolemia before treatment; has been on some form of cholesterol-lowering medication since the 1970s     Prostate cancer (H) 2010     Syncope        Past Surgical History   I have reviewed this patient's surgical history and updated it with pertinent information if needed.  Past Surgical History:   Procedure Laterality Date     CARDIAC SURGERY  1998    coronary stents x2 placed 1998; angioplasty     CV HEART CATHETERIZATION WITH POSSIBLE INTERVENTION N/A 7/21/2020    Procedure: Heart Catheterization with Possible Intervention;  Surgeon: Alber Bentley MD;  [facility]: [facility] CARDIAC CATH LAB     DAVINCI PROSTATECTOMY      2010     ESOPHAGOSCOPY, GASTROSCOPY, DUODENOSCOPY (EGD), COMBINED N/A 1/6/2021     Procedure: ESOPHAGOGASTRODUODENOSCOPY (EGD);  Surgeon: Rosemarie Laws MD;  Location:  GI     EYE SURGERY Bilateral 2017    eyelids     GENITOURINARY SURGERY       ORTHOPEDIC SURGERY Right     achilles tendon; post football injury     ORTHOPEDIC SURGERY Right     hand; near thumb. has wires in there. cysts     REPAIR TENDON BICEPS DISTAL UPPER EXTREMITY Right 2018    Procedure: REPAIR TENDON BICEPS DISTAL UPPER EXTREMITY;  Right open biceps tendon repair  ;  Surgeon: Alber Zabala MD;  Location: [facility] OR     SURGICAL HISTORY OF -       Right hand Xanthoma removal     SURGICAL HISTORY OF -       Stress Echo (-)       Social History   I have reviewed this patient's social history and updated it with pertinent information if needed.  Social History     Tobacco Use     Smoking status: Former Smoker     Packs/day: 0.25     Years: 20.00     Pack years: 5.00     Types: Cigars     Quit date:      Years since quittin.7     Smokeless tobacco: Former User     Types: Chew, Snuff     Quit date:      Tobacco comment: 12/10/18: occ chew, not every day   Substance Use Topics     Alcohol use: Not Currently     Comment: very rare, none for 8 years     Drug use: Yes     Types: Marijuana     Comment: daily        Family History   I have reviewed this patient's family history and updated it with pertinent information if needed.  Family History   Problem Relation Age of Onset     Lipids Mother      C.A.D. Mother          at age 49 of MI     Dementia Father      C.A.D. Sister         MI at age 48     Cancer - colorectal No family hx of      Prostate Cancer No family hx of      Colon Cancer No family hx of        Prior to Admission Medications   Prior to Admission Medications   Prescriptions Last Dose Informant Patient Reported? Taking?   aspirin (ASA) 81 MG chewable tablet 10/7/2021 at Unknown time Self Yes Yes   Sig: Take 81 mg by mouth every evening    atorvastatin (LIPITOR) 80 MG tablet 10/7/2021  at Unknown time Self No Yes   Sig: Take 1 tablet (80 mg) by mouth daily   Patient taking differently: Take 80 mg by mouth every evening    diclofenac (VOLTAREN) 1 % topical gel Past Week at Unknown time Self Yes Yes   Sig: Apply 2 g topically 4 times daily as needed for moderate pain (apply to hands and fingers)   evolocumab (REPATHA) 140 MG/ML prefilled autoinjector 10/1/2021 Self No No   Sig: Inject 1 mL (140 mg) Subcutaneous every 14 days   fluticasone (FLONASE) 50 MCG/ACT nasal spray Past Week at Unknown time Self No Yes   Sig: Spray 2 sprays into both nostrils daily as needed for rhinitis or allergies   folic acid (FOLVITE) 1 MG tablet 10/7/2021 at Unknown time Self No Yes   Sig: TAKE ONE TABLET BY MOUTH ONE TIME DAILY    Patient taking differently: Take 1 mg by mouth every evening    gabapentin (NEURONTIN) 600 MG tablet 10/8/2021 at Unknown time Self No Yes   Sig: TAKE ONE TABLET BY MOUTH TWICE DAILY    sildenafil (REVATIO) 20 MG tablet Past Week at Unknown time Self No Yes   Sig: TAKE 2 TO 5 TABLETS BY MOUTH AS NEEDED PRIOR TO SEXUAL INTERCOURSE      Facility-Administered Medications: None     Allergies   Allergies   Allergen Reactions     Crestor [Rosuvastatin] GI Disturbance     Dust Mites      Other [No Clinical Screening - See Comments]      Goose feathers     Pollen Extract        Physical Exam   Vital Signs: Temp: 97.5  F (36.4  C) Temp src: Temporal BP: 136/83 Pulse: 81   Resp: 12 SpO2: 95 % O2 Device: None (Room air)    Weight: 160 lbs 0 oz    General Appearance: Well appearing, no distress  Eyes: LOGAN, EOMI  HEENT: NC, AT. MMM.   Respiratory: CTAB, normal work of breathing  Cardiovascular: Regular Rate and Rhythm, normal S1, S2. No murmurs, rubs, gallops  GI: Soft, non-tender, non-distedned  Lymph/Hematologic: No asymetric swelling, edema. No bruising.  Genitourinary: Deferred  Skin: No rashes, lesions, wounds.  Musculoskeletal: Warm, well perfused  Neurologic: AOx4, CNII-XII intact.  Psychiatric:  Euthymic. Mood appropriate.     Data   Data reviewed today: I reviewed all medications, new labs and imaging results over the last 24 hours. I personally reviewed the EKG tracing showing sinus rhythm with no FABIAN or TWI.    Recent Labs   Lab 10/08/21  2059 10/08/21  2058   WBC 12.7*  --    HGB 15.2  --    MCV 90  --      --    NA  --  142   POTASSIUM  --  3.9   CHLORIDE  --  108   CO2  --  17*   BUN  --  22   CR  --  1.32*   ANIONGAP  --  17*   ONDINA  --  8.9   GLC  --  97   TROPONIN  --  <0.015

## 2021-10-09 NOTE — ED TRIAGE NOTES
"pt biba from home - reports has not been feeling well all day - 2 hrs ago, started having chest pain \"like an elephant sitting on chest\". hx of stents. wife gave 324 asa, one dose of sl nitro, 8mg zofran and 200mcg fentanyl given en route.   "

## 2021-10-09 NOTE — ED NOTES
Bed: ED26  Expected date:   Expected time:   Means of arrival:   Comments:  [facility] 66M chest pain, eta 2035- to triage

## 2021-10-09 NOTE — ED NOTES
"[facility]  ED Nurse Handoff Report    ED Chief complaint: Chest Pain (pt biba from home - reports has not been feeling well all day - 2 hrs ago, started having chest pain \"like an elephant sitting on chest\". hx of stents. wife gave 324 asa, one dose of sl nitro, 8mg zofran and 200mcg fentanyl given en route. )      ED Diagnosis:   Final diagnoses:   None       Code Status: To be addressed by admitting provider    Allergies:   Allergies   Allergen Reactions     Crestor [Rosuvastatin] GI Disturbance     Dust Mites      Other [No Clinical Screening - See Comments]      Goose feathers     Pollen Extract        Patient Story: Patient reports feeling unwell all day today. Felt fine yesterday. At 1900 tonight started having chest pressure \"elephant on chest\". Took 324 ASA at home. EMS gave 8mg Zofran, 200 Fentanyl, and 1 SL nitroglycerine. Pt initially was reporting \"8/10\" midsternal CP w/ diaphoresis and SOB. Upon arrival \"4/10\". Hx of stent placement.    Focused Assessment:  A&Ox4. CP/pressure 4/10, no diaphoresis or SOB. VSS. Nitroglycerine drip initiated in ED. Chest pain eliminated at 30mcg/min infusion. Pt independent with ambulation and cares.     Labs Ordered and Resulted from Time of ED Arrival Up to the Time of Departure from the ED   BASIC METABOLIC PANEL - Abnormal; Notable for the following components:       Result Value    Carbon Dioxide (CO2) 17 (*)     Anion Gap 17 (*)     Creatinine 1.32 (*)     GFR Estimate 56 (*)     All other components within normal limits   CBC WITH PLATELETS AND DIFFERENTIAL - Abnormal; Notable for the following components:    WBC Count 12.7 (*)     Absolute Neutrophils 11.1 (*)     Absolute Immature Granulocytes 0.1 (*)     All other components within normal limits   TROPONIN I - Normal   COVID-19 VIRUS (CORONAVIRUS) BY PCR - Normal    Narrative:     Testing was performed using the patricio  SARS-CoV-2 & Influenza A/B Assay on the patricio  Talia  System.  This test " should be ordered for the detection of SARS-COV-2 in individuals who meet SARS-CoV-2 clinical and/or epidemiological criteria. Test performance is unknown in asymptomatic patients.  This test is for in vitro diagnostic use under the FDA EUA for laboratories certified under CLIA to perform moderate and/or high complexity testing. This test has not been FDA cleared or approved.  A negative test does not rule out the presence of PCR inhibitors in the specimen or target RNA in concentration below the limit of detection for the assay. The possibility of a false negative should be considered if the patient's recent exposure or clinical presentation suggests COVID-19.  [facility] are certified under the Clinical Laboratory Improvement Amendments of 1988 (CLIA-88) as qualified to perform moderate and/or high complexity laboratory testing.   EXTRA BLUE TOP TUBE   PERIPHERAL IV CATHETER   CARDIAC CONTINUOUS MONITORING   MEASURE WEIGHT   NOTIFY PHYSICIAN   NOTIFY PHYSICIAN   CBC WITH PLATELETS & DIFFERENTIAL    Narrative:     The following orders were created for panel order CBC with platelets differential.  Procedure                               Abnormality         Status                     ---------                               -----------         ------                     CBC with platelets and d...[[id number]]  Abnormal            Final result                 Please view results for these tests on the individual orders.   EXTRA TUBE    Narrative:     The following orders were created for panel order [place] Draw.  Procedure                               Abnormality         Status                     ---------                               -----------         ------                     Extra Blue Top Tube[[id number]]                              Final result                 Please view results for these tests on the individual orders.       Treatments and/or interventions provided:   Medications  "  nitroGLYcerin (NITROSTAT) sublingual tablet 0.4 mg (0.4 mg Sublingual Given 10/8/21 2109)   nitroGLYcerin 50 mg in D5W 250 mL (adult std) infusion CENTRAL (30 mcg/min Intravenous Rate/Dose Change 10/8/21 2247)   0.9% sodium chloride BOLUS (1,000 mLs Intravenous New Bag 10/8/21 2316)   heparin 25,000 units in 0.45% NaCl 250 mL ANTICOAGULANT infusion (850 Units/hr Intravenous New Bag 10/8/21 2316)   heparin loading dose for LOW INTENSITY TREATMENT * Give BEFORE starting heparin infusion (4,350 Units Intravenous Given 10/8/21 2319)     Patient's response to treatments and/or interventions: decreased pain to 3/10 after SL nitroglycerin     To be done/followed up on inpatient unit:  Continue with plan of care per admitting MD.    Does this patient have any cognitive concerns?: None    Activity level - Baseline/Home:  Independent  Activity Level - Current:   Independent    Patient's Preferred language: English   Needed?: No    Isolation: None  Infection: Not Applicable  Patient tested for COVID 19 prior to admission: YES  Bariatric?: No    Vital Signs:   Vitals:    10/08/21 2051 10/08/21 2100 10/08/21 2130 10/08/21 2200   BP: (!) 168/107  (!) 125/90 126/84   Pulse: 101 96 96 66   Resp: 18 10 29 11   Temp: (!) 96.7  F (35.9  C)  97.5  F (36.4  C)    TempSrc: Temporal  Temporal    SpO2: 98% 97% 96% 94%   Weight: 72.6 kg (160 lb)      Height: 1.676 m (5' 6\")          Cardiac Rhythm:     Was the PSS-3 completed:   Yes  What interventions are required if any?               Family Comments: Pt updated spouse.   OBS brochure/video discussed/provided to patient/family: N/A              For the majority of the shift this patient's behavior was Green.   Behavioral interventions performed were NA.    ED NURSE PHONE NUMBER: *[phone]         "

## 2021-10-09 NOTE — PHARMACY-ADMISSION MEDICATION HISTORY
Pharmacy Medication History  Admission medication history interview status for the 10/8/2021  admission is complete. See EPIC admission navigator for prior to admission medications     Location of Interview: Phone  Medication history sources: Patient and Surescripts    Significant changes made to the medication list:  Removed Albuterol MDI; - pt confirmed he is not taking Omeprazole     In the past week, patient estimated taking medication this percent of the time: greater than 90%    Additional medication history information:       Medication reconciliation completed by provider prior to medication history? No    Time spent in this activity: 15 min    Prior to Admission medications    Medication Sig Last Dose Taking? Auth Provider   aspirin (ASA) 81 MG chewable tablet Take 81 mg by mouth every evening  10/7/2021 at Unknown time Yes Unknown, Entered By History   atorvastatin (LIPITOR) 80 MG tablet Take 1 tablet (80 mg) by mouth daily  Patient taking differently: Take 80 mg by mouth every evening  10/7/2021 at Unknown time Yes Sander Galicia MD   diclofenac (VOLTAREN) 1 % topical gel Apply 2 g topically 4 times daily as needed for moderate pain (apply to hands and fingers) Past Week at Unknown time Yes Unknown, Entered By History   fluticasone (FLONASE) 50 MCG/ACT nasal spray Spray 2 sprays into both nostrils daily as needed for rhinitis or allergies Past Week at Unknown time Yes Washington Yang PA-C   folic acid (FOLVITE) 1 MG tablet TAKE ONE TABLET BY MOUTH ONE TIME DAILY   Patient taking differently: Take 1 mg by mouth every evening  10/7/2021 at Unknown time Yes Washington Yang PA-C   gabapentin (NEURONTIN) 600 MG tablet TAKE ONE TABLET BY MOUTH TWICE DAILY  10/8/2021 at Unknown time Yes Washington Yang PA-C   sildenafil (REVATIO) 20 MG tablet TAKE 2 TO 5 TABLETS BY MOUTH AS NEEDED PRIOR TO SEXUAL INTERCOURSE Past Week at Unknown time Yes Washington Yang PA-C   evolocumab  (REPATHA) 140 MG/ML prefilled autoinjector Inject 1 mL (140 mg) Subcutaneous every 14 days 10/1/2021  Sander Galicia MD       The information provided in this note is only as accurate as the sources available at the time of update(s)

## 2021-10-09 NOTE — ED NOTES
Assumed care at this time.    Pt currently on nitroglycerin gtt and heparin gtt. States that chest pain is currently at 3/10. Nitroglycerin gtt titrated per order till pain free.  Otherwise independent, call light at bedside and able to make needs known.    Emilie Salmon RN,.......................................... 10/9/2021   12:01 AM

## 2021-10-09 NOTE — CONSULTS
GASTROENTEROLOGY CONSULTATION     IMPRESSION:  66 year old male with history of CAD s/p PCI in 1998 and 2020, who is currently admitted with NSTEMI. He is now on heparin and there are plans for angiogram on Monday. GI is consulted to review risk of dual antiplatelet therapy given prior GIB.    His course after the 7/2020 PCI was complicated by overt/obscure GI bleeding in 1/2021, where he had black/tarry stools and a hemoglobin jorge of 5.3. At that time, he was on Brilinta, and had a negative EGD / Colon.  Pill cam with some nonspecific findings, including a small erosion, which may or may not have been clinically relevant.  His Brilinta was changed to Plavix, and he thereafter did well, until he apparently stopped this on his own accord 4/2021 given recurrence of black/tarry stools. He further concerns thereafter.    At this time, he is having some bright red blood per rectum, but this can be attributed to hemorrhoids and is likely unrelated to 1/2021 presentation.  We reviewed that the benefits of PCI and dual antiplatelet therapy outweigh the risks, and if there is indeed recurrent GI bleeding while on this, then repeat evaluation can be pursued.     RECOMMENDATIONS:  -- Repeat iron studies.  If ferritin low, would give IV iron. (Has been taking oral iron every other day since 9/9 when ferritin was 17).  -- No plans for endoscopic evaluation at this time. Ok to eat.  -- Fiber supplementation for presumed hemorrhoidal bleeding. Will not be able to pursue further intervention for hemorrhoids while on heparin or antiplatelets.  -- Proceed with angiogram as planned.  If dual antiplatelet therapy needed, would consider plavix over brilinta, as he seemed to do better with this in the past.  -- Will need close monitoring of hemoglobin and iron studies moving forward.    Thank you for this consult.  We will sign off. Please call with  questions.    ---------------------------------------------------------------------------------------------------------------------    REASON FOR CONSULT: History of GI bleeding    HPI:  Johan Navarro is a 66 year old male with history of CAD s/p PCI in 1998 and 2020, who presented to the ED yesterday with chest pain.      After the PCI in 7/2020, he was on Brilinta and ASA.  He later developed an iron deficiency anemia (admission 1/2021, with hemoglobin jorge down to 5.3), with report of intermittent black/tarry stools.  This prompted an EGD and colonoscopy, which did not find source of anemia (see reports below) followed by a pill cam. This demonstrated a single aphthous erosion in the small bowel, a few scattered punctate red spots, and a small inflamed lymphangectasia in the proximal jejunum, but none of this was felt to be clinically significant. During that admission, his Brilinta was stopped and he was started on plavix.     Plavix was then discontinued in 4/2021, when he developed recurrent black/tarry stools. Per documentation, he did this on his own accord, though per patient, it was felt that he was close enough to the one year carmelita, that benefit outweighed risk.  I don't see any dramatic drop in hemoglobin around this time.     Since then, he has done well. It is his baseline to have 3-4 BMs / day. No recent change in frequency. He has had chronic, intermittent, bright red blood per rectum, but this has become more persistent recently, now occurring with every BM.  He had some mild abdominal pain, but attributes this to being hungry. Some nausea with the chest pain / diaphoresis. No vomiting. Lost 10 pounds this summer, which is not uncommon for him given his physical activity. He gets occasional heartburn, no longer on PPI.  No dysphagia. No odynophagia.     ROS: A comprehensive ten point review of systems was negative aside from those in mentioned in the HPI.      PAST MEDICAL HISTORY:  Patient Active  Problem List    Diagnosis Date Noted     Unstable angina (H) 01/06/2021     Priority: Medium     Gastrointestinal hemorrhage, unspecified gastrointestinal hemorrhage type 01/06/2021     Priority: Medium     Abnormal cardiovascular stress test 12/30/2020     Priority: Medium     Added automatically from request for surgery [id number]       Status post coronary angiogram 07/21/2020     Priority: Medium     Malignant neoplasm of prostate (H) 08/17/2017     Priority: Medium     Hx of prostatectomy       Mixed hyperlipidemia      Priority: Medium     familial hyperlipidemia with marked hypercholesterolemia before treatment; has been on some form of cholesterol-lowering medication since the 1970s  Diagnosis updated by automated process. Provider to review and confirm.       Family history of early CAD      Priority: Medium     Aortic stenosis      Priority: Medium     Carotid stenosis      Priority: Medium     left       Coronary artery disease involving native coronary artery of native heart without angina pectoris      Priority: Medium     cardiac cath 1998: stents x 2 to circumflex       Depressive disorder, not elsewhere classified 03/02/2007     Priority: Medium              Elevated prostate specific antigen (PSA) 03/02/2007     Priority: Medium     Osteoarthritis of Hand  03/02/2007     Priority: Medium       MEDICATIONS:   Current Outpatient Medications   Medication Sig Dispense Refill     aspirin (ASA) 81 MG chewable tablet Take 81 mg by mouth every evening        atorvastatin (LIPITOR) 80 MG tablet Take 1 tablet (80 mg) by mouth daily (Patient taking differently: Take 80 mg by mouth every evening ) 90 tablet 0     diclofenac (VOLTAREN) 1 % topical gel Apply 2 g topically 4 times daily as needed for moderate pain (apply to hands and fingers)       fluticasone (FLONASE) 50 MCG/ACT nasal spray Spray 2 sprays into both nostrils daily as needed for rhinitis or allergies 16 g 3     folic acid (FOLVITE) 1 MG tablet TAKE  "ONE TABLET BY MOUTH ONE TIME DAILY  (Patient taking differently: Take 1 mg by mouth every evening ) 90 tablet 1     gabapentin (NEURONTIN) 600 MG tablet TAKE ONE TABLET BY MOUTH TWICE DAILY  180 tablet 1     sildenafil (REVATIO) 20 MG tablet TAKE 2 TO 5 TABLETS BY MOUTH AS NEEDED PRIOR TO SEXUAL INTERCOURSE 90 tablet 0     evolocumab (REPATHA) 140 MG/ML prefilled autoinjector Inject 1 mL (140 mg) Subcutaneous every 14 days 6 mL 3       ALLERGIES:   Allergies   Allergen Reactions     Crestor [Rosuvastatin] GI Disturbance     Dust Mites      Other [No Clinical Screening - See Comments]      Goose feathers     Pollen Extract        SOCIAL HISTORY:  Social History     Tobacco Use     Smoking status: Former Smoker     Packs/day: 0.25     Years: 20.00     Pack years: 5.00     Types: Cigars     Quit date:      Years since quittin.     Smokeless tobacco: Former User     Types: Chew, Snuff     Quit date:      Tobacco comment: 12/10/18: occ chew, not every day   Substance Use Topics     Alcohol use: Not Currently     Comment: very rare, none for 8 years     Drug use: Yes     Types: Marijuana     Comment: daily        FAMILY HISTORY:  Family History   Problem Relation Age of Onset     Lipids Mother      C.A.D. Mother          at age 49 of MI     Dementia Father      C.A.D. Sister         MI at age 48     Cancer - colorectal No family hx of      Prostate Cancer No family hx of      Colon Cancer No family hx of        PHYSICAL EXAM:   BP (!) 160/92   Pulse 96   Temp 97.5  F (36.4  C) (Temporal)   Resp 17   Ht 1.676 m (5' 6\")   Wt 72.6 kg (160 lb)   SpO2 96%   BMI 25.82 kg/m    General: awake, alert, well appearing  HEENT: no scleral icterus, no conjunctival injection  Cardiovascular: normal rate  Chest: no accessory muscle use  Abdomen: soft, nontender, nondistended  Extremities: no edema  Skin: no rashes or lesions      LABORATORY DATA:   I reviewed the patient's new clinical lab test results.   Recent " Labs   Lab Test 10/09/21  0530 10/08/21  2059 09/09/21  1008 01/06/21  1824 01/06/21  1027 01/06/21  0825 07/21/20  0815   WBC 8.0 12.7* 5.2   < > 3.8*   < > 4.5   HGB 12.6* 15.2 13.4   < > 5.3*   < > 11.9*   MCV 88 90 88   < > 64*   < > 87    315 204   < > 208   < > 246   INR  --   --   --   --  1.03  --  0.96    < > = values in this interval not displayed.     Recent Labs   Lab Test 10/09/21  0530 10/08/21  2058 01/08/21  0551   POTASSIUM 4.0 3.9 3.5   CHLORIDE 105 108 105   CO2 24 17* 24   BUN 27 22 14   ANIONGAP 8 17* 7     Recent Labs   Lab Test 10/09/21  0530 01/07/21  0430 01/06/21  1027   ALBUMIN 3.6 3.7 3.9   BILITOTAL 0.5 1.5* 0.7   ALT 36 17 19   AST 52* 19 18       PRIOR ENDOSCOPY / GI EVALUATION    EGD 1/6/2021 for Iron deficiency anemia, report of melena over past                             month   Impression:               - Z-line regular, 38 cm from the incisors.                             - Normal stomach.                             - Normal examined duodenum. Not biopsied given low                             hemoglobin and question of GI bleed.     Colonoscopy 1/7/2021 for Iron deficiency anemia   Impression:               - Two 1 to 4 mm polyps in the ascending colon,                             removed with a cold snare. Resected and retrieved.                             - Moderate diverticulosis in the sigmoid colon and                             in the descending colon.                             - Internal hemorrhoids.                             - The examined portion of the ileum was normal.                             Approximately 10cm of ileum seen.                             - The examination was otherwise normal on direct                             and retroflexion views.                             No findings to explain anemia.                             Light brown fluid in the colon, nothing to suggest                             small bowel bleeding.    Recommendation:           Outpatient small bowel pill camera study.                             Consider CT of the abdomen/pelvis to rule out                             hematoma.                             If pill camera study normal, hematology consult.         OV with Dr. Laws 3/12/2021  Impression A very pleasant 66-year-old man with coronary artery disease history with stenting as well as aortic stenosis, admitted to [facility] with severe anemia with a low MCV. EGD and colonoscopy were unremarkable. Small bowel pill camera study did show an erosion in the mid small bowel, that may have been worse on his initial presentation and on the Brilinta that he was taking could have blood. He is now on Plavix, aspirin, and PPI, and appears stable.    We did an MRI of his liver because the CT scan had shown questionable lesions. He does have benign hemangiomas in the liver, but the MRI incidentally noted the possibility of iron deposition. He is iron deficient, so hemochromatosis seems unlikely, but I will check the serologies for that.    I will recheck the patient's hemoglobin to make sure it is stable as well as hemochromatosis serologies to make sure they are negative. If all are stable, I recommend that he stay on the PPI medication indefinitely and follow up with us on an as-needed basis.     CTA ([place]) 4/26/2021  IMPRESSION:   No acute GI bleed. Incidental multiple hemangiomas of the liver.     Malinda Kang MD  [facility]

## 2021-10-10 ENCOUNTER — APPOINTMENT (OUTPATIENT)
Dept: CARDIOLOGY | Facility: CLINIC | Age: 66
DRG: 247 | End: 2021-10-10
Attending: HOSPITALIST
Payer: COMMERCIAL

## 2021-10-10 LAB
ANION GAP SERPL CALCULATED.3IONS-SCNC: 6 MMOL/L (ref 3–14)
BUN SERPL-MCNC: 19 MG/DL (ref 7–30)
CALCIUM SERPL-MCNC: 9 MG/DL (ref 8.5–10.1)
CHLORIDE BLD-SCNC: 104 MMOL/L (ref 94–109)
CO2 SERPL-SCNC: 26 MMOL/L (ref 20–32)
CREAT SERPL-MCNC: 1 MG/DL (ref 0.66–1.25)
ERYTHROCYTE [DISTWIDTH] IN BLOOD BY AUTOMATED COUNT: 13.7 % (ref 10–15)
FERRITIN SERPL-MCNC: 68 NG/ML (ref 26–388)
GFR SERPL CREATININE-BSD FRML MDRD: 78 ML/MIN/1.73M2
GLUCOSE BLD-MCNC: 102 MG/DL (ref 70–99)
HCT VFR BLD AUTO: 44.9 % (ref 40–53)
HGB BLD-MCNC: 14.9 G/DL (ref 13.3–17.7)
IRON SATN MFR SERPL: 83 % (ref 15–46)
IRON SERPL-MCNC: 277 UG/DL (ref 35–180)
LVEF ECHO: NORMAL
MCH RBC QN AUTO: 28.6 PG (ref 26.5–33)
MCHC RBC AUTO-ENTMCNC: 33.2 G/DL (ref 31.5–36.5)
MCV RBC AUTO: 86 FL (ref 78–100)
PLATELET # BLD AUTO: 266 10E3/UL (ref 150–450)
POTASSIUM BLD-SCNC: 4 MMOL/L (ref 3.4–5.3)
RBC # BLD AUTO: 5.21 10E6/UL (ref 4.4–5.9)
SODIUM SERPL-SCNC: 136 MMOL/L (ref 133–144)
TIBC SERPL-MCNC: 333 UG/DL (ref 240–430)
TROPONIN I SERPL-MCNC: 2.73 UG/L (ref 0–0.04)
UFH PPP CHRO-ACNC: 0.32 IU/ML
WBC # BLD AUTO: 8 10E3/UL (ref 4–11)

## 2021-10-10 PROCEDURE — 210N000002 HC R&B HEART CARE

## 2021-10-10 PROCEDURE — 83550 IRON BINDING TEST: CPT | Performed by: INTERNAL MEDICINE

## 2021-10-10 PROCEDURE — 255N000002 HC RX 255 OP 636: Performed by: HOSPITALIST

## 2021-10-10 PROCEDURE — 250N000011 HC RX IP 250 OP 636: Performed by: INTERNAL MEDICINE

## 2021-10-10 PROCEDURE — 99233 SBSQ HOSP IP/OBS HIGH 50: CPT | Performed by: INTERNAL MEDICINE

## 2021-10-10 PROCEDURE — 82728 ASSAY OF FERRITIN: CPT | Performed by: INTERNAL MEDICINE

## 2021-10-10 PROCEDURE — 93306 TTE W/DOPPLER COMPLETE: CPT | Mod: 26 | Performed by: INTERNAL MEDICINE

## 2021-10-10 PROCEDURE — 84484 ASSAY OF TROPONIN QUANT: CPT | Performed by: INTERNAL MEDICINE

## 2021-10-10 PROCEDURE — 250N000013 HC RX MED GY IP 250 OP 250 PS 637: Performed by: HOSPITALIST

## 2021-10-10 PROCEDURE — 85520 HEPARIN ASSAY: CPT | Performed by: INTERNAL MEDICINE

## 2021-10-10 PROCEDURE — 80048 BASIC METABOLIC PNL TOTAL CA: CPT | Performed by: INTERNAL MEDICINE

## 2021-10-10 PROCEDURE — 36415 COLL VENOUS BLD VENIPUNCTURE: CPT | Performed by: INTERNAL MEDICINE

## 2021-10-10 PROCEDURE — 250N000013 HC RX MED GY IP 250 OP 250 PS 637: Performed by: INTERNAL MEDICINE

## 2021-10-10 PROCEDURE — 85027 COMPLETE CBC AUTOMATED: CPT | Performed by: HOSPITALIST

## 2021-10-10 PROCEDURE — 999N000208 ECHOCARDIOGRAM COMPLETE

## 2021-10-10 RX ORDER — METOPROLOL TARTRATE 25 MG/1
25 TABLET, FILM COATED ORAL 2 TIMES DAILY
Status: DISCONTINUED | OUTPATIENT
Start: 2021-10-10 | End: 2021-10-12

## 2021-10-10 RX ORDER — POLYETHYLENE GLYCOL 3350 17 G/17G
17 POWDER, FOR SOLUTION ORAL DAILY PRN
Status: DISCONTINUED | OUTPATIENT
Start: 2021-10-10 | End: 2021-10-12 | Stop reason: HOSPADM

## 2021-10-10 RX ADMIN — ATORVASTATIN CALCIUM 80 MG: 80 TABLET, FILM COATED ORAL at 21:28

## 2021-10-10 RX ADMIN — METOPROLOL TARTRATE 25 MG: 25 TABLET, FILM COATED ORAL at 21:29

## 2021-10-10 RX ADMIN — METOPROLOL TARTRATE 12.5 MG: 25 TABLET, FILM COATED ORAL at 09:44

## 2021-10-10 RX ADMIN — HUMAN ALBUMIN MICROSPHERES AND PERFLUTREN 3 ML: 10; .22 INJECTION, SOLUTION INTRAVENOUS at 08:57

## 2021-10-10 RX ADMIN — POLYETHYLENE GLYCOL 3350 17 G: 17 POWDER, FOR SOLUTION ORAL at 18:00

## 2021-10-10 RX ADMIN — ASPIRIN 81 MG CHEWABLE TABLET 81 MG: 81 TABLET CHEWABLE at 21:28

## 2021-10-10 RX ADMIN — LORAZEPAM 0.5 MG: 0.5 TABLET ORAL at 16:40

## 2021-10-10 RX ADMIN — FOLIC ACID 1 MG: 1 TABLET ORAL at 21:29

## 2021-10-10 RX ADMIN — METOPROLOL TARTRATE 12.5 MG: 25 TABLET, FILM COATED ORAL at 11:50

## 2021-10-10 RX ADMIN — LORAZEPAM 1 MG: 2 INJECTION INTRAMUSCULAR; INTRAVENOUS at 21:29

## 2021-10-10 RX ADMIN — LORAZEPAM 1 MG: 2 INJECTION INTRAMUSCULAR; INTRAVENOUS at 00:03

## 2021-10-10 RX ADMIN — PSYLLIUM HUSK 1 PACKET: 3.4 POWDER ORAL at 11:51

## 2021-10-10 ASSESSMENT — MIFFLIN-ST. JEOR: SCORE: 1353.7

## 2021-10-10 NOTE — PROGRESS NOTES
[facility]    Medicine Progress Note - Hospitalist Service       Date of Admission:  10/8/2021  Assessment & Plan   Johan Navarro is a 66 year-old male with history of coronary artery disease (with previous PCI in both 1998 and 2020), iron deficiency anemia with history of GI bleed of unclear source who presents with chest pain and diaphoresis.  Admitted on 10/8/2021.       NSTEMI  Coronary artery disease with history of PCI   *Presents with a 1 day history of chest pressure and diaphoresis, worsening at rest, improved with nitroglycerin.   *Initial troponin <0.015, subsequent 3.794  - Cardiology consulted, plan for angiogram 10/11  - Continue heparin drip, aspirin, atorvastatin  - Initially required nitroglycerin drip, no chest pain off of nitroglycerin drip   - Troponin peaked at 3.915, now trending down. No further checks neeeded.   - Echocardiogram pending  - Increased metoprolol to 25 mg BID; has plenty of HR and BP room to continue to titrate as needed.   - NPO at midnight ordered     Bright red blood per rectum, suspect hemorrhoidal bleeding  Internal hemorrhoids  Iron deficiency anemia  History of GI bleed with unclear source  *Admission for GI bleed 1/2021.  EGD unremarkable.  Colonoscopy with diverticulosis, internal hemorrhoids, no clear source of active bleeding.  Reportedly underwent PillCam which was negative.  Also saw gastroenterology through [facility] and had CT enterography which again was reportedly unremarkable.  Has since had dark stools which he attributes to his iron supplementation, hemoglobin has otherwise been most recently 13.5 g/dL 9/9/2021.  *Reports 2-3 days of bright red blood in bowl and on wipe, none mixed with stool.  Reports some firmer stools, suspect this is secondary to known internal hemorrhoids  *Admission hemoglobin of 15.2 g/dl likely hemoconcentrated, had not been that high since 2004. Subsequent 12.6 g/dl after IVF more in line with recent  values.   - [facility] consulted per cardiology recommendations to risk stratify for DAPT ahead of planned angiogram. No plans for endoscopy and cleared for DAPT as needed, recommended to consider clopidogrel over ticagrelor   - Hemoglobin stable  - Repeat iron studies ordered with adequate iron stores  - Ordered fiber supplementation for hemorrhoidal bleeding   - Consented for blood if needed, though not anticipated     Leukocytosis  Suspect stress response in setting of NSTEMI.  *Procalcitonin undetectable  - WBC remains normal      Acute kidney injury, prerenal   Baseline creatinine around 1.  Creatinine 1.32 on admission.  Likely prerenal due to diaphoresis, both in the setting of NSTEMI and patient reported he has frequent hot tub sessions, including two on the date of his symptoms.   - Creatinine improved to baseline  - Monitor BMP    Anxiety  Situational due to acute illness, prolonged time in emergency department waiting for bed.  - Lorazepam PRN    Chronic:  1.  Prostate cancer  2.  Arthritis.  Continue Voltaren gel, gabapentin  3.  History of depression      Clinically Significant Risk Factors Present on Admission                   Diet: Low Saturated Fat Na <2400 mg    DVT Prophylaxis: Heparin drip   Salvador Catheter: Not present  Code Status: Full Code      Disposition Plan   Expected discharge: Pending angiogram 10/11  Entered: Ferny Boyd MD 10/10/2021, 11:05 AM       The patient's care was discussed with the Patient and Patient's wife.     Ferny Boyd MD  Hospitalist Service  [facility]    ______________________________________________________________________    Interval History   No acute events overnight.  Denies any further chest pain or diaphoresis.  Reports he had another firm stool, which was dark though at his baseline and no bright red blood seen.  Anxiety over his situation improved today.  Tolerating diet.     Data reviewed today: I reviewed all medications, new  labs and imaging results over the last 24 hours. I personally reviewed no images or EKG's today.    Physical Exam   Vital Signs: Temp: 98.3  F (36.8  C) Temp src: Oral BP: (!) 130/99 Pulse: 102   Resp: 18 SpO2: 98 % O2 Device: None (Room air)    Weight: 139 lbs 1.6 oz    Constitutional: Well-appearing, NAD  Respiratory: Clear to auscultation bilaterally, good air movement bilaterally  Cardiovascular: RRR. No peripheral edema.  GI: Soft, non-tender, non-distended.   Skin/Integumen: Warm, dry  Other:     Data   Recent Labs   Lab 10/10/21  0612 10/10/21  0347 10/09/21  1953 10/09/21  1315 10/09/21  0530 10/09/21  0530 10/08/21  2059 10/08/21  2058 10/08/21  2058   WBC 8.0  --   --   --   --  8.0 12.7*  --   --    HGB 14.9  --  12.8*  --   --  12.6* 15.2   < >  --    MCV 86  --   --   --   --  88 90  --   --      --   --   --   --  268 315  --   --      --   --   --   --  137  --   --  142   POTASSIUM 4.0  --   --   --   --  4.0  --   --  3.9   CHLORIDE 104  --   --   --   --  105  --   --  108   CO2 26  --   --   --   --  24  --   --  17*   BUN 19  --   --   --   --  27  --   --  22   CR 1.00  --   --   --   --  1.21  --   --  1.32*   ANIONGAP 6  --   --   --   --  8  --   --  17*   ONDINA 9.0  --   --   --   --  8.2*  --   --  8.9   *  --   --   --   --  79  --   --  97   ALBUMIN  --   --   --   --   --  3.6  --   --   --    PROTTOTAL  --   --   --   --   --  6.8  --   --   --    BILITOTAL  --   --   --   --   --  0.5  --   --   --    ALKPHOS  --   --   --   --   --  75  --   --   --    ALT  --   --   --   --   --  36  --   --   --    AST  --   --   --   --   --  52*  --   --   --    TROPONIN  --  2.734* 2.931* 3.915*   < > 3.794*  --    < > <0.015    < > = values in this interval not displayed.       No results found for this or any previous visit (from the past 24 hour(s)).  Medications     heparin 800 Units/hr (10/09/21 3029)     nitroGLYcerin Stopped (10/09/21 3396)     - MEDICATION INSTRUCTIONS -          aspirin  81 mg Oral QPM     atorvastatin  80 mg Oral QPM     folic acid  1 mg Oral QPM     gabapentin  600 mg Oral BID     metoprolol tartrate  12.5 mg Oral Once     metoprolol tartrate  25 mg Oral BID     sodium chloride (PF)  3 mL Intracatheter Q8H

## 2021-10-10 NOTE — PROGRESS NOTES
[facility]    Cardiology Consultation     Date of Admission:  10/8/2021    Assessment & Plan   Johan Navarro is a 66 year old male who was admitted on 10/8/2021. I was asked to see the patient for a non-ST elevation myocardial infarction.    The patient is currently frustrated that he has been in the ER for prolonged period of time due to lack of bed availability.  He stated multiple times that he wants to leave.  He is frustrated that the bed is not comfortable.  He became increasingly agitated and anxious during my visit.    Overall the patient is very pleasant but is frustrated about his experience.    Patient has a known history of coronary artery disease and is status post multiple PCI's.  He had an acute presentation of diaphoresis, chest pain and shortness of breath.  Troponin is elevated with a trend that would be consistent with a non-ST elevation myocardial infarction.  EKG does not note significant ST elevation.  Troponin has trended to 3.7.      The patient has been started on heparin and is currently asymptomatic.    The patient has residual disease after I have reviewed his coronary angiogram.  I suspect that this represents a new acute coronary syndrome.  He needs to have an angiogram on Monday.    Further complicating matters would be the fact that the patient has issues with anemia.  He has had presumed GI bleeding from a possible upper GI source in the past.  Over the past 2 days he has had bright red blood per rectum with stooling.  He states he has 4 bowel movements per day on occasion and these have been associated with bright red blood.    #1 NSTEMI, prior history of CAD with PCI to OM2 in 2020, prior PCI to the distal circumflex which supplies a LPDA    During his last PCI his distal circumflex which supplies his large left PDA was jailed by a stent.  The ostium of this branch appears to be somewhat compromised.  Additionally the patient has potential significant  lesion at the ostium of his right coronary.  It should be noted he has residual disease and has substrate for further acute coronary syndromes.    #2 Lower GI bleeding - Bright red blood per rectum with bowel movements yesterday  #3 Concern for history of upper GI Bleeding - prior history of melena with multiple endoscopies and colonoscopies and pill endoscopy - no source found  #4 Patient reported history of familial hypercholesterolemia  #5 MARCUS    Plan:  Continue aspirin, heparin    Continue Lipitor    Continue metoprolol    Echocardiogram pending    Patient is happier today.  Yesterday he was upset about being in the ER for a long time waiting on a bed.  No concerns about leaving AMA today.    Coronary angiogram tomorrow     Use Plavix if DAPT is required (per GI consult-appreciate their input)    Edson Hagan MD     Code Status    Full Code    Reason for Consult   Reason for consult: Non-ST elevation myocardial infarction    Primary Care Physician   Washington Yang    Physical Exam   Temp: 98.3  F (36.8  C) Temp src: Oral BP: (!) 130/99 Pulse: 102   Resp: 18 SpO2: 98 % O2 Device: None (Room air)    Vital Signs with Ranges  Temp:  [98.1  F (36.7  C)-98.3  F (36.8  C)] 98.3  F (36.8  C)  Pulse:  [] 102  Resp:  [9-18] 18  BP: (130-164)/() 130/99  SpO2:  [94 %-98 %] 98 %  139 lbs 1.6 oz    GENERAL APPEARANCE: Alert and oriented x3. No acute distress.  SKIN: Inspection of the skin reveals no rashes, ulcerations, warm, dry  NECK: Supple and symmetric.   Normal JVP  LUNGS: Clear breath sounds throughout bilaterally, no wheezes, no rhonchi  CARDIOVASCULAR: S1, S2, regular rate and rhythm without any murmurs, gallops, rubs. The carotid pulses were normal and 2+ bilaterally without bruits. Peripheral pulses were 2+ and symmetric.  ABDOMEN: Soft, non-tender, non-distended with normal bowel sounds.  No ascites noted.  EXTREMITIES: No edema.  NEUROLOGIC:  Normal mood and affect.  Sensation to touch  was normal.      Data   Results for orders placed or performed during the hospital encounter of 10/08/21 (from the past 24 hour(s))   Troponin I   Result Value Ref Range    Troponin I 3.915 (HH) 0.000 - 0.045 ug/L   Gastroenterology IP Consult: Lower GI bleeding; risk stratification for DAPT pre-angiogram per cardiology request; Consultant may enter orders: Yes; Patient to be seen: Routine - within 24 hours; Requested Clinic/Group: [facility]; Requesting provider? ...    Narrative    Malinda Kang MD     10/9/2021  6:03 PM  GASTROENTEROLOGY CONSULTATION     IMPRESSION:  66 year old male with history of CAD s/p PCI in 1998   and 2020, who is currently admitted with NSTEMI. He is now on   heparin and there are plans for angiogram on Monday. GI is   consulted to review risk of dual antiplatelet therapy given prior   GIB.    His course after the 7/2020 PCI was complicated by overt/obscure   GI bleeding in 1/2021, where he had black/tarry stools and a   hemoglobin jorge of 5.3. At that time, he was on Brilinta, and   had a negative EGD / Colon.  Pill cam with some nonspecific   findings, including a small erosion, which may or may not have   been clinically relevant.  His Brilinta was changed to Plavix,   and he thereafter did well, until he apparently stopped this on   his own accord 4/2021 given recurrence of black/tarry stools. He   further concerns thereafter.    At this time, he is having some bright red blood per rectum, but   this can be attributed to hemorrhoids and is likely unrelated to   1/2021 presentation.  We reviewed that the benefits of PCI and   dual antiplatelet therapy outweigh the risks, and if there is   indeed recurrent GI bleeding while on this, then repeat   evaluation can be pursued.     RECOMMENDATIONS:  -- Repeat iron studies.  If ferritin low, would give IV iron.   (Has been taking oral iron every other day since 9/9 when   ferritin was 17).  -- No plans for endoscopic evaluation at this time. Ok  to eat.  -- Fiber supplementation for presumed hemorrhoidal bleeding. Will   not be able to pursue further intervention for hemorrhoids while   on heparin or antiplatelets.  -- Proceed with angiogram as planned.  If dual antiplatelet   therapy needed, would consider plavix over brilinta, as he seemed   to do better with this in the past.  -- Will need close monitoring of hemoglobin and iron studies   moving forward.    Thank you for this consult.  We will sign off. Please call with   questions.    ------------------------------------------------------------------  ---------------------------------------------------    REASON FOR CONSULT: History of GI bleeding    HPI:  Johan Navarro is a 66 year old male with history of CAD s/p   PCI in 1998 and 2020, who presented to the ED yesterday with   chest pain.      After the PCI in 7/2020, he was on Brilinta and ASA.  He later   developed an iron deficiency anemia (admission 1/2021, with   hemoglobin jorge down to 5.3), with report of intermittent   black/tarry stools.  This prompted an EGD and colonoscopy, which   did not find source of anemia (see reports below) followed by a   pill cam. This demonstrated a single aphthous erosion in the   small bowel, a few scattered punctate red spots, and a small   inflamed lymphangectasia in the proximal jejunum, but none of   this was felt to be clinically significant. During that   admission, his Brilinta was stopped and he was started on plavix.       Plavix was then discontinued in 4/2021, when he developed   recurrent black/tarry stools. Per documentation, he did this on   his own accord, though per patient, it was felt that he was close   enough to the one year carmelita, that benefit outweighed risk.  I   don't see any dramatic drop in hemoglobin around this time.     Since then, he has done well. It is his baseline to have 3-4 BMs   / day. No recent change in frequency. He has had chronic,   intermittent, bright red blood per  rectum, but this has become   more persistent recently, now occurring with every BM.  He had   some mild abdominal pain, but attributes this to being hungry.   Some nausea with the chest pain / diaphoresis. No vomiting. Lost   10 pounds this summer, which is not uncommon for him given his   physical activity. He gets occasional heartburn, no longer on   PPI.  No dysphagia. No odynophagia.     ROS: A comprehensive ten point review of systems was negative   aside from those in mentioned in the HPI.      PAST MEDICAL HISTORY:  Patient Active Problem List    Diagnosis Date Noted     Unstable angina (H) 01/06/2021     Priority: Medium     Gastrointestinal hemorrhage, unspecified gastrointestinal   hemorrhage type 01/06/2021     Priority: Medium     Abnormal cardiovascular stress test 12/30/2020     Priority: Medium     Added automatically from request for surgery [id number]       Status post coronary angiogram 07/21/2020     Priority: Medium     Malignant neoplasm of prostate (H) 08/17/2017     Priority: Medium     Hx of prostatectomy       Mixed hyperlipidemia      Priority: Medium     familial hyperlipidemia with marked hypercholesterolemia before   treatment; has been on some form of cholesterol-lowering   medication since the 1970s  Diagnosis updated by automated process. Provider to review and   confirm.       Family history of early CAD      Priority: Medium     Aortic stenosis      Priority: Medium     Carotid stenosis      Priority: Medium     left       Coronary artery disease involving native coronary artery of   native heart without angina pectoris      Priority: Medium     cardiac cath 1998: stents x 2 to circumflex       Depressive disorder, not elsewhere classified 03/02/2007     Priority: Medium              Elevated prostate specific antigen (PSA) 03/02/2007     Priority: Medium     Osteoarthritis of Hand  03/02/2007     Priority: Medium       MEDICATIONS:   Current Outpatient Medications   Medication Sig  Dispense Refill     aspirin (ASA) 81 MG chewable tablet Take 81 mg by mouth every   evening        atorvastatin (LIPITOR) 80 MG tablet Take 1 tablet (80 mg) by   mouth daily (Patient taking differently: Take 80 mg by mouth   every evening ) 90 tablet 0     diclofenac (VOLTAREN) 1 % topical gel Apply 2 g topically 4   times daily as needed for moderate pain (apply to hands and   fingers)       fluticasone (FLONASE) 50 MCG/ACT nasal spray Spray 2 sprays   into both nostrils daily as needed for rhinitis or allergies 16 g   3     folic acid (FOLVITE) 1 MG tablet TAKE ONE TABLET BY MOUTH ONE   TIME DAILY  (Patient taking differently: Take 1 mg by mouth every   evening ) 90 tablet 1     gabapentin (NEURONTIN) 600 MG tablet TAKE ONE TABLET BY MOUTH   TWICE DAILY  180 tablet 1     sildenafil (REVATIO) 20 MG tablet TAKE 2 TO 5 TABLETS BY MOUTH   AS NEEDED PRIOR TO SEXUAL INTERCOURSE 90 tablet 0     evolocumab (REPATHA) 140 MG/ML prefilled autoinjector Inject 1   mL (140 mg) Subcutaneous every 14 days 6 mL 3       ALLERGIES:   Allergies   Allergen Reactions     Crestor [Rosuvastatin] GI Disturbance     Dust Mites      Other [No Clinical Screening - See Comments]      Goose feathers     Pollen Extract        SOCIAL HISTORY:  Social History     Tobacco Use     Smoking status: Former Smoker     Packs/day: 0.25     Years: 20.00     Pack years: 5.00     Types: Cigars     Quit date:      Years since quittin.7     Smokeless tobacco: Former User     Types: Chew, Snuff     Quit date:      Tobacco comment: 12/10/18: occ chew, not every day   Substance Use Topics     Alcohol use: Not Currently     Comment: very rare, none for 8 years     Drug use: Yes     Types: Marijuana     Comment: daily        FAMILY HISTORY:  Family History   Problem Relation Age of Onset     Lipids Mother      C.A.D. Mother          at age 49 of MI     Dementia Father      C.A.D. Sister         MI at age 48     Cancer - colorectal No family hx of   "    Prostate Cancer No family hx of      Colon Cancer No family hx of        PHYSICAL EXAM:   BP (!) 160/92   Pulse 96   Temp 97.5  F (36.4  C) (Temporal)     Resp 17   Ht 1.676 m (5' 6\")   Wt 72.6 kg (160 lb)   SpO2 96%     BMI 25.82 kg/m    General: awake, alert, well appearing  HEENT: no scleral icterus, no conjunctival injection  Cardiovascular: normal rate  Chest: no accessory muscle use  Abdomen: soft, nontender, nondistended  Extremities: no edema  Skin: no rashes or lesions      LABORATORY DATA:   I reviewed the patient's new clinical lab test results.   Recent Labs   Lab Test 10/09/21  0530 10/08/21  2059 09/09/21  1008 01/06/21  1824 01/06/21  1027 01/06/21  0825 07/21/20  0815   WBC 8.0 12.7* 5.2   < > 3.8*   < > 4.5   HGB 12.6* 15.2 13.4   < > 5.3*   < > 11.9*   MCV 88 90 88   < > 64*   < > 87    315 204   < > 208   < > 246   INR  --   --   --   --  1.03  --  0.96    < > = values in this interval not displayed.     Recent Labs   Lab Test 10/09/21  0530 10/08/21  2058 01/08/21  0551   POTASSIUM 4.0 3.9 3.5   CHLORIDE 105 108 105   CO2 24 17* 24   BUN 27 22 14   ANIONGAP 8 17* 7     Recent Labs   Lab Test 10/09/21  0530 01/07/21  0430 01/06/21  1027   ALBUMIN 3.6 3.7 3.9   BILITOTAL 0.5 1.5* 0.7   ALT 36 17 19   AST 52* 19 18       PRIOR ENDOSCOPY / GI EVALUATION    EGD 1/6/2021 for Iron deficiency anemia, report of melena over   past                             month   Impression:               - Z-line regular, 38 cm from the   incisors.                             - Normal stomach.                             - Normal examined duodenum. Not   biopsied given low                             hemoglobin and question of GI bleed.     Colonoscopy 1/7/2021 for Iron deficiency anemia   Impression:               - Two 1 to 4 mm polyps in the ascending   colon,                             removed with a cold snare. Resected and   retrieved.                             - Moderate diverticulosis in " the   sigmoid colon and                             in the descending colon.                             - Internal hemorrhoids.                             - The examined portion of the ileum was   normal.                             Approximately 10cm of ileum seen.                             - The examination was otherwise normal   on direct                             and retroflexion views.                             No findings to explain anemia.                             Light brown fluid in the colon, nothing   to suggest                             small bowel bleeding.   Recommendation:           Outpatient small bowel pill camera   study.                             Consider CT of the abdomen/pelvis to   rule out                             hematoma.                             If pill camera study normal, hematology   consult.         OV with Dr. Laws 3/12/2021  Impression A very pleasant 66-year-old man with coronary artery   disease history with stenting as well as aortic stenosis,   admitted to [facility] with severe anemia with a low MCV.   EGD and colonoscopy were unremarkable. Small bowel pill camera   study did show an erosion in the mid small bowel, that may have   been worse on his initial presentation and on the Brilinta that   he was taking could have blood. He is now on Plavix, aspirin, and   PPI, and appears stable.    We did an MRI of his liver because the CT scan had shown   questionable lesions. He does have benign hemangiomas in the   liver, but the MRI incidentally noted the possibility of iron   deposition. He is iron deficient, so hemochromatosis seems   unlikely, but I will check the serologies for that.    I will recheck the patient's hemoglobin to make sure it is stable   as well as hemochromatosis serologies to make sure they are   negative. If all are stable, I recommend that he stay on the PPI   medication indefinitely and follow up with us on an as-needed   basis.      CTA ([place]) 4/26/2021  IMPRESSION:   No acute GI bleed. Incidental multiple hemangiomas of the liver.     Malinda Kang MD  [facility]     Heparin Unfractionated Anti Xa Level   Result Value Ref Range    Anti Xa Unfractionated Heparin 0.23 For Reference Range, See Comment IU/mL    Narrative    Therapeutic Range: UFH: 0.25-0.50 IU/mL for low intensity dosing,  0.30-0.70 IU/mL for high intensity dosing DVT and PE.  This test is not validated for other direct factor X inhibitors (e.g. rivaroxaban, apixaban, edoxaban, betrixaban, fondaparinux) and should not be used for monitoring of other medications.   Hemoglobin   Result Value Ref Range    Hemoglobin 12.8 (L) 13.3 - 17.7 g/dL   Troponin I   Result Value Ref Range    Troponin I 2.931 (HH) 0.000 - 0.045 ug/L   Heparin Unfractionated Anti Xa Level   Result Value Ref Range    Anti Xa Unfractionated Heparin 0.27 For Reference Range, See Comment IU/mL    Narrative    Therapeutic Range: UFH: 0.25-0.50 IU/mL for low intensity dosing,  0.30-0.70 IU/mL for high intensity dosing DVT and PE.  This test is not validated for other direct factor X inhibitors (e.g. rivaroxaban, apixaban, edoxaban, betrixaban, fondaparinux) and should not be used for monitoring of other medications.   Heparin Unfractionated Anti Xa Level   Result Value Ref Range    Anti Xa Unfractionated Heparin 0.32 For Reference Range, See Comment IU/mL    Narrative    Therapeutic Range: UFH: 0.25-0.50 IU/mL for low intensity dosing,  0.30-0.70 IU/mL for high intensity dosing DVT and PE.  This test is not validated for other direct factor X inhibitors (e.g. rivaroxaban, apixaban, edoxaban, betrixaban, fondaparinux) and should not be used for monitoring of other medications.   Troponin I   Result Value Ref Range    Troponin I 2.734 (HH) 0.000 - 0.045 ug/L   CBC with platelets   Result Value Ref Range    WBC Count 8.0 4.0 - 11.0 10e3/uL    RBC Count 5.21 4.40 - 5.90 10e6/uL    Hemoglobin 14.9 13.3  - 17.7 g/dL    Hematocrit 44.9 40.0 - 53.0 %    MCV 86 78 - 100 fL    MCH 28.6 26.5 - 33.0 pg    MCHC 33.2 31.5 - 36.5 g/dL    RDW 13.7 10.0 - 15.0 %    Platelet Count 266 150 - 450 10e3/uL   Basic metabolic panel   Result Value Ref Range    Sodium 136 133 - 144 mmol/L    Potassium 4.0 3.4 - 5.3 mmol/L    Chloride 104 94 - 109 mmol/L    Carbon Dioxide (CO2) 26 20 - 32 mmol/L    Anion Gap 6 3 - 14 mmol/L    Urea Nitrogen 19 7 - 30 mg/dL    Creatinine 1.00 0.66 - 1.25 mg/dL    Calcium 9.0 8.5 - 10.1 mg/dL    Glucose 102 (H) 70 - 99 mg/dL    GFR Estimate 78 >60 mL/min/1.73m2   Ferritin   Result Value Ref Range    Ferritin 68 26 - 388 ng/mL   Iron and iron binding capacity   Result Value Ref Range    Iron 277 (H) 35 - 180 ug/dL    Iron Binding Capacity 333 240 - 430 ug/dL    Iron Sat Index 83 (H) 15 - 46 %

## 2021-10-10 NOTE — PROGRESS NOTES
Patient vitally stable through shift today, denies chest pain and remains on heparin gtt at 800units/hour.  TTE completed this morning, EF @ 60-65%.  No reports of bloody stools today.  Pt repported probable constipation so MD ordered Metamucil, and later Miralax per request, which was given late this afternoon.  Plan for CL tomorrow for [facility].  NPO at [place].

## 2021-10-10 NOTE — PLAN OF CARE
[id number]: A&Ox4. VSS on RA. Tele SR. Denies CP or shortness of breath. LS clear. Heparin infusing. Reports anxiety, given prn ativan x2. No bloody stools overnight. Trending hgb, stable. Up independently. Plan for angio Monday.

## 2021-10-10 NOTE — PROGRESS NOTES
Pt arrived to unit from ED approx 1700, A&O, pleasant and no C/o chest pain.  VS taken, tele SR, afebrile. Heparin gtt running at 800 units/hr.  Xa, Troponin, Hgb to be drawn soon (2000). No report of bloody rectal output today per pt. GI seen.  Plan for pt to receive [facility] Monday

## 2021-10-10 NOTE — PROGRESS NOTES
Pt A&O, stable with no acute events today.  AICD implant site stable, with dressing CDI.  No ecchymosis or edema noted at the site. Visited with wife, Stephie, most of day, ambulated around unit with spouse with no complaints from exertion.  Plan for pt to remain tomorrow, and receive [facility] Monday, providing renal values are acceptable.

## 2021-10-11 ENCOUNTER — TELEPHONE (OUTPATIENT)
Dept: CARDIOLOGY | Facility: CLINIC | Age: 66
End: 2021-10-11

## 2021-10-11 LAB
ACT BLD: 288 SECONDS (ref 74–150)
ACT BLD: 312 SECONDS (ref 74–150)
ACT BLD: 429 SECONDS (ref 74–150)
ANION GAP SERPL CALCULATED.3IONS-SCNC: 5 MMOL/L (ref 3–14)
BUN SERPL-MCNC: 15 MG/DL (ref 7–30)
CALCIUM SERPL-MCNC: 8.7 MG/DL (ref 8.5–10.1)
CHLORIDE BLD-SCNC: 106 MMOL/L (ref 94–109)
CO2 SERPL-SCNC: 26 MMOL/L (ref 20–32)
CREAT SERPL-MCNC: 0.92 MG/DL (ref 0.66–1.25)
ERYTHROCYTE [DISTWIDTH] IN BLOOD BY AUTOMATED COUNT: 13.5 % (ref 10–15)
GFR SERPL CREATININE-BSD FRML MDRD: 86 ML/MIN/1.73M2
GLUCOSE BLD-MCNC: 88 MG/DL (ref 70–99)
HCT VFR BLD AUTO: 41.9 % (ref 40–53)
HGB BLD-MCNC: 14.1 G/DL (ref 13.3–17.7)
MCH RBC QN AUTO: 29.3 PG (ref 26.5–33)
MCHC RBC AUTO-ENTMCNC: 33.7 G/DL (ref 31.5–36.5)
MCV RBC AUTO: 87 FL (ref 78–100)
PLATELET # BLD AUTO: 230 10E3/UL (ref 150–450)
POTASSIUM BLD-SCNC: 4 MMOL/L (ref 3.4–5.3)
RBC # BLD AUTO: 4.82 10E6/UL (ref 4.4–5.9)
SODIUM SERPL-SCNC: 137 MMOL/L (ref 133–144)
UFH PPP CHRO-ACNC: 0.25 IU/ML
WBC # BLD AUTO: 5.6 10E3/UL (ref 4–11)

## 2021-10-11 PROCEDURE — 93458 L HRT ARTERY/VENTRICLE ANGIO: CPT | Performed by: INTERNAL MEDICINE

## 2021-10-11 PROCEDURE — 93005 ELECTROCARDIOGRAM TRACING: CPT

## 2021-10-11 PROCEDURE — 4A033BC MEASUREMENT OF ARTERIAL PRESSURE, CORONARY, PERCUTANEOUS APPROACH: ICD-10-PCS | Performed by: INTERNAL MEDICINE

## 2021-10-11 PROCEDURE — 250N000013 HC RX MED GY IP 250 OP 250 PS 637: Performed by: HOSPITALIST

## 2021-10-11 PROCEDURE — C9600 PERC DRUG-EL COR STENT SING: HCPCS | Mod: LC | Performed by: INTERNAL MEDICINE

## 2021-10-11 PROCEDURE — 99232 SBSQ HOSP IP/OBS MODERATE 35: CPT | Performed by: INTERNAL MEDICINE

## 2021-10-11 PROCEDURE — 99152 MOD SED SAME PHYS/QHP 5/>YRS: CPT | Performed by: INTERNAL MEDICINE

## 2021-10-11 PROCEDURE — 250N000013 HC RX MED GY IP 250 OP 250 PS 637: Performed by: INTERNAL MEDICINE

## 2021-10-11 PROCEDURE — 4A0335C MEASUREMENT OF ARTERIAL FLOW, CORONARY, PERCUTANEOUS APPROACH: ICD-10-PCS | Performed by: INTERNAL MEDICINE

## 2021-10-11 PROCEDURE — 93571 IV DOP VEL&/PRESS C FLO 1ST: CPT | Mod: 26 | Performed by: INTERNAL MEDICINE

## 2021-10-11 PROCEDURE — 93458 L HRT ARTERY/VENTRICLE ANGIO: CPT | Mod: 26 | Performed by: INTERNAL MEDICINE

## 2021-10-11 PROCEDURE — 4A02XM4 MEASUREMENT OF CARDIAC TOTAL ACTIVITY, EXTERNAL APPROACH: ICD-10-PCS | Performed by: INTERNAL MEDICINE

## 2021-10-11 PROCEDURE — C1874 STENT, COATED/COV W/DEL SYS: HCPCS | Performed by: INTERNAL MEDICINE

## 2021-10-11 PROCEDURE — 258N000003 HC RX IP 258 OP 636: Performed by: INTERNAL MEDICINE

## 2021-10-11 PROCEDURE — 99153 MOD SED SAME PHYS/QHP EA: CPT | Performed by: INTERNAL MEDICINE

## 2021-10-11 PROCEDURE — 36415 COLL VENOUS BLD VENIPUNCTURE: CPT | Performed by: INTERNAL MEDICINE

## 2021-10-11 PROCEDURE — 4A023N7 MEASUREMENT OF CARDIAC SAMPLING AND PRESSURE, LEFT HEART, PERCUTANEOUS APPROACH: ICD-10-PCS | Performed by: INTERNAL MEDICINE

## 2021-10-11 PROCEDURE — 92928 PRQ TCAT PLMT NTRAC ST 1 LES: CPT | Mod: LC | Performed by: INTERNAL MEDICINE

## 2021-10-11 PROCEDURE — 85347 COAGULATION TIME ACTIVATED: CPT

## 2021-10-11 PROCEDURE — C1725 CATH, TRANSLUMIN NON-LASER: HCPCS | Performed by: INTERNAL MEDICINE

## 2021-10-11 PROCEDURE — 027134Z DILATION OF CORONARY ARTERY, TWO ARTERIES WITH DRUG-ELUTING INTRALUMINAL DEVICE, PERCUTANEOUS APPROACH: ICD-10-PCS | Performed by: INTERNAL MEDICINE

## 2021-10-11 PROCEDURE — 250N000011 HC RX IP 250 OP 636: Performed by: INTERNAL MEDICINE

## 2021-10-11 PROCEDURE — 250N000009 HC RX 250: Performed by: INTERNAL MEDICINE

## 2021-10-11 PROCEDURE — C1887 CATHETER, GUIDING: HCPCS | Performed by: INTERNAL MEDICINE

## 2021-10-11 PROCEDURE — 250N000011 HC RX IP 250 OP 636: Performed by: HOSPITALIST

## 2021-10-11 PROCEDURE — 80048 BASIC METABOLIC PNL TOTAL CA: CPT | Performed by: INTERNAL MEDICINE

## 2021-10-11 PROCEDURE — 99232 SBSQ HOSP IP/OBS MODERATE 35: CPT | Mod: 25 | Performed by: INTERNAL MEDICINE

## 2021-10-11 PROCEDURE — 93571 IV DOP VEL&/PRESS C FLO 1ST: CPT | Performed by: INTERNAL MEDICINE

## 2021-10-11 PROCEDURE — 210N000002 HC R&B HEART CARE

## 2021-10-11 PROCEDURE — 272N000001 HC OR GENERAL SUPPLY STERILE: Performed by: INTERNAL MEDICINE

## 2021-10-11 PROCEDURE — 85018 HEMOGLOBIN: CPT | Performed by: INTERNAL MEDICINE

## 2021-10-11 PROCEDURE — C1894 INTRO/SHEATH, NON-LASER: HCPCS | Performed by: INTERNAL MEDICINE

## 2021-10-11 PROCEDURE — 85520 HEPARIN ASSAY: CPT | Performed by: INTERNAL MEDICINE

## 2021-10-11 PROCEDURE — C1769 GUIDE WIRE: HCPCS | Performed by: INTERNAL MEDICINE

## 2021-10-11 DEVICE — STENT CORONARY DES SYNERGY XD MR US 3.00X12MM H7493941812300: Type: IMPLANTABLE DEVICE | Status: FUNCTIONAL

## 2021-10-11 RX ORDER — OXYCODONE HYDROCHLORIDE 5 MG/1
10 TABLET ORAL EVERY 4 HOURS PRN
Status: DISCONTINUED | OUTPATIENT
Start: 2021-10-11 | End: 2021-10-12 | Stop reason: HOSPADM

## 2021-10-11 RX ORDER — ACETAMINOPHEN 325 MG/1
650 TABLET ORAL EVERY 4 HOURS PRN
Status: DISCONTINUED | OUTPATIENT
Start: 2021-10-11 | End: 2021-10-12 | Stop reason: HOSPADM

## 2021-10-11 RX ORDER — HYDRALAZINE HYDROCHLORIDE 20 MG/ML
10 INJECTION INTRAMUSCULAR; INTRAVENOUS EVERY 4 HOURS PRN
Status: DISCONTINUED | OUTPATIENT
Start: 2021-10-11 | End: 2021-10-12 | Stop reason: HOSPADM

## 2021-10-11 RX ORDER — LORAZEPAM 2 MG/ML
0.5 INJECTION INTRAMUSCULAR
Status: DISCONTINUED | OUTPATIENT
Start: 2021-10-11 | End: 2021-10-11 | Stop reason: HOSPADM

## 2021-10-11 RX ORDER — LIDOCAINE 40 MG/G
CREAM TOPICAL
Status: DISCONTINUED | OUTPATIENT
Start: 2021-10-11 | End: 2021-10-11

## 2021-10-11 RX ORDER — NITROGLYCERIN 5 MG/ML
VIAL (ML) INTRAVENOUS
Status: DISCONTINUED | OUTPATIENT
Start: 2021-10-11 | End: 2021-10-11 | Stop reason: HOSPADM

## 2021-10-11 RX ORDER — LORAZEPAM 0.5 MG/1
0.5 TABLET ORAL
Status: DISCONTINUED | OUTPATIENT
Start: 2021-10-11 | End: 2021-10-11 | Stop reason: HOSPADM

## 2021-10-11 RX ORDER — FENTANYL CITRATE 50 UG/ML
INJECTION, SOLUTION INTRAMUSCULAR; INTRAVENOUS
Status: DISCONTINUED | OUTPATIENT
Start: 2021-10-11 | End: 2021-10-11 | Stop reason: HOSPADM

## 2021-10-11 RX ORDER — ONDANSETRON 2 MG/ML
4 INJECTION INTRAMUSCULAR; INTRAVENOUS EVERY 6 HOURS PRN
Status: DISCONTINUED | OUTPATIENT
Start: 2021-10-11 | End: 2021-10-11

## 2021-10-11 RX ORDER — ATROPINE SULFATE 0.1 MG/ML
0.5 INJECTION INTRAVENOUS
Status: ACTIVE | OUTPATIENT
Start: 2021-10-11 | End: 2021-10-11

## 2021-10-11 RX ORDER — HEPARIN SODIUM 1000 [USP'U]/ML
INJECTION, SOLUTION INTRAVENOUS; SUBCUTANEOUS
Status: DISCONTINUED | OUTPATIENT
Start: 2021-10-11 | End: 2021-10-11 | Stop reason: HOSPADM

## 2021-10-11 RX ORDER — ASPIRIN 325 MG
325 TABLET ORAL ONCE
Status: COMPLETED | OUTPATIENT
Start: 2021-10-11 | End: 2021-10-11

## 2021-10-11 RX ORDER — FENTANYL CITRATE 50 UG/ML
25 INJECTION, SOLUTION INTRAMUSCULAR; INTRAVENOUS
Status: DISCONTINUED | OUTPATIENT
Start: 2021-10-11 | End: 2021-10-12 | Stop reason: HOSPADM

## 2021-10-11 RX ORDER — OXYCODONE HYDROCHLORIDE 5 MG/1
5 TABLET ORAL EVERY 4 HOURS PRN
Status: DISCONTINUED | OUTPATIENT
Start: 2021-10-11 | End: 2021-10-12 | Stop reason: HOSPADM

## 2021-10-11 RX ORDER — NALOXONE HYDROCHLORIDE 0.4 MG/ML
0.2 INJECTION, SOLUTION INTRAMUSCULAR; INTRAVENOUS; SUBCUTANEOUS
Status: ACTIVE | OUTPATIENT
Start: 2021-10-11 | End: 2021-10-11

## 2021-10-11 RX ORDER — NALOXONE HYDROCHLORIDE 0.4 MG/ML
0.4 INJECTION, SOLUTION INTRAMUSCULAR; INTRAVENOUS; SUBCUTANEOUS
Status: ACTIVE | OUTPATIENT
Start: 2021-10-11 | End: 2021-10-11

## 2021-10-11 RX ORDER — ASPIRIN 81 MG/1
243 TABLET, CHEWABLE ORAL ONCE
Status: DISCONTINUED | OUTPATIENT
Start: 2021-10-11 | End: 2021-10-11

## 2021-10-11 RX ORDER — FLUMAZENIL 0.1 MG/ML
0.2 INJECTION, SOLUTION INTRAVENOUS
Status: ACTIVE | OUTPATIENT
Start: 2021-10-11 | End: 2021-10-11

## 2021-10-11 RX ORDER — POTASSIUM CHLORIDE 1500 MG/1
20 TABLET, EXTENDED RELEASE ORAL
Status: DISCONTINUED | OUTPATIENT
Start: 2021-10-11 | End: 2021-10-11 | Stop reason: HOSPADM

## 2021-10-11 RX ORDER — SODIUM CHLORIDE 9 MG/ML
INJECTION, SOLUTION INTRAVENOUS CONTINUOUS
Status: DISCONTINUED | OUTPATIENT
Start: 2021-10-11 | End: 2021-10-11 | Stop reason: HOSPADM

## 2021-10-11 RX ORDER — SODIUM CHLORIDE 9 MG/ML
INJECTION, SOLUTION INTRAVENOUS CONTINUOUS
Status: ACTIVE | OUTPATIENT
Start: 2021-10-11 | End: 2021-10-11

## 2021-10-11 RX ORDER — ASPIRIN 81 MG/1
81 TABLET, CHEWABLE ORAL ONCE
Status: DISCONTINUED | OUTPATIENT
Start: 2021-10-11 | End: 2021-10-11

## 2021-10-11 RX ORDER — ONDANSETRON 4 MG/1
4 TABLET, ORALLY DISINTEGRATING ORAL EVERY 6 HOURS PRN
Status: DISCONTINUED | OUTPATIENT
Start: 2021-10-11 | End: 2021-10-11

## 2021-10-11 RX ORDER — VERAPAMIL HYDROCHLORIDE 2.5 MG/ML
INJECTION, SOLUTION INTRAVENOUS
Status: DISCONTINUED | OUTPATIENT
Start: 2021-10-11 | End: 2021-10-11 | Stop reason: HOSPADM

## 2021-10-11 RX ORDER — CLOPIDOGREL BISULFATE 75 MG/1
TABLET ORAL
Status: DISCONTINUED | OUTPATIENT
Start: 2021-10-11 | End: 2021-10-11 | Stop reason: HOSPADM

## 2021-10-11 RX ORDER — ASPIRIN 81 MG/1
81 TABLET ORAL DAILY
Status: DISCONTINUED | OUTPATIENT
Start: 2021-10-12 | End: 2021-10-11

## 2021-10-11 RX ORDER — NITROGLYCERIN 0.4 MG/1
0.4 TABLET SUBLINGUAL EVERY 5 MIN PRN
Status: DISCONTINUED | OUTPATIENT
Start: 2021-10-11 | End: 2021-10-12 | Stop reason: HOSPADM

## 2021-10-11 RX ORDER — IOPAMIDOL 755 MG/ML
INJECTION, SOLUTION INTRAVASCULAR
Status: DISCONTINUED | OUTPATIENT
Start: 2021-10-11 | End: 2021-10-11 | Stop reason: HOSPADM

## 2021-10-11 RX ORDER — METOPROLOL TARTRATE 1 MG/ML
5-10 INJECTION, SOLUTION INTRAVENOUS
Status: DISCONTINUED | OUTPATIENT
Start: 2021-10-11 | End: 2021-10-12 | Stop reason: HOSPADM

## 2021-10-11 RX ADMIN — ATORVASTATIN CALCIUM 80 MG: 80 TABLET, FILM COATED ORAL at 20:37

## 2021-10-11 RX ADMIN — ACETAMINOPHEN 650 MG: 325 TABLET, FILM COATED ORAL at 20:37

## 2021-10-11 RX ADMIN — FOLIC ACID 1 MG: 1 TABLET ORAL at 20:37

## 2021-10-11 RX ADMIN — ASPIRIN 81 MG CHEWABLE TABLET 81 MG: 81 TABLET CHEWABLE at 20:37

## 2021-10-11 RX ADMIN — ASPIRIN 325 MG ORAL TABLET 325 MG: 325 PILL ORAL at 08:38

## 2021-10-11 RX ADMIN — HYDRALAZINE HYDROCHLORIDE 10 MG: 20 INJECTION INTRAMUSCULAR; INTRAVENOUS at 15:08

## 2021-10-11 RX ADMIN — LORAZEPAM 1 MG: 2 INJECTION INTRAMUSCULAR; INTRAVENOUS at 14:20

## 2021-10-11 RX ADMIN — LORAZEPAM 0.5 MG: 0.5 TABLET ORAL at 09:21

## 2021-10-11 RX ADMIN — METOPROLOL TARTRATE 25 MG: 25 TABLET, FILM COATED ORAL at 20:37

## 2021-10-11 RX ADMIN — PSYLLIUM HUSK 1 PACKET: 3.4 POWDER ORAL at 08:39

## 2021-10-11 RX ADMIN — ONDANSETRON 4 MG: 2 INJECTION INTRAMUSCULAR; INTRAVENOUS at 13:38

## 2021-10-11 RX ADMIN — HEPARIN SODIUM 800 UNITS/HR: 10000 INJECTION, SOLUTION INTRAVENOUS at 06:56

## 2021-10-11 RX ADMIN — METOPROLOL TARTRATE 25 MG: 25 TABLET, FILM COATED ORAL at 08:38

## 2021-10-11 RX ADMIN — OXYCODONE HYDROCHLORIDE 5 MG: 5 TABLET ORAL at 20:38

## 2021-10-11 RX ADMIN — SODIUM CHLORIDE: 9 INJECTION, SOLUTION INTRAVENOUS at 10:07

## 2021-10-11 ASSESSMENT — MIFFLIN-ST. JEOR: SCORE: 1349.17

## 2021-10-11 NOTE — PROGRESS NOTES
Interventional cardiology brief procedure note  Coronary angio w/possible PCI. Indication: NSTEMI  Access: right radial  Result:   severe stenosis of the midLCx which was jailed by the previously placed LCx/OM stent.  PCI of the midLCx with placement of a [employer] ANNETTE.  No complications  Full report to follow

## 2021-10-11 NOTE — PLAN OF CARE
Tele: SR. BP elevated, better after getting IV hydralazine. Right radial site, TR at band pressure. Pt keeps threatening to leave AMA. Wife at bedside and encouraging him to stay here overnight. Will continue to monitor.

## 2021-10-11 NOTE — TELEPHONE ENCOUNTER
Voicemail received from patient's wife, Amy, on 10/9/21 that patient is currently hospitalized at [facility] for chest pain and wonders if Dr Galicia could follow up on this. Per notes, angiogram is scheduled for today. Last OV 9/15/21 w/ Dr Galicia, echo and carotid US were recommended at that time.     Left message for Amy that we would update Dr Galicia about patient's hospitalization however any recommendations regarding his treatment would be at the discretion of this inpatient team. Amy to call back with any further questions or concerns.

## 2021-10-11 NOTE — PLAN OF CARE
Pt here with chest pain. A&Ox4, PRN ativan available for anxiety. VSS on RA. Tele SR. NPO since midnight. Heparin infusing at 800 units/hr. Up independent. Denies chest pain.  Plan for Angiogram today.

## 2021-10-11 NOTE — PRE-PROCEDURE
GENERAL PRE-PROCEDURE:   Procedure:  Cor angio possible PCI  Date/Time:  10/11/2021 11:05 AM    Written consent obtained?: Yes    Risks and benefits: Risks, benefits and alternatives were discussed    Consent given by:  Patient  Patient states understanding of procedure being performed: Yes    Patient's understanding of procedure matches consent: Yes    Procedure consent matches procedure scheduled: Yes    Expected level of sedation:  Moderate  Appropriately NPO:  Yes  ASA Class:  4  Mallampati  :  Grade 2- soft palate, base of uvula, tonsillar pillars, and portion of posterior pharyngeal wall visible  Lungs:  Other (comment)  Lung exam comment:  Normal respirations  Heart:  RRR  History & Physical reviewed:  History and physical reviewed and no updates needed  Statement of review:  I have reviewed the lab findings, diagnostic data, medications, and the plan for sedation

## 2021-10-11 NOTE — PLAN OF CARE
[id number]: A&Ox4. VSS on RA. Tele SR. Denies CP or shortness of breath. Heparin infusing. No stools this shift. No signs of bleeding seen. Prn IV ativan given at bedtime for anxiety per pt request. Up independently. NPO @ 0000. Plan for angio 10-11.

## 2021-10-11 NOTE — PROGRESS NOTES
[facility]    Cardiology Progress Note    Primary Cardiologist: Dr. Galicia    Date of Admission: 10/08/2021  Service Date: 10/11/2021    Summary:  Mr. Johan Navarro is a very pleasant 66 year old male with a past medical history of hyperlipidemia, moderate aortic valve stenosis, GI bleed, carotid artery stenosis, and coronary artery disease s/p PCI with stenting of the distal and ostial circumflex as well as in the first obtuse marginal artery in 1998 and subsequent PCI to OM2 in 2020 who was admitted on 10/8/2021 after presenting with chest pain and being found to have a NSTEMI.    Interval History   Patient is resting comfortably. His wife is at the bedside. He denies recurrent symptoms of chest pain overnight or this morning. He denies shortness of breath, palpitations, dizziness, or lightheadedness. We reviewed the plan of care as outlined below. He stated understanding. All questions were answered.     Telemetry: NSR    Assessment & Plan   1. NSTEMI, prior history of CAD with PCI to OM2 in 2020, prior PCI to the distal circumflex which supplies a LPDA  - Troponins elevated to a peak of 3.9 and trended down.  -  Most recent coronary angiogram in 07/2020 noting distal circumflex which supplies his large left PDA was jailed by a stent. The ostium of this branch appeared to be somewhat compromised. Additionally, a 70% lesion was noted at the ostium of his RCA.      2. Moderate aortic stenosis  - TTE 10/10/21 showing calcified aortic valve with probable moderate aortic stenosis with a mean gradient is 13mmHg with a peak velocity of 2.3m/sec, MANDEEP using LVOT diameter of 2.1cm is 1.2cm2. Mild aortic regurgitation also noted.    3. Lower GI bleeding  - Bright red blood per rectum noted with bowel movements 10/09.   - Hgb stable and WNL here.  - GI consulted. Felt symptoms likely secondary to hemorrhoids and unrelated to 1/2021 presentation with previous GI bleed. Suggested that if dual  antiplatelet therapy needed, would consider plavix over brilinta, as he seemed to do better with this in the past.    4. Concern for history of upper GI Bleeding  - Prior history of melena with multiple endoscopies and colonoscopies and pill endoscopy - no source found.    5. Hyperlipidemia, treated on atorvastatin 80 mg daily     6. MARCUS, resolved  - Creatinine at 1.32 upon admit and improved at 0.92 today.     Plan:   1. Coronary angiogram with possible PCI today.  2. If DAPT needed, would consider plavix over brilinta, as he seemed to do better with this in the past with regards to his history of GI bleed.  3. Continue with high intensity statin, beta blocker, and aspirin 81 mg daily.  4. Pending the angiogram findings, anticipate possible discharge home this evening versus more likely tomorrow morning. Will arrange close follow up with a Cardiology ERIC in 1-2 weeks post discharge and with Dr. Galicia in around 2-3 months.    Thank you for the opportunity to participate in this pleasant patient's care.     ANGELINE Church, CNP   Nurse Practitioner  [facility]  Pager: [phone]  Text Page  (8am - 5pm, M-F)    Patient Active Problem List   Diagnosis     Depressive disorder, not elsewhere classified     Elevated prostate specific antigen (PSA)     Osteoarthritis of Hand      Mixed hyperlipidemia     Family history of early CAD     Aortic stenosis     Carotid stenosis     Coronary artery disease involving native coronary artery of native heart without angina pectoris     Malignant neoplasm of prostate (H)     Status post coronary angiogram     Abnormal cardiovascular stress test     Unstable angina (H)     Gastrointestinal hemorrhage, unspecified gastrointestinal hemorrhage type     Physical Exam   Temp: 98.4  F (36.9  C) Temp src: Oral BP: (!) 142/97 Pulse: 77   Resp: 16 SpO2: 97 % O2 Device: Nasal cannula Oxygen Delivery: 3 LPM  Vitals:    10/08/21 2051 10/10/21 0600 10/11/21 0541   Weight: 72.6  kg (160 lb) 63.1 kg (139 lb 1.6 oz) 62.6 kg (138 lb 1.6 oz)     Vital Signs with Ranges  Temp:  [97.7  F (36.5  C)-98.4  F (36.9  C)] 98.4  F (36.9  C)  Pulse:  [73-88] 77  Resp:  [16-17] 16  BP: (116-145)/() 142/97  SpO2:  [97 %-100 %] 97 %  I/O last 3 completed shifts:  In: 600 [P.O.:600]  Out: 950 [Urine:950]    Constitutional: Appears his stated age, well nourished, and in no acute distress.  Eyes: Pupils equal, round. Sclerae anicteric.   HEENT: Normocephalic, atraumatic.   Neck: Supple. No JVD appreciated.  Respiratory: Breathing non-labored. Lungs clear to auscultation bilaterally. No crackles, wheezes, rhonchi, or rales.  Cardiovascular: Regular rate and rhythm, normal S1 and S2. No murmur, rub, or gallop. Radial pulses full and equal bilaterally.   Skin: Warm, dry. No apparent rashes or cyanosis.  Musculoskeletal/Extremities: Moves all extremities well and symmetrically. No LE edema bilaterally.  Neurologic: No gross focal deficits. Alert, awake, and oriented to person, place and time.  Psychiatric: Affect appropriate. Mentation normal.    Medications     heparin 800 Units/hr (10/11/21 0656)     nitroGLYcerin Stopped (10/09/21 0530)     - MEDICATION INSTRUCTIONS -       - MEDICATION INSTRUCTIONS -       sodium chloride 150 mL/hr at 10/11/21 1007       aspirin  81 mg Oral QPM     atorvastatin  80 mg Oral QPM     folic acid  1 mg Oral QPM     gabapentin  600 mg Oral BID     metoprolol tartrate  25 mg Oral BID     psyllium  1 packet Oral Daily     sodium chloride (PF)  3 mL Intracatheter Q8H     Data   Recent Labs   Lab 10/11/21  0535 10/10/21  0612 10/10/21  0347 10/09/21  1953 10/09/21  1315 10/09/21  0530 10/09/21  0530   WBC 5.6 8.0  --   --   --   --  8.0   HGB 14.1 14.9  --  12.8*  --    < > 12.6*   MCV 87 86  --   --   --   --  88    266  --   --   --   --  268    136  --   --   --   --  137   POTASSIUM 4.0 4.0  --   --   --   --  4.0   CHLORIDE 106 104  --   --   --   --  105   CO2  26 26  --   --   --   --  24   BUN 15 19  --   --   --   --  27   CR 0.92 1.00  --   --   --   --  1.21   ANIONGAP 5 6  --   --   --   --  8   ONDINA 8.7 9.0  --   --   --   --  8.2*   GLC 88 102*  --   --   --   --  79   ALBUMIN  --   --   --   --   --   --  3.6   PROTTOTAL  --   --   --   --   --   --  6.8   BILITOTAL  --   --   --   --   --   --  0.5   ALKPHOS  --   --   --   --   --   --  75   ALT  --   --   --   --   --   --  36   AST  --   --   --   --   --   --  52*   TROPONIN  --   --  2.734* 2.931* 3.915*   < > 3.794*    < > = values in this interval not displayed.     This note was completed in part using Dragon voice recognition software. Although reviewed after completion, some word and grammatical errors may occur.

## 2021-10-11 NOTE — PROGRESS NOTES
[facility]    Medicine Progress Note - Hospitalist Service       Date of Admission:  10/8/2021  Assessment & Plan   Johan Navarro is a 66 year-old male with history of coronary artery disease (with previous PCI in both 1998 and 2020), iron deficiency anemia with history of GI bleed of unclear source who presents with chest pain and diaphoresis.  Admitted on 10/8/2021.       NSTEMI  Coronary artery disease with history of PCI   *Presents with a 1 day history of chest pressure and diaphoresis, worsening at rest, improved with nitroglycerin.   *Initial troponin <0.015, subsequent 3.794  - Cardiology consulted, plan for angiogram today.   - He is on IV  heparin drip, aspirin, Metoprolol and  Atorvastatin continue with that.   - Initially required nitroglycerin drip, no chest pain off of nitroglycerin drip   - Troponin peaked at 3.915, now trending down. No further checks neeeded.   - Echocardiogram EF 60-65%, no WMA, moderate Aortic stenosis, no significant changes as compare to previous Echo.   Coronary angiogram today.     Bright red blood per rectum, suspect hemorrhoidal bleeding  Internal hemorrhoids  Iron deficiency anemia  History of GI bleed with unclear source  *Admission for GI bleed 1/2021.  EGD unremarkable.  Colonoscopy with diverticulosis, internal hemorrhoids, no clear source of active bleeding.  Reportedly underwent PillCam which was negative.  Also saw gastroenterology through [facility] and had CT enterography which again was reportedly unremarkable.  Has since had dark stools which he attributes to his iron supplementation, hemoglobin has otherwise been most recently 13.5 g/dL 9/9/2021.  *Reports 2-3 days of bright red blood in bowl and on wipe, none mixed with stool.  Reports some firmer stools, suspect this is secondary to known internal hemorrhoids  *Admission hemoglobin of 15.2 g/dl likely hemoconcentrated, had not been that high since 2004. Subsequent 12.6 g/dl after IVF  more in line with recent values.   - [facility] consulted per cardiology recommendations to risk stratify for DAPT ahead of planned angiogram. No plans for endoscopy and cleared for DAPT as needed, recommended to consider clopidogrel over ticagrelor   - Hemoglobin stable  - Repeat iron studies ordered with adequate iron stores  - Ordered fiber supplementation for hemorrhoidal bleeding   - Consented for blood if needed, though not anticipated     Overall remain stable and clear by GI for angiogram and DAPT    Leukocytosis  Suspect stress response in setting of NSTEMI.  *Procalcitonin undetectable  - WBC remains normal      Acute kidney injury, prerenal : resolved.   Baseline creatinine around 1.  Creatinine 1.32 on admission.  Likely prerenal due to diaphoresis, both in the setting of NSTEMI and patient reported he has frequent hot tub sessions, including two on the date of his symptoms.   - Creatinine improved to baseline  - Monitor BMP    Anxiety  Situational due to acute illness, prolonged time in emergency department waiting for bed.  - Lorazepam PRN    Chronic:  1.  Prostate cancer  2.  Arthritis.  Continue Voltaren gel, gabapentin  3.  History of depression      Diet: NPO per Anesthesia Guidelines for Procedure/Surgery Except for: Meds  NPO for Medical/Clinical Reasons Except for: Meds    DVT Prophylaxis: Heparin drip   Salvador Catheter: Not present  Code Status: Full Code      Disposition Plan   Expected discharge: Pending angiogram 10/11  Entered: Patrick Waldron MD 10/11/2021, 11:37 AM       The patient's care was discussed with the Patient and Patient's wife.     Patrick Waldron MD  Hospitalist Service  [facility]    ______________________________________________________________________    Interval History   Patient feeling well this morning, denies any chest pain, SOB, fever, chills, nausea, vomiting or bleeding per rectum at this time.     No other significant event overnight.     Data  reviewed today: I reviewed all medications, new labs and imaging results over the last 24 hours. I personally reviewed no images or EKG's today.    Physical Exam   Vital Signs: Temp: 98.4  F (36.9  C) Temp src: Oral BP: (!) 142/97 Pulse: 77   Resp: (!) 0 SpO2: 97 % O2 Device: Nasal cannula Oxygen Delivery: 3 LPM  Weight: 138 lbs 1.6 oz    Constitutional: Awake, alert, oriented, in no acute distress.   Respiratory: Clear to auscultation bilaterally, good air movement bilaterally  Cardiovascular: RRR. No peripheral edema.  GI: Soft, non-tender, non-distended.   Skin/Integumen: Warm, dry  CNS: no focal deficit.     Data   Recent Labs   Lab 10/11/21  0535 10/10/21  0612 10/10/21  0347 10/09/21  1953 10/09/21  1315 10/09/21  0530 10/09/21  0530   WBC 5.6 8.0  --   --   --   --  8.0   HGB 14.1 14.9  --  12.8*  --    < > 12.6*   MCV 87 86  --   --   --   --  88    266  --   --   --   --  268    136  --   --   --   --  137   POTASSIUM 4.0 4.0  --   --   --   --  4.0   CHLORIDE 106 104  --   --   --   --  105   CO2 26 26  --   --   --   --  24   BUN 15 19  --   --   --   --  27   CR 0.92 1.00  --   --   --   --  1.21   ANIONGAP 5 6  --   --   --   --  8   ONDINA 8.7 9.0  --   --   --   --  8.2*   GLC 88 102*  --   --   --   --  79   ALBUMIN  --   --   --   --   --   --  3.6   PROTTOTAL  --   --   --   --   --   --  6.8   BILITOTAL  --   --   --   --   --   --  0.5   ALKPHOS  --   --   --   --   --   --  75   ALT  --   --   --   --   --   --  36   AST  --   --   --   --   --   --  52*   TROPONIN  --   --  2.734* 2.931* 3.915*   < > 3.794*    < > = values in this interval not displayed.       No results found for this or any previous visit (from the past 24 hour(s)).  Medications     heparin 800 Units/hr (10/11/21 0656)     nitroGLYcerin Stopped (10/09/21 0530)     - MEDICATION INSTRUCTIONS -       - MEDICATION INSTRUCTIONS -       sodium chloride 150 mL/hr at 10/11/21 1007       aspirin  81 mg Oral QPM      atorvastatin  80 mg Oral QPM     folic acid  1 mg Oral QPM     gabapentin  600 mg Oral BID     metoprolol tartrate  25 mg Oral BID     psyllium  1 packet Oral Daily     sodium chloride (PF)  3 mL Intracatheter Q8H

## 2021-10-12 ENCOUNTER — APPOINTMENT (OUTPATIENT)
Dept: PHYSICAL THERAPY | Facility: CLINIC | Age: 66
DRG: 247 | End: 2021-10-12
Attending: HOSPITALIST
Payer: COMMERCIAL

## 2021-10-12 VITALS
OXYGEN SATURATION: 96 % | SYSTOLIC BLOOD PRESSURE: 144 MMHG | HEART RATE: 105 BPM | BODY MASS INDEX: 22.21 KG/M2 | WEIGHT: 138.2 LBS | RESPIRATION RATE: 16 BRPM | TEMPERATURE: 98.5 F | HEIGHT: 66 IN | DIASTOLIC BLOOD PRESSURE: 95 MMHG

## 2021-10-12 LAB
ANION GAP SERPL CALCULATED.3IONS-SCNC: 6 MMOL/L (ref 3–14)
ATRIAL RATE - MUSE: 64 BPM
ATRIAL RATE - MUSE: 75 BPM
BUN SERPL-MCNC: 13 MG/DL (ref 7–30)
CALCIUM SERPL-MCNC: 8.7 MG/DL (ref 8.5–10.1)
CHLORIDE BLD-SCNC: 105 MMOL/L (ref 94–109)
CHOLEST SERPL-MCNC: 166 MG/DL
CO2 SERPL-SCNC: 26 MMOL/L (ref 20–32)
CREAT SERPL-MCNC: 0.93 MG/DL (ref 0.66–1.25)
DIASTOLIC BLOOD PRESSURE - MUSE: NORMAL MMHG
DIASTOLIC BLOOD PRESSURE - MUSE: NORMAL MMHG
GFR SERPL CREATININE-BSD FRML MDRD: 85 ML/MIN/1.73M2
GLUCOSE BLD-MCNC: 90 MG/DL (ref 70–99)
HDLC SERPL-MCNC: 79 MG/DL
INTERPRETATION ECG - MUSE: NORMAL
INTERPRETATION ECG - MUSE: NORMAL
LDLC SERPL CALC-MCNC: 55 MG/DL
NONHDLC SERPL-MCNC: 87 MG/DL
P AXIS - MUSE: 63 DEGREES
P AXIS - MUSE: 66 DEGREES
POTASSIUM BLD-SCNC: 3.8 MMOL/L (ref 3.4–5.3)
PR INTERVAL - MUSE: 140 MS
PR INTERVAL - MUSE: 142 MS
QRS DURATION - MUSE: 78 MS
QRS DURATION - MUSE: 88 MS
QT - MUSE: 400 MS
QT - MUSE: 432 MS
QTC - MUSE: 445 MS
QTC - MUSE: 446 MS
R AXIS - MUSE: 65 DEGREES
R AXIS - MUSE: 67 DEGREES
SODIUM SERPL-SCNC: 137 MMOL/L (ref 133–144)
SYSTOLIC BLOOD PRESSURE - MUSE: NORMAL MMHG
SYSTOLIC BLOOD PRESSURE - MUSE: NORMAL MMHG
T AXIS - MUSE: 62 DEGREES
T AXIS - MUSE: 65 DEGREES
TRIGL SERPL-MCNC: 158 MG/DL
VENTRICULAR RATE- MUSE: 64 BPM
VENTRICULAR RATE- MUSE: 75 BPM

## 2021-10-12 PROCEDURE — 99239 HOSP IP/OBS DSCHRG MGMT >30: CPT | Performed by: INTERNAL MEDICINE

## 2021-10-12 PROCEDURE — 250N000013 HC RX MED GY IP 250 OP 250 PS 637: Performed by: HOSPITALIST

## 2021-10-12 PROCEDURE — 80048 BASIC METABOLIC PNL TOTAL CA: CPT | Performed by: INTERNAL MEDICINE

## 2021-10-12 PROCEDURE — 97110 THERAPEUTIC EXERCISES: CPT | Mod: GP | Performed by: PHYSICAL THERAPIST

## 2021-10-12 PROCEDURE — 97530 THERAPEUTIC ACTIVITIES: CPT | Mod: GP | Performed by: PHYSICAL THERAPIST

## 2021-10-12 PROCEDURE — 80061 LIPID PANEL: CPT | Performed by: INTERNAL MEDICINE

## 2021-10-12 PROCEDURE — 99232 SBSQ HOSP IP/OBS MODERATE 35: CPT | Performed by: INTERNAL MEDICINE

## 2021-10-12 PROCEDURE — 36415 COLL VENOUS BLD VENIPUNCTURE: CPT | Performed by: INTERNAL MEDICINE

## 2021-10-12 PROCEDURE — 250N000013 HC RX MED GY IP 250 OP 250 PS 637: Performed by: NURSE PRACTITIONER

## 2021-10-12 PROCEDURE — 250N000013 HC RX MED GY IP 250 OP 250 PS 637: Performed by: INTERNAL MEDICINE

## 2021-10-12 PROCEDURE — 97161 PT EVAL LOW COMPLEX 20 MIN: CPT | Mod: GP | Performed by: PHYSICAL THERAPIST

## 2021-10-12 PROCEDURE — 93005 ELECTROCARDIOGRAM TRACING: CPT

## 2021-10-12 RX ORDER — METOPROLOL TARTRATE 50 MG
50 TABLET ORAL 2 TIMES DAILY
Status: DISCONTINUED | OUTPATIENT
Start: 2021-10-12 | End: 2021-10-12 | Stop reason: HOSPADM

## 2021-10-12 RX ORDER — METOPROLOL TARTRATE 50 MG
50 TABLET ORAL 2 TIMES DAILY
Qty: 60 TABLET | Refills: 0 | Status: SHIPPED | OUTPATIENT
Start: 2021-10-12 | End: 2021-10-15

## 2021-10-12 RX ORDER — CLOPIDOGREL BISULFATE 75 MG/1
75 TABLET ORAL DAILY
Qty: 90 TABLET | Refills: 0 | Status: SHIPPED | OUTPATIENT
Start: 2021-10-12 | End: 2021-12-27

## 2021-10-12 RX ORDER — NITROGLYCERIN 0.4 MG/1
TABLET SUBLINGUAL
Qty: 30 TABLET | Refills: 0 | Status: SHIPPED | OUTPATIENT
Start: 2021-10-12 | End: 2023-08-30

## 2021-10-12 RX ORDER — METOPROLOL TARTRATE 25 MG/1
25 TABLET, FILM COATED ORAL 2 TIMES DAILY
Qty: 60 TABLET | Refills: 0 | Status: SHIPPED | OUTPATIENT
Start: 2021-10-12 | End: 2021-10-12

## 2021-10-12 RX ORDER — CLOPIDOGREL BISULFATE 75 MG/1
75 TABLET ORAL DAILY
Status: DISCONTINUED | OUTPATIENT
Start: 2021-10-12 | End: 2021-10-12 | Stop reason: HOSPADM

## 2021-10-12 RX ADMIN — METOPROLOL TARTRATE 25 MG: 25 TABLET, FILM COATED ORAL at 08:18

## 2021-10-12 RX ADMIN — CLOPIDOGREL BISULFATE 75 MG: 75 TABLET ORAL at 09:32

## 2021-10-12 RX ADMIN — GABAPENTIN 600 MG: 300 CAPSULE ORAL at 08:18

## 2021-10-12 ASSESSMENT — MIFFLIN-ST. JEOR: SCORE: 1349.62

## 2021-10-12 NOTE — PLAN OF CARE
VSS. Tele: SR. Tolerating cardiac rehab. Right radial site CDI, good CMS and pulses. IV out and monitor off. Reviewed discharge instructions and limitations with pt and his wife. Medications filled here and sent home with pt. All questions and concerns addressed. Pt has all of his belongings. Pt brought down to door 6 where his wife picked him up.

## 2021-10-12 NOTE — PLAN OF CARE
Physical Therapy Discharge Summary    Reason for therapy discharge:    Discharged to home with outpatient therapy.    Progress towards therapy goal(s). See goals on Care Plan in [facility] electronic health record for goal details.  Goals met    Therapy recommendation(s):    Continued therapy is recommended.  Rationale/Recommendations:  Pt has function to be at hoem and recommended to go to OP CR to continue to maximize cardiac health.

## 2021-10-12 NOTE — PROGRESS NOTES
[facility]    Cardiology Progress Note    Primary Cardiologist: Dr. Galicia    Date of Admission: 10/08/2021  Service Date: 10/12/2021    Summary:  Mr. Johan Navarro is a very pleasant 66 year old male with a past medical history of hyperlipidemia, moderate aortic valve stenosis, GI bleed, carotid artery stenosis, and coronary artery disease s/p PCI with stenting of the distal and ostial circumflex as well as in the first obtuse marginal artery in 1998 and subsequent PCI to OM2 in 2020 who was admitted on 10/8/2021 after presenting with chest pain and being found to have a NSTEMI.    Interval History   Patient is resting comfortably. He denies recurrent symptoms of chest pain overnight or this morning. He denies shortness of breath, palpitations, dizziness, or lightheadedness. We reviewed the plan of care as outlined below. Importance of DAPT and care of the radial access site were reviewed. He stated understanding. All questions were answered.     Telemetry: NSR; Sinus tachycardia    Assessment & Plan   1. NSTEMI, prior history of CAD with PCI to OM2 in 2020, prior PCI to the distal circumflex which supplies a LPDA  - Troponins elevated to a peak of 3.9 and trended down.  -  Coronary angiogram 10/11/21 noting severe stenosis of the midLCx which was jailed by the previously placed LCx/OM stent. PCI of the mid LCx with placement of a Synergy ANNETTE.     2. Moderate aortic stenosis  - TTE 10/10/21 showing calcified aortic valve with probable moderate aortic stenosis with a mean gradient is 13mmHg with a peak velocity of 2.3m/sec, MANDEEP using LVOT diameter of 2.1cm is 1.2cm2. Mild aortic regurgitation also noted. Appears stable in comparison to prior study in 07/2020.    3. Lower GI bleeding  - Bright red blood per rectum noted with bowel movements 10/09.   - Hgb stable and WNL here.  - GI consulted. Felt symptoms likely secondary to hemorrhoids and unrelated to 1/2021 presentation with previous GI  bleed. Suggested that if dual antiplatelet therapy needed, would consider plavix over brilinta, as he seemed to do better with this in the past.    4. Concern for history of upper GI Bleeding  - Prior history of melena with multiple endoscopies and colonoscopies and pill endoscopy - no source found.    5. Hyperlipidemia, treated on atorvastatin 80 mg daily     6. MARCUS, resolved  - Creatinine at 1.32 upon admit and improved at 0.92 today.     Plan:   1. Blood pressure has been mildly elevated here with some episodes of sinus tachycardia on telemetry. Will increase the dose of metoprolol tartrate from 25 mg to 50 mg BID.  2. Continue with high intensity statin, beta blocker, and DAPT with aspirin 81 mg daily indefinitely, and plavix 75 mg daily (or other P2Y12 inhibitor) for at least 1 year uninterrupted post PCI.  3. Continue with cardiac rehab and lifestyle modifications, including getting regular exercise and sticking to a heart healthy, Mediterranean style diet.   4. Please send patient home with a prescription for PRN sublingual nitroglycerin at discharge.  5. Okay from a cardiac standpoint for discharge home today.   6. Will arrange close follow up with a Cardiology ERIC in around 1-2 weeks post discharge and with Dr. Galicia in around 2-3 months.    Thank you for the opportunity to participate in this pleasant patient's care.     ANGELINE Church, CNP   Nurse Practitioner  [facility]  Pager: [phone]  Text Page  (8am - 5pm, M-F)    Patient Active Problem List   Diagnosis     Depressive disorder, not elsewhere classified     Elevated prostate specific antigen (PSA)     Osteoarthritis of Hand      Mixed hyperlipidemia     Family history of early CAD     Aortic stenosis     Carotid stenosis     Coronary artery disease involving native coronary artery of native heart without angina pectoris     Malignant neoplasm of prostate (H)     Status post coronary angiogram     Abnormal cardiovascular  stress test     Unstable angina (H)     Gastrointestinal hemorrhage, unspecified gastrointestinal hemorrhage type     Physical Exam   Temp: 98.1  F (36.7  C) Temp src: Oral BP: (!) 144/95 Pulse: 105   Resp: 16 SpO2: 98 % O2 Device: None (Room air) Oxygen Delivery: 3 LPM  Vitals:    10/10/21 0600 10/11/21 0541 10/12/21 0612   Weight: 63.1 kg (139 lb 1.6 oz) 62.6 kg (138 lb 1.6 oz) 62.7 kg (138 lb 3.2 oz)     Vital Signs with Ranges  Temp:  [98.1  F (36.7  C)] 98.1  F (36.7  C)  Pulse:  [] 105  Resp:  [0-16] 16  BP: (110-180)/() 144/95  SpO2:  [94 %-100 %] 98 %  I/O last 3 completed shifts:  In: 100 [P.O.:100]  Out: 850 [Urine:850]    Constitutional: Appears his stated age, well nourished, and in no acute distress.  Eyes: Pupils equal, round. Sclerae anicteric.   HEENT: Normocephalic, atraumatic.   Neck: Supple. No JVD appreciated.  Respiratory: Breathing non-labored. Lungs clear to auscultation bilaterally. No crackles, wheezes, rhonchi, or rales.  Cardiovascular: Regular rate and rhythm, normal S1 and S2. No murmur, rub, or gallop. Right radial site is soft, non-tender, and free of any significant ecchymosis or appreciable hematoma. Radial pulses full and equal bilaterally.   Skin: Warm, dry. No apparent rashes or cyanosis.  Musculoskeletal/Extremities: Moves all extremities well and symmetrically. No LE edema bilaterally.  Neurologic: No gross focal deficits. Alert, awake, and oriented to person, place and time.  Psychiatric: Affect appropriate. Mentation normal.    Medications     - MEDICATION INSTRUCTIONS -       - MEDICATION INSTRUCTIONS -       - MEDICATION INSTRUCTIONS -       - MEDICATION INSTRUCTIONS -       Percutaneous Coronary Intervention orders placed (this is information for BPA alerting)         aspirin  81 mg Oral QPM     atorvastatin  80 mg Oral QPM     clopidogrel  75 mg Oral Daily     folic acid  1 mg Oral QPM     gabapentin  600 mg Oral BID     metoprolol tartrate  25 mg Oral BID      psyllium  1 packet Oral Daily     Data   Recent Labs   Lab 10/12/21  0538 10/11/21  0535 10/10/21  0612 10/10/21  0347 10/09/21  1953 10/09/21  1315 10/09/21  0530 10/09/21  0530   WBC  --  5.6 8.0  --   --   --   --  8.0   HGB  --  14.1 14.9  --  12.8*  --    < > 12.6*   MCV  --  87 86  --   --   --   --  88   PLT  --  230 266  --   --   --   --  268    137 136  --   --   --    < > 137   POTASSIUM 3.8 4.0 4.0  --   --   --    < > 4.0   CHLORIDE 105 106 104  --   --   --    < > 105   CO2 26 26 26  --   --   --    < > 24   BUN 13 15 19  --   --   --    < > 27   CR 0.93 0.92 1.00  --   --   --    < > 1.21   ANIONGAP 6 5 6  --   --   --    < > 8   ONDINA 8.7 8.7 9.0  --   --   --    < > 8.2*   GLC 90 88 102*  --   --   --    < > 79   ALBUMIN  --   --   --   --   --   --   --  3.6   PROTTOTAL  --   --   --   --   --   --   --  6.8   BILITOTAL  --   --   --   --   --   --   --  0.5   ALKPHOS  --   --   --   --   --   --   --  75   ALT  --   --   --   --   --   --   --  36   AST  --   --   --   --   --   --   --  52*   TROPONIN  --   --   --  2.734* 2.931* 3.915*   < > 3.794*    < > = values in this interval not displayed.     This note was completed in part using Dragon voice recognition software. Although reviewed after completion, some word and grammatical errors may occur.    Clinically Significant Risk Factors Present on Admission      Cardiovascular: Non-Rheumatic Valve Disease: Aortic valve stenosis    Gastroenterology: GI Hemorrhage: Melena    Nephrology: Acute kidney failure, unspecified

## 2021-10-12 NOTE — DISCHARGE SUMMARY
[facility]    Discharge Summary  Hospitalist    Date of Admission:  10/8/2021  Date of Discharge:  10/12/2021 11:45 AM  Discharging Provider: Patrick Waldron MD  Date of Service (when I saw the patient): 10/12/21    Discharge Diagnoses   Please refer below     History of Present Illness   Johan Navarro is an 66 year old male who presented with Chest pain    Hospital Course      Johan Navarro is a 66 year-old male with history of coronary artery disease (with previous PCI in both 1998 and 2020), iron deficiency anemia with history of GI bleed of unclear source who presents with chest pain and diaphoresis.  Admitted on 10/8/2021.      Final Discharge Diagnosis and Hospital Course.      NSTEMI S/P PCI to CX   Patient with history of Coronary artery disease with PCI in the past, now  Presents with 1 day history of chest pressure and diaphoresis, worsening at rest, improved with nitroglycerin.   *Initial troponin <0.015, subsequent 3.794  - Cardiology consulted and was taken for the angiogram   - He was on IV  heparin drip, aspirin, Metoprolol and  Atorvastatin continue with that.   - Initially required nitroglycerin drip, no chest pain off of nitroglycerin drip   - Troponin peaked at 3.915, now trending down. No further checks neeeded.   - Echocardiogram EF 60-65%, no WMA, moderate Aortic stenosis, no significant changes as compare to previous Echo.   Coronary angiogram done shows severe stenosis of mid Cx, PCI of the mid Left Cx with ANNETTE was done successfully, he was started on Plavix and metoprolol dose increase to 50 mg bid. At the time of discharge he was feeling well, no chest pain, SOB, fever, chills, nausea or vomiting, advice him to be on aspirin and Plavix for at least 1 year then aspirin only, metoprolol 50 bid and continue with Atorvastatin and Repatha. He will be discharge home in stable condition.       Bright red blood per rectum, suspect hemorrhoidal bleeding: Resolved now.    Internal hemorrhoids  Iron deficiency anemia  History of GI bleed with unclear source  *Admission for GI bleed 1/2021.  EGD unremarkable.  Colonoscopy with diverticulosis, internal hemorrhoids, no clear source of active bleeding.  Reportedly underwent PillCam which was negative.  Also saw gastroenterology through [facility] and had CT enterography which again was reportedly unremarkable.  Has since had dark stools which he attributes to his iron supplementation, hemoglobin has otherwise been most recently 13.5 g/dL 9/9/2021.  *Reports 2-3 days of bright red blood in bowl and on wipe, none mixed with stool.  Reports some firmer stools, suspect this is secondary to known internal hemorrhoids  *Admission hemoglobin of 15.2 g/dl likely hemoconcentrated, had not been that high since 2004. Subsequent 12.6 g/dl after IVF more in line with recent values.   - [facility] consulted per cardiology recommendations to risk stratify for DAPT ahead of planned angiogram. No plans for endoscopy and cleared for DAPT as needed, recommended to consider clopidogrel over ticagrelor   - Hemoglobin stable  - Repeat iron studies ordered with adequate iron stores  - Ordered fiber supplementation for hemorrhoidal bleeding   - Consented for blood if needed, though not anticipated      Overall remain stable and clear by GI for angiogram and DAPT, no more Bleeding per rectum at this time.      Leukocytosis  Suspect stress response in setting of NSTEMI. Resolved now.   *Procalcitonin undetectable  - WBC remains normal      Acute kidney injury, prerenal : resolved.   Baseline creatinine around 1.  Creatinine 1.32 on admission.  Likely prerenal due to diaphoresis, both in the setting of NSTEMI and patient reported he has frequent hot tub sessions, including two on the date of his symptoms.   - Creatinine improved to baseline  - Monitor BMP     Anxiety  Situational due to acute illness, prolonged time in emergency department waiting for bed.  -  Lorazepam PRN     Chronic:  1.  Prostate cancer  2.  Arthritis.  Continue Voltaren gel, gabapentin  3.  History of depression        Patient discharge home in stable condition.     Patrick Waldron MD, MD    Significant Results and Procedures   Coronary Findings      Diagnostic  Dominance: Left    Left Main   The vessel is moderate in size.   Ost LM to Ost LAD lesion is 20% stenosed.   Left Anterior Descending   The vessel is large. Type 3 LAD.   Prox LAD lesion is 30% stenosed.   Left Circumflex   The vessel is moderate in size.   Prox Cx to Mid Cx lesion is 40% stenosed. The lesion was previously treated using a stent of unknown type.   Dist Cx-1 lesion is 75% stenosed. The lesion is type B2 - medium risk, located at the bifurcation and bifurcated. The lesion was not previously treated. Pressure wire/FFR was performed on the lesion.   Dist Cx-2 lesion is 50% stenosed. The lesion was previously treated using a stent of unknown type. Pressure wire/iFR used. Pre adenosine administration IFR: 1.   First Obtuse Marginal Branch   1st Mrg lesion is 60% stenosed. Pressure wire/FFR was performed on the lesion. Pre adenosine administration FFR: 0.94.   Second Obtuse Marginal Branch   2nd Mrg lesion is 20% stenosed. The lesion was previously treated using a stent of unknown type.   Right Coronary Artery   The vessel is small. Small, non-dominant RCA.   Ost RCA to Prox RCA lesion is 70% stenosed.   Prox RCA lesion is 40% stenosed.   Prox RCA to Dist RCA lesion is 20% stenosed.       Intervention      Dist Cx-1 lesion   Stent   Lesion length: 10 mm. A guide catheter was successfully placed. The crossed the lesion. The pre-interventional distal flow is normal (BRAYAN 3). A STENT CORONARY ANNETTE SYNERGY XD MR US 3.20F38CB [id number] drug eluting stent was successfully placed. Pre-stent angioplasty was performed using a CATH BALLOON NC EMERGE 2.74G29PS [id number] supply. An additional CATH BALLOON NC EMERGE 3.94K56QM  [id number] supply was used. . Post-stent angioplasty was performed using a CATH BALLOON NC EMERGE 4.23H27QM [id number] supply. . The post-interventional distal flow is normal (BRAYAN 3). The intervention was successful. baseline pd/pa of the midLCx lesion which was jailed by the previously placed LCx lesion was 0.76 and clearly abnormal. The LCx stent was dilated with a 2.5 and 3.5mm balloon due to diffuculty passing a stent through the previously placed stent. A 6 Fr guidelier was also used and the bifurcation stents were post-dilated in a step-wise fashion using a 4.0mm balloon after deployment of the bifurcation stent wtih a mini-crush technique.   There is a 0% residual stenosis post intervention.   Dist Cx-2 lesion   Stent   Lesion length: 10 mm. A guide catheter was successfully placed. The crossed the lesion. The pre-interventional distal flow is normal (BRAYAN 3). Pre-stent angioplasty was performed using a CATH BALLOON NC EMERGE 2.84A90SI [id number] supply. . The post-interventional distal flow is normal (BRAYAN 3). No complications occurred at this lesion.   There is a 0% residual stenosis post intervention.         Pending Results   These results will be followed up by PCP  Unresulted Labs Ordered in the Past 30 Days of this Admission     No orders found from 9/8/2021 to 10/9/2021.          Code Status   Full Code       Primary Care Physician   Washington Yang    Physical Exam   Temp: 98.5  F (36.9  C) Temp src: Oral BP: (!) 144/95 Pulse: 105   Resp: 16 SpO2: 96 % O2 Device: None (Room air)    Vitals:    10/10/21 0600 10/11/21 0541 10/12/21 0612   Weight: 63.1 kg (139 lb 1.6 oz) 62.6 kg (138 lb 1.6 oz) 62.7 kg (138 lb 3.2 oz)     Vital Signs with Ranges  Temp:  [98.1  F (36.7  C)-98.5  F (36.9  C)] 98.5  F (36.9  C)  Pulse:  [] 105  Resp:  [16] 16  BP: (110-180)/() 144/95  SpO2:  [94 %-100 %] 96 %  I/O last 3 completed shifts:  In: 100 [P.O.:100]  Out: 850  [Urine:850]    Constitutional: awake, alert, cooperative, no apparent distress, and appears stated age  Eyes: Lids and lashes normal, pupils equal, round and reactive to light, extra ocular muscles intact, sclera clear, conjunctiva normal  Respiratory: No increased work of breathing, good air exchange, clear to auscultation bilaterally, no crackles or wheezing  Cardiovascular: Normal apical impulse, regular rate and rhythm, normal S1 and S2, no S3 or S4, and no murmur noted  GI: No scars, normal bowel sounds, soft, non-distended, non-tender, no masses palpated, no hepatosplenomegally  Skin: no bruising or bleeding  Musculoskeletal: no lower extremity pitting edema present  Neurologic: no focal deficit.     Discharge Disposition   Discharged to home  Condition at discharge: Stable    Consultations This Hospital Stay   PHARMACY IP CONSULT  PHARMACY IP CONSULT  CARDIOLOGY IP CONSULT  PHYSICAL THERAPY ADULT IP CONSULT  GASTROENTEROLOGY IP CONSULT  NUTRITION SERVICES ADULT IP CONSULT  CARDIAC REHAB IP CONSULT  PHARMACY IP CONSULT  PHARMACY IP CONSULT  SMOKING CESSATION PROGRAM IP CONSULT    Time Spent on this Encounter   IPatrick MD, personally saw the patient today and spent greater than 30 minutes discharging this patient.    Discharge Orders      CARDIAC REHAB REFERRAL      Discharge Order: F/U with Cardiac  [facility]      Follow-Up with Cardiologist      Medication Instructions - Anticoagulants    Do NOT stop your aspirin or platelet inhibitor unless directed by your Cardiologist.  These medications help to prevent platelets in your blood from sticking together and forming a clot.  Examples of these medications are:  Ticagrelor (Brilinta), Clopidigrel (Plavix), Prasugrel (Effient)     When to call - Contact the Heart Clinic    You may experience symptoms that require follow-up before your scheduled appointment. Contact the Heart Clinic if you develop: Fever over 100.4o Fahrenheit, that lasts more than one day;  Redness, heat, or pus at the puncture site; Change in color or temperature in your hand or arm.     When to call - Reasons to Call 911    If your wrist puncture site starts bleeding after discharge, sit down and apply firm pressure with your thumb against the puncture site and fingers against the back of the wrist for 10 minutes. If the bleeding stops, continue to rest, keeping your wrist still for 2 hours. Notify your doctor as soon as possible.  IF BLEEDING DOES NOT STOP OR THERE IS A LARGER AMOUNT OF BLEEDING OR SPURTING CALL 9-1-1 immediately.DO NOT drive yourself to the hospital.     Precautions - Lifting    DO NOT lift more than 5 pounds with affected arm for 48 hours     Precautions - Household Activities    Avoid excessive bending or movement of your wrist for 72 hours.  Do not subject hand/arm to any forceful movements for 24 hours, such as supporting weight when rising from a chair or bed.     Remove the band-aid on the puncture site after 24 hours and leave open to air. If minor oozing, you may apply a band-aid and remove after 12 hours.     Precautions - Active Sports Activities    DO NOT engage in vigorous exercise using your affected arm for 3 days after discharge.  This includes golf, tennis or swimming.     Precautions - Operating yard equipment or vehicles    Do not operate a chainsaw, lawnmower, motorcycle, or all-terrain vehicle for 48 hours after the procedure.     Precautions - Elective Dental Work    NO elective dental work for 6 weeks after receiving a stent.     Comfort and Pain Management - Bruising after Surgery    Expect mild tingling of hand and tenderness at the wrist puncture site for up to 3 days. You may take Tylenol or a pain medicine recommended by your doctor.     Activity - Cardiac Rehab    You are encouraged to enroll in an Outpatient Cardiac Rehab program after discharge from the hospital.  Our Cardiac Rehab staff may visit briefly with you while you're in the hospital.  If they  miss you, someone will contact you after you are home.     Return to Driving    Driving is NOT permitted for 24 hours after surgery     Return to work    You may return to work after 72 hours if you are feeling well and your job does not involve heavy lifting.     Shower / Bathing    You may shower on the day after your procedure.  DO NOT soak of wrist with the puncture site in water for 3 days to prevent infection. DO NOT take a tub bath or wash dishes for 3 days after the procedure     Dressing Removal    Remove the band-aid on the puncture site after 24 hours and leave open to air. If minor oozing, you may apply a band-aid and remove after 12 hours     Reason aspirin not prescribed from this order set    Per hospital orders     Reason for your hospital stay    NSTEMI     Follow-up and recommended labs and tests     Follow up with primary care provider, Washington Yang, within 7 days for hospital follow- up.  No follow up labs or test are needed.     Activity    Your activity upon discharge: activity as tolerated     Diet    Follow this diet upon discharge: Orders Placed This Encounter      Low Saturated Fat Na <2400 mg     Discharge Medications   Discharge Medication List as of 10/12/2021 10:47 AM      START taking these medications    Details   clopidogrel (PLAVIX) 75 MG tablet Take 1 tablet (75 mg) by mouth daily, Disp-90 tablet, R-0, E-Prescribe      nitroGLYcerin (NITROSTAT) 0.4 MG sublingual tablet For chest pain place 1 tablet under the tongue every 5 minutes for 3 doses. If symptoms persist 5 minutes after 1st dose call 911., Disp-30 tablet, R-0, E-Prescribe         CONTINUE these medications which have CHANGED    Details   metoprolol tartrate (LOPRESSOR) 50 MG tablet Take 1 tablet (50 mg) by mouth 2 times daily, Disp-60 tablet, R-0, E-Prescribe         CONTINUE these medications which have NOT CHANGED    Details   aspirin (ASA) 81 MG chewable tablet Take 81 mg by mouth every evening , Historical       atorvastatin (LIPITOR) 80 MG tablet Take 1 tablet (80 mg) by mouth daily, Disp-90 tablet, R-0, E-Prescribe      diclofenac (VOLTAREN) 1 % topical gel Apply 2 g topically 4 times daily as needed for moderate pain (apply to hands and fingers), Historical      fluticasone (FLONASE) 50 MCG/ACT nasal spray Spray 2 sprays into both nostrils daily as needed for rhinitis or allergies, Disp-16 g, R-3, E-Prescribe      folic acid (FOLVITE) 1 MG tablet TAKE ONE TABLET BY MOUTH ONE TIME DAILY , Disp-90 tablet, R-1, E-Prescribe      gabapentin (NEURONTIN) 600 MG tablet TAKE ONE TABLET BY MOUTH TWICE DAILY , Disp-180 tablet, R-1, E-Prescribe      sildenafil (REVATIO) 20 MG tablet TAKE 2 TO 5 TABLETS BY MOUTH AS NEEDED PRIOR TO SEXUAL INTERCOURSE, Disp-90 tablet, R-0, E-Prescribe      evolocumab (REPATHA) 140 MG/ML prefilled autoinjector Inject 1 mL (140 mg) Subcutaneous every 14 days, Disp-6 mL, R-3, E-Prescribe           Allergies   Allergies   Allergen Reactions     Crestor [Rosuvastatin] GI Disturbance     Dust Mites      Other [No Clinical Screening - See Comments]      Goose feathers     Pollen Extract      Data   Most Recent 3 CBC's:Recent Labs   Lab Test 10/11/21  0535 10/10/21  0612 10/09/21  1953 10/09/21  0530 10/09/21  0530   WBC 5.6 8.0  --   --  8.0   HGB 14.1 14.9 12.8*   < > 12.6*   MCV 87 86  --   --  88    266  --   --  268    < > = values in this interval not displayed.      Most Recent 3 BMP's:  Recent Labs   Lab Test 10/12/21  0538 10/11/21  0535 10/10/21  0612    137 136   POTASSIUM 3.8 4.0 4.0   CHLORIDE 105 106 104   CO2 26 26 26   BUN 13 15 19   CR 0.93 0.92 1.00   ANIONGAP 6 5 6   ONDINA 8.7 8.7 9.0   GLC 90 88 102*     Most Recent 2 LFT's:  Recent Labs   Lab Test 10/09/21  0530 01/07/21  0430   AST 52* 19   ALT 36 17   ALKPHOS 75 67   BILITOTAL 0.5 1.5*     Most Recent INR's and Anticoagulation Dosing History:  Anticoagulation Dose History     Recent Dosing and Labs Latest Ref Rng & Units  2020    INR 0.86 - 1.14 0.96 1.03        Most Recent 3 Troponin's:  Recent Labs   Lab Test 10/10/21  0347 10/09/21  1953 10/09/21  1315 10/08/21  2058 01/06/21  1027 20  1314 20  1352   TROPI  --   --   --   --  <0.015 <0.015 <0.015   TROPONIN 2.734* 2.931* 3.915*   < >  --   --   --     < > = values in this interval not displayed.     Most Recent Cholesterol Panel:  Recent Labs   Lab Test 10/12/21  0538   CHOL 166   LDL 55   HDL 79   TRIG 158*     Most Recent 6 Bacteria Isolates From Any Culture (See EPIC Reports for Culture Details):No lab results found.  Most Recent TSH, T4 and A1c Labs:  Recent Labs   Lab Test 20  1352   TSH 3.21     Results for orders placed or performed during the hospital encounter of 10/08/21   XR Chest 2 Views    Narrative    EXAM: XR CHEST 2 VW  LOCATION: [facility]  DATE/TIME: 10/8/2021 10:13 PM    INDICATION: Chest pain.  COMPARISON: 2020.    FINDINGS: The heart size is normal. The lungs are clear. No pneumothorax or pleural effusion. Degenerative disease in the spine.      Impression    IMPRESSION: No acute abnormality.   Echocardiogram Complete     Value    LVEF  60-65%    Narrative    [id number]  [id number]  [id number]  [id number]^ARNULFO^ROBERTH^ANNALISE     [facility]  Echocardiography Laboratory  [address] [place] [id number]     Name: MARLENY CHAPA  MRN: [id number]  : 1955  Study Date: 10/10/2021 08:23 AM  Age: 66 yrs  Gender: Male  Patient Location: [facility]  Reason For Study: Chest Pain  Ordering Physician: ROBERTH ARREDONDO  Referring Physician: Washington Yang  Performed By: Aditi Parikh RDCS     BSA: 1.7 m2  Height: 66 in  Weight: 139 lb  HR: 91  BP: 142/88 mmHg  ______________________________________________________________________________  Procedure  Complete Portable Echo Adult. Optison (NDC #[id number]) given  intravenously.  ______________________________________________________________________________  Interpretation Summary     1. Left ventricular systolic function is normal. The visual ejection fraction  is 60-65%. No gross regional wall motion abnormalities.  2. The right ventricular systolic function is normal.  3. Aortic valve is not well-visualized, but is calcified with probable  moderate aortic stenosis. The mean gradient is 13mmHg with a peak velocity of  2.3m/sec, MANDEEP using LVOT diameter of 2.1cm is 1.2cm2.  4. Mild aortic regurgitation.     In comparison to the echo report from 7/15/2020, there is no significant  change. The peak velocity was 29mmHg and mean gradient was 17mmHg at that  time.  ______________________________________________________________________________  Left Ventricle  The left ventricle is normal in size. There is mild concentric left  ventricular hypertrophy. Left ventricular systolic function is normal. The  visual ejection fraction is 60-65%. Left ventricular diastolic function is  indeterminate. No regional wall motion abnormalities noted.     Right Ventricle  The right ventricle is grossly normal size. The right ventricular systolic  function is normal.     Atria  The left atrium is not well visualized. Does not appear significantly dilated.  Right atrial size is normal.     Mitral Valve  There is mild to moderate mitral annular calcification.     Tricuspid Valve  The tricuspid valve is not well visualized, but is grossly normal.     Aortic Valve  The aortic valve is not well visualized. The peak AoV pressure gradient is  20.0 mmHg. The mean AoV pressure gradient is 12.9 mmHg.     Pulmonic Valve  There is trace pulmonic valvular regurgitation.     Vessels  The aortic root is not well visualized. The inferior vena cava is normal.     Pericardium  There is no pericardial effusion.     ______________________________________________________________________________  MMode/2D Measurements &  Calculations  IVSd: 1.2 cm  LVIDd: 3.6 cm  LVIDs: 3.2 cm  LVPWd: 1.5 cm  FS: 9.7 %  LV mass(C)d: 171.6 grams  LV mass(C)dI: 100.1 grams/m2     LVOT diam: 1.9 cm  LVOT area: 2.8 cm2  RWT: 0.86     Doppler Measurements & Calculations  MV E max jero: 65.1 cm/sec  MV A max jero: 110.5 cm/sec  MV E/A: 0.59  MV dec slope: 386.8 cm/sec2  Ao V2 max: 224.0 cm/sec  Ao max P.0 mmHg  Ao V2 mean: 168.5 cm/sec  Ao mean P.9 mmHg  Ao V2 VTI: 43.3 cm  MANDEEP(I,D): 1.0 cm2  MANDEEP(V,D): 1.0 cm2  LV V1 max P.7 mmHg  LV V1 max: 82.6 cm/sec  LV V1 VTI: 15.5 cm  SV(LVOT): 43.3 ml  SI(LVOT): 25.3 ml/m2  AV Jero Ratio (DI): 0.37  MANDEEP Index (cm2/m2): 0.58  E/E' avg: 10.3  Lateral E/e': 8.8  Medial E/e': 11.7     ______________________________________________________________________________  Report approved by: MD Patsy Roman 10/10/2021 12:47 PM         Cardiac Catheterization    Narrative    1.  Severe stenosis of the midLCx which was jailed by the previously   placed LCx/OM stent.  2.  PCI of the midLCx with placement of a 3.0 x 12 Synergy ANNETTE which   was-post-dilated to 4.0. The previously placed LCx OM stent was   pre-dilated to facilitate stent delivery at the bifurcation and   post-dilated to crush any protruding stent-edge.   3.  Stable moderate to severe stenosis of the ostial nondominant RCA.  4.  Moderate to severe non-obstructive stenosis of OM1 (iFR of OM1 0.94).  5.  LVEDP mildly elevated 17mmHg.  6.  There is approximately a 30mmHg peak-to-peak gradient across the   aortic valve consistent with moderate aortic stenosis.     Most Recent 3 CBC's:Recent Labs   Lab Test 10/11/21  0535 10/10/21  0612 10/09/21  1953 10/09/21  0530 10/09/21  0530   WBC 5.6 8.0  --   --  8.0   HGB 14.1 14.9 12.8*   < > 12.6*   MCV 87 86  --   --  88    266  --   --  268    < > = values in this interval not displayed.     Most Recent 3 BMP's:Recent Labs   Lab Test 10/12/21  0538 10/11/21  0535 10/10/21  0612    137 136    POTASSIUM 3.8 4.0 4.0   CHLORIDE 105 106 104   CO2 26 26 26   BUN 13 15 19   CR 0.93 0.92 1.00   ANIONGAP 6 5 6   ONDINA 8.7 8.7 9.0   GLC 90 88 102*     Most Recent 2 LFT's:Recent Labs   Lab Test 10/09/21  0530 01/07/21  0430   AST 52* 19   ALT 36 17   ALKPHOS 75 67   BILITOTAL 0.5 1.5*

## 2021-10-12 NOTE — DISCHARGE INSTRUCTIONS
Cardiac Angiogram Discharge Instructions - Radial    After you go home:      Have an adult stay with you until tomorrow.    Drink extra fluids for 2 days.    You may resume your normal diet.    No smoking       For 24 hours - due to the sedation you received:    Relax and take it easy.    Do NOT make any important or legal decisions.    Do NOT drive or operate machines at home or at work.    Do NOT drink alcohol.    Care of Wrist Puncture Site:      For the first 24 hrs - check the puncture site every 1-2 hours while awake.    It is normal to have soreness at the puncture site and mild tingling in your hand for up to 3 days.    Remove the bandaid after 24 hours. If there is minor oozing, apply another bandaid and remove it after 12 hours.    You may shower tomorrow.  Do NOT take a bath, or use a hot tub or pool for at least 3 days. Do NOT scrub the site. Do not use lotion or powder near the puncture site.           Activity:        For 2 days:     do not use your hand or arm to support your weight (such as rising from a chair)     do not bend your wrist (such as lifting a garage door).    do not lift more than 5 pounds or exercise your arm (such as tennis, golf or bowling).    Do NOT do any heavy activity such as exercise, lifting, or straining.     Bleeding:      If you start bleeding from the site in your wrist, sit down and press firmly on/above the site for 10 minutes.     Once bleeding stops, keep arm still for 2 hours.     Call [facility] as soon as you can.       Call 911 right away if you have heavy bleeding or bleeding that does not stop.      Medicines:      If you are taking an antiplatelet medication such as Plavix, Brilinta or Effient, do not stop taking it until you talk to your cardiologist.        If you are on Metformin (Glucophage), do not restart it until you have blood tests (within 2 to 3 days after discharge).  After you have your blood drawn, you may restart the Metformin.     Take your  medications, including blood thinners, unless your provider tells you not to.      If you take Coumadin (Warfarin), have your INR checked by your provider in  3-5 days. Call your clinic to schedule this.    If you have stopped any medicines, check with your provider about when to restart them.    Follow Up Appointments:      Follow up with [email or web address] Nurse Practitioner at [email or web address] of patient preference in 7-10 days.    Call the clinic if:      You have a large or growing hard lump around the site.    The site is red, swollen, hot or tender.    Blood or fluid is draining from the site.    You have chills or a fever greater than 101 F (38 C).    Your arm feels numb, cool or changes color.    You have hives, a rash or unusual itching.    Any questions or concerns.          [facility] [place]:    [phone] [facility] [[email or web address]] (7 days a week)

## 2021-10-12 NOTE — PROGRESS NOTES
10/12/21 0754   Quick Adds   Type of Visit Initial PT Evaluation   Living Environment   People in home spouse   Current Living Arrangements house   Home Accessibility stairs to enter home;stairs within home   Self-Care   Usual Activity Tolerance good   Current Activity Tolerance moderate   Regular Exercise Yes   Activity/Exercise Type strength training   Exercise Amount/Frequency daily   Equipment Currently Used at Home none   Activity/Exercise/Self-Care Comment independent ADLs, IADLs, yard work   Disability/Function   Walking or Climbing Stairs Difficulty no   Fall history within last six months no   Change in Functional Status Since Onset of Current Illness/Injury no   General Information   Onset of Illness/Injury or Date of Surgery 10/08/21   Referring Physician Dr. Wallace   Patient/Family Therapy Goals Statement (PT) to go home and is agreeable to OP CR   Pertinent History of Current Problem (include personal factors and/or comorbidities that impact the POC) 66 year old male with a past medical history of hyperlipidemia, moderate aortic valve stenosis, GI bleed, carotid artery stenosis, and coronary artery disease s/p PCI with stenting of the distal and ostial circumflex as well as in the first obtuse marginal artery in 1998 and subsequent PCI to OM2 in 2020 who was admitted on 10/8/2021 after presenting with chest pain and being found to have a NSTEMI.  EF is normal 60-65% without focal WMA and probable moderate aortic stenosis. PCI of the midLCx with placement of a 3.0 x 12 Synergy ANNETTE which was-post-dilated to 4.0. The previously placed LCx OM stent was pre-dilated to facilitate stent delivery at the bifurcation and post-dilated to crush any protruding stent-edge.    Existing Precautions/Restrictions cardiac   Cognition   Orientation Status (Cognition) oriented x 4   Pain Assessment   Patient Currently in Pain No   Integumentary/Edema   Integumentary/Edema Comments radial incision site   Range of  Motion (ROM)   ROM Comment WFL   Strength   Strength Comments WFL   Bed Mobility   Comment (Bed Mobility) independent   Transfers   Transfer Safety Comments independent   Gait/Stairs (Locomotion)   [place] Level (Gait) independent   Balance   Balance Comments stable   Sensory Examination   Sensory Perception patient reports no sensory changes   Coordination   Coordination no deficits were identified   Clinical Impression   Criteria for Skilled Therapeutic Intervention yes, treatment indicated   PT Diagnosis (PT) Decreased cardiopulmonary endurance   Influenced by the following impairments Decreased activity tolerance after NSTEMI and ANNETTE   Functional limitations due to impairments below baseline for endurance and cardiac tolerance   Clinical Presentation Stable/Uncomplicated   Clinical Presentation Rationale clinical jugment, PMH, current level   Clinical Decision Making (Complexity) low complexity   Therapy Frequency (PT) One time eval and treatment only   Predicted Duration of Therapy Intervention (days/wks) 1 day   Planned Therapy Interventions (PT) home exercise program;patient/family education;progressive activity/exercise   Risk & Benefits of therapy have been explained evaluation/treatment results reviewed;care plan/treatment goals reviewed;risks/benefits reviewed;current/potential barriers reviewed;participants voiced agreement with care plan;patient   PT Discharge Planning    PT Discharge Recommendation (DC Rec) home with outpatient cardiac rehab   PT Rationale for DC Rec Pt is below baseline for cardiac activity tolerance but pt has demonstrated mobility and tolerance to return home. Anticipate pt will be at home with spouse assist as needed and will continue cardiac rehab for lifestyle and cardiac education at OP CR   PT Brief overview of current status  Amb on TM 12 min at 2.0 with no adverse symptoms   Total Evaluation Time   Total Evaluation Time (Minutes) 5

## 2021-10-13 ENCOUNTER — TELEPHONE (OUTPATIENT)
Dept: FAMILY MEDICINE | Facility: CLINIC | Age: 66
End: 2021-10-13

## 2021-10-13 ENCOUNTER — TELEPHONE (OUTPATIENT)
Dept: CARDIOLOGY | Facility: CLINIC | Age: 66
End: 2021-10-13

## 2021-10-13 NOTE — TELEPHONE ENCOUNTER
Reason for call:  Other   Patient called regarding (reason for call): call back  Additional comments: Patient's wife is calling to see if patient can be seen by Washington Yang PA-C on Monday or Tuesday for hospital follow up for a heart attack. Please call patient back.     Phone number to reach patient:  Home number on file [phone] (home)    Best Time:  Any time    Can we leave a detailed message on this number?  YES    Travel screening: Not Applicable

## 2021-10-13 NOTE — TELEPHONE ENCOUNTER
Patient was evaluated by cardiology while inpatient for NSTEMI s/p Coronary angiogram 10/11/21 noting severe stenosis of the midLCx which was jailed by the previously placed LCx/OM stent. PCI of the mid LCx with placement of a Synergy ANNETTE. Called patient and left  to discuss any post hospital d/c questions he may have, review medication changes, and confirm f/u appts. RN advised patient in  to call the clinic ASAP if he did not receive his rx for Plavix. RN advised patient in  that he has an apt scheduled on 10/25/21 with DANIEL Hector and on 10/26/21 for OP cardiac rehab. Patient advised in  to call clinic with any cardiac related questions or concerns prior to this scheduled daniel't.               10/12/21 cardiology progress note  Primary Cardiologist: Dr. Galicia     Date of Admission: 10/08/2021  Service Date: 10/12/2021     Summary:  Mr. Johan Navarro is a very pleasant 66 year old male with a past medical history of hyperlipidemia, moderate aortic valve stenosis, GI bleed, carotid artery stenosis, and coronary artery disease s/p PCI with stenting of the distal and ostial circumflex as well as in the first obtuse marginal artery in 1998 and subsequent PCI to OM2 in 2020 who was admitted on 10/8/2021 after presenting with chest pain and being found to have a NSTEMI.        Interval History     Patient is resting comfortably. He denies recurrent symptoms of chest pain overnight or this morning. He denies shortness of breath, palpitations, dizziness, or lightheadedness. We reviewed the plan of care as outlined below. Importance of DAPT and care of the radial access site were reviewed. He stated understanding. All questions were answered.      Telemetry: NSR; Sinus tachycardia           Assessment & Plan     1. NSTEMI, prior history of CAD with PCI to OM2 in 2020, prior PCI to the distal circumflex which supplies a LPDA  - Troponins elevated to a peak of 3.9 and trended down.  -  Coronary angiogram 10/11/21 noting  severe stenosis of the midLCx which was jailed by the previously placed LCx/OM stent. PCI of the mid LCx with placement of a Synergy ANNETTE.     2. Moderate aortic stenosis  - TTE 10/10/21 showing calcified aortic valve with probable moderate aortic stenosis with a mean gradient is 13mmHg with a peak velocity of 2.3m/sec, MANDEEP using LVOT diameter of 2.1cm is 1.2cm2. Mild aortic regurgitation also noted. Appears stable in comparison to prior study in 07/2020.     3. Lower GI bleeding  - Bright red blood per rectum noted with bowel movements 10/09.   - Hgb stable and WNL here.  - GI consulted. Felt symptoms likely secondary to hemorrhoids and unrelated to 1/2021 presentation with previous GI bleed. Suggested that if dual antiplatelet therapy needed, would consider plavix over brilinta, as he seemed to do better with this in the past.     4. Concern for history of upper GI Bleeding  - Prior history of melena with multiple endoscopies and colonoscopies and pill endoscopy - no source found.     5. Hyperlipidemia, treated on atorvastatin 80 mg daily      6. MARCUS, resolved  - Creatinine at 1.32 upon admit and improved at 0.92 today.      Plan:   1. Blood pressure has been mildly elevated here with some episodes of sinus tachycardia on telemetry. Will increase the dose of metoprolol tartrate from 25 mg to 50 mg BID.  2. Continue with high intensity statin, beta blocker, and DAPT with aspirin 81 mg daily indefinitely, and plavix 75 mg daily (or other P2Y12 inhibitor) for at least 1 year uninterrupted post PCI.  3. Continue with cardiac rehab and lifestyle modifications, including getting regular exercise and sticking to a heart healthy, Mediterranean style diet.   4. Please send patient home with a prescription for PRN sublingual nitroglycerin at discharge.  5. Okay from a cardiac standpoint for discharge home today.   6. Will arrange close follow up with a Cardiology [facility] in around 1-2 weeks post discharge and with Dr. Galicia in  around 2-3 months.     Thank you for the opportunity to participate in this pleasant patient's care.      ANGELINE Church, CNP   Nurse Practitioner  [facility]  Pager: [phone]  Text Page  (8am - 5pm, M-F)

## 2021-10-14 LAB
ATRIAL RATE - MUSE: 90 BPM
DIASTOLIC BLOOD PRESSURE - MUSE: NORMAL MMHG
INTERPRETATION ECG - MUSE: NORMAL
P AXIS - MUSE: 85 DEGREES
PR INTERVAL - MUSE: 136 MS
QRS DURATION - MUSE: 82 MS
QT - MUSE: 388 MS
QTC - MUSE: 474 MS
R AXIS - MUSE: 62 DEGREES
SYSTOLIC BLOOD PRESSURE - MUSE: NORMAL MMHG
T AXIS - MUSE: 56 DEGREES
VENTRICULAR RATE- MUSE: 90 BPM

## 2021-10-15 ENCOUNTER — OFFICE VISIT (OUTPATIENT)
Dept: FAMILY MEDICINE | Facility: CLINIC | Age: 66
End: 2021-10-15
Payer: COMMERCIAL

## 2021-10-15 VITALS
WEIGHT: 148.3 LBS | RESPIRATION RATE: 16 BRPM | BODY MASS INDEX: 23.94 KG/M2 | HEART RATE: 79 BPM | OXYGEN SATURATION: 98 % | TEMPERATURE: 98.7 F | SYSTOLIC BLOOD PRESSURE: 100 MMHG | DIASTOLIC BLOOD PRESSURE: 78 MMHG

## 2021-10-15 DIAGNOSIS — I25.10 CORONARY ARTERY DISEASE INVOLVING NATIVE CORONARY ARTERY OF NATIVE HEART WITHOUT ANGINA PECTORIS: ICD-10-CM

## 2021-10-15 DIAGNOSIS — Z09 HOSPITAL DISCHARGE FOLLOW-UP: Primary | ICD-10-CM

## 2021-10-15 DIAGNOSIS — E78.2 MIXED HYPERLIPIDEMIA: ICD-10-CM

## 2021-10-15 PROBLEM — I20.0 UNSTABLE ANGINA (H): Status: RESOLVED | Noted: 2021-01-06 | Resolved: 2021-10-15

## 2021-10-15 PROCEDURE — 99495 TRANSJ CARE MGMT MOD F2F 14D: CPT | Performed by: FAMILY MEDICINE

## 2021-10-15 ASSESSMENT — ENCOUNTER SYMPTOMS
CONSTITUTIONAL NEGATIVE: 1
HEADACHES: 0
SHORTNESS OF BREATH: 0
PALPITATIONS: 0

## 2021-10-15 ASSESSMENT — PAIN SCALES - GENERAL: PAINLEVEL: NO PAIN (0)

## 2021-10-15 NOTE — PROGRESS NOTES
Assessment and Plan    (Z09) Hospital discharge follow-up  (primary encounter diagnosis)  Comment: feeling well, following closely with cardiology, meds reviewed, will refill as indicated.  Advised to discuss with cardiology duration of Plavix tx.  Plan:     (E78.2) Mixed hyperlipidemia  Comment: started on meds, cards has arranged for retesting  Plan:     (I25.10) Coronary artery disease involving native coronary artery of native heart without angina pectoris  Comment:   Plan:       RTC in 6m for CPE    Ethan García MD      Jaymie Prado is a 66 year old who presents for the following health issues     HPI       Hospital Follow-up Visit:    Hospital/Nursing Home/IP Rehab Facility: [facility]  Date of Admission: 10/8/21  Date of Discharge: 10/12/2021  Reason(s) for Admission: Heart surgery       Was your hospitalization related to COVID-19? No   Problems taking medications regularly:  None  Medication changes since discharge: None  Problems adhering to non-medication therapy:  None    Summary of hospitalization:  [facility] discharge summary reviewed  Diagnostic Tests/Treatments reviewed.  Follow up needed: none  Other Healthcare Providers Involved in Patient s Care:         None  Update since discharge: improved.       Post Discharge Medication Reconciliation: discharge medications reconciled and changed, per note/orders.  Plan of care communicated with patient and family              Feeling well, staying active.  No WRIGHT.  Denies CP, palpitations, edema, dyspnea, HA, vision changes.    BP is running a bit low, held his metoprolol on advice from his cardiologist.  Does have f/u with cards in about 10 days.  Notes that he did not have high blood pressure before his MI.    Review of Systems   Constitutional: Negative.    Eyes: Negative for visual disturbance.   Respiratory: Negative for shortness of breath.    Cardiovascular: Negative for chest pain, palpitations  and peripheral edema.   Neurological: Negative for headaches.            Objective    /78 (BP Location: Right arm, Patient Position: Sitting, Cuff Size: Adult Regular)   Pulse 79   Temp 98.7  F (37.1  C) (Oral)   Resp 16   Wt 67.3 kg (148 lb 4.8 oz)   SpO2 98%   BMI 23.94 kg/m    Body mass index is 23.94 kg/m .  Physical Exam  Vitals reviewed.   Eyes:      Conjunctiva/sclera: Conjunctivae normal.   Cardiovascular:      Rate and Rhythm: Normal rate and regular rhythm.      Heart sounds: Murmur heard.   Systolic murmur is present with a grade of 2/6.     Pulmonary:      Effort: Pulmonary effort is normal.      Breath sounds: Normal breath sounds.   Skin:     General: Skin is warm and dry.   Neurological:      Mental Status: He is alert and oriented to person, place, and time.

## 2021-10-26 ENCOUNTER — HOSPITAL ENCOUNTER (OUTPATIENT)
Dept: CARDIAC REHAB | Facility: CLINIC | Age: 66
End: 2021-10-26
Attending: INTERNAL MEDICINE
Payer: COMMERCIAL

## 2021-10-26 DIAGNOSIS — I25.10 CORONARY ARTERY DISEASE INVOLVING NATIVE CORONARY ARTERY OF NATIVE HEART WITHOUT ANGINA PECTORIS: ICD-10-CM

## 2021-10-26 DIAGNOSIS — I21.4 NSTEMI (NON-ST ELEVATED MYOCARDIAL INFARCTION) (H): ICD-10-CM

## 2021-10-26 PROCEDURE — 93798 PHYS/QHP OP CAR RHAB W/ECG: CPT | Performed by: REHABILITATION PRACTITIONER

## 2021-11-02 ENCOUNTER — OFFICE VISIT (OUTPATIENT)
Dept: CARDIOLOGY | Facility: CLINIC | Age: 66
End: 2021-11-02
Payer: COMMERCIAL

## 2021-11-02 VITALS
SYSTOLIC BLOOD PRESSURE: 100 MMHG | BODY MASS INDEX: 24.03 KG/M2 | WEIGHT: 149.5 LBS | DIASTOLIC BLOOD PRESSURE: 70 MMHG | HEIGHT: 66 IN | HEART RATE: 88 BPM | OXYGEN SATURATION: 96 %

## 2021-11-02 DIAGNOSIS — I65.29 STENOSIS OF CAROTID ARTERY, UNSPECIFIED LATERALITY: ICD-10-CM

## 2021-11-02 DIAGNOSIS — I35.0 NONRHEUMATIC AORTIC VALVE STENOSIS: ICD-10-CM

## 2021-11-02 DIAGNOSIS — I21.4 NSTEMI (NON-ST ELEVATED MYOCARDIAL INFARCTION) (H): ICD-10-CM

## 2021-11-02 DIAGNOSIS — I25.10 CORONARY ARTERY DISEASE INVOLVING NATIVE CORONARY ARTERY OF NATIVE HEART WITHOUT ANGINA PECTORIS: ICD-10-CM

## 2021-11-02 PROCEDURE — 99214 OFFICE O/P EST MOD 30 MIN: CPT | Performed by: NURSE PRACTITIONER

## 2021-11-02 RX ORDER — METOPROLOL TARTRATE 25 MG/1
25 TABLET, FILM COATED ORAL 2 TIMES DAILY
Qty: 180 TABLET | Refills: 3 | Status: SHIPPED | OUTPATIENT
Start: 2021-11-02 | End: 2022-02-18

## 2021-11-02 RX ORDER — METOPROLOL TARTRATE 50 MG
50 TABLET ORAL 2 TIMES DAILY
COMMUNITY
End: 2021-11-02

## 2021-11-02 ASSESSMENT — MIFFLIN-ST. JEOR: SCORE: 1400.88

## 2021-11-02 NOTE — PROGRESS NOTES
Cardiology Clinic Progress Note    Service Date: November 2, 2021    Primary Cardiologist: Dr. Galicia      Reason for Visit: Hospital follow up    HPI:   I had the pleasure of seeing Mr. Johan Navarro in the clinic today. He is a very pleasant 66 year old male with a past medical history notable for hyperlipidemia, moderate aortic valve stenosis, GI bleed, carotid artery stenosis, and coronary artery disease status post PCI with stenting of the distal and ostial circumflex as well as in the first obtuse marginal artery in 1998 and subsequent PCI to OM2 in 2020. He has followed with Dr. Galicia for his cardiology car.e    He was recently admitted to [facility] on 10/8/2021 after presenting with chest pain and being found to have a NSTEMI. Troponins were elevated to a peak of 3.9 and trended down. He was taken to the Cath Lab with coronary angiogram 10/11/21 noting severe stenosis of the mid circumflex which was jailed by the previously placed LCx/OM stent. PCI of the mid LCx was completed with placement of a single Synergy ANNETTE.  Echocardiogram during his hospitalization showed preserved LV systolic function with EF of 60-65%. Aortic valve was not well visualized but appeared calcified with probable moderate aortic stenosis with a mean gradient of 13mmHg and peak velocity of 2.3m/sec. Mild aortic regurgitation was also noted. He was discharged on DAPT with aspirin and Plavix.     Today, Mr. Navarro presents to the clinic accompanied by his wife Aym in follow up of his recent hospitalization. He tells me that he has been feeling quite well since he has been home in the hospital. He has not had any recurrent episodes of chest pain. He has been doing some light exercise and going for walks a few times a week and tolerating this well without exertional symptoms. He has not had palpitations, shortness of breath, dyspnea on exertion, orthopnea, PND, or lower extremity edema. He did note some mild  lightheadedness initially the first 3 days following his discharge. He called in his of the clinic, spoke with one of the cardiologist on-call, and was apparently instructed to decrease the dose of his metoprolol.  He notes that he has mostly been taking 25 mg twice a day, sometimes he will take a 12.5 mg dose and sometimes he will take a 50 mg dose depending on his blood pressure readings as they have been up and down at times.    ASSESSMENT AND PLAN:  1. Coronary artery disease  - History of PCI with stenting of the status post stenting of the ostial and distal circumflex as well as the first obtuse marginal in 1998, subsequent PCI to the second obtuse marginal in 07/2020, and now PCI of the mid circumflex with ANNETTE x1 on 10/11/2021.   - Stable without recurrent anginal symptoms. Continue current cardiac regimen with aspirin 81 mg daily, Plavix 75 mg daily for 1 year post PCI, high intensity statin, Repatha, and beta-blocker.  He notes that he was discharged with metoprolol tartrate and 50 mg twice daily but had some episodes of hypotension and so has mostly been taking 25 mg twice daily. Recommend he continue the 25 mg twice daily dosing.   - Counseled on continued get regular exercise and stick to a heart healthy Mediterranean style diet.     2. Moderate aortic stenosis  - TTE 10/10/21 showing calcified aortic valve with probable moderate aortic stenosis with a mean gradient is 13mmHg with a peak velocity of 2.3m/sec, MANDEEP using LVOT diameter of 2.1cm is 1.2cm2. Mild aortic regurgitation also noted. Appears stable in comparison to prior study in 07/2020.     3. History of GI Bleed  - Prior history of melena and acute anemia in 01/2021.Has followed with GI with multiple endoscopies and colonoscopies and pill endoscopy with no clear source found.  Hemoglobin now stable.      4. Hyperlipidemia  - Treated on atorvastatin 80 mg daily and Repatha with a most recent LDL cholesterol 55 on 10/12/21.    5. Moderate left  internal carotid artery stenosis on carotid ultrasound in 2019  - Asymptomatic. Plan for a repeat carotid ultrasound for monitoring in the next couple of weeks.     Thank you for the opportunity to participate in this pleasant patient's care.  I will plan to have him see Dr. Galicia in routine follow-up in about 2 to 3 months. He is also scheduled for a carotid ultrasound within the next few weeks for continued monitoring of his carotid stenosis.  I encouraged the patient or his wife to call the clinic with questions or concerns in the meantime, and we would be happy to see him sooner if needed.    30 total minutes was spent today including chart review, precharting, history and exam, post visit documentation, and reviewing studies as outlined above.     ANGELINE Church, CNP   Nurse Practitioner  [facility]  Pager: [phone]  Text Page  (8am - 5pm, M-F)    Orders this Visit:  No orders of the defined types were placed in this encounter.    Orders Placed This Encounter   Medications     metoprolol tartrate (LOPRESSOR) 50 MG tablet     Sig: Take 50 mg by mouth 2 times daily Pt takes 1/4 tablet if systolic BP is over 110 and 1/2 tablet if systolic BP is over 140     There are no discontinued medications.  Encounter Diagnoses   Name Primary?     Coronary artery disease involving native coronary artery of native heart without angina pectoris      NSTEMI (non-ST elevated myocardial infarction) (H)      Stenosis of carotid artery, unspecified laterality      Nonrheumatic aortic valve stenosis      CURRENT MEDICATIONS:  Current Outpatient Medications   Medication Sig Dispense Refill     aspirin (ASA) 81 MG chewable tablet Take 81 mg by mouth every evening        atorvastatin (LIPITOR) 80 MG tablet Take 1 tablet (80 mg) by mouth daily (Patient taking differently: Take 80 mg by mouth every evening ) 90 tablet 0     clopidogrel (PLAVIX) 75 MG tablet Take 1 tablet (75 mg) by mouth daily 90 tablet 0      diclofenac (VOLTAREN) 1 % topical gel Apply 2 g topically 4 times daily as needed for moderate pain (apply to hands and fingers)       evolocumab (REPATHA) 140 MG/ML prefilled autoinjector Inject 1 mL (140 mg) Subcutaneous every 14 days 6 mL 3     fluticasone (FLONASE) 50 MCG/ACT nasal spray Spray 2 sprays into both nostrils daily as needed for rhinitis or allergies 16 g 3     folic acid (FOLVITE) 1 MG tablet TAKE ONE TABLET BY MOUTH ONE TIME DAILY  (Patient taking differently: Take 1 mg by mouth every evening ) 90 tablet 1     gabapentin (NEURONTIN) 600 MG tablet TAKE ONE TABLET BY MOUTH TWICE DAILY  180 tablet 1     metoprolol tartrate (LOPRESSOR) 50 MG tablet Take 50 mg by mouth 2 times daily Pt takes 1/4 tablet if systolic BP is over 110 and 1/2 tablet if systolic BP is over 140       nitroGLYcerin (NITROSTAT) 0.4 MG sublingual tablet For chest pain place 1 tablet under the tongue every 5 minutes for 3 doses. If symptoms persist 5 minutes after 1st dose call 911. 30 tablet 0     sildenafil (REVATIO) 20 MG tablet TAKE 2 TO 5 TABLETS BY MOUTH AS NEEDED PRIOR TO SEXUAL INTERCOURSE 90 tablet 0       ALLERGIES  Allergies   Allergen Reactions     Crestor [Rosuvastatin] GI Disturbance     Dust Mites      Other [No Clinical Screening - See Comments]      Goose feathers     Pollen Extract        PAST MEDICAL, SURGICAL, FAMILY HISTORY:  History was reviewed and updated as needed, see medical record.    SOCIAL HISTORY:  Social History     Socioeconomic History     Marital status:      Spouse name: Not on file     Number of children: Not on file     Years of education: Not on file     Highest education level: Not on file   Occupational History     Not on file   Tobacco Use     Smoking status: Former Smoker     Packs/day: 0.25     Years: 20.00     Pack years: 5.00     Types: Cigars     Quit date:      Years since quittin.8     Smokeless tobacco: Former User     Types: Chew, Snuff     Quit date:       Tobacco comment: 12/10/18: occ chew, not every day   Vaping Use     Vaping Use: Never used   Substance and Sexual Activity     Alcohol use: Not Currently     Comment: very rare, none for 8 years     Drug use: Yes     Types: Marijuana     Comment: daily      Sexual activity: Yes     Partners: Female   Other Topics Concern      Service Not Asked     Blood Transfusions Not Asked     Caffeine Concern Yes     Comment: coffee and soda     Occupational Exposure Not Asked     Hobby Hazards Not Asked     Sleep Concern Not Asked     Stress Concern Not Asked     Weight Concern Not Asked     Special Diet No     Back Care Not Asked     Exercise Yes     Comment: regular      Bike Helmet Not Asked     Seat Belt Yes     Self-Exams Not Asked     Parent/sibling w/ CABG, MI or angioplasty before 65F 55M? Yes     Comment: 49   Social History Narrative     Not on file     Social Determinants of Health     Financial Resource Strain:      Difficulty of Paying Living Expenses:    Food Insecurity:      Worried About Running Out of Food in the Last Year:      Ran Out of Food in the Last Year:    Transportation Needs:      Lack of Transportation (Medical):      Lack of Transportation (Non-Medical):    Physical Activity:      Days of Exercise per Week:      Minutes of Exercise per Session:    Stress:      Feeling of Stress :    Social Connections:      Frequency of Communication with Friends and Family:      Frequency of Social Gatherings with Friends and Family:      Attends Zoroastrian Services:      Active Member of Clubs or Organizations:      Attends Club or Organization Meetings:      Marital Status:    Intimate Partner Violence:      Fear of Current or Ex-Partner:      Emotionally Abused:      Physically Abused:      Sexually Abused:      Review of Systems:  Skin:  Negative     Eyes:  Negative    ENT:  Negative    Respiratory:  Negative    Cardiovascular:  Negative    Gastroenterology: Negative    Genitourinary:      "  Musculoskeletal:  Positive for back pain;arthritis  Neurologic:  Negative    Psychiatric:  Negative    Heme/Lymph/Imm:  Positive for easy bruising  Endocrine:  Negative       Physical Exam:  Vitals: /70 (BP Location: Right arm, Patient Position: Sitting, Cuff Size: Adult Regular)   Pulse 88   Ht 1.676 m (5' 6\")   Wt 67.8 kg (149 lb 8 oz)   SpO2 96%   BMI 24.13 kg/m     Wt Readings from Last 4 Encounters:   11/02/21 67.8 kg (149 lb 8 oz)   10/15/21 67.3 kg (148 lb 4.8 oz)   10/12/21 62.7 kg (138 lb 3.2 oz)   09/09/21 67.6 kg (149 lb 1.6 oz)     CONSTITUTIONAL: Appears his stated age, well nourished, and in no acute distress.  HEENT: Pupils equal, round. Sclerae nonicteric.    NECK: Supple, no masses or JVD appreciated.   C/V:  Regular rate and rhythm, grade 2/6 systolic murmur, no rub or gallop. Radial pulses are full and equal bilaterally. Radial access site appears well-healed without significant ecchymosis, erythema, or appreciable hematoma.  RESP: Respirations are unlabored. Lungs are clear to auscultation bilaterally without wheezing, rales, or rhonchi.  EXTREM: No clubbing, cyanosis, or lower extremity edema bilaterally.   NEURO: Alert and oriented, cooperative. Gait steady. No gross focal deficits.   PSYCH: Affect appropriate. Mentation normal. Responds to questions appropriately.  SKIN: Warm and dry.    Recent Lab Results reviewed today:  LIPID RESULTS:  Lab Results   Component Value Date    CHOL 166 10/12/2021    CHOL 113 01/06/2021    HDL 79 10/12/2021    HDL 55 01/06/2021    LDL 55 10/12/2021    LDL 46 01/06/2021    TRIG 158 (H) 10/12/2021    TRIG 61 01/06/2021    CHOLHDLRATIO 3.3 09/15/2015     LIVER ENZYME RESULTS:  Lab Results   Component Value Date    AST 52 (H) 10/09/2021    AST 19 01/07/2021    ALT 36 10/09/2021    ALT 17 01/07/2021     CBC RESULTS:  Lab Results   Component Value Date    WBC 5.6 10/11/2021    WBC 4.3 03/24/2021    RBC 4.82 10/11/2021    RBC 4.46 03/24/2021    HGB 14.1 " 10/11/2021    HGB 11.2 (L) 03/24/2021    HCT 41.9 10/11/2021    HCT 35.6 (L) 03/24/2021    MCV 87 10/11/2021    MCV 80 03/24/2021    MCH 29.3 10/11/2021    MCH 25.1 (L) 03/24/2021    MCHC 33.7 10/11/2021    MCHC 31.5 03/24/2021    RDW 13.5 10/11/2021    RDW 19.4 (H) 03/24/2021     10/11/2021     03/24/2021     BMP RESULTS:  Lab Results   Component Value Date     10/12/2021     01/08/2021    POTASSIUM 3.8 10/12/2021    POTASSIUM 3.5 01/08/2021    CHLORIDE 105 10/12/2021    CHLORIDE 105 01/08/2021    CO2 26 10/12/2021    CO2 24 01/08/2021    ANIONGAP 6 10/12/2021    ANIONGAP 7 01/08/2021    GLC 90 10/12/2021    GLC 96 01/08/2021    BUN 13 10/12/2021    BUN 14 01/08/2021    CR 0.93 10/12/2021    CR 0.97 01/08/2021    GFRESTIMATED 85 10/12/2021    GFRESTIMATED 81 01/08/2021    GFRESTBLACK >90 01/08/2021    ONDINA 8.7 10/12/2021    ONDINA 8.8 01/08/2021      CC  Sander Galicia MD  [id number] [place] [id number]    This note was completed in part using Dragon voice recognition software. Although reviewed after completion, some word and grammatical errors may occur.

## 2021-11-02 NOTE — PATIENT INSTRUCTIONS
Thank you for your visit with the [facility] today.    Today's plan:   1. Change metoprolol to 25 mg twice daily.  2. Complete the carotid ultrasound as planned.  3. Follow up with Dr. Galicia in about 2-3 months with labs before to keep an eye on your hemoglobin.     If you have questions or concerns, please do not hesitate to call my nurse, Zuri, at [phone].     Scheduling phone number: [phone]    It was a pleasure seeing you today!     ANGELINE Church, CNP  Nurse Practitioner  [facility]  November 2, 2021  ________________________________________________________

## 2021-11-02 NOTE — LETTER
11/2/2021    Washington Yang PA-C  [address]  [facility] [address]    RE: Johan Navarro       Dear Colleague,    I had the pleasure of seeing Johan Navarro in the [facility].    Cardiology Clinic Progress Note    Service Date: November 2, 2021    Primary Cardiologist: Dr. Galicia      Reason for Visit: Hospital follow up    HPI:   I had the pleasure of seeing Mr. Johan Navarro in the clinic today. He is a very pleasant 66 year old male with a past medical history notable for hyperlipidemia, moderate aortic valve stenosis, GI bleed, carotid artery stenosis, and coronary artery disease status post PCI with stenting of the distal and ostial circumflex as well as in the first obtuse marginal artery in 1998 and subsequent PCI to OM2 in 2020. He has followed with Dr. Galicia for his cardiology car.e    He was recently admitted to [facility] on 10/8/2021 after presenting with chest pain and being found to have a NSTEMI. Troponins were elevated to a peak of 3.9 and trended down. He was taken to the Cath Lab with coronary angiogram 10/11/21 noting severe stenosis of the mid circumflex which was jailed by the previously placed LCx/OM stent. PCI of the mid LCx was completed with placement of a single Synergy ANNETTE.  Echocardiogram during his hospitalization showed preserved LV systolic function with EF of 60-65%. Aortic valve was not well visualized but appeared calcified with probable moderate aortic stenosis with a mean gradient of 13mmHg and peak velocity of 2.3m/sec. Mild aortic regurgitation was also noted. He was discharged on DAPT with aspirin and Plavix.     Today, Mr. Navarro presents to the clinic accompanied by his wife Amy in follow up of his recent hospitalization. He tells me that he has been feeling quite well since he has been home in the hospital. He has not had any recurrent episodes of chest pain. He has been doing some  light exercise and going for walks a few times a week and tolerating this well without exertional symptoms. He has not had palpitations, shortness of breath, dyspnea on exertion, orthopnea, PND, or lower extremity edema. He did note some mild lightheadedness initially the first 3 days following his discharge. He called in his of the clinic, spoke with one of the cardiologist on-call, and was apparently instructed to decrease the dose of his metoprolol.  He notes that he has mostly been taking 25 mg twice a day, sometimes he will take a 12.5 mg dose and sometimes he will take a 50 mg dose depending on his blood pressure readings as they have been up and down at times.    ASSESSMENT AND PLAN:  1. Coronary artery disease  - History of PCI with stenting of the status post stenting of the ostial and distal circumflex as well as the first obtuse marginal in 1998, subsequent PCI to the second obtuse marginal in 07/2020, and now PCI of the mid circumflex with ANNETTE x1 on 10/11/2021.   - Stable without recurrent anginal symptoms. Continue current cardiac regimen with aspirin 81 mg daily, Plavix 75 mg daily for 1 year post PCI, high intensity statin, Repatha, and beta-blocker.  He notes that he was discharged with metoprolol tartrate and 50 mg twice daily but had some episodes of hypotension and so has mostly been taking 25 mg twice daily. Recommend he continue the 25 mg twice daily dosing.   - Counseled on continued get regular exercise and stick to a heart healthy Mediterranean style diet.     2. Moderate aortic stenosis  - TTE 10/10/21 showing calcified aortic valve with probable moderate aortic stenosis with a mean gradient is 13mmHg with a peak velocity of 2.3m/sec, MANDEEP using LVOT diameter of 2.1cm is 1.2cm2. Mild aortic regurgitation also noted. Appears stable in comparison to prior study in 07/2020.     3. History of GI Bleed  - Prior history of melena and acute anemia in 01/2021.Has followed with GI with multiple  endoscopies and colonoscopies and pill endoscopy with no clear source found.  Hemoglobin now stable.      4. Hyperlipidemia  - Treated on atorvastatin 80 mg daily and Repatha with a most recent LDL cholesterol 55 on 10/12/21.    5. Moderate left internal carotid artery stenosis on carotid ultrasound in 2019  - Asymptomatic. Plan for a repeat carotid ultrasound for monitoring in the next couple of weeks.     Thank you for the opportunity to participate in this pleasant patient's care.  I will plan to have him see Dr. Galicia in routine follow-up in about 2 to 3 months. He is also scheduled for a carotid ultrasound within the next few weeks for continued monitoring of his carotid stenosis.  I encouraged the patient or his wife to call the clinic with questions or concerns in the meantime, and we would be happy to see him sooner if needed.    30 total minutes was spent today including chart review, precharting, history and exam, post visit documentation, and reviewing studies as outlined above.     ANGELINE Church, CNP   Nurse Practitioner  [facility]  Pager: [phone]  Text Page  (8am - 5pm, M-F)    Orders this Visit:  No orders of the defined types were placed in this encounter.    Orders Placed This Encounter   Medications     metoprolol tartrate (LOPRESSOR) 50 MG tablet     Sig: Take 50 mg by mouth 2 times daily Pt takes 1/4 tablet if systolic BP is over 110 and 1/2 tablet if systolic BP is over 140     There are no discontinued medications.  Encounter Diagnoses   Name Primary?     Coronary artery disease involving native coronary artery of native heart without angina pectoris      NSTEMI (non-ST elevated myocardial infarction) (H)      Stenosis of carotid artery, unspecified laterality      Nonrheumatic aortic valve stenosis      CURRENT MEDICATIONS:  Current Outpatient Medications   Medication Sig Dispense Refill     aspirin (ASA) 81 MG chewable tablet Take 81 mg by mouth every evening         atorvastatin (LIPITOR) 80 MG tablet Take 1 tablet (80 mg) by mouth daily (Patient taking differently: Take 80 mg by mouth every evening ) 90 tablet 0     clopidogrel (PLAVIX) 75 MG tablet Take 1 tablet (75 mg) by mouth daily 90 tablet 0     diclofenac (VOLTAREN) 1 % topical gel Apply 2 g topically 4 times daily as needed for moderate pain (apply to hands and fingers)       evolocumab (REPATHA) 140 MG/ML prefilled autoinjector Inject 1 mL (140 mg) Subcutaneous every 14 days 6 mL 3     fluticasone (FLONASE) 50 MCG/ACT nasal spray Spray 2 sprays into both nostrils daily as needed for rhinitis or allergies 16 g 3     folic acid (FOLVITE) 1 MG tablet TAKE ONE TABLET BY MOUTH ONE TIME DAILY  (Patient taking differently: Take 1 mg by mouth every evening ) 90 tablet 1     gabapentin (NEURONTIN) 600 MG tablet TAKE ONE TABLET BY MOUTH TWICE DAILY  180 tablet 1     metoprolol tartrate (LOPRESSOR) 50 MG tablet Take 50 mg by mouth 2 times daily Pt takes 1/4 tablet if systolic BP is over 110 and 1/2 tablet if systolic BP is over 140       nitroGLYcerin (NITROSTAT) 0.4 MG sublingual tablet For chest pain place 1 tablet under the tongue every 5 minutes for 3 doses. If symptoms persist 5 minutes after 1st dose call 911. 30 tablet 0     sildenafil (REVATIO) 20 MG tablet TAKE 2 TO 5 TABLETS BY MOUTH AS NEEDED PRIOR TO SEXUAL INTERCOURSE 90 tablet 0       ALLERGIES  Allergies   Allergen Reactions     Crestor [Rosuvastatin] GI Disturbance     Dust Mites      Other [No Clinical Screening - See Comments]      Goose feathers     Pollen Extract        PAST MEDICAL, SURGICAL, FAMILY HISTORY:  History was reviewed and updated as needed, see medical record.    SOCIAL HISTORY:  Social History     Socioeconomic History     Marital status:      Spouse name: Not on file     Number of children: Not on file     Years of education: Not on file     Highest education level: Not on file   Occupational History     Not on file   Tobacco Use      Smoking status: Former Smoker     Packs/day: 0.25     Years: 20.00     Pack years: 5.00     Types: Cigars     Quit date:      Years since quittin.8     Smokeless tobacco: Former User     Types: Chew, Snuff     Quit date:      Tobacco comment: 12/10/18: occ chew, not every day   Vaping Use     Vaping Use: Never used   Substance and Sexual Activity     Alcohol use: Not Currently     Comment: very rare, none for 8 years     Drug use: Yes     Types: Marijuana     Comment: daily      Sexual activity: Yes     Partners: Female   Other Topics Concern      Service Not Asked     Blood Transfusions Not Asked     Caffeine Concern Yes     Comment: coffee and soda     Occupational Exposure Not Asked     Hobby Hazards Not Asked     Sleep Concern Not Asked     Stress Concern Not Asked     Weight Concern Not Asked     Special Diet No     Back Care Not Asked     Exercise Yes     Comment: regular      Bike Helmet Not Asked     Seat Belt Yes     Self-Exams Not Asked     Parent/sibling w/ CABG, MI or angioplasty before 65F 55M? Yes     Comment: 49   Social History Narrative     Not on file     Social Determinants of Health     Financial Resource Strain:      Difficulty of Paying Living Expenses:    Food Insecurity:      Worried About Running Out of Food in the Last Year:      Ran Out of Food in the Last Year:    Transportation Needs:      Lack of Transportation (Medical):      Lack of Transportation (Non-Medical):    Physical Activity:      Days of Exercise per Week:      Minutes of Exercise per Session:    Stress:      Feeling of Stress :    Social Connections:      Frequency of Communication with Friends and Family:      Frequency of Social Gatherings with Friends and Family:      Attends [employer] Services:      Active Member of Clubs or Organizations:      Attends Club or Organization Meetings:      Marital Status:    Intimate Partner Violence:      Fear of Current or Ex-Partner:      Emotionally Abused:       "Physically Abused:      Sexually Abused:      Review of Systems:  Skin:  Negative     Eyes:  Negative    ENT:  Negative    Respiratory:  Negative    Cardiovascular:  Negative    Gastroenterology: Negative    Genitourinary:       Musculoskeletal:  Positive for back pain;arthritis  Neurologic:  Negative    Psychiatric:  Negative    Heme/Lymph/Imm:  Positive for easy bruising  Endocrine:  Negative       Physical Exam:  Vitals: /70 (BP Location: Right arm, Patient Position: Sitting, Cuff Size: Adult Regular)   Pulse 88   Ht 1.676 m (5' 6\")   Wt 67.8 kg (149 lb 8 oz)   SpO2 96%   BMI 24.13 kg/m     Wt Readings from Last 4 Encounters:   11/02/21 67.8 kg (149 lb 8 oz)   10/15/21 67.3 kg (148 lb 4.8 oz)   10/12/21 62.7 kg (138 lb 3.2 oz)   09/09/21 67.6 kg (149 lb 1.6 oz)     CONSTITUTIONAL: Appears his stated age, well nourished, and in no acute distress.  HEENT: Pupils equal, round. Sclerae nonicteric.    NECK: Supple, no masses or JVD appreciated.   C/V:  Regular rate and rhythm, grade 2/6 systolic murmur, no rub or gallop. Radial pulses are full and equal bilaterally. Radial access site appears well-healed without significant ecchymosis, erythema, or appreciable hematoma.  RESP: Respirations are unlabored. Lungs are clear to auscultation bilaterally without wheezing, rales, or rhonchi.  EXTREM: No clubbing, cyanosis, or lower extremity edema bilaterally.   NEURO: Alert and oriented, cooperative. Gait steady. No gross focal deficits.   PSYCH: Affect appropriate. Mentation normal. Responds to questions appropriately.  SKIN: Warm and dry.    Recent Lab Results reviewed today:  LIPID RESULTS:  Lab Results   Component Value Date    CHOL 166 10/12/2021    CHOL 113 01/06/2021    HDL 79 10/12/2021    HDL 55 01/06/2021    LDL 55 10/12/2021    LDL 46 01/06/2021    TRIG 158 (H) 10/12/2021    TRIG 61 01/06/2021    CHOLHDLRATIO 3.3 09/15/2015     LIVER ENZYME RESULTS:  Lab Results   Component Value Date    AST 52 (H) " 10/09/2021    AST 19 01/07/2021    ALT 36 10/09/2021    ALT 17 01/07/2021     CBC RESULTS:  Lab Results   Component Value Date    WBC 5.6 10/11/2021    WBC 4.3 03/24/2021    RBC 4.82 10/11/2021    RBC 4.46 03/24/2021    HGB 14.1 10/11/2021    HGB 11.2 (L) 03/24/2021    HCT 41.9 10/11/2021    HCT 35.6 (L) 03/24/2021    MCV 87 10/11/2021    MCV 80 03/24/2021    MCH 29.3 10/11/2021    MCH 25.1 (L) 03/24/2021    MCHC 33.7 10/11/2021    MCHC 31.5 03/24/2021    RDW 13.5 10/11/2021    RDW 19.4 (H) 03/24/2021     10/11/2021     03/24/2021     BMP RESULTS:  Lab Results   Component Value Date     10/12/2021     01/08/2021    POTASSIUM 3.8 10/12/2021    POTASSIUM 3.5 01/08/2021    CHLORIDE 105 10/12/2021    CHLORIDE 105 01/08/2021    CO2 26 10/12/2021    CO2 24 01/08/2021    ANIONGAP 6 10/12/2021    ANIONGAP 7 01/08/2021    GLC 90 10/12/2021    GLC 96 01/08/2021    BUN 13 10/12/2021    BUN 14 01/08/2021    CR 0.93 10/12/2021    CR 0.97 01/08/2021    GFRESTIMATED 85 10/12/2021    GFRESTIMATED 81 01/08/2021    GFRESTBLACK >90 01/08/2021    ONDINA 8.7 10/12/2021    ONDINA 8.8 01/08/2021      PEARL Galicia MD  [id number] [id number]    This note was completed in part using Dragon voice recognition software. Although reviewed after completion, some word and grammatical errors may occur.      Albert Rosas NP   [facility]

## 2021-11-12 ENCOUNTER — IMMUNIZATION (OUTPATIENT)
Dept: PEDIATRICS | Facility: CLINIC | Age: 66
End: 2021-11-12
Payer: COMMERCIAL

## 2021-11-12 DIAGNOSIS — Z23 HIGH PRIORITY FOR 2019-NCOV VACCINE: Primary | ICD-10-CM

## 2021-11-12 PROCEDURE — 91300 COVID-19,PF,PFIZER (12+ YRS): CPT

## 2021-11-12 PROCEDURE — 0004A COVID-19,PF,PFIZER (12+ YRS): CPT

## 2021-11-12 PROCEDURE — 99207 PR NO CHARGE LOS: CPT

## 2021-11-19 ENCOUNTER — HOSPITAL ENCOUNTER (OUTPATIENT)
Dept: CARDIOLOGY | Facility: CLINIC | Age: 66
Discharge: HOME | End: 2021-11-19
Attending: INTERNAL MEDICINE | Admitting: INTERNAL MEDICINE
Payer: COMMERCIAL

## 2021-11-19 DIAGNOSIS — I65.23 BILATERAL CAROTID ARTERY STENOSIS: ICD-10-CM

## 2021-11-19 PROCEDURE — 93880 EXTRACRANIAL BILAT STUDY: CPT | Mod: 26 | Performed by: INTERNAL MEDICINE

## 2021-11-19 PROCEDURE — 93880 EXTRACRANIAL BILAT STUDY: CPT

## 2021-11-22 DIAGNOSIS — E78.5 HLD (HYPERLIPIDEMIA): ICD-10-CM

## 2021-11-22 DIAGNOSIS — I25.10 CORONARY ARTERY DISEASE INVOLVING NATIVE CORONARY ARTERY OF NATIVE HEART WITHOUT ANGINA PECTORIS: ICD-10-CM

## 2021-11-22 RX ORDER — ATORVASTATIN CALCIUM 80 MG/1
80 TABLET, FILM COATED ORAL DAILY
Qty: 90 TABLET | Refills: 2 | Status: SHIPPED | OUTPATIENT
Start: 2021-11-22 | End: 2022-09-12

## 2021-11-23 ENCOUNTER — TELEPHONE (OUTPATIENT)
Dept: OTHER | Facility: CLINIC | Age: 66
End: 2021-11-23
Payer: COMMERCIAL

## 2021-11-23 ENCOUNTER — TELEPHONE (OUTPATIENT)
Dept: CARDIOLOGY | Facility: CLINIC | Age: 66
End: 2021-11-23
Payer: COMMERCIAL

## 2021-11-23 DIAGNOSIS — I65.22 STENOSIS OF LEFT CAROTID ARTERY: Primary | ICD-10-CM

## 2021-11-23 DIAGNOSIS — I65.29 CAROTID STENOSIS: Primary | ICD-10-CM

## 2021-11-23 NOTE — TELEPHONE ENCOUNTER
Pt referred to [facility] by Albert Rosas NP for carotid stenosis.    11/19/21 bilateral carotid US in Epic:  1. Right internal carotid artery: There is mild-moderate 50-69% diameter stenosis by velocity criteria ( cm/s).   2. Left internal carotid artery: There is moderate-severe >70% diameter stenosis by velocity criteria ( cm/s).     10/12/21 renal function:  Creat = 0.93  GFR = 85    No contrast allergy per review of chart.    Pt needs to be scheduled for CTA head/neck (per protocol) and in person consult with Vascular Surgery.  Will route to scheduling to coordinate an appointment within 1-2 weeks.    Appt note: Ref by Albert Rosas NP for carotid stenosis; bilateral carotid US in Epic; CTA head/neck to be scheduled.    PAULINA MckeonN, RN-[facility]

## 2021-11-23 NOTE — TELEPHONE ENCOUNTER
Order placed for referral with Vascular Surgery.    Attempted to contact patient to review recommendation from ERIC Hector Rosas for Vascular Surgery referral. Left message for patient to call back.

## 2021-11-23 NOTE — TELEPHONE ENCOUNTER
Recommend referral to Vascular Surgery given progression of left carotid stenosis now into the moderate to severe range for further discussion regarding options for continued medical management and monitoring versus carotid revascularization.     Thank you!    Hector Rosas APRN, CNP

## 2021-11-23 NOTE — TELEPHONE ENCOUNTER
Pt's wife sent SIGFOXt message regarding results and urgency. Response sent with recommendations regarding scheduling and recommendations for follow up with vascular surgery.   WERNER Arroyo RN, BSN. 11/23/21 2:01 PM

## 2021-11-23 NOTE — TELEPHONE ENCOUNTER
US carotid 11/22/2021 noted. Ordered for follow up of previous stenosis in 2019.    US carotid:  1. Right internal carotid artery: There is mild-moderate 50-69% diameter stenosis by velocity criteria ( cm/s). There is moderate mixed atherosclerotic plaque in the carotid bulb and proximal internal carotid artery.   2. Left internal carotid artery: There is moderate-severe >70% diameter stenosis by velocity criteria ( cm/s). There is moderate-severe mixed atherosclerotic plaque in the carotid bulb and proximal internal carotid artery.   3. Bilateral vertebral arteries demonstrate antegrade flow.   4. This study was compared to a prior ultrasound 6/4/2019. There has been progression of both the Right ICA (previously <50% stenosis, PSV 96 cm/s) and the Left ICA (previously 50-69% stenosis,  cm/s).     Will message ERIC Hector Rosas as Dr. Galicia is out of the clinic.

## 2021-11-23 NOTE — TELEPHONE ENCOUNTER
Wife called , Wife called for vascular surgery visit. Nurse to be calling back.  Wife states they won't be able to do it this week with the holiday. Wife wondering what surgery recommendations will be given?    Wife will try vascular Dept again for next available visit.    Wife agrees with plan.

## 2021-11-23 NOTE — TELEPHONE ENCOUNTER
LM to schedule CTA ([place]) and NEW CONSULT with Vascular Surgery  ([place]) with in 1-2 weeks.     Jocelyn MANUEL

## 2021-12-02 ENCOUNTER — MYC MEDICAL ADVICE (OUTPATIENT)
Dept: CARDIOLOGY | Facility: CLINIC | Age: 66
End: 2021-12-02
Payer: COMMERCIAL

## 2021-12-02 NOTE — TELEPHONE ENCOUNTER
Tipbit message sent to patient/wife with reminder to schedule vascular follow up for carotid stenosis. Scheduling phone number provided.

## 2021-12-09 ENCOUNTER — DOCUMENTATION ONLY (OUTPATIENT)
Dept: CARDIOLOGY | Facility: CLINIC | Age: 66
End: 2021-12-09
Payer: COMMERCIAL

## 2021-12-09 NOTE — LETTER
December 9, 2021       TO: Johan Navarro  [address]  [place] [address]       Dear Johan Navarro,    We noticed that you are not yet scheduled for a consult with Vascular care to follow up on your carotid stenosis. You can call them at [phone] to make an appointment    The referral was placed by ERIC Hector Rosas as there has been some progression of your carotid stenosis. It would be beneficial to meet with vascular surgery to review what the recommendations are now that the stenosis is greater than 50%. We are being proactive and referring you to the surgical team to get further recommendations on monitoring versus intervention.     Thank you,    Team 2 RNs  [phone]  [facility]

## 2021-12-09 NOTE — PROGRESS NOTES
Letter sent to patient reminding him of referral to the Vascular Surgery team to consider recommendations for management of his carotid stenosis.

## 2021-12-20 ENCOUNTER — HOSPITAL ENCOUNTER (OUTPATIENT)
Dept: CT IMAGING | Facility: CLINIC | Age: 66
Discharge: HOME | End: 2021-12-20
Attending: SURGERY | Admitting: SURGERY
Payer: COMMERCIAL

## 2021-12-20 DIAGNOSIS — I25.10 CORONARY ARTERY DISEASE INVOLVING NATIVE CORONARY ARTERY OF NATIVE HEART WITHOUT ANGINA PECTORIS: ICD-10-CM

## 2021-12-20 DIAGNOSIS — I65.22 STENOSIS OF LEFT CAROTID ARTERY: ICD-10-CM

## 2021-12-20 PROCEDURE — 70496 CT ANGIOGRAPHY HEAD: CPT

## 2021-12-20 PROCEDURE — 250N000009 HC RX 250: Performed by: SURGERY

## 2021-12-20 PROCEDURE — 250N000011 HC RX IP 250 OP 636: Performed by: SURGERY

## 2021-12-20 RX ORDER — GABAPENTIN 600 MG/1
TABLET ORAL
Qty: 180 TABLET | Refills: 1 | Status: SHIPPED | OUTPATIENT
Start: 2021-12-20 | End: 2022-06-22

## 2021-12-20 RX ORDER — IOPAMIDOL 755 MG/ML
500 INJECTION, SOLUTION INTRAVASCULAR ONCE
Status: COMPLETED | OUTPATIENT
Start: 2021-12-20 | End: 2021-12-20

## 2021-12-20 RX ADMIN — IOPAMIDOL 70 ML: 755 INJECTION, SOLUTION INTRAVENOUS at 14:40

## 2021-12-20 RX ADMIN — SODIUM CHLORIDE 80 ML: 9 INJECTION, SOLUTION INTRAVENOUS at 14:40

## 2021-12-22 ENCOUNTER — OFFICE VISIT (OUTPATIENT)
Dept: SURGERY | Facility: CLINIC | Age: 66
End: 2021-12-22
Payer: COMMERCIAL

## 2021-12-22 VITALS
BODY MASS INDEX: 24.91 KG/M2 | RESPIRATION RATE: 16 BRPM | HEART RATE: 77 BPM | HEIGHT: 66 IN | WEIGHT: 155 LBS | SYSTOLIC BLOOD PRESSURE: 118 MMHG | OXYGEN SATURATION: 98 % | DIASTOLIC BLOOD PRESSURE: 76 MMHG

## 2021-12-22 DIAGNOSIS — Z87.19 HISTORY OF GI BLEED: ICD-10-CM

## 2021-12-22 DIAGNOSIS — I65.22 ASYMPTOMATIC STENOSIS OF LEFT CAROTID ARTERY: Primary | ICD-10-CM

## 2021-12-22 DIAGNOSIS — I35.0 AORTIC VALVE STENOSIS, ETIOLOGY OF CARDIAC VALVE DISEASE UNSPECIFIED: ICD-10-CM

## 2021-12-22 DIAGNOSIS — Z95.5 STATUS POST INSERTION OF DRUG ELUTING CORONARY ARTERY STENT: ICD-10-CM

## 2021-12-22 PROCEDURE — 99203 OFFICE O/P NEW LOW 30 MIN: CPT | Performed by: SURGERY

## 2021-12-22 ASSESSMENT — ENCOUNTER SYMPTOMS
CARDIOVASCULAR NEGATIVE: 1
BRUISES/BLEEDS EASILY: 1
ENDOCRINE NEGATIVE: 1
ALLERGIC/IMMUNOLOGIC NEGATIVE: 1
EYES NEGATIVE: 1
CONSTITUTIONAL NEGATIVE: 1
PSYCHIATRIC NEGATIVE: 1
MUSCULOSKELETAL NEGATIVE: 1
NEUROLOGICAL NEGATIVE: 1
RESPIRATORY NEGATIVE: 1

## 2021-12-22 ASSESSMENT — MIFFLIN-ST. JEOR: SCORE: 1425.83

## 2021-12-22 NOTE — PROGRESS NOTES
[facility] VASCULAR [facility] CENTER INITIAL VASCULAR SURGERY CONSULT    Impression:   1.  Asymptomatic approximately 70-80% left ICA stenosis.  On recent ultrasound PSV was 243 with an EDV of 115 and ratio of 3.3.  Those numbers are not significantly changed compared to an ultrasound from 2019.    2.  Coronary artery disease status post mid left circumflex ANNETTE on 10/11/2021.  He must therefore remain on Plavix uninterrupted.    3.  Moderate aortic stenosis.    4.  History of GI bleed requiring transfusions in January 2021.  Status post multiple upper and lower endoscopies with PillCam and no clear bleeding source ever identified.    5.  Hyperlipidemia treated with atorvastatin and Repatha.        Plan:   I had a lengthy discussion with Otis and his wife regarding the natural history of asymptomatic carotid disease.  Asymptomatic carotid atherosclerotic study data are not really applicable to him given his significant medical comorbidities.  We discussed the specifics of carotid endarterectomy including potential complications and the anticipated postoperative course.  We discussed his need to remain on aspirin/Plavix for the next year given the recent ANNETTE.  We discussed his history of significant GI bleed of unknown etiology.    After a very thoughtful conversation he opts to continue observing his asymptomatic left carotid stenosis.  I support this decision.  We discussed the warning signs of left hemispheric stroke.  He understands to present immediately to the emergency room should those symptoms develop.    He will continue his medical regimen including aspirin/Plavix.  Vascular surgical follow-up will be with me in November 2022 for repeat bilateral carotid ultrasonography.  Certainly if there is progression of disease strong consideration will be given to left carotid endarterectomy.  Hopefully by then we can at least temporarily stop his Plavix.    All of their questions were answered and they verbalized  full understanding to the above and complete agreement with this management plan.        HPI:   Johan Navarro is a 66-year-old gentleman with hypertension and a significant cardiac history.  He has moderate aortic valve stenosis.  He is status post multiple coronary interventions most recently with placement of a drug-eluting stent in his mid circumflex on 10/11/2021.  He has been followed for an asymptomatic, moderate left carotid stenosis.  Bilateral carotid ultrasound on 11/19/2021 showed a 50-69% right ICA stenosis.  On the left side PSV was 243 with a ratio of 3.3.  Carotid CTA from 12/20/2021 showed an approximately 60-70% of the left carotid bifurcation with an approximately 80% stenosis of the proximal left internal carotid artery.  He had mild right-sided disease.  He is referred to me for vascular surgical consultation.    He has no personal or familial history of stroke, symptoms of TIA, or amaurosis.  He is a right-handed individual who is a retired .  Overall he seems to be recovering nicely from his recent coronary stenting.      CURRENT MEDICATIONS  aspirin (ASA) 81 MG chewable tablet, Take 81 mg by mouth every evening   atorvastatin (LIPITOR) 80 MG tablet, Take 1 tablet (80 mg) by mouth daily  clopidogrel (PLAVIX) 75 MG tablet, Take 1 tablet (75 mg) by mouth daily  diclofenac (VOLTAREN) 1 % topical gel, Apply 2 g topically 4 times daily as needed for moderate pain (apply to hands and fingers)  evolocumab (REPATHA) 140 MG/ML prefilled autoinjector, Inject 1 mL (140 mg) Subcutaneous every 14 days  fluticasone (FLONASE) 50 MCG/ACT nasal spray, Spray 2 sprays into both nostrils daily as needed for rhinitis or allergies  folic acid (FOLVITE) 1 MG tablet, TAKE ONE TABLET BY MOUTH ONE TIME DAILY  (Patient taking differently: Take 1 mg by mouth every evening )  gabapentin (NEURONTIN) 600 MG tablet, TAKE ONE TABLET BY MOUTH TWICE DAILY  metoprolol tartrate (LOPRESSOR) 25 MG tablet, Take 1 tablet (25  mg) by mouth 2 times daily  nitroGLYcerin (NITROSTAT) 0.4 MG sublingual tablet, For chest pain place 1 tablet under the tongue every 5 minutes for 3 doses. If symptoms persist 5 minutes after 1st dose call 911.  sildenafil (REVATIO) 20 MG tablet, TAKE 2 TO 5 TABLETS BY MOUTH AS NEEDED PRIOR TO SEXUAL INTERCOURSE    No current facility-administered medications on file prior to visit.        PAST MEDICAL HISTORY  Past Medical History:   Diagnosis Date     Aortic stenosis      Arthritis     hands     CAD (coronary artery disease)     cardiac cath 1998: stents x 2 to circumflex     Carotid stenosis     left     Family history of early CAD      Hyperlipidaemia LDL goal < 70     familial hyperlipidemia with marked hypercholesterolemia before treatment; has been on some form of cholesterol-lowering medication since the 1970s     Prostate cancer (H) 2010     Syncope      Unstable angina (H) 1/6/2021         PAST SURGICAL HISTORY:  Past Surgical History:   Procedure Laterality Date     CARDIAC SURGERY  1998    coronary stents x2 placed 1998; angioplasty     CV HEART CATHETERIZATION WITH POSSIBLE INTERVENTION N/A 7/21/2020    Procedure: Heart Catheterization with Possible Intervention;  Surgeon: Alber Bentley MD;  Location: [facility] CARDIAC CATH LAB     CV HEART CATHETERIZATION WITH POSSIBLE INTERVENTION N/A 10/11/2021    Procedure: Heart Catheterization with Possible Intervention;  Surgeon: Patsy Wallace MD;  Location: [facility] CARDIAC CATH LAB     CV INSTANTANEOUS WAVE-FREE RATIO N/A 10/11/2021    Procedure: Instantaneous Wave-Free Ratio;  Surgeon: Patsy Wallace MD;  Location: [facility] CARDIAC CATH LAB     CV LEFT HEART CATH N/A 10/11/2021    Procedure: Left Heart Cath;  Surgeon: Patsy Wallace MD;  Location: [facility] CARDIAC CATH LAB     CV PCI STENT DRUG ELUTING N/A 10/11/2021    Procedure: Percutaneous Coronary Intervention Stent Drug Eluting;  Surgeon: Patsy Wallace MD;  Location: [facility] CARDIAC  CATH LAB     DAVINCI PROSTATECTOMY           ESOPHAGOSCOPY, GASTROSCOPY, DUODENOSCOPY (EGD), COMBINED N/A 2021    Procedure: ESOPHAGOGASTRODUODENOSCOPY (EGD);  Surgeon: Rosemarie Laws MD;  Location:  GI     EYE SURGERY Bilateral 2017    eyelids     GENITOURINARY SURGERY       ORTHOPEDIC SURGERY Right     achilles tendon; post football injury     ORTHOPEDIC SURGERY Right     hand; near thumb. has wires in there. cysts     REPAIR TENDON BICEPS DISTAL UPPER EXTREMITY Right 2018    Procedure: REPAIR TENDON BICEPS DISTAL UPPER EXTREMITY;  Right open biceps tendon repair  ;  Surgeon: Alber Zabala MD;  Location: [facility] OR     SURGICAL HISTORY OF -       Right hand Xanthoma removal     SURGICAL HISTORY OF -       Stress Echo (-)       ALLERGIES     Allergies   Allergen Reactions     Crestor [Rosuvastatin] GI Disturbance     Dust Mites      Other [No Clinical Screening - See Comments]      Goose feathers     Pollen Extract        FAMILY HISTORY  Family History   Problem Relation Age of Onset     Lipids Mother      C.A.D. Mother          at age 49 of MI     Dementia Father      C.A.D. Sister         MI at age 48     Cancer - colorectal No family hx of      Prostate Cancer No family hx of      Colon Cancer No family hx of        SOCIAL HISTORY  Social History     Tobacco Use     Smoking status: Former Smoker     Packs/day: 0.25     Years: 20.00     Pack years: 5.00     Types: Cigars     Quit date: 2000     Years since quittin.9     Smokeless tobacco: Former User     Types: Chew, Snuff     Quit date:      Tobacco comment: 12/10/18: occ chew, not every day   Vaping Use     Vaping Use: Never used   Substance Use Topics     Alcohol use: Not Currently     Comment: very rare, none for 8 years     Drug use: Yes     Types: Marijuana     Comment: daily        ROS:   Review of Systems   Constitutional: Negative.   HENT: Negative.    Eyes: Negative.    Cardiovascular: Negative.    Respiratory:  Negative.    Endocrine: Negative.    Hematologic/Lymphatic: Bruises/bleeds easily.   Skin: Negative.    Musculoskeletal: Negative.    Gastrointestinal:        See HPI.  No recent hematemesis melena or hematochezia.   Genitourinary: Negative.    Neurological: Negative.    Psychiatric/Behavioral: Negative.    Allergic/Immunologic: Negative.          EXAM:  There were no vitals taken for this visit.  Physical Exam  Vitals reviewed.   Constitutional:       Appearance: Normal appearance.   HENT:      Head: Normocephalic and atraumatic.   Eyes:      General: No scleral icterus.     Extraocular Movements: Extraocular movements intact.      Pupils: Pupils are equal, round, and reactive to light.   Cardiovascular:      Rate and Rhythm: Normal rate and regular rhythm.      Pulses:           Radial pulses are 2+ on the right side and 2+ on the left side.   Musculoskeletal:         General: Normal range of motion.      Cervical back: Normal range of motion.   Skin:     General: Skin is warm and dry.   Neurological:      General: No focal deficit present.      Mental Status: He is alert and oriented to person, place, and time. Mental status is at baseline.   Psychiatric:         Mood and Affect: Mood normal.         Behavior: Behavior normal.         Thought Content: Thought content normal.         Judgment: Judgment normal.       Labs:  LIPID RESULTS:  Lab Results   Component Value Date    CHOL 166 10/12/2021    CHOL 113 01/06/2021    HDL 79 10/12/2021    HDL 55 01/06/2021    LDL 55 10/12/2021    LDL 46 01/06/2021    TRIG 158 (H) 10/12/2021    TRIG 61 01/06/2021    CHOLHDLRATIO 3.3 09/15/2015       CBC RESULTS:  Lab Results   Component Value Date    WBC 5.6 10/11/2021    WBC 4.3 03/24/2021    RBC 4.82 10/11/2021    RBC 4.46 03/24/2021    HGB 14.1 10/11/2021    HGB 11.2 (L) 03/24/2021    HCT 41.9 10/11/2021    HCT 35.6 (L) 03/24/2021    MCV 87 10/11/2021    MCV 80 03/24/2021    MCH 29.3 10/11/2021    MCH 25.1 (L) 03/24/2021     MCHC 33.7 10/11/2021    MCHC 31.5 03/24/2021    RDW 13.5 10/11/2021    RDW 19.4 (H) 03/24/2021     10/11/2021     03/24/2021       BMP RESULTS:  Lab Results   Component Value Date     10/12/2021     01/08/2021    POTASSIUM 3.8 10/12/2021    POTASSIUM 3.5 01/08/2021    CHLORIDE 105 10/12/2021    CHLORIDE 105 01/08/2021    CO2 26 10/12/2021    CO2 24 01/08/2021    ANIONGAP 6 10/12/2021    ANIONGAP 7 01/08/2021    GLC 90 10/12/2021    GLC 96 01/08/2021    BUN 13 10/12/2021    BUN 14 01/08/2021    CR 0.93 10/12/2021    CR 0.97 01/08/2021    GFRESTIMATED 85 10/12/2021    GFRESTIMATED 81 01/08/2021    GFRESTBLACK >90 01/08/2021    ONDINA 8.7 10/12/2021    ONDINA 8.8 01/08/2021        A1C RESULTS:  No results found for: A1C      Imaging:    US CAROTID BILATERAL 11/19/2021 11:07 AM     Clinical history: Bilateral carotid artery stenosis     Comparison Study: 6/4/2019     Ordering: Dr. Galicia     Technique: Grayscale (B-mode) and duplex and spectral Doppler  ultrasound of the extracranial internal carotid, external carotid,  vertebral artery origins, right brachiocephalic/subclavian and left  subclavian arteries. Velocity measurements obtained with angle  correction at or less than 60 degrees.                                                                      Impression:     1. Right internal carotid artery: There is mild-moderate 50-69%  diameter stenosis by velocity criteria ( cm/s). There is  moderate mixed atherosclerotic plaque in the carotid bulb and proximal  internal carotid artery.      2. Left internal carotid artery: There is moderate-severe >70%  diameter stenosis by velocity criteria ( cm/s). There is  moderate-severe mixed atherosclerotic plaque in the carotid bulb and  proximal internal carotid artery.      3. Bilateral vertebral arteries demonstrate antegrade flow.      4. This study was compared to a prior ultrasound 6/4/2019. There has  been progression of both the Right ICA  (previously <50% stenosis, PSV  96 cm/s) and the Left ICA (previously 50-69% stenosis,  cm/s).      Findings:     Right side:   There is moderate mixed atherosclerotic plaque in the carotid bulb and  proximal internal carotid artery.         Proximal CCA Peak Systolic Velocity (PSV): 98 cm/sec     Distal CCA PSV: 95 cm/sec     ICA PSV: 127 cm/sec     ICA/CCA PSV ratio: 1.3         ECA PSV: 140 cm/sec     Vertebral artery: Antegrade flow     Left side:   There is moderate-severe mixed atherosclerotic plaque in the carotid  bulb and proximal internal carotid artery.         Proximal CCA Peak Systolic Velocity (PSV): 87 cm/sec     Distal CCA PSV: 73 cm/sec     ICA PSV: 243 cm/sec     ICA/CCA PSV ratio: 3.3         ECA PSV: 98 cm/sec     Vertebral artery: Antegrade flow  ----------------------------------------------------------------------  ----------------------------------------------------------------------  ---------------     Consensus Panel Gray-Scale and Doppler US Criteria for Diagnosis of  ICA Stenosis (Radiology 11/2003)        Normal         ICA PSV < 125 cm/sec       Plaque Estimate None       ICA/CCA  PSV Ratio < 2.0       ICA EDV < 40 cm/sec        < 50%          ICA PSV < 125 cm/sec       Plaque Estimate < 50%       ICA/CCA  PSV Ratio < 2.0       ICA EDV < 40 cm/sec        50- 69%       ICA -230 cm/sec       Plaque Estimate > or = 50%       ICA/CCA PSV Ratio 2.0-4.0       ICA EDV  cm/sec          > or = 70%, less than near occlusion       ICA PSV > 230 cm/sec       Plaque Estimate > or = 50%       ICA/CCA Ratio > 4.0       ICA EDV > 100 cm/sec                                            Additional criteria from vascular surgery     > 80%       EDV > 120 cm/sec      WILLIAN PLATA MD        I also reviewed the CTA of the head and neck performed on 12/20/2021.      Total length of this encounter was 30 minutes.    Montez Aponte MD

## 2021-12-27 DIAGNOSIS — I21.4 NSTEMI (NON-ST ELEVATED MYOCARDIAL INFARCTION) (H): ICD-10-CM

## 2021-12-27 DIAGNOSIS — I25.10 CORONARY ARTERY DISEASE INVOLVING NATIVE CORONARY ARTERY OF NATIVE HEART WITHOUT ANGINA PECTORIS: Primary | ICD-10-CM

## 2021-12-27 RX ORDER — CLOPIDOGREL BISULFATE 75 MG/1
75 TABLET ORAL DAILY
Qty: 90 TABLET | Refills: 3 | Status: SHIPPED | OUTPATIENT
Start: 2021-12-27 | End: 2022-06-29

## 2022-01-05 DIAGNOSIS — I65.22 STENOSIS OF LEFT CAROTID ARTERY: Primary | ICD-10-CM

## 2022-01-05 DIAGNOSIS — I65.23 BILATERAL CAROTID ARTERY STENOSIS: ICD-10-CM

## 2022-01-20 ENCOUNTER — TELEPHONE (OUTPATIENT)
Dept: CARDIOLOGY | Facility: CLINIC | Age: 67
End: 2022-01-20
Payer: COMMERCIAL

## 2022-01-20 DIAGNOSIS — E78.2 MIXED HYPERLIPIDEMIA: ICD-10-CM

## 2022-01-20 NOTE — TELEPHONE ENCOUNTER
Voicemail received from patient's wife calling to report that their insurance changed to [facility] in 2022 and a prior authorization is needed for the patient's repatha through [facility] pharmacy. Routed to PCSK9 inhibitor pharmacy liason.

## 2022-01-21 ENCOUNTER — DOCUMENTATION ONLY (OUTPATIENT)
Dept: CARDIOLOGY | Facility: CLINIC | Age: 67
End: 2022-01-21
Payer: COMMERCIAL

## 2022-01-21 ENCOUNTER — TELEPHONE (OUTPATIENT)
Dept: CARDIOLOGY | Facility: CLINIC | Age: 67
End: 2022-01-21
Payer: COMMERCIAL

## 2022-01-21 NOTE — TELEPHONE ENCOUNTER
PA Initiation    Medication: Repatha--PA Initiated  Insurance Company: [employer] - Phone [phone] Fax [phone]  Pharmacy Filling the Rx:    Filling Pharmacy Phone:    Filling Pharmacy Fax:    Start Date: 1/21/2022    KEY:[id number]

## 2022-01-21 NOTE — PROGRESS NOTES
Message from Dr. Galicia:  From: Sander Galicia MD   Sent: 1/20/2022   4:03 PM CST   To: Consuelo Hudson RN   Subject: RE: Ok to change visit to Feb to virtual? Patient request due to COVID risk    Sure. Thanks.       Sent to scheduling team to adjust appointment.

## 2022-01-25 NOTE — TELEPHONE ENCOUNTER
Patient's spouse called that she spoke with [employer] today - she was told the repatha was denied.    The patient took a dose yesterday and has another in the fridge for the next 2 week dose.  They are hoping they can work with their local [facility] rather than mail order but will await a call from [facility] team once the PA goes through.    Will send an update to the [email or web address] team.      1/31/2022 rx escripted for the repatha to [facility] mail order service. [facility] Cardiology Refill Guideline reviewed.

## 2022-01-27 NOTE — TELEPHONE ENCOUNTER
Prior Authorization Approval    Authorization Effective Date: 1/26/2022  Authorization Expiration Date: 1/31/2022  Medication: Repatha--PA APPROVED  Approved Dose/Quantity: 2/28  Reference #: Case# [id number]   Insurance Company: [employer] - Phone [phone] Fax [phone]  Expected CoPay:       CoPay Card Available:      Foundation Assistance Needed:    Which Pharmacy is filling the prescription (Not needed for infusion/clinic administered):    Pharmacy Notified:    Patient Notified:

## 2022-02-15 ENCOUNTER — LAB (OUTPATIENT)
Dept: LAB | Facility: CLINIC | Age: 67
End: 2022-02-15
Payer: COMMERCIAL

## 2022-02-15 ENCOUNTER — TELEPHONE (OUTPATIENT)
Dept: CARDIOLOGY | Facility: CLINIC | Age: 67
End: 2022-02-15

## 2022-02-15 DIAGNOSIS — I25.10 CORONARY ARTERY DISEASE INVOLVING NATIVE CORONARY ARTERY OF NATIVE HEART WITHOUT ANGINA PECTORIS: ICD-10-CM

## 2022-02-15 DIAGNOSIS — I65.29 STENOSIS OF CAROTID ARTERY, UNSPECIFIED LATERALITY: ICD-10-CM

## 2022-02-15 DIAGNOSIS — I35.0 NONRHEUMATIC AORTIC VALVE STENOSIS: ICD-10-CM

## 2022-02-15 DIAGNOSIS — I21.4 NSTEMI (NON-ST ELEVATED MYOCARDIAL INFARCTION) (H): ICD-10-CM

## 2022-02-15 LAB — HGB BLD-MCNC: 10 G/DL (ref 13.3–17.7)

## 2022-02-15 PROCEDURE — 85018 HEMOGLOBIN: CPT | Performed by: INTERNAL MEDICINE

## 2022-02-15 PROCEDURE — 36415 COLL VENOUS BLD VENIPUNCTURE: CPT | Performed by: INTERNAL MEDICINE

## 2022-02-15 NOTE — TELEPHONE ENCOUNTER
Call placed to pt to review results and recommendations per Hector Rosas CNP.     Component      Latest Ref Rng & Units 2/15/2022   Hemoglobin      13.3 - 17.7 g/dL 10.0 (L)       ----- Message from Albert Rosas NP sent at 2/15/2022 10:10 AM CST -----  Attempted to reach the patient to update him that his hemoglobin has unfortunately dropped from 14.1 on his last check in October to 10 today. Unable to reach him but left a message requesting he call back for an update and so that we can assess if he has had any signs of bleeding. Would recommend he follow up with his GI provider for further evaluation given his history of GI bleed. Possible he may also have a component of hemolysis contributing from calcific aortic stenosis. He is scheduled for a video visit with Dr. Galicia later this week--Will forward to Dr. Galicia as FYEH. Thank you!    Pt verbalized understanding. Pt will reach out to PMD and GI to review recent results and request recommendations for returning to taking his iron pills. Pt reports that he stopped the iron supplement a while back and feels this may be contributing Pt declines and s/s of bleed. No dark stools, no epistaxis or abdominal pain. Pt feels well without complaint. Will plan to proceed with OV this Friday with Dr. Galicia as planned. No further questions at this time.     WERNER Arroyo RN, BSN. 02/15/22 12:10 PM

## 2022-02-18 ENCOUNTER — VIRTUAL VISIT (OUTPATIENT)
Dept: CARDIOLOGY | Facility: CLINIC | Age: 67
End: 2022-02-18
Attending: NURSE PRACTITIONER
Payer: COMMERCIAL

## 2022-02-18 ENCOUNTER — TELEPHONE (OUTPATIENT)
Dept: CARDIOLOGY | Facility: CLINIC | Age: 67
End: 2022-02-18

## 2022-02-18 DIAGNOSIS — I65.29 STENOSIS OF CAROTID ARTERY, UNSPECIFIED LATERALITY: ICD-10-CM

## 2022-02-18 DIAGNOSIS — I25.10 CORONARY ARTERY DISEASE INVOLVING NATIVE CORONARY ARTERY OF NATIVE HEART WITHOUT ANGINA PECTORIS: ICD-10-CM

## 2022-02-18 DIAGNOSIS — I35.0 NONRHEUMATIC AORTIC VALVE STENOSIS: ICD-10-CM

## 2022-02-18 DIAGNOSIS — I21.4 NSTEMI (NON-ST ELEVATED MYOCARDIAL INFARCTION) (H): ICD-10-CM

## 2022-02-18 PROCEDURE — 99214 OFFICE O/P EST MOD 30 MIN: CPT | Mod: GT | Performed by: INTERNAL MEDICINE

## 2022-02-18 NOTE — PROGRESS NOTES
"Otis is a 67 year old who is being evaluated via a billable video visit.      How would you like to obtain your AVS? MyChart  If the video visit is dropped, the invitation should be resent by: Send to e-mail at: [email or web address]  Will anyone else be joining your video visit? No      Video Start Time: 9:45 AM  Video-Visit Details    Type of service:  Video Visit    Video End Time:10:02 AM    Originating Location (pt. Location): Home    Distant Location (provider location):  [facility] [place]     Platform used for Video Visit: [employer]     Review Of Systems  Skin: NEGATIVE  Eyes:Ears/Nose/Throat: NEGATIVE  Respiratory: NEGATIVE  Cardiovascular: occasional lightheadedness   Gastrointestinal: NEGATIVE  Genitourinary:NEGATIVE   Musculoskeletal: NEGATIVE  Neurologic: NEGATIVE  Psychiatric: NEGATIVE  Hematologic/Lymphatic/Immunologic: NEGATIVE  Endocrine:  NEGATIVE    Telephone number of patient: [phone]    Vitals - Patient Reported  Systolic (Patient Reported): 117  Diastolic (Patient Reported): 80  Weight (Patient Reported): 71.7 kg (158 lb)  Height (Patient Reported): 167.6 cm (5' 6\")  BMI (Based on Pt Reported Ht/Wt): 25.5  SpO2 (Patient Reported): 96  Pulse (Patient Reported): 89    Silva Salinas LPN      HPI and Plan:       I had the pleasure of seeing Mr. Navarro in followup at the [facility] today.  He is a very pleasant 67-year-old gentleman who I last saw in 09/2021.  He has a history of coronary artery disease and is status post stenting of the distal and ostial circumflex as well as the first obtuse marginal in 1998.  He has severe, probably familial, dyslipidemia with a markedly elevated LDL, although we have been able to control this well with Repatha and atorvastatin.  He also has a history of moderate aortic stenosis.      Due to dyspnea on exertion and syncopal episode, he underwent a Lexiscan nuclear stress test on 07/15/2020. This demonstrated a " medium-sized area of moderate ischemia involving the [id number]-wg-[id number] anterior and anterolateral segments of the left ventricle.  Given these findings, I recommended coronary angiography.  He also underwent an echocardiogram which demonstrated mild progression in his aortic stenosis which was now in the moderate range.      He underwent coronary angiography on 07/21/2020 and was found to have significant stenosis of the ostium of the second obtuse marginal.  He underwent successful intervention with a drug-eluting stent at the time.  His other coronary arteries demonstrated mild-to-moderate nonobstructive disease in a left dominant system.      Unfortunately in January of 2021 he was experiencing significant fatigue and dyspnea on exertion and was found to have significant anemia in the context of dark stools for which a comprehensive gastrointestinal work-up was performed but was ultimately negative.  His clopidogrel was discontinued in April.    He was readmitted to [facility] in October 2021 with a non-ST elevation myocardial infarction.  He was found to have severe stenosis of the mid circumflex and underwent PCI of the mid circumflex at the time with a synergy drug-eluting stent.  He was found to have moderate to severe nonobstructive stenosis of the OM1 with a negative IFR.  An echocardiogram at the time demonstrated probable moderate aortic stenosis but normal left ventricular systolic function.  He was again placed on dual antiplatelet therapy prior to discharge.  At the time of his cardiology follow-up post revascularization he was doing well overall from a cardiovascular standpoint.    Unfortunately he has noted generalized fatigue and chest tightness over the past few days.  It appears that his anemia has recurred, although he has not noted any dark stools or hematemesis.  Labs on 2/15/2022 demonstrated a a hemoglobin of 10.0, down from 14.1 on 10/11/2021.        PHYSICAL EXAMINATION:  Dictated  below.      LABORATORY STUDIES:  Lipids on 01/06/2021 demonstrated total cholesterol 113, HDL 55, LDL 46 and triglycerides of 61.      IMPRESSION:   1.  Coronary artery disease status post recent PCI to the mid circumflex in October 2021 in the context of a non-ST elevation myocardial infarction.  He is status post revascularization of the second obtuse marginal in 07/2020 and prior circumflex/obtuse marginal revascularization in 1998.   2.  Significant dyslipidemia characterized by severe LDL elevation.  This has normalized with the use of Repatha and atorvastatin.   3.  History of moderate aortic stenosis.   4.  Moderate left internal carotid artery stenosis for which he is followed by vascular surgery.  5.  Anemia with a comprehensive negative work-up in the past.  Unfortunately this has recurred on dual antiplatelet therapy.    Mr. Navarro is experiencing generalized fatigue and chest tightness which is most likely related to recurrent anemia in the context of his recent labs.  I have asked him to discontinue aspirin immediately and continue Plavix alone while we investigate this further.  To that effect, I have placed a GI referral and will try to expedite his work-up in this regard.  He may need iron transfusions in the short-term as previously recommended.    I have asked him to present to the ER if he experiences any obvious signs of GI bleeding or if his chest discomfort/fatigue worsens in the short-term.    It was a pleasure seeing him today.                      CURRENT MEDICATIONS:  Current Outpatient Medications   Medication Sig Dispense Refill     aspirin (ASA) 81 MG chewable tablet Take 81 mg by mouth every evening        atorvastatin (LIPITOR) 80 MG tablet Take 1 tablet (80 mg) by mouth daily 90 tablet 2     clopidogrel (PLAVIX) 75 MG tablet Take 1 tablet (75 mg) by mouth daily 90 tablet 3     diclofenac (VOLTAREN) 1 % topical gel Apply 2 g topically 4 times daily as needed for moderate pain (apply to  hands and fingers)       evolocumab (REPATHA) 140 MG/ML prefilled autoinjector Inject 1 mL (140 mg) Subcutaneous every 14 days 6 mL 3     fluticasone (FLONASE) 50 MCG/ACT nasal spray Spray 2 sprays into both nostrils daily as needed for rhinitis or allergies 16 g 3     folic acid (FOLVITE) 1 MG tablet TAKE ONE TABLET BY MOUTH ONE TIME DAILY  (Patient taking differently: Take 1 mg by mouth every evening ) 90 tablet 1     gabapentin (NEURONTIN) 600 MG tablet TAKE ONE TABLET BY MOUTH TWICE DAILY 180 tablet 1     metoprolol tartrate (LOPRESSOR) 25 MG tablet Take 1 tablet (25 mg) by mouth 2 times daily 180 tablet 3     nitroGLYcerin (NITROSTAT) 0.4 MG sublingual tablet For chest pain place 1 tablet under the tongue every 5 minutes for 3 doses. If symptoms persist 5 minutes after 1st dose call 911. 30 tablet 0     sildenafil (REVATIO) 20 MG tablet TAKE 2 TO 5 TABLETS BY MOUTH AS NEEDED PRIOR TO SEXUAL INTERCOURSE 90 tablet 0       ALLERGIES     Allergies   Allergen Reactions     Crestor [Rosuvastatin] GI Disturbance     Dust Mites      Other [No Clinical Screening - See Comments]      Goose feathers     Pollen Extract        PAST MEDICAL HISTORY:  Past Medical History:   Diagnosis Date     Aortic stenosis      Arthritis     hands     CAD (coronary artery disease)     cardiac cath 1998: stents x 2 to circumflex     Carotid stenosis     left     Family history of early CAD      Hyperlipidaemia LDL goal < 70     familial hyperlipidemia with marked hypercholesterolemia before treatment; has been on some form of cholesterol-lowering medication since the 1970s     Prostate cancer (H) 2010     Syncope      Unstable angina (H) 1/6/2021       PAST SURGICAL HISTORY:  Past Surgical History:   Procedure Laterality Date     CARDIAC SURGERY  1998    coronary stents x2 placed 1998; angioplasty     CV HEART CATHETERIZATION WITH POSSIBLE INTERVENTION N/A 7/21/2020    Procedure: Heart Catheterization with Possible Intervention;  Surgeon:  Alber Bentley MD;  Location:  HEART CARDIAC CATH LAB     CV HEART CATHETERIZATION WITH POSSIBLE INTERVENTION N/A 10/11/2021    Procedure: Heart Catheterization with Possible Intervention;  Surgeon: Patsy Wallace MD;  Location:  HEART CARDIAC CATH LAB     CV INSTANTANEOUS WAVE-FREE RATIO N/A 10/11/2021    Procedure: Instantaneous Wave-Free Ratio;  Surgeon: Patsy Wallace MD;  Location:  HEART CARDIAC CATH LAB     CV LEFT HEART CATH N/A 10/11/2021    Procedure: Left Heart Cath;  Surgeon: Patsy Wallace MD;  Location:  HEART CARDIAC CATH LAB     CV PCI STENT DRUG ELUTING N/A 10/11/2021    Procedure: Percutaneous Coronary Intervention Stent Drug Eluting;  Surgeon: Patsy Wallace MD;  Location:  HEART CARDIAC CATH LAB     DAVINCI PROSTATECTOMY           ESOPHAGOSCOPY, GASTROSCOPY, DUODENOSCOPY (EGD), COMBINED N/A 2021    Procedure: ESOPHAGOGASTRODUODENOSCOPY (EGD);  Surgeon: Rosemarie Laws MD;  Location:  GI     EYE SURGERY Bilateral 2017    eyelids     GENITOURINARY SURGERY       ORTHOPEDIC SURGERY Right     achilles tendon; post football injury     ORTHOPEDIC SURGERY Right     hand; near thumb. has wires in there. cysts     REPAIR TENDON BICEPS DISTAL UPPER EXTREMITY Right 2018    Procedure: REPAIR TENDON BICEPS DISTAL UPPER EXTREMITY;  Right open biceps tendon repair  ;  Surgeon: Alber Zabala MD;  Location: [facility] OR     SURGICAL HISTORY OF -       Right hand Xanthoma removal     SURGICAL HISTORY OF -       Stress Echo (-)       FAMILY HISTORY:  Family History   Problem Relation Age of Onset     Lipids Mother      C.A.D. Mother          at age 49 of MI     Dementia Father      C.A.D. Sister         MI at age 48     Cancer - colorectal No family hx of      Prostate Cancer No family hx of      Colon Cancer No family hx of        SOCIAL HISTORY:  Social History     Socioeconomic History     Marital status:      Spouse name: Not on file     Number of  children: Not on file     Years of education: Not on file     Highest education level: Not on file   Occupational History     Not on file   Tobacco Use     Smoking status: Former Smoker     Packs/day: 0.25     Years: 20.00     Pack years: 5.00     Types: Cigars     Quit date:      Years since quittin.1     Smokeless tobacco: Former User     Types: Chew, Snuff     Quit date:      Tobacco comment: 12/10/18: occ chew, not every day   Vaping Use     Vaping Use: Never used   Substance and Sexual Activity     Alcohol use: Not Currently     Comment: very rare, none for 8 years     Drug use: Yes     Types: Marijuana     Comment: daily      Sexual activity: Yes     Partners: Female   Other Topics Concern      Service Not Asked     Blood Transfusions Not Asked     Caffeine Concern Yes     Comment: coffee and soda     Occupational Exposure Not Asked     Hobby Hazards Not Asked     Sleep Concern Not Asked     Stress Concern Not Asked     Weight Concern Not Asked     Special Diet No     Back Care Not Asked     Exercise Yes     Comment: regular      Bike Helmet Not Asked     Seat Belt Yes     Self-Exams Not Asked     Parent/sibling w/ CABG, MI or angioplasty before 65F 55M? Yes     Comment: 49   Social History Narrative     Not on file     Social Determinants of Health     Financial Resource Strain: Not on file   Food Insecurity: Not on file   Transportation Needs: Not on file   Physical Activity: Not on file   Stress: Not on file   Social Connections: Not on file   Intimate Partner Violence: Not on file   Housing Stability: Not on file       Review of Systems:  Skin:          Eyes:         ENT:         Respiratory:          Cardiovascular:         Gastroenterology:        Genitourinary:         Musculoskeletal:         Neurologic:         Psychiatric:         Heme/Lymph/Imm:         Endocrine:           Physical Exam:  Vitals: There were no vitals taken for this visit.    Constitutional:           Skin:              Head:           Eyes:           Lymph:      ENT:           Neck:           Respiratory:            Cardiac:                                                           GI:           Extremities and Muscular Skeletal:                 Neurological:           Psych:           CC  Albert Rosas, NP  [address] [place] [id number]

## 2022-02-18 NOTE — TELEPHONE ENCOUNTER
Call from patient's spouse, Amy. They were called by Dr. Milner's office and patient is scheduled for a colonoscopy on Monday. Their prep instructions are to continue the aspirin and hold the plavix for 5 days.    Dr. Galicia's plan as to stop the aspirin and continue the plavix due to his stent on 10/2021. Per Dr. Galicia's recommendation, patient is approved to follow Dr. Nogueira's plan for the weekend, but to resume plavix asap after the procedure and hold the aspirin going forward for now.    Spoke with patient's spouse to review that recommendation. Spouse verbalized understanding and agreed with plan.  They will sign an DENISE for Dr. Milner's records on Monday.

## 2022-02-18 NOTE — LETTER
2/18/2022    Washington Yang PA-C  [id number]  [facility] [id number]    RE: Johan REBECCA Navarro       Dear Colleague,     I had the pleasure of seeing Johan Navarro in the [facility].      HPI and Plan:       I had the pleasure of seeing Mr. Navarro in followup at the [facility] today.  He is a very pleasant 67-year-old gentleman who I last saw in 09/2021.  He has a history of coronary artery disease and is status post stenting of the distal and ostial circumflex as well as the first obtuse marginal in 1998.  He has severe, probably familial, dyslipidemia with a markedly elevated LDL, although we have been able to control this well with Repatha and atorvastatin.  He also has a history of moderate aortic stenosis.      Due to dyspnea on exertion and syncopal episode, he underwent a Lexiscan nuclear stress test on 07/15/2020. This demonstrated a medium-sized area of moderate ischemia involving the mad-ik-bnlxmd anterior and anterolateral segments of the left ventricle.  Given these findings, I recommended coronary angiography.  He also underwent an echocardiogram which demonstrated mild progression in his aortic stenosis which was now in the moderate range.      He underwent coronary angiography on 07/21/2020 and was found to have significant stenosis of the ostium of the second obtuse marginal.  He underwent successful intervention with a drug-eluting stent at the time.  His other coronary arteries demonstrated mild-to-moderate nonobstructive disease in a left dominant system.      Unfortunately in January of 2021 he was experiencing significant fatigue and dyspnea on exertion and was found to have significant anemia in the context of dark stools for which a comprehensive gastrointestinal work-up was performed but was ultimately negative.  His clopidogrel was discontinued in April.    He was readmitted to [facility] in October 2021 with a non-ST elevation  myocardial infarction.  He was found to have severe stenosis of the mid circumflex and underwent PCI of the mid circumflex at the time with a synergy drug-eluting stent.  He was found to have moderate to severe nonobstructive stenosis of the OM1 with a negative IFR.  An echocardiogram at the time demonstrated probable moderate aortic stenosis but normal left ventricular systolic function.  He was again placed on dual antiplatelet therapy prior to discharge.  At the time of his cardiology follow-up post revascularization he was doing well overall from a cardiovascular standpoint.    Unfortunately he has noted generalized fatigue and chest tightness over the past few days.  It appears that his anemia has recurred, although he has not noted any dark stools or hematemesis.  Labs on 2/15/2022 demonstrated a a hemoglobin of 10.0, down from 14.1 on 10/11/2021.        PHYSICAL EXAMINATION:  Dictated below.      LABORATORY STUDIES:  Lipids on 01/06/2021 demonstrated total cholesterol 113, HDL 55, LDL 46 and triglycerides of 61.      IMPRESSION:   1.  Coronary artery disease status post recent PCI to the mid circumflex in October 2021 in the context of a non-ST elevation myocardial infarction.  He is status post revascularization of the second obtuse marginal in 07/2020 and prior circumflex/obtuse marginal revascularization in 1998.   2.  Significant dyslipidemia characterized by severe LDL elevation.  This has normalized with the use of Repatha and atorvastatin.   3.  History of moderate aortic stenosis.   4.  Moderate left internal carotid artery stenosis for which he is followed by vascular surgery.  5.  Anemia with a comprehensive negative work-up in the past.  Unfortunately this has recurred on dual antiplatelet therapy.    Mr. Navarro is experiencing generalized fatigue and chest tightness which is most likely related to recurrent anemia in the context of his recent labs.  I have asked him to discontinue aspirin  immediately and continue Plavix alone while we investigate this further.  To that effect, I have placed a GI referral and will try to expedite his work-up in this regard.  He may need iron transfusions in the short-term as previously recommended.    I have asked him to present to the ER if he experiences any obvious signs of GI bleeding or if his chest discomfort/fatigue worsens in the short-term.    It was a pleasure seeing him today.                      CURRENT MEDICATIONS:  Current Outpatient Medications   Medication Sig Dispense Refill     aspirin (ASA) 81 MG chewable tablet Take 81 mg by mouth every evening        atorvastatin (LIPITOR) 80 MG tablet Take 1 tablet (80 mg) by mouth daily 90 tablet 2     clopidogrel (PLAVIX) 75 MG tablet Take 1 tablet (75 mg) by mouth daily 90 tablet 3     diclofenac (VOLTAREN) 1 % topical gel Apply 2 g topically 4 times daily as needed for moderate pain (apply to hands and fingers)       evolocumab (REPATHA) 140 MG/ML prefilled autoinjector Inject 1 mL (140 mg) Subcutaneous every 14 days 6 mL 3     fluticasone (FLONASE) 50 MCG/ACT nasal spray Spray 2 sprays into both nostrils daily as needed for rhinitis or allergies 16 g 3     folic acid (FOLVITE) 1 MG tablet TAKE ONE TABLET BY MOUTH ONE TIME DAILY  (Patient taking differently: Take 1 mg by mouth every evening ) 90 tablet 1     gabapentin (NEURONTIN) 600 MG tablet TAKE ONE TABLET BY MOUTH TWICE DAILY 180 tablet 1     metoprolol tartrate (LOPRESSOR) 25 MG tablet Take 1 tablet (25 mg) by mouth 2 times daily 180 tablet 3     nitroGLYcerin (NITROSTAT) 0.4 MG sublingual tablet For chest pain place 1 tablet under the tongue every 5 minutes for 3 doses. If symptoms persist 5 minutes after 1st dose call 911. 30 tablet 0     sildenafil (REVATIO) 20 MG tablet TAKE 2 TO 5 TABLETS BY MOUTH AS NEEDED PRIOR TO SEXUAL INTERCOURSE 90 tablet 0       ALLERGIES     Allergies   Allergen Reactions     Crestor [Rosuvastatin] GI Disturbance     Dust  Mites      Other [No Clinical Screening - See Comments]      Goose feathers     Pollen Extract        PAST MEDICAL HISTORY:  Past Medical History:   Diagnosis Date     Aortic stenosis      Arthritis     hands     CAD (coronary artery disease)     cardiac cath 1998: stents x 2 to circumflex     Carotid stenosis     left     Family history of early CAD      Hyperlipidaemia LDL goal < 70     familial hyperlipidemia with marked hypercholesterolemia before treatment; has been on some form of cholesterol-lowering medication since the 1970s     Prostate cancer (H) 2010     Syncope      Unstable angina (H) 1/6/2021       PAST SURGICAL HISTORY:  Past Surgical History:   Procedure Laterality Date     CARDIAC SURGERY  1998    coronary stents x2 placed 1998; angioplasty     CV HEART CATHETERIZATION WITH POSSIBLE INTERVENTION N/A 7/21/2020    Procedure: Heart Catheterization with Possible Intervention;  Surgeon: Alber Bentley MD;  Location:  HEART CARDIAC CATH LAB     CV HEART CATHETERIZATION WITH POSSIBLE INTERVENTION N/A 10/11/2021    Procedure: Heart Catheterization with Possible Intervention;  Surgeon: Patsy Wallace MD;  Location:  HEART CARDIAC CATH LAB     CV INSTANTANEOUS WAVE-FREE RATIO N/A 10/11/2021    Procedure: Instantaneous Wave-Free Ratio;  Surgeon: Patsy Wallace MD;  Location:  HEART CARDIAC CATH LAB     CV LEFT HEART CATH N/A 10/11/2021    Procedure: Left Heart Cath;  Surgeon: Patsy Wallace MD;  Location:  HEART CARDIAC CATH LAB     CV PCI STENT DRUG ELUTING N/A 10/11/2021    Procedure: Percutaneous Coronary Intervention Stent Drug Eluting;  Surgeon: Patsy Wallace MD;  Location:  HEART CARDIAC CATH LAB     DAVINCI PROSTATECTOMY      2010     ESOPHAGOSCOPY, GASTROSCOPY, DUODENOSCOPY (EGD), COMBINED N/A 1/6/2021    Procedure: ESOPHAGOGASTRODUODENOSCOPY (EGD);  Surgeon: Rosemarie Laws MD;  Location:  GI     EYE SURGERY Bilateral 2017    eyelids     GENITOURINARY SURGERY        ORTHOPEDIC SURGERY Right     achilles tendon; post football injury     ORTHOPEDIC SURGERY Right     hand; near thumb. has wires in there. cysts     REPAIR TENDON BICEPS DISTAL UPPER EXTREMITY Right 2018    Procedure: REPAIR TENDON BICEPS DISTAL UPPER EXTREMITY;  Right open biceps tendon repair  ;  Surgeon: Alber Zabala MD;  Location: [facility] OR     SURGICAL HISTORY OF -       Right hand Xanthoma removal     SURGICAL HISTORY OF -       Stress Echo (-)       FAMILY HISTORY:  Family History   Problem Relation Age of Onset     Lipids Mother      C.A.D. Mother          at age 49 of MI     Dementia Father      C.A.D. Sister         MI at age 48     Cancer - colorectal No family hx of      Prostate Cancer No family hx of      Colon Cancer No family hx of        SOCIAL HISTORY:  Social History     Socioeconomic History     Marital status:      Spouse name: Not on file     Number of children: Not on file     Years of education: Not on file     Highest education level: Not on file   Occupational History     Not on file   Tobacco Use     Smoking status: Former Smoker     Packs/day: 0.25     Years: 20.00     Pack years: 5.00     Types: Cigars     Quit date:      Years since quittin.1     Smokeless tobacco: Former User     Types: Chew, Snuff     Quit date:      Tobacco comment: 12/10/18: occ chew, not every day   Vaping Use     Vaping Use: Never used   Substance and Sexual Activity     Alcohol use: Not Currently     Comment: very rare, none for 8 years     Drug use: Yes     Types: Marijuana     Comment: daily      Sexual activity: Yes     Partners: Female   Other Topics Concern      Service Not Asked     Blood Transfusions Not Asked     Caffeine Concern Yes     Comment: coffee and soda     Occupational Exposure Not Asked     Hobby Hazards Not Asked     Sleep Concern Not Asked     Stress Concern Not Asked     Weight Concern Not Asked     Special Diet No     Back Care Not Asked      Exercise Yes     Comment: regular      Bike Helmet Not Asked     Seat Belt Yes     Self-Exams Not Asked     Parent/sibling w/ CABG, MI or angioplasty before 65F 55M? Yes     Comment: 49   Social History Narrative     Not on file     Social Determinants of Health     Financial Resource Strain: Not on file   Food Insecurity: Not on file   Transportation Needs: Not on file   Physical Activity: Not on file   Stress: Not on file   Social Connections: Not on file   Intimate Partner Violence: Not on file   Housing Stability: Not on file       Review of Systems:  Skin:          Eyes:         ENT:         Respiratory:          Cardiovascular:         Gastroenterology:        Genitourinary:         Musculoskeletal:         Neurologic:         Psychiatric:         Heme/Lymph/Imm:         Endocrine:           Physical Exam:  Vitals: There were no vitals taken for this visit.    Constitutional:           Skin:             Head:           Eyes:           Lymph:      ENT:           Neck:           Respiratory:            Cardiac:                                                           GI:           Extremities and Muscular Skeletal:                 Neurological:           Psych:             Thank you for allowing me to participate in the care of your patient.      Sincerely,     Sander Galicia MD     [facility]  cc:   Albert Rosas, JACQUIE  [address] [id number]

## 2022-02-19 ENCOUNTER — HEALTH MAINTENANCE LETTER (OUTPATIENT)
Age: 67
End: 2022-02-19

## 2022-02-21 ENCOUNTER — TRANSFERRED RECORDS (OUTPATIENT)
Dept: HEALTH INFORMATION MANAGEMENT | Facility: CLINIC | Age: 67
End: 2022-02-21
Payer: COMMERCIAL

## 2022-03-01 ENCOUNTER — TELEPHONE (OUTPATIENT)
Dept: FAMILY MEDICINE | Facility: CLINIC | Age: 67
End: 2022-03-01
Payer: COMMERCIAL

## 2022-03-01 NOTE — TELEPHONE ENCOUNTER
Patients recent hemoglobin result was low, cardiologist advised that patient see PCP for anemia. Wondering if you'd like to see him in clinic (using appr req slot) or if you'd prefer virtual w/ lab only? Please advise and return call to patient to schedule.  -Zuri Pizarro

## 2022-03-01 NOTE — TELEPHONE ENCOUNTER
I think he was actually scheduled with Dr. Milner in GI? Can you confirm that with them? Likely he should start with them/imaging.

## 2022-03-01 NOTE — TELEPHONE ENCOUNTER
PT saw Dr Milner last week, scheduled for pill cam next week (had colonoscopy and endoscopy last week). Wondering about labs and F/U w/ you.  -Zuri Pizarro

## 2022-03-07 ENCOUNTER — OFFICE VISIT (OUTPATIENT)
Dept: FAMILY MEDICINE | Facility: CLINIC | Age: 67
End: 2022-03-07
Payer: COMMERCIAL

## 2022-03-07 VITALS
RESPIRATION RATE: 16 BRPM | OXYGEN SATURATION: 98 % | HEART RATE: 81 BPM | WEIGHT: 158.5 LBS | BODY MASS INDEX: 25.58 KG/M2 | DIASTOLIC BLOOD PRESSURE: 62 MMHG | TEMPERATURE: 98.7 F | SYSTOLIC BLOOD PRESSURE: 100 MMHG

## 2022-03-07 DIAGNOSIS — K21.01 GASTROESOPHAGEAL REFLUX DISEASE WITH ESOPHAGITIS AND HEMORRHAGE: ICD-10-CM

## 2022-03-07 DIAGNOSIS — D50.9 IRON DEFICIENCY ANEMIA, UNSPECIFIED IRON DEFICIENCY ANEMIA TYPE: Primary | ICD-10-CM

## 2022-03-07 DIAGNOSIS — I25.10 CORONARY ARTERY DISEASE INVOLVING NATIVE CORONARY ARTERY OF NATIVE HEART WITHOUT ANGINA PECTORIS: ICD-10-CM

## 2022-03-07 PROBLEM — Z85.46 HISTORY OF PROSTATE CANCER: Status: ACTIVE | Noted: 2022-03-07

## 2022-03-07 PROBLEM — C61 MALIGNANT NEOPLASM OF PROSTATE (H): Status: RESOLVED | Noted: 2017-08-17 | Resolved: 2022-03-07

## 2022-03-07 LAB
BASOPHILS # BLD AUTO: 0.1 10E3/UL (ref 0–0.2)
BASOPHILS NFR BLD AUTO: 1 %
EOSINOPHIL # BLD AUTO: 0.2 10E3/UL (ref 0–0.7)
EOSINOPHIL NFR BLD AUTO: 3 %
ERYTHROCYTE [DISTWIDTH] IN BLOOD BY AUTOMATED COUNT: 14.7 % (ref 10–15)
FERRITIN SERPL-MCNC: 5 NG/ML (ref 26–388)
HCT VFR BLD AUTO: 33 % (ref 40–53)
HGB BLD-MCNC: 10.3 G/DL (ref 13.3–17.7)
IRON SATN MFR SERPL: 5 % (ref 15–46)
IRON SERPL-MCNC: 19 UG/DL (ref 35–180)
LYMPHOCYTES # BLD AUTO: 1.4 10E3/UL (ref 0.8–5.3)
LYMPHOCYTES NFR BLD AUTO: 31 %
MCH RBC QN AUTO: 23.3 PG (ref 26.5–33)
MCHC RBC AUTO-ENTMCNC: 31.2 G/DL (ref 31.5–36.5)
MCV RBC AUTO: 75 FL (ref 78–100)
MONOCYTES # BLD AUTO: 0.7 10E3/UL (ref 0–1.3)
MONOCYTES NFR BLD AUTO: 15 %
NEUTROPHILS # BLD AUTO: 2.2 10E3/UL (ref 1.6–8.3)
NEUTROPHILS NFR BLD AUTO: 49 %
PLATELET # BLD AUTO: 251 10E3/UL (ref 150–450)
RBC # BLD AUTO: 4.42 10E6/UL (ref 4.4–5.9)
TIBC SERPL-MCNC: 405 UG/DL (ref 240–430)
WBC # BLD AUTO: 4.5 10E3/UL (ref 4–11)

## 2022-03-07 PROCEDURE — 99214 OFFICE O/P EST MOD 30 MIN: CPT | Performed by: PHYSICIAN ASSISTANT

## 2022-03-07 PROCEDURE — 85025 COMPLETE CBC W/AUTO DIFF WBC: CPT | Performed by: PHYSICIAN ASSISTANT

## 2022-03-07 PROCEDURE — 82728 ASSAY OF FERRITIN: CPT | Performed by: PHYSICIAN ASSISTANT

## 2022-03-07 PROCEDURE — 83550 IRON BINDING TEST: CPT | Performed by: PHYSICIAN ASSISTANT

## 2022-03-07 PROCEDURE — 36415 COLL VENOUS BLD VENIPUNCTURE: CPT | Performed by: PHYSICIAN ASSISTANT

## 2022-03-07 RX ORDER — PANTOPRAZOLE SODIUM 40 MG/1
40 TABLET, DELAYED RELEASE ORAL DAILY
Qty: 90 TABLET | Refills: 3 | Status: SHIPPED | OUTPATIENT
Start: 2022-03-07 | End: 2022-12-23

## 2022-03-07 RX ORDER — PANTOPRAZOLE SODIUM 40 MG/1
40 TABLET, DELAYED RELEASE ORAL DAILY
COMMUNITY
Start: 2022-02-21 | End: 2022-07-27

## 2022-03-07 ASSESSMENT — ANXIETY QUESTIONNAIRES
6. BECOMING EASILY ANNOYED OR IRRITABLE: SEVERAL DAYS
3. WORRYING TOO MUCH ABOUT DIFFERENT THINGS: NOT AT ALL
5. BEING SO RESTLESS THAT IT IS HARD TO SIT STILL: NOT AT ALL
1. FEELING NERVOUS, ANXIOUS, OR ON EDGE: NOT AT ALL
GAD7 TOTAL SCORE: 1
7. FEELING AFRAID AS IF SOMETHING AWFUL MIGHT HAPPEN: NOT AT ALL
2. NOT BEING ABLE TO STOP OR CONTROL WORRYING: NOT AT ALL

## 2022-03-07 ASSESSMENT — PATIENT HEALTH QUESTIONNAIRE - PHQ9
SUM OF ALL RESPONSES TO PHQ QUESTIONS 1-9: 2
5. POOR APPETITE OR OVEREATING: NOT AT ALL

## 2022-03-07 ASSESSMENT — PAIN SCALES - GENERAL: PAINLEVEL: NO PAIN (1)

## 2022-03-07 NOTE — PROGRESS NOTES
"  Assessment & Plan     Iron deficiency anemia, unspecified iron deficiency anemia type  Coronary artery disease involving native coronary artery of native heart without angina pectoris  Unfortunately Otis is back with likely GLORIA after improvements in late summer/early fall. Reviewing everything, DAPT is a significant risk for him, thought the specific etiology of the loss is difficult to locate. (see below). Contrarily, he has heavy CAD burden and stopping DAPT carries its own inherent risk. It has now been over 3 months since last stenting which provide the most benefit. He may need to consider [id number]? To discuss with cardiology. Follow-up per pill cam study. Assess ferritin stores today. STart vitron C and ppi.  - CBC with Platelets & Differential  - Ferritin  - Iron & Iron Binding Capacity    Gastroesophageal reflux disease with esophagitis and hemorrhage  This could be etiology? Reassess with pill cam study. Start PPI  - pantoprazole (PROTONIX) 40 MG EC tablet; Take 1 tablet (40 mg) by mouth daily           BMI:   Estimated body mass index is 25.58 kg/m  as calculated from the following:    Height as of 12/22/21: 1.676 m (5' 6\").    Weight as of this encounter: 71.9 kg (158 lb 8 oz).       Return in about 4 weeks (around 4/4/2022) for Lab Work.    Washington Yang PA-C  [facility]   Otis is a 67 year old who presents for the following health issues  accompanied by his spouse.    History of Present Illness     Reason for visit:  Anemia  Symptom onset:  3-4 weeks ago  Symptoms include:  Tired  Symptom intensity:  Moderate  Symptom progression:  Staying the same  Had these symptoms before:  Yes  Has tried/received treatment for these symptoms:  Yes  What makes it better:  Being off plavix    : Incomplete.: Incomplete.: Incomplete.  : Incomplete.        PATIENT IS ALSO HAVING A PILL CAM ON Wednesday 3/9/2022 WITH DR. ROBERTSON.  He had started to feel worse around a month or so " ago  Hgb was low once again  Did see GI - endoscopy (some mild gerd) and colonoscopy (normal)  He has stopped ASA;   He has restarted iron (ferrous sulfate)   -Does have side effects to the iron   -does think some of symptoms improving  Mostly fatigue, not overt shortness of breath unless moving upstairs or strenuous  Stent placed 10/2021; working with cardiology; did STOP low dose asa    Review of Systems   Constitutional, HEENT, cardiovascular, pulmonary, gi and gu systems are negative, except as otherwise noted.      Objective    /62 (BP Location: Right arm, Patient Position: Sitting, Cuff Size: Adult Regular)   Pulse 81   Temp 98.7  F (37.1  C) (Oral)   Resp 16   Wt 71.9 kg (158 lb 8 oz)   SpO2 98%   BMI 25.58 kg/m    Body mass index is 25.58 kg/m .  Physical Exam   GENERAL: healthy, alert and no distress  EYES: conjunctival pallor  RESP: lungs clear to auscultation - no rales, rhonchi or wheezes  CV: regular rates and rhythm and harsh systolic murmur  SKIN: nail pallor   PSYCH: mentation appears normal, affect normal/bright

## 2022-03-08 ASSESSMENT — ANXIETY QUESTIONNAIRES: GAD7 TOTAL SCORE: 1

## 2022-03-09 ENCOUNTER — TRANSFERRED RECORDS (OUTPATIENT)
Dept: HEALTH INFORMATION MANAGEMENT | Facility: CLINIC | Age: 67
End: 2022-03-09
Payer: COMMERCIAL

## 2022-03-22 DIAGNOSIS — D64.9 ANEMIA, UNSPECIFIED TYPE: ICD-10-CM

## 2022-03-22 DIAGNOSIS — M19.249 SECONDARY LOCALIZED OSTEOARTHROSIS OF HAND, UNSPECIFIED LATERALITY: Primary | ICD-10-CM

## 2022-03-23 RX ORDER — FOLIC ACID 1 MG/1
1 TABLET ORAL EVERY EVENING
Qty: 90 TABLET | Refills: 0 | Status: SHIPPED | OUTPATIENT
Start: 2022-03-23 | End: 2022-06-07

## 2022-03-31 ENCOUNTER — HOSPITAL ENCOUNTER (OUTPATIENT)
Dept: GENERAL RADIOLOGY | Facility: CLINIC | Age: 67
Discharge: HOME | End: 2022-03-31
Attending: INTERNAL MEDICINE | Admitting: INTERNAL MEDICINE
Payer: COMMERCIAL

## 2022-03-31 DIAGNOSIS — Z04.9 ENCOUNTER FOR EXAMINATION AND OBSERVATION FOR UNSPECIFIED REASON: ICD-10-CM

## 2022-03-31 PROCEDURE — 74019 RADEX ABDOMEN 2 VIEWS: CPT

## 2022-04-12 ENCOUNTER — NURSE TRIAGE (OUTPATIENT)
Dept: NURSING | Facility: CLINIC | Age: 67
End: 2022-04-12
Payer: COMMERCIAL

## 2022-04-12 NOTE — TELEPHONE ENCOUNTER
Wife and patient calling to report;    Injured his lower left leg.  hauling trailer down a hill, and suddenly  A pain came on, and he thinks he pulled or injured a tendon    Swelling is below knee.left leg  Swelling came on suddenly after he pulled the tendon.  Patient reports it hurts to walk.  Patient is also on plavix   For stent placed in Oct. 2021    Patient advised to go to orthopedic Urgent care this morning, reports he tried to get into Dr. Zabala in Orthopedic clinic at  , but no appointment unbtil 04/20/2022    Rebecca Cason RN   [facility] Nurse Advisor      Reason for Disposition    Leg pain not from an injury    SEVERE pain (e.g., excruciating, unable to do any normal activities)    Additional Information    Negative: Looks like a broken bone or dislocated joint (e.g., crooked or deformed)    Negative: Sounds like a life-threatening emergency to the triager    Negative: Followed a hip injury    Negative: Followed a knee injury    Negative: Followed an ankle or foot injury    Negative: Back pain radiating (shooting) into leg(s)    Negative: Foot pain is the main symptom    Negative: Ankle pain is the main symptom    Negative: Knee pain is the main symptom    Negative: Leg swelling is the main symptom    Negative: Chest pain    Negative: Difficulty breathing    Negative: Entire foot is cool or blue in comparison to other side    Negative: Unable to walk    Negative: Fever and red area (or area very tender to touch)    Negative: Fever and swollen joint    Negative: Thigh or calf pain in only one leg and present > 1 hour    Negative: Thigh, calf, or ankle swelling in only one leg    Negative: Thigh, calf, or ankle swelling in both legs, but one side is definitely more swollen    Negative: History of prior 'blood clot' in leg or lungs (i.e., deep vein thrombosis, pulmonary embolism)    Negative: History of inherited increased risk of blood clots (e.g., factor 5 Leiden, antithrombin 3, protein C  or protein S deficiency, prothrombin mutation)    Negative: Major surgery in the past month    Negative: Hip or leg fracture (broken bone) in past month (or had cast on leg or ankle in past month)    Negative: Illness requiring prolonged bedrest in past month (e.g., immobilization, long hospital stay)    Negative: Long-distance travel in past month (e.g., car, bus, train, plane; with trip lasting 6 or more hours)    Negative: Cancer treatment in the past two months (or has cancer now)    Negative: Patient sounds very sick or weak to the triager    Protocols used: LEG INJURY-A-OH, LEG PAIN-A-OH

## 2022-04-20 ENCOUNTER — TRANSFERRED RECORDS (OUTPATIENT)
Dept: HEALTH INFORMATION MANAGEMENT | Facility: CLINIC | Age: 67
End: 2022-04-20
Payer: COMMERCIAL

## 2022-05-02 ENCOUNTER — TRANSFERRED RECORDS (OUTPATIENT)
Dept: HEALTH INFORMATION MANAGEMENT | Facility: CLINIC | Age: 67
End: 2022-05-02
Payer: COMMERCIAL

## 2022-05-16 ENCOUNTER — TELEPHONE (OUTPATIENT)
Dept: FAMILY MEDICINE | Facility: CLINIC | Age: 67
End: 2022-05-16

## 2022-05-16 ENCOUNTER — TELEPHONE (OUTPATIENT)
Dept: CARDIOLOGY | Facility: CLINIC | Age: 67
End: 2022-05-16

## 2022-05-16 ENCOUNTER — LAB (OUTPATIENT)
Dept: LAB | Facility: CLINIC | Age: 67
End: 2022-05-16
Payer: COMMERCIAL

## 2022-05-16 DIAGNOSIS — D50.9 IRON DEFICIENCY ANEMIA, UNSPECIFIED IRON DEFICIENCY ANEMIA TYPE: ICD-10-CM

## 2022-05-16 LAB
BASOPHILS # BLD AUTO: 0.1 10E3/UL (ref 0–0.2)
BASOPHILS NFR BLD AUTO: 1 %
EOSINOPHIL # BLD AUTO: 0.1 10E3/UL (ref 0–0.7)
EOSINOPHIL NFR BLD AUTO: 1 %
ERYTHROCYTE [DISTWIDTH] IN BLOOD BY AUTOMATED COUNT: 18.2 % (ref 10–15)
HCT VFR BLD AUTO: 24.6 % (ref 40–53)
HGB BLD-MCNC: 7.4 G/DL (ref 13.3–17.7)
LYMPHOCYTES # BLD AUTO: 1.6 10E3/UL (ref 0.8–5.3)
LYMPHOCYTES NFR BLD AUTO: 23 %
MCH RBC QN AUTO: 21.8 PG (ref 26.5–33)
MCHC RBC AUTO-ENTMCNC: 30.1 G/DL (ref 31.5–36.5)
MCV RBC AUTO: 72 FL (ref 78–100)
MONOCYTES # BLD AUTO: 0.8 10E3/UL (ref 0–1.3)
MONOCYTES NFR BLD AUTO: 11 %
NEUTROPHILS # BLD AUTO: 4.4 10E3/UL (ref 1.6–8.3)
NEUTROPHILS NFR BLD AUTO: 64 %
PLATELET # BLD AUTO: 309 10E3/UL (ref 150–450)
RBC # BLD AUTO: 3.4 10E6/UL (ref 4.4–5.9)
WBC # BLD AUTO: 6.9 10E3/UL (ref 4–11)

## 2022-05-16 PROCEDURE — 36415 COLL VENOUS BLD VENIPUNCTURE: CPT

## 2022-05-16 PROCEDURE — 83550 IRON BINDING TEST: CPT

## 2022-05-16 PROCEDURE — 82728 ASSAY OF FERRITIN: CPT

## 2022-05-16 PROCEDURE — 85025 COMPLETE CBC W/AUTO DIFF WBC: CPT

## 2022-05-16 NOTE — TELEPHONE ENCOUNTER
Wife calderon called very concerned as today's HBG ordered by PCP was 7.4.   HBG has been dropping. Has had GI consults with extensive testing and wife states they have not found source of bleeding.  Patient very fatigues, Shortness of breath with mild exertion, and uncomfortable.  Denies chest discomfort.  PCP advised Patient to go into ED for evaluation and assessment for critical HBG.    Patient on his own stopped the Plavix a few days ago due to dark colored stools.  Patient started on his own a ASA,     Wife agrees with taking Patient to ED now.     Dr. Grayson magallanes.

## 2022-05-16 NOTE — TELEPHONE ENCOUNTER
Received a call from  [facility] calling stating they have a critical value lab result:    Hgb:  7.4  5/16/2022    Arben Yang notified.    Jessica Pino RN

## 2022-05-17 ENCOUNTER — HOSPITAL ENCOUNTER (EMERGENCY)
Facility: CLINIC | Age: 67
Discharge: HOME | End: 2022-05-17
Attending: EMERGENCY MEDICINE | Admitting: EMERGENCY MEDICINE
Payer: COMMERCIAL

## 2022-05-17 VITALS
RESPIRATION RATE: 17 BRPM | OXYGEN SATURATION: 98 % | HEART RATE: 81 BPM | DIASTOLIC BLOOD PRESSURE: 84 MMHG | SYSTOLIC BLOOD PRESSURE: 132 MMHG | TEMPERATURE: 98.4 F

## 2022-05-17 DIAGNOSIS — K92.2 GASTROINTESTINAL HEMORRHAGE, UNSPECIFIED GASTROINTESTINAL HEMORRHAGE TYPE: ICD-10-CM

## 2022-05-17 DIAGNOSIS — D50.0 IRON DEFICIENCY ANEMIA DUE TO CHRONIC BLOOD LOSS: ICD-10-CM

## 2022-05-17 LAB
ABO/RH(D): NORMAL
ANION GAP SERPL CALCULATED.3IONS-SCNC: 5 MMOL/L (ref 3–14)
ANTIBODY SCREEN: NEGATIVE
BASOPHILS # BLD AUTO: 0.1 10E3/UL (ref 0–0.2)
BASOPHILS NFR BLD AUTO: 1 %
BLD PROD TYP BPU: NORMAL
BLOOD COMPONENT TYPE: NORMAL
BUN SERPL-MCNC: 19 MG/DL (ref 7–30)
CALCIUM SERPL-MCNC: 8.7 MG/DL (ref 8.5–10.1)
CHLORIDE BLD-SCNC: 106 MMOL/L (ref 94–109)
CO2 SERPL-SCNC: 27 MMOL/L (ref 20–32)
CODING SYSTEM: NORMAL
CREAT SERPL-MCNC: 0.9 MG/DL (ref 0.66–1.25)
CROSSMATCH: NORMAL
EOSINOPHIL # BLD AUTO: 0.1 10E3/UL (ref 0–0.7)
EOSINOPHIL NFR BLD AUTO: 1 %
ERYTHROCYTE [DISTWIDTH] IN BLOOD BY AUTOMATED COUNT: 18.3 % (ref 10–15)
FERRITIN SERPL-MCNC: 9 NG/ML (ref 26–388)
GFR SERPL CREATININE-BSD FRML MDRD: >90 ML/MIN/1.73M2
GLUCOSE BLD-MCNC: 96 MG/DL (ref 70–99)
HCT VFR BLD AUTO: 26.5 % (ref 40–53)
HEMOCCULT STL QL: POSITIVE
HGB BLD-MCNC: 7.5 G/DL (ref 13.3–17.7)
HGB BLD-MCNC: 8.1 G/DL (ref 13.3–17.7)
HOLD SPECIMEN: NORMAL
IMM GRANULOCYTES # BLD: 0 10E3/UL
IMM GRANULOCYTES NFR BLD: 0 %
IRON SATN MFR SERPL: 3 % (ref 15–46)
IRON SERPL-MCNC: 13 UG/DL (ref 35–180)
ISSUE DATE AND TIME: NORMAL
LYMPHOCYTES # BLD AUTO: 1 10E3/UL (ref 0.8–5.3)
LYMPHOCYTES NFR BLD AUTO: 16 %
MCH RBC QN AUTO: 20.9 PG (ref 26.5–33)
MCHC RBC AUTO-ENTMCNC: 28.3 G/DL (ref 31.5–36.5)
MCV RBC AUTO: 74 FL (ref 78–100)
MONOCYTES # BLD AUTO: 0.7 10E3/UL (ref 0–1.3)
MONOCYTES NFR BLD AUTO: 12 %
NEUTROPHILS # BLD AUTO: 4.1 10E3/UL (ref 1.6–8.3)
NEUTROPHILS NFR BLD AUTO: 70 %
NRBC # BLD AUTO: 0 10E3/UL
NRBC BLD AUTO-RTO: 0 /100
PLATELET # BLD AUTO: 281 10E3/UL (ref 150–450)
POTASSIUM BLD-SCNC: 4.1 MMOL/L (ref 3.4–5.3)
RBC # BLD AUTO: 3.58 10E6/UL (ref 4.4–5.9)
SODIUM SERPL-SCNC: 138 MMOL/L (ref 133–144)
SPECIMEN EXPIRATION DATE: NORMAL
TIBC SERPL-MCNC: 399 UG/DL (ref 240–430)
UNIT ABO/RH: NORMAL
UNIT NUMBER: NORMAL
UNIT STATUS: NORMAL
UNIT TYPE ISBT: 5100
WBC # BLD AUTO: 5.9 10E3/UL (ref 4–11)

## 2022-05-17 PROCEDURE — 82272 OCCULT BLD FECES 1-3 TESTS: CPT | Performed by: EMERGENCY MEDICINE

## 2022-05-17 PROCEDURE — 36415 COLL VENOUS BLD VENIPUNCTURE: CPT | Performed by: EMERGENCY MEDICINE

## 2022-05-17 PROCEDURE — 36430 TRANSFUSION BLD/BLD COMPNT: CPT

## 2022-05-17 PROCEDURE — 85018 HEMOGLOBIN: CPT | Performed by: EMERGENCY MEDICINE

## 2022-05-17 PROCEDURE — 99285 EMERGENCY DEPT VISIT HI MDM: CPT | Mod: 25

## 2022-05-17 PROCEDURE — 80048 BASIC METABOLIC PNL TOTAL CA: CPT | Performed by: EMERGENCY MEDICINE

## 2022-05-17 PROCEDURE — 85025 COMPLETE CBC W/AUTO DIFF WBC: CPT | Performed by: EMERGENCY MEDICINE

## 2022-05-17 PROCEDURE — P9016 RBC LEUKOCYTES REDUCED: HCPCS | Performed by: EMERGENCY MEDICINE

## 2022-05-17 PROCEDURE — 86850 RBC ANTIBODY SCREEN: CPT | Performed by: EMERGENCY MEDICINE

## 2022-05-17 PROCEDURE — 86923 COMPATIBILITY TEST ELECTRIC: CPT | Performed by: EMERGENCY MEDICINE

## 2022-05-17 ASSESSMENT — ENCOUNTER SYMPTOMS
NAUSEA: 0
SHORTNESS OF BREATH: 0
VOMITING: 0
ABDOMINAL PAIN: 0
FATIGUE: 1
BLOOD IN STOOL: 0

## 2022-05-17 NOTE — DISCHARGE INSTRUCTIONS
Have your blood counts rechecked within 1-2 days and follow up promptly with your primary care doctor.    Hold your Plavix and instead start baby aspirin (81 mg) daily.      Return to the ER for any worsening symptoms.

## 2022-05-17 NOTE — ED TRIAGE NOTES
A&O x4, ABCs intact. Pt presents with concern for low Hgb. Pt states that he had his hgb checked yesterday and it came back at 7.4 and was told to go to ED. Pt denies SOB or lightheaded. Pt states stools darker than normal but not black. He denies hematuria. Pt on plavix and stopped it 2 days ago because he thought this was causing it. He has been taking 2 baby ASA instead.        Triage Assessment     Row Name 05/17/22 0851       Triage Assessment (Adult)    Airway WDL WDL       Respiratory WDL    Respiratory WDL WDL       Cardiac WDL    Cardiac WDL WDL

## 2022-05-17 NOTE — ED PROVIDER NOTES
History   Chief Complaint:  Abnormal labs     HPI   Johan Navarro is a 67 year old male with history of MI, CAD, aortic stenosis, unstable angina, cardiac stent placement, CV heart catheterization, anticoagulated on Plavix, who presents with his wife for evaluation of low hemoglobin. The patient reports that his hgb has been dropping intermittently over the last year. He states that he has had multiple endoscopy's, colonoscopy's and imaging done trying to find the source of bleeding but has not been able to. He states last week he noticed some darker stools. He was taking iron pills during this time. He reports onset of fatigue around 4 days ago and had his hgb checked yesterday. He states it came back at 7.4 and he was told to go to the ED. He notes that he has been in contact with his Cardiologist who recommended he hold his Plavix. He states since holding his Plavix he has felt much better. The patient denies chest pain, abdominal pain, bloody stools, nausea or vomiting. He notes that he has been taking baby aspirin instead of his Plavix currently.       Review of Systems   Constitutional: Positive for fatigue.   Respiratory: Negative for shortness of breath.    Cardiovascular: Negative for chest pain.   Gastrointestinal: Negative for abdominal pain, blood in stool, nausea and vomiting.   All other systems reviewed and are negative.        Allergies:  Crestor   Dust mites    Medications:  Albuterol  Plavix 75 mg  Lipitor 80 mg  Ferosul 325 mg  Neurontin 600 mg  Folvite 1 mg  Protonix 40 mg    Past Medical History:     MI  CAD  Aortic stenosis   Hyperlipidemia   Prostate cancer  Unstable angina   Heart murmur   Syncope   Sleep apnea     Past Surgical History:    Coronary stents x 2 placed  CV heart catheterization x 3   CV PCI stent drug eluting  DaVinci prostatectomy  EGD  Achilles tendon repair   Eyelid repair  Bicep tendon repair  Stress echo  Right hand xanthoma removal    Family History:    Mother: CAD, MI  at 49  Father: Dementia    Social History:  The patient presents to the ED with his wife.  The patient's Cardiologist is Dr. Sander Galicia MD    Physical Exam     Patient Vitals for the past 24 hrs:   BP Temp Temp src Pulse Resp SpO2   05/17/22 1606 -- -- -- -- -- 98 %   05/17/22 1605 132/84 -- -- 81 -- --   05/17/22 1545 -- -- -- 96 17 --   05/17/22 1530 -- -- -- 80 19 --   05/17/22 1515 -- -- -- 75 -- --   05/17/22 1500 -- -- -- 75 -- --   05/17/22 1442 136/75 98.4  F (36.9  C) Oral 72 18 98 %   05/17/22 1345 124/77 98.5  F (36.9  C) Oral 60 14 98 %   05/17/22 1330 121/75 -- -- 67 -- 99 %   05/17/22 1315 121/84 -- -- 70 -- 100 %   05/17/22 1245 126/78 97.9  F (36.6  C) -- 58 17 100 %   05/17/22 1230 130/71 -- -- 66 (!) 32 100 %   05/17/22 1228 134/72 98  F (36.7  C) -- 72 18 --   05/17/22 1115 128/82 -- -- 64 12 97 %   05/17/22 1100 125/73 -- -- 64 13 98 %   05/17/22 1045 126/76 -- -- 68 17 99 %   05/17/22 1030 130/75 -- -- 69 23 100 %   05/17/22 1020 133/78 -- -- 69 -- 99 %   05/17/22 0850 115/81 97.3  F (36.3  C) Temporal 83 18 100 %       Physical Exam  Vitals and nursing note reviewed. Exam conducted with a chaperone present.   Constitutional:       General: He is not in acute distress.     Appearance: Normal appearance. He is not ill-appearing.   HENT:      Head: Normocephalic and atraumatic.      Right Ear: External ear normal.      Left Ear: External ear normal.      Nose: Nose normal.   Eyes:      Conjunctiva/sclera: Conjunctivae normal.   Cardiovascular:      Rate and Rhythm: Normal rate and regular rhythm.      Heart sounds: No murmur heard.  Pulmonary:      Effort: Pulmonary effort is normal. No respiratory distress.      Breath sounds: No wheezing, rhonchi or rales.   Abdominal:      General: Abdomen is flat. There is no distension.      Palpations: Abdomen is soft.      Tenderness: There is no abdominal tenderness. There is no guarding or rebound.   Genitourinary:     Rectum: Guaiac result  positive (dark brown liquid stool).      Comments: Chaperoned by victor manuel Martin  Musculoskeletal:         General: No swelling or deformity.      Cervical back: Normal range of motion and neck supple.   Skin:     General: Skin is warm and dry.      Coloration: Skin is pale.      Findings: No rash.   Neurological:      Mental Status: He is alert and oriented to person, place, and time.   Psychiatric:         Mood and Affect: Mood normal.         Behavior: Behavior normal.           Emergency Department Course     Laboratory:  Labs Ordered and Resulted from Time of ED Arrival to Time of ED Departure   CBC WITH PLATELETS AND DIFFERENTIAL - Abnormal       Result Value    WBC Count 5.9      RBC Count 3.58 (*)     Hemoglobin 7.5 (*)     Hematocrit 26.5 (*)     MCV 74 (*)     MCH 20.9 (*)     MCHC 28.3 (*)     RDW 18.3 (*)     Platelet Count 281      % Neutrophils 70      % Lymphocytes 16      % Monocytes 12      % Eosinophils 1      % Basophils 1      % Immature Granulocytes 0      NRBCs per 100 WBC 0      Absolute Neutrophils 4.1      Absolute Lymphocytes 1.0      Absolute Monocytes 0.7      Absolute Eosinophils 0.1      Absolute Basophils 0.1      Absolute Immature Granulocytes 0.0      Absolute NRBCs 0.0     OCCULT BLOOD STOOL - Abnormal    Occult Blood Positive (*)    HEMOGLOBIN - Abnormal    Hemoglobin 8.1 (*)    BASIC METABOLIC PANEL - Normal    Sodium 138      Potassium 4.1      Chloride 106      Carbon Dioxide (CO2) 27      Anion Gap 5      Urea Nitrogen 19      Creatinine 0.90      Calcium 8.7      Glucose 96      GFR Estimate >90     TYPE AND SCREEN, ADULT    ABO/RH(D) O POS      Antibody Screen Negative      SPECIMEN EXPIRATION DATE [id number]     PREPARE RED BLOOD CELLS (UNIT)    CROSSMATCH Compatible      UNIT ABO/RH O Pos      Unit Number [id number]      Unit Status Issued      Blood Component Type Red Blood Cells      Product Code [id number]      CODING SYSTEM [id number]      UNIT TYPE ISBT 5100      ISSUE  DATE AND TIME [id number]     PREPARE RED BLOOD CELLS (UNIT)   TRANSFUSE RED BLOOD CELLS (UNIT)   ABO/RH TYPE AND SCREEN        Emergency Department Course:             Reviewed:  I reviewed nursing notes, vitals, past medical history and Care Everywhere    Assessments:  1018 I obtained history and examined the patient as noted above.   1445 I rechecked the patient and explained findings, he was feeling better.   1555 I rechecked the patient and explained hgb results, discharge was planned.     Consults:  1047 I spoke with Dr. Santos of the Cardiology service, regarding patient's presentation, findings, and plan of care.       Interventions:  1 unit red blood cells    Disposition:  The patient was discharged to home.     Impression & Plan       Medical Decision Makin yo M with anemia, fatigue.  Chronic history of this, has been worked up by GI extensively.  Will check labs and FOBT.   Likely chronic GI losses and iron deficiency. Pt is hemodynamically stable.  Notes he is feeling much improved today after stopping the Plavix.      1047  Pt noted to have hgb 7.5, slightly improved from yesterday.  FOBT was pos, so he is have GI losses.  Discussed giving a unit PRBC given his sig cardiac history, and he is agreeable with this.  D/w Cardiology and they agree with plan for transfusion, and Dr Santos says it's ok stop his Plavix and transition him to only baby aspirin daily.    1555  Pt's transfusion has completed and Hgb improved above 8. Pt feels much better.  Given the GIB and severe anemia, rec admission, but pt is fairly adamant that he does not want to stay.  He is aware of the risks involved and will f/u this week for repeat H/H and will have PCP f/u on these results.  We discussed return precautions.      Critical Care Time: was 0 minutes for this patient excluding procedures    Diagnosis:    ICD-10-CM    1. Iron deficiency anemia due to chronic blood loss  D50.0 CBC with platelets   2. Gastrointestinal  hemorrhage, unspecified gastrointestinal hemorrhage type  K92.2        Discharge Medications:  Discharge Medication List as of 5/17/2022  4:02 PM          Scribe Disclosure:  I, Mario Figueroa, am serving as a scribe at 10:18 AM on 5/17/2022 to document services personally performed by Boy Mancini MD based on my observations and the provider's statements to me.              Boy Mancini MD  05/17/22 6672

## 2022-05-18 LAB
ATRIAL RATE - MUSE: 64 BPM
DIASTOLIC BLOOD PRESSURE - MUSE: NORMAL MMHG
INTERPRETATION ECG - MUSE: NORMAL
P AXIS - MUSE: 32 DEGREES
PR INTERVAL - MUSE: 148 MS
QRS DURATION - MUSE: 84 MS
QT - MUSE: 416 MS
QTC - MUSE: 429 MS
R AXIS - MUSE: -8 DEGREES
SYSTOLIC BLOOD PRESSURE - MUSE: NORMAL MMHG
T AXIS - MUSE: 15 DEGREES
VENTRICULAR RATE- MUSE: 64 BPM

## 2022-05-24 ENCOUNTER — OFFICE VISIT (OUTPATIENT)
Dept: FAMILY MEDICINE | Facility: CLINIC | Age: 67
End: 2022-05-24
Payer: COMMERCIAL

## 2022-05-24 ENCOUNTER — PATIENT OUTREACH (OUTPATIENT)
Dept: ONCOLOGY | Facility: CLINIC | Age: 67
End: 2022-05-24

## 2022-05-24 VITALS
DIASTOLIC BLOOD PRESSURE: 72 MMHG | OXYGEN SATURATION: 98 % | SYSTOLIC BLOOD PRESSURE: 120 MMHG | WEIGHT: 160.8 LBS | RESPIRATION RATE: 14 BRPM | HEART RATE: 83 BPM | BODY MASS INDEX: 25.84 KG/M2 | HEIGHT: 66 IN

## 2022-05-24 DIAGNOSIS — D50.0 IRON DEFICIENCY ANEMIA DUE TO CHRONIC BLOOD LOSS: ICD-10-CM

## 2022-05-24 LAB
ERYTHROCYTE [DISTWIDTH] IN BLOOD BY AUTOMATED COUNT: 18.5 % (ref 10–15)
HCT VFR BLD AUTO: 29 % (ref 40–53)
HGB BLD-MCNC: 8.5 G/DL (ref 13.3–17.7)
MCH RBC QN AUTO: 21.9 PG (ref 26.5–33)
MCHC RBC AUTO-ENTMCNC: 29.3 G/DL (ref 31.5–36.5)
MCV RBC AUTO: 75 FL (ref 78–100)
PLATELET # BLD AUTO: 281 10E3/UL (ref 150–450)
RBC # BLD AUTO: 3.89 10E6/UL (ref 4.4–5.9)
WBC # BLD AUTO: 4.9 10E3/UL (ref 4–11)

## 2022-05-24 PROCEDURE — 36415 COLL VENOUS BLD VENIPUNCTURE: CPT | Performed by: FAMILY MEDICINE

## 2022-05-24 PROCEDURE — 99213 OFFICE O/P EST LOW 20 MIN: CPT | Performed by: FAMILY MEDICINE

## 2022-05-24 PROCEDURE — 85027 COMPLETE CBC AUTOMATED: CPT | Performed by: FAMILY MEDICINE

## 2022-05-24 ASSESSMENT — ENCOUNTER SYMPTOMS
PALPITATIONS: 0
HEADACHES: 0
CONSTITUTIONAL NEGATIVE: 1
SHORTNESS OF BREATH: 0
CONSTIPATION: 1

## 2022-05-24 ASSESSMENT — PAIN SCALES - GENERAL: PAINLEVEL: NO PAIN (0)

## 2022-05-24 NOTE — PROGRESS NOTES
Assessment and Plan    (D50.0) Iron deficiency anemia due to chronic blood loss  Comment: long standing, extensive GI w/u, has never met with heme, will refer for this.  Repeat CBC stable.  Plan: Adult Oncology/Hematology Referral, CBC with         platelets              RTC in 5m CPE    Ethan García MD      Jaymie Berg is a 67 year old who presents for the following health issues     HPI     ED/UC Followup:    Facility:  [facility]  Date of visit: 05/17/2022  Reason for visit: Iron deficiency  Current Status: Pt states is feeling much better has been taking iron supplements     Component      Latest Ref Rng & Units 5/17/2022 5/17/2022           9:42 AM  3:10 PM   WBC      4.0 - 11.0 10e3/uL 5.9    RBC Count      4.40 - 5.90 10e6/uL 3.58 (L)    Hemoglobin      13.3 - 17.7 g/dL 7.5 (L) 8.1 (L)   Hematocrit      40.0 - 53.0 % 26.5 (L)    MCV      78 - 100 fL 74 (L)    MCH      26.5 - 33.0 pg 20.9 (L)    MCHC      31.5 - 36.5 g/dL 28.3 (L)    RDW      10.0 - 15.0 % 18.3 (H)    Platelet Count      150 - 450 10e3/uL 281    % Neutrophils      % 70    % Lymphocytes      % 16    % Monocytes      % 12    % Eosinophils      % 1    % Basophils      % 1    % Immature Granulocytes      % 0    NRBCs per 100 WBC      <1 /100 0    Absolute Neutrophils      1.6 - 8.3 10e3/uL 4.1    Absolute Lymphocytes      0.8 - 5.3 10e3/uL 1.0    Absolute Monocytes      0.0 - 1.3 10e3/uL 0.7    Absolute Eosinophils      0.0 - 0.7 10e3/uL 0.1    Absolute Basophils      0.0 - 0.2 10e3/uL 0.1    Absolute Immature Granulocytes      <=0.4 10e3/uL 0.0    Absolute NRBCs      10e3/uL 0.0    Sodium      133 - 144 mmol/L 138    Potassium      3.4 - 5.3 mmol/L 4.1    Chloride      94 - 109 mmol/L 106    Carbon Dioxide      20 - 32 mmol/L 27    Anion Gap      3 - 14 mmol/L 5    Urea Nitrogen      7 - 30 mg/dL 19    Creatinine      0.66 - 1.25 mg/dL 0.90    Calcium      8.5 - 10.1 mg/dL 8.7    Glucose      70 - 99 mg/dL 96    GFR Estimate      >60  "mL/min/1.73m2 >90        Feeling well.  Stools lighter, but having constipation from Fe supplement.  Denies dyspnea, fatigue, palpitations.  Longer standing problem - has been working on this for years.  Has not worked with hematology at all.  Has done pills cam x2 with nothing seen.  Has not restarted Plavix.  Is taking baby ASA.    No EtOH.    Taking 1 Fe tab daily, did try to take two but constipation got too bad.        Review of Systems   Constitutional: Negative.    Eyes: Negative for visual disturbance.   Respiratory: Negative for shortness of breath.    Cardiovascular: Negative for chest pain and palpitations.   Gastrointestinal: Positive for constipation.   Neurological: Negative for headaches.            Objective    /72 (BP Location: Right arm, Patient Position: Sitting, Cuff Size: Adult Regular)   Pulse 83   Resp 14   Ht 1.676 m (5' 6\")   Wt 72.9 kg (160 lb 12.8 oz)   SpO2 98%   BMI 25.95 kg/m    Body mass index is 25.95 kg/m .  Physical Exam  Vitals and nursing note reviewed.   HENT:      Head: Normocephalic.   Eyes:      Conjunctiva/sclera: Conjunctivae normal.   Cardiovascular:      Rate and Rhythm: Normal rate and regular rhythm.      Heart sounds: Murmur heard.    Systolic murmur is present with a grade of 2/6.  Pulmonary:      Effort: Pulmonary effort is normal.      Breath sounds: Normal breath sounds.   Skin:     General: Skin is warm and dry.   Neurological:      General: No focal deficit present.      Mental Status: He is alert and oriented to person, place, and time.   Psychiatric:         Mood and Affect: Mood normal.         Behavior: Behavior normal.                        "

## 2022-05-24 NOTE — PROGRESS NOTES
Referral received for benign heme services, see below.    Referral reason: GLORIA    Current abnormal labs:   Lab Results   Component Value Date    WBC 4.9 05/24/2022    WBC 4.3 03/24/2021     Lab Results   Component Value Date    RBC 3.89 05/24/2022    RBC 4.46 03/24/2021     Lab Results   Component Value Date    HGB 8.5 05/24/2022    HGB 11.2 03/24/2021     Lab Results   Component Value Date    HCT 29.0 05/24/2022    HCT 35.6 03/24/2021     Lab Results   Component Value Date    MCV 75 05/24/2022    MCV 80 03/24/2021     Lab Results   Component Value Date    MCH 21.9 05/24/2022    MCH 25.1 03/24/2021     Lab Results   Component Value Date    MCHC 29.3 05/24/2022    MCHC 31.5 03/24/2021     Lab Results   Component Value Date    RDW 18.5 05/24/2022    RDW 19.4 03/24/2021     Lab Results   Component Value Date     05/24/2022     03/24/2021      and   Ferritin   Date Value Ref Range Status   05/16/2022 9 (L) 26 - 388 ng/mL Final   03/24/2021 4 (L) 26 - 388 ng/mL Final     Iron   Date Value Ref Range Status   05/16/2022 13 (L) 35 - 180 ug/dL Final   03/24/2021 18 (L) 35 - 180 ug/dL Final     Iron Binding Cap   Date Value Ref Range Status   03/24/2021 416 240 - 430 ug/dL Final     Iron Binding Capacity   Date Value Ref Range Status   05/16/2022 399 240 - 430 ug/dL Final       Preferred location per patient or referral: [place], will offer [place] as well for sooner visit    Outreach: Detailed voicemail left regarding referral.    Plan: Internal Referral: No additional work-up needed, scheduling instructions updated, referral transferred to [facility]PS for completion.

## 2022-05-26 NOTE — PROGRESS NOTES
RECORDS STATUS - ALL OTHER DIAGNOSIS      Action    Action Taken 6/27/2022 11:03AM JOVANNI   I called [place] dept [phone] option 2- they will push the image over soon!     6/28/2022 10:11AM JOVANNI   I called [facility] again... they claimed to have sent the CT. I explained that It's not in PACS and it's not waiting to be resolved either. They will try to push the CT over now.      RECORDS RECEIVED FROM: [facility]/ [place]    DATE RECEIVED: 7/13/2022   NOTES STATUS DETAILS   OFFICE NOTE from referring provider [facility]  Ethan García MD   DISCHARGE SUMMARY from hospital     DISCHARGE REPORT from the ER     OPERATIVE REPORT [facility]- 3/9/2022 Endoscopy     2/21/2022 Upper GI Endoscopy     More in EPIC   MEDICATION LIST [facility]   CLINICAL TRIAL TREATMENTS TO DATE     LABS     PATHOLOGY REPORTS     ANYTHING RELATED TO DIAGNOSIS [facility] last updated on 5/24/2022   GENONOMIC TESTING     TYPE:     IMAGING (NEED IMAGES & REPORT)     CT SCANS Requested- [place] CT Abdomen Pelvis 4/26/2021   MRI     MAMMO     ULTRASOUND     PET

## 2022-05-27 ENCOUNTER — OFFICE VISIT (OUTPATIENT)
Dept: URGENT CARE | Facility: URGENT CARE | Age: 67
End: 2022-05-27
Payer: COMMERCIAL

## 2022-05-27 VITALS
OXYGEN SATURATION: 99 % | WEIGHT: 157 LBS | SYSTOLIC BLOOD PRESSURE: 110 MMHG | BODY MASS INDEX: 25.34 KG/M2 | HEART RATE: 91 BPM | DIASTOLIC BLOOD PRESSURE: 60 MMHG | TEMPERATURE: 97.8 F

## 2022-05-27 DIAGNOSIS — S60.222A TRAUMATIC HEMATOMA OF LEFT HAND, INITIAL ENCOUNTER: ICD-10-CM

## 2022-05-27 DIAGNOSIS — S61.211A LACERATION OF LEFT INDEX FINGER WITHOUT FOREIGN BODY WITHOUT DAMAGE TO NAIL, INITIAL ENCOUNTER: Primary | ICD-10-CM

## 2022-05-27 PROCEDURE — 99213 OFFICE O/P EST LOW 20 MIN: CPT | Mod: 25 | Performed by: PHYSICIAN ASSISTANT

## 2022-05-27 PROCEDURE — 12001 RPR S/N/AX/GEN/TRNK 2.5CM/<: CPT | Performed by: PHYSICIAN ASSISTANT

## 2022-05-28 NOTE — PROGRESS NOTES
SUBJECTIVE:     Chief Complaint   Patient presents with     Urgent Care     Laceration on left pointer finger this evening while using a chainsaw  Bleeding has stopped, painful and swollen, blue in color   Cleaned and applied pressure at home   Patient wife is concerned that his ring on his ring finger maybe causing swelling or his injury today is causing that finger to appear swollen     Johan Navarro is a 67 year old male who presents to the clinic with a laceration on the right index finger sustained several hours ago. He was using a chainsaw at home and the chainsaw went through his leather glove and he sustained a laceration.    Associated symptoms: Denies numbness, weakness, or loss of function  Last tetanus booster within 10 years: YES 2013    EXAM:   The patient appears today in alert,no apparent distress distress  VITALS: /60   Pulse 91   Temp 97.8  F (36.6  C) (Tympanic)   Wt 71.2 kg (157 lb)   SpO2 99%   BMI 25.34 kg/m      Size of laceration: 2.5 centimeters  Characteristics of the laceration: straight. Does not have tendon or bony involvement. FROM in finger  Tendon function intact: yes  Sensation to light touch intact: yes  Pulses intact: yes    Assessment:     Laceration of left index finger without foreign body without damage to nail, initial encounter  Traumatic hematoma of left hand, initial encounter    PLAN:  PROCEDURE NOTE::  Wound was examined for foreign body, tendon, nerve or bony damage. He has FROM in his hand.   XR was obtained for hematoma and XR clear per my read, radiology report is pending.     Wound was locally injected with 4 cc's of Lidocaine 1% plain  Prepped and draped in the usual sterile fashion  Wound cleaned with HIBICLENS  Wound cleaned with betadine/saline solution  Laceration was closed using 6 4-0 nylon interrupted sutures  Area was dressed and wrapped. I applied an ACE bandage to the area to add compression to hematoma.   After care instructions:  Keep wound  clean and dry for the next 24-48 hours  Sutures out in 10 days  Signs of infection discussed today  Apply anti-bacterial ointment for one week    Malinda Boone PA-C

## 2022-06-05 DIAGNOSIS — D64.9 ANEMIA, UNSPECIFIED TYPE: ICD-10-CM

## 2022-06-07 ENCOUNTER — TRANSFERRED RECORDS (OUTPATIENT)
Dept: HEALTH INFORMATION MANAGEMENT | Facility: CLINIC | Age: 67
End: 2022-06-07

## 2022-06-07 RX ORDER — FOLIC ACID 1 MG/1
TABLET ORAL
Qty: 90 TABLET | Refills: 1 | Status: SHIPPED | OUTPATIENT
Start: 2022-06-07 | End: 2023-01-04

## 2022-06-10 ENCOUNTER — LAB (OUTPATIENT)
Dept: LAB | Facility: CLINIC | Age: 67
End: 2022-06-10
Payer: COMMERCIAL

## 2022-06-10 DIAGNOSIS — D50.0 IRON DEFICIENCY ANEMIA DUE TO CHRONIC BLOOD LOSS: ICD-10-CM

## 2022-06-10 LAB
ERYTHROCYTE [DISTWIDTH] IN BLOOD BY AUTOMATED COUNT: 17.8 % (ref 10–15)
HCT VFR BLD AUTO: 30.3 % (ref 40–53)
HGB BLD-MCNC: 8.9 G/DL (ref 13.3–17.7)
MCH RBC QN AUTO: 21.7 PG (ref 26.5–33)
MCHC RBC AUTO-ENTMCNC: 29.4 G/DL (ref 31.5–36.5)
MCV RBC AUTO: 74 FL (ref 78–100)
PLATELET # BLD AUTO: 323 10E3/UL (ref 150–450)
RBC # BLD AUTO: 4.11 10E6/UL (ref 4.4–5.9)
WBC # BLD AUTO: 4 10E3/UL (ref 4–11)

## 2022-06-10 PROCEDURE — 36415 COLL VENOUS BLD VENIPUNCTURE: CPT

## 2022-06-10 PROCEDURE — 85027 COMPLETE CBC AUTOMATED: CPT

## 2022-06-21 DIAGNOSIS — I25.10 CORONARY ARTERY DISEASE INVOLVING NATIVE CORONARY ARTERY OF NATIVE HEART WITHOUT ANGINA PECTORIS: ICD-10-CM

## 2022-06-22 RX ORDER — GABAPENTIN 600 MG/1
600 TABLET ORAL 2 TIMES DAILY
Qty: 180 TABLET | Refills: 0 | Status: SHIPPED | OUTPATIENT
Start: 2022-06-22 | End: 2022-09-15

## 2022-06-29 ENCOUNTER — ONCOLOGY VISIT (OUTPATIENT)
Dept: ONCOLOGY | Facility: HOSPITAL | Age: 67
End: 2022-06-29
Attending: INTERNAL MEDICINE
Payer: COMMERCIAL

## 2022-06-29 VITALS
WEIGHT: 155.6 LBS | OXYGEN SATURATION: 97 % | BODY MASS INDEX: 25.01 KG/M2 | HEIGHT: 66 IN | RESPIRATION RATE: 18 BRPM | DIASTOLIC BLOOD PRESSURE: 69 MMHG | TEMPERATURE: 98.3 F | HEART RATE: 68 BPM | SYSTOLIC BLOOD PRESSURE: 119 MMHG

## 2022-06-29 DIAGNOSIS — D50.0 IRON DEFICIENCY ANEMIA DUE TO CHRONIC BLOOD LOSS: Primary | ICD-10-CM

## 2022-06-29 PROCEDURE — G0463 HOSPITAL OUTPT CLINIC VISIT: HCPCS

## 2022-06-29 PROCEDURE — 99204 OFFICE O/P NEW MOD 45 MIN: CPT | Performed by: INTERNAL MEDICINE

## 2022-06-29 RX ORDER — HEPARIN SODIUM (PORCINE) LOCK FLUSH IV SOLN 100 UNIT/ML 100 UNIT/ML
5 SOLUTION INTRAVENOUS
Status: CANCELLED | OUTPATIENT
Start: 2022-06-29

## 2022-06-29 RX ORDER — MEPERIDINE HYDROCHLORIDE 25 MG/ML
25 INJECTION INTRAMUSCULAR; INTRAVENOUS; SUBCUTANEOUS EVERY 30 MIN PRN
Status: CANCELLED | OUTPATIENT
Start: 2022-06-29

## 2022-06-29 RX ORDER — ALBUTEROL SULFATE 90 UG/1
1-2 AEROSOL, METERED RESPIRATORY (INHALATION)
Status: CANCELLED
Start: 2022-06-29

## 2022-06-29 RX ORDER — ASPIRIN 81 MG/1
81 TABLET ORAL DAILY
COMMUNITY

## 2022-06-29 RX ORDER — ALBUTEROL SULFATE 0.83 MG/ML
2.5 SOLUTION RESPIRATORY (INHALATION)
Status: CANCELLED | OUTPATIENT
Start: 2022-06-29

## 2022-06-29 RX ORDER — HEPARIN SODIUM,PORCINE 10 UNIT/ML
5 VIAL (ML) INTRAVENOUS
Status: CANCELLED | OUTPATIENT
Start: 2022-06-29

## 2022-06-29 RX ORDER — METHYLPREDNISOLONE SODIUM SUCCINATE 125 MG/2ML
125 INJECTION, POWDER, LYOPHILIZED, FOR SOLUTION INTRAMUSCULAR; INTRAVENOUS
Status: CANCELLED
Start: 2022-06-29

## 2022-06-29 RX ORDER — NALOXONE HYDROCHLORIDE 0.4 MG/ML
0.2 INJECTION, SOLUTION INTRAMUSCULAR; INTRAVENOUS; SUBCUTANEOUS
Status: CANCELLED | OUTPATIENT
Start: 2022-06-29

## 2022-06-29 RX ORDER — DIPHENHYDRAMINE HYDROCHLORIDE 50 MG/ML
50 INJECTION INTRAMUSCULAR; INTRAVENOUS
Status: CANCELLED
Start: 2022-06-29

## 2022-06-29 RX ORDER — EPINEPHRINE 1 MG/ML
0.3 INJECTION, SOLUTION INTRAMUSCULAR; SUBCUTANEOUS EVERY 5 MIN PRN
Status: CANCELLED | OUTPATIENT
Start: 2022-06-29

## 2022-06-29 ASSESSMENT — PAIN SCALES - GENERAL: PAINLEVEL: NO PAIN (0)

## 2022-06-29 NOTE — LETTER
"    6/29/2022         RE: Johan Navarro  [address]  [place] [address]        Dear Colleague,    Thank you for referring your patient, Johan Navarro, to the [facility] [place]. Please see a copy of my visit note below.    Oncology Rooming Note    June 29, 2022 2:57 PM   Johan Navarro is a 67 year old male who presents for:    Chief Complaint   Patient presents with     Hematology     Iron anemia      Initial Vitals: /69   Pulse 68   Temp 98.3  F (36.8  C)   Resp 18   Ht 1.676 m (5' 6\")   Wt 70.6 kg (155 lb 9.6 oz)   SpO2 97%   BMI 25.11 kg/m   Estimated body mass index is 25.11 kg/m  as calculated from the following:    Height as of this encounter: 1.676 m (5' 6\").    Weight as of this encounter: 70.6 kg (155 lb 9.6 oz). Body surface area is 1.81 meters squared.  No Pain (0) Comment: Data Unavailable   No LMP for male patient.  Allergies reviewed: Yes  Medications reviewed: Yes    Medications: Medication refills not needed today.  Pharmacy name entered into [employer]:    [facility] - [place] - [address]  [id number] - [place], [place] - [address] [id number] - [place], [place] - [address]    Clinical concerns: None       Valentina Fischer LPN              [facility] Hematology and Oncology Consult Note    Patient: Johan Navarro  MRN: [id number]  Date of Service: 06/29/2022      Reason for Visit    Chief Complaint   Patient presents with     Hematology     Iron anemia          Assessment/Plan    Problem List Items Addressed This Visit        Hematologic    Iron deficiency anemia due to chronic blood loss - Primary    Relevant Orders    CBC with platelets and differential (Completed)    Iron & Iron Binding Capacity (Completed)    Ferritin (Completed)        Iron deficiency anemia secondary to chronic GI blood loss  I reviewed his chart in detail today including endoscopy " reports from outside hospital, hospitalization records and lab studies.  Fortunately the cause for his GI blood loss is not clear yet but he does have small bowel AVMs and diverticulosis potentially these are the sources of bleeding.  Unfortunately this cannot be intervened on considering the location and the intermittent nature of the bleeding.  We discussed various options for locating the bleeding source and how to talk to them.  I explained to him and his wife that it is difficult to pinpoint the site of bleeding during endoscopies especially when he does not have any active bleeding at that time.  We also discussed about RBC scintigraphy but this can be done only when he has clinically significant and active GI bleeding.  In the absence of proper preventative measures, I think we are better off with intermittent IV iron infusions to keep his hemoglobin high considering his significant cardiac history.  He is currently on oral iron supplementation but I do not think this is sufficient.  I think you will need IV iron.  I discussed the rationale of doing IV iron, especially side effects and complications.  He is agreeable to do this.  He has plans for travel next month.  We will arrange 2 doses of IV Feraheme ASAP.  We will recheck his counts a couple weeks after the infusion and plan for future appointments at that time.    He is agreeable with the plan.      Considering that he has classical signs and symptoms of iron deficiency anemia, I do not think there is any concern for a primary bone marrow pathology.  He responds appropriately to blood transfusions and iron supplementations, it suggest that the losses more than supply and hence he is always anemic.      ECOG Performance    0 - Independent    Problem List    Patient Active Problem List   Diagnosis     Depressive disorder, not elsewhere classified     Elevated prostate specific antigen (PSA)     Osteoarthritis of Hand      Hyperlipidemia LDL goal <100      Mixed hyperlipidemia     Family history of early CAD     Aortic stenosis     Carotid stenosis     Coronary artery disease involving native coronary artery of native heart without angina pectoris     Status post coronary angiogram     Abnormal cardiovascular stress test     Gastrointestinal hemorrhage, unspecified gastrointestinal hemorrhage type     History of prostate cancer     Iron deficiency anemia due to chronic blood loss     ______________________________________________________________________________    Staging History    Cancer Staging  No matching staging information was found for the patient.      History of presenting illness:  Johan Navarro is a 61-year-old gentleman with past medical history significant for arctic stenosis, coronary disease, status post stent placement most recently in 2020, catheter stenosis, hypertension, hyperlipidemia and multiple medical problems who is seen in the hematology clinic for management of iron deficiency anemia secondary to chronic GI blood loss.    Over the last couple of years he has had multiple hospitalizations secondary to anemia from GI blood loss.  He has had multiple upper and lower endoscopies and small bowel capsule endoscopies without any obvious cause for bleeding.  Most recently he was seen in the hospital on 5/17/2022 with anemia.  Hemoglobin was 1.4.  Iron studies showed a transferrin saturation of 3% and a ferritin of 9.  Total iron was 13.  History of iron or blood transfusion.  He is on oral iron supplementation.  As mentioned earlier he has had multiple upper and lower endoscopies and small bowel capsule endoscopies.  No obvious upper GI source of bleeding.  He does have some small bowel AVMs.  Also has diverticulosis.  He was on Plavix until recently it was stopped after recurrent GI blood loss.  He is currently on baby aspirin only.  Today he denies any bright red blood per rectum.  He is fatigued.  He tolerates oral iron pretty okay.  Denies any  chest pain or shortness of breath today.  He is very physically active.  Most recent labs from 6/10/2022 show a total white count of 4000.  Hemoglobin is 8.9.  Platelet count was 323.  MCV was 74.    No fever chills or night sweats.  No significant weight loss.  No enlarging lymph nodes reported.  He does not take any over-the-counter pain medications.  No personal or family history of hematological malignancy.      Past History    Past Medical History:   Diagnosis Date     Aortic stenosis      Arthritis     hands     CAD (coronary artery disease)     cardiac cath : stents x 2 to circumflex     Carotid stenosis     left     Family history of early CAD      Hyperlipidaemia LDL goal < 70     familial hyperlipidemia with marked hypercholesterolemia before treatment; has been on some form of cholesterol-lowering medication since the 1970s     Prostate cancer (H)      Sleep apnea      Syncope      Unstable angina (H) 2021    Family History   Problem Relation Age of Onset     Lipids Mother      C.A.D. Mother          at age 49 of MI     Dementia Father      C.A.D. Sister         MI at age 48     Cancer - colorectal No family hx of      Prostate Cancer No family hx of      Colon Cancer No family hx of       Past Surgical History:   Procedure Laterality Date     CARDIAC SURGERY      coronary stents x2 placed ; angioplasty     CV HEART CATHETERIZATION WITH POSSIBLE INTERVENTION N/A 2020    Procedure: Heart Catheterization with Possible Intervention;  Surgeon: Alber Bentley MD;  Location: [facility] CARDIAC CATH LAB     CV HEART CATHETERIZATION WITH POSSIBLE INTERVENTION N/A 10/11/2021    Procedure: Heart Catheterization with Possible Intervention;  Surgeon: Patsy Wallace MD;  Location: [facility] CARDIAC CATH LAB     CV INSTANTANEOUS WAVE-FREE RATIO N/A 10/11/2021    Procedure: Instantaneous Wave-Free Ratio;  Surgeon: Patsy Wallace MD;  Location:  HEART CARDIAC CATH LAB     CV LEFT  HEART CATH N/A 10/11/2021    Procedure: Left Heart Cath;  Surgeon: Patsy Wallace MD;  Location:  HEART CARDIAC CATH LAB     CV PCI STENT DRUG ELUTING N/A 10/11/2021    Procedure: Percutaneous Coronary Intervention Stent Drug Eluting;  Surgeon: Patsy Wallace MD;  Location:  HEART CARDIAC CATH LAB     DAVINCI PROSTATECTOMY           ESOPHAGOSCOPY, GASTROSCOPY, DUODENOSCOPY (EGD), COMBINED N/A 2021    Procedure: ESOPHAGOGASTRODUODENOSCOPY (EGD);  Surgeon: Rosemarie Laws MD;  Location:  GI     EYE SURGERY Bilateral 2017    eyelids     GENITOURINARY SURGERY       ORTHOPEDIC SURGERY Right     achilles tendon; post football injury     ORTHOPEDIC SURGERY Right     hand; near thumb. has wires in there. cysts     REPAIR TENDON BICEPS DISTAL UPPER EXTREMITY Right 2018    Procedure: REPAIR TENDON BICEPS DISTAL UPPER EXTREMITY;  Right open biceps tendon repair  ;  Surgeon: Alber Zabala MD;  Location: [facility] OR     SURGICAL HISTORY OF -       Right hand Xanthoma removal     SURGICAL HISTORY OF -       Stress Echo (-)    Social History     Socioeconomic History     Marital status:      Spouse name: Not on file     Number of children: Not on file     Years of education: Not on file     Highest education level: Not on file   Occupational History     Not on file   Tobacco Use     Smoking status: Former Smoker     Packs/day: 0.25     Years: 20.00     Pack years: 5.00     Types: Cigars     Quit date: 2000     Years since quittin.5     Smokeless tobacco: Current User     Types: Snuff     Tobacco comment: 12/10/18: occ chew, not every day   Vaping Use     Vaping Use: Never used   Substance and Sexual Activity     Alcohol use: Not Currently     Comment: very rare, none for 8 years     Drug use: Yes     Types: Marijuana     Comment: daily      Sexual activity: Yes     Partners: Female   Other Topics Concern      Service Not Asked     Blood Transfusions Not Asked     Caffeine  Concern Yes     Comment: coffee and soda     Occupational Exposure Not Asked     Hobby Hazards Not Asked     Sleep Concern Not Asked     Stress Concern Not Asked     Weight Concern Not Asked     Special Diet No     Back Care Not Asked     Exercise Yes     Comment: regular      Bike Helmet Not Asked     Seat Belt Yes     Self-Exams Not Asked     Parent/sibling w/ CABG, MI or angioplasty before 65F 55M? Yes     Comment: 49   Social History Narrative     Not on file     Social Determinants of Health     Financial Resource Strain: Not on file   Food Insecurity: Not on file   Transportation Needs: Not on file   Physical Activity: Not on file   Stress: Not on file   Social Connections: Not on file   Intimate Partner Violence: Not on file   Housing Stability: Not on file        Allergies    Allergies   Allergen Reactions     Crestor [Rosuvastatin] GI Disturbance     Dust Mites      Other [No Clinical Screening - See Comments]      Goose feathers     Pollen Extract        Review of Systems    Pertinent items are noted in HPI.      Physical Exam    Oncology Vitals 7/14/2022   Height -   Weight -   BSA (m2) -   /74   Pulse 78   Temp -   Temp src -   SpO2 95   Pain Score -   Some recent data might be hidden       General: Alert, cooperative, in no distress  HEENT: Atraumatic and normocephalic  Lungs: Clear to auscultation bilaterally  CVS: S1-S2 normal, regular rate and rhythm  Abdomen: Soft, nontender, splenomegaly  Lymphatic system: No peripheral adenopathy  Skin: No rashes  Neuro:Alert and oriented x3    Lab Results    No results found for this or any previous visit (from the past 168 hour(s)).    Imaging Results    No results found.    A total of 45 minutes was spent today on this visit including face to face conversation with the patient, EMR review (labs, imaging studies, pathology reports and outside records), counseling and care co-ordination and documentation.    Signed by: Praful Rousseau MD        Again,  thank you for allowing me to participate in the care of your patient.        Sincerely,        Praful Rousseau MD

## 2022-06-29 NOTE — PROGRESS NOTES
"Oncology Rooming Note    June 29, 2022 2:57 PM   Johan Navarro is a 67 year old male who presents for:    Chief Complaint   Patient presents with     Hematology     Iron anemia      Initial Vitals: /69   Pulse 68   Temp 98.3  F (36.8  C)   Resp 18   Ht 1.676 m (5' 6\")   Wt 70.6 kg (155 lb 9.6 oz)   SpO2 97%   BMI 25.11 kg/m   Estimated body mass index is 25.11 kg/m  as calculated from the following:    Height as of this encounter: 1.676 m (5' 6\").    Weight as of this encounter: 70.6 kg (155 lb 9.6 oz). Body surface area is 1.81 meters squared.  No Pain (0) Comment: Data Unavailable   No LMP for male patient.  Allergies reviewed: Yes  Medications reviewed: Yes    Medications: Medication refills not needed today.  Pharmacy name entered into [employer]:    [facility] - [place] - [address]  [id number] - [place], [place] - [address]  [id number] - [place], [place] - [address]    Clinical concerns: None       Valentina Fischer LPN            "

## 2022-07-11 ENCOUNTER — INFUSION THERAPY VISIT (OUTPATIENT)
Dept: INFUSION THERAPY | Facility: HOSPITAL | Age: 67
End: 2022-07-11
Attending: INTERNAL MEDICINE
Payer: COMMERCIAL

## 2022-07-11 VITALS
SYSTOLIC BLOOD PRESSURE: 139 MMHG | OXYGEN SATURATION: 98 % | HEART RATE: 61 BPM | DIASTOLIC BLOOD PRESSURE: 76 MMHG | RESPIRATION RATE: 18 BRPM | TEMPERATURE: 98.3 F

## 2022-07-11 DIAGNOSIS — D50.0 IRON DEFICIENCY ANEMIA DUE TO CHRONIC BLOOD LOSS: Primary | ICD-10-CM

## 2022-07-11 LAB
BASOPHILS # BLD AUTO: 0.1 10E3/UL (ref 0–0.2)
BASOPHILS NFR BLD AUTO: 1 %
EOSINOPHIL # BLD AUTO: 0.2 10E3/UL (ref 0–0.7)
EOSINOPHIL NFR BLD AUTO: 5 %
ERYTHROCYTE [DISTWIDTH] IN BLOOD BY AUTOMATED COUNT: 18 % (ref 10–15)
FERRITIN SERPL-MCNC: 13 NG/ML (ref 31–409)
HCT VFR BLD AUTO: 32.1 % (ref 40–53)
HGB BLD-MCNC: 9.4 G/DL (ref 13.3–17.7)
IMM GRANULOCYTES # BLD: 0 10E3/UL
IMM GRANULOCYTES NFR BLD: 0 %
IRON BINDING CAPACITY (ROCHE): 326 UG/DL (ref 240–430)
IRON SATN MFR SERPL: 30 % (ref 15–46)
IRON SERPL-MCNC: 98 UG/DL (ref 61–157)
LYMPHOCYTES # BLD AUTO: 1 10E3/UL (ref 0.8–5.3)
LYMPHOCYTES NFR BLD AUTO: 23 %
MCH RBC QN AUTO: 21.2 PG (ref 26.5–33)
MCHC RBC AUTO-ENTMCNC: 29.3 G/DL (ref 31.5–36.5)
MCV RBC AUTO: 73 FL (ref 78–100)
MONOCYTES # BLD AUTO: 0.5 10E3/UL (ref 0–1.3)
MONOCYTES NFR BLD AUTO: 12 %
NEUTROPHILS # BLD AUTO: 2.4 10E3/UL (ref 1.6–8.3)
NEUTROPHILS NFR BLD AUTO: 59 %
NRBC # BLD AUTO: 0 10E3/UL
NRBC BLD AUTO-RTO: 0 /100
PLATELET # BLD AUTO: 246 10E3/UL (ref 150–450)
RBC # BLD AUTO: 4.43 10E6/UL (ref 4.4–5.9)
WBC # BLD AUTO: 4.1 10E3/UL (ref 4–11)

## 2022-07-11 PROCEDURE — 85004 AUTOMATED DIFF WBC COUNT: CPT

## 2022-07-11 PROCEDURE — 83550 IRON BINDING TEST: CPT

## 2022-07-11 PROCEDURE — 36415 COLL VENOUS BLD VENIPUNCTURE: CPT

## 2022-07-11 PROCEDURE — 250N000011 HC RX IP 250 OP 636: Performed by: INTERNAL MEDICINE

## 2022-07-11 PROCEDURE — 258N000003 HC RX IP 258 OP 636: Performed by: INTERNAL MEDICINE

## 2022-07-11 PROCEDURE — 82728 ASSAY OF FERRITIN: CPT

## 2022-07-11 PROCEDURE — 96365 THER/PROPH/DIAG IV INF INIT: CPT

## 2022-07-11 RX ORDER — DIPHENHYDRAMINE HYDROCHLORIDE 50 MG/ML
50 INJECTION INTRAMUSCULAR; INTRAVENOUS
Status: CANCELLED
Start: 2022-07-13

## 2022-07-11 RX ORDER — ALBUTEROL SULFATE 0.83 MG/ML
2.5 SOLUTION RESPIRATORY (INHALATION)
Status: CANCELLED | OUTPATIENT
Start: 2022-07-13

## 2022-07-11 RX ORDER — EPINEPHRINE 1 MG/ML
0.3 INJECTION, SOLUTION INTRAMUSCULAR; SUBCUTANEOUS EVERY 5 MIN PRN
Status: DISCONTINUED | OUTPATIENT
Start: 2022-07-11 | End: 2022-07-11 | Stop reason: HOSPADM

## 2022-07-11 RX ORDER — NALOXONE HYDROCHLORIDE 0.4 MG/ML
0.2 INJECTION, SOLUTION INTRAMUSCULAR; INTRAVENOUS; SUBCUTANEOUS
Status: DISCONTINUED | OUTPATIENT
Start: 2022-07-11 | End: 2022-07-11 | Stop reason: HOSPADM

## 2022-07-11 RX ORDER — METHYLPREDNISOLONE SODIUM SUCCINATE 125 MG/2ML
125 INJECTION, POWDER, LYOPHILIZED, FOR SOLUTION INTRAMUSCULAR; INTRAVENOUS
Status: DISCONTINUED | OUTPATIENT
Start: 2022-07-11 | End: 2022-07-11 | Stop reason: HOSPADM

## 2022-07-11 RX ORDER — HEPARIN SODIUM,PORCINE 10 UNIT/ML
5 VIAL (ML) INTRAVENOUS
Status: CANCELLED | OUTPATIENT
Start: 2022-07-13

## 2022-07-11 RX ORDER — NALOXONE HYDROCHLORIDE 0.4 MG/ML
0.2 INJECTION, SOLUTION INTRAMUSCULAR; INTRAVENOUS; SUBCUTANEOUS
Status: CANCELLED | OUTPATIENT
Start: 2022-07-13

## 2022-07-11 RX ORDER — HEPARIN SODIUM,PORCINE 10 UNIT/ML
5 VIAL (ML) INTRAVENOUS
Status: DISCONTINUED | OUTPATIENT
Start: 2022-07-11 | End: 2022-07-11 | Stop reason: HOSPADM

## 2022-07-11 RX ORDER — DIPHENHYDRAMINE HYDROCHLORIDE 50 MG/ML
50 INJECTION INTRAMUSCULAR; INTRAVENOUS
Status: DISCONTINUED | OUTPATIENT
Start: 2022-07-11 | End: 2022-07-11 | Stop reason: HOSPADM

## 2022-07-11 RX ORDER — ALBUTEROL SULFATE 90 UG/1
1-2 AEROSOL, METERED RESPIRATORY (INHALATION)
Status: DISCONTINUED | OUTPATIENT
Start: 2022-07-11 | End: 2022-07-11 | Stop reason: HOSPADM

## 2022-07-11 RX ORDER — EPINEPHRINE 1 MG/ML
0.3 INJECTION, SOLUTION INTRAMUSCULAR; SUBCUTANEOUS EVERY 5 MIN PRN
Status: CANCELLED | OUTPATIENT
Start: 2022-07-13

## 2022-07-11 RX ORDER — ALBUTEROL SULFATE 90 UG/1
1-2 AEROSOL, METERED RESPIRATORY (INHALATION)
Status: CANCELLED
Start: 2022-07-13

## 2022-07-11 RX ORDER — HEPARIN SODIUM (PORCINE) LOCK FLUSH IV SOLN 100 UNIT/ML 100 UNIT/ML
5 SOLUTION INTRAVENOUS
Status: CANCELLED | OUTPATIENT
Start: 2022-07-13

## 2022-07-11 RX ORDER — MEPERIDINE HYDROCHLORIDE 25 MG/ML
25 INJECTION INTRAMUSCULAR; INTRAVENOUS; SUBCUTANEOUS EVERY 30 MIN PRN
Status: CANCELLED | OUTPATIENT
Start: 2022-07-13

## 2022-07-11 RX ORDER — HEPARIN SODIUM (PORCINE) LOCK FLUSH IV SOLN 100 UNIT/ML 100 UNIT/ML
5 SOLUTION INTRAVENOUS
Status: DISCONTINUED | OUTPATIENT
Start: 2022-07-11 | End: 2022-07-11 | Stop reason: HOSPADM

## 2022-07-11 RX ORDER — ALBUTEROL SULFATE 0.83 MG/ML
2.5 SOLUTION RESPIRATORY (INHALATION)
Status: DISCONTINUED | OUTPATIENT
Start: 2022-07-11 | End: 2022-07-11 | Stop reason: HOSPADM

## 2022-07-11 RX ORDER — METHYLPREDNISOLONE SODIUM SUCCINATE 125 MG/2ML
125 INJECTION, POWDER, LYOPHILIZED, FOR SOLUTION INTRAMUSCULAR; INTRAVENOUS
Status: CANCELLED
Start: 2022-07-13

## 2022-07-11 RX ORDER — MEPERIDINE HYDROCHLORIDE 25 MG/ML
25 INJECTION INTRAMUSCULAR; INTRAVENOUS; SUBCUTANEOUS EVERY 30 MIN PRN
Status: DISCONTINUED | OUTPATIENT
Start: 2022-07-11 | End: 2022-07-11 | Stop reason: HOSPADM

## 2022-07-11 RX ADMIN — SODIUM CHLORIDE 250 ML: 9 INJECTION, SOLUTION INTRAVENOUS at 09:36

## 2022-07-11 RX ADMIN — FERUMOXYTOL 510 MG: 510 INJECTION INTRAVENOUS at 09:43

## 2022-07-11 NOTE — PROGRESS NOTES
Infusion Nursing Note:  Johan Navarro presents today for 1/2 Feraheme infusion.    Patient seen by provider today: No   present during visit today: Not Applicable.    Note: Otis comes in today for his 1/2 Feraheme infusions. Otis has had iron transfusions in the past and has tolerated. I went over the plan of care and he verbalized understanding. PIV placed in right forearm with great blood return throughout. Labs drawn. Feraheme given as ordered. Otis tolerated with no sign or symptom of a reaction. PIV removed and covered with gauze and coban. AVS printed and reviewed. Otis's next appointment being changed to Thursday because he is leaving for [place] on 07/18/22. Otis left to the [place] around 1050.    Intravenous Access:  Labs drawn without difficulty.  Peripheral IV placed.    Treatment Conditions:  Lab Results   Component Value Date    HGB 9.4 (L) 07/11/2022    WBC 4.1 07/11/2022    ANEU 4.2 01/12/2021    ANEUTAUTO 2.4 07/11/2022     07/11/2022      Results reviewed, labs MET treatment parameters, ok to proceed with treatment.    Post Infusion Assessment:  Patient tolerated infusion without incident.   Patient observed for 30 minutes post  per protocol.   Blood return noted pre and post infusion.  Site patent and intact, free from redness, edema or discomfort.  No evidence of extravasations.  Access discontinued per protocol.     Discharge Plan:   Copy of AVS reviewed with patient and/or family.  Patient will return 07/14/22 for next appointment.  Patient discharged in stable condition accompanied by: self.  Departure Mode: Ambulatory.      ENRIQUE REED RN

## 2022-07-11 NOTE — PATIENT INSTRUCTIONS
Call with any questions or concerns. [facility] 1st floor infusion [phone] option #2

## 2022-07-13 ENCOUNTER — PRE VISIT (OUTPATIENT)
Dept: ONCOLOGY | Facility: CLINIC | Age: 67
End: 2022-07-13
Payer: COMMERCIAL

## 2022-07-14 ENCOUNTER — INFUSION THERAPY VISIT (OUTPATIENT)
Dept: INFUSION THERAPY | Facility: HOSPITAL | Age: 67
End: 2022-07-14
Attending: INTERNAL MEDICINE
Payer: COMMERCIAL

## 2022-07-14 VITALS
SYSTOLIC BLOOD PRESSURE: 128 MMHG | HEART RATE: 78 BPM | DIASTOLIC BLOOD PRESSURE: 74 MMHG | RESPIRATION RATE: 16 BRPM | TEMPERATURE: 98.2 F | OXYGEN SATURATION: 95 %

## 2022-07-14 DIAGNOSIS — D50.0 IRON DEFICIENCY ANEMIA DUE TO CHRONIC BLOOD LOSS: Primary | ICD-10-CM

## 2022-07-14 PROCEDURE — 96365 THER/PROPH/DIAG IV INF INIT: CPT

## 2022-07-14 PROCEDURE — 250N000011 HC RX IP 250 OP 636: Performed by: INTERNAL MEDICINE

## 2022-07-14 PROCEDURE — 258N000003 HC RX IP 258 OP 636: Performed by: INTERNAL MEDICINE

## 2022-07-14 RX ORDER — HEPARIN SODIUM (PORCINE) LOCK FLUSH IV SOLN 100 UNIT/ML 100 UNIT/ML
5 SOLUTION INTRAVENOUS
Status: CANCELLED | OUTPATIENT
Start: 2022-07-14

## 2022-07-14 RX ORDER — NALOXONE HYDROCHLORIDE 0.4 MG/ML
0.2 INJECTION, SOLUTION INTRAMUSCULAR; INTRAVENOUS; SUBCUTANEOUS
Status: CANCELLED | OUTPATIENT
Start: 2022-07-14

## 2022-07-14 RX ORDER — MEPERIDINE HYDROCHLORIDE 25 MG/ML
25 INJECTION INTRAMUSCULAR; INTRAVENOUS; SUBCUTANEOUS EVERY 30 MIN PRN
Status: CANCELLED | OUTPATIENT
Start: 2022-07-14

## 2022-07-14 RX ORDER — ALBUTEROL SULFATE 0.83 MG/ML
2.5 SOLUTION RESPIRATORY (INHALATION)
Status: CANCELLED | OUTPATIENT
Start: 2022-07-14

## 2022-07-14 RX ORDER — EPINEPHRINE 1 MG/ML
0.3 INJECTION, SOLUTION INTRAMUSCULAR; SUBCUTANEOUS EVERY 5 MIN PRN
Status: DISCONTINUED | OUTPATIENT
Start: 2022-07-14 | End: 2022-07-14 | Stop reason: HOSPADM

## 2022-07-14 RX ORDER — ALBUTEROL SULFATE 0.83 MG/ML
2.5 SOLUTION RESPIRATORY (INHALATION)
Status: DISCONTINUED | OUTPATIENT
Start: 2022-07-14 | End: 2022-07-14 | Stop reason: HOSPADM

## 2022-07-14 RX ORDER — DIPHENHYDRAMINE HYDROCHLORIDE 50 MG/ML
50 INJECTION INTRAMUSCULAR; INTRAVENOUS
Status: DISCONTINUED | OUTPATIENT
Start: 2022-07-14 | End: 2022-07-14 | Stop reason: HOSPADM

## 2022-07-14 RX ORDER — MEPERIDINE HYDROCHLORIDE 25 MG/ML
25 INJECTION INTRAMUSCULAR; INTRAVENOUS; SUBCUTANEOUS EVERY 30 MIN PRN
Status: DISCONTINUED | OUTPATIENT
Start: 2022-07-14 | End: 2022-07-14 | Stop reason: HOSPADM

## 2022-07-14 RX ORDER — NALOXONE HYDROCHLORIDE 0.4 MG/ML
0.2 INJECTION, SOLUTION INTRAMUSCULAR; INTRAVENOUS; SUBCUTANEOUS
Status: DISCONTINUED | OUTPATIENT
Start: 2022-07-14 | End: 2022-07-14 | Stop reason: HOSPADM

## 2022-07-14 RX ORDER — METHYLPREDNISOLONE SODIUM SUCCINATE 125 MG/2ML
125 INJECTION, POWDER, LYOPHILIZED, FOR SOLUTION INTRAMUSCULAR; INTRAVENOUS
Status: DISCONTINUED | OUTPATIENT
Start: 2022-07-14 | End: 2022-07-14 | Stop reason: HOSPADM

## 2022-07-14 RX ORDER — DIPHENHYDRAMINE HYDROCHLORIDE 50 MG/ML
50 INJECTION INTRAMUSCULAR; INTRAVENOUS
Status: CANCELLED
Start: 2022-07-14

## 2022-07-14 RX ORDER — IRON BIS-GLYCINAT/VIT C/FA/B12 28-60-0.4
1 CAPSULE ORAL EVERY OTHER DAY
COMMUNITY
Start: 2022-07-01 | End: 2022-12-23

## 2022-07-14 RX ORDER — EPINEPHRINE 1 MG/ML
0.3 INJECTION, SOLUTION INTRAMUSCULAR; SUBCUTANEOUS EVERY 5 MIN PRN
Status: CANCELLED | OUTPATIENT
Start: 2022-07-14

## 2022-07-14 RX ORDER — METHYLPREDNISOLONE SODIUM SUCCINATE 125 MG/2ML
125 INJECTION, POWDER, LYOPHILIZED, FOR SOLUTION INTRAMUSCULAR; INTRAVENOUS
Status: CANCELLED
Start: 2022-07-14

## 2022-07-14 RX ORDER — ALBUTEROL SULFATE 90 UG/1
1-2 AEROSOL, METERED RESPIRATORY (INHALATION)
Status: CANCELLED
Start: 2022-07-14

## 2022-07-14 RX ORDER — HEPARIN SODIUM,PORCINE 10 UNIT/ML
5 VIAL (ML) INTRAVENOUS
Status: CANCELLED | OUTPATIENT
Start: 2022-07-14

## 2022-07-14 RX ORDER — ALBUTEROL SULFATE 90 UG/1
1-2 AEROSOL, METERED RESPIRATORY (INHALATION)
Status: DISCONTINUED | OUTPATIENT
Start: 2022-07-14 | End: 2022-07-14 | Stop reason: HOSPADM

## 2022-07-14 RX ADMIN — FERUMOXYTOL 510 MG: 510 INJECTION INTRAVENOUS at 13:32

## 2022-07-14 RX ADMIN — SODIUM CHLORIDE 250 ML: 9 INJECTION, SOLUTION INTRAVENOUS at 13:32

## 2022-07-14 ASSESSMENT — PAIN SCALES - GENERAL: PAINLEVEL: NO PAIN (0)

## 2022-07-14 NOTE — PROGRESS NOTES
Infusion Nursing Note:  Johan Navarro presents today for Feraheme    Patient seen by provider today: No   present during visit today: Not Applicable.    Note: N/A.    Intravenous Access:  Peripheral IV placed.    Treatment Conditions:  Not Applicable.    Post Infusion Assessment:  Patient tolerated infusion without incident.  Patient observed for 30 minutes post infusion per protocol.     Discharge Plan:   Patient discharged in stable condition accompanied by: self.  Departure Mode: Ambulatory.      Zuri Weaver RN

## 2022-07-21 ENCOUNTER — DOCUMENTATION ONLY (OUTPATIENT)
Dept: LAB | Facility: CLINIC | Age: 67
End: 2022-07-21

## 2022-07-21 NOTE — PROGRESS NOTES
Patient has a lab appointment on 08/08/22 for post transfusion. Please place future lab orders. Thank you.     WHITNEY Cabrera

## 2022-07-24 NOTE — PROGRESS NOTES
[facility] Hematology and Oncology Consult Note    Patient: Johan Navarro  MRN: [id number]  Date of Service: 06/29/2022      Reason for Visit    Chief Complaint   Patient presents with     Hematology     Iron anemia          Assessment/Plan    Problem List Items Addressed This Visit        Hematologic    Iron deficiency anemia due to chronic blood loss - Primary    Relevant Orders    CBC with platelets and differential (Completed)    Iron & Iron Binding Capacity (Completed)    Ferritin (Completed)        Iron deficiency anemia secondary to chronic GI blood loss  I reviewed his chart in detail today including endoscopy reports from outside hospital, hospitalization records and lab studies.  Fortunately the cause for his GI blood loss is not clear yet but he does have small bowel AVMs and diverticulosis potentially these are the sources of bleeding.  Unfortunately this cannot be intervened on considering the location and the intermittent nature of the bleeding.  We discussed various options for locating the bleeding source and how to talk to them.  I explained to him and his wife that it is difficult to pinpoint the site of bleeding during endoscopies especially when he does not have any active bleeding at that time.  We also discussed about RBC scintigraphy but this can be done only when he has clinically significant and active GI bleeding.  In the absence of proper preventative measures, I think we are better off with intermittent IV iron infusions to keep his hemoglobin high considering his significant cardiac history.  He is currently on oral iron supplementation but I do not think this is sufficient.  I think you will need IV iron.  I discussed the rationale of doing IV iron, especially side effects and complications.  He is agreeable to do this.  He has plans for travel next month.  We will arrange 2 doses of IV Feraheme ASAP.  We will recheck his counts a couple weeks after the infusion and plan for  future appointments at that time.    He is agreeable with the plan.      Considering that he has classical signs and symptoms of iron deficiency anemia, I do not think there is any concern for a primary bone marrow pathology.  He responds appropriately to blood transfusions and iron supplementations, it suggest that the losses more than supply and hence he is always anemic.      ECOG Performance    0 - Independent    Problem List    Patient Active Problem List   Diagnosis     Depressive disorder, not elsewhere classified     Elevated prostate specific antigen (PSA)     Osteoarthritis of Hand      Hyperlipidemia LDL goal <100     Mixed hyperlipidemia     Family history of early CAD     Aortic stenosis     Carotid stenosis     Coronary artery disease involving native coronary artery of native heart without angina pectoris     Status post coronary angiogram     Abnormal cardiovascular stress test     Gastrointestinal hemorrhage, unspecified gastrointestinal hemorrhage type     History of prostate cancer     Iron deficiency anemia due to chronic blood loss     ______________________________________________________________________________    Staging History    Cancer Staging  No matching staging information was found for the patient.      History of presenting illness:  Johan Navarro is a 61-year-old gentleman with past medical history significant for arctic stenosis, coronary disease, status post stent placement most recently in 2020, catheter stenosis, hypertension, hyperlipidemia and multiple medical problems who is seen in the hematology clinic for management of iron deficiency anemia secondary to chronic GI blood loss.    Over the last couple of years he has had multiple hospitalizations secondary to anemia from GI blood loss.  He has had multiple upper and lower endoscopies and small bowel capsule endoscopies without any obvious cause for bleeding.  Most recently he was seen in the hospital on 5/17/2022 with  anemia.  Hemoglobin was 1.4.  Iron studies showed a transferrin saturation of 3% and a ferritin of 9.  Total iron was 13.  History of iron or blood transfusion.  He is on oral iron supplementation.  As mentioned earlier he has had multiple upper and lower endoscopies and small bowel capsule endoscopies.  No obvious upper GI source of bleeding.  He does have some small bowel AVMs.  Also has diverticulosis.  He was on Plavix until recently it was stopped after recurrent GI blood loss.  He is currently on baby aspirin only.  Today he denies any bright red blood per rectum.  He is fatigued.  He tolerates oral iron pretty okay.  Denies any chest pain or shortness of breath today.  He is very physically active.  Most recent labs from 6/10/2022 show a total white count of 4000.  Hemoglobin is 8.9.  Platelet count was 323.  MCV was 74.    No fever chills or night sweats.  No significant weight loss.  No enlarging lymph nodes reported.  He does not take any over-the-counter pain medications.  No personal or family history of hematological malignancy.      Past History    Past Medical History:   Diagnosis Date     Aortic stenosis      Arthritis     hands     CAD (coronary artery disease)     cardiac cath 1998: stents x 2 to circumflex     Carotid stenosis     left     Family history of early CAD      Hyperlipidaemia LDL goal < 70     familial hyperlipidemia with marked hypercholesterolemia before treatment; has been on some form of cholesterol-lowering medication since the 1970s     Prostate cancer (H)      Sleep apnea      Syncope      Unstable angina (H) 2021    Family History   Problem Relation Age of Onset     Lipids Mother      C.A.D. Mother          at age 49 of MI     Dementia Father      C.A.D. Sister         MI at age 48     Cancer - colorectal No family hx of      Prostate Cancer No family hx of      Colon Cancer No family hx of       Past Surgical History:   Procedure Laterality Date     CARDIAC SURGERY   1998    coronary stents x2 placed 1998; angioplasty     CV HEART CATHETERIZATION WITH POSSIBLE INTERVENTION N/A 7/21/2020    Procedure: Heart Catheterization with Possible Intervention;  Surgeon: Alber Bentley MD;  Location:  HEART CARDIAC CATH LAB     CV HEART CATHETERIZATION WITH POSSIBLE INTERVENTION N/A 10/11/2021    Procedure: Heart Catheterization with Possible Intervention;  Surgeon: Patsy Wallace MD;  Location:  HEART CARDIAC CATH LAB     CV INSTANTANEOUS WAVE-FREE RATIO N/A 10/11/2021    Procedure: Instantaneous Wave-Free Ratio;  Surgeon: Patsy Wallace MD;  Location:  HEART CARDIAC CATH LAB     CV LEFT HEART CATH N/A 10/11/2021    Procedure: Left Heart Cath;  Surgeon: Patsy Wallace MD;  Location:  HEART CARDIAC CATH LAB     CV PCI STENT DRUG ELUTING N/A 10/11/2021    Procedure: Percutaneous Coronary Intervention Stent Drug Eluting;  Surgeon: Patsy Wallace MD;  Location:  HEART CARDIAC CATH LAB     DAVINCI PROSTATECTOMY      2010     ESOPHAGOSCOPY, GASTROSCOPY, DUODENOSCOPY (EGD), COMBINED N/A 1/6/2021    Procedure: ESOPHAGOGASTRODUODENOSCOPY (EGD);  Surgeon: Rosemarie Laws MD;  Location:  GI     EYE SURGERY Bilateral 2017    eyelids     GENITOURINARY SURGERY       ORTHOPEDIC SURGERY Right     achilles tendon; post football injury     ORTHOPEDIC SURGERY Right     hand; near thumb. has wires in there. cysts     REPAIR TENDON BICEPS DISTAL UPPER EXTREMITY Right 7/16/2018    Procedure: REPAIR TENDON BICEPS DISTAL UPPER EXTREMITY;  Right open biceps tendon repair  ;  Surgeon: Alber Zabala MD;  Location: [facility] OR     SURGICAL HISTORY OF -       Right hand Xanthoma removal     SURGICAL HISTORY OF -       Stress Echo (-)    Social History     Socioeconomic History     Marital status:      Spouse name: Not on file     Number of children: Not on file     Years of education: Not on file     Highest education level: Not on file   Occupational History     Not  on file   Tobacco Use     Smoking status: Former Smoker     Packs/day: 0.25     Years: 20.00     Pack years: 5.00     Types: Cigars     Quit date:      Years since quittin.5     Smokeless tobacco: Current User     Types: Snuff     Tobacco comment: 12/10/18: occ chew, not every day   Vaping Use     Vaping Use: Never used   Substance and Sexual Activity     Alcohol use: Not Currently     Comment: very rare, none for 8 years     Drug use: Yes     Types: Marijuana     Comment: daily      Sexual activity: Yes     Partners: Female   Other Topics Concern      Service Not Asked     Blood Transfusions Not Asked     Caffeine Concern Yes     Comment: coffee and soda     Occupational Exposure Not Asked     Hobby Hazards Not Asked     Sleep Concern Not Asked     Stress Concern Not Asked     Weight Concern Not Asked     Special Diet No     Back Care Not Asked     Exercise Yes     Comment: regular      Bike Helmet Not Asked     Seat Belt Yes     Self-Exams Not Asked     Parent/sibling w/ CABG, MI or angioplasty before 65F 55M? Yes     Comment: 49   Social History Narrative     Not on file     Social Determinants of Health     Financial Resource Strain: Not on file   Food Insecurity: Not on file   Transportation Needs: Not on file   Physical Activity: Not on file   Stress: Not on file   Social Connections: Not on file   Intimate Partner Violence: Not on file   Housing Stability: Not on file        Allergies    Allergies   Allergen Reactions     Crestor [Rosuvastatin] GI Disturbance     Dust Mites      Other [No Clinical Screening - See Comments]      Goose feathers     Pollen Extract        Review of Systems    Pertinent items are noted in HPI.      Physical Exam    Oncology Vitals 2022   Height -   Weight -   BSA (m2) -   /74   Pulse 78   Temp -   Temp src -   SpO2 95   Pain Score -   Some recent data might be hidden       General: Alert, cooperative, in no distress  HEENT: Atraumatic and  normocephalic  Lungs: Clear to auscultation bilaterally  CVS: S1-S2 normal, regular rate and rhythm  Abdomen: Soft, nontender, splenomegaly  Lymphatic system: No peripheral adenopathy  Skin: No rashes  Neuro:Alert and oriented x3    Lab Results    No results found for this or any previous visit (from the past 168 hour(s)).    Imaging Results    No results found.    A total of 45 minutes was spent today on this visit including face to face conversation with the patient, EMR review (labs, imaging studies, pathology reports and outside records), counseling and care co-ordination and documentation.    Signed by: Praful Rousseau MD

## 2022-07-27 ENCOUNTER — VIRTUAL VISIT (OUTPATIENT)
Dept: FAMILY MEDICINE | Facility: CLINIC | Age: 67
End: 2022-07-27
Payer: COMMERCIAL

## 2022-07-27 VITALS
TEMPERATURE: 98.4 F | BODY MASS INDEX: 24.37 KG/M2 | SYSTOLIC BLOOD PRESSURE: 115 MMHG | WEIGHT: 151 LBS | HEART RATE: 75 BPM | DIASTOLIC BLOOD PRESSURE: 80 MMHG

## 2022-07-27 DIAGNOSIS — Z98.890 STATUS POST CORONARY ANGIOGRAM: ICD-10-CM

## 2022-07-27 DIAGNOSIS — E78.2 MIXED HYPERLIPIDEMIA: ICD-10-CM

## 2022-07-27 DIAGNOSIS — I25.10 CORONARY ARTERY DISEASE INVOLVING NATIVE CORONARY ARTERY OF NATIVE HEART WITHOUT ANGINA PECTORIS: ICD-10-CM

## 2022-07-27 DIAGNOSIS — D64.9 ANEMIA, UNSPECIFIED TYPE: ICD-10-CM

## 2022-07-27 DIAGNOSIS — U07.1 INFECTION DUE TO 2019 NOVEL CORONAVIRUS: Primary | ICD-10-CM

## 2022-07-27 PROCEDURE — 99214 OFFICE O/P EST MOD 30 MIN: CPT | Mod: 95 | Performed by: FAMILY MEDICINE

## 2022-07-27 NOTE — PROGRESS NOTES
Otis is a 67 year old who is being evaluated via a billable video visit.      How would you like to obtain your AVS? MyChart  If the video visit is dropped, the invitation should be resent by: Text to cell phone:    Will anyone else be joining your video visit? No          Assessment & Plan     Infection due to 2019 novel coronavirus  He was given a prescription for Paxlovid.  Isolation/quarantining precautions were given.  I encouraged him to rest and stay well-hydrated.  If he develops any signs or symptoms of respiratory distress he will seek medical attention right away.  - nirmatrelvir and ritonavir (PAXLOVID) therapy pack; Take 3 tablets by mouth 2 times daily for 5 days Take 2 Nirmatrelvir tablets and 1 Ritonavir tablet twice daily for 5 days.    Coronary artery disease involving native coronary artery of native heart without angina pectoris  He reports having his most recent stent placement last October.  He feels like things have been going well since then.  He is going to continue the atorvastatin at a half dose for the 5 days he is on Paxlovid.    Anemia, unspecified type  Continue to follow with hematology.    Mixed hyperlipidemia  He is going to continue the atorvastatin at a half dose for the 5 days he is on Paxlovid.    Status post coronary angiogram    Addendum: He reports that he is no longer taking fluticasone.  I recommended he not take the sildenafil while he is on Paxlovid.                 Return in about 2 weeks (around 8/10/2022) for Follow up if symptoms not improving..    Abraham Aguilar, [facility]    Subjective   Otis is a 67 year old, presenting for the following health issues:  Covid Concern      HPI       COVID-19 Symptom Review  How many days ago did these symptoms start? 3 days tested positive last night    Are any of the following symptoms significant for you?    New or worsening difficulty breathing? No    Worsening cough? Yes, I am coughing up  mucus.    Fever or chills? No    Headache: No    Sore throat: YES    Chest pain: No    Diarrhea: YES    Body aches? No    What treatments has patient tried? Acetaminophen   Does patient live in a nursing home, group home, or shelter? No  Does patient have a way to get food/medications during quarantined? Yes, I have a friend or family member who can help me.                  Review of Systems   Constitutional, HEENT, cardiovascular, pulmonary, gi and gu systems are negative, except as otherwise noted.      Objective           Vitals:  No vitals were obtained today due to virtual visit.    Physical Exam   GENERAL: Healthy, alert and no distress  EYES: Eyes grossly normal to inspection.  No discharge or erythema, or obvious scleral/conjunctival abnormalities.  RESP: No audible wheeze, cough, or visible cyanosis.  No visible retractions or increased work of breathing.    SKIN: Visible skin clear. No significant rash, abnormal pigmentation or lesions.  NEURO: Cranial nerves grossly intact.  Mentation and speech appropriate for age.  PSYCH: Mentation appears normal, affect normal/bright, judgement and insight intact, normal speech and appearance well-groomed.                Video-Visit Details    Video Start Time: 11:55 AM    Type of service:  Video Visit    Video End Time:12:14 PM    Originating Location (pt. Location): Home    Distant Location (provider location):  [facility]     Platform used for Video Visit: Five minutes    .  ..

## 2022-07-27 NOTE — NURSING NOTE
"Chief Complaint   Patient presents with     Covid Concern     initial /80   Pulse 75   Temp 98.4  F (36.9  C)   Wt 68.5 kg (151 lb)   BMI 24.37 kg/m   Estimated body mass index is 24.37 kg/m  as calculated from the following:    Height as of 6/29/22: 1.676 m (5' 6\").    Weight as of this encounter: 68.5 kg (151 lb).  BP completed using cuff size: .  L  R arm      Health Maintenance that is potentially due pending provider review:      Gary Rendon ma  "

## 2022-08-08 ENCOUNTER — TELEPHONE (OUTPATIENT)
Dept: ONCOLOGY | Facility: HOSPITAL | Age: 67
End: 2022-08-08

## 2022-08-08 NOTE — TELEPHONE ENCOUNTER
Otis's wife called asking for lab orders to be sent to the [facility] clinic and then pt to see CB after that. LISSA Hurst, OCN, CBCN

## 2022-08-09 ENCOUNTER — LAB (OUTPATIENT)
Dept: LAB | Facility: CLINIC | Age: 67
End: 2022-08-09
Payer: COMMERCIAL

## 2022-08-09 DIAGNOSIS — D50.0 IRON DEFICIENCY ANEMIA DUE TO CHRONIC BLOOD LOSS: Primary | ICD-10-CM

## 2022-08-09 DIAGNOSIS — D50.0 IRON DEFICIENCY ANEMIA DUE TO CHRONIC BLOOD LOSS: ICD-10-CM

## 2022-08-09 LAB
BASOPHILS # BLD AUTO: 0 10E3/UL (ref 0–0.2)
BASOPHILS NFR BLD AUTO: 1 %
EOSINOPHIL # BLD AUTO: 0.1 10E3/UL (ref 0–0.7)
EOSINOPHIL NFR BLD AUTO: 3 %
ERYTHROCYTE [DISTWIDTH] IN BLOOD BY AUTOMATED COUNT: 24.7 % (ref 10–15)
FERRITIN SERPL-MCNC: 121 NG/ML (ref 26–388)
HCT VFR BLD AUTO: 39.7 % (ref 40–53)
HGB BLD-MCNC: 12.1 G/DL (ref 13.3–17.7)
IMM GRANULOCYTES # BLD: 0 10E3/UL
IMM GRANULOCYTES NFR BLD: 0 %
IRON SATN MFR SERPL: 21 % (ref 15–46)
IRON SERPL-MCNC: 62 UG/DL (ref 35–180)
LYMPHOCYTES # BLD AUTO: 1.3 10E3/UL (ref 0.8–5.3)
LYMPHOCYTES NFR BLD AUTO: 31 %
MCH RBC QN AUTO: 23.9 PG (ref 26.5–33)
MCHC RBC AUTO-ENTMCNC: 30.5 G/DL (ref 31.5–36.5)
MCV RBC AUTO: 78 FL (ref 78–100)
MONOCYTES # BLD AUTO: 0.5 10E3/UL (ref 0–1.3)
MONOCYTES NFR BLD AUTO: 12 %
NEUTROPHILS # BLD AUTO: 2.2 10E3/UL (ref 1.6–8.3)
NEUTROPHILS NFR BLD AUTO: 53 %
NRBC # BLD AUTO: 0 10E3/UL
NRBC BLD AUTO-RTO: 0 /100
PLATELET # BLD AUTO: 271 10E3/UL (ref 150–450)
RBC # BLD AUTO: 5.07 10E6/UL (ref 4.4–5.9)
TIBC SERPL-MCNC: 290 UG/DL (ref 240–430)
WBC # BLD AUTO: 4.1 10E3/UL (ref 4–11)

## 2022-08-09 PROCEDURE — 82728 ASSAY OF FERRITIN: CPT

## 2022-08-09 PROCEDURE — 36415 COLL VENOUS BLD VENIPUNCTURE: CPT

## 2022-08-09 PROCEDURE — 83550 IRON BINDING TEST: CPT

## 2022-08-09 PROCEDURE — 85025 COMPLETE CBC W/AUTO DIFF WBC: CPT

## 2022-08-10 ENCOUNTER — VIRTUAL VISIT (OUTPATIENT)
Dept: ONCOLOGY | Facility: HOSPITAL | Age: 67
End: 2022-08-10
Attending: INTERNAL MEDICINE
Payer: COMMERCIAL

## 2022-08-10 DIAGNOSIS — D50.0 IRON DEFICIENCY ANEMIA DUE TO CHRONIC BLOOD LOSS: Primary | ICD-10-CM

## 2022-08-10 PROCEDURE — G0463 HOSPITAL OUTPT CLINIC VISIT: HCPCS | Mod: PN,RTG | Performed by: INTERNAL MEDICINE

## 2022-08-10 PROCEDURE — 99212 OFFICE O/P EST SF 10 MIN: CPT | Mod: 95 | Performed by: INTERNAL MEDICINE

## 2022-08-10 ASSESSMENT — PAIN SCALES - GENERAL: PAINLEVEL: NO PAIN (0)

## 2022-08-10 NOTE — PROGRESS NOTES
Video-Visit Details    Type of service:  Video Visit    Video Start Time: 9:32 AM  Video End Time: 9:37 AM    Originating Location (pt. Location): Home in [place]    Distant Location (provider location):  [facility]    Platform used for Video Visit: Haseeb    [facility] Hematology and Oncology Progress Note    Patient: Johan Navarro  MRN: [id number]  Date of Service: 08/10/2022        Reason for Visit    Chief Complaint   Patient presents with     Video Visit     Return video visit with labs prior related to Iron deficiency anemia due to chronic blood loss         Problem List Items Addressed This Visit    None           Assessment and Plan    Iron deficiency anemia secondary to chronic GI blood loss  Labs from yesterday reviewed.  His ferritin has come up to 121.  Transferrin saturation was 21.  TIBC is 290.  Total iron 62.  Hemoglobin has come up to 12.1.  MCV has normalized.  Normal platelet count and normal white count.  Is currently taking oral iron every other day and is tolerating it pretty well.  Fortunately no bleeding issues right now.  Considering that we will never be able to identify the exact bleeding source, I think our best bet going forward is to do repeat labs periodically and continue oral iron supplementation and give IV iron if needed.  He is agreeable with the plan.  My plan is to repeat his labs in about 10 weeks and visit with me afterwards.  If he becomes iron deficient again then we will give him IV iron infusion as needed.  We will continue oral iron indefinitely.    He is agreeable with the plan.    Cancer Staging  No matching staging information was found for the patient.    ECOG Performance    0 - Independent         Problem List    Patient Active Problem List   Diagnosis     Depressive disorder, not elsewhere classified     Elevated prostate specific antigen (PSA)     Osteoarthritis of Hand      Hyperlipidemia LDL goal <100     Mixed hyperlipidemia      Family history of early CAD     Aortic stenosis     Carotid stenosis     Coronary artery disease involving native coronary artery of native heart without angina pectoris     Status post coronary angiogram     Abnormal cardiovascular stress test     Gastrointestinal hemorrhage, unspecified gastrointestinal hemorrhage type     History of prostate cancer     Iron deficiency anemia due to chronic blood loss          Interval History   Johan Navarro is a 67 year old male with history of recurrent iron deficiency anemia status post IV iron infusions who is seen as a video visit for follow-up.    I saw him a couple months ago for recurrent iron deficiency anemia secondary to chronic GI blood loss.  Unfortunately despite repeated efforts to detect the bleeding source with multiple endoscopies and colonoscopies, no obvious bleeding source has been identified.  He has had multiple episodes of severe anemia requiring iron infusions and blood transfusions.  I saw him at the end of June at that time his hemoglobin was 8.9.  CEA was low at 74.  I recommended oral iron every other day and also gave him 2 doses of IV Feraheme.  He is here to review the latest lab results and make further follow-up plans.  Denies any new issues since last visit.  He attended a [place] in [place] and apparently got COVID.  He has recovered well.  Denies any major issues today.  No bleeding issues.  He is currently on only low-dose aspirin.  Plavix has been discontinued.        Review of Systems  A comprehensive review of systems was negative except for what is noted in the interval history    Current Outpatient Medications   Medication     aspirin 81 MG EC tablet     atorvastatin (LIPITOR) 80 MG tablet     diclofenac (VOLTAREN) 1 % topical gel     evolocumab (REPATHA) 140 MG/ML prefilled autoinjector     folic acid (FOLVITE) 1 MG tablet     gabapentin (NEURONTIN) 600 MG tablet     IRON-VIT C-VIT B12-FOLIC ACID (GENTLE IRON) 28-60-0.008-0.4 MG CAPS      nitroGLYcerin (NITROSTAT) 0.4 MG sublingual tablet     pantoprazole (PROTONIX) 40 MG EC tablet     sildenafil (REVATIO) 20 MG tablet     No current facility-administered medications for this visit.        Physical Exam    No flowsheet data found.    General: alert and cooperative      Lab Results    Recent Results (from the past 168 hour(s))   Ferritin   Result Value Ref Range    Ferritin 121 26 - 388 ng/mL   Iron & Iron Binding Capacity   Result Value Ref Range    Iron 62 35 - 180 ug/dL    Iron Binding Capacity 290 240 - 430 ug/dL    Iron Sat Index 21 15 - 46 %   CBC with platelets and differential   Result Value Ref Range    WBC Count 4.1 4.0 - 11.0 10e3/uL    RBC Count 5.07 4.40 - 5.90 10e6/uL    Hemoglobin 12.1 (L) 13.3 - 17.7 g/dL    Hematocrit 39.7 (L) 40.0 - 53.0 %    MCV 78 78 - 100 fL    MCH 23.9 (L) 26.5 - 33.0 pg    MCHC 30.5 (L) 31.5 - 36.5 g/dL    RDW 24.7 (H) 10.0 - 15.0 %    Platelet Count 271 150 - 450 10e3/uL    % Neutrophils 53 %    % Lymphocytes 31 %    % Monocytes 12 %    % Eosinophils 3 %    % Basophils 1 %    % Immature Granulocytes 0 %    NRBCs per 100 WBC 0 <1 /100    Absolute Neutrophils 2.2 1.6 - 8.3 10e3/uL    Absolute Lymphocytes 1.3 0.8 - 5.3 10e3/uL    Absolute Monocytes 0.5 0.0 - 1.3 10e3/uL    Absolute Eosinophils 0.1 0.0 - 0.7 10e3/uL    Absolute Basophils 0.0 0.0 - 0.2 10e3/uL    Absolute Immature Granulocytes 0.0 <=0.4 10e3/uL    Absolute NRBCs 0.0 10e3/uL       Imaging    No results found.    A total of 15 min were spent today on this visit which included face to face conversation with the patient, EMR review, counseling and co-ordination of care and medical documentation.      Signed by: Praful Rousseau MD

## 2022-08-10 NOTE — PROGRESS NOTES
Otis is a 67 year old who is being evaluated via a billable video visit.      How would you like to obtain your AVS? MyChart  If the video visit is dropped, the invitation should be resent by: Text to cell phone: [phone]  Will anyone else be joining your video visit? Yes: Amy. How would they like to receive their invitation? Text to cell phone: same as patient

## 2022-09-12 DIAGNOSIS — I25.10 CORONARY ARTERY DISEASE INVOLVING NATIVE CORONARY ARTERY OF NATIVE HEART WITHOUT ANGINA PECTORIS: ICD-10-CM

## 2022-09-12 DIAGNOSIS — E78.5 HLD (HYPERLIPIDEMIA): ICD-10-CM

## 2022-09-12 RX ORDER — ATORVASTATIN CALCIUM 80 MG/1
80 TABLET, FILM COATED ORAL DAILY
Qty: 90 TABLET | Refills: 1 | Status: SHIPPED | OUTPATIENT
Start: 2022-09-12 | End: 2023-04-07

## 2022-09-12 NOTE — PROGRESS NOTES
[facility] Refill Guideline reviewed.  Medication meets criteria for refill. MEME Maddox RN

## 2022-09-15 DIAGNOSIS — I25.10 CORONARY ARTERY DISEASE INVOLVING NATIVE CORONARY ARTERY OF NATIVE HEART WITHOUT ANGINA PECTORIS: ICD-10-CM

## 2022-09-15 RX ORDER — GABAPENTIN 600 MG/1
TABLET ORAL
Qty: 180 TABLET | Refills: 1 | Status: SHIPPED | OUTPATIENT
Start: 2022-09-15 | End: 2023-02-20

## 2022-10-05 ENCOUNTER — TELEPHONE (OUTPATIENT)
Dept: CARDIOLOGY | Facility: CLINIC | Age: 67
End: 2022-10-05

## 2022-10-05 NOTE — TELEPHONE ENCOUNTER
Received PA request from [employer] for repatha.  Previous refill was filled to 2023.    Attempted to contact the patient to discuss if he has changed insurance or his pharmacy and to see if he need a new script sent somewhere. Left a message for patient to call back.    1700 message from patient's spouse that they think the PA is from [employer] [facility]. Will submit request for the PA.    10/6/2022 left a message for patient that PA request was sent to pharmacy liaison.    2nd message from spouse - per [employer], the annual PA approval  in July. Their next repatha delivery is due next week.

## 2022-10-10 NOTE — TELEPHONE ENCOUNTER
Central Prior Authorization Team   Phone: [phone]    PA Initiation    Medication: Repatha Sureclick 140mg/ml  Insurance Company: [employer] Part D - Phone [phone] Fax [phone]  Pharmacy Filling the Rx: [facility]  ([facility] - [place], [place] - [address] [facility]  Filling Pharmacy Phone: [phone]  Filling Pharmacy Fax:    Start Date: 10/10/2022

## 2022-10-12 NOTE — TELEPHONE ENCOUNTER
Prior Authorization Approval    Authorization Effective Date: 10/10/2022  Authorization Expiration Date: 12/31/2023  Medication: Repatha Sureclick 140mg/ml  Approved Dose/Quantity:   Reference #:     Insurance Company: [employer] Part D - Phone [phone] Fax [phone]  Expected CoPay:       CoPay Card Available:      Foundation Assistance Needed:    Which Pharmacy is filling the prescription (Not needed for infusion/clinic administered): [facility]  ([facility]) - [address]  Pharmacy Notified: No  Patient Notified: Yes

## 2022-10-22 ENCOUNTER — HEALTH MAINTENANCE LETTER (OUTPATIENT)
Age: 67
End: 2022-10-22

## 2022-10-30 DIAGNOSIS — N52.9 ERECTILE DYSFUNCTION, UNSPECIFIED ERECTILE DYSFUNCTION TYPE: ICD-10-CM

## 2022-11-02 RX ORDER — SILDENAFIL CITRATE 20 MG/1
TABLET ORAL
Qty: 90 TABLET | Refills: 0 | Status: SHIPPED | OUTPATIENT
Start: 2022-11-02

## 2022-11-10 ENCOUNTER — TELEPHONE (OUTPATIENT)
Dept: FAMILY MEDICINE | Facility: CLINIC | Age: 67
End: 2022-11-10

## 2022-11-10 NOTE — TELEPHONE ENCOUNTER
Prior Authorization Specialty Medication Request    Medication/Dose: sildenafil (REVATIO) 20 MG tablet    Insurance Name: [employer] Medicare Advantage  Insurance ID: [id number]   Insurance Phone Number: [phone]

## 2022-11-11 NOTE — TELEPHONE ENCOUNTER
PA Initiation    Medication: sildenafil (REVATIO) 20 MG tablet PA INITIATED  Insurance Company: [employer] Part D - Phone [phone] Fax [phone]  Pharmacy Filling the Rx: [id number] - [place], [place] - [address]  Filling Pharmacy Phone: [phone]  Filling Pharmacy Fax:    Start Date: 11/11/2022    Central Prior Authorization Team   Phone: [phone]

## 2022-11-15 NOTE — TELEPHONE ENCOUNTER
PRIOR AUTHORIZATION DENIED    Medication: sildenafil (REVATIO) 20 MG tablet PA DENIED    Denial Date: 11/11/2022    Denial Rational:     Appeal Information:

## 2022-11-28 ENCOUNTER — HOSPITAL ENCOUNTER (OUTPATIENT)
Dept: ULTRASOUND IMAGING | Facility: CLINIC | Age: 67
Discharge: HOME | End: 2022-11-28
Attending: SURGERY | Admitting: SURGERY
Payer: COMMERCIAL

## 2022-11-28 DIAGNOSIS — I65.23 BILATERAL CAROTID ARTERY STENOSIS: ICD-10-CM

## 2022-11-28 PROCEDURE — 93880 EXTRACRANIAL BILAT STUDY: CPT

## 2022-12-02 ENCOUNTER — OFFICE VISIT (OUTPATIENT)
Dept: OTHER | Facility: CLINIC | Age: 67
End: 2022-12-02
Attending: PHYSICIAN ASSISTANT
Payer: COMMERCIAL

## 2022-12-02 VITALS — SYSTOLIC BLOOD PRESSURE: 117 MMHG | HEART RATE: 74 BPM | DIASTOLIC BLOOD PRESSURE: 76 MMHG

## 2022-12-02 DIAGNOSIS — I65.22 LEFT CAROTID ARTERY STENOSIS: Primary | ICD-10-CM

## 2022-12-02 DIAGNOSIS — Z95.5 HISTORY OF CORONARY ANGIOPLASTY WITH INSERTION OF STENT: ICD-10-CM

## 2022-12-02 PROCEDURE — G0463 HOSPITAL OUTPT CLINIC VISIT: HCPCS

## 2022-12-02 PROCEDURE — 99215 OFFICE O/P EST HI 40 MIN: CPT | Performed by: SURGERY

## 2022-12-02 NOTE — PROGRESS NOTES
[facility]        Patient is here for a  follow up.     Pt is currently taking Aspirin and Statin.    /76 (BP Location: Left arm, Patient Position: Chair, Cuff Size: Adult Small)   Pulse 74     The provider has been notified that the patient has no concerns.     Questions patient would like addressed today are: N/A.    Refills are needed: N/A    Has homecare services and agency name:  Cece Terry MA

## 2022-12-02 NOTE — NURSING NOTE
Patient Education    Procedure: Left carotid endarterectomy with EEG  Diagnosis: Left carotid artery stenosis  Anticoagulation Instruction: continue ASA  Pre-Operative Physical Exam: You need to have a pre-op physical exam within 30 days of your procedure. Your procedure may be cancelled if you do not have a current History and Physical. Call your PCP's office to schedule.  Allergies:  Updated in Epic  Bowel Prep: n/a  NPO for solid 8 hours prior to arrival time.   NPO for clear liquids 2 hours prior to arrival time.   Post Procedure Education: [facility] post-procedure fact sheet reviewed with patient.    Showering instructions reviewed: Yes    Learner(s):patient and significant other  Method: Listening, Reading  Barriers to Learning:No Barrier  Outcome: Patient did verbalize understanding of above education.    Chen Lindsey, PAULINAN, RN-[facility] [place]

## 2022-12-02 NOTE — PROGRESS NOTES
Johan Navarro is a 67-year-old gentleman with hypertension and a significant cardiac history.  He has moderate aortic valve stenosis.  He is status post multiple coronary interventions most recently with placement of a drug-eluting stent in his mid circumflex on 10/11/2021.  He has been followed for an asymptomatic moderate left carotid stenosis.  Bilateral carotid ultrasound on 11/19/2021 showed 50-69% right ICA stenosis.  On the left side PSV was 243 with a ratio of 3.3.  Carotid CTA from 12/20/2021 showed approximately 60 to 70% left carotid bifurcation with an approximately 80% stenosis of the proximal left internal carotid artery.  Please see my consultation from a prior 12/22/2021 office visit.  Given the recent coronary intervention and the asymptomatic nature of his left carotid stenosis, at that time I recommended continued observation.    Otis returns today for annual follow-up.  He is a right-handed individual who is a retired .  He remained stable from the cardiac standpoint and his Plavix has been discontinued.  Over the past year he denies stroke, symptoms of TIA, or amaurosis.  He is right-handed and is a fairly active individual.  He is still followed for iron deficiency anemia felt to be secondary to an occult chronic GI blood loss.  Extensive work-up for this condition has been unrevealing.  Hemoglobin on 8/9/2022 was 12.1 increased from 9.4 in July of this year.  He has repeat labs scheduled in the coming weeks and follow-up with hematology.  He remains on oral iron and has periodically required IV iron supplementation.    Exam:  Well-developed male alert and oriented x3.  Blood pressure 117/76 with a pulse of 74.  Neck with good range of motion.  2+ palpable radial pulses bilaterally.  Neurologic exam nonfocal.    Imaging:  US CAROTID BILATERAL  PROCEDURE DATE: 11/28/2022 10:14 AM      HISTORY:  history of bilateral carotid artery stenosis; Bilateral  carotid artery  stenosis     COMPARISON: Bilateral carotid stenosis     TECHNIQUE: Duplex exam performed utilizing 2D gray-scale imaging,  Doppler interrogation with color-flow and spectral waveform analysis.     FINDINGS:  RIGHT: Mild atheromatous plaque at the carotid bulb and proximal ICA.  Normal common carotid and internal carotid artery waveforms. Elevated  peak systolic velocity at the internal carotid based on peak systolic  velocities and NASCET criteria. External carotid artery velocity is  within the normal range. Vertebral artery flow is antegrade with  normal waveform morphology.     LEFT: Moderate atheromatous plaque at the carotid bulb and proximal  ICA. Normal common carotid and internal carotid artery waveforms.  Elevated peak systolic velocity at the internal carotid based on peak  systolic velocities and NASCET criteria. External carotid artery  velocity is within the normal range. Vertebral artery flow is  antegrade with normal waveform morphology.     VELOCITY CHART:   The following velocities were obtained in the RIGHT carotid system.  CCA: 93/33 cm/s  ICA: 113/38 cm/s  ECA: 142/42 cm/s  ICA/CCA: PS 1.2     The following velocities were obtained in the LEFT carotid system.  CCA: 73/28 cm/s  ICA: 237/95 cm/s  ECA: 128/29 cm/s  ICA/CCA: PS 3.3                                                                                            IMPRESSION:  1.  RIGHT: Peak systolic velocities in the ICA are 113 cm/s which  correspond to less than 50% stenosis based on NASCET criteria.  2.  LEFT: Peak systolic velocities in the ICA are 237 cm/s, previously  243 cm/s, which correspond to greater than 70% stenosis based on  NASCET criteria. Based on end diastolic velocity and internal carotid  artery/CCA ratio stenosis is in the 50-69% range.  3.  Antegrade flow within the vertebral arteries bilaterally.     Evaluation based on velocities and NASCET criteria.     BEATRIS ULRICH MD      I reviewed his CTA of the head and  neck from 12/20/2021 which showed an 80% stenosis of the proximal left internal carotid artery.    ASSESSMENT:  67-year-old gentleman with an asymptomatic 70-80% left ICA stenosis.  He is a right-handed individual who remains physically active.  He is stable from a coronary standpoint and his Plavix has been discontinued.  He does have issues with iron deficiency anemia from an as yet unidentified occult GI blood loss.    RECOMMENDATION:  I had a lengthy review with Otis and his wife regarding all of the above.  Last year I recommended continued observation of this asymptomatic left ICA stenosis given his recent drug-eluting coronary stent and his ongoing anemia.  He has stabilized from the cardiac standpoint and now is on aspirin alone.  We reviewed the natural history of asymptomatic carotid disease and I offered him prophylactic left carotid endarterectomy versus ongoing surveillance.  His brother had a TIA and required carotid endarterectomy.  Otis has already made up his mind that he absolutely wishes to pursue left carotid endarterectomy.    I reviewed the specifics of this procedure and I quoted him my personal perioperative stroke rate of less than 1% for asymptomatic disease.  We discussed potential complications and the anticipated postoperative course.  This procedure can wait until after the holidays.  He has an upcoming appointment with hematology.  Left carotid endarterectomy will be scheduled at [facility] in January 2023.  He may continue his aspirin uninterrupted.  All of their questions were answered and they verbalized full understanding to the above and complete agreement with this management plan.    Total length of this encounter was 40 minutes.    Yuri Aponte MD

## 2022-12-08 ENCOUNTER — TELEPHONE (OUTPATIENT)
Dept: OTHER | Facility: CLINIC | Age: 67
End: 2022-12-08

## 2022-12-08 NOTE — TELEPHONE ENCOUNTER
Case request: LEFT CAROTID ENDARTERECTOMY WITH ELECTROENCEPHALOGRAM    Case ID: [id number]    LM asking Otis to call me to schedule.

## 2022-12-19 NOTE — TELEPHONE ENCOUNTER
LM letting Otis know I have his surgery scheduled and that he should call me back as soon as possible to discuss dates/times as it is scheduled for next week.

## 2022-12-20 NOTE — TELEPHONE ENCOUNTER
Otis and I have exchanged voicemails.  Left all details of dates/times on Otis's voicemail.  He will call if he has additional questions.

## 2022-12-21 ENCOUNTER — OFFICE VISIT (OUTPATIENT)
Dept: FAMILY MEDICINE | Facility: CLINIC | Age: 67
End: 2022-12-21
Payer: COMMERCIAL

## 2022-12-21 VITALS
HEART RATE: 69 BPM | RESPIRATION RATE: 18 BRPM | SYSTOLIC BLOOD PRESSURE: 135 MMHG | DIASTOLIC BLOOD PRESSURE: 80 MMHG | WEIGHT: 156 LBS | OXYGEN SATURATION: 98 % | HEIGHT: 66 IN | TEMPERATURE: 98.1 F | BODY MASS INDEX: 25.07 KG/M2

## 2022-12-21 DIAGNOSIS — I25.10 CORONARY ARTERY DISEASE INVOLVING NATIVE CORONARY ARTERY OF NATIVE HEART WITHOUT ANGINA PECTORIS: ICD-10-CM

## 2022-12-21 DIAGNOSIS — Z01.818 PREOP EXAMINATION: Primary | ICD-10-CM

## 2022-12-21 DIAGNOSIS — D50.0 IRON DEFICIENCY ANEMIA DUE TO CHRONIC BLOOD LOSS: ICD-10-CM

## 2022-12-21 DIAGNOSIS — I65.22 STENOSIS OF LEFT CAROTID ARTERY: ICD-10-CM

## 2022-12-21 LAB
ANION GAP SERPL CALCULATED.3IONS-SCNC: 11 MMOL/L (ref 7–15)
BASOPHILS # BLD AUTO: 0 10E3/UL (ref 0–0.2)
BASOPHILS NFR BLD AUTO: 1 %
BUN SERPL-MCNC: 9.1 MG/DL (ref 8–23)
CALCIUM SERPL-MCNC: 9.2 MG/DL (ref 8.8–10.2)
CHLORIDE SERPL-SCNC: 108 MMOL/L (ref 98–107)
CREAT SERPL-MCNC: 0.97 MG/DL (ref 0.67–1.17)
DEPRECATED HCO3 PLAS-SCNC: 24 MMOL/L (ref 22–29)
EOSINOPHIL # BLD AUTO: 0.2 10E3/UL (ref 0–0.7)
EOSINOPHIL NFR BLD AUTO: 3 %
ERYTHROCYTE [DISTWIDTH] IN BLOOD BY AUTOMATED COUNT: 13.7 % (ref 10–15)
GFR SERPL CREATININE-BSD FRML MDRD: 86 ML/MIN/1.73M2
GLUCOSE SERPL-MCNC: 90 MG/DL (ref 70–99)
HCT VFR BLD AUTO: 42.8 % (ref 40–53)
HGB BLD-MCNC: 14 G/DL (ref 13.3–17.7)
IMM GRANULOCYTES # BLD: 0 10E3/UL
IMM GRANULOCYTES NFR BLD: 0 %
IRON BINDING CAPACITY (ROCHE): 310 UG/DL (ref 240–430)
IRON SATN MFR SERPL: 16 % (ref 15–46)
IRON SERPL-MCNC: 49 UG/DL (ref 61–157)
LYMPHOCYTES # BLD AUTO: 1.2 10E3/UL (ref 0.8–5.3)
LYMPHOCYTES NFR BLD AUTO: 17 %
MCH RBC QN AUTO: 29.1 PG (ref 26.5–33)
MCHC RBC AUTO-ENTMCNC: 32.7 G/DL (ref 31.5–36.5)
MCV RBC AUTO: 89 FL (ref 78–100)
MONOCYTES # BLD AUTO: 0.8 10E3/UL (ref 0–1.3)
MONOCYTES NFR BLD AUTO: 12 %
NEUTROPHILS # BLD AUTO: 4.8 10E3/UL (ref 1.6–8.3)
NEUTROPHILS NFR BLD AUTO: 68 %
PLATELET # BLD AUTO: 211 10E3/UL (ref 150–450)
POTASSIUM SERPL-SCNC: 4.9 MMOL/L (ref 3.4–5.3)
RBC # BLD AUTO: 4.81 10E6/UL (ref 4.4–5.9)
SODIUM SERPL-SCNC: 143 MMOL/L (ref 136–145)
WBC # BLD AUTO: 7.1 10E3/UL (ref 4–11)

## 2022-12-21 PROCEDURE — 83540 ASSAY OF IRON: CPT | Performed by: FAMILY MEDICINE

## 2022-12-21 PROCEDURE — 36415 COLL VENOUS BLD VENIPUNCTURE: CPT | Performed by: FAMILY MEDICINE

## 2022-12-21 PROCEDURE — 83550 IRON BINDING TEST: CPT | Performed by: FAMILY MEDICINE

## 2022-12-21 PROCEDURE — 93005 ELECTROCARDIOGRAM TRACING: CPT | Performed by: FAMILY MEDICINE

## 2022-12-21 PROCEDURE — 80048 BASIC METABOLIC PNL TOTAL CA: CPT | Performed by: FAMILY MEDICINE

## 2022-12-21 PROCEDURE — 93010 ELECTROCARDIOGRAM REPORT: CPT | Performed by: INTERNAL MEDICINE

## 2022-12-21 PROCEDURE — 99214 OFFICE O/P EST MOD 30 MIN: CPT | Performed by: FAMILY MEDICINE

## 2022-12-21 PROCEDURE — 85025 COMPLETE CBC W/AUTO DIFF WBC: CPT | Performed by: FAMILY MEDICINE

## 2022-12-21 NOTE — PROGRESS NOTES
[facility] [address] [place] [address]  Phone: [phone]  Fax: [phone]  Primary Provider: Washington Yang  Pre-op Performing Provider: BRIAN OHARA      PREOPERATIVE EVALUATION:  Today's date: 12/21/2022    Johan Navarro is a 67 year old male who presents for a preoperative evaluation.    Surgical Information:  Surgery/Procedure: Left Carotid Endaterectomy  Surgery Location: [facility]  Surgeon: Dr. Aponte  Surgery Date: 12/27/22  Time of Surgery: 8:50am  Where patient plans to recover: At home with family  Fax number for surgical facility: Note does not need to be faxed, will be available electronically in Epic.    Type of Anesthesia Anticipated: to be determined    Assessment & Plan     The proposed surgical procedure is considered INTERMEDIATE risk.    Problem List Items Addressed This Visit        Circulatory    Carotid stenosis    Coronary artery disease involving native coronary artery of native heart without angina pectoris       Hematologic    Iron deficiency anemia due to chronic blood loss   Other Visit Diagnoses     Preop examination    -  Primary    Relevant Orders    REVIEW OF HEALTH MAINTENANCE PROTOCOL ORDERS (Completed)    Basic metabolic panel  (Ca, Cl, CO2, Creat, Gluc, K, Na, BUN) (Completed)    EKG 12-lead, tracing only (Completed)          This is a 66 yo male with known underlying CAD.  Also has known carotid artery stenosis - now scheduled for left carotid endarterectomy.  High risk patient, but stable.  OK for surgery.         Risks and Recommendations:  The patient has the following additional risks and recommendations for perioperative complications:  Cardiovascular:   - stable -     Medication Instructions:  will continue ASA    RECOMMENDATION:  APPROVAL GIVEN to proceed with proposed procedure, without further diagnostic evaluation.            Subjective     HPI related to upcoming procedure: had heart attack a  year ago - was on blood thinners due to stent ; has left carotid stenosis - now scheduled for carotid endarterectomy -     No recent problems with energy level         Preop Questions 12/21/2022   1. Have you ever had a heart attack or stroke? YES - October 2021   2. Have you ever had surgery on your heart or blood vessels, such as a stent placement, a coronary artery bypass, or surgery on an artery in your head, neck, heart, or legs? YES - multiple stents    3. Do you have chest pain with activity? No   4. Do you have a history of  heart failure? No   5. Do you currently have a cold, bronchitis or symptoms of other infection? No   6. Do you have a cough, shortness of breath, or wheezing? No   7. Do you or anyone in your family have previous history of blood clots? No   8. Do you or does anyone in your family have a serious bleeding problem such as prolonged bleeding following surgeries or cuts? No   9. Have you ever had problems with anemia or been told to take iron pills? YES - Jan. 2021   10. Have you had any abnormal blood loss such as black, tarry or bloody stools? YES - hospitalized Jan 6, 2021 - presumed GI bleeding - Hgb 5 - no source found    11. Have you ever had a blood transfusion? YES - Jan 2021;  Had iron/blood   11a. Have you ever had a transfusion reaction? No   12. Are you willing to have a blood transfusion if it is medically needed before, during, or after your surgery? Yes   13. Have you or any of your relatives ever had problems with anesthesia? No   14. Do you have sleep apnea, excessive snoring or daytime drowsiness? No   15. Do you have any artifical heart valves or other implanted medical devices like a pacemaker, defibrillator, or continuous glucose monitor? No   16. Do you have artificial joints? No   17. Are you allergic to latex? No       Health Care Directive:  Patient does not have a Health Care Directive or Living Will: Patient states has Advance Directive and will bring in a copy to  clinic.    Preoperative Review of :  Filled  Written  Sold  ID  Drug  QTY  Days  Prescriber     09/16/2022  09/15/2022  09/20/2022  1  Gabapentin 600 Mg Tablet 180.00  90  Da Farhana     06/22/2022 06/22/2022 06/22/2022  1  Gabapentin 600 Mg Tablet 180.00  90  Me Kor     03/22/2022 12/20/2021 03/23/2022  1  Gabapentin 600 Mg Tablet 180.00  90  Da Farhana         reviewed - controlled substances reflected in medication list.      Status of Chronic Conditions:  See problem list for active medical problems.  Problems all longstanding and stable, except as noted/documented.  See ROS for pertinent symptoms related to these conditions.      Review of Systems   Constitutional: Negative for activity change, chills, fever and unexpected weight change.   HENT: Negative.    Eyes: Negative for visual disturbance.   Respiratory: Negative for cough and shortness of breath.    Cardiovascular: Negative for chest pain and peripheral edema.   Gastrointestinal: Negative for abdominal pain.   Endocrine: Negative for polydipsia and polyuria.   Genitourinary: Negative.    Musculoskeletal: Positive for arthralgias.   Skin: Negative.    Allergic/Immunologic: Negative.    Neurological: Negative.    Hematological: Does not bruise/bleed easily (is on blood thinning medication).   Psychiatric/Behavioral: Negative.    All other systems reviewed and are negative.        Patient Active Problem List    Diagnosis Date Noted     Iron deficiency anemia due to chronic blood loss 06/29/2022     Priority: Medium     History of prostate cancer 03/07/2022     Priority: Medium     Gastrointestinal hemorrhage, unspecified gastrointestinal hemorrhage type 01/06/2021     Priority: Medium     Abnormal cardiovascular stress test 12/30/2020     Priority: Medium     Added automatically from request for surgery [id number]       Status post coronary angiogram 07/21/2020     Priority: Medium     Mixed hyperlipidemia      Priority: Medium     familial hyperlipidemia  with marked hypercholesterolemia before treatment; has been on some form of cholesterol-lowering medication since the 1970s  Diagnosis updated by automated process. Provider to review and confirm.       Family history of early CAD      Priority: Medium     Aortic stenosis      Priority: Medium     Carotid stenosis      Priority: Medium     left       Coronary artery disease involving native coronary artery of native heart without angina pectoris      Priority: Medium     cardiac cath 1998: stents x 2 to circumflex       Hyperlipidemia LDL goal <100 02/10/2010     Priority: Medium     Depressive disorder, not elsewhere classified 03/02/2007     Priority: Medium              Elevated prostate specific antigen (PSA) 03/02/2007     Priority: Medium     Osteoarthritis of Hand  03/02/2007     Priority: Medium      Past Medical History:   Diagnosis Date     Aortic stenosis      Arthritis     hands     CAD (coronary artery disease)     cardiac cath 1998: stents x 2 to circumflex     Carotid stenosis     left     Family history of early CAD      Hyperlipidaemia LDL goal < 70     familial hyperlipidemia with marked hypercholesterolemia before treatment; has been on some form of cholesterol-lowering medication since the 1970s     Prostate cancer (H) 2010     Sleep apnea      Syncope      Unstable angina (H) 1/6/2021     Past Surgical History:   Procedure Laterality Date     CARDIAC SURGERY  1998    coronary stents x2 placed 1998; angioplasty     CV HEART CATHETERIZATION WITH POSSIBLE INTERVENTION N/A 7/21/2020    Procedure: Heart Catheterization with Possible Intervention;  Surgeon: Alber Bentley MD;  Location: [facility] CARDIAC CATH LAB     CV HEART CATHETERIZATION WITH POSSIBLE INTERVENTION N/A 10/11/2021    Procedure: Heart Catheterization with Possible Intervention;  Surgeon: Patsy Wallace MD;  Location: [facility] CARDIAC CATH LAB     CV INSTANTANEOUS WAVE-FREE RATIO N/A 10/11/2021    Procedure: Instantaneous  Wave-Free Ratio;  Surgeon: Patsy Wallace MD;  Location:  HEART CARDIAC CATH LAB     CV LEFT HEART CATH N/A 10/11/2021    Procedure: Left Heart Cath;  Surgeon: Patsy Wallace MD;  Location:  HEART CARDIAC CATH LAB     CV PCI STENT DRUG ELUTING N/A 10/11/2021    Procedure: Percutaneous Coronary Intervention Stent Drug Eluting;  Surgeon: Patsy Wallace MD;  Location:  HEART CARDIAC CATH LAB     DAVINCI PROSTATECTOMY      2010     ESOPHAGOSCOPY, GASTROSCOPY, DUODENOSCOPY (EGD), COMBINED N/A 1/6/2021    Procedure: ESOPHAGOGASTRODUODENOSCOPY (EGD);  Surgeon: Rosemarie Laws MD;  Location:  GI     EYE SURGERY Bilateral 2017    eyelids     GENITOURINARY SURGERY       ORTHOPEDIC SURGERY Right     achilles tendon; post football injury     ORTHOPEDIC SURGERY Right     hand; near thumb. has wires in there. cysts     REPAIR TENDON BICEPS DISTAL UPPER EXTREMITY Right 7/16/2018    Procedure: REPAIR TENDON BICEPS DISTAL UPPER EXTREMITY;  Right open biceps tendon repair  ;  Surgeon: Alber Zabala MD;  Location: [facility] OR     SURGICAL HISTORY OF -       Right hand Xanthoma removal     SURGICAL HISTORY OF -       Stress Echo (-)     Current Outpatient Medications   Medication Sig Dispense Refill     aspirin 81 MG EC tablet Take 81 mg by mouth daily       atorvastatin (LIPITOR) 80 MG tablet Take 1 tablet (80 mg) by mouth daily 90 tablet 1     diclofenac (VOLTAREN) 1 % topical gel APPLY 2GRAM TO HANDS AND FINGERS FOUR TIMES DAILY USING DOSING CARD 100 g 0     evolocumab (REPATHA) 140 MG/ML prefilled autoinjector Inject 1 mL (140 mg) Subcutaneous every 14 days 6 mL 3     folic acid (FOLVITE) 1 MG tablet TAKE 1 TABLET(1 MG) BY MOUTH EVERY EVENING 90 tablet 1     gabapentin (NEURONTIN) 600 MG tablet TAKE 1 TABLET(600 MG) BY MOUTH TWICE DAILY 180 tablet 1     nitroGLYcerin (NITROSTAT) 0.4 MG sublingual tablet For chest pain place 1 tablet under the tongue every 5 minutes for 3 doses. If symptoms persist 5  "minutes after 1st dose call 911. 30 tablet 0     sildenafil (REVATIO) 20 MG tablet TAKE 2 TO 5 TABLETS BY MOUTH AS NEEDED PRIOR TO INTERCOURSE. MAX 100MG(5 TABLETS)/DAY 90 tablet 0       Allergies   Allergen Reactions     Crestor [Rosuvastatin] GI Disturbance     Dust Mites      Other [No Clinical Screening - See Comments]      Goose feathers     Pollen Extract         Social History     Tobacco Use     Smoking status: Former     Packs/day: 0.25     Years: 20.00     Pack years: 5.00     Types: Cigars, Cigarettes     Quit date:      Years since quittin.9     Smokeless tobacco: Current     Types: Snuff     Tobacco comments:     12/10/18: occ chew, not every day   Substance Use Topics     Alcohol use: Not Currently     Comment: very rare, none for 8 years     Family History   Problem Relation Age of Onset     Lipids Mother      C.A.D. Mother          at age 49 of MI     Dementia Father      C.A.D. Sister         MI at age 48     Cancer - colorectal No family hx of      Prostate Cancer No family hx of      Colon Cancer No family hx of      History   Drug Use     Types: Marijuana     Comment: daily          Objective     /80 (BP Location: Right arm, Patient Position: Sitting, Cuff Size: Adult Regular)   Pulse 69   Temp 98.1  F (36.7  C) (Temporal)   Resp 18   Ht 1.676 m (5' 6\")   Wt 70.8 kg (156 lb)   SpO2 98%   BMI 25.18 kg/m      Physical Exam  Vitals reviewed.   Constitutional:       General: He is not in acute distress.     Appearance: Normal appearance.   HENT:      Head: Normocephalic.      Right Ear: Tympanic membrane, ear canal and external ear normal.      Left Ear: Tympanic membrane, ear canal and external ear normal.      Nose: Nose normal.      Mouth/Throat:      Mouth: Mucous membranes are moist.      Pharynx: No posterior oropharyngeal erythema.   Eyes:      Extraocular Movements: Extraocular movements intact.      Conjunctiva/sclera: Conjunctivae normal.      Pupils: Pupils are " equal, round, and reactive to light.   Cardiovascular:      Rate and Rhythm: Normal rate and regular rhythm.      Pulses: Normal pulses.      Heart sounds: Normal heart sounds. No murmur heard.  Pulmonary:      Effort: Pulmonary effort is normal.      Breath sounds: Normal breath sounds.   Abdominal:      Palpations: Abdomen is soft. There is no mass.      Tenderness: There is no abdominal tenderness. There is no guarding or rebound.   Musculoskeletal:         General: No deformity. Normal range of motion.      Cervical back: Normal range of motion and neck supple.   Lymphadenopathy:      Cervical: No cervical adenopathy.   Skin:     General: Skin is warm and dry.   Neurological:      General: No focal deficit present.      Mental Status: He is alert and oriented to person, place, and time.   Psychiatric:         Mood and Affect: Mood normal.         Behavior: Behavior normal.           Recent Labs   Lab Test 08/09/22  1007 07/11/22  0927 05/17/22  1510 05/17/22  0942 02/15/22  0833 10/12/21  0538 01/06/21  1824 01/06/21  1027   HGB 12.1* 9.4*   < > 7.5*   < >  --    < > 5.3*    246   < > 281   < >  --    < > 208   INR  --   --   --   --   --   --   --  1.03   NA  --   --   --  138  --  137   < > 136   POTASSIUM  --   --   --  4.1  --  3.8   < > 3.8   CR  --   --   --  0.90  --  0.93   < > 1.02    < > = values in this interval not displayed.        Diagnostics:  Recent Results (from the past 168 hour(s))   EKG 12-lead, tracing only    Collection Time: 12/21/22 11:47 AM   Result Value Ref Range    Systolic Blood Pressure  mmHg    Diastolic Blood Pressure  mmHg    Ventricular Rate 63 BPM    Atrial Rate 63 BPM    KS Interval 146 ms    QRS Duration 88 ms     ms    QTc 413 ms    P Axis 58 degrees    R AXIS 34 degrees    T Axis 65 degrees    Interpretation ECG       Sinus rhythm  Normal ECG  When compared with ECG of 17-MAY-2022 10:52,  T wave inversion no longer evident in Inferior leads  Confirmed by MIROSLAVA   RUSTY RÍOS LOC:WW ([id number]) on 12/22/2022 4:07:49 PM     Iron & Iron Binding Capacity    Collection Time: 12/21/22 11:58 AM   Result Value Ref Range    Iron 49 (L) 61 - 157 ug/dL    Iron Binding Capacity 310 240 - 430 ug/dL    Iron Sat Index 16 15 - 46 %   Basic metabolic panel  (Ca, Cl, CO2, Creat, Gluc, K, Na, BUN)    Collection Time: 12/21/22 11:58 AM   Result Value Ref Range    Sodium 143 136 - 145 mmol/L    Potassium 4.9 3.4 - 5.3 mmol/L    Chloride 108 (H) 98 - 107 mmol/L    Carbon Dioxide (CO2) 24 22 - 29 mmol/L    Anion Gap 11 7 - 15 mmol/L    Urea Nitrogen 9.1 8.0 - 23.0 mg/dL    Creatinine 0.97 0.67 - 1.17 mg/dL    Calcium 9.2 8.8 - 10.2 mg/dL    Glucose 90 70 - 99 mg/dL    GFR Estimate 86 >60 mL/min/1.73m2   CBC with platelets and differential    Collection Time: 12/21/22 11:58 AM   Result Value Ref Range    WBC Count 7.1 4.0 - 11.0 10e3/uL    RBC Count 4.81 4.40 - 5.90 10e6/uL    Hemoglobin 14.0 13.3 - 17.7 g/dL    Hematocrit 42.8 40.0 - 53.0 %    MCV 89 78 - 100 fL    MCH 29.1 26.5 - 33.0 pg    MCHC 32.7 31.5 - 36.5 g/dL    RDW 13.7 10.0 - 15.0 %    Platelet Count 211 150 - 450 10e3/uL    % Neutrophils 68 %    % Lymphocytes 17 %    % Monocytes 12 %    % Eosinophils 3 %    % Basophils 1 %    % Immature Granulocytes 0 %    Absolute Neutrophils 4.8 1.6 - 8.3 10e3/uL    Absolute Lymphocytes 1.2 0.8 - 5.3 10e3/uL    Absolute Monocytes 0.8 0.0 - 1.3 10e3/uL    Absolute Eosinophils 0.2 0.0 - 0.7 10e3/uL    Absolute Basophils 0.0 0.0 - 0.2 10e3/uL    Absolute Immature Granulocytes 0.0 <=0.4 10e3/uL          Revised Cardiac Risk Index (RCRI):  The patient has the following serious cardiovascular risks for perioperative complications:   - Coronary Artery Disease (MI, positive stress test, angina, Qs on EKG) = 1 point     RCRI Interpretation: 1 point: Class II (low risk - 0.9% complication rate)           Signed Electronically by: BRIAN OHARA MD  Copy of this evaluation report is provided to  requesting physician.

## 2022-12-21 NOTE — H&P (VIEW-ONLY)
[facility] [address] [place] [address]  Phone: [phone]  Fax: [phone]  Primary Provider: Washington Yang  Pre-op Performing Provider: BRIAN OHARA      PREOPERATIVE EVALUATION:  Today's date: 12/21/2022    Johan Navarro is a 67 year old male who presents for a preoperative evaluation.    Surgical Information:  Surgery/Procedure: Left Carotid Endaterectomy  Surgery Location: [facility]  Surgeon: Dr. Aponte  Surgery Date: 12/27/22  Time of Surgery: 8:50am  Where patient plans to recover: At home with family  Fax number for surgical facility: Note does not need to be faxed, will be available electronically in Epic.    Type of Anesthesia Anticipated: to be determined    Assessment & Plan     The proposed surgical procedure is considered INTERMEDIATE risk.    Problem List Items Addressed This Visit        Circulatory    Carotid stenosis    Coronary artery disease involving native coronary artery of native heart without angina pectoris       Hematologic    Iron deficiency anemia due to chronic blood loss   Other Visit Diagnoses     Preop examination    -  Primary    Relevant Orders    REVIEW OF HEALTH MAINTENANCE PROTOCOL ORDERS (Completed)    Basic metabolic panel  (Ca, Cl, CO2, Creat, Gluc, K, Na, BUN) (Completed)    EKG 12-lead, tracing only (Completed)          This is a 66 yo male with known underlying CAD.  Also has known carotid artery stenosis - now scheduled for left carotid endarterectomy.  High risk patient, but stable.  OK for surgery.         Risks and Recommendations:  The patient has the following additional risks and recommendations for perioperative complications:  Cardiovascular:   - stable -     Medication Instructions:  will continue ASA    RECOMMENDATION:  APPROVAL GIVEN to proceed with proposed procedure, without further diagnostic evaluation.            Subjective     HPI related to upcoming procedure: had heart attack a  year ago - was on blood thinners due to stent ; has left carotid stenosis - now scheduled for carotid endarterectomy -     No recent problems with energy level         Preop Questions 12/21/2022   1. Have you ever had a heart attack or stroke? YES - October 2021   2. Have you ever had surgery on your heart or blood vessels, such as a stent placement, a coronary artery bypass, or surgery on an artery in your head, neck, heart, or legs? YES - multiple stents    3. Do you have chest pain with activity? No   4. Do you have a history of  heart failure? No   5. Do you currently have a cold, bronchitis or symptoms of other infection? No   6. Do you have a cough, shortness of breath, or wheezing? No   7. Do you or anyone in your family have previous history of blood clots? No   8. Do you or does anyone in your family have a serious bleeding problem such as prolonged bleeding following surgeries or cuts? No   9. Have you ever had problems with anemia or been told to take iron pills? YES - Jan. 2021   10. Have you had any abnormal blood loss such as black, tarry or bloody stools? YES - hospitalized Jan 6, 2021 - presumed GI bleeding - Hgb 5 - no source found    11. Have you ever had a blood transfusion? YES - Jan 2021;  Had iron/blood   11a. Have you ever had a transfusion reaction? No   12. Are you willing to have a blood transfusion if it is medically needed before, during, or after your surgery? Yes   13. Have you or any of your relatives ever had problems with anesthesia? No   14. Do you have sleep apnea, excessive snoring or daytime drowsiness? No   15. Do you have any artifical heart valves or other implanted medical devices like a pacemaker, defibrillator, or continuous glucose monitor? No   16. Do you have artificial joints? No   17. Are you allergic to latex? No       Health Care Directive:  Patient does not have a Health Care Directive or Living Will: Patient states has Advance Directive and will bring in a copy to  clinic.    Preoperative Review of :  Filled  Written  Sold  ID  Drug  QTY  Days  Prescriber     09/16/2022  09/15/2022  09/20/2022  1  Gabapentin 600 Mg Tablet 180.00  90  Da Farhana     06/22/2022 06/22/2022 06/22/2022  1  Gabapentin 600 Mg Tablet 180.00  90  Me Kor     03/22/2022 12/20/2021 03/23/2022  1  Gabapentin 600 Mg Tablet 180.00  90  Da Farhana         reviewed - controlled substances reflected in medication list.      Status of Chronic Conditions:  See problem list for active medical problems.  Problems all longstanding and stable, except as noted/documented.  See ROS for pertinent symptoms related to these conditions.      Review of Systems   Constitutional: Negative for activity change, chills, fever and unexpected weight change.   HENT: Negative.    Eyes: Negative for visual disturbance.   Respiratory: Negative for cough and shortness of breath.    Cardiovascular: Negative for chest pain and peripheral edema.   Gastrointestinal: Negative for abdominal pain.   Endocrine: Negative for polydipsia and polyuria.   Genitourinary: Negative.    Musculoskeletal: Positive for arthralgias.   Skin: Negative.    Allergic/Immunologic: Negative.    Neurological: Negative.    Hematological: Does not bruise/bleed easily (is on blood thinning medication).   Psychiatric/Behavioral: Negative.    All other systems reviewed and are negative.        Patient Active Problem List    Diagnosis Date Noted     Iron deficiency anemia due to chronic blood loss 06/29/2022     Priority: Medium     History of prostate cancer 03/07/2022     Priority: Medium     Gastrointestinal hemorrhage, unspecified gastrointestinal hemorrhage type 01/06/2021     Priority: Medium     Abnormal cardiovascular stress test 12/30/2020     Priority: Medium     Added automatically from request for surgery [id number]       Status post coronary angiogram 07/21/2020     Priority: Medium     Mixed hyperlipidemia      Priority: Medium     familial hyperlipidemia  with marked hypercholesterolemia before treatment; has been on some form of cholesterol-lowering medication since the 1970s  Diagnosis updated by automated process. Provider to review and confirm.       Family history of early CAD      Priority: Medium     Aortic stenosis      Priority: Medium     Carotid stenosis      Priority: Medium     left       Coronary artery disease involving native coronary artery of native heart without angina pectoris      Priority: Medium     cardiac cath 1998: stents x 2 to circumflex       Hyperlipidemia LDL goal <100 02/10/2010     Priority: Medium     Depressive disorder, not elsewhere classified 03/02/2007     Priority: Medium              Elevated prostate specific antigen (PSA) 03/02/2007     Priority: Medium     Osteoarthritis of Hand  03/02/2007     Priority: Medium      Past Medical History:   Diagnosis Date     Aortic stenosis      Arthritis     hands     CAD (coronary artery disease)     cardiac cath 1998: stents x 2 to circumflex     Carotid stenosis     left     Family history of early CAD      Hyperlipidaemia LDL goal < 70     familial hyperlipidemia with marked hypercholesterolemia before treatment; has been on some form of cholesterol-lowering medication since the 1970s     Prostate cancer (H) 2010     Sleep apnea      Syncope      Unstable angina (H) 1/6/2021     Past Surgical History:   Procedure Laterality Date     CARDIAC SURGERY  1998    coronary stents x2 placed 1998; angioplasty     CV HEART CATHETERIZATION WITH POSSIBLE INTERVENTION N/A 7/21/2020    Procedure: Heart Catheterization with Possible Intervention;  Surgeon: Alber Bentley MD;  [facility]     CV HEART CATHETERIZATION WITH POSSIBLE INTERVENTION N/A 10/11/2021    Procedure: Heart Catheterization with Possible Intervention;  Surgeon: Patsy Wallace MD;  [facility]     CV INSTANTANEOUS WAVE-FREE RATIO N/A 10/11/2021    Procedure: Instantaneous  Wave-Free Ratio;  Surgeon: Patsy Wallace MD;  Location:  HEART CARDIAC CATH LAB     CV LEFT HEART CATH N/A 10/11/2021    Procedure: Left Heart Cath;  Surgeon: Patsy Wallace MD;  Location:  HEART CARDIAC CATH LAB     CV PCI STENT DRUG ELUTING N/A 10/11/2021    Procedure: Percutaneous Coronary Intervention Stent Drug Eluting;  Surgeon: Patsy Wallace MD;  Location:  HEART CARDIAC CATH LAB     DAVINCI PROSTATECTOMY      2010     ESOPHAGOSCOPY, GASTROSCOPY, DUODENOSCOPY (EGD), COMBINED N/A 1/6/2021    Procedure: ESOPHAGOGASTRODUODENOSCOPY (EGD);  Surgeon: Rosemarie Laws MD;  Location:  GI     EYE SURGERY Bilateral 2017    eyelids     GENITOURINARY SURGERY       ORTHOPEDIC SURGERY Right     achilles tendon; post football injury     ORTHOPEDIC SURGERY Right     hand; near thumb. has wires in there. cysts     REPAIR TENDON BICEPS DISTAL UPPER EXTREMITY Right 7/16/2018    Procedure: REPAIR TENDON BICEPS DISTAL UPPER EXTREMITY;  Right open biceps tendon repair  ;  Surgeon: Alber Zabala MD;  Location: [facility] OR     SURGICAL HISTORY OF -       Right hand Xanthoma removal     SURGICAL HISTORY OF -       Stress Echo (-)     Current Outpatient Medications   Medication Sig Dispense Refill     aspirin 81 MG EC tablet Take 81 mg by mouth daily       atorvastatin (LIPITOR) 80 MG tablet Take 1 tablet (80 mg) by mouth daily 90 tablet 1     diclofenac (VOLTAREN) 1 % topical gel APPLY 2GRAM TO HANDS AND FINGERS FOUR TIMES DAILY USING DOSING CARD 100 g 0     evolocumab (REPATHA) 140 MG/ML prefilled autoinjector Inject 1 mL (140 mg) Subcutaneous every 14 days 6 mL 3     folic acid (FOLVITE) 1 MG tablet TAKE 1 TABLET(1 MG) BY MOUTH EVERY EVENING 90 tablet 1     gabapentin (NEURONTIN) 600 MG tablet TAKE 1 TABLET(600 MG) BY MOUTH TWICE DAILY 180 tablet 1     nitroGLYcerin (NITROSTAT) 0.4 MG sublingual tablet For chest pain place 1 tablet under the tongue every 5 minutes for 3 doses. If symptoms persist 5  "minutes after 1st dose call 911. 30 tablet 0     sildenafil (REVATIO) 20 MG tablet TAKE 2 TO 5 TABLETS BY MOUTH AS NEEDED PRIOR TO INTERCOURSE. MAX 100MG(5 TABLETS)/DAY 90 tablet 0       Allergies   Allergen Reactions     Crestor [Rosuvastatin] GI Disturbance     Dust Mites      Other [No Clinical Screening - See Comments]      Goose feathers     Pollen Extract         Social History     Tobacco Use     Smoking status: Former     Packs/day: 0.25     Years: 20.00     Pack years: 5.00     Types: Cigars, Cigarettes     Quit date:      Years since quittin.9     Smokeless tobacco: Current     Types: Snuff     Tobacco comments:     12/10/18: occ chew, not every day   Substance Use Topics     Alcohol use: Not Currently     Comment: very rare, none for 8 years     Family History   Problem Relation Age of Onset     Lipids Mother      C.A.D. Mother          at age 49 of MI     Dementia Father      C.A.D. Sister         MI at age 48     Cancer - colorectal No family hx of      Prostate Cancer No family hx of      Colon Cancer No family hx of      History   Drug Use     Types: Marijuana     Comment: daily          Objective     /80 (BP Location: Right arm, Patient Position: Sitting, Cuff Size: Adult Regular)   Pulse 69   Temp 98.1  F (36.7  C) (Temporal)   Resp 18   Ht 1.676 m (5' 6\")   Wt 70.8 kg (156 lb)   SpO2 98%   BMI 25.18 kg/m      Physical Exam  Vitals reviewed.   Constitutional:       General: He is not in acute distress.     Appearance: Normal appearance.   HENT:      Head: Normocephalic.      Right Ear: Tympanic membrane, ear canal and external ear normal.      Left Ear: Tympanic membrane, ear canal and external ear normal.      Nose: Nose normal.      Mouth/Throat:      Mouth: Mucous membranes are moist.      Pharynx: No posterior oropharyngeal erythema.   Eyes:      Extraocular Movements: Extraocular movements intact.      Conjunctiva/sclera: Conjunctivae normal.      Pupils: Pupils are " equal, round, and reactive to light.   Cardiovascular:      Rate and Rhythm: Normal rate and regular rhythm.      Pulses: Normal pulses.      Heart sounds: Normal heart sounds. No murmur heard.  Pulmonary:      Effort: Pulmonary effort is normal.      Breath sounds: Normal breath sounds.   Abdominal:      Palpations: Abdomen is soft. There is no mass.      Tenderness: There is no abdominal tenderness. There is no guarding or rebound.   Musculoskeletal:         General: No deformity. Normal range of motion.      Cervical back: Normal range of motion and neck supple.   Lymphadenopathy:      Cervical: No cervical adenopathy.   Skin:     General: Skin is warm and dry.   Neurological:      General: No focal deficit present.      Mental Status: He is alert and oriented to person, place, and time.   Psychiatric:         Mood and Affect: Mood normal.         Behavior: Behavior normal.           Recent Labs   Lab Test 08/09/22  1007 07/11/22  0927 05/17/22  1510 05/17/22  0942 02/15/22  0833 10/12/21  0538 01/06/21  1824 01/06/21  1027   HGB 12.1* 9.4*   < > 7.5*   < >  --    < > 5.3*    246   < > 281   < >  --    < > 208   INR  --   --   --   --   --   --   --  1.03   NA  --   --   --  138  --  137   < > 136   POTASSIUM  --   --   --  4.1  --  3.8   < > 3.8   CR  --   --   --  0.90  --  0.93   < > 1.02    < > = values in this interval not displayed.        Diagnostics:  Recent Results (from the past 168 hour(s))   EKG 12-lead, tracing only    Collection Time: 12/21/22 11:47 AM   Result Value Ref Range    Systolic Blood Pressure  mmHg    Diastolic Blood Pressure  mmHg    Ventricular Rate 63 BPM    Atrial Rate 63 BPM    MO Interval 146 ms    QRS Duration 88 ms     ms    QTc 413 ms    P Axis 58 degrees    R AXIS 34 degrees    T Axis 65 degrees    Interpretation ECG       Sinus rhythm  Normal ECG  When compared with ECG of 17-MAY-2022 10:52,  T wave inversion no longer evident in Inferior leads  Confirmed by MIROSLAVA   RUSTY RÍOS LOC:WW ([id number]) on 12/22/2022 4:07:49 PM     Iron & Iron Binding Capacity    Collection Time: 12/21/22 11:58 AM   Result Value Ref Range    Iron 49 (L) 61 - 157 ug/dL    Iron Binding Capacity 310 240 - 430 ug/dL    Iron Sat Index 16 15 - 46 %   Basic metabolic panel  (Ca, Cl, CO2, Creat, Gluc, K, Na, BUN)    Collection Time: 12/21/22 11:58 AM   Result Value Ref Range    Sodium 143 136 - 145 mmol/L    Potassium 4.9 3.4 - 5.3 mmol/L    Chloride 108 (H) 98 - 107 mmol/L    Carbon Dioxide (CO2) 24 22 - 29 mmol/L    Anion Gap 11 7 - 15 mmol/L    Urea Nitrogen 9.1 8.0 - 23.0 mg/dL    Creatinine 0.97 0.67 - 1.17 mg/dL    Calcium 9.2 8.8 - 10.2 mg/dL    Glucose 90 70 - 99 mg/dL    GFR Estimate 86 >60 mL/min/1.73m2   CBC with platelets and differential    Collection Time: 12/21/22 11:58 AM   Result Value Ref Range    WBC Count 7.1 4.0 - 11.0 10e3/uL    RBC Count 4.81 4.40 - 5.90 10e6/uL    Hemoglobin 14.0 13.3 - 17.7 g/dL    Hematocrit 42.8 40.0 - 53.0 %    MCV 89 78 - 100 fL    MCH 29.1 26.5 - 33.0 pg    MCHC 32.7 31.5 - 36.5 g/dL    RDW 13.7 10.0 - 15.0 %    Platelet Count 211 150 - 450 10e3/uL    % Neutrophils 68 %    % Lymphocytes 17 %    % Monocytes 12 %    % Eosinophils 3 %    % Basophils 1 %    % Immature Granulocytes 0 %    Absolute Neutrophils 4.8 1.6 - 8.3 10e3/uL    Absolute Lymphocytes 1.2 0.8 - 5.3 10e3/uL    Absolute Monocytes 0.8 0.0 - 1.3 10e3/uL    Absolute Eosinophils 0.2 0.0 - 0.7 10e3/uL    Absolute Basophils 0.0 0.0 - 0.2 10e3/uL    Absolute Immature Granulocytes 0.0 <=0.4 10e3/uL          Revised Cardiac Risk Index (RCRI):  The patient has the following serious cardiovascular risks for perioperative complications:   - Coronary Artery Disease (MI, positive stress test, angina, Qs on EKG) = 1 point     RCRI Interpretation: 1 point: Class II (low risk - 0.9% complication rate)           Signed Electronically by: BRIAN OHARA MD  Copy of this evaluation report is provided to  requesting physician.

## 2022-12-22 LAB
ATRIAL RATE - MUSE: 63 BPM
DIASTOLIC BLOOD PRESSURE - MUSE: NORMAL MMHG
INTERPRETATION ECG - MUSE: NORMAL
P AXIS - MUSE: 58 DEGREES
PR INTERVAL - MUSE: 146 MS
QRS DURATION - MUSE: 88 MS
QT - MUSE: 404 MS
QTC - MUSE: 413 MS
R AXIS - MUSE: 34 DEGREES
SYSTOLIC BLOOD PRESSURE - MUSE: NORMAL MMHG
T AXIS - MUSE: 65 DEGREES
VENTRICULAR RATE- MUSE: 63 BPM

## 2022-12-23 ENCOUNTER — TELEPHONE (OUTPATIENT)
Dept: FAMILY MEDICINE | Facility: CLINIC | Age: 67
End: 2022-12-23

## 2022-12-23 ENCOUNTER — TELEPHONE (OUTPATIENT)
Dept: CARDIOLOGY | Facility: CLINIC | Age: 67
End: 2022-12-23

## 2022-12-23 DIAGNOSIS — I25.10 CORONARY ARTERY DISEASE INVOLVING NATIVE CORONARY ARTERY OF NATIVE HEART WITHOUT ANGINA PECTORIS: Primary | ICD-10-CM

## 2022-12-23 DIAGNOSIS — I35.0 NONRHEUMATIC AORTIC VALVE STENOSIS: ICD-10-CM

## 2022-12-23 ASSESSMENT — ENCOUNTER SYMPTOMS
POLYDIPSIA: 0
BRUISES/BLEEDS EASILY: 0
UNEXPECTED WEIGHT CHANGE: 0
COUGH: 0
NEUROLOGICAL NEGATIVE: 1
ARTHRALGIAS: 1
ALLERGIC/IMMUNOLOGIC NEGATIVE: 1
CHILLS: 0
SHORTNESS OF BREATH: 0
FEVER: 0
PSYCHIATRIC NEGATIVE: 1
ACTIVITY CHANGE: 0
ABDOMINAL PAIN: 0

## 2022-12-23 NOTE — TELEPHONE ENCOUNTER
Message from patient's spouse: she wanted to update Dr. Galicia that patient is scheduled for a left carotid endarterectomy on 12/27/2022 with Dr. Aponte.    Per Dr. Aponte's note 12/2/2022: Left carotid endarterectomy will be scheduled at [facility] in January 2023.  He may continue his aspirin uninterrupted.   He is stable from a coronary standpoint and his Plavix has been discontinued.  He does have issues with iron deficiency anemia from an as yet unidentified occult GI blood loss.    Patient does not have return orders with cardiology.    Will message Dr. Galicia with update

## 2022-12-23 NOTE — TELEPHONE ENCOUNTER
General Call    Contacts       Type Contact Phone/Fax    12/23/2022 09:14 AM CST Phone (Incoming) [facility] (Other) [phone]        Reason for Call:  Pre op phy 12/21/22 visit need to be sign.    What are your questions or concerns: [facility] call. Patient have surgery on 12/27/22,  pre op phy visit on 12/21/22 is not sign yet.     Date of last appointment with provider: 12/21/2022    Could we send this information to you in [employer] or would you prefer to receive a phone call?:   Patient would prefer a phone call   Okay to leave a detailed message?: No at Cell number on file:    Telephone Information:   Mobile [phone]

## 2022-12-27 ENCOUNTER — ANESTHESIA (OUTPATIENT)
Dept: SURGERY | Facility: CLINIC | Age: 67
DRG: 039 | End: 2022-12-27
Payer: COMMERCIAL

## 2022-12-27 ENCOUNTER — ANESTHESIA EVENT (OUTPATIENT)
Dept: SURGERY | Facility: CLINIC | Age: 67
DRG: 039 | End: 2022-12-27
Payer: COMMERCIAL

## 2022-12-27 ENCOUNTER — HOSPITAL ENCOUNTER (INPATIENT)
Facility: CLINIC | Age: 67
LOS: 1 days | Discharge: HOME | DRG: 039 | End: 2022-12-28
Attending: SURGERY | Admitting: SURGERY
Payer: COMMERCIAL

## 2022-12-27 ENCOUNTER — APPOINTMENT (OUTPATIENT)
Dept: SURGERY | Facility: PHYSICIAN GROUP | Age: 67
End: 2022-12-27
Payer: COMMERCIAL

## 2022-12-27 DIAGNOSIS — Z98.890 POST-OPERATIVE STATE: Primary | ICD-10-CM

## 2022-12-27 DIAGNOSIS — D50.0 IRON DEFICIENCY ANEMIA DUE TO CHRONIC BLOOD LOSS: ICD-10-CM

## 2022-12-27 LAB
ABO/RH(D): NORMAL
ANION GAP SERPL CALCULATED.3IONS-SCNC: 7 MMOL/L (ref 3–14)
ANTIBODY SCREEN: NEGATIVE
BUN SERPL-MCNC: 18 MG/DL (ref 7–30)
CALCIUM SERPL-MCNC: 8.8 MG/DL (ref 8.5–10.1)
CHLORIDE BLD-SCNC: 108 MMOL/L (ref 94–109)
CHOLEST SERPL-MCNC: 149 MG/DL
CO2 SERPL-SCNC: 23 MMOL/L (ref 20–32)
CREAT SERPL-MCNC: 0.9 MG/DL (ref 0.66–1.25)
GFR SERPL CREATININE-BSD FRML MDRD: >90 ML/MIN/1.73M2
GLUCOSE BLD-MCNC: 100 MG/DL (ref 70–99)
HBA1C MFR BLD: 5.5 % (ref 0–5.6)
HDLC SERPL-MCNC: 69 MG/DL
LDLC SERPL CALC-MCNC: 69 MG/DL
NONHDLC SERPL-MCNC: 80 MG/DL
POTASSIUM BLD-SCNC: 3.8 MMOL/L (ref 3.4–5.3)
SODIUM SERPL-SCNC: 138 MMOL/L (ref 133–144)
SPECIMEN EXPIRATION DATE: NORMAL
TRIGL SERPL-MCNC: 55 MG/DL

## 2022-12-27 PROCEDURE — 250N000009 HC RX 250: Performed by: ANESTHESIOLOGY

## 2022-12-27 PROCEDURE — 4A10X4G MONITORING OF CENTRAL NERVOUS ELECTRICAL ACTIVITY, INTRAOPERATIVE, EXTERNAL APPROACH: ICD-10-PCS | Performed by: SURGERY

## 2022-12-27 PROCEDURE — 250N000011 HC RX IP 250 OP 636: Performed by: NURSE ANESTHETIST, CERTIFIED REGISTERED

## 2022-12-27 PROCEDURE — 36415 COLL VENOUS BLD VENIPUNCTURE: CPT | Performed by: SURGERY

## 2022-12-27 PROCEDURE — 250N000013 HC RX MED GY IP 250 OP 250 PS 637: Performed by: SURGERY

## 2022-12-27 PROCEDURE — 258N000003 HC RX IP 258 OP 636: Performed by: ANESTHESIOLOGY

## 2022-12-27 PROCEDURE — 272N000001 HC OR GENERAL SUPPLY STERILE: Performed by: SURGERY

## 2022-12-27 PROCEDURE — 250N000011 HC RX IP 250 OP 636: Performed by: ANESTHESIOLOGY

## 2022-12-27 PROCEDURE — 82728 ASSAY OF FERRITIN: CPT

## 2022-12-27 PROCEDURE — 250N000011 HC RX IP 250 OP 636: Performed by: SURGERY

## 2022-12-27 PROCEDURE — 80048 BASIC METABOLIC PNL TOTAL CA: CPT | Performed by: SURGERY

## 2022-12-27 PROCEDURE — 360N000077 HC SURGERY LEVEL 4, PER MIN: Performed by: SURGERY

## 2022-12-27 PROCEDURE — 250N000013 HC RX MED GY IP 250 OP 250 PS 637: Performed by: STUDENT IN AN ORGANIZED HEALTH CARE EDUCATION/TRAINING PROGRAM

## 2022-12-27 PROCEDURE — 93010 ELECTROCARDIOGRAM REPORT: CPT | Performed by: INTERNAL MEDICINE

## 2022-12-27 PROCEDURE — 03CL0ZZ EXTIRPATION OF MATTER FROM LEFT INTERNAL CAROTID ARTERY, OPEN APPROACH: ICD-10-PCS | Performed by: SURGERY

## 2022-12-27 PROCEDURE — 272N000282 HC OR IOM SUPPLIES OPNP: Performed by: SURGERY

## 2022-12-27 PROCEDURE — 86901 BLOOD TYPING SEROLOGIC RH(D): CPT | Performed by: SURGERY

## 2022-12-27 PROCEDURE — 250N000009 HC RX 250: Performed by: NURSE ANESTHETIST, CERTIFIED REGISTERED

## 2022-12-27 PROCEDURE — 03UL0KZ SUPPLEMENT LEFT INTERNAL CAROTID ARTERY WITH NONAUTOLOGOUS TISSUE SUBSTITUTE, OPEN APPROACH: ICD-10-PCS | Performed by: SURGERY

## 2022-12-27 PROCEDURE — 35301 RECHANNELING OF ARTERY: CPT | Mod: LT | Performed by: SURGERY

## 2022-12-27 PROCEDURE — 922N000001 HC NEURO MONITORING SERVICE, UP TO 7 HOURS (T1FEE): Performed by: SURGERY

## 2022-12-27 PROCEDURE — 120N000013 HC R&B IMCU

## 2022-12-27 PROCEDURE — 86850 RBC ANTIBODY SCREEN: CPT | Performed by: SURGERY

## 2022-12-27 PROCEDURE — 999N000141 HC STATISTIC PRE-PROCEDURE NURSING ASSESSMENT: Performed by: SURGERY

## 2022-12-27 PROCEDURE — 83036 HEMOGLOBIN GLYCOSYLATED A1C: CPT | Performed by: SURGERY

## 2022-12-27 PROCEDURE — 258N000003 HC RX IP 258 OP 636: Performed by: NURSE ANESTHETIST, CERTIFIED REGISTERED

## 2022-12-27 PROCEDURE — 250N000025 HC SEVOFLURANE, PER MIN: Performed by: SURGERY

## 2022-12-27 PROCEDURE — 03CN0ZZ EXTIRPATION OF MATTER FROM LEFT EXTERNAL CAROTID ARTERY, OPEN APPROACH: ICD-10-PCS | Performed by: SURGERY

## 2022-12-27 PROCEDURE — 93005 ELECTROCARDIOGRAM TRACING: CPT

## 2022-12-27 PROCEDURE — 370N000017 HC ANESTHESIA TECHNICAL FEE, PER MIN: Performed by: SURGERY

## 2022-12-27 PROCEDURE — 710N000010 HC RECOVERY PHASE 1, LEVEL 2, PER MIN: Performed by: SURGERY

## 2022-12-27 PROCEDURE — 258N000003 HC RX IP 258 OP 636: Performed by: SURGERY

## 2022-12-27 PROCEDURE — C1763 CONN TISS, NON-HUMAN: HCPCS | Performed by: SURGERY

## 2022-12-27 PROCEDURE — 80061 LIPID PANEL: CPT | Performed by: SURGERY

## 2022-12-27 DEVICE — DECELLULARIZED BOVINE PERICARDIUM
Type: IMPLANTABLE DEVICE | Site: NECK | Status: FUNCTIONAL
Brand: PHOTOFIX DECELLULARIZED BOVINE PERICARDIUM

## 2022-12-27 RX ORDER — NALOXONE HYDROCHLORIDE 0.4 MG/ML
0.4 INJECTION, SOLUTION INTRAMUSCULAR; INTRAVENOUS; SUBCUTANEOUS
Status: DISCONTINUED | OUTPATIENT
Start: 2022-12-27 | End: 2022-12-28 | Stop reason: HOSPADM

## 2022-12-27 RX ORDER — SODIUM CHLORIDE, SODIUM LACTATE, POTASSIUM CHLORIDE, CALCIUM CHLORIDE 600; 310; 30; 20 MG/100ML; MG/100ML; MG/100ML; MG/100ML
INJECTION, SOLUTION INTRAVENOUS CONTINUOUS
Status: DISCONTINUED | OUTPATIENT
Start: 2022-12-27 | End: 2022-12-27 | Stop reason: HOSPADM

## 2022-12-27 RX ORDER — PROTAMINE SULFATE 10 MG/ML
INJECTION, SOLUTION INTRAVENOUS PRN
Status: DISCONTINUED | OUTPATIENT
Start: 2022-12-27 | End: 2022-12-27

## 2022-12-27 RX ORDER — ONDANSETRON 2 MG/ML
INJECTION INTRAMUSCULAR; INTRAVENOUS PRN
Status: DISCONTINUED | OUTPATIENT
Start: 2022-12-27 | End: 2022-12-27

## 2022-12-27 RX ORDER — BISACODYL 10 MG
10 SUPPOSITORY, RECTAL RECTAL DAILY PRN
Status: DISCONTINUED | OUTPATIENT
Start: 2022-12-27 | End: 2022-12-28 | Stop reason: HOSPADM

## 2022-12-27 RX ORDER — OXYCODONE HYDROCHLORIDE 5 MG/1
5 TABLET ORAL EVERY 4 HOURS PRN
Status: DISCONTINUED | OUTPATIENT
Start: 2022-12-27 | End: 2022-12-28 | Stop reason: HOSPADM

## 2022-12-27 RX ORDER — ASPIRIN 81 MG/1
81 TABLET ORAL DAILY
Status: DISCONTINUED | OUTPATIENT
Start: 2022-12-28 | End: 2022-12-28 | Stop reason: HOSPADM

## 2022-12-27 RX ORDER — LIDOCAINE 40 MG/G
CREAM TOPICAL
Status: DISCONTINUED | OUTPATIENT
Start: 2022-12-27 | End: 2022-12-28 | Stop reason: HOSPADM

## 2022-12-27 RX ORDER — LABETALOL HYDROCHLORIDE 5 MG/ML
10 INJECTION, SOLUTION INTRAVENOUS EVERY 10 MIN PRN
Status: DISCONTINUED | OUTPATIENT
Start: 2022-12-27 | End: 2022-12-28 | Stop reason: HOSPADM

## 2022-12-27 RX ORDER — ASPIRIN 81 MG/1
81 TABLET, CHEWABLE ORAL
Status: COMPLETED | OUTPATIENT
Start: 2022-12-27 | End: 2022-12-27

## 2022-12-27 RX ORDER — ONDANSETRON 4 MG/1
4 TABLET, ORALLY DISINTEGRATING ORAL EVERY 6 HOURS PRN
Status: DISCONTINUED | OUTPATIENT
Start: 2022-12-27 | End: 2022-12-28 | Stop reason: HOSPADM

## 2022-12-27 RX ORDER — EPHEDRINE SULFATE 50 MG/ML
INJECTION, SOLUTION INTRAMUSCULAR; INTRAVENOUS; SUBCUTANEOUS PRN
Status: DISCONTINUED | OUTPATIENT
Start: 2022-12-27 | End: 2022-12-27

## 2022-12-27 RX ORDER — AMOXICILLIN 250 MG
1 CAPSULE ORAL 2 TIMES DAILY
Status: DISCONTINUED | OUTPATIENT
Start: 2022-12-27 | End: 2022-12-28 | Stop reason: HOSPADM

## 2022-12-27 RX ORDER — POLYETHYLENE GLYCOL 3350 17 G/17G
17 POWDER, FOR SOLUTION ORAL DAILY
Status: DISCONTINUED | OUTPATIENT
Start: 2022-12-28 | End: 2022-12-28 | Stop reason: HOSPADM

## 2022-12-27 RX ORDER — FENTANYL CITRATE 0.05 MG/ML
50 INJECTION, SOLUTION INTRAMUSCULAR; INTRAVENOUS EVERY 5 MIN PRN
Status: DISCONTINUED | OUTPATIENT
Start: 2022-12-27 | End: 2022-12-27 | Stop reason: HOSPADM

## 2022-12-27 RX ORDER — NALOXONE HYDROCHLORIDE 0.4 MG/ML
0.2 INJECTION, SOLUTION INTRAMUSCULAR; INTRAVENOUS; SUBCUTANEOUS
Status: DISCONTINUED | OUTPATIENT
Start: 2022-12-27 | End: 2022-12-28 | Stop reason: HOSPADM

## 2022-12-27 RX ORDER — ASPIRIN 300 MG/1
600 SUPPOSITORY RECTAL ONCE
Status: COMPLETED | OUTPATIENT
Start: 2022-12-27 | End: 2022-12-27

## 2022-12-27 RX ORDER — ACETAMINOPHEN 325 MG/1
650 TABLET ORAL EVERY 4 HOURS PRN
Status: DISCONTINUED | OUTPATIENT
Start: 2022-12-27 | End: 2022-12-27 | Stop reason: HOSPADM

## 2022-12-27 RX ORDER — ATORVASTATIN CALCIUM 80 MG/1
80 TABLET, FILM COATED ORAL DAILY
Status: DISCONTINUED | OUTPATIENT
Start: 2022-12-28 | End: 2022-12-28 | Stop reason: HOSPADM

## 2022-12-27 RX ORDER — HYDROMORPHONE HCL IN WATER/PF 6 MG/30 ML
0.4 PATIENT CONTROLLED ANALGESIA SYRINGE INTRAVENOUS EVERY 5 MIN PRN
Status: DISCONTINUED | OUTPATIENT
Start: 2022-12-27 | End: 2022-12-27 | Stop reason: HOSPADM

## 2022-12-27 RX ORDER — OXYCODONE HYDROCHLORIDE 5 MG/1
10 TABLET ORAL EVERY 4 HOURS PRN
Status: DISCONTINUED | OUTPATIENT
Start: 2022-12-27 | End: 2022-12-28 | Stop reason: HOSPADM

## 2022-12-27 RX ORDER — ACETAMINOPHEN 325 MG/1
975 TABLET ORAL EVERY 8 HOURS
Status: DISCONTINUED | OUTPATIENT
Start: 2022-12-27 | End: 2022-12-28 | Stop reason: HOSPADM

## 2022-12-27 RX ORDER — ONDANSETRON 2 MG/ML
4 INJECTION INTRAMUSCULAR; INTRAVENOUS EVERY 30 MIN PRN
Status: DISCONTINUED | OUTPATIENT
Start: 2022-12-27 | End: 2022-12-27 | Stop reason: HOSPADM

## 2022-12-27 RX ORDER — GLYCOPYRROLATE 0.2 MG/ML
INJECTION, SOLUTION INTRAMUSCULAR; INTRAVENOUS PRN
Status: DISCONTINUED | OUTPATIENT
Start: 2022-12-27 | End: 2022-12-27

## 2022-12-27 RX ORDER — HEPARIN SODIUM 1000 [USP'U]/ML
INJECTION, SOLUTION INTRAVENOUS; SUBCUTANEOUS PRN
Status: DISCONTINUED | OUTPATIENT
Start: 2022-12-27 | End: 2022-12-27

## 2022-12-27 RX ORDER — PROCHLORPERAZINE MALEATE 5 MG
5 TABLET ORAL EVERY 6 HOURS PRN
Status: DISCONTINUED | OUTPATIENT
Start: 2022-12-27 | End: 2022-12-28 | Stop reason: HOSPADM

## 2022-12-27 RX ORDER — ONDANSETRON 4 MG/1
4 TABLET, ORALLY DISINTEGRATING ORAL EVERY 30 MIN PRN
Status: DISCONTINUED | OUTPATIENT
Start: 2022-12-27 | End: 2022-12-27 | Stop reason: HOSPADM

## 2022-12-27 RX ORDER — ACETAMINOPHEN 650 MG/1
650 SUPPOSITORY RECTAL EVERY 4 HOURS PRN
Status: DISCONTINUED | OUTPATIENT
Start: 2022-12-27 | End: 2022-12-27 | Stop reason: HOSPADM

## 2022-12-27 RX ORDER — DEXAMETHASONE SODIUM PHOSPHATE 4 MG/ML
INJECTION, SOLUTION INTRA-ARTICULAR; INTRALESIONAL; INTRAMUSCULAR; INTRAVENOUS; SOFT TISSUE PRN
Status: DISCONTINUED | OUTPATIENT
Start: 2022-12-27 | End: 2022-12-27

## 2022-12-27 RX ORDER — ASPIRIN 81 MG/1
162 TABLET, CHEWABLE ORAL
Status: COMPLETED | OUTPATIENT
Start: 2022-12-27 | End: 2022-12-27

## 2022-12-27 RX ORDER — CEFAZOLIN SODIUM/WATER 2 G/20 ML
2 SYRINGE (ML) INTRAVENOUS
Status: COMPLETED | OUTPATIENT
Start: 2022-12-27 | End: 2022-12-27

## 2022-12-27 RX ORDER — FENTANYL CITRATE 0.05 MG/ML
25 INJECTION, SOLUTION INTRAMUSCULAR; INTRAVENOUS EVERY 5 MIN PRN
Status: DISCONTINUED | OUTPATIENT
Start: 2022-12-27 | End: 2022-12-27 | Stop reason: HOSPADM

## 2022-12-27 RX ORDER — HYDROMORPHONE HCL IN WATER/PF 6 MG/30 ML
0.2 PATIENT CONTROLLED ANALGESIA SYRINGE INTRAVENOUS EVERY 5 MIN PRN
Status: DISCONTINUED | OUTPATIENT
Start: 2022-12-27 | End: 2022-12-27 | Stop reason: HOSPADM

## 2022-12-27 RX ORDER — GABAPENTIN 600 MG/1
600 TABLET ORAL 2 TIMES DAILY
Status: DISCONTINUED | OUTPATIENT
Start: 2022-12-27 | End: 2022-12-28 | Stop reason: HOSPADM

## 2022-12-27 RX ORDER — PROPOFOL 10 MG/ML
INJECTION, EMULSION INTRAVENOUS PRN
Status: DISCONTINUED | OUTPATIENT
Start: 2022-12-27 | End: 2022-12-27

## 2022-12-27 RX ORDER — ONDANSETRON 2 MG/ML
4 INJECTION INTRAMUSCULAR; INTRAVENOUS EVERY 6 HOURS PRN
Status: DISCONTINUED | OUTPATIENT
Start: 2022-12-27 | End: 2022-12-28 | Stop reason: HOSPADM

## 2022-12-27 RX ORDER — FENTANYL CITRATE 50 UG/ML
INJECTION, SOLUTION INTRAMUSCULAR; INTRAVENOUS PRN
Status: DISCONTINUED | OUTPATIENT
Start: 2022-12-27 | End: 2022-12-27

## 2022-12-27 RX ORDER — ACETAMINOPHEN 325 MG/1
650 TABLET ORAL EVERY 4 HOURS PRN
Status: DISCONTINUED | OUTPATIENT
Start: 2022-12-30 | End: 2022-12-28 | Stop reason: HOSPADM

## 2022-12-27 RX ADMIN — PHENYLEPHRINE HYDROCHLORIDE 0.3 MCG/KG/MIN: 10 INJECTION INTRAVENOUS at 11:39

## 2022-12-27 RX ADMIN — ROCURONIUM BROMIDE 10 MG: 50 INJECTION, SOLUTION INTRAVENOUS at 13:59

## 2022-12-27 RX ADMIN — PROPOFOL 30 MG: 10 INJECTION, EMULSION INTRAVENOUS at 12:11

## 2022-12-27 RX ADMIN — PHENYLEPHRINE HYDROCHLORIDE 100 MCG: 10 INJECTION INTRAVENOUS at 13:59

## 2022-12-27 RX ADMIN — SODIUM CHLORIDE, POTASSIUM CHLORIDE, SODIUM LACTATE AND CALCIUM CHLORIDE: 600; 310; 30; 20 INJECTION, SOLUTION INTRAVENOUS at 12:18

## 2022-12-27 RX ADMIN — GLYCOPYRROLATE 0.2 MG: 0.2 INJECTION, SOLUTION INTRAMUSCULAR; INTRAVENOUS at 12:06

## 2022-12-27 RX ADMIN — PHENYLEPHRINE HYDROCHLORIDE 100 MCG: 10 INJECTION INTRAVENOUS at 15:13

## 2022-12-27 RX ADMIN — PHENYLEPHRINE HYDROCHLORIDE 50 MCG: 10 INJECTION INTRAVENOUS at 13:21

## 2022-12-27 RX ADMIN — PHENYLEPHRINE HYDROCHLORIDE 50 MCG: 10 INJECTION INTRAVENOUS at 12:06

## 2022-12-27 RX ADMIN — PHENYLEPHRINE HYDROCHLORIDE 100 MCG: 10 INJECTION INTRAVENOUS at 13:45

## 2022-12-27 RX ADMIN — SODIUM CHLORIDE, POTASSIUM CHLORIDE, SODIUM LACTATE AND CALCIUM CHLORIDE: 600; 310; 30; 20 INJECTION, SOLUTION INTRAVENOUS at 15:19

## 2022-12-27 RX ADMIN — PHENYLEPHRINE HYDROCHLORIDE 50 MCG: 10 INJECTION INTRAVENOUS at 11:50

## 2022-12-27 RX ADMIN — DEXAMETHASONE SODIUM PHOSPHATE 4 MG: 4 INJECTION, SOLUTION INTRA-ARTICULAR; INTRALESIONAL; INTRAMUSCULAR; INTRAVENOUS; SOFT TISSUE at 11:15

## 2022-12-27 RX ADMIN — FENTANYL CITRATE 50 MCG: 50 INJECTION, SOLUTION INTRAMUSCULAR; INTRAVENOUS at 11:20

## 2022-12-27 RX ADMIN — PROTAMINE SULFATE 25 MG: 10 INJECTION, SOLUTION INTRAVENOUS at 15:13

## 2022-12-27 RX ADMIN — ROCURONIUM BROMIDE 20 MG: 50 INJECTION, SOLUTION INTRAVENOUS at 13:22

## 2022-12-27 RX ADMIN — Medication 10 MG: at 12:07

## 2022-12-27 RX ADMIN — PHENYLEPHRINE HYDROCHLORIDE 50 MCG: 10 INJECTION INTRAVENOUS at 11:47

## 2022-12-27 RX ADMIN — PHENYLEPHRINE HYDROCHLORIDE 50 MCG: 10 INJECTION INTRAVENOUS at 13:44

## 2022-12-27 RX ADMIN — ASPIRIN 600 MG: 300 SUPPOSITORY RECTAL at 16:16

## 2022-12-27 RX ADMIN — HEPARIN SODIUM 6000 UNITS: 1000 INJECTION INTRAVENOUS; SUBCUTANEOUS at 13:18

## 2022-12-27 RX ADMIN — ASPIRIN 81 MG CHEWABLE TABLET 162 MG: 81 TABLET CHEWABLE at 09:24

## 2022-12-27 RX ADMIN — PHENYLEPHRINE HYDROCHLORIDE 100 MCG: 10 INJECTION INTRAVENOUS at 13:52

## 2022-12-27 RX ADMIN — SUGAMMADEX 200 MG: 100 INJECTION, SOLUTION INTRAVENOUS at 15:38

## 2022-12-27 RX ADMIN — HEPARIN SODIUM 2000 UNITS: 1000 INJECTION INTRAVENOUS; SUBCUTANEOUS at 14:20

## 2022-12-27 RX ADMIN — Medication 2 G: at 11:27

## 2022-12-27 RX ADMIN — PHENYLEPHRINE HYDROCHLORIDE 100 MCG: 10 INJECTION INTRAVENOUS at 15:02

## 2022-12-27 RX ADMIN — Medication 2 G: at 15:30

## 2022-12-27 RX ADMIN — SUCCINYLCHOLINE CHLORIDE 100 MG: 20 INJECTION, SOLUTION INTRAMUSCULAR; INTRAVENOUS; PARENTERAL at 11:31

## 2022-12-27 RX ADMIN — FENTANYL CITRATE 100 MCG: 50 INJECTION, SOLUTION INTRAMUSCULAR; INTRAVENOUS at 12:02

## 2022-12-27 RX ADMIN — ROCURONIUM BROMIDE 50 MG: 50 INJECTION, SOLUTION INTRAVENOUS at 11:42

## 2022-12-27 RX ADMIN — ACETAMINOPHEN 975 MG: 325 TABLET, FILM COATED ORAL at 17:45

## 2022-12-27 RX ADMIN — PHENYLEPHRINE HYDROCHLORIDE 50 MCG: 10 INJECTION INTRAVENOUS at 13:09

## 2022-12-27 RX ADMIN — PHENYLEPHRINE HYDROCHLORIDE 50 MCG: 10 INJECTION INTRAVENOUS at 13:20

## 2022-12-27 RX ADMIN — MIDAZOLAM 2 MG: 1 INJECTION INTRAMUSCULAR; INTRAVENOUS at 11:11

## 2022-12-27 RX ADMIN — PHENYLEPHRINE HYDROCHLORIDE 100 MCG: 10 INJECTION INTRAVENOUS at 11:33

## 2022-12-27 RX ADMIN — DEXMEDETOMIDINE HYDROCHLORIDE 12 MCG: 100 INJECTION, SOLUTION INTRAVENOUS at 13:38

## 2022-12-27 RX ADMIN — PHENYLEPHRINE HYDROCHLORIDE 100 MCG: 10 INJECTION INTRAVENOUS at 13:50

## 2022-12-27 RX ADMIN — PHENYLEPHRINE HYDROCHLORIDE 100 MCG: 10 INJECTION INTRAVENOUS at 13:28

## 2022-12-27 RX ADMIN — PROPOFOL 150 MG: 10 INJECTION, EMULSION INTRAVENOUS at 11:31

## 2022-12-27 RX ADMIN — PROPOFOL 50 MG: 10 INJECTION, EMULSION INTRAVENOUS at 11:35

## 2022-12-27 RX ADMIN — PROPOFOL 50 MG: 10 INJECTION, EMULSION INTRAVENOUS at 15:37

## 2022-12-27 RX ADMIN — SODIUM CHLORIDE, POTASSIUM CHLORIDE, SODIUM LACTATE AND CALCIUM CHLORIDE: 600; 310; 30; 20 INJECTION, SOLUTION INTRAVENOUS at 09:34

## 2022-12-27 RX ADMIN — DEXMEDETOMIDINE HYDROCHLORIDE 8 MCG: 100 INJECTION, SOLUTION INTRAVENOUS at 14:55

## 2022-12-27 RX ADMIN — PROPOFOL 20 MG: 10 INJECTION, EMULSION INTRAVENOUS at 12:12

## 2022-12-27 RX ADMIN — PHENYLEPHRINE HYDROCHLORIDE 100 MCG: 10 INJECTION INTRAVENOUS at 15:12

## 2022-12-27 RX ADMIN — FENTANYL CITRATE 50 MCG: 50 INJECTION, SOLUTION INTRAMUSCULAR; INTRAVENOUS at 11:28

## 2022-12-27 RX ADMIN — PHENYLEPHRINE HYDROCHLORIDE 100 MCG: 10 INJECTION INTRAVENOUS at 15:05

## 2022-12-27 RX ADMIN — ONDANSETRON 4 MG: 2 INJECTION INTRAMUSCULAR; INTRAVENOUS at 11:15

## 2022-12-27 RX ADMIN — ROCURONIUM BROMIDE 20 MG: 50 INJECTION, SOLUTION INTRAVENOUS at 12:54

## 2022-12-27 RX ADMIN — PHENYLEPHRINE HYDROCHLORIDE 100 MCG: 10 INJECTION INTRAVENOUS at 15:49

## 2022-12-27 ASSESSMENT — ACTIVITIES OF DAILY LIVING (ADL)
ADLS_ACUITY_SCORE: 18
ADLS_ACUITY_SCORE: 33
ADLS_ACUITY_SCORE: 18

## 2022-12-27 ASSESSMENT — ENCOUNTER SYMPTOMS: SEIZURES: 0

## 2022-12-27 ASSESSMENT — LIFESTYLE VARIABLES: TOBACCO_USE: 1

## 2022-12-27 NOTE — ANESTHESIA CARE TRANSFER NOTE
Patient: Johan Navarro    Procedure: Procedure(s):  LEFT CAROTID ENDARTERECTOMY WITH BOVINE PERICARDIUM PATCH ANGIOPLASTY, AND INTRAOPERATIVE ELECTROENCEPHALOGRAM MONITORING       Diagnosis: Left carotid artery stenosis [I65.22]  Diagnosis Additional Information: No value filed.    Anesthesia Type:   General     Note:    Oropharynx: oropharynx clear of all foreign objects  Level of Consciousness: awake  Oxygen Supplementation: face mask    Independent Airway: airway patency satisfactory and stable  Dentition: dentition unchanged  Vital Signs Stable: post-procedure vital signs reviewed and stable  Report to RN Given: handoff report given  Patient transferred to: PACU    Handoff Report: Identifed the Patient, Identified the Reponsible Provider, Reviewed the pertinent medical history, Discussed the surgical course, Reviewed Intra-OP anesthesia mangement and issues during anesthesia, Set expectations for post-procedure period and Allowed opportunity for questions and acknowledgement of understanding      Vitals:  Vitals Value Taken Time   BP     Temp     Pulse     Resp     SpO2         Electronically Signed By: ANGELINE Smith CRNA  December 27, 2022  3:49 PM

## 2022-12-27 NOTE — ANESTHESIA PROCEDURE NOTES
Arterial Line Procedure Note    Pre-Procedure   Staff -        Anesthesiologist:  Nathan Rowley MD       Performed By: Anesthesiologist       Location: pre-op       Pre-Anesthestic Checklist: patient identified, IV checked, site marked, risks and benefits discussed, informed consent, monitors and equipment checked, pre-op evaluation and at physician/surgeon's request  Timeout:       Correct Patient: Yes        Correct Procedure: Yes        Correct Site: Yes        Correct Position: Yes   Line Placement:   This line was placed Post Induction  Procedure   Procedure: arterial line       Laterality: right       Insertion Site: radial.  Sterile Prep        All elements of maximal sterile barrier technique followed       Patient Prep/Sterile Barriers: hand hygiene, sterile gloves, hat, mask, draped, sterile gown, draped       Skin prep: Chloraprep  Insertion/Injection        Technique: Seldinger Technique        Catheter Type/Size: 20 gauge, 1.75 in/4.5 cm quick cath (integral wire)  Narrative        Tegaderm dressing used.       Complications: None apparent,        Arterial waveform: Yes        IBP within 10% of NIBP: Yes

## 2022-12-27 NOTE — ANESTHESIA PROCEDURE NOTES
Airway       Patient location during procedure: OR       Procedure Start/Stop Times: 12/27/2022 11:35 AM  Staff -        Anesthesiologist:  Nathan Rowley MD       CRNA: Marika Mulligan APRN CRNA       Performed By: anesthesiologist  Consent for Airway        Urgency: elective  Indications and Patient Condition       Indications for airway management: reddy-procedural       Induction type:intravenous       Mask difficulty assessment: 0 - not attempted    Final Airway Details       Final airway type: endotracheal airway       Successful airway: ETT - single  Endotracheal Airway Details        ETT size (mm): 8.0       Cuffed: yes       Cuff volume (mL): 10       Successful intubation technique: video laryngoscopy       VL Blade Size: Glidescope 4       Grade View of Cords: 1       Adjucts: stylet       Position: Right       Measured from: lips       Secured at (cm): 23       Bite block used: None    Post intubation assessment        Placement verified by: capnometry, equal breath sounds and chest rise        Number of attempts at approach: 3 (1st attempt with Lizarraga 2 per NS, obtained full view of cords, cricoid pressure reduced view and esophagus intubated. DL per EE with Lizarraga 2, and then intuateed with GS #4 blade)       Secured with: pink tape       Ease of procedure: easy (pt. anterior, but full view of VCs was obtained)       Dentition: Intact and Unchanged    Medication(s) Administered   Medication Administration Time: 12/27/2022 11:35 AM

## 2022-12-27 NOTE — ANESTHESIA POSTPROCEDURE EVALUATION
Patient: Johan Navarro    Procedure: Procedure(s):  LEFT CAROTID ENDARTERECTOMY WITH BOVINE PERICARDIUM PATCH ANGIOPLASTY, AND INTRAOPERATIVE ELECTROENCEPHALOGRAM MONITORING       Anesthesia Type:  General    Note:  Disposition: Inpatient   Postop Pain Control: Uneventful            Sign Out: Well controlled pain   PONV: No   Neuro/Psych: Uneventful            Sign Out: Acceptable/Baseline neuro status   Airway/Respiratory: Uneventful            Sign Out: Acceptable/Baseline resp. status   CV/Hemodynamics: Uneventful            Sign Out: Acceptable CV status; No obvious hypovolemia; No obvious fluid overload   Other NRE: NONE   DID A NON-ROUTINE EVENT OCCUR? No           Last vitals:  Vitals Value Taken Time   BP 96/61 12/27/22 1620   Temp 36.9  C (98.5  F) 12/27/22 1600   Pulse 75 12/27/22 1626   Resp 14 12/27/22 1626   SpO2 94 % 12/27/22 1626   Vitals shown include unvalidated device data.    Electronically Signed By: Nathan Rowley MD  December 27, 2022  4:27 PM

## 2022-12-27 NOTE — ANESTHESIA PREPROCEDURE EVALUATION
Anesthesia Pre-Procedure Evaluation    Patient: Johan Navarro   MRN: [id number] : 1955        Procedure : Procedure(s):  LEFT CAROTID ENDARTERECTOMY WITH ELECTROENCEPHALOGRAM          Past Medical History:   Diagnosis Date     Aortic stenosis      Arthritis     hands     CAD (coronary artery disease)     cardiac cath 1998: stents x 2 to circumflex     Carotid stenosis     left     Family history of early CAD      Hyperlipidaemia LDL goal < 70     familial hyperlipidemia with marked hypercholesterolemia before treatment; has been on some form of cholesterol-lowering medication since the 1970s     Prostate cancer (H)      Sleep apnea      Syncope      Unstable angina (H) 2021      Past Surgical History:   Procedure Laterality Date     CARDIAC SURGERY      coronary stents x2 placed ; angioplasty     CV HEART CATHETERIZATION WITH POSSIBLE INTERVENTION N/A 2020    Procedure: Heart Catheterization with Possible Intervention;  Surgeon: Alber Bentley MD;  Location:  HEART CARDIAC CATH LAB     CV HEART CATHETERIZATION WITH POSSIBLE INTERVENTION N/A 10/11/2021    Procedure: Heart Catheterization with Possible Intervention;  Surgeon: Patsy Wallace MD;  Location:  HEART CARDIAC CATH LAB     CV INSTANTANEOUS WAVE-FREE RATIO N/A 10/11/2021    Procedure: Instantaneous Wave-Free Ratio;  Surgeon: Patsy Wallace MD;  Location:  HEART CARDIAC CATH LAB     CV LEFT HEART CATH N/A 10/11/2021    Procedure: Left Heart Cath;  Surgeon: Patsy Wallace MD;  Location:  HEART CARDIAC CATH LAB     CV PCI STENT DRUG ELUTING N/A 10/11/2021    Procedure: Percutaneous Coronary Intervention Stent Drug Eluting;  Surgeon: Patsy Wallace MD;  Location:  HEART CARDIAC CATH LAB     DAVINCI PROSTATECTOMY           ESOPHAGOSCOPY, GASTROSCOPY, DUODENOSCOPY (EGD), COMBINED N/A 2021    Procedure: ESOPHAGOGASTRODUODENOSCOPY (EGD);  Surgeon: Rosemarie Laws MD;  Location:  GI     EYE  SURGERY Bilateral 2017    eyelids     GENITOURINARY SURGERY       ORTHOPEDIC SURGERY Right     achilles tendon; post football injury     ORTHOPEDIC SURGERY Right     hand; near thumb. has wires in there. cysts     REPAIR TENDON BICEPS DISTAL UPPER EXTREMITY Right 2018    Procedure: REPAIR TENDON BICEPS DISTAL UPPER EXTREMITY;  Right open biceps tendon repair  ;  Surgeon: Alber Zabala MD;  Location: [facility] OR     SURGICAL HISTORY OF -       Right hand Xanthoma removal     SURGICAL HISTORY OF -       Stress Echo (-)      Allergies   Allergen Reactions     Crestor [Rosuvastatin] GI Disturbance     Dust Mites      Other [No Clinical Screening - See Comments]      Goose feathers     Pollen Extract       Social History     Tobacco Use     Smoking status: Former     Packs/day: 0.25     Years: 20.00     Pack years: 5.00     Types: Cigars, Cigarettes     Quit date: 2000     Years since quittin.0     Smokeless tobacco: Current     Types: Snuff     Tobacco comments:     12/10/18: occ chew, not every day   Substance Use Topics     Alcohol use: Not Currently     Comment: very rare, none for 8 years      Wt Readings from Last 1 Encounters:   22 68.2 kg (150 lb 4.8 oz)        Anesthesia Evaluation            ROS/MED HX  ENT/Pulmonary:     (+) sleep apnea, tobacco use, Past use,     Neurologic:    (-) no seizures and no CVA   Cardiovascular:     (+) Dyslipidemia -Peripheral Vascular Disease-- Carotid Stenosis. CAD -past MI (2021) -stent-. fainting (syncope). valvular problems/murmurs type: AS Previous cardiac testing   Echo: Date: 10/10/21 Results:  Procedure  Complete Portable Echo Adult. Optison (NDC #5583-0607) given intravenously.  ______________________________________________________________________________  Interpretation Summary     1. Left ventricular systolic function is normal. The visual ejection fraction  is 60-65%. No gross regional wall motion abnormalities.  2. The right  ventricular systolic function is normal.  3. Aortic valve is not well-visualized, but is calcified with probable  moderate aortic stenosis. The mean gradient is 13mmHg with a peak velocity of  2.3m/sec, MANDEEP using LVOT diameter of 2.1cm is 1.2cm2.  4. Mild aortic regurgitation.     In comparison to the echo report from 7/15/2020, there is no significant  change. The peak velocity was 29mmHg and mean gradient was 17mmHg at that  time.  Stress Test: Date: Results:    ECG Reviewed: Date: Results:    Cath: Date: Results:      METS/Exercise Tolerance: >4 METS    Hematologic:     (+) anemia,     Musculoskeletal:   (+) arthritis,     GI/Hepatic:    (-) liver disease   Renal/Genitourinary:    (-) renal disease   Endo:    (-) Type II DM, thyroid disease and obesity   Psychiatric/Substance Use:     (+) psychiatric history depression     Infectious Disease:       Malignancy:       Other:            Physical Exam    Airway        Mallampati: II   TM distance: > 3 FB   Neck ROM: full   Mouth opening: > 3 cm    Respiratory Devices and Support         Dental  no notable dental history         Cardiovascular          Rhythm and rate: regular and normal     Pulmonary   pulmonary exam normal                OUTSIDE LABS:  CBC:   Lab Results   Component Value Date    WBC 7.1 12/21/2022    WBC 4.1 08/09/2022    HGB 14.0 12/21/2022    HGB 12.1 (L) 08/09/2022    HCT 42.8 12/21/2022    HCT 39.7 (L) 08/09/2022     12/21/2022     08/09/2022     BMP:   Lab Results   Component Value Date     12/27/2022     12/21/2022    POTASSIUM 3.8 12/27/2022    POTASSIUM 4.9 12/21/2022    CHLORIDE 108 12/27/2022    CHLORIDE 108 (H) 12/21/2022    CO2 23 12/27/2022    CO2 24 12/21/2022    BUN 18 12/27/2022    BUN 9.1 12/21/2022    CR 0.90 12/27/2022    CR 0.97 12/21/2022     (H) 12/27/2022    GLC 90 12/21/2022     COAGS:   Lab Results   Component Value Date    PTT 31 07/21/2020    INR 1.03 01/06/2021     POC: No results found  for: BGM, HCG, HCGS  HEPATIC:   Lab Results   Component Value Date    ALBUMIN 3.6 10/09/2021    PROTTOTAL 6.8 10/09/2021    ALT 36 10/09/2021    AST 52 (H) 10/09/2021    ALKPHOS 75 10/09/2021    BILITOTAL 0.5 10/09/2021     OTHER:   Lab Results   Component Value Date    A1C 5.5 12/27/2022    ONDINA 8.8 12/27/2022    MAG 2.1 01/07/2021    TSH 3.21 06/29/2020       Anesthesia Plan    ASA Status:  3   NPO Status:  NPO Appropriate    Anesthesia Type: General.     - Airway: ETT   Induction: Intravenous, Propofol.   Maintenance: Balanced.   Techniques and Equipment:     - Lines/Monitors: 2nd IV, Arterial Line     Consents    Anesthesia Plan(s) and associated risks, benefits, and realistic alternatives discussed. Questions answered and patient/representative(s) expressed understanding.    - Discussed:     - Discussed with:  Patient         Postoperative Care    Pain management: IV analgesics.   PONV prophylaxis: Ondansetron (or other 5HT-3), Dexamethasone or Solumedrol, Background Propofol Infusion     Comments:                Nathan Rowley MD

## 2022-12-28 VITALS
TEMPERATURE: 98 F | RESPIRATION RATE: 16 BRPM | SYSTOLIC BLOOD PRESSURE: 149 MMHG | WEIGHT: 150.3 LBS | HEART RATE: 86 BPM | HEIGHT: 66 IN | DIASTOLIC BLOOD PRESSURE: 89 MMHG | OXYGEN SATURATION: 96 % | BODY MASS INDEX: 24.15 KG/M2

## 2022-12-28 LAB — GLUCOSE BLDC GLUCOMTR-MCNC: 123 MG/DL (ref 70–99)

## 2022-12-28 PROCEDURE — 99024 POSTOP FOLLOW-UP VISIT: CPT

## 2022-12-28 PROCEDURE — 250N000013 HC RX MED GY IP 250 OP 250 PS 637: Performed by: STUDENT IN AN ORGANIZED HEALTH CARE EDUCATION/TRAINING PROGRAM

## 2022-12-28 RX ORDER — AMOXICILLIN 250 MG
1 CAPSULE ORAL 2 TIMES DAILY
Qty: 10 TABLET | Refills: 0 | Status: SHIPPED | OUTPATIENT
Start: 2022-12-28 | End: 2023-09-25

## 2022-12-28 RX ORDER — OXYCODONE HYDROCHLORIDE 5 MG/1
5 TABLET ORAL EVERY 4 HOURS PRN
Qty: 3 TABLET | Refills: 0 | Status: SHIPPED | OUTPATIENT
Start: 2022-12-28 | End: 2023-09-25

## 2022-12-28 RX ADMIN — ATORVASTATIN CALCIUM 80 MG: 80 TABLET, FILM COATED ORAL at 08:22

## 2022-12-28 RX ADMIN — SENNOSIDES AND DOCUSATE SODIUM 1 TABLET: 50; 8.6 TABLET ORAL at 08:22

## 2022-12-28 RX ADMIN — ACETAMINOPHEN 975 MG: 325 TABLET, FILM COATED ORAL at 02:11

## 2022-12-28 RX ADMIN — GABAPENTIN 600 MG: 600 TABLET, FILM COATED ORAL at 08:22

## 2022-12-28 RX ADMIN — ASPIRIN 81 MG: 81 TABLET, COATED ORAL at 08:22

## 2022-12-28 ASSESSMENT — ACTIVITIES OF DAILY LIVING (ADL)
ADLS_ACUITY_SCORE: 18

## 2022-12-28 NOTE — PLAN OF CARE
Reason for Admission: POD 1 L CEA  Cognitive/Mentation: A/Ox 4  Neuros/CMS: Intact  VS: VSS on RA- HTN, SBP goal <160.   Tele: SR   GI: BS audible, + flatus. Continent.  : Voiding adequately. Continent.  Pulmonary: LS clear  Pain: Headache, neck soreness- scheduled Tylenol and ice packs given.   Drains/Lines: PIV SL  Skin: L neck incision dressing with small drainage intact.   Activity: Up with SBA   Diet: Regular diet with thin liquids. Takes pills whole.   Therapies recs: Home  Discharge: Pending  Aggression Stoplight Tool: Green   End of shift summary: No changes this shift.

## 2022-12-28 NOTE — OP NOTE
Procedure Date: 12/27/2022    PREOPERATIVE DIAGNOSIS:  Asymptomatic 80% left internal carotid stenosis.    POSTOPERATIVE DIAGNOSIS:  Asymptomatic 80% left internal carotid stenosis.    PROCEDURE PERFORMED:  Left carotid endarterectomy with bovine pericardial patch angioplasty.    SURGEON:  Yuri Aponte MD    ASSISTANT:  Zuri Bales MD.  She is a [employer] Surgery Resident.    ANESTHESIA:  General endotracheal anesthesia.    ESTIMATED BLOOD LOSS:  75 mL    OPERATIVE INDICATIONS:  This patient is a 67-year-old gentleman with a significant cardiac history who presents at this time with an asymptomatic 80% left carotid stenosis.  After a discussion as to the natural history of asymptomatic carotid disease, he has opted to proceed with left carotid endarterectomy.    OPERATIVE FINDINGS:  There was a somewhat high carotid bifurcation and a lower lying hypoglossal nerve such that dissection of the distal internal carotid disease was a bit tedious.  We had to go through most of the maneuvers to mobilize the hypoglossal nerve.  There was a fairly focal 80% stenosis at the origin of the left ICA with a posterior tongue of plaque extending distally.  At the completion of this procedure, he awoke neurologically intact.    OPERATIVE DESCRIPTION:  After informed consent was obtained, the patient was brought to the operating room and placed on the table in a supine position.  General endotracheal anesthesia was achieved without incident.  He was connected to the EEG leads.  His left neck was prepped and draped in the usual sterile fashion.  Timeout was called, and we verified the patient's identity, the operative site, and the proposed procedure.  We began with an oblique left sided neck incision.  Dissection proceeded sharply downward to expose the mid cervical left common carotid artery just above the omohyoid muscle.  This vessel was dissected free and proximal control was obtained with a vessel loop.  The vagus nerve was  identified posterior to the carotid and carefully avoided throughout the remainder of the dissection.  We continued our dissection cephalad along the anterior common carotid.  A crossing facial vein was divided between 2-0 silk ties.  The superior thyroidal and external carotid arteries were dissected free and encircled with vessel loops.  We continued our dissection distally up the internal carotid.  The ansa was traced cephalad to its confluence with the hypoglossal nerve.  The ansa was divided to allow for better distal internal carotid exposure.  Tethering arterial and venous branches were divided between silk ties to fully mobilize the hypoglossal nerve.  With these maneuvers, we were able to obtain distal internal carotid control above the gross disease.  Distal control was achieved with a vessel loop.  The patient was systemically heparinized with 6000 units of intravenous heparin.  After a 5-minute circulation time, we performed test clamping of the carotid.  There were no EEG waveforms.  An arteriotomy was made on the mid cervical common carotid and extended up the internal carotid to a point above the level of the obvious disease.  There was still a small posterior tongue of plaque extending above the arteriotomy, but I chose to accept this.  The endarterectomy plane was developed using a black spatula.  We obtained a satisfactory proximal taper point.  Distally, the intima was scored with a [place] blade and we obtained a satisfactory distal taper point.  We performed eversion endarterectomy on the external carotid.  The plaque was removed from the field en bloc.  Residual strands of fibrinous media were removed from the endarterectomized surface until I was fully satisfied with its quality.  Both the distal and proximal taper points were tacked down with multiple interrupted 7-0 Prolene sutures.  A bovine pericardial patch was selected.  It was cut to length.  The patch angioplasty was secured with a  running circumferential 6-0 Prolene suture.  Prior to placing the final stitches in the patch angioplasty suture line, all clamps were briefly released to back bleed any debris out of the carotid artery.  The lumen was copiously irrigated with heparinized saline and found to be clear.  I placed the remaining sutures in the patch angioplasty suture line.  It was subsequently secured and flow was preferentially directed up the external carotid for several heartbeats before relinquishing control of the distal internal carotid.  A single 6-0 Prolene patch angioplasty repair suture was required.  Surgicel gauze and gentle compression were held over the wound for 10 or 15 minutes.  The patient had been rebolused with an additional 2000 units of heparin during the case for a total dose of 8000 units.  This was partially reversed with 25 mg of protamine.  Ultimately, we had excellent hemostasis.  Closure then proceeded utilizing interrupted 2-0 Vicryl to reapproximate the deep fascia.  Platysma was closed with a running 3-0 Vicryl suture.  Subdermal tissue was closed with interrupted 3-0 Vicryl and skin was closed with a running 4-0 Monocryl subcuticular stitch.  Steri-Strips and a sterile dressing were applied.  Final sponge and needle count were reported as correct.  The patient tolerated the procedure without incident.  He was extubated and observed to follow commands with all 4 extremities.  His tongue was in the midline and his smile was symmetric.    Montez Aponte MD        D: 2022   T: 2022   MT: md    Name:     MARLENY CHAPA  MRN:      [id number]        Account:        [id number]   :      1955           Procedure Date: 2022     Document: [id number]

## 2022-12-28 NOTE — DISCHARGE SUMMARY
Vascular Surgery Discharge Summary    NAME: Johan Navarro   MRN: [id number]   : 1955     DATE OF ADMISSION: 2022     PRE/POSTOPERATIVE DIAGNOSES: Asymptomatic 80% left internal carotid stenosis.     PROCEDURES PERFORMED, 2022: Left carotid endarterectomy with bovine pericardial patch angioplasty.    INTRAOPERATIVE FINDINGS: somewhat high carotid bifurcation and a lower lying hypoglossal nerve such that dissection of the distal internal carotid disease was a bit tedious.  We had to go through most of the maneuvers to mobilize the hypoglossal nerve.  There was a fairly focal 80% stenosis at the origin of the left ICA with a posterior tongue of plaque extending distally.      POSTOPERATIVE COMPLICATIONS: None     DATE OF DISCHARGE:  2022    HOSPITAL COURSE: Johan Navarro is a 67 year old male who on 2022 underwent the above-named procedures. He tolerated the procedure well and postoperatively was transferred to the general post-surgical unit.  The remainder of his course was essentiallly uncomplicated.  Prior to discharge, his pain was controlled well with acetaminophen.He was able to perform ADLs and ambulate independently without difficulty, and had full return of bowel and bladder function.  On 2022, he was discharged to home in stable condition.    DISCHARGE INSTRUCTIONS:  Copy from AVS    FOLLOW UP APPOINTMENTS:   Copy from AVS    DISCHARGE MEDICATIONS:     Review of your medicines      START taking      Dose / Directions   oxyCODONE 5 MG tablet  Commonly known as: ROXICODONE      Dose: 5 mg  Take 1 tablet (5 mg) by mouth every 4 hours as needed for moderate pain (4-6)  Quantity: 3 tablet  Refills: 0     senna-docusate 8.6-50 MG tablet  Commonly known as: SENOKOT-S/PERICOLACE      Dose: 1 tablet  Take 1 tablet by mouth 2 times daily  Quantity: 10 tablet  Refills: 0        CONTINUE these medicines which have NOT CHANGED      Dose / Directions   aspirin 81 MG EC tablet      Dose:  81 mg  Take 81 mg by mouth daily  Refills: 0     atorvastatin 80 MG tablet  Commonly known as: Lipitor  Used for: Coronary artery disease involving native coronary artery of native heart without angina pectoris, HLD (hyperlipidemia)      Dose: 80 mg  Take 1 tablet (80 mg) by mouth daily  Quantity: 90 tablet  Refills: 1     diclofenac 1 % topical gel  Commonly known as: VOLTAREN  Used for: Secondary localized osteoarthrosis of hand, unspecified laterality      APPLY 2GRAM TO HANDS AND FINGERS FOUR TIMES DAILY USING DOSING CARD  Quantity: 100 g  Refills: 0     evolocumab 140 MG/ML prefilled autoinjector  Commonly known as: REPATHA  Used for: Mixed hyperlipidemia      Dose: 140 mg  Inject 1 mL (140 mg) Subcutaneous every 14 days  Quantity: 6 mL  Refills: 3     folic acid 1 MG tablet  Commonly known as: FOLVITE  Used for: Anemia, unspecified type      TAKE 1 TABLET(1 MG) BY MOUTH EVERY EVENING  Quantity: 90 tablet  Refills: 1     gabapentin 600 MG tablet  Commonly known as: NEURONTIN  Used for: Coronary artery disease involving native coronary artery of native heart without angina pectoris      TAKE 1 TABLET(600 MG) BY MOUTH TWICE DAILY  Quantity: 180 tablet  Refills: 1     nitroGLYcerin 0.4 MG sublingual tablet  Commonly known as: NITROSTAT  Used for: NSTEMI (non-ST elevated myocardial infarction) (H)      For chest pain place 1 tablet under the tongue every 5 minutes for 3 doses. If symptoms persist 5 minutes after 1st dose call 911.  Quantity: 30 tablet  Refills: 0     sildenafil 20 MG tablet  Commonly known as: REVATIO  Used for: Erectile dysfunction, unspecified erectile dysfunction type      TAKE 2 TO 5 TABLETS BY MOUTH AS NEEDED PRIOR TO INTERCOURSE. MAX 100MG(5 TABLETS)/DAY  Quantity: 90 tablet  Refills: 0           Where to get your medicines      These medications were sent to [facility] - [address] [facility] [facility] [address]    Phone:  [phone]     oxyCODONE 5 MG tablet    senna-docusate 8.6-50 MG tablet

## 2022-12-28 NOTE — PROGRESS NOTES
"VASCULAR SURGERY PROGRESS NOTE    Subjective:  Patient resting comfortably in bed. Minimal pain overnight. Denies nausea/vomiting.    Objective:  Intake/Output Summary (Last 24 hours) at 12/28/2022 0741  Last data filed at 12/28/2022 0648  Gross per 24 hour   Intake 2000 ml   Output 2125 ml   Net -125 ml     PHYSICAL EXAM:  BP (!) 157/88 (BP Location: Left arm, Patient Position: Semi-Adam's, Cuff Size: Adult Regular)   Pulse 89   Temp 98.1  F (36.7  C) (Oral)   Resp 16   Ht 1.676 m (5' 6\")   Wt 68.2 kg (150 lb 4.8 oz)   SpO2 96%   BMI 24.26 kg/m    Alert and oriented x4. Neurologically intact. Denies headache.  LS clear, on room air  Minimal incisional pain  Regular diet, tolerating well, passing flatus. Urinating by self.  Dressing is C/D/I.    ASSESSMENT:  Mr. Navarro is a 67 year old male who is POD 1 s/p left carotid endarterectomy. Patient is neurologically intact, with no complaints or complications at this time.     PLAN:  -continue current medication regimen  -discharge this am  -discussed follow-up with the patient in addition to incisional care, all questions answered.     Stormy Bermudez NP  VASCULAR SURGERY                   "

## 2022-12-28 NOTE — DISCHARGE INSTRUCTIONS
Follow up:   Follow-up in two weeks    Patient education:   Follow up in 2 weeks  You can take down the dressing on 12/29, if there are steri-strips, those can fall off.   You might have numbness around the incision site. Ok to shower once bandage is off, pat dry do not rub to avoid irritation.   Please call if you notice an increase in redness, swelling, or signs of infection in the immediate post-operative phase. You can call the vascular clinic or triage nurse.   You might experience an increase in pain when you go home, you were prescribed a small dose of oxycodone in case you were to need it, but Acetaminophen(tylenol) is good to use as written on the bottle.   Avoid strenuous activity for 3 days  -If you develop headaches or  high blood pressure please notify the clinic immediately

## 2022-12-28 NOTE — PLAN OF CARE
Patient discharged. Discontinued IV. Discharge paperwork reviewed, patient indicates understanding of all follow up instructions and appointments. All questions answered. Patient received new Rxs from [facility] pharmacy and indicates understanding of medication indication and schedule. Patient states they have all belongings. All questions answered. Pt family here to drive them home.

## 2022-12-30 ENCOUNTER — VIRTUAL VISIT (OUTPATIENT)
Dept: ONCOLOGY | Facility: HOSPITAL | Age: 67
End: 2022-12-30
Attending: INTERNAL MEDICINE
Payer: COMMERCIAL

## 2022-12-30 DIAGNOSIS — D50.0 IRON DEFICIENCY ANEMIA DUE TO CHRONIC BLOOD LOSS: Primary | ICD-10-CM

## 2022-12-30 LAB
ATRIAL RATE - MUSE: 64 BPM
DIASTOLIC BLOOD PRESSURE - MUSE: NORMAL MMHG
FERRITIN SERPL-MCNC: 19 NG/ML (ref 26–388)
INTERPRETATION ECG - MUSE: NORMAL
P AXIS - MUSE: 56 DEGREES
PR INTERVAL - MUSE: 148 MS
QRS DURATION - MUSE: 84 MS
QT - MUSE: 418 MS
QTC - MUSE: 431 MS
R AXIS - MUSE: 64 DEGREES
SYSTOLIC BLOOD PRESSURE - MUSE: NORMAL MMHG
T AXIS - MUSE: 59 DEGREES
VENTRICULAR RATE- MUSE: 64 BPM

## 2022-12-30 PROCEDURE — 99212 OFFICE O/P EST SF 10 MIN: CPT | Mod: 95 | Performed by: INTERNAL MEDICINE

## 2022-12-30 PROCEDURE — G0463 HOSPITAL OUTPT CLINIC VISIT: HCPCS | Mod: PN,GT | Performed by: INTERNAL MEDICINE

## 2022-12-30 ASSESSMENT — PAIN SCALES - GENERAL: PAINLEVEL: NO PAIN (0)

## 2022-12-30 NOTE — PROGRESS NOTES
Video-Visit Details    Type of service:  Video Visit    Video Start Time: 3:38 PM  Video End Time: 3:50 PM    Originating Location (pt. Location): Home in [place]    Distant Location (provider location):  [facility] [place]    Platform used for Video Visit: Well    [facility] Progress Note    Patient: Johan Navarro  MRN: [id number]  Date of Service: 08/10/2022        Reason for Visit    Chief Complaint   Patient presents with     Video Visit     Return visit related to Iron deficiency anemia due to chronic blood loss         Problem List Items Addressed This Visit        Hematologic    Iron deficiency anemia due to chronic blood loss - Primary    Relevant Orders    Ferritin (Completed)    Ferritin         Assessment and Plan    Iron deficiency anemia secondary to chronic GI blood loss  Labs from last week reviewed.  Hemoglobin has improved significantly to 14.  Normal MCV at 89.  Iron studies show transferrin saturation of 16%.  Total iron is mildly low at 49.  TIBC 310.  Ferritin was not done.  I have added this on.  Although his hemoglobin has improved he still a little bit borderline iron deficient.  But seems to be responding to oral iron.  I would continue this for now.  We will repeat his labs in 3 months.  If he develops any significant GI bleeding symptoms or is more fatigued and short of breath and we can check his labs earlier.  No need for IV iron at this point in time.    He is agreeable with the plan.     Cancer Staging   No matching staging information was found for the patient.    ECOG Performance    0 - Independent         Problem List    Patient Active Problem List   Diagnosis     Depressive disorder, not elsewhere classified     Elevated prostate specific antigen (PSA)     Osteoarthritis of Hand      Hyperlipidemia LDL goal <100     Mixed hyperlipidemia     Family history of early CAD     Aortic stenosis     Carotid stenosis     Coronary artery  disease involving native coronary artery of native heart without angina pectoris     Status post coronary angiogram     Abnormal cardiovascular stress test     Gastrointestinal hemorrhage, unspecified gastrointestinal hemorrhage type     History of prostate cancer     Iron deficiency anemia due to chronic blood loss     Post-operative state          Interval History   Johan Navarro is a 67 year old male with history of recurrent iron deficiency anemia status post IV iron infusions who is seen as a video visit for follow-up.    Doing well since last visit.  Denies any new bleeding issues.  He continues to take oral iron without any major side effects.  He is seen as a video visit with repeat labs.  He received IV iron infusions in the past.        Review of Systems  A comprehensive review of systems was negative except for what is noted in the interval history    Current Outpatient Medications   Medication     aspirin 81 MG EC tablet     atorvastatin (LIPITOR) 80 MG tablet     evolocumab (REPATHA) 140 MG/ML prefilled autoinjector     gabapentin (NEURONTIN) 600 MG tablet     nitroGLYcerin (NITROSTAT) 0.4 MG sublingual tablet     sildenafil (REVATIO) 20 MG tablet     diclofenac (VOLTAREN) 1 % topical gel     Ferrous Sulfate (IRON) 325 (65 Fe) MG tablet     folic acid (FOLVITE) 1 MG tablet     oxyCODONE (ROXICODONE) 5 MG tablet     senna-docusate (SENOKOT-S/PERICOLACE) 8.6-50 MG tablet     No current facility-administered medications for this visit.        Physical Exam    No flowsheet data found.    General: alert and cooperative      Lab Results    No results found for this or any previous visit (from the past 168 hour(s)).    Imaging    No results found.    A total of 15 min were spent today on this visit which included face to face conversation with the patient, EMR review, counseling and co-ordination of care and medical documentation.      Signed by: Praful Rousseau MD

## 2022-12-30 NOTE — PROGRESS NOTES
Otis is a 67 year old who is being evaluated via a billable video visit.      How would you like to obtain your AVS? MyChart  If the video visit is dropped, the invitation should be resent by: Text to cell phone: [phone]  Will anyone else be joining your video visit? Yes: Wife Amy. How would they like to receive their invitation? Text to cell phone: [phone]        Video-Visit Details    Type of service:  Video Visit   Video Start Time: 3:17 PM  Video End Time:     Originating Location (pt. Location): Home    Distant Location (provider location):  On-site  Platform used for Video Visit: [email or web address]

## 2023-01-02 ENCOUNTER — DOCUMENTATION ONLY (OUTPATIENT)
Dept: OTHER | Facility: CLINIC | Age: 68
End: 2023-01-02

## 2023-01-03 DIAGNOSIS — D64.9 ANEMIA, UNSPECIFIED TYPE: ICD-10-CM

## 2023-01-03 DIAGNOSIS — M19.249 SECONDARY LOCALIZED OSTEOARTHROSIS OF HAND, UNSPECIFIED LATERALITY: ICD-10-CM

## 2023-01-04 RX ORDER — FOLIC ACID 1 MG/1
TABLET ORAL
Qty: 90 TABLET | Refills: 1 | Status: SHIPPED | OUTPATIENT
Start: 2023-01-04 | End: 2023-07-05

## 2023-01-04 NOTE — TELEPHONE ENCOUNTER
Prescription approved per [facility] Refill Protocol.  Emily TORRES RN, BSN

## 2023-01-05 DIAGNOSIS — M19.249 SECONDARY LOCALIZED OSTEOARTHROSIS OF HAND, UNSPECIFIED LATERALITY: ICD-10-CM

## 2023-01-09 NOTE — TELEPHONE ENCOUNTER
Refills requested too soon. diclofenac (VOLTAREN) 1 % topical gel 100 g 0 1/4/2023     Zuri Martinez RN   PAL (Patient Advocate Liason)  [facility]

## 2023-01-11 ENCOUNTER — OFFICE VISIT (OUTPATIENT)
Dept: OTHER | Facility: CLINIC | Age: 68
End: 2023-01-11
Payer: COMMERCIAL

## 2023-01-11 VITALS — DIASTOLIC BLOOD PRESSURE: 82 MMHG | SYSTOLIC BLOOD PRESSURE: 132 MMHG | HEART RATE: 80 BPM

## 2023-01-11 DIAGNOSIS — I65.22 STENOSIS OF LEFT CAROTID ARTERY: Primary | ICD-10-CM

## 2023-01-11 PROCEDURE — G0463 HOSPITAL OUTPT CLINIC VISIT: HCPCS

## 2023-01-11 PROCEDURE — 99024 POSTOP FOLLOW-UP VISIT: CPT

## 2023-01-11 RX ORDER — PNV NO.95/FERROUS FUM/FOLIC AC 28MG-0.8MG
1 TABLET ORAL
COMMUNITY

## 2023-01-11 NOTE — PROGRESS NOTES
VASCULAR SURGERY PROGRESS NOTE    LOCATION: [facility]    Johan Navarro  Medical Record #:  [id number]  YOB: 1955  Age:  68 year old     Date of Service: 1/11/2023    PRIMARY CARE PROVIDER: Washington Yang    Reason for visit:  2 week post-op L CEA    IMPRESSION: Mr. Navarro is a pleasant 68 year old gentleman who came in for his 2 week post-operative visit following left carotid endarterectomy w/ bovine pericardium patch angioplasty with Dr. Aponte. Patient did not have any complications post-operatively and went home on 12/28/2023. The patient since then reports no concerns. Denies pain. Denies TIA symptoms/amaurosis fugax/headaches. Incision is well-healed. Minimal numbness near the incisional line.     The patient has a history of moderate aortic valve stenosis-status post coronary interventions with his most recent intervention being the placement of ANNETTE in mid-circumflex on 10/11/2021. The patient is known to our clinic for asymptomatic left carotid stenosis.     Examination:   Alert and oriented x4, no distress noted.   /82 Pulse 80  Neck with good range of motion  Incision is well healed  Neurologic exam nonfocal    RECOMMENDATION/RISKS: Continue Aspirin 81 mg. Follow-up 3 months in [facility] with left carotid ultrasound. Patient is in agreement with plan.     REVIEW OF SYSTEMS:    A 12 point ROS was reviewed and is negative except for what is listed above in HPI.    PHH:    Past Medical History:   Diagnosis Date     Aortic stenosis      Arthritis     hands     CAD (coronary artery disease)     cardiac cath 1998: stents x 2 to circumflex     Carotid stenosis     left     Family history of early CAD      Hyperlipidaemia LDL goal < 70     familial hyperlipidemia with marked hypercholesterolemia before treatment; has been on some form of cholesterol-lowering medication since the 1970s     Prostate cancer (H) 2010     Sleep apnea      Syncope      Unstable  angina (H) 1/6/2021          Past Surgical History:   Procedure Laterality Date     CARDIAC SURGERY  1998    coronary stents x2 placed 1998; angioplasty     CV HEART CATHETERIZATION WITH POSSIBLE INTERVENTION N/A 7/21/2020    Procedure: Heart Catheterization with Possible Intervention;  Surgeon: Alber Bentley MD;  Location:  HEART CARDIAC CATH LAB     CV HEART CATHETERIZATION WITH POSSIBLE INTERVENTION N/A 10/11/2021    Procedure: Heart Catheterization with Possible Intervention;  Surgeon: Patsy Wallace MD;  Location:  HEART CARDIAC CATH LAB     CV INSTANTANEOUS WAVE-FREE RATIO N/A 10/11/2021    Procedure: Instantaneous Wave-Free Ratio;  Surgeon: Patsy Wallace MD;  Location:  HEART CARDIAC CATH LAB     CV LEFT HEART CATH N/A 10/11/2021    Procedure: Left Heart Cath;  Surgeon: Patsy Wallace MD;  Location:  HEART CARDIAC CATH LAB     CV PCI STENT DRUG ELUTING N/A 10/11/2021    Procedure: Percutaneous Coronary Intervention Stent Drug Eluting;  Surgeon: Patsy Wallace MD;  Location:  HEART CARDIAC CATH LAB     DAVINCI PROSTATECTOMY      2010     ENDARTERECTOMY CAROTID Left 12/27/2022    Procedure: LEFT CAROTID ENDARTERECTOMY WITH BOVINE PERICARDIUM PATCH ANGIOPLASTY, AND INTRAOPERATIVE ELECTROENCEPHALOGRAM MONITORING;  Surgeon: Montez Aponte MD;  Location:  OR     ESOPHAGOSCOPY, GASTROSCOPY, DUODENOSCOPY (EGD), COMBINED N/A 1/6/2021    Procedure: ESOPHAGOGASTRODUODENOSCOPY (EGD);  Surgeon: Rosemarie Laws MD;  Location:  GI     EYE SURGERY Bilateral 2017    eyelids     GENITOURINARY SURGERY       ORTHOPEDIC SURGERY Right     achilles tendon; post football injury     ORTHOPEDIC SURGERY Right     hand; near thumb. has wires in there. cysts     REPAIR TENDON BICEPS DISTAL UPPER EXTREMITY Right 7/16/2018    Procedure: REPAIR TENDON BICEPS DISTAL UPPER EXTREMITY;  Right open biceps tendon repair  ;  Surgeon: Alber Zabala MD;  Location:  OR     SURGICAL HISTORY  OF -       Right hand Xanthoma removal     SURGICAL HISTORY OF -       Stress Echo (-)       ALLERGIES:  Crestor [rosuvastatin], Dust mites, Other [no clinical screening - see comments], and Pollen extract    MEDS:    Current Outpatient Medications:      aspirin 81 MG EC tablet, Take 81 mg by mouth daily, Disp: , Rfl:      atorvastatin (LIPITOR) 80 MG tablet, Take 1 tablet (80 mg) by mouth daily, Disp: 90 tablet, Rfl: 1     diclofenac (VOLTAREN) 1 % topical gel, APPLY 2 GRAMS TO HANDS AND FINGERS FOUR TIMES DAILY USING DOSING CARD, Disp: 100 g, Rfl: 0     evolocumab (REPATHA) 140 MG/ML prefilled autoinjector, Inject 1 mL (140 mg) Subcutaneous every 14 days, Disp: 6 mL, Rfl: 3     Ferrous Sulfate (IRON) 325 (65 Fe) MG tablet, Take 1 tablet by mouth every other day, Disp: , Rfl:      folic acid (FOLVITE) 1 MG tablet, TAKE 1 TABLET(1 MG) BY MOUTH EVERY EVENING, Disp: 90 tablet, Rfl: 1     gabapentin (NEURONTIN) 600 MG tablet, TAKE 1 TABLET(600 MG) BY MOUTH TWICE DAILY, Disp: 180 tablet, Rfl: 1     nitroGLYcerin (NITROSTAT) 0.4 MG sublingual tablet, For chest pain place 1 tablet under the tongue every 5 minutes for 3 doses. If symptoms persist 5 minutes after 1st dose call 911., Disp: 30 tablet, Rfl: 0     oxyCODONE (ROXICODONE) 5 MG tablet, Take 1 tablet (5 mg) by mouth every 4 hours as needed for moderate pain (4-6), Disp: 3 tablet, Rfl: 0     senna-docusate (SENOKOT-S/PERICOLACE) 8.6-50 MG tablet, Take 1 tablet by mouth 2 times daily, Disp: 10 tablet, Rfl: 0     sildenafil (REVATIO) 20 MG tablet, TAKE 2 TO 5 TABLETS BY MOUTH AS NEEDED PRIOR TO INTERCOURSE. MAX 100MG(5 TABLETS)/DAY, Disp: 90 tablet, Rfl: 0    SOCIAL HABITS:    History   Smoking Status     Former     Packs/day: 0.25     Years: 20.00     Types: Cigars, Cigarettes     Quit date: 2000   Smokeless Tobacco     Current     Types: Snuff     Social History    Substance and Sexual Activity      Alcohol use: Not Currently        Comment: very rare, none for 8  years      History   Drug Use     Types: Marijuana     Comment: daily        FAMILY HISTORY:    Family History   Problem Relation Age of Onset     Lipids Mother      C.A.D. Mother          at age 49 of MI     Dementia Father      JOEY.A.D. Sister         MI at age 48     Cancer - colorectal No family hx of      Prostate Cancer No family hx of      Colon Cancer No family hx of        PE:  /82 (BP Location: Left arm, Patient Position: Chair, Cuff Size: Adult Regular)   Pulse 80   Wt Readings from Last 1 Encounters:   22 150 lb 4.8 oz (68.2 kg)     There is no height or weight on file to calculate BMI.    EXAM:  GENERAL: well-developed 68 year old male who appears his stated age  CARDIAC: normal   CHEST/LUNG: normal respiratory effort   MUSCULOSKELETAL: grossly normal and both lower extremities are intact, no lower extremity edema  NEUROLOGIC: focally intact, alert and oriented x 3  PSYCH: appropriate affect  VASCULAR:       DIAGNOSTIC STUDIES:     Images:  No results found.    LABS:      Sodium   Date Value Ref Range Status   2022 138 133 - 144 mmol/L Final   2022 143 136 - 145 mmol/L Final   2022 138 133 - 144 mmol/L Final   2021 136 133 - 144 mmol/L Final   2021 138 133 - 144 mmol/L Final   2021 136 133 - 144 mmol/L Final     Urea Nitrogen   Date Value Ref Range Status   2022 18 7 - 30 mg/dL Final   2022 9.1 8.0 - 23.0 mg/dL Final   2022 19 7 - 30 mg/dL Final   10/12/2021 13 7 - 30 mg/dL Final   2021 14 7 - 30 mg/dL Final   2021 15 7 - 30 mg/dL Final   2021 21 7 - 30 mg/dL Final     Hemoglobin   Date Value Ref Range Status   2022 14.0 13.3 - 17.7 g/dL Final   2022 12.1 (L) 13.3 - 17.7 g/dL Final   2022 9.4 (L) 13.3 - 17.7 g/dL Final   2021 11.2 (L) 13.3 - 17.7 g/dL Final     Comment:     Results confirmed by repeat test   2021 9.8 (L) 13.3 - 17.7 g/dL Final     Comment:     Results confirmed by repeat  test   01/12/2021 9.4 (L) 13.3 - 17.7 g/dL Final     Comment:     Results confirmed by repeat test     Platelet Count   Date Value Ref Range Status   12/21/2022 211 150 - 450 10e3/uL Final   08/09/2022 271 150 - 450 10e3/uL Final   07/11/2022 246 150 - 450 10e3/uL Final   03/24/2021 206 150 - 450 10e9/L Final   01/21/2021 437 150 - 450 10e9/L Final   01/12/2021 184 150 - 450 10e9/L Final     INR   Date Value Ref Range Status   01/06/2021 1.03 0.86 - 1.14 Final   07/21/2020 0.96 0.86 - 1.14 Final       30 minutes spent on the day of encounter doing chart review, history and exam, documentation, and further activities as noted.     Stormy Bermudez, NP  VASCULAR SURGERY

## 2023-01-11 NOTE — PROGRESS NOTES
[facility]        Patient is here for a  follow up.      Pt is currently taking Aspirin and Statin.    /82 (BP Location: Left arm, Patient Position: Chair, Cuff Size: Adult Regular)   Pulse 80     The provider has been notified that the patient has no concerns.     Questions patient would like addressed today are: N/A.    Refills are needed: N/A    Has homecare services and agency name:  Cece Terry MA

## 2023-02-13 ENCOUNTER — TELEPHONE (OUTPATIENT)
Dept: OTHER | Facility: CLINIC | Age: 68
End: 2023-02-13
Payer: COMMERCIAL

## 2023-02-13 NOTE — TELEPHONE ENCOUNTER
Patient needs to be scheduled for left carotid US and in person follow up with Dr Billingsley 3 months from 1/11/23 office visit with Stormy QUINTERO    Appt note: 3 month follow up to 1/11/23 with left carotid US prior.     Pinky GONZALEZ, RN    [facility]  Vascular [facility]  Office: [phone]  Fax: [phone]

## 2023-02-14 NOTE — TELEPHONE ENCOUNTER
Left voicemail with instructions for patient to call back to schedule their appointment(s)    February 14, 2023 , 10:47 AM

## 2023-02-16 NOTE — TELEPHONE ENCOUNTER
Patient is scheduled for his Ultrasound on 04/11/23 in [place] and follow up appointment with Stormy Bermudez on 04/13/23.

## 2023-02-19 DIAGNOSIS — I25.10 CORONARY ARTERY DISEASE INVOLVING NATIVE CORONARY ARTERY OF NATIVE HEART WITHOUT ANGINA PECTORIS: ICD-10-CM

## 2023-02-20 RX ORDER — GABAPENTIN 600 MG/1
TABLET ORAL
Qty: 180 TABLET | Refills: 1 | Status: SHIPPED | OUTPATIENT
Start: 2023-02-20 | End: 2023-06-30

## 2023-03-02 ENCOUNTER — TELEPHONE (OUTPATIENT)
Dept: CARDIOLOGY | Facility: CLINIC | Age: 68
End: 2023-03-02
Payer: COMMERCIAL

## 2023-03-02 DIAGNOSIS — E78.2 MIXED HYPERLIPIDEMIA: ICD-10-CM

## 2023-03-02 NOTE — TELEPHONE ENCOUNTER
Central Prior Authorization Team   Phone: [phone]    PA Initiation    Medication: Repatha 140mg/ml  Insurance Company: [employer] Part D - Phone [phone] Fax [phone]  Pharmacy Filling the Rx: [facility] - [place], [place] - [address]  Filling Pharmacy Phone: [phone]  Filling Pharmacy Fax:    Start Date: 3/2/2023    Finished through [facility]

## 2023-03-02 NOTE — TELEPHONE ENCOUNTER
Prior Authorization Not Needed per Insurance    Medication: Repatha 140mg/ml  Insurance Company: [employer] Part D - Phone [phone] Fax [phone]  Expected CoPay:      Pharmacy Filling the Rx: [facility] - [place], [place] - [address]  Pharmacy Notified: No  Patient Notified: Yes    Medication previously approved through 12/31/2023.

## 2023-03-08 ENCOUNTER — TELEPHONE (OUTPATIENT)
Dept: INFUSION THERAPY | Facility: HOSPITAL | Age: 68
End: 2023-03-08
Payer: COMMERCIAL

## 2023-03-21 ENCOUNTER — TELEPHONE (OUTPATIENT)
Dept: CARDIOLOGY | Facility: CLINIC | Age: 68
End: 2023-03-21
Payer: COMMERCIAL

## 2023-03-21 NOTE — TELEPHONE ENCOUNTER
Voicemail received from patient's wife requesting a callback, no other details provided.     Called Amy back, she was concerned about the patient's medications getting renewed since he can't get in with Dr Galicia until August. Discussed that if has an upcoming visit then we can continue to renew the medications. She notes he just had an endarterectomy and has follow up with vascular and is doing well.     She has questions about her son's medications who she says see's Dr Valerio. Team 6 number given to Amy to call and discuss with his nurse, unable to review chart as he is not our patient.

## 2023-03-28 ENCOUNTER — LAB (OUTPATIENT)
Dept: LAB | Facility: CLINIC | Age: 68
End: 2023-03-28
Payer: COMMERCIAL

## 2023-03-28 DIAGNOSIS — D50.0 IRON DEFICIENCY ANEMIA DUE TO CHRONIC BLOOD LOSS: ICD-10-CM

## 2023-03-28 LAB
BASOPHILS # BLD AUTO: 0.1 10E3/UL (ref 0–0.2)
BASOPHILS NFR BLD AUTO: 1 %
EOSINOPHIL # BLD AUTO: 0.4 10E3/UL (ref 0–0.7)
EOSINOPHIL NFR BLD AUTO: 7 %
ERYTHROCYTE [DISTWIDTH] IN BLOOD BY AUTOMATED COUNT: 13.4 % (ref 10–15)
FERRITIN SERPL-MCNC: 22 NG/ML (ref 31–409)
HCT VFR BLD AUTO: 45.4 % (ref 40–53)
HGB BLD-MCNC: 15.2 G/DL (ref 13.3–17.7)
IRON BINDING CAPACITY (ROCHE): 347 UG/DL (ref 240–430)
IRON SATN MFR SERPL: 18 % (ref 15–46)
IRON SERPL-MCNC: 61 UG/DL (ref 61–157)
LYMPHOCYTES # BLD AUTO: 1.8 10E3/UL (ref 0.8–5.3)
LYMPHOCYTES NFR BLD AUTO: 33 %
MCH RBC QN AUTO: 29 PG (ref 26.5–33)
MCHC RBC AUTO-ENTMCNC: 33.5 G/DL (ref 31.5–36.5)
MCV RBC AUTO: 87 FL (ref 78–100)
MONOCYTES # BLD AUTO: 0.7 10E3/UL (ref 0–1.3)
MONOCYTES NFR BLD AUTO: 13 %
NEUTROPHILS # BLD AUTO: 2.6 10E3/UL (ref 1.6–8.3)
NEUTROPHILS NFR BLD AUTO: 46 %
PLATELET # BLD AUTO: 240 10E3/UL (ref 150–450)
RBC # BLD AUTO: 5.25 10E6/UL (ref 4.4–5.9)
WBC # BLD AUTO: 5.7 10E3/UL (ref 4–11)

## 2023-03-28 PROCEDURE — 83550 IRON BINDING TEST: CPT

## 2023-03-28 PROCEDURE — 82728 ASSAY OF FERRITIN: CPT

## 2023-03-28 PROCEDURE — 83540 ASSAY OF IRON: CPT

## 2023-03-28 PROCEDURE — 36415 COLL VENOUS BLD VENIPUNCTURE: CPT

## 2023-03-28 PROCEDURE — 85025 COMPLETE CBC W/AUTO DIFF WBC: CPT

## 2023-04-01 ENCOUNTER — HEALTH MAINTENANCE LETTER (OUTPATIENT)
Age: 68
End: 2023-04-01

## 2023-04-04 ENCOUNTER — VIRTUAL VISIT (OUTPATIENT)
Dept: ONCOLOGY | Facility: CLINIC | Age: 68
End: 2023-04-04
Attending: PHYSICIAN ASSISTANT
Payer: COMMERCIAL

## 2023-04-04 DIAGNOSIS — D50.0 IRON DEFICIENCY ANEMIA DUE TO CHRONIC BLOOD LOSS: Primary | ICD-10-CM

## 2023-04-04 PROCEDURE — 99213 OFFICE O/P EST LOW 20 MIN: CPT | Mod: VID | Performed by: INTERNAL MEDICINE

## 2023-04-04 NOTE — PROGRESS NOTES
Virtual Visit Details    Type of service:  Video Visit   Video Start Time: 8:01 AM  Video End Time:8:07 AM    Originating Location (pt. Location): Home    Distant Location (provider location):  Off-site  Platform used for Video Visit: [facility] Hematology and Oncology Progress Note    Patient: Johan Navarro  MRN: [id number]  Date of Service: 08/10/2022        Reason for Visit    Chief Complaint   Patient presents with     RECHECK     Video visit          Problem List Items Addressed This Visit        Hematologic    Iron deficiency anemia due to chronic blood loss - Primary         Assessment and Plan    Iron deficiency anemia secondary to chronic GI blood loss  Labs from 3/28/2023 reviewed.  Ferritin is still mildly low but slightly better from last time.  Currently at 22.  Previously it was 19.  Transferrin saturation is 18%.  Total iron is normal.  TIBC is slightly on the higher side but still normal.  Hemoglobin is pretty good at 15.2.  No thrombocytosis.  MCV is 87.  Overall he is doing really well.  No indications for IV iron infusions right now.  I explained to him that considering the ferritin is still less than 30 I think he still has a little bit of iron deficiency or at least has low iron reserves but he is currently not anemic and asymptomatic.  We will continue to monitor with labs every 3 months.  I will see him back in 1 year.  If his hemoglobin falls further down or if he can symptomatic then we can consider IV iron infusion.    He is agreeable with the plan.     Cancer Staging   No matching staging information was found for the patient.    ECOG Performance    0 - Independent         Problem List    Patient Active Problem List   Diagnosis     Depressive disorder, not elsewhere classified     Elevated prostate specific antigen (PSA)     Osteoarthritis of Hand      Hyperlipidemia LDL goal <100     Mixed hyperlipidemia     Family history of early CAD     Aortic stenosis     Carotid  stenosis     Coronary artery disease involving native coronary artery of native heart without angina pectoris     Status post coronary angiogram     Abnormal cardiovascular stress test     Gastrointestinal hemorrhage, unspecified gastrointestinal hemorrhage type     History of prostate cancer     Iron deficiency anemia due to chronic blood loss     Post-operative state          Interval History   Johan Navarro is a 67 year old male with history of recurrent iron deficiency anemia status post IV iron infusions who is seen as a video visit for follow-up.    He continues to do well.  Denies any new issues today.  He has more energy now.  Denies any black or tarry stools.  No bright red blood per rectum.  Recently he underwent left carotid endarterectomy.  Has done well with that.  Continues to be on low-dose aspirin.  Also taking oral iron every other day.  Has some constipation but responsive to supplements.  He had labs last week.        Review of Systems  A comprehensive review of systems was negative except for what is noted in the interval history    Current Outpatient Medications   Medication     aspirin 81 MG EC tablet     atorvastatin (LIPITOR) 80 MG tablet     diclofenac (VOLTAREN) 1 % topical gel     evolocumab (REPATHA) 140 MG/ML prefilled autoinjector     Ferrous Sulfate (IRON) 325 (65 Fe) MG tablet     folic acid (FOLVITE) 1 MG tablet     gabapentin (NEURONTIN) 600 MG tablet     nitroGLYcerin (NITROSTAT) 0.4 MG sublingual tablet     senna-docusate (SENOKOT-S/PERICOLACE) 8.6-50 MG tablet     sildenafil (REVATIO) 20 MG tablet     oxyCODONE (ROXICODONE) 5 MG tablet     No current facility-administered medications for this visit.        Physical Exam    No flowsheet data found.    General: alert and cooperative      Lab Results    Recent Results (from the past 168 hour(s))   Iron & Iron Binding Capacity   Result Value Ref Range    Iron 61 61 - 157 ug/dL    Iron Binding Capacity 347 240 - 430 ug/dL    Iron Sat  Index 18 15 - 46 %   Ferritin   Result Value Ref Range    Ferritin 22 (L) 31 - 409 ng/mL   CBC with platelets and differential   Result Value Ref Range    WBC Count 5.7 4.0 - 11.0 10e3/uL    RBC Count 5.25 4.40 - 5.90 10e6/uL    Hemoglobin 15.2 13.3 - 17.7 g/dL    Hematocrit 45.4 40.0 - 53.0 %    MCV 87 78 - 100 fL    MCH 29.0 26.5 - 33.0 pg    MCHC 33.5 31.5 - 36.5 g/dL    RDW 13.4 10.0 - 15.0 %    Platelet Count 240 150 - 450 10e3/uL    % Neutrophils 46 %    % Lymphocytes 33 %    % Monocytes 13 %    % Eosinophils 7 %    % Basophils 1 %    Absolute Neutrophils 2.6 1.6 - 8.3 10e3/uL    Absolute Lymphocytes 1.8 0.8 - 5.3 10e3/uL    Absolute Monocytes 0.7 0.0 - 1.3 10e3/uL    Absolute Eosinophils 0.4 0.0 - 0.7 10e3/uL    Absolute Basophils 0.1 0.0 - 0.2 10e3/uL       Imaging    No results found.    A total of 15 min were spent today on this visit which included face to face conversation with the patient, EMR review, counseling and co-ordination of care and medical documentation.      Signed by: Praful Rousseau MD

## 2023-04-04 NOTE — NURSING NOTE
Is the patient currently in the state of [place]? YES    Visit mode:VIDEO    If the visit is dropped, the patient can be reconnected by: VIDEO VISIT: Text to cell phone: [phone]    Will anyone else be joining the visit? Yes, spouse Amy is there with him for the visit.       How would you like to obtain your AVS? MyChart    Are changes needed to the allergy or medication list? NO  Zina Villegas    Reason for visit: Follow up

## 2023-04-04 NOTE — LETTER
4/4/2023         RE: Johan Navarro  [address]  [place] [address]        Dear Colleague,    Thank you for referring your patient, Johan Navarro, to the [facility] [place]. Please see a copy of my visit note below.    Virtual Visit Details    Type of service:  Video Visit   Video Start Time: 8:01 AM  Video End Time:8:07 AM    Originating Location (pt. Location): Home    Distant Location (provider location):  Off-site  Platform used for Video Visit: [facility] Hematology and Oncology Progress Note    Patient: Johan Navarro  MRN: [id number]  Date of Service: 08/10/2022        Reason for Visit    Chief Complaint   Patient presents with     RECHECK     Video visit          Problem List Items Addressed This Visit        Hematologic    Iron deficiency anemia due to chronic blood loss - Primary         Assessment and Plan    Iron deficiency anemia secondary to chronic GI blood loss  Labs from 3/28/2023 reviewed.  Ferritin is still mildly low but slightly better from last time.  Currently at 22.  Previously it was 19.  Transferrin saturation is 18%.  Total iron is normal.  TIBC is slightly on the higher side but still normal.  Hemoglobin is pretty good at 15.2.  No thrombocytosis.  MCV is 87.  Overall he is doing really well.  No indications for IV iron infusions right now.  I explained to him that considering the ferritin is still less than 30 I think he still has a little bit of iron deficiency or at least has low iron reserves but he is currently not anemic and asymptomatic.  We will continue to monitor with labs every 3 months.  I will see him back in 1 year.  If his hemoglobin falls further down or if he can symptomatic then we can consider IV iron infusion.    He is agreeable with the plan.     Cancer Staging   No matching staging information was found for the patient.    ECOG Performance    0 - Independent         Problem List    Patient Active Problem List    Diagnosis     Depressive disorder, not elsewhere classified     Elevated prostate specific antigen (PSA)     Osteoarthritis of Hand      Hyperlipidemia LDL goal <100     Mixed hyperlipidemia     Family history of early CAD     Aortic stenosis     Carotid stenosis     Coronary artery disease involving native coronary artery of native heart without angina pectoris     Status post coronary angiogram     Abnormal cardiovascular stress test     Gastrointestinal hemorrhage, unspecified gastrointestinal hemorrhage type     History of prostate cancer     Iron deficiency anemia due to chronic blood loss     Post-operative state          Interval History   Johan Navarro is a 67 year old male with history of recurrent iron deficiency anemia status post IV iron infusions who is seen as a video visit for follow-up.    He continues to do well.  Denies any new issues today.  He has more energy now.  Denies any black or tarry stools.  No bright red blood per rectum.  Recently he underwent left carotid endarterectomy.  Has done well with that.  Continues to be on low-dose aspirin.  Also taking oral iron every other day.  Has some constipation but responsive to supplements.  He had labs last week.        Review of Systems  A comprehensive review of systems was negative except for what is noted in the interval history    Current Outpatient Medications   Medication     aspirin 81 MG EC tablet     atorvastatin (LIPITOR) 80 MG tablet     diclofenac (VOLTAREN) 1 % topical gel     evolocumab (REPATHA) 140 MG/ML prefilled autoinjector     Ferrous Sulfate (IRON) 325 (65 Fe) MG tablet     folic acid (FOLVITE) 1 MG tablet     gabapentin (NEURONTIN) 600 MG tablet     nitroGLYcerin (NITROSTAT) 0.4 MG sublingual tablet     senna-docusate (SENOKOT-S/PERICOLACE) 8.6-50 MG tablet     sildenafil (REVATIO) 20 MG tablet     oxyCODONE (ROXICODONE) 5 MG tablet     No current facility-administered medications for this visit.        Physical Exam    No  flowsheet data found.    General: alert and cooperative      Lab Results    Recent Results (from the past 168 hour(s))   Iron & Iron Binding Capacity   Result Value Ref Range    Iron 61 61 - 157 ug/dL    Iron Binding Capacity 347 240 - 430 ug/dL    Iron Sat Index 18 15 - 46 %   Ferritin   Result Value Ref Range    Ferritin 22 (L) 31 - 409 ng/mL   CBC with platelets and differential   Result Value Ref Range    WBC Count 5.7 4.0 - 11.0 10e3/uL    RBC Count 5.25 4.40 - 5.90 10e6/uL    Hemoglobin 15.2 13.3 - 17.7 g/dL    Hematocrit 45.4 40.0 - 53.0 %    MCV 87 78 - 100 fL    MCH 29.0 26.5 - 33.0 pg    MCHC 33.5 31.5 - 36.5 g/dL    RDW 13.4 10.0 - 15.0 %    Platelet Count 240 150 - 450 10e3/uL    % Neutrophils 46 %    % Lymphocytes 33 %    % Monocytes 13 %    % Eosinophils 7 %    % Basophils 1 %    Absolute Neutrophils 2.6 1.6 - 8.3 10e3/uL    Absolute Lymphocytes 1.8 0.8 - 5.3 10e3/uL    Absolute Monocytes 0.7 0.0 - 1.3 10e3/uL    Absolute Eosinophils 0.4 0.0 - 0.7 10e3/uL    Absolute Basophils 0.1 0.0 - 0.2 10e3/uL       Imaging    No results found.    A total of 15 min were spent today on this visit which included face to face conversation with the patient, EMR review, counseling and co-ordination of care and medical documentation.      Signed by: Praful Rousseau MD              Again, thank you for allowing me to participate in the care of your patient.        Sincerely,        Praful Rousseau MD

## 2023-04-04 NOTE — LETTER
4/4/2023         RE: Johan Navarro  [address]  [place] [address]        Dear Colleague,    Thank you for referring your patient, Johan Navarro, to the [facility] [place]. Please see a copy of my visit note below.    Virtual Visit Details    Type of service:  Video Visit   Video Start Time: 8:01 AM  Video End Time:8:07 AM    Originating Location (pt. Location): Home    Distant Location (provider location):  Off-site  Platform used for Video Visit: [facility] Hematology and Oncology Progress Note    Patient: Johan Navarro  MRN: [id number]  Date of Service: 08/10/2022        Reason for Visit    Chief Complaint   Patient presents with     RECHECK     Video visit          Problem List Items Addressed This Visit        Hematologic    Iron deficiency anemia due to chronic blood loss - Primary         Assessment and Plan    Iron deficiency anemia secondary to chronic GI blood loss  Labs from 3/28/2023 reviewed.  Ferritin is still mildly low but slightly better from last time.  Currently at 22.  Previously it was 19.  Transferrin saturation is 18%.  Total iron is normal.  TIBC is slightly on the higher side but still normal.  Hemoglobin is pretty good at 15.2.  No thrombocytosis.  MCV is 87.  Overall he is doing really well.  No indications for IV iron infusions right now.  I explained to him that considering the ferritin is still less than 30 I think he still has a little bit of iron deficiency or at least has low iron reserves but he is currently not anemic and asymptomatic.  We will continue to monitor with labs every 3 months.  I will see him back in 1 year.  If his hemoglobin falls further down or if he can symptomatic then we can consider IV iron infusion.    He is agreeable with the plan.     Cancer Staging   No matching staging information was found for the patient.    ECOG Performance    0 - Independent         Problem List    Patient Active Problem List    Diagnosis     Depressive disorder, not elsewhere classified     Elevated prostate specific antigen (PSA)     Osteoarthritis of Hand      Hyperlipidemia LDL goal <100     Mixed hyperlipidemia     Family history of early CAD     Aortic stenosis     Carotid stenosis     Coronary artery disease involving native coronary artery of native heart without angina pectoris     Status post coronary angiogram     Abnormal cardiovascular stress test     Gastrointestinal hemorrhage, unspecified gastrointestinal hemorrhage type     History of prostate cancer     Iron deficiency anemia due to chronic blood loss     Post-operative state          Interval History   Johan Navarro is a 67 year old male with history of recurrent iron deficiency anemia status post IV iron infusions who is seen as a video visit for follow-up.    He continues to do well.  Denies any new issues today.  He has more energy now.  Denies any black or tarry stools.  No bright red blood per rectum.  Recently he underwent left carotid endarterectomy.  Has done well with that.  Continues to be on low-dose aspirin.  Also taking oral iron every other day.  Has some constipation but responsive to supplements.  He had labs last week.        Review of Systems  A comprehensive review of systems was negative except for what is noted in the interval history    Current Outpatient Medications   Medication     aspirin 81 MG EC tablet     atorvastatin (LIPITOR) 80 MG tablet     diclofenac (VOLTAREN) 1 % topical gel     evolocumab (REPATHA) 140 MG/ML prefilled autoinjector     Ferrous Sulfate (IRON) 325 (65 Fe) MG tablet     folic acid (FOLVITE) 1 MG tablet     gabapentin (NEURONTIN) 600 MG tablet     nitroGLYcerin (NITROSTAT) 0.4 MG sublingual tablet     senna-docusate (SENOKOT-S/PERICOLACE) 8.6-50 MG tablet     sildenafil (REVATIO) 20 MG tablet     oxyCODONE (ROXICODONE) 5 MG tablet     No current facility-administered medications for this visit.        Physical Exam    No  flowsheet data found.    General: alert and cooperative      Lab Results    Recent Results (from the past 168 hour(s))   Iron & Iron Binding Capacity   Result Value Ref Range    Iron 61 61 - 157 ug/dL    Iron Binding Capacity 347 240 - 430 ug/dL    Iron Sat Index 18 15 - 46 %   Ferritin   Result Value Ref Range    Ferritin 22 (L) 31 - 409 ng/mL   CBC with platelets and differential   Result Value Ref Range    WBC Count 5.7 4.0 - 11.0 10e3/uL    RBC Count 5.25 4.40 - 5.90 10e6/uL    Hemoglobin 15.2 13.3 - 17.7 g/dL    Hematocrit 45.4 40.0 - 53.0 %    MCV 87 78 - 100 fL    MCH 29.0 26.5 - 33.0 pg    MCHC 33.5 31.5 - 36.5 g/dL    RDW 13.4 10.0 - 15.0 %    Platelet Count 240 150 - 450 10e3/uL    % Neutrophils 46 %    % Lymphocytes 33 %    % Monocytes 13 %    % Eosinophils 7 %    % Basophils 1 %    Absolute Neutrophils 2.6 1.6 - 8.3 10e3/uL    Absolute Lymphocytes 1.8 0.8 - 5.3 10e3/uL    Absolute Monocytes 0.7 0.0 - 1.3 10e3/uL    Absolute Eosinophils 0.4 0.0 - 0.7 10e3/uL    Absolute Basophils 0.1 0.0 - 0.2 10e3/uL       Imaging    No results found.    A total of 15 min were spent today on this visit which included face to face conversation with the patient, EMR review, counseling and co-ordination of care and medical documentation.      Signed by: Praful Rousseau MD              Again, thank you for allowing me to participate in the care of your patient.        Sincerely,        Praful Rousseau MD

## 2023-04-07 DIAGNOSIS — I25.10 CORONARY ARTERY DISEASE INVOLVING NATIVE CORONARY ARTERY OF NATIVE HEART WITHOUT ANGINA PECTORIS: ICD-10-CM

## 2023-04-07 DIAGNOSIS — E78.5 HLD (HYPERLIPIDEMIA): ICD-10-CM

## 2023-04-07 RX ORDER — ATORVASTATIN CALCIUM 80 MG/1
80 TABLET, FILM COATED ORAL DAILY
Qty: 90 TABLET | Refills: 1 | Status: SHIPPED | OUTPATIENT
Start: 2023-04-07 | End: 2023-08-30

## 2023-04-11 ENCOUNTER — HOSPITAL ENCOUNTER (OUTPATIENT)
Dept: ULTRASOUND IMAGING | Facility: CLINIC | Age: 68
Discharge: HOME | End: 2023-04-11
Payer: COMMERCIAL

## 2023-04-11 DIAGNOSIS — I65.22 STENOSIS OF LEFT CAROTID ARTERY: ICD-10-CM

## 2023-04-11 PROCEDURE — 93882 EXTRACRANIAL UNI/LTD STUDY: CPT | Mod: LT

## 2023-04-13 ENCOUNTER — OFFICE VISIT (OUTPATIENT)
Dept: OTHER | Facility: CLINIC | Age: 68
End: 2023-04-13
Payer: COMMERCIAL

## 2023-04-13 VITALS — HEART RATE: 85 BPM | DIASTOLIC BLOOD PRESSURE: 74 MMHG | SYSTOLIC BLOOD PRESSURE: 169 MMHG

## 2023-04-13 DIAGNOSIS — I65.22 STENOSIS OF LEFT CAROTID ARTERY: Primary | ICD-10-CM

## 2023-04-13 PROCEDURE — G0463 HOSPITAL OUTPT CLINIC VISIT: HCPCS

## 2023-04-13 PROCEDURE — 99212 OFFICE O/P EST SF 10 MIN: CPT

## 2023-04-13 NOTE — PROGRESS NOTES
VASCULAR SURGERY PROGRESS NOTE    [facility]    Johan Navarro  Medical Record #:  [id number]  YOB: 1955  Age:  68 year old     Date of Service: 4/13/2023    PRIMARY CARE PROVIDER: Washington Yang    Reason for visit:  3 month follow up post L CEA    Otis Navarro is a pleasant 68 year old gentleman who comes into clinic for 3 month follow-up post L carotid endarterectomy with Dr. Aponte on 12/28/2-23 for asymptomatic L carotid stenosis.     He has a history of moderate aortic valve stenosis-status post coronary interventions with his most recent intervention being the placement of ANNETTE in mid-circumflex on 10/11/2021. The patient is known to our clinic for asymptomatic left carotid stenosis.      On examination today Otis is alert and oriented x4, neurologically intact  /76 P 85  Incision is fully healed, denies numbness or pain around the incision site  He has no concerns   Denies stroke like symptoms, amaurosis fugax          RECOMMENDATION/RISKS: Continue on Aspirin and his statin. Follow-up in 1 year with [facility] for bilateral carotid duplex and appointment. Otis has no questions and is in agreement of plan.      REVIEW OF SYSTEMS:    A 12 point ROS was reviewed and is negative except for what is listed above in HPI.    PHH:    Past Medical History:   Diagnosis Date     Aortic stenosis      Arthritis     hands     CAD (coronary artery disease)     cardiac cath 1998: stents x 2 to circumflex     Carotid stenosis     left     Family history of early CAD      Hyperlipidaemia LDL goal < 70     familial hyperlipidemia with marked hypercholesterolemia before treatment; has been on some form of cholesterol-lowering medication since the 1970s     Prostate cancer (H) 2010     Sleep apnea      Syncope      Unstable angina (H) 1/6/2021          Past Surgical History:   Procedure Laterality Date     CARDIAC SURGERY  1998    coronary stents x2 placed 1998; angioplasty     CV HEART  CATHETERIZATION WITH POSSIBLE INTERVENTION N/A 7/21/2020    Procedure: Heart Catheterization with Possible Intervention;  Surgeon: Alber Bentley MD;  Location:  HEART CARDIAC CATH LAB     CV HEART CATHETERIZATION WITH POSSIBLE INTERVENTION N/A 10/11/2021    Procedure: Heart Catheterization with Possible Intervention;  Surgeon: Patsy Wallace MD;  Location:  HEART CARDIAC CATH LAB     CV INSTANTANEOUS WAVE-FREE RATIO N/A 10/11/2021    Procedure: Instantaneous Wave-Free Ratio;  Surgeon: Patsy Wallace MD;  Location:  HEART CARDIAC CATH LAB     CV LEFT HEART CATH N/A 10/11/2021    Procedure: Left Heart Cath;  Surgeon: Patsy Wallace MD;  Location:  HEART CARDIAC CATH LAB     CV PCI STENT DRUG ELUTING N/A 10/11/2021    Procedure: Percutaneous Coronary Intervention Stent Drug Eluting;  Surgeon: Patsy Wallace MD;  Location:  HEART CARDIAC CATH LAB     DAVINCI PROSTATECTOMY      2010     ENDARTERECTOMY CAROTID Left 12/27/2022    Procedure: LEFT CAROTID ENDARTERECTOMY WITH BOVINE PERICARDIUM PATCH ANGIOPLASTY, AND INTRAOPERATIVE ELECTROENCEPHALOGRAM MONITORING;  Surgeon: Montez Aponte MD;  Location:  OR     ESOPHAGOSCOPY, GASTROSCOPY, DUODENOSCOPY (EGD), COMBINED N/A 1/6/2021    Procedure: ESOPHAGOGASTRODUODENOSCOPY (EGD);  Surgeon: Rosemarie Laws MD;  Location:  GI     EYE SURGERY Bilateral 2017    eyelids     GENITOURINARY SURGERY       ORTHOPEDIC SURGERY Right     achilles tendon; post football injury     ORTHOPEDIC SURGERY Right     hand; near thumb. has wires in there. cysts     REPAIR TENDON BICEPS DISTAL UPPER EXTREMITY Right 7/16/2018    Procedure: REPAIR TENDON BICEPS DISTAL UPPER EXTREMITY;  Right open biceps tendon repair  ;  Surgeon: Alber Zabala MD;  Location:  OR     SURGICAL HISTORY OF -       Right hand Xanthoma removal     SURGICAL HISTORY OF -       Stress Echo (-)       ALLERGIES:  Crestor [rosuvastatin], Dust mites, Other [no clinical  screening - see comments], and Pollen extract    MEDS:    Current Outpatient Medications:      aspirin 81 MG EC tablet, Take 81 mg by mouth daily, Disp: , Rfl:      atorvastatin (LIPITOR) 80 MG tablet, Take 1 tablet (80 mg) by mouth daily, Disp: 90 tablet, Rfl: 1     diclofenac (VOLTAREN) 1 % topical gel, APPLY 2 GRAMS TO HANDS AND FINGERS FOUR TIMES DAILY USING DOSING CARD, Disp: 100 g, Rfl: 0     evolocumab (REPATHA) 140 MG/ML prefilled autoinjector, Inject 1 mL (140 mg) Subcutaneous every 14 days Appointment required for further refills, Disp: 6 mL, Rfl: 0     Ferrous Sulfate (IRON) 325 (65 Fe) MG tablet, Take 1 tablet by mouth every other day, Disp: , Rfl:      folic acid (FOLVITE) 1 MG tablet, TAKE 1 TABLET(1 MG) BY MOUTH EVERY EVENING, Disp: 90 tablet, Rfl: 1     gabapentin (NEURONTIN) 600 MG tablet, TAKE 1 TABLET(600 MG) BY MOUTH TWICE DAILY, Disp: 180 tablet, Rfl: 1     nitroGLYcerin (NITROSTAT) 0.4 MG sublingual tablet, For chest pain place 1 tablet under the tongue every 5 minutes for 3 doses. If symptoms persist 5 minutes after 1st dose call 911., Disp: 30 tablet, Rfl: 0     oxyCODONE (ROXICODONE) 5 MG tablet, Take 1 tablet (5 mg) by mouth every 4 hours as needed for moderate pain (4-6), Disp: 3 tablet, Rfl: 0     senna-docusate (SENOKOT-S/PERICOLACE) 8.6-50 MG tablet, Take 1 tablet by mouth 2 times daily, Disp: 10 tablet, Rfl: 0     sildenafil (REVATIO) 20 MG tablet, TAKE 2 TO 5 TABLETS BY MOUTH AS NEEDED PRIOR TO INTERCOURSE. MAX 100MG(5 TABLETS)/DAY, Disp: 90 tablet, Rfl: 0    SOCIAL HABITS:    History   Smoking Status     Former     Packs/day: 0.25     Years: 20.00     Types: Cigars, Cigarettes     Quit date: 2000   Smokeless Tobacco     Current     Types: Snuff     Social History    Substance and Sexual Activity      Alcohol use: Not Currently        Comment: very rare, none for 8 years      History   Drug Use     Types: Marijuana     Comment: daily        FAMILY HISTORY:    Family History   Problem  Relation Age of Onset     Lipids Mother      PEPE. Mother          at age 49 of MI     Dementia Father      JOEY.A.D. Sister         MI at age 48     Cancer - colorectal No family hx of      Prostate Cancer No family hx of      Colon Cancer No family hx of        PE:  BP (!) 169/74 (BP Location: Right arm, Patient Position: Chair, Cuff Size: Adult Regular)   Pulse 85   Wt Readings from Last 1 Encounters:   22 150 lb 4.8 oz (68.2 kg)     There is no height or weight on file to calculate BMI.      DIAGNOSTIC STUDIES:     Images:  US Carotid Left    Result Date: 2023  LEFT CAROTID ULTRASOUND   2023 8:08 AM HISTORY:  Left CEA on  with Dr. Aponte. Stenosis of left carotid artery. COMPARISON: 2022 LEFT CAROTID FINDINGS:  Changes of carotid endarterectomy. Left ICA PSV:  85  cm/sec. Left ICA EDV:  32 cm/sec. Left ICA/CCA PSV Ratio:  0.99  These indicate less than 50% diameter stenosis of the left ICA.  Left Vertebral: Antegrade flow. Left ECA: Antegrade flow. Causes of Decreased Accuracy:   None.     IMPRESSION:  Less than 50% diameter stenosis of the left ICA relative to the distal ICA diameter. Changes of carotid endarterectomy. LELA LI DO   SYSTEM ID:  [id number]        LABS:      Sodium   Date Value Ref Range Status   2022 138 133 - 144 mmol/L Final   2022 143 136 - 145 mmol/L Final   2022 138 133 - 144 mmol/L Final   2021 136 133 - 144 mmol/L Final   2021 138 133 - 144 mmol/L Final   2021 136 133 - 144 mmol/L Final     Urea Nitrogen   Date Value Ref Range Status   2022 18 7 - 30 mg/dL Final   2022 9.1 8.0 - 23.0 mg/dL Final   2022 19 7 - 30 mg/dL Final   10/12/2021 13 7 - 30 mg/dL Final   2021 14 7 - 30 mg/dL Final   2021 15 7 - 30 mg/dL Final   2021 21 7 - 30 mg/dL Final     Hemoglobin   Date Value Ref Range Status   2023 15.2 13.3 - 17.7 g/dL Final   2022 14.0 13.3 - 17.7 g/dL Final   2022  12.1 (L) 13.3 - 17.7 g/dL Final   03/24/2021 11.2 (L) 13.3 - 17.7 g/dL Final     Comment:     Results confirmed by repeat test   01/21/2021 9.8 (L) 13.3 - 17.7 g/dL Final     Comment:     Results confirmed by repeat test   01/12/2021 9.4 (L) 13.3 - 17.7 g/dL Final     Comment:     Results confirmed by repeat test     Platelet Count   Date Value Ref Range Status   03/28/2023 240 150 - 450 10e3/uL Final   12/21/2022 211 150 - 450 10e3/uL Final   08/09/2022 271 150 - 450 10e3/uL Final   03/24/2021 206 150 - 450 10e9/L Final   01/21/2021 437 150 - 450 10e9/L Final   01/12/2021 184 150 - 450 10e9/L Final     INR   Date Value Ref Range Status   01/06/2021 1.03 0.86 - 1.14 Final   07/21/2020 0.96 0.86 - 1.14 Final       15 minutes spent on the day of encounter doing chart review, history and exam, documentation, and further activities as noted.       Stormy Bermudez, NP  VASCULAR SURGERY

## 2023-04-13 NOTE — PROGRESS NOTES
[facility]        Patient is here for a  follow up.     Pt is currently taking Aspirin and Statin.    BP (!) 169/74 (BP Location: Right arm, Patient Position: Chair, Cuff Size: Adult Regular)   Pulse 85     The provider has been notified that the patient has no concerns.     Questions patient would like addressed today are: N/A.    Refills are needed: N/A    Has homecare services and agency name:  Cece Terry MA

## 2023-05-01 DIAGNOSIS — E78.2 MIXED HYPERLIPIDEMIA: ICD-10-CM

## 2023-06-03 DIAGNOSIS — M19.249 SECONDARY LOCALIZED OSTEOARTHROSIS OF HAND, UNSPECIFIED LATERALITY: ICD-10-CM

## 2023-06-06 NOTE — TELEPHONE ENCOUNTER
Routing refill request to provider for review/approval because:  No appointment with PCP in past 12 months, pt has had appointment elsewhere  Does pt need appointment now? Please confirm, route to TC for scheduling if appointment needed  Rabia Tello RN, BSN  [facility]

## 2023-06-13 ENCOUNTER — TELEPHONE (OUTPATIENT)
Dept: FAMILY MEDICINE | Facility: CLINIC | Age: 68
End: 2023-06-13

## 2023-06-13 NOTE — LETTER
[facility]  [address] [address]  [phone]       June 27, 2023    Johan Navarro  [address]  [place] [address]    Dear Otis,    We care about your health and have reviewed your health plan and are making recommendations based on this review, to optimize your health.    You are in particular need of attention regarding:  -Coronary Artery and/or Vascular Disease  -Wellness (Physical) Visit     We are recommending that you:  -schedule a FOLLOWUP OFFICE APPOINTMENT with me.       -schedule a WELLNESS (Physical) APPOINTMENT with me.   I will check fasting labs the same day - nothing to eat except water and meds for 8-10 hours prior.    In addition, here is a list of due or overdue Health Maintenance reminders.    Health Maintenance Due   Topic Date Due    Annual Wellness Visit  07/16/2020    Pneumococcal Vaccine (2 - PCV) 08/31/2021    PHQ-2 (once per calendar year)  01/01/2023    COVID-19 Vaccine (6 - Pfizer series) 03/11/2023       To address the above recommendations, we encourage you to contact us at [phone], via [facility] or by contacting Central Scheduling toll free at [phone] 24 hours a day. They will assist you with finding the most convenient time and location.    Thank you for trusting [facility] and we appreciate the opportunity to serve you.  We look forward to supporting your healthcare needs in the future.    Healthy Regards,    Your [facility] Team

## 2023-06-13 NOTE — CONFIDENTIAL NOTE
Patient Quality Outreach    Patient is due for the following:   IVD  -  IVD Follow-up Visit  Physical Annual Wellness Visit    Next Steps:   Schedule a Annual Wellness Visit    Type of outreach:    Sent [email or web address] message.      Questions for provider review:    None           Vince Nelson MA

## 2023-06-27 ENCOUNTER — LAB (OUTPATIENT)
Dept: LAB | Facility: CLINIC | Age: 68
End: 2023-06-27
Attending: INTERNAL MEDICINE
Payer: COMMERCIAL

## 2023-06-27 DIAGNOSIS — D50.0 IRON DEFICIENCY ANEMIA DUE TO CHRONIC BLOOD LOSS: ICD-10-CM

## 2023-06-27 LAB
BASOPHILS # BLD AUTO: 0 10E3/UL (ref 0–0.2)
BASOPHILS NFR BLD AUTO: 0 %
EOSINOPHIL # BLD AUTO: 0.4 10E3/UL (ref 0–0.7)
EOSINOPHIL NFR BLD AUTO: 6 %
ERYTHROCYTE [DISTWIDTH] IN BLOOD BY AUTOMATED COUNT: 13.9 % (ref 10–15)
FERRITIN SERPL-MCNC: 29 NG/ML (ref 31–409)
HCT VFR BLD AUTO: 44.6 % (ref 40–53)
HGB BLD-MCNC: 14.5 G/DL (ref 13.3–17.7)
IMM GRANULOCYTES # BLD: 0 10E3/UL
IMM GRANULOCYTES NFR BLD: 0 %
IRON BINDING CAPACITY (ROCHE): 312 UG/DL (ref 240–430)
IRON SATN MFR SERPL: 12 % (ref 15–46)
IRON SERPL-MCNC: 37 UG/DL (ref 61–157)
LYMPHOCYTES # BLD AUTO: 1.8 10E3/UL (ref 0.8–5.3)
LYMPHOCYTES NFR BLD AUTO: 28 %
MCH RBC QN AUTO: 28.9 PG (ref 26.5–33)
MCHC RBC AUTO-ENTMCNC: 32.5 G/DL (ref 31.5–36.5)
MCV RBC AUTO: 89 FL (ref 78–100)
MONOCYTES # BLD AUTO: 0.7 10E3/UL (ref 0–1.3)
MONOCYTES NFR BLD AUTO: 11 %
NEUTROPHILS # BLD AUTO: 3.6 10E3/UL (ref 1.6–8.3)
NEUTROPHILS NFR BLD AUTO: 55 %
PLATELET # BLD AUTO: 225 10E3/UL (ref 150–450)
RBC # BLD AUTO: 5.01 10E6/UL (ref 4.4–5.9)
WBC # BLD AUTO: 6.5 10E3/UL (ref 4–11)

## 2023-06-27 PROCEDURE — 36415 COLL VENOUS BLD VENIPUNCTURE: CPT

## 2023-06-27 PROCEDURE — 83550 IRON BINDING TEST: CPT

## 2023-06-27 PROCEDURE — 83540 ASSAY OF IRON: CPT

## 2023-06-27 PROCEDURE — 82728 ASSAY OF FERRITIN: CPT

## 2023-06-27 PROCEDURE — 85025 COMPLETE CBC W/AUTO DIFF WBC: CPT

## 2023-06-30 DIAGNOSIS — I25.10 CORONARY ARTERY DISEASE INVOLVING NATIVE CORONARY ARTERY OF NATIVE HEART WITHOUT ANGINA PECTORIS: ICD-10-CM

## 2023-06-30 RX ORDER — GABAPENTIN 600 MG/1
TABLET ORAL
Qty: 180 TABLET | Refills: 1 | Status: SHIPPED | OUTPATIENT
Start: 2023-06-30 | End: 2023-09-25

## 2023-07-02 DIAGNOSIS — D64.9 ANEMIA, UNSPECIFIED TYPE: ICD-10-CM

## 2023-07-02 NOTE — LETTER
July 12, 2023      Johan Navarro  [address]  [place] [address]        Mani Berg,    We recently received a call from your pharmacy requesting a  refill request for your medication folic acid. We are contacting you today to notify you that you are due for PHYSICAL for further refills.     We have authorized a one time refill of your medication to allow time for you to schedule your appointment.     Please call [phone] to schedule an appointment or if you have MyChart chart you can schedule with your provider as well.     Taking care of your health is important to us, and ongoing visits with your provider are vital to your care. We look forward to seeing you in the near future.     Thank you for using [email or web address] for your medical needs.       Sincerely,        Washington Yang PA-C

## 2023-07-05 RX ORDER — FOLIC ACID 1 MG/1
TABLET ORAL
Qty: 90 TABLET | Refills: 0 | Status: SHIPPED | OUTPATIENT
Start: 2023-07-05 | End: 2023-09-28

## 2023-07-05 NOTE — TELEPHONE ENCOUNTER
Sent [employer] message requesting a call back for an appt (med check). Two more attempts will be made.     Susan You

## 2023-07-05 NOTE — TELEPHONE ENCOUNTER
Medication is being filled for 1 time refill only due to:  Patient needs to be seen because it has been more than one year since last visit.  LOV 5/24/22.    MA/TC:  Please help pt schedule appt    Jemima Vicente RN, BSN  [facility]

## 2023-07-08 NOTE — ANESTHESIA PREPROCEDURE EVALUATION
Anesthesia Evaluation     .             ROS/MED HX    ENT/Pulmonary:  - neg pulmonary ROS     Neurologic:  - neg neurologic ROS     Cardiovascular:     (+) hypertension-Peripheral Vascular Disease-- Carotid Stenosis and Non Symptomatic, CAD, --. : . . . :. .       METS/Exercise Tolerance:     Hematologic:  - neg hematologic  ROS       Musculoskeletal:   (+) , , other musculoskeletal- biceps tear      GI/Hepatic:  - neg GI/hepatic ROS       Renal/Genitourinary:  - ROS Renal section negative       Endo:  - neg endo ROS       Psychiatric:  - neg psychiatric ROS       Infectious Disease:  - neg infectious disease ROS       Malignancy:      - no malignancy   Other:    (+) No chance of pregnancy C-spine cleared: N/A, no H/O Chronic Pain,no other significant disability                    Physical Exam  Normal systems: cardiovascular, pulmonary and dental    Airway   Mallampati: II  TM distance: >3 FB  Neck ROM: full    Dental     Cardiovascular       Pulmonary                     Anesthesia Plan      History & Physical Review  History and physical reviewed and following examination; no interval change.    ASA Status:  3 .    NPO Status:  > 8 hours    Plan for General with Intravenous induction.   PONV prophylaxis:  Ondansetron (or other 5HT-3) and Dexamethasone or Solumedrol       Postoperative Care  Postoperative pain management:  IV analgesics.      Consents  Anesthetic plan, risks, benefits and alternatives discussed with:  Patient.  Use of blood products discussed: Yes.   Use of blood products discussed with Patient.  Consented to blood products.  .                          .   seen and examined   intubated/ventilated   on pressors   cvvhd on hold         PAST HISTORY  --------------------------------------------------------------------------------  No significant changes to PMH, PSH, FHx, SHx, unless otherwise noted    ALLERGIES & MEDICATIONS  --------------------------------------------------------------------------------  Allergies    No Known Allergies    Intolerances      Standing Inpatient Medications  bacitracin   Ointment 1 Application(s) Topical two times a day  chlorhexidine 0.12% Liquid 15 milliLiter(s) Oral Mucosa every 12 hours  chlorhexidine 2% Cloths 1 Application(s) Topical <User Schedule>  DAPTOmycin IVPB 460 milliGRAM(s) IV Intermittent every 48 hours  dexMEDEtomidine Infusion 0.2 MICROgram(s)/kG/Hr IV Continuous <Continuous>  heparin   Injectable 5000 Unit(s) SubCutaneous every 8 hours  levoFLOXacin IVPB 750 milliGRAM(s) IV Intermittent every 48 hours  meropenem  IVPB 1000 milliGRAM(s) IV Intermittent every 12 hours  pantoprazole  Injectable 40 milliGRAM(s) IV Push every 12 hours  Parenteral Nutrition - Adult 1 Each TPN Continuous <Continuous>  phenylephrine    Infusion 0.1 MICROgram(s)/kG/Min IV Continuous <Continuous>  vasopressin Infusion 0.03 Unit(s)/Min IV Continuous <Continuous>    PRN Inpatient Medications  sodium chloride 0.9% lock flush 10 milliLiter(s) IV Push every 1 hour PRN  sodium chloride 0.9% lock flush 10 milliLiter(s) IV Push every 1 hour PRN        VITALS/PHYSICAL EXAM  --------------------------------------------------------------------------------  T(C): 37.7 (07-08-23 @ 04:00), Max: 37.9 (07-07-23 @ 18:00)  HR: 56 (07-08-23 @ 06:00) (56 - 91)  BP: 98/58 (07-07-23 @ 08:00) (98/58 - 98/58)  RR: 16 (07-08-23 @ 06:00) (16 - 33)  SpO2: 100% (07-08-23 @ 06:00) (99% - 100%)  Wt(kg): --        07-07-23 @ 07:01  -  07-08-23 @ 07:00  --------------------------------------------------------  IN: 1659.7 mL / OUT: 2968 mL / NET: -1308.3 mL      Physical Exam:  	Gen: intubated/ventilated   	Pulm:  B/L lorenzo   	CV:  S1S2; no rub  	Abd: +distended  	LE: edema  	Vascular access:    LABS/STUDIES  --------------------------------------------------------------------------------              6.6    14.95 >-----------<  20       [07-08-23 @ 04:30]              20.5     138  |  105  |  77  ----------------------------<  149      [07-08-23 @ 04:30]  6.3   |  23  |  1.6        Ca     7.1     [07-08-23 @ 04:30]      Mg     2.4     [07-08-23 @ 04:30]      Phos  3.9     [07-08-23 @ 04:30]      Creatinine Trend:  SCr 1.6 [07-08 @ 04:30]  SCr 1.2 [07-07 @ 04:40]  SCr 1.0 [07-06 @ 15:30]  SCr 0.9 [07-06 @ 10:30]  SCr 1.0 [07-06 @ 04:30]    Urinalysis - [07-08-23 @ 04:30]      Color  / Appearance  / SG  / pH       Gluc 149 / Ketone   / Bili  / Urobili        Blood  / Protein  / Leuk Est  / Nitrite       RBC  / WBC  / Hyaline  / Gran  / Sq Epi  / Non Sq Epi  / Bacteria       Iron 12, TIBC 128, %sat 9      [05-07-23 @ 09:19]  Ferritin 526      [05-07-23 @ 09:19]  Vitamin D (25OH) 26      [05-08-23 @ 07:58]  TSH 0.45      [06-19-23 @ 14:14]  Lipid: chol 58, TG 88, HDL 22, LDL --      [07-02-23 @ 21:01]

## 2023-08-29 NOTE — PROGRESS NOTES
HPI and Plan:       I had the pleasure of seeing Mr. Navarro in followup at the [facility] today.  He is a very pleasant 68-year-old gentleman who I last saw in 02/2022.  He has a history of coronary artery disease and is status post stenting of the distal and ostial circumflex as well as the first obtuse marginal in 1998.  He has severe, probably familial, dyslipidemia with a markedly elevated LDL, although we have been able to control this well with Repatha and atorvastatin.  He also has a history of moderate aortic stenosis.      He then underwent coronary angiography on 07/21/2020 and was found to have significant stenosis of the ostium of the second obtuse marginal.  He underwent successful intervention with a drug-eluting stent at the time.  His other coronary arteries demonstrated mild-to-moderate nonobstructive disease in a left dominant system.      In January of 2021 he was experiencing significant fatigue and dyspnea on exertion and was found to have significant anemia in the context of dark stools for which a comprehensive gastrointestinal work-up was performed but was ultimately negative.      He was readmitted to [facility] in October 2021 with a non-ST elevation myocardial infarction.  He was found to have severe stenosis of the mid circumflex and underwent PCI of the mid circumflex at the time with a synergy drug-eluting stent.  He was found to have moderate to severe nonobstructive stenosis of the OM1 with a negative IFR.  An echocardiogram at the time demonstrated probable moderate aortic stenosis but normal left ventricular systolic function.  He was again placed on dual antiplatelet therapy prior to discharge.     Unfortunately he had subsequent GI bleeding and anemia and underwent a repeat GI evaluation in 2022 that included capsule endoscopy.  He was found to have small bowel AVMs on his capsule study.  His EGD demonstrated esophagitis and a colonoscopy  demonstrated diverticulosis but no evidence of active bleeding.    Since then he has been followed by oncology/hematology, most recently in April of this year.  His iron deficiency anemia appears to be stable.    Since I last saw him he has also undergone left-sided carotid endarterectomy in December 2023 for asymptomatic left carotid stenosis.    Today he feels well overall from a cardiovascular standpoint.  He specifically denies any chest discomfort, palpitations, syncope or presyncope.  He denies any PND orthopnea.  He has been exercising for 30 minutes a day on a consistent basis without any cardiac limitations.        PHYSICAL EXAMINATION:  Dictated below.  Of note, I did recheck his blood pressure after our conversation and it was still elevated at 150/80 mmHg.     LABORATORY STUDIES:  Lipids on 12/27/2022 demonstrated total cholesterol 149, HDL of 69, LDL of 69 and triglycerides of 55.    Hemoglobin on 6/27/2023 was within normal limits at 14.5.    Last echocardiogram on file from October 2021 demonstrated normal left ventricular systolic function with moderate aortic stenosis and mild aortic regurgitation.     IMPRESSION:     1.  Coronary artery disease status post most recent PCI to the mid circumflex in October 2021 in the context of a non-ST elevation myocardial infarction.  He is status post revascularization of the second obtuse marginal in 07/2020 and prior circumflex/obtuse marginal revascularization in 1998.   2.  Significant dyslipidemia characterized by severe LDL elevation.  This has normalized with the use of Repatha and atorvastatin.   3.  History of moderate aortic stenosis.   4.  Left internal carotid stenosis status post carotid endarterectomy as described above.  5.  Iron deficiency anemia as described above.  6.  Elevated blood pressure today without a formal diagnosis of hypertension.    PLAN    Mr. Navarro is doing well overall from a cardiovascular standpoint.  There is no evidence of  angina or congestive heart failure.  His medications are appropriate and his lipids are at goal.    On review of his previous blood pressure readings, they appear to have started trending upwards in 2022.  I have asked him to keep track of his blood pressure at home over the next few days and to contact his at the end of this week with the results.  If they are consistently elevated then I think that initiation of antihypertensive therapy would be appropriate.    I will also order an echocardiogram for a reassessment of his aortic valve disease.  Assuming the findings are stable, I will plan on follow-up in approximately 1 year.    It was a pleasure seeing him today.    CURRENT MEDICATIONS:  Current Outpatient Medications   Medication Sig Dispense Refill    aspirin 81 MG EC tablet Take 81 mg by mouth daily      atorvastatin (LIPITOR) 80 MG tablet Take 1 tablet (80 mg) by mouth daily 90 tablet 1    diclofenac (VOLTAREN) 1 % topical gel APPLY 2 GM TO HANDS AND FINGERS FOUR TIMES DAILY USING DOSING CARD 100 g 0    evolocumab (REPATHA) 140 MG/ML prefilled autoinjector Inject 1 mL (140 mg) Subcutaneous every 14 days 6 mL 1    Ferrous Sulfate (IRON) 325 (65 Fe) MG tablet Take 1 tablet by mouth every other day      folic acid (FOLVITE) 1 MG tablet TAKE 1 TABLET(1 MG) BY MOUTH EVERY EVENING 90 tablet 0    gabapentin (NEURONTIN) 600 MG tablet TAKE 1 TABLET(600 MG) BY MOUTH TWICE DAILY 180 tablet 1    nitroGLYcerin (NITROSTAT) 0.4 MG sublingual tablet For chest pain place 1 tablet under the tongue every 5 minutes for 3 doses. If symptoms persist 5 minutes after 1st dose call 911. 30 tablet 0    oxyCODONE (ROXICODONE) 5 MG tablet Take 1 tablet (5 mg) by mouth every 4 hours as needed for moderate pain (4-6) 3 tablet 0    senna-docusate (SENOKOT-S/PERICOLACE) 8.6-50 MG tablet Take 1 tablet by mouth 2 times daily 10 tablet 0    sildenafil (REVATIO) 20 MG tablet TAKE 2 TO 5 TABLETS BY MOUTH AS NEEDED PRIOR TO INTERCOURSE. MAX  100MG(5 TABLETS)/DAY 90 tablet 0       ALLERGIES     Allergies   Allergen Reactions    Crestor [Rosuvastatin] GI Disturbance    Dust Mites     Other [No Clinical Screening - See Comments]      Goose feathers    Pollen Extract        PAST MEDICAL HISTORY:  Past Medical History:   Diagnosis Date    Aortic stenosis     Arthritis     hands    CAD (coronary artery disease)     cardiac cath 1998: stents x 2 to circumflex    Carotid stenosis     left    Family history of early CAD     Hyperlipidaemia LDL goal < 70     familial hyperlipidemia with marked hypercholesterolemia before treatment; has been on some form of cholesterol-lowering medication since the 1970s    Prostate cancer (H) 2010    Sleep apnea     Syncope     Unstable angina (H) 1/6/2021       PAST SURGICAL HISTORY:  Past Surgical History:   Procedure Laterality Date    CARDIAC SURGERY  1998    coronary stents x2 placed 1998; angioplasty    CV HEART CATHETERIZATION WITH POSSIBLE INTERVENTION N/A 7/21/2020    Procedure: Heart Catheterization with Possible Intervention;  Surgeon: Alber Bentley MD;  Location: [facility] CARDIAC CATH LAB    CV HEART CATHETERIZATION WITH POSSIBLE INTERVENTION N/A 10/11/2021    Procedure: Heart Catheterization with Possible Intervention;  Surgeon: Patsy Wallace MD;  Location: [facility] CARDIAC CATH LAB    CV INSTANTANEOUS WAVE-FREE RATIO N/A 10/11/2021    Procedure: Instantaneous Wave-Free Ratio;  Surgeon: aPtsy Wallace MD;  Location: [facility] CARDIAC CATH LAB    CV LEFT HEART CATH N/A 10/11/2021    Procedure: Left Heart Cath;  Surgeon: Patsy Wallace MD;  Location: [facility] CARDIAC CATH LAB    CV PCI STENT DRUG ELUTING N/A 10/11/2021    Procedure: Percutaneous Coronary Intervention Stent Drug Eluting;  Surgeon: Patsy Wallace MD;  Location: [facility] CARDIAC CATH LAB    DAVINCI PROSTATECTOMY      2010    ENDARTERECTOMY CAROTID Left 12/27/2022    Procedure: LEFT CAROTID ENDARTERECTOMY WITH BOVINE PERICARDIUM PATCH  ANGIOPLASTY, AND INTRAOPERATIVE ELECTROENCEPHALOGRAM MONITORING;  Surgeon: Montez Aponte MD;  Location:  OR    ESOPHAGOSCOPY, GASTROSCOPY, DUODENOSCOPY (EGD), COMBINED N/A 2021    Procedure: ESOPHAGOGASTRODUODENOSCOPY (EGD);  Surgeon: Rosemarie Laws MD;  Location:  GI    EYE SURGERY Bilateral 2017    eyelids    GENITOURINARY SURGERY      ORTHOPEDIC SURGERY Right     achilles tendon; post football injury    ORTHOPEDIC SURGERY Right     hand; near thumb. has wires in there. cysts    REPAIR TENDON BICEPS DISTAL UPPER EXTREMITY Right 2018    Procedure: REPAIR TENDON BICEPS DISTAL UPPER EXTREMITY;  Right open biceps tendon repair  ;  Surgeon: Alber Zabala MD;  Location: [facility] OR    SURGICAL HISTORY OF -       Right hand Xanthoma removal    SURGICAL HISTORY OF -       Stress Echo (-)       FAMILY HISTORY:  Family History   Problem Relation Age of Onset    Lipids Mother     C.A.D. Mother          at age 49 of MI    Dementia Father     C.A.D. Sister         MI at age 48    Cancer - colorectal No family hx of     Prostate Cancer No family hx of     Colon Cancer No family hx of        SOCIAL HISTORY:  Social History     Socioeconomic History    Marital status:    Tobacco Use    Smoking status: Former     Packs/day: 0.25     Years: 20.00     Pack years: 5.00     Types: Cigars, Cigarettes     Quit date: 2000     Years since quittin.6    Smokeless tobacco: Current     Types: Snuff    Tobacco comments:     12/10/18: occ chew, not every day   Vaping Use    Vaping Use: Never used   Substance and Sexual Activity    Alcohol use: Not Currently     Comment: very rare, none for 8 years    Drug use: Yes     Types: Marijuana     Comment: daily     Sexual activity: Yes     Partners: Female   Other Topics Concern    Caffeine Concern Yes     Comment: coffee and soda    Special Diet No    Exercise Yes     Comment: regular     Seat Belt Yes    Parent/sibling w/ CABG, MI or angioplasty before  65F 55M? Yes     Comment: 49       Review of Systems:  Skin:          Eyes:         ENT:         Respiratory:          Cardiovascular:         Gastroenterology:        Genitourinary:         Musculoskeletal:         Neurologic:         Psychiatric:         Heme/Lymph/Imm:         Endocrine:           Physical Exam:  Vitals: There were no vitals taken for this visit.    Constitutional:  cooperative        Skin:  warm and dry to the touch          Head:  normocephalic        Eyes:  pupils equal and round        Lymph:      ENT:  no pallor or cyanosis        Neck:  JVP normal        Respiratory:  clear to auscultation         Cardiac: regular rhythm              2/6 systolic ejection murmur heard best at the right upper sternal border.  pulses full and equal                                        GI:  abdomen soft        Extremities and Muscular Skeletal:  no edema              Neurological:  affect appropriate        Psych:  Alert and Oriented x 3        CC  Benjaminal Juan Antonio Galicia MD  [address]  [place] [id number]

## 2023-08-30 ENCOUNTER — OFFICE VISIT (OUTPATIENT)
Dept: CARDIOLOGY | Facility: CLINIC | Age: 68
End: 2023-08-30
Attending: INTERNAL MEDICINE
Payer: COMMERCIAL

## 2023-08-30 VITALS
SYSTOLIC BLOOD PRESSURE: 161 MMHG | WEIGHT: 153 LBS | DIASTOLIC BLOOD PRESSURE: 93 MMHG | HEIGHT: 66 IN | BODY MASS INDEX: 24.59 KG/M2 | HEART RATE: 79 BPM

## 2023-08-30 DIAGNOSIS — I21.4 NSTEMI (NON-ST ELEVATED MYOCARDIAL INFARCTION) (H): ICD-10-CM

## 2023-08-30 DIAGNOSIS — I35.0 NONRHEUMATIC AORTIC VALVE STENOSIS: ICD-10-CM

## 2023-08-30 DIAGNOSIS — I25.10 CORONARY ARTERY DISEASE INVOLVING NATIVE CORONARY ARTERY OF NATIVE HEART WITHOUT ANGINA PECTORIS: ICD-10-CM

## 2023-08-30 DIAGNOSIS — E78.2 MIXED HYPERLIPIDEMIA: ICD-10-CM

## 2023-08-30 PROCEDURE — 99214 OFFICE O/P EST MOD 30 MIN: CPT | Performed by: INTERNAL MEDICINE

## 2023-08-30 RX ORDER — NITROGLYCERIN 0.4 MG/1
TABLET SUBLINGUAL
Qty: 30 TABLET | Refills: 0 | Status: SHIPPED | OUTPATIENT
Start: 2023-08-30

## 2023-08-30 RX ORDER — ATORVASTATIN CALCIUM 80 MG/1
80 TABLET, FILM COATED ORAL DAILY
Qty: 90 TABLET | Refills: 3 | Status: SHIPPED | OUTPATIENT
Start: 2023-08-30

## 2023-08-30 NOTE — PATIENT INSTRUCTIONS
Please check your blood pressure in the morning over the next few days and call us on 09/01/2023 with the results.

## 2023-08-30 NOTE — LETTER
8/30/2023    Washington Yang PA-C  [id number]  [facility] [id number]    RE: Johan REBECCA Navarro       Dear Colleague,     I had the pleasure of seeing Johan Navarro in the [facility].  HPI and Plan:       I had the pleasure of seeing Mr. Navarro in followup at the [facility] today.  He is a very pleasant 68-year-old gentleman who I last saw in 02/2022.  He has a history of coronary artery disease and is status post stenting of the distal and ostial circumflex as well as the first obtuse marginal in 1998.  He has severe, probably familial, dyslipidemia with a markedly elevated LDL, although we have been able to control this well with Repatha and atorvastatin.  He also has a history of moderate aortic stenosis.      He then underwent coronary angiography on 07/21/2020 and was found to have significant stenosis of the ostium of the second obtuse marginal.  He underwent successful intervention with a drug-eluting stent at the time.  His other coronary arteries demonstrated mild-to-moderate nonobstructive disease in a left dominant system.      In January of 2021 he was experiencing significant fatigue and dyspnea on exertion and was found to have significant anemia in the context of dark stools for which a comprehensive gastrointestinal work-up was performed but was ultimately negative.      He was readmitted to [facility] in October 2021 with a non-ST elevation myocardial infarction.  He was found to have severe stenosis of the mid circumflex and underwent PCI of the mid circumflex at the time with a synergy drug-eluting stent.  He was found to have moderate to severe nonobstructive stenosis of the OM1 with a negative IFR.  An echocardiogram at the time demonstrated probable moderate aortic stenosis but normal left ventricular systolic function.  He was again placed on dual antiplatelet therapy prior to discharge.     Unfortunately he had subsequent GI bleeding  and anemia and underwent a repeat GI evaluation in 2022 that included capsule endoscopy.  He was found to have small bowel AVMs on his capsule study.  His EGD demonstrated esophagitis and a colonoscopy demonstrated diverticulosis but no evidence of active bleeding.    Since then he has been followed by oncology/hematology, most recently in April of this year.  His iron deficiency anemia appears to be stable.    Since I last saw him he has also undergone left-sided carotid endarterectomy in December 2023 for asymptomatic left carotid stenosis.    Today he feels well overall from a cardiovascular standpoint.  He specifically denies any chest discomfort, palpitations, syncope or presyncope.  He denies any PND orthopnea.  He has been exercising for 30 minutes a day on a consistent basis without any cardiac limitations.        PHYSICAL EXAMINATION:  Dictated below.  Of note, I did recheck his blood pressure after our conversation and it was still elevated at 150/80 mmHg.     LABORATORY STUDIES:  Lipids on 12/27/2022 demonstrated total cholesterol 149, HDL of 69, LDL of 69 and triglycerides of 55.    Hemoglobin on 6/27/2023 was within normal limits at 14.5.    Last echocardiogram on file from October 2021 demonstrated normal left ventricular systolic function with moderate aortic stenosis and mild aortic regurgitation.     IMPRESSION:     1.  Coronary artery disease status post most recent PCI to the mid circumflex in October 2021 in the context of a non-ST elevation myocardial infarction.  He is status post revascularization of the second obtuse marginal in 07/2020 and prior circumflex/obtuse marginal revascularization in 1998.   2.  Significant dyslipidemia characterized by severe LDL elevation.  This has normalized with the use of Repatha and atorvastatin.   3.  History of moderate aortic stenosis.   4.  Left internal carotid stenosis status post carotid endarterectomy as described above.  5.  Iron deficiency anemia as  described above.  6.  Elevated blood pressure today without a formal diagnosis of hypertension.    PLAN    Mr. Navarro is doing well overall from a cardiovascular standpoint.  There is no evidence of angina or congestive heart failure.  His medications are appropriate and his lipids are at goal.    On review of his previous blood pressure readings, they appear to have started trending upwards in 2022.  I have asked him to keep track of his blood pressure at home over the next few days and to contact his at the end of this week with the results.  If they are consistently elevated then I think that initiation of antihypertensive therapy would be appropriate.    I will also order an echocardiogram for a reassessment of his aortic valve disease.  Assuming the findings are stable, I will plan on follow-up in approximately 1 year.    It was a pleasure seeing him today.    CURRENT MEDICATIONS:  Current Outpatient Medications   Medication Sig Dispense Refill    aspirin 81 MG EC tablet Take 81 mg by mouth daily      atorvastatin (LIPITOR) 80 MG tablet Take 1 tablet (80 mg) by mouth daily 90 tablet 1    diclofenac (VOLTAREN) 1 % topical gel APPLY 2 GM TO HANDS AND FINGERS FOUR TIMES DAILY USING DOSING CARD 100 g 0    evolocumab (REPATHA) 140 MG/ML prefilled autoinjector Inject 1 mL (140 mg) Subcutaneous every 14 days 6 mL 1    Ferrous Sulfate (IRON) 325 (65 Fe) MG tablet Take 1 tablet by mouth every other day      folic acid (FOLVITE) 1 MG tablet TAKE 1 TABLET(1 MG) BY MOUTH EVERY EVENING 90 tablet 0    gabapentin (NEURONTIN) 600 MG tablet TAKE 1 TABLET(600 MG) BY MOUTH TWICE DAILY 180 tablet 1    nitroGLYcerin (NITROSTAT) 0.4 MG sublingual tablet For chest pain place 1 tablet under the tongue every 5 minutes for 3 doses. If symptoms persist 5 minutes after 1st dose call 911. 30 tablet 0    oxyCODONE (ROXICODONE) 5 MG tablet Take 1 tablet (5 mg) by mouth every 4 hours as needed for moderate pain (4-6) 3 tablet 0     senna-docusate (SENOKOT-S/PERICOLACE) 8.6-50 MG tablet Take 1 tablet by mouth 2 times daily 10 tablet 0    sildenafil (REVATIO) 20 MG tablet TAKE 2 TO 5 TABLETS BY MOUTH AS NEEDED PRIOR TO INTERCOURSE. MAX 100MG(5 TABLETS)/DAY 90 tablet 0       ALLERGIES     Allergies   Allergen Reactions    Crestor [Rosuvastatin] GI Disturbance    Dust Mites     Other [No Clinical Screening - See Comments]      Goose feathers    Pollen Extract        PAST MEDICAL HISTORY:  Past Medical History:   Diagnosis Date    Aortic stenosis     Arthritis     hands    CAD (coronary artery disease)     cardiac cath 1998: stents x 2 to circumflex    Carotid stenosis     left    Family history of early CAD     Hyperlipidaemia LDL goal < 70     familial hyperlipidemia with marked hypercholesterolemia before treatment; has been on some form of cholesterol-lowering medication since the 1970s    Prostate cancer (H) 2010    Sleep apnea     Syncope     Unstable angina (H) 1/6/2021       PAST SURGICAL HISTORY:  Past Surgical History:   Procedure Laterality Date    CARDIAC SURGERY  1998    coronary stents x2 placed 1998; angioplasty    CV HEART CATHETERIZATION WITH POSSIBLE INTERVENTION N/A 7/21/2020    Procedure: Heart Catheterization with Possible Intervention;  Surgeon: Alber Bentley MD;  Location:  HEART CARDIAC CATH LAB    CV HEART CATHETERIZATION WITH POSSIBLE INTERVENTION N/A 10/11/2021    Procedure: Heart Catheterization with Possible Intervention;  Surgeon: Patsy Wallace MD;  Location: [facility] CARDIAC CATH LAB    CV INSTANTANEOUS WAVE-FREE RATIO N/A 10/11/2021    Procedure: Instantaneous Wave-Free Ratio;  Surgeon: Patsy Wallace MD;  Location:  HEART CARDIAC CATH LAB    CV LEFT HEART CATH N/A 10/11/2021    Procedure: Left Heart Cath;  Surgeon: Patsy Wallace MD;  Location:  HEART CARDIAC CATH LAB    CV PCI STENT DRUG ELUTING N/A 10/11/2021    Procedure: Percutaneous Coronary Intervention Stent Drug Eluting;  Surgeon:  Patsy Wallace MD;  Location:  HEART CARDIAC CATH LAB    DAVINCI PROSTATECTOMY      2010    ENDARTERECTOMY CAROTID Left 2022    Procedure: LEFT CAROTID ENDARTERECTOMY WITH BOVINE PERICARDIUM PATCH ANGIOPLASTY, AND INTRAOPERATIVE ELECTROENCEPHALOGRAM MONITORING;  Surgeon: Montez Aponte MD;  Location:  OR    ESOPHAGOSCOPY, GASTROSCOPY, DUODENOSCOPY (EGD), COMBINED N/A 2021    Procedure: ESOPHAGOGASTRODUODENOSCOPY (EGD);  Surgeon: Rosemarie Laws MD;  Location:  GI    EYE SURGERY Bilateral 2017    eyelids    GENITOURINARY SURGERY      ORTHOPEDIC SURGERY Right     achilles tendon; post football injury    ORTHOPEDIC SURGERY Right     hand; near thumb. has wires in there. cysts    REPAIR TENDON BICEPS DISTAL UPPER EXTREMITY Right 2018    Procedure: REPAIR TENDON BICEPS DISTAL UPPER EXTREMITY;  Right open biceps tendon repair  ;  Surgeon: Alber Zabala MD;  Location:  OR    SURGICAL HISTORY OF -       Right hand Xanthoma removal    SURGICAL HISTORY OF -       Stress Echo (-)       FAMILY HISTORY:  Family History   Problem Relation Age of Onset    Lipids Mother     C.A.D. Mother          at age 49 of MI    Dementia Father     C.A.D. Sister         MI at age 48    Cancer - colorectal No family hx of     Prostate Cancer No family hx of     Colon Cancer No family hx of        SOCIAL HISTORY:  Social History     Socioeconomic History    Marital status:    Tobacco Use    Smoking status: Former     Packs/day: 0.25     Years: 20.00     Pack years: 5.00     Types: Cigars, Cigarettes     Quit date: 2000     Years since quittin.6    Smokeless tobacco: Current     Types: Snuff    Tobacco comments:     12/10/18: occ chew, not every day   Vaping Use    Vaping Use: Never used   Substance and Sexual Activity    Alcohol use: Not Currently     Comment: very rare, none for 8 years    Drug use: Yes     Types: Marijuana     Comment: daily     Sexual activity: Yes     Partners:  Female   Other Topics Concern    Caffeine Concern Yes     Comment: coffee and soda    Special Diet No    Exercise Yes     Comment: regular     Seat Belt Yes    Parent/sibling w/ CABG, MI or angioplasty before 65F 55M? Yes     Comment: 49       Review of Systems:  Skin:          Eyes:         ENT:         Respiratory:          Cardiovascular:         Gastroenterology:        Genitourinary:         Musculoskeletal:         Neurologic:         Psychiatric:         Heme/Lymph/Imm:         Endocrine:           Physical Exam:  Vitals: There were no vitals taken for this visit.    Constitutional:  cooperative        Skin:  warm and dry to the touch          Head:  normocephalic        Eyes:  pupils equal and round        Lymph:      ENT:  no pallor or cyanosis        Neck:  JVP normal        Respiratory:  clear to auscultation         Cardiac: regular rhythm              2/6 systolic ejection murmur heard best at the right upper sternal border.  pulses full and equal                                        GI:  abdomen soft        Extremities and Muscular Skeletal:  no edema              Neurological:  affect appropriate        Psych:  Alert and Oriented x 3        CC  Sander Galicia MD  [address] [facility] [address]  [place] [address]        Thank you for allowing me to participate in the care of your patient.      Sincerely,     Sander Galicia MD     [facility]

## 2023-09-05 ENCOUNTER — HOSPITAL ENCOUNTER (OUTPATIENT)
Dept: CARDIOLOGY | Facility: CLINIC | Age: 68
Discharge: HOME | End: 2023-09-05
Attending: INTERNAL MEDICINE | Admitting: INTERNAL MEDICINE
Payer: COMMERCIAL

## 2023-09-05 ENCOUNTER — TELEPHONE (OUTPATIENT)
Dept: CARDIOLOGY | Facility: CLINIC | Age: 68
End: 2023-09-05

## 2023-09-05 DIAGNOSIS — I25.10 CORONARY ARTERY DISEASE INVOLVING NATIVE CORONARY ARTERY OF NATIVE HEART WITHOUT ANGINA PECTORIS: ICD-10-CM

## 2023-09-05 DIAGNOSIS — I35.0 AORTIC STENOSIS: Primary | ICD-10-CM

## 2023-09-05 LAB — LVEF ECHO: NORMAL

## 2023-09-05 PROCEDURE — 93306 TTE W/DOPPLER COMPLETE: CPT | Mod: 26 | Performed by: INTERNAL MEDICINE

## 2023-09-05 PROCEDURE — 93306 TTE W/DOPPLER COMPLETE: CPT

## 2023-09-05 NOTE — TELEPHONE ENCOUNTER
It looks like there has been an increase in transaortic gradients.  Lets plan on a follow-up visit with an echocardiogram in 6 months.  Thanks.

## 2023-09-05 NOTE — TELEPHONE ENCOUNTER
Echo 9-5-23 -   Left ventricular systolic function is normal.The visual ejection fraction is  55-60%.  The right ventricular systolic function is normal.  Moderate to severe valvular aortic stenosis-Peak aortic pvalve velocity 3.5  m/s, mean gradients 29 mm hg, MANDEEP 1 cm2, DI 0.26  The inferior vena cava was normal in size with preserved respiratory  variability.     Echo dated 10/10/2021 moderate aortic stenosis, mean gradients 13 mm hg, MANDEEP  1.2 cm2/      Last Dr. Galicia OV 8-30-23  I will also order an echocardiogram for a reassessment of his aortic valve disease. Assuming the findings are stable, I will plan on follow-up in approximately 1 year.

## 2023-09-06 ENCOUNTER — MYC MEDICAL ADVICE (OUTPATIENT)
Dept: CARDIOLOGY | Facility: CLINIC | Age: 68
End: 2023-09-06
Payer: COMMERCIAL

## 2023-09-06 NOTE — TELEPHONE ENCOUNTER
Otsi Navarro [facility] Heart Team 2  Phone Number: [phone]     Ambrose Valverde,  There were no openings in March. He s on a waiting list.  He is scheduled for an echo on Wed. March 6th  at 8:30 in [place].  Thanks for getting back to him so promptly.  Amy (wife)    Otis Singleton, Ms. Ramon,    I put a HOLD on an OV with Dr. Galicia on 3/7/2023 @ 9:45. Scheduling has to call you and confirm that date, but at least no one else has access until you say whether it will work.    Hope this helps  Team 2 R.N.s  [phone]

## 2023-09-06 NOTE — TELEPHONE ENCOUNTER
Attempted to contact patient to review changes in echo and Dr. Galicia's recommendation for a 6 month echo and OV.  Orders placed.  Left message for patient to call back.      1520 My chart message sent to patient:  Mani, Mr. Navarro,    Dr. Galicia saw some changed in the most recent echo, primarily the pressure flow across the valve. He would like to move up your next visit and echo to a 6 month visit.    Those orders are ready for March. This month is open for setting up appointments. We recommend you call scheduling now to set these up as Dr. Galicia fills up very quickly. Scheduling is [phone].    Please call if you have any questions.    Thank you  Team 2 R.N.s  [phone]  ====================    1530 patient and spouse called back. Reviewed echo results. They will call the [facility] to st up the March appointments.

## 2023-09-07 ENCOUNTER — TRANSFERRED RECORDS (OUTPATIENT)
Dept: HEALTH INFORMATION MANAGEMENT | Facility: CLINIC | Age: 68
End: 2023-09-07
Payer: COMMERCIAL

## 2023-09-25 ENCOUNTER — OFFICE VISIT (OUTPATIENT)
Dept: FAMILY MEDICINE | Facility: CLINIC | Age: 68
End: 2023-09-25
Payer: COMMERCIAL

## 2023-09-25 VITALS
DIASTOLIC BLOOD PRESSURE: 74 MMHG | HEART RATE: 82 BPM | HEIGHT: 66 IN | WEIGHT: 150.3 LBS | SYSTOLIC BLOOD PRESSURE: 114 MMHG | RESPIRATION RATE: 16 BRPM | OXYGEN SATURATION: 95 % | TEMPERATURE: 97.9 F | BODY MASS INDEX: 24.15 KG/M2

## 2023-09-25 DIAGNOSIS — Z01.818 PREOP GENERAL PHYSICAL EXAM: Primary | ICD-10-CM

## 2023-09-25 DIAGNOSIS — D50.9 IRON DEFICIENCY ANEMIA, UNSPECIFIED IRON DEFICIENCY ANEMIA TYPE: ICD-10-CM

## 2023-09-25 DIAGNOSIS — Z23 NEED FOR TDAP VACCINATION: ICD-10-CM

## 2023-09-25 DIAGNOSIS — G56.03 BILATERAL CARPAL TUNNEL SYNDROME: ICD-10-CM

## 2023-09-25 DIAGNOSIS — M19.249 SECONDARY LOCALIZED OSTEOARTHROSIS OF HAND, UNSPECIFIED LATERALITY: ICD-10-CM

## 2023-09-25 DIAGNOSIS — H26.9 CATARACT OF BOTH EYES, UNSPECIFIED CATARACT TYPE: ICD-10-CM

## 2023-09-25 DIAGNOSIS — Z23 NEED FOR VACCINATION: ICD-10-CM

## 2023-09-25 DIAGNOSIS — G56.21 CUBITAL TUNNEL SYNDROME ON RIGHT: ICD-10-CM

## 2023-09-25 DIAGNOSIS — I25.10 CORONARY ARTERY DISEASE INVOLVING NATIVE CORONARY ARTERY OF NATIVE HEART WITHOUT ANGINA PECTORIS: ICD-10-CM

## 2023-09-25 PROCEDURE — 90662 IIV NO PRSV INCREASED AG IM: CPT | Performed by: PHYSICIAN ASSISTANT

## 2023-09-25 PROCEDURE — 99214 OFFICE O/P EST MOD 30 MIN: CPT | Mod: 25 | Performed by: PHYSICIAN ASSISTANT

## 2023-09-25 PROCEDURE — G0008 ADMIN INFLUENZA VIRUS VAC: HCPCS | Performed by: PHYSICIAN ASSISTANT

## 2023-09-25 PROCEDURE — 93000 ELECTROCARDIOGRAM COMPLETE: CPT | Performed by: PHYSICIAN ASSISTANT

## 2023-09-25 RX ORDER — GABAPENTIN 600 MG/1
600 TABLET ORAL 2 TIMES DAILY
Qty: 180 TABLET | Refills: 1 | Status: SHIPPED | OUTPATIENT
Start: 2023-09-25 | End: 2024-01-01

## 2023-09-25 ASSESSMENT — PAIN SCALES - GENERAL: PAINLEVEL: NO PAIN (0)

## 2023-09-25 NOTE — CONFIDENTIAL NOTE
Patient Quality Outreach    Patient is due for the following:   Colon Cancer Screening  Physical Annual Wellness Visit    Next Steps:   Schedule a Annual Wellness Visit    Type of outreach:    Sent letter.      Questions for provider review:    None           Vince Nelson MA

## 2023-09-25 NOTE — PROGRESS NOTES
[facility]  [address] [address]  Phone: [phone]  Primary Provider: Washington Yang  Pre-op Performing Provider: WASHINGTON YANG      PREOPERATIVE EVALUATION:  Today's date: 9/25/2023    Otis is a 68 year old male who presents for a preoperative evaluation.      9/25/2023     8:34 AM   Additional Questions   Roomed by Kassandra GONZALEZ CMA   Accompanied by ERIC         9/25/2023     8:34 AM   Patient Reported Additional Medications   Patient reports taking the following new medications None       Surgical Information:  Surgery/Procedure: Carpal Tunnel on the Right hand and Elbow/ Cataract Surgery both eyes   Surgery Location: [facility]   Surgeon: Dr. Benjamin/ Dr. Kannan Olvera (Cataract Surgery)  Surgery Date: 10/5/2023/ 10/18/2023 (Cataract)  Time of Surgery: 8 Am   Where patient plans to recover: At home with family  Fax number for surgical facility: [phone] for ORTHO-[facility];  [phone] for OPHTHO/Cataract    Assessment & Plan     The proposed surgical procedure is considered LOW risk.    Preop general physical exam  Cubital tunnel syndrome on right  Bilateral carpal tunnel syndrome  Cataract of both eyes, unspecified cataract type  Patient is ok to proceed with both of the above procedures    Coronary artery disease involving native coronary artery of native heart without angina pectoris  Stable. EKG ok. Will stop asa81 prior to surgery  - gabapentin (NEURONTIN) 600 MG tablet; Take 1 tablet (600 mg) by mouth 2 times daily  - EKG 12-lead complete w/read - Clinics    Iron deficiency anemia, unspecified iron deficiency anemia type  Updating prior to surgery  - Basic metabolic panel  (Ca, Cl, CO2, Creat, Gluc, K, Na, BUN); Future  - Hemoglobin; Future    Secondary localized osteoarthrosis of hand, unspecified laterality  Effective. Continue present management.   - diclofenac (VOLTAREN) 1 % topical gel; APPLY 2 GM TO HANDS AND FINGERS FOUR TIMES  DAILY USING DOSING CARD    Need for vaccination  - INFLUENZA VACCINE 65+ (FLUZONE HD)    Need for Tdap vaccination  - Tdap, tetanus-diptheria-acell pertussis, (BOOSTRIX) 5-2.5-18.5 LF-MCG/0.5 CHRISTINE injection; Inject 0.5 mLs into the muscle once for 1 dose        RECOMMENDATION:  APPROVAL GIVEN to proceed with proposed procedure, without further diagnostic evaluation.      Subjective       HPI related to upcoming procedure: Right arm numbness and tingling, weakness of  -- EMG showed cubital and carpal tunnel syndrome        9/18/2023     9:15 AM   Preop Questions   1. Have you ever had a heart attack or stroke? YES - 2021   2. Have you ever had surgery on your heart or blood vessels, such as a stent placement, a coronary artery bypass, or surgery on an artery in your head, neck, heart, or legs? YES - multiple stentings   3. Do you have chest pain with activity? No   4. Do you have a history of  heart failure? No   5. Do you currently have a cold, bronchitis or symptoms of other infection? No   6. Do you have a cough, shortness of breath, or wheezing? No   7. Do you or anyone in your family have previous history of blood clots? No   8. Do you or does anyone in your family have a serious bleeding problem such as prolonged bleeding following surgeries or cuts? No   9. Have you ever had problems with anemia or been told to take iron pills? YES - previous transfusions for unknown cause. Stable recently   10. Have you had any abnormal blood loss such as black, tarry or bloody stools? YES - prior suspected GI bleed   11. Have you ever had a blood transfusion? YES - as above   11a. Have you ever had a transfusion reaction? No   12. Are you willing to have a blood transfusion if it is medically needed before, during, or after your surgery? Yes   13. Have you or any of your relatives ever had problems with anesthesia? No   14. Do you have sleep apnea, excessive snoring or daytime drowsiness? No   15. Do you have any  artifical heart valves or other implanted medical devices like a pacemaker, defibrillator, or continuous glucose monitor? No   16. Do you have artificial joints? No   17. Are you allergic to latex? No       Health Care Directive:  Patient does not have a Health Care Directive or Living Will: Patient states has Advance Directive and will bring in a copy to clinic.    Preoperative Review of :   reviewed - controlled substances reflected in medication list.      Status of Chronic Conditions:  HYPERLIPIDEMIA - Patient has a long history of significant Hyperlipidemia requiring medication for treatment with recent good control. Patient reports no problems or side effects with the medication.     Review of Systems  Constitutional, neuro, ENT, endocrine, pulmonary, cardiac, gastrointestinal, genitourinary, musculoskeletal, integument and psychiatric systems are negative, except as otherwise noted.    Patient Active Problem List    Diagnosis Date Noted    Post-operative state 12/27/2022     Priority: Medium    Iron deficiency anemia due to chronic blood loss 06/29/2022     Priority: Medium    History of prostate cancer 03/07/2022     Priority: Medium    Gastrointestinal hemorrhage, unspecified gastrointestinal hemorrhage type 01/06/2021     Priority: Medium    Abnormal cardiovascular stress test 12/30/2020     Priority: Medium     Added automatically from request for surgery [id number]      Status post coronary angiogram 07/21/2020     Priority: Medium    Mixed hyperlipidemia      Priority: Medium     familial hyperlipidemia with marked hypercholesterolemia before treatment; has been on some form of cholesterol-lowering medication since the 1970s  Diagnosis updated by automated process. Provider to review and confirm.      Family history of early CAD      Priority: Medium    Aortic stenosis      Priority: Medium    Carotid stenosis      Priority: Medium     left      Coronary artery disease involving native coronary  artery of native heart without angina pectoris      Priority: Medium     cardiac cath 1998: stents x 2 to circumflex      Hyperlipidemia LDL goal <100 02/10/2010     Priority: Medium    Depressive disorder, not elsewhere classified 03/02/2007     Priority: Medium             Elevated prostate specific antigen (PSA) 03/02/2007     Priority: Medium    Osteoarthritis of Hand  03/02/2007     Priority: Medium      Past Medical History:   Diagnosis Date    Aortic stenosis     Arthritis     hands    CAD (coronary artery disease)     cardiac cath 1998: stents x 2 to circumflex    Carotid stenosis     left    Family history of early CAD     Hyperlipidaemia LDL goal < 70     familial hyperlipidemia with marked hypercholesterolemia before treatment; has been on some form of cholesterol-lowering medication since the 1970s    Prostate cancer (H) 2010    Sleep apnea     Syncope     Unstable angina (H) 1/6/2021     Past Surgical History:   Procedure Laterality Date    CARDIAC SURGERY  1998    coronary stents x2 placed 1998; angioplasty    CV HEART CATHETERIZATION WITH POSSIBLE INTERVENTION N/A 7/21/2020    Procedure: Heart Catheterization with Possible Intervention;  Surgeon: Alber Bentley MD;  Location: [facility] CARDIAC CATH LAB    CV HEART CATHETERIZATION WITH POSSIBLE INTERVENTION N/A 10/11/2021    Procedure: Heart Catheterization with Possible Intervention;  Surgeon: Patsy Wallace MD;  Location: [facility] CARDIAC CATH LAB    CV INSTANTANEOUS WAVE-FREE RATIO N/A 10/11/2021    Procedure: Instantaneous Wave-Free Ratio;  Surgeon: Patsy Wallace MD;  Location: [facility] CARDIAC CATH LAB    CV LEFT HEART CATH N/A 10/11/2021    Procedure: Left Heart Cath;  Surgeon: Patsy Wallace MD;  Location: [facility] CARDIAC CATH LAB    CV PCI STENT DRUG ELUTING N/A 10/11/2021    Procedure: Percutaneous Coronary Intervention Stent Drug Eluting;  Surgeon: Patsy Wallace MD;  Location: [facility] CARDIAC CATH LAB    [facility]  PROSTATECTOMY      2010    ENDARTERECTOMY CAROTID Left 12/27/2022    Procedure: LEFT CAROTID ENDARTERECTOMY WITH BOVINE PERICARDIUM PATCH ANGIOPLASTY, AND INTRAOPERATIVE ELECTROENCEPHALOGRAM MONITORING;  Surgeon: Montez Aponte MD;  Location:  OR    ESOPHAGOSCOPY, GASTROSCOPY, DUODENOSCOPY (EGD), COMBINED N/A 1/6/2021    Procedure: ESOPHAGOGASTRODUODENOSCOPY (EGD);  Surgeon: Rosemarie Laws MD;  Location:  GI    EYE SURGERY Bilateral 2017    eyelids    GENITOURINARY SURGERY      ORTHOPEDIC SURGERY Right     achilles tendon; post football injury    ORTHOPEDIC SURGERY Right     hand; near thumb. has wires in there. cysts    REPAIR TENDON BICEPS DISTAL UPPER EXTREMITY Right 7/16/2018    Procedure: REPAIR TENDON BICEPS DISTAL UPPER EXTREMITY;  Right open biceps tendon repair  ;  Surgeon: Alber Zabala MD;  Location:  OR    SURGICAL HISTORY OF -       Right hand Xanthoma removal    SURGICAL HISTORY OF -       Stress Echo (-)     Current Outpatient Medications   Medication Sig Dispense Refill    aspirin 81 MG EC tablet Take 81 mg by mouth daily      atorvastatin (LIPITOR) 80 MG tablet Take 1 tablet (80 mg) by mouth daily 90 tablet 3    diclofenac (VOLTAREN) 1 % topical gel APPLY 2 GM TO HANDS AND FINGERS FOUR TIMES DAILY USING DOSING CARD 100 g 0    evolocumab (REPATHA) 140 MG/ML prefilled autoinjector Inject 1 mL (140 mg) Subcutaneous every 14 days 6 mL 3    Ferrous Sulfate (IRON) 325 (65 Fe) MG tablet Take 1 tablet by mouth every other day      folic acid (FOLVITE) 1 MG tablet TAKE 1 TABLET(1 MG) BY MOUTH EVERY EVENING 90 tablet 0    gabapentin (NEURONTIN) 600 MG tablet TAKE 1 TABLET(600 MG) BY MOUTH TWICE DAILY 180 tablet 1    nitroGLYcerin (NITROSTAT) 0.4 MG sublingual tablet For chest pain place 1 tablet under the tongue every 5 minutes for 3 doses. If symptoms persist 5 minutes after 1st dose call 911. 30 tablet 0    sildenafil (REVATIO) 20 MG tablet TAKE 2 TO 5 TABLETS BY MOUTH AS  "NEEDED PRIOR TO INTERCOURSE. MAX 100MG(5 TABLETS)/DAY 90 tablet 0       Allergies   Allergen Reactions    Crestor [Rosuvastatin] GI Disturbance    Dust Mites     Other [No Clinical Screening - See Comments]      Goose feathers    Pollen Extract         Social History     Tobacco Use    Smoking status: Former     Packs/day: 0.25     Years: 20.00     Pack years: 5.00     Types: Cigars, Cigarettes     Quit date: 2000     Years since quittin.7    Smokeless tobacco: Current     Types: Snuff, Chew    Tobacco comments:     daily   Substance Use Topics    Alcohol use: Not Currently     Comment: very rare, none for 8 years       History   Drug Use    Types: Marijuana     Comment: daily          Objective     /74 (BP Location: Right arm, Patient Position: Sitting, Cuff Size: Adult Regular)   Pulse 82   Temp 97.9  F (36.6  C) (Oral)   Resp 16   Ht 1.676 m (5' 6\")   Wt 68.2 kg (150 lb 4.8 oz)   SpO2 95%   BMI 24.26 kg/m      Physical Exam    GENERAL APPEARANCE: healthy, alert and no distress     EYES: Eyes grossly normal to inspection; lens clouded bilaterally     HENT: ear canals and TM's normal and nose and mouth without ulcers or lesions     NECK: no adenopathy, no asymmetry, masses, or scars and thyroid normal to palpation     RESP: lungs clear to auscultation - no rales, rhonchi or wheezes     CV: regular rates and rhythm with systolic murmur     ABDOMEN:  soft, nontender, no HSM or masses and bowel sounds normal     MS: No peripheral edema; adequate radial pulse in the right upper extremity     SKIN: no suspicious lesions or rashes     NEURO: Normal strength and tone, sensory exam grossly normal, mentation intact and speech normal     PSYCH: mentation appears normal. and affect normal/bright    Recent Labs   Lab Test 23  0846 23  0838 22  0910 22  1158   HGB 14.5 15.2  --  14.0    240  --  211   NA  --   --  138 143   POTASSIUM  --   --  3.8 4.9   CR  --   --  0.90 0.97 "   A1C  --   --  5.5  --         Diagnostics:  Recent Results (from the past 24 hour(s))   Ferritin    Collection Time: 09/28/23 11:27 AM   Result Value Ref Range    Ferritin 32 31 - 409 ng/mL   ALT    Collection Time: 09/28/23 11:27 AM   Result Value Ref Range    ALT 17 0 - 70 U/L   BASIC METABOLIC PANEL    Collection Time: 09/28/23 11:27 AM   Result Value Ref Range    Sodium 138 135 - 145 mmol/L    Potassium 4.3 3.4 - 5.3 mmol/L    Chloride 103 98 - 107 mmol/L    Carbon Dioxide (CO2) 25 22 - 29 mmol/L    Anion Gap 10 7 - 15 mmol/L    Urea Nitrogen 16.7 8.0 - 23.0 mg/dL    Creatinine 1.06 0.67 - 1.17 mg/dL    GFR Estimate 76 >60 mL/min/1.73m2    Calcium 9.0 8.8 - 10.2 mg/dL    Glucose 96 70 - 99 mg/dL   Hemoglobin    Collection Time: 09/28/23 11:27 AM   Result Value Ref Range    Hemoglobin 14.2 13.3 - 17.7 g/dL      EKG: appears normal, NSR, historical infarct noted, no LVH by voltage criteria, unchanged from previous tracings    Revised Cardiac Risk Index (RCRI):  The patient has the following serious cardiovascular risks for perioperative complications:   - Coronary Artery Disease (MI, positive stress test, angina, Qs on EKG) = 1 point     RCRI Interpretation: 1 point: Class II (low risk - 0.9% complication rate)         Signed Electronically by: Washington Yang PA-C  Copy of this evaluation report is provided to requesting physician.

## 2023-09-25 NOTE — PATIENT INSTRUCTIONS
Preparing for Your Surgery  Getting started  A nurse will call you to review your health history and instructions. They will give you an arrival time based on your scheduled surgery time. Please be ready to share:  Your doctor's clinic name and phone number  Your medical, surgical, and anesthesia history  A list of allergies and sensitivities  A list of medicines, including herbal treatments and over-the-counter drugs  Whether the patient has a legal guardian (ask how to send us the papers in advance)  Please tell us if you're pregnant--or if there's any chance you might be pregnant. Some surgeries may injure a fetus (unborn baby), so they require a pregnancy test. Surgeries that are safe for a fetus don't always need a test, and you can choose whether to have one.   If you have a child who's having surgery, please ask for a copy of Preparing for Your Child's Surgery.    Preparing for surgery  Within 10 to 30 days of surgery: Have a pre-op exam (sometimes called an H&P, or History and Physical). This can be done at a clinic or pre-operative center.  If you're having a , you may not need this exam. Talk to your care team.  At your pre-op exam, talk to your care team about all medicines you take. If you need to stop any medicines before surgery, ask when to start taking them again.  We do this for your safety. Many medicines can make you bleed too much during surgery. Some change how well surgery (anesthesia) drugs work.  Call your insurance company to let them know you're having surgery. (If you don't have insurance, call [phone].)  Call your clinic if there's any change in your health. This includes signs of a cold or flu (sore throat, runny nose, cough, rash, fever). It also includes a scrape or scratch near the surgery site.  If you have questions on the day of surgery, call your hospital or surgery center.  Eating and drinking guidelines  For your safety: Unless your surgeon tells you otherwise,  follow the guidelines below.  Eat and drink as usual until 8 hours before you arrive for surgery. After that, no food or milk.  Drink clear liquids until 2 hours before you arrive. These are liquids you can see through, like water, Gatorade, and Propel Water. They also include plain black coffee and tea (no cream or milk), candy, and breath mints. You can spit out gum when you arrive.  If you drink alcohol: Stop drinking it the night before surgery.  If your care team tells you to take medicine on the morning of surgery, it's okay to take it with a sip of water.  Preventing infection  Shower or bathe the night before and morning of your surgery. Follow the instructions your clinic gave you. (If no instructions, use regular soap.)  Don't shave or clip hair near your surgery site. We'll remove the hair if needed.  Don't smoke or vape the morning of surgery. You may chew nicotine gum up to 2 hours before surgery. A nicotine patch is okay.  Note: Some surgeries require you to completely quit smoking and nicotine. Check with your surgeon.  Your care team will make every effort to keep you safe from infection. We will:  Clean our hands often with soap and water (or an alcohol-based hand rub).  Clean the skin at your surgery site with a special soap that kills germs.  Give you a special gown to keep you warm. (Cold raises the risk of infection.)  Wear special hair covers, masks, gowns and gloves during surgery.  Give antibiotic medicine, if prescribed. Not all surgeries need antibiotics.  What to bring on the day of surgery  Photo ID and insurance card  Copy of your health care directive, if you have one  Glasses and hearing aids (bring cases)  You can't wear contacts during surgery  Inhaler and eye drops, if you use them (tell us about these when you arrive)  CPAP machine or breathing device, if you use them  A few personal items, if spending the night  If you have . . .  A pacemaker, ICD (cardiac defibrillator) or other  implant: Bring the ID card.  An implanted stimulator: Bring the remote control.  A legal guardian: Bring a copy of the certified (court-stamped) guardianship papers.  Please remove any jewelry, including body piercings. Leave jewelry and other valuables at home.  If you're going home the day of surgery  You must have a responsible adult drive you home. They should stay with you overnight as well.  If you don't have someone to stay with you, and you aren't safe to go home alone, we may keep you overnight. Insurance often won't pay for this.  After surgery  If it's hard to control your pain or you need more pain medicine, please call your surgeon's office.  Questions?   If you have any questions for your care team, list them here: _________________________________________________________________________________________________________________________________________________________________________ ____________________________________ ____________________________________ ____________________________________  For informational purposes only. Not to replace the advice of your health care provider. Copyright   2003, 2019 [employer]. All rights reserved. Clinically reviewed by Maria G Reed MD. [employer] 239378 - REV 12/22.    How to Take Your Medication Before Surgery  - HOLD (do not take) Aspirin for 7 days  - HOLD (do not take) voltaren 7 days prior to surgery  All other medications can be taken as usual

## 2023-09-28 ENCOUNTER — LAB (OUTPATIENT)
Dept: LAB | Facility: CLINIC | Age: 68
End: 2023-09-28
Payer: COMMERCIAL

## 2023-09-28 DIAGNOSIS — E78.2 MIXED HYPERLIPIDEMIA: ICD-10-CM

## 2023-09-28 DIAGNOSIS — D50.0 IRON DEFICIENCY ANEMIA DUE TO CHRONIC BLOOD LOSS: ICD-10-CM

## 2023-09-28 DIAGNOSIS — D50.9 IRON DEFICIENCY ANEMIA, UNSPECIFIED IRON DEFICIENCY ANEMIA TYPE: ICD-10-CM

## 2023-09-28 DIAGNOSIS — D64.9 ANEMIA, UNSPECIFIED TYPE: ICD-10-CM

## 2023-09-28 LAB
ALT SERPL W P-5'-P-CCNC: 17 U/L (ref 0–70)
ANION GAP SERPL CALCULATED.3IONS-SCNC: 10 MMOL/L (ref 7–15)
BUN SERPL-MCNC: 16.7 MG/DL (ref 8–23)
CALCIUM SERPL-MCNC: 9 MG/DL (ref 8.8–10.2)
CHLORIDE SERPL-SCNC: 103 MMOL/L (ref 98–107)
CREAT SERPL-MCNC: 1.06 MG/DL (ref 0.67–1.17)
EGFRCR SERPLBLD CKD-EPI 2021: 76 ML/MIN/1.73M2
FERRITIN SERPL-MCNC: 32 NG/ML (ref 31–409)
GLUCOSE SERPL-MCNC: 96 MG/DL (ref 70–99)
HCO3 SERPL-SCNC: 25 MMOL/L (ref 22–29)
HGB BLD-MCNC: 14.2 G/DL (ref 13.3–17.7)
POTASSIUM SERPL-SCNC: 4.3 MMOL/L (ref 3.4–5.3)
SODIUM SERPL-SCNC: 138 MMOL/L (ref 135–145)

## 2023-09-28 PROCEDURE — 80048 BASIC METABOLIC PNL TOTAL CA: CPT

## 2023-09-28 PROCEDURE — 36415 COLL VENOUS BLD VENIPUNCTURE: CPT

## 2023-09-28 PROCEDURE — 85018 HEMOGLOBIN: CPT

## 2023-09-28 PROCEDURE — 84460 ALANINE AMINO (ALT) (SGPT): CPT

## 2023-09-28 PROCEDURE — 82728 ASSAY OF FERRITIN: CPT

## 2023-09-28 RX ORDER — FOLIC ACID 1 MG/1
TABLET ORAL
Qty: 90 TABLET | Refills: 3 | Status: SHIPPED | OUTPATIENT
Start: 2023-09-28 | End: 2024-09-23

## 2023-09-29 ENCOUNTER — TELEPHONE (OUTPATIENT)
Dept: FAMILY MEDICINE | Facility: CLINIC | Age: 68
End: 2023-09-29
Payer: COMMERCIAL

## 2023-10-05 ENCOUNTER — TRANSFERRED RECORDS (OUTPATIENT)
Dept: HEALTH INFORMATION MANAGEMENT | Facility: CLINIC | Age: 68
End: 2023-10-05
Payer: COMMERCIAL

## 2023-10-17 ENCOUNTER — TRANSFERRED RECORDS (OUTPATIENT)
Dept: HEALTH INFORMATION MANAGEMENT | Facility: CLINIC | Age: 68
End: 2023-10-17
Payer: COMMERCIAL

## 2023-11-22 NOTE — LETTER
May 8, 2018      Johan Navarro  [address]  [place] [address]        Thank you for choosing [facility]. You are scheduled for the following service.     Date:  Wednesday May 30, 2018             Procedure:  COLONOSCOPY  Doctor:        Dr Orozco   Arrival Time:  0730  *check in at Emergency/Endoscopy desk*  Procedure Time:  0800    Location:   [facility]        Endoscopy Department, First Floor (Enter through ER Doors) *        [address] [place], [place] [address]      [phone] or [phone] ([facility]) to reschedule      MIRALAX -GATORADE  PREP  Colonoscopy is the most accurate test to detect colon polyps and colon cancer; and the only test where polyps can be removed. During this procedure, a doctor examines the lining of your large intestine and rectum through a flexible tube.           Transportation  Arrange for a ride for the day of your procedure with a responsible adult.  A taxi ride is not an option unless you are accompanied by a responsible adult. If you fail to arrange transportation with a responsible adult, your procedure will be cancelled and rescheduled.    Purchase the  following supplies at your local pharmacy:  - 2 (two) bisacodyl tablets: each tablet contains 5 mg.  (Dulcolax  laxative NOT Dulcolax  stool softener)   - 1 (one) 8.3 oz bottle of Polyethylene Glycol (PEG) 3350 Powder   (MiraLAX , Smooth LAX , ClearLAX  or equivalent)  - 64 oz Gatorade    Regular Gatorade, Gatorade G2 , Powerade , Powerade Zero  or Pedialyte  is acceptable. Red colored flavors are not allowed; all other colors (yellow, green, orange, purple and blue) are okay. It is also okay to buy two 2.12 oz packets of powdered Gatorade that can be mixed with water to a total volume of 64 oz of liquid.  - 1 (one) 10 oz bottle of Magnesium Citrate (Red colored flavors are not allowed)  It is also okay for you to use a 0.5 oz package of powdered magnesium  citrate (17 g) mixed with 10 oz of water.    PREPARATION FOR COLONOSCOPY    7 days before:    Discontinue fiber supplements and medications containing iron. This includes Metamucil  and Fibercon ; and multivitamins with iron.  3 days before:    Begin a low-fiber diet. A low-fiber diet helps making the cleanout more effective.     Examples of a low-fiber diet include (but are not limited to): white bread, white rice, pasta, crackers, fish, chicken, eggs, ground beef, creamy peanut butter, cooked/steamed/boiled vegetables, canned fruit, bananas, melons, milk, plain yogurt cheese, salad dressing and other condiments.     The following are not allowed on a low-fiber diet: seeds, nuts, popcorn, bran, whole wheat, corn, quinoa, raw fruits and vegetables, berries and dried fruit, beans and lentils.    For additional details on low-fiber diet, please refer to the table on the last page.  2 days before:    Continue the low-fiber diet.     Drink at least 8 glasses of water throughout the day.     Stop eating solid foods at 11:45 pm.  1 day before:    In the morning: begin a clear liquid diet (liquids you can see through).     Examples of a clear liquid diet include: water, clear broth or bouillon, Gatorade, Pedialyte or Powerade, carbonated and non-carbonated soft drinks (Sprite , 7-Up , ginger ale), strained fruit juices without pulp (apple, white grape, white cranberry), Jell-O  and popsicles.     The following are not allowed on a clear liquid diet: red liquids, alcoholic beverages, coffee, dairy products (milk, creamer, and yogurt), protein shakes, creamy broths, juice with pulp and chewing tobacco.    At noon: take 2 (two) bisacodyl tablets     At 4 (and no later than 6pm): start drinking the Miralax-Gatorade preparation (8.3 oz of Miralax mixed with 64 oz of Gatorade in a large pitcher). Drink 1(one) 8 oz glass every 15 minutes thereafter, until the mixture is gone.    COLON CLEANSING TIPS: drink adequate amounts of  fluids before and after your colon cleansing to prevent dehydration. Stay near a toilet because you will have diarrhea. Even if you are sitting on the toilet, continue to drink the cleansing solution every 15 minutes. If you feel nauseous or vomit, rinse your mouth with water, take a 15 to 30-minute-break and then continue drinking the solution. You will be uncomfortable until the stool has flushed from your colon (in about 2 to 4 hours). You may feel chilled.              Day of your procedure  You may take all of your morning medications including blood pressure medications, blood thinners (if you have not been instructed to stop these by our office), methadone, anti-seizure medications with sips of water 3 hours prior to your procedure or earlier. Do not take insulin or vitamins prior to your procedure. Continue the clear liquid diet.   4 hours prior: drink 10 oz of magnesium citrate. It may be easier to drink it with a straw.    STOP consuming all liquids after that.     Do not take anything by mouth during this time.     Allow extra time to travel to your procedure as you may need to stop and use a restroom along the way.  You are ready for the procedure, if you followed all instructions and your stool is no longer formed, but clear or yellow liquid. If you are unsure whether your colon is clean, please call our office at [phone] before you leave for your appointment.  Bring the following to your procedure:  - Insurance Card/Photo ID.   - List of current medications including over-the-counter medications and supplements.   - Your rescue inhaler if you currently use one to control asthma.      Canceling or rescheduling your appointment:   If you must cancel or reschedule your appointment, please call [phone] as soon as possible.      COLONOSCOPY PRE-PROCEDURE CHECKLIST  If you have diabetes, ask your regular doctor for diet and medication restrictions.  If you take an anticoagulant or anti-platelet  medication (such as Coumadin , Lovenox , Pradaxa , Xarelto , Eliquis , etc.), please call your primary doctor for advice on holding this medication.  If you take aspirin you may continue to do so.  If you are or may be pregnant, please discuss the risks and benefits of this procedure with your doctor.          What happens during a colonoscopy?    Plan to spend up to two hours, starting at registration time, at the endoscopy center the day of your procedure. The colonoscopy takes an average of 15 to 30 minutes. Recovery time is about 30 minutes.    Before the exam:    You will change into a gown.    Your medical history and medication list will be reviewed with you, unless that has been done over the phone prior to the procedure.     A nurse will insert an intravenous (IV) line into your hand or arm.    The doctor will meet with you and will give you a consent form to sign.    During the exam:     Medicine will be given through the IV line to help you relax.     Your heart rate and oxygen levels will be monitored. If your blood pressure is low, you may be given fluids through the IV line.     The doctor will insert a flexible hollow tube, called a colonoscope, into your rectum. The scope will be advanced slowly through the large intestine (colon).    You may have a feeling of fullness or pressure.     If an abnormal tissue or a polyp is found, the doctor may remove it through the endoscope for closer examination, or biopsy. Tissue removal is painless    After the exam:           Any tissue samples removed during the exam will be sent to a lab for evaluation. It may take 5-7 working days for you to be notified of the results.     A nurse will provide you with complete discharge instructions before you leave the endoscopy center. Be sure to ask the nurse for specific instructions if you take blood thinners such as Aspirin, Coumadin or Plavix.     The doctor will prepare a full report for you and for the physician who  referred you for the procedure.     Your doctor will talk with you about the initial results of your exam.      Medication given during the exam will prohibit you from driving for the rest of the day.     Following the exam, you may resume your normal diet. Your first meal should be light, no greasy foods. Avoid alcohol until the next day.     You may resume your regular activities the day after the procedure.     LOW-FIBER DIET    Foods RECOMMENDED Foods to AVOID   Breads, Cereal, Rice and Pasta:   White bread, rolls, biscuits, croissant and paolo toast.   Waffles, Greek toast and pancakes.   White rice, noodles, pasta, macaroni and peeled cooked potatoes.   Plain crackers and saltines.   Cooked cereals: farina, cream of rice.   Cold cereals: Puffed Rice , Rice Krispies , Corn Flakes  and Special K    Breads, Cereal, Rice and Pasta:   Breads or rolls with nuts, seeds or fruit.   Whole wheat, pumpernickel, rye breads and cornbread.   Potatoes with skin, brown or wild rice, and kasha (buckwheat).     Vegetables:   Tender cooked and canned vegetables without seeds: carrots, asparagus tips, green or wax beans, pumpkin, spinach, lima beans. Vegetables:   Raw or steamed vegetables.   Vegetables with seeds.   Sauerkraut.   Winter squash, peas, broccoli, Brussel sprouts, cabbage, onions, cauliflower, baked beans, peas and corn.   Fruits:   Strained fruit juice.   Canned fruit, except pineapple.   Ripe bananas and melon. Fruits:   Prunes and prune juice.   Raw fruits.   Dried fruits: figs, dates and raisins.   Milk/Dairy:   Milk: plain or flavored.   Yogurt, custard and ice cream.   Cheese and cottage cheese Milk/Dairy:     Meat and other proteins:   ground, well-cooked tender beef, lamb, ham, veal, pork, fish, poultry and organ meats.   Eggs.   Peanut butter without nuts. Meat and other proteins:   Tough, fibrous meats with gristle.   Dry beans, peas and lentils.   Peanut butter with nuts.   Tofu.   Fats, Snack, Sweets,  Condiments and Beverages:   Margarine, butter, oils, mayonnaise, sour cream and salad dressing, plain gravy.   Sugar, hard candy, clear jelly, honey and syrup.   Spices, cooked herbs, bouillon, broth and soups made with allowed vegetable, ketchup and mustard.   Coffee, tea and carbonated drinks.   Plain cakes, cookies and pretzels.   Gelatin, plain puddings, custard, ice cream, sherbet and popsicles. Fats, Snack, Sweets, Condiments and Beverages:   Nuts, seeds and coconut.   Jam, marmalade and preserves.   Pickles, olives, relish and horseradish.   All desserts containing nuts, seeds, dried fruit and coconut; or made from whole grains or bran.   Candy made with nuts or seeds.   Popcorn.                     DIRECTIONS TO THE ENDOSCOPY DEPARTMENT     From the north ([facility])  Take [address] South, exit on [address] [address]. Get into the left hand sandie, turn left (east), go one-half mile to [address] and turn left. Go north to the first stoplight, take a right on [address] and follow it to the Emergency entrance.    From the south ([facility])  Take [address]N to the [address] split and exit on [address] [address]. On [address] [address], turn left (west) to [address]. Turn right (north) on [address]. Go north to the first stoplight, take a right on [address] and follow it to the Emergency entrance.    From the east via 35E ([facility])  Take [address] south to [address] [address] exit. Turn right on [address] [address]. Go west to [address]. Turn right (north) on [address]. Go to the first stoplight, take a right and follow on [address] to the Emergency entrance.    From the east via [address] ([facility])  Take [address] to [address]. Turn left (south) on [address] to [address]. Turn left (east) on [address] and follow it to the Emergency entrance.    From the west via [address] ([place], [place])  Take [address] east to [address].  Turn right (south) on [address] to [address]. Turn left (east) on [address] and follow it to the Emergency entrance.                                 R/O stroke vs AIDP vs autoimmune d/o

## 2023-12-01 ENCOUNTER — TELEPHONE (OUTPATIENT)
Dept: CARDIOLOGY | Facility: CLINIC | Age: 68
End: 2023-12-01
Payer: COMMERCIAL

## 2023-12-01 DIAGNOSIS — E78.2 MIXED HYPERLIPIDEMIA: ICD-10-CM

## 2023-12-01 DIAGNOSIS — I25.10 CORONARY ARTERY DISEASE INVOLVING NATIVE CORONARY ARTERY OF NATIVE HEART WITHOUT ANGINA PECTORIS: Primary | ICD-10-CM

## 2023-12-01 NOTE — TELEPHONE ENCOUNTER
Prior Authorization Approval    Medication: REPATHA SURECLICK 140 MG/ML SC SOAJ  Authorization Effective Date: 12/1/2023  Authorization Expiration Date: 12/31/2024  Approved Dose/Quantity:   Reference #:     Insurance Company: [employer] Part D - Phone [phone] Fax [phone]  Expected CoPay: $    CoPay Card Available:      Financial Assistance Needed:   Which Pharmacy is filling the prescription: [facility] - [address]  Pharmacy Notified: YES  Patient Notified: **Instructed pharmacy to notify patient when script is ready to /ship.**

## 2023-12-01 NOTE — TELEPHONE ENCOUNTER
Refill for Repatha sent  to mail order pharmacy and FLP's /ALT orders placed for next Cardiology OV.     Wife called, left message for a refill on Repatha to [facility] -     Next Dr. Galicia OV 3-7-24.     Last FLP/ALT 12-27-22. .     Last Dr. Galicia OV 8-30-23   He has a history of coronary artery disease and is status post stenting of the distal and ostial circumflex as well as the first obtuse marginal in 1998.  He has severe, probably familial, dyslipidemia with a markedly elevated LDL, although we have been able to control this well with Repatha and atorvastatin.  He also has a history of moderate aortic stenosis.

## 2023-12-01 NOTE — TELEPHONE ENCOUNTER
PA Initiation    Medication: REPATHA SURECLICK 140 MG/ML SC SOAJ  Insurance Company: [employer] Part D - Phone [phone] Fax [phone]  Pharmacy Filling the Rx: [facility] - [place], [place] - [address] [facility]  Filling Pharmacy Phone: [phone]  Filling Pharmacy Fax: [phone]  Start Date: 12/1/2023

## 2023-12-26 ENCOUNTER — LAB (OUTPATIENT)
Dept: LAB | Facility: CLINIC | Age: 68
End: 2023-12-26
Attending: INTERNAL MEDICINE
Payer: COMMERCIAL

## 2023-12-26 DIAGNOSIS — D50.0 IRON DEFICIENCY ANEMIA DUE TO CHRONIC BLOOD LOSS: ICD-10-CM

## 2023-12-26 LAB — FERRITIN SERPL-MCNC: 32 NG/ML (ref 31–409)

## 2023-12-26 PROCEDURE — 82728 ASSAY OF FERRITIN: CPT

## 2023-12-26 PROCEDURE — 36415 COLL VENOUS BLD VENIPUNCTURE: CPT

## 2023-12-31 DIAGNOSIS — I25.10 CORONARY ARTERY DISEASE INVOLVING NATIVE CORONARY ARTERY OF NATIVE HEART WITHOUT ANGINA PECTORIS: ICD-10-CM

## 2024-01-01 RX ORDER — GABAPENTIN 600 MG/1
600 TABLET ORAL 2 TIMES DAILY
Qty: 180 TABLET | Refills: 1 | Status: SHIPPED | OUTPATIENT
Start: 2024-01-01

## 2024-02-12 ENCOUNTER — OFFICE VISIT (OUTPATIENT)
Dept: FAMILY MEDICINE | Facility: CLINIC | Age: 69
End: 2024-02-12
Payer: COMMERCIAL

## 2024-02-12 VITALS
BODY MASS INDEX: 24.27 KG/M2 | TEMPERATURE: 98.3 F | WEIGHT: 151 LBS | HEART RATE: 110 BPM | DIASTOLIC BLOOD PRESSURE: 88 MMHG | RESPIRATION RATE: 25 BRPM | SYSTOLIC BLOOD PRESSURE: 133 MMHG | OXYGEN SATURATION: 100 % | HEIGHT: 66 IN

## 2024-02-12 DIAGNOSIS — S69.91XA INJURY OF RIGHT LITTLE FINGER, INITIAL ENCOUNTER: ICD-10-CM

## 2024-02-12 DIAGNOSIS — Z01.818 PREOP GENERAL PHYSICAL EXAM: Primary | ICD-10-CM

## 2024-02-12 DIAGNOSIS — I25.10 CORONARY ARTERY DISEASE INVOLVING NATIVE CORONARY ARTERY OF NATIVE HEART WITHOUT ANGINA PECTORIS: ICD-10-CM

## 2024-02-12 LAB
ANION GAP SERPL CALCULATED.3IONS-SCNC: 9 MMOL/L (ref 7–15)
BUN SERPL-MCNC: 12.7 MG/DL (ref 8–23)
CALCIUM SERPL-MCNC: 9.4 MG/DL (ref 8.8–10.2)
CHLORIDE SERPL-SCNC: 104 MMOL/L (ref 98–107)
CREAT SERPL-MCNC: 0.85 MG/DL (ref 0.67–1.17)
DEPRECATED HCO3 PLAS-SCNC: 27 MMOL/L (ref 22–29)
EGFRCR SERPLBLD CKD-EPI 2021: >90 ML/MIN/1.73M2
GLUCOSE SERPL-MCNC: 94 MG/DL (ref 70–99)
HGB BLD-MCNC: 14.9 G/DL (ref 13.3–17.7)
POTASSIUM SERPL-SCNC: 4.2 MMOL/L (ref 3.4–5.3)
SODIUM SERPL-SCNC: 140 MMOL/L (ref 135–145)

## 2024-02-12 PROCEDURE — 93000 ELECTROCARDIOGRAM COMPLETE: CPT | Performed by: PHYSICIAN ASSISTANT

## 2024-02-12 PROCEDURE — 36415 COLL VENOUS BLD VENIPUNCTURE: CPT | Performed by: PHYSICIAN ASSISTANT

## 2024-02-12 PROCEDURE — 80048 BASIC METABOLIC PNL TOTAL CA: CPT | Performed by: PHYSICIAN ASSISTANT

## 2024-02-12 PROCEDURE — 99214 OFFICE O/P EST MOD 30 MIN: CPT | Performed by: PHYSICIAN ASSISTANT

## 2024-02-12 PROCEDURE — 85018 HEMOGLOBIN: CPT | Performed by: PHYSICIAN ASSISTANT

## 2024-02-12 RX ORDER — RESPIRATORY SYNCYTIAL VIRUS VACCINE 120MCG/0.5
0.5 KIT INTRAMUSCULAR ONCE
Qty: 1 EACH | Refills: 0 | Status: CANCELLED | OUTPATIENT
Start: 2024-02-12 | End: 2024-02-12

## 2024-02-12 ASSESSMENT — PAIN SCALES - GENERAL: PAINLEVEL: NO PAIN (0)

## 2024-02-12 NOTE — PROGRESS NOTES
Preoperative Evaluation  [facility]  [address] [address]  Phone: [phone]  Primary Provider: Ruperto Fernández  Pre-op Performing Provider: RUPERTO FERNÁNDEZ  Feb 12, 2024       Otis is a 69 year old, presenting for the following:  Pre-Op Exam      Surgical Information  Surgery/Procedure: Right hand fusion  Surgery Location: [facility]  Surgeon: Dr. Benjamin  Surgery Date: 2/29/2024  Time of Surgery: TBD  Where patient plans to recover: At home with family  Fax number for surgical facility: [phone]    Assessment & Plan     The proposed surgical procedure is considered LOW risk.    Preop general physical exam  Injury of right little finger, initial encounter  Ok for planned procedure.   - Hemoglobin; Future  - EKG 12-lead complete w/read - Clinics  - Basic metabolic panel  (Ca, Cl, CO2, Creat, Gluc, K, Na, BUN); Future  - Hemoglobin  - Basic metabolic panel  (Ca, Cl, CO2, Creat, Gluc, K, Na, BUN)      Coronary artery disease involving native coronary artery of native heart without angina pectoris  Stable. Will stop ASA81 prior to procedure.             - No identified additional risk factors other than previously addressed        Recommendation  APPROVAL GIVEN to proceed with proposed procedure, without further diagnostic evaluation.      Subjective       HPI related to upcoming procedure: Right 5th digit injury repair - PIP fusion        2/7/2024     5:21 PM   Preop Questions   1. Have you ever had a heart attack or stroke? YES - 2021   2. Have you ever had surgery on your heart or blood vessels, such as a stent placement, a coronary artery bypass, or surgery on an artery in your head, neck, heart, or legs? YES - multiple stents   3. Do you have chest pain with activity? No   4. Do you have a history of  heart failure? No   5. Do you currently have a cold, bronchitis or symptoms of other infection? YES - lingering throat clearing, nasal  congestion   6. Do you have a cough, shortness of breath, or wheezing? YES - as above; slowly clearing up   7. Do you or anyone in your family have previous history of blood clots? No   8. Do you or does anyone in your family have a serious bleeding problem such as prolonged bleeding following surgeries or cuts? No   9. Have you ever had problems with anemia or been told to take iron pills? YES    10. Have you had any abnormal blood loss such as black, tarry or bloody stools? YES - suspected GI source   11. Have you ever had a blood transfusion? YES    11a. Have you ever had a transfusion reaction? No   12. Are you willing to have a blood transfusion if it is medically needed before, during, or after your surgery? Yes   13. Have you or any of your relatives ever had problems with anesthesia? No   14. Do you have sleep apnea, excessive snoring or daytime drowsiness? No   15. Do you have any artifical heart valves or other implanted medical devices like a pacemaker, defibrillator, or continuous glucose monitor? No   16. Do you have artificial joints? No   17. Are you allergic to latex? No       Health Care Directive  Patient does not have a Health Care Directive or Living Will: Patient states has Advance Directive and will bring in a copy to clinic.    Preoperative Review of    reviewed - controlled substances reflected in medication list.          Patient Active Problem List    Diagnosis Date Noted    Post-operative state 12/27/2022     Priority: Medium    Iron deficiency anemia due to chronic blood loss 06/29/2022     Priority: Medium    History of prostate cancer 03/07/2022     Priority: Medium    Gastrointestinal hemorrhage, unspecified gastrointestinal hemorrhage type 01/06/2021     Priority: Medium    Abnormal cardiovascular stress test 12/30/2020     Priority: Medium     Added automatically from request for surgery [id number]      Status post coronary angiogram 07/21/2020     Priority: Medium    Mixed  hyperlipidemia      Priority: Medium     familial hyperlipidemia with marked hypercholesterolemia before treatment; has been on some form of cholesterol-lowering medication since the 1970s  Diagnosis updated by automated process. Provider to review and confirm.      Family history of early CAD      Priority: Medium    Aortic stenosis      Priority: Medium    Carotid stenosis      Priority: Medium     left      Coronary artery disease involving native coronary artery of native heart without angina pectoris      Priority: Medium     cardiac cath 1998: stents x 2 to circumflex      Hyperlipidemia LDL goal <100 02/10/2010     Priority: Medium    Depressive disorder, not elsewhere classified 03/02/2007     Priority: Medium             Elevated prostate specific antigen (PSA) 03/02/2007     Priority: Medium    Osteoarthritis of Hand  03/02/2007     Priority: Medium      Past Medical History:   Diagnosis Date    Aortic stenosis     Arthritis     hands    CAD (coronary artery disease)     cardiac cath 1998: stents x 2 to circumflex    Carotid stenosis     left    Family history of early CAD     Hyperlipidaemia LDL goal < 70     familial hyperlipidemia with marked hypercholesterolemia before treatment; has been on some form of cholesterol-lowering medication since the 1970s    Prostate cancer (H) 2010    Sleep apnea     Syncope     Unstable angina (H) 1/6/2021     Past Surgical History:   Procedure Laterality Date    CARDIAC SURGERY  1998    coronary stents x2 placed 1998; angioplasty    CV HEART CATHETERIZATION WITH POSSIBLE INTERVENTION N/A 7/21/2020    Procedure: Heart Catheterization with Possible Intervention;  Surgeon: Alber Bentley MD;  Location: [facility] CARDIAC CATH LAB    CV HEART CATHETERIZATION WITH POSSIBLE INTERVENTION N/A 10/11/2021    Procedure: Heart Catheterization with Possible Intervention;  Surgeon: Patsy Wallace MD;  Location: [facility] CARDIAC CATH LAB    CV INSTANTANEOUS WAVE-FREE RATIO N/A  10/11/2021    Procedure: Instantaneous Wave-Free Ratio;  Surgeon: Patsy Wallace MD;  Location:  HEART CARDIAC CATH LAB    CV LEFT HEART CATH N/A 10/11/2021    Procedure: Left Heart Cath;  Surgeon: Patsy Wallace MD;  Location:  HEART CARDIAC CATH LAB    CV PCI STENT DRUG ELUTING N/A 10/11/2021    Procedure: Percutaneous Coronary Intervention Stent Drug Eluting;  Surgeon: Patsy Wallace MD;  Location:  HEART CARDIAC CATH LAB    DAVINCI PROSTATECTOMY      2010    ENDARTERECTOMY CAROTID Left 12/27/2022    Procedure: LEFT CAROTID ENDARTERECTOMY WITH BOVINE PERICARDIUM PATCH ANGIOPLASTY, AND INTRAOPERATIVE ELECTROENCEPHALOGRAM MONITORING;  Surgeon: Montez Aponte MD;  Location:  OR    ESOPHAGOSCOPY, GASTROSCOPY, DUODENOSCOPY (EGD), COMBINED N/A 1/6/2021    Procedure: ESOPHAGOGASTRODUODENOSCOPY (EGD);  Surgeon: Rosemarie Laws MD;  Location:  GI    EYE SURGERY Bilateral 2017    eyelids    GENITOURINARY SURGERY      ORTHOPEDIC SURGERY Right     achilles tendon; post football injury    ORTHOPEDIC SURGERY Right     hand; near thumb. has wires in there. cysts    REPAIR TENDON BICEPS DISTAL UPPER EXTREMITY Right 7/16/2018    Procedure: REPAIR TENDON BICEPS DISTAL UPPER EXTREMITY;  Right open biceps tendon repair  ;  Surgeon: Alber Zabala MD;  Location:  OR    SURGICAL HISTORY OF -       Right hand Xanthoma removal    SURGICAL HISTORY OF -       Stress Echo (-)     Current Outpatient Medications   Medication Sig Dispense Refill    aspirin 81 MG EC tablet Take 81 mg by mouth daily      atorvastatin (LIPITOR) 80 MG tablet Take 1 tablet (80 mg) by mouth daily 90 tablet 3    diclofenac (VOLTAREN) 1 % topical gel APPLY 2 GM TO HANDS AND FINGERS FOUR TIMES DAILY USING DOSING CARD 100 g 0    evolocumab (REPATHA) 140 MG/ML prefilled autoinjector Inject 1 mL (140 mg) Subcutaneous every 14 days 6 mL 1    Ferrous Sulfate (IRON) 325 (65 Fe) MG tablet Take 1 tablet by mouth every other day       "folic acid (FOLVITE) 1 MG tablet TAKE 1 TABLET(1 MG) BY MOUTH EVERY EVENING 90 tablet 3    gabapentin (NEURONTIN) 600 MG tablet TAKE 1 TABLET(600 MG) BY MOUTH TWICE DAILY 180 tablet 1    nitroGLYcerin (NITROSTAT) 0.4 MG sublingual tablet For chest pain place 1 tablet under the tongue every 5 minutes for 3 doses. If symptoms persist 5 minutes after 1st dose call 911. 30 tablet 0    sildenafil (REVATIO) 20 MG tablet TAKE 2 TO 5 TABLETS BY MOUTH AS NEEDED PRIOR TO INTERCOURSE. MAX 100MG(5 TABLETS)/DAY 90 tablet 0       Allergies   Allergen Reactions    Crestor [Rosuvastatin] GI Disturbance    Dust Mites     Other [No Clinical Screening - See Comments]      Goose feathers    Pollen Extract         Social History     Tobacco Use    Smoking status: Former     Packs/day: 0.25     Years: 20.00     Additional pack years: 0.00     Total pack years: 5.00     Types: Cigars, Cigarettes     Quit date:      Years since quittin.    Smokeless tobacco: Current     Types: Snuff, Chew    Tobacco comments:     daily   Substance Use Topics    Alcohol use: Not Currently     Comment: very rare, none for 8 years     History   Drug Use    Types: Marijuana     Comment: daily          Review of Systems    Review of Systems  Constitutional, HEENT, cardiovascular, pulmonary, gi and gu systems are negative, except as otherwise noted.  Objective    /88 (BP Location: Right arm, Patient Position: Sitting, Cuff Size: Adult Regular)   Pulse 110   Temp 98.3  F (36.8  C) (Oral)   Resp 25   Ht 1.676 m (5' 5.98\")   Wt 68.5 kg (151 lb)   SpO2 100%   BMI 24.38 kg/m     Estimated body mass index is 24.38 kg/m  as calculated from the following:    Height as of this encounter: 1.676 m (5' 5.98\").    Weight as of this encounter: 68.5 kg (151 lb).  Physical Exam  GENERAL: alert and no distress  EYES: Eyes grossly normal to inspection, PERRL and conjunctivae and sclerae normal  HENT: ear canals and TM's normal, nose and mouth without ulcers " or lesions  RESP: lungs clear to auscultation - no rales, rhonchi or wheezes  CV: regular rates and rhythm  ABDOMEN: soft, nontender, no hepatosplenomegaly, no masses and bowel sounds normal  MS: the right radial pulse is wnl; right 5th hand digit with swelling at the PIP and deviation  PSYCH: mentation appears normal, affect normal/bright    Recent Labs     Diagnostics  Recent Results (from the past 48 hour(s))   Hemoglobin    Collection Time: 02/12/24  9:36 AM   Result Value Ref Range    Hemoglobin 14.9 13.3 - 17.7 g/dL   Basic metabolic panel  (Ca, Cl, CO2, Creat, Gluc, K, Na, BUN)    Collection Time: 02/12/24  9:36 AM   Result Value Ref Range    Sodium 140 135 - 145 mmol/L    Potassium 4.2 3.4 - 5.3 mmol/L    Chloride 104 98 - 107 mmol/L    Carbon Dioxide (CO2) 27 22 - 29 mmol/L    Anion Gap 9 7 - 15 mmol/L    Urea Nitrogen 12.7 8.0 - 23.0 mg/dL    Creatinine 0.85 0.67 - 1.17 mg/dL    GFR Estimate >90 >60 mL/min/1.73m2    Calcium 9.4 8.8 - 10.2 mg/dL    Glucose 94 70 - 99 mg/dL      EKG: appears normal, NSR, Normal Sinus Rhythm, unchanged from previous tracings    Revised Cardiac Risk Index (RCRI)  The patient has the following serious cardiovascular risks for perioperative complications:   - Coronary Artery Disease (MI, positive stress test, angina, Qs on EKG) = 1 point     RCRI Interpretation: 1 point: Class II (low risk - 0.9% complication rate)         Signed Electronically by: Washington Yang PA-C  Copy of this evaluation report is provided to requesting physician.

## 2024-02-12 NOTE — PATIENT INSTRUCTIONS
Check with Alf about colonoscopy  Get a COVID, RSV, TDaP and pneumonia shot at [facility]    Preparing for Your Surgery  Getting started  A nurse will call you to review your health history and instructions. They will give you an arrival time based on your scheduled surgery time. Please be ready to share:  Your doctor's clinic name and phone number  Your medical, surgical, and anesthesia history  A list of allergies and sensitivities  A list of medicines, including herbal treatments and over-the-counter drugs  Whether the patient has a legal guardian (ask how to send us the papers in advance)  Please tell us if you're pregnant--or if there's any chance you might be pregnant. Some surgeries may injure a fetus (unborn baby), so they require a pregnancy test. Surgeries that are safe for a fetus don't always need a test, and you can choose whether to have one.   If you have a child who's having surgery, please ask for a copy of Preparing for Your Child's Surgery.    Preparing for surgery  Within 10 to 30 days of surgery: Have a pre-op exam (sometimes called an H&P, or History and Physical). This can be done at a clinic or pre-operative center.  If you're having a , you may not need this exam. Talk to your care team.  At your pre-op exam, talk to your care team about all medicines you take. If you need to stop any medicines before surgery, ask when to start taking them again.  We do this for your safety. Many medicines can make you bleed too much during surgery. Some change how well surgery (anesthesia) drugs work.  Call your insurance company to let them know you're having surgery. (If you don't have insurance, call [phone].)  Call your clinic if there's any change in your health. This includes signs of a cold or flu (sore throat, runny nose, cough, rash, fever). It also includes a scrape or scratch near the surgery site.  If you have questions on the day of surgery, call your hospital or surgery  center.  Eating and drinking guidelines  For your safety: Unless your surgeon tells you otherwise, follow the guidelines below.  Eat and drink as usual until 8 hours before you arrive for surgery. After that, no food or milk.  Drink clear liquids until 2 hours before you arrive. These are liquids you can see through, like water, Gatorade, and Propel Water. They also include plain black coffee and tea (no cream or milk), candy, and breath mints. You can spit out gum when you arrive.  If you drink alcohol: Stop drinking it the night before surgery.  If your care team tells you to take medicine on the morning of surgery, it's okay to take it with a sip of water.  Preventing infection  Shower or bathe the night before and morning of your surgery. Follow the instructions your clinic gave you. (If no instructions, use regular soap.)  Don't shave or clip hair near your surgery site. We'll remove the hair if needed.  Don't smoke or vape the morning of surgery. You may chew nicotine gum up to 2 hours before surgery. A nicotine patch is okay.  Note: Some surgeries require you to completely quit smoking and nicotine. Check with your surgeon.  Your care team will make every effort to keep you safe from infection. We will:  Clean our hands often with soap and water (or an alcohol-based hand rub).  Clean the skin at your surgery site with a special soap that kills germs.  Give you a special gown to keep you warm. (Cold raises the risk of infection.)  Wear special hair covers, masks, gowns and gloves during surgery.  Give antibiotic medicine, if prescribed. Not all surgeries need antibiotics.  What to bring on the day of surgery  Photo ID and insurance card  Copy of your health care directive, if you have one  Glasses and hearing aids (bring cases)  You can't wear contacts during surgery  Inhaler and eye drops, if you use them (tell us about these when you arrive)  CPAP machine or breathing device, if you use them  A few personal  items, if spending the night  If you have . . .  A pacemaker, ICD (cardiac defibrillator) or other implant: Bring the ID card.  An implanted stimulator: Bring the remote control.  A legal guardian: Bring a copy of the certified (court-stamped) guardianship papers.  Please remove any jewelry, including body piercings. Leave jewelry and other valuables at home.  If you're going home the day of surgery  You must have a responsible adult drive you home. They should stay with you overnight as well.  If you don't have someone to stay with you, and you aren't safe to go home alone, we may keep you overnight. Insurance often won't pay for this.  After surgery  If it's hard to control your pain or you need more pain medicine, please call your surgeon's office.  Questions?   If you have any questions for your care team, list them here: _________________________________________________________________________________________________________________________________________________________________________ ____________________________________ ____________________________________ ____________________________________  For informational purposes only. Not to replace the advice of your health care provider. Copyright   2003, 2019 [employer]. All rights reserved. Clinically reviewed by Maria G Reed MD. [employer] [id number] - REV 12/22.    How to Take Your Medication Before Surgery  - HOLD (do not take) Aspirin for 7 days

## 2024-02-29 ENCOUNTER — TRANSFERRED RECORDS (OUTPATIENT)
Dept: HEALTH INFORMATION MANAGEMENT | Facility: CLINIC | Age: 69
End: 2024-02-29
Payer: COMMERCIAL

## 2024-03-06 ENCOUNTER — HOSPITAL ENCOUNTER (OUTPATIENT)
Dept: CARDIOLOGY | Facility: CLINIC | Age: 69
Discharge: HOME | End: 2024-03-06
Attending: INTERNAL MEDICINE | Admitting: INTERNAL MEDICINE
Payer: COMMERCIAL

## 2024-03-06 ENCOUNTER — LAB (OUTPATIENT)
Dept: LAB | Facility: CLINIC | Age: 69
End: 2024-03-06
Payer: COMMERCIAL

## 2024-03-06 DIAGNOSIS — I25.10 CORONARY ARTERY DISEASE INVOLVING NATIVE CORONARY ARTERY OF NATIVE HEART WITHOUT ANGINA PECTORIS: ICD-10-CM

## 2024-03-06 DIAGNOSIS — E78.2 MIXED HYPERLIPIDEMIA: ICD-10-CM

## 2024-03-06 DIAGNOSIS — I35.0 AORTIC STENOSIS: ICD-10-CM

## 2024-03-06 LAB
ALT SERPL W P-5'-P-CCNC: 25 U/L (ref 0–70)
CHOLEST SERPL-MCNC: 142 MG/DL
FASTING STATUS PATIENT QL REPORTED: YES
HDLC SERPL-MCNC: 67 MG/DL
LDLC SERPL CALC-MCNC: 63 MG/DL
NONHDLC SERPL-MCNC: 75 MG/DL
TRIGL SERPL-MCNC: 59 MG/DL

## 2024-03-06 PROCEDURE — 36415 COLL VENOUS BLD VENIPUNCTURE: CPT | Performed by: INTERNAL MEDICINE

## 2024-03-06 PROCEDURE — 80061 LIPID PANEL: CPT | Performed by: INTERNAL MEDICINE

## 2024-03-06 PROCEDURE — 93306 TTE W/DOPPLER COMPLETE: CPT

## 2024-03-06 PROCEDURE — 93306 TTE W/DOPPLER COMPLETE: CPT | Mod: 26 | Performed by: INTERNAL MEDICINE

## 2024-03-06 PROCEDURE — 84460 ALANINE AMINO (ALT) (SGPT): CPT | Performed by: INTERNAL MEDICINE

## 2024-03-06 NOTE — PROGRESS NOTES
HPI and Plan:       I had the pleasure of seeing Mr. Navarro in followup at the [facility] today.  He is a very pleasant 69-year-old gentleman who I last saw in 8/2023.  He has a history of coronary artery disease and is status post stenting of the distal and ostial circumflex as well as the first obtuse marginal in 1998.  He has severe, probably familial, dyslipidemia with a markedly elevated LDL, although we have been able to control this well with Repatha and atorvastatin.  He also has a history of moderate aortic stenosis.      He then underwent coronary angiography on 07/21/2020 and was found to have significant stenosis of the ostium of the second obtuse marginal.  He underwent successful intervention with a drug-eluting stent at the time.  His other coronary arteries demonstrated mild-to-moderate nonobstructive disease in a left dominant system.      In January of 2021 he was experiencing significant fatigue and dyspnea on exertion and was found to have significant anemia in the context of dark stools for which a comprehensive gastrointestinal work-up was performed but was ultimately negative.      He was readmitted to [facility] in October 2021 with a non-ST elevation myocardial infarction.  He was found to have severe stenosis of the mid circumflex and underwent PCI of the mid circumflex at the time with a synergy drug-eluting stent.  He was found to have moderate to severe nonobstructive stenosis of the OM1 with a negative IFR.  An echocardiogram at the time demonstrated probable moderate aortic stenosis but normal left ventricular systolic function.  He was again placed on dual antiplatelet therapy prior to discharge.     Unfortunately he had subsequent GI bleeding and anemia and underwent a repeat GI evaluation in 2022 that included capsule endoscopy.  He was found to have small bowel AVMs on his capsule study.  His EGD demonstrated esophagitis and a colonoscopy  demonstrated diverticulosis but no evidence of active bleeding.  He was ultimately placed on iron supplementation and hemoglobin has been stable.    He has also undergone left-sided carotid endarterectomy in December 2023 for asymptomatic left carotid stenosis.    Today he feels well overall from a cardiovascular standpoint.  He injured his right hand while playing golf and recently underwent surgery.  He remains very active, walking regularly and taking care of his grandchildren without any cardiac limitations.  He specifically denies any chest discomfort, palpitations, syncope or presyncope.  He denies any PND or orthopnea.  He has been taking all his medications as prescribed.        PHYSICAL EXAMINATION:  Dictated below.      LABORATORY STUDIES:  Lipids on 3/6/2024 demonstrated total cholesterol 142, HDL 67, LDL of 63 and triglycerides of 59.    Hemoglobin on 02/12/2024 was 14.9.     An echocardiogram on 3/6/2024 demonstrated normal left ventricular systolic function with moderate aortic stenosis.  This appears to be stable compared to the previous study.     IMPRESSION:     1.  Coronary artery disease status post most recent PCI to the mid circumflex in October 2021 in the context of a non-ST elevation myocardial infarction.  He is status post revascularization of the second obtuse marginal in 07/2020 and prior circumflex/obtuse marginal revascularization in 1998.   2.  Significant dyslipidemia characterized by severe LDL elevation.  This has normalized with the use of Repatha and atorvastatin.   3.  History of moderate aortic stenosis.  Stable on most recent echocardiogram.  4.  Left internal carotid stenosis status post carotid endarterectomy as described above.  5.  Iron deficiency anemia as described above.  Hemoglobin within normal limits on 2/12/2024.  6.  Elevated blood pressure today without a formal diagnosis of hypertension.    PLAN    Mr. Navarro is doing well overall from a cardiovascular standpoint.   There is no evidence of angina or congestive heart failure.  His medications are appropriate and his risk factors are at goal.    Assuming his clinical status remains stable, I would plan on seeing him in approximately 1 year at which point an echocardiogram will be performed for reassessment of his aortic stenosis.    It was a pleasure seeing him today.      CURRENT MEDICATIONS:  Current Outpatient Medications   Medication Sig Dispense Refill    aspirin 81 MG EC tablet Take 81 mg by mouth daily      atorvastatin (LIPITOR) 80 MG tablet Take 1 tablet (80 mg) by mouth daily 90 tablet 3    diclofenac (VOLTAREN) 1 % topical gel APPLY 2 GM TO HANDS AND FINGERS FOUR TIMES DAILY USING DOSING CARD 100 g 0    evolocumab (REPATHA) 140 MG/ML prefilled autoinjector Inject 1 mL (140 mg) Subcutaneous every 14 days 6 mL 1    Ferrous Sulfate (IRON) 325 (65 Fe) MG tablet Take 1 tablet by mouth every other day      folic acid (FOLVITE) 1 MG tablet TAKE 1 TABLET(1 MG) BY MOUTH EVERY EVENING 90 tablet 3    gabapentin (NEURONTIN) 600 MG tablet TAKE 1 TABLET(600 MG) BY MOUTH TWICE DAILY 180 tablet 1    nitroGLYcerin (NITROSTAT) 0.4 MG sublingual tablet For chest pain place 1 tablet under the tongue every 5 minutes for 3 doses. If symptoms persist 5 minutes after 1st dose call 911. 30 tablet 0    sildenafil (REVATIO) 20 MG tablet TAKE 2 TO 5 TABLETS BY MOUTH AS NEEDED PRIOR TO INTERCOURSE. MAX 100MG(5 TABLETS)/DAY 90 tablet 0       ALLERGIES     Allergies   Allergen Reactions    Crestor [Rosuvastatin] GI Disturbance    Dust Mites     Other [No Clinical Screening - See Comments]      Goose feathers    Pollen Extract        PAST MEDICAL HISTORY:  Past Medical History:   Diagnosis Date    Aortic stenosis     Arthritis     hands    CAD (coronary artery disease)     cardiac cath 1998: stents x 2 to circumflex    Carotid stenosis     left    Family history of early CAD     Hyperlipidaemia LDL goal < 70     familial hyperlipidemia with marked  hypercholesterolemia before treatment; has been on some form of cholesterol-lowering medication since the 1970s    Prostate cancer (H) 2010    Sleep apnea     Syncope     Unstable angina (H) 1/6/2021       PAST SURGICAL HISTORY:  Past Surgical History:   Procedure Laterality Date    CARDIAC SURGERY  1998    coronary stents x2 placed 1998; angioplasty    CV HEART CATHETERIZATION WITH POSSIBLE INTERVENTION N/A 7/21/2020    Procedure: Heart Catheterization with Possible Intervention;  Surgeon: Alber Bentley MD;  Location:  HEART CARDIAC CATH LAB    CV HEART CATHETERIZATION WITH POSSIBLE INTERVENTION N/A 10/11/2021    Procedure: Heart Catheterization with Possible Intervention;  Surgeon: Patsy Wallace MD;  Location:  HEART CARDIAC CATH LAB    CV INSTANTANEOUS WAVE-FREE RATIO N/A 10/11/2021    Procedure: Instantaneous Wave-Free Ratio;  Surgeon: Patsy Wallace MD;  Location:  HEART CARDIAC CATH LAB    CV LEFT HEART CATH N/A 10/11/2021    Procedure: Left Heart Cath;  Surgeon: Patsy Wallace MD;  Location:  HEART CARDIAC CATH LAB    CV PCI STENT DRUG ELUTING N/A 10/11/2021    Procedure: Percutaneous Coronary Intervention Stent Drug Eluting;  Surgeon: Patsy Wallace MD;  Location:  HEART CARDIAC CATH LAB    DAVINCI PROSTATECTOMY      2010    ENDARTERECTOMY CAROTID Left 12/27/2022    Procedure: LEFT CAROTID ENDARTERECTOMY WITH BOVINE PERICARDIUM PATCH ANGIOPLASTY, AND INTRAOPERATIVE ELECTROENCEPHALOGRAM MONITORING;  Surgeon: Montez Aponte MD;  Location:  OR    ESOPHAGOSCOPY, GASTROSCOPY, DUODENOSCOPY (EGD), COMBINED N/A 1/6/2021    Procedure: ESOPHAGOGASTRODUODENOSCOPY (EGD);  Surgeon: Rosemarie Laws MD;  Location:  GI    EYE SURGERY Bilateral 2017    eyelids    GENITOURINARY SURGERY      ORTHOPEDIC SURGERY Right     achilles tendon; post football injury    ORTHOPEDIC SURGERY Right     hand; near thumb. has wires in there. cysts    REPAIR TENDON BICEPS DISTAL UPPER EXTREMITY  Right 2018    Procedure: REPAIR TENDON BICEPS DISTAL UPPER EXTREMITY;  Right open biceps tendon repair  ;  Surgeon: Alber Zabala MD;  Location: [facility] OR    SURGICAL HISTORY OF -       Right hand Xanthoma removal    SURGICAL HISTORY OF -       Stress Echo (-)       FAMILY HISTORY:  Family History   Problem Relation Age of Onset    Lipids Mother     C.A.D. Mother          at age 49 of MI    Dementia Father     C.A.D. Sister         MI at age 48    Cancer - colorectal No family hx of     Prostate Cancer No family hx of     Colon Cancer No family hx of        SOCIAL HISTORY:  Social History     Socioeconomic History    Marital status:    Tobacco Use    Smoking status: Former     Packs/day: 0.25     Years: 20.00     Additional pack years: 0.00     Total pack years: 5.00     Types: Cigars, Cigarettes     Quit date:      Years since quittin.1    Smokeless tobacco: Current     Types: Snuff, Chew    Tobacco comments:     daily   Vaping Use    Vaping Use: Never used   Substance and Sexual Activity    Alcohol use: Not Currently     Comment: very rare, none for 8 years    Drug use: Yes     Types: Marijuana     Comment: daily     Sexual activity: Yes     Partners: Female   Other Topics Concern    Caffeine Concern Yes     Comment: coffee and soda    Special Diet No    Exercise Yes     Comment: regular     Seat Belt Yes    Parent/sibling w/ CABG, MI or angioplasty before 65F 55M? Yes     Comment: 49     Social Determinants of Health     Financial Resource Strain: Low Risk  (2023)    Financial Resource Strain     Within the past 12 months, have you or your family members you live with been unable to get utilities (heat, electricity) when it was really needed?: No   Food Insecurity: Low Risk  (2023)    Food Insecurity     Within the past 12 months, did you worry that your food would run out before you got money to buy more?: No     Within the past 12 months, did the food you bought just not  last and you didn t have money to get more?: No   Transportation Needs: Low Risk  (9/25/2023)    Transportation Needs     Within the past 12 months, has lack of transportation kept you from medical appointments, getting your medicines, non-medical meetings or appointments, work, or from getting things that you need?: No   Interpersonal Safety: Low Risk  (9/25/2023)    Interpersonal Safety     Do you feel physically and emotionally safe where you currently live?: Yes     Within the past 12 months, have you been hit, slapped, kicked or otherwise physically hurt by someone?: No     Within the past 12 months, have you been humiliated or emotionally abused in other ways by your partner or ex-partner?: No   Housing Stability: Low Risk  (9/25/2023)    Housing Stability     Do you have housing? : Yes     Are you worried about losing your housing?: No       Review of Systems:  Skin:          Eyes:         ENT:         Respiratory:          Cardiovascular:         Gastroenterology:        Genitourinary:         Musculoskeletal:         Neurologic:         Psychiatric:         Heme/Lymph/Imm:         Endocrine:           Physical Exam:  Vitals: There were no vitals taken for this visit.    Constitutional:  cooperative        Skin:  warm and dry to the touch          Head:  normocephalic        Eyes:  pupils equal and round        Lymph:      ENT:  no pallor or cyanosis        Neck:  JVP normal        Respiratory:  clear to auscultation         Cardiac: regular rhythm                pulses full and equal                                    systolic ejection murmur    GI:  abdomen soft        Extremities and Muscular Skeletal:  no edema              Neurological:  no gross motor deficits        Psych:  Alert and Oriented x 3        CC  Sander Galicia MD  [address]  [place] [id number]

## 2024-03-07 ENCOUNTER — OFFICE VISIT (OUTPATIENT)
Dept: CARDIOLOGY | Facility: CLINIC | Age: 69
End: 2024-03-07
Attending: INTERNAL MEDICINE
Payer: COMMERCIAL

## 2024-03-07 VITALS
HEIGHT: 66 IN | WEIGHT: 154.6 LBS | HEART RATE: 68 BPM | OXYGEN SATURATION: 95 % | SYSTOLIC BLOOD PRESSURE: 153 MMHG | BODY MASS INDEX: 24.85 KG/M2 | DIASTOLIC BLOOD PRESSURE: 91 MMHG

## 2024-03-07 DIAGNOSIS — I25.10 CORONARY ARTERY DISEASE INVOLVING NATIVE CORONARY ARTERY OF NATIVE HEART WITHOUT ANGINA PECTORIS: Primary | ICD-10-CM

## 2024-03-07 DIAGNOSIS — I35.0 NONRHEUMATIC AORTIC VALVE STENOSIS: ICD-10-CM

## 2024-03-07 PROCEDURE — 99213 OFFICE O/P EST LOW 20 MIN: CPT | Performed by: INTERNAL MEDICINE

## 2024-03-07 RX ORDER — HYDROCODONE BITARTRATE AND ACETAMINOPHEN 5; 325 MG/1; MG/1
1 TABLET ORAL EVERY 6 HOURS PRN
COMMUNITY
Start: 2024-02-29 | End: 2024-07-29

## 2024-03-07 NOTE — LETTER
3/7/2024    Washington Yang PA-C  [address]  [facility] [address]    RE: Johan FERNANDEZ Ramon       Dear Colleague,     I had the pleasure of seeing Johan Navarro in the [facility].  HPI and Plan:       I had the pleasure of seeing Mr. Navarro in followup at the [facility] today.  He is a very pleasant 69-year-old gentleman who I last saw in 8/2023.  He has a history of coronary artery disease and is status post stenting of the distal and ostial circumflex as well as the first obtuse marginal in 1998.  He has severe, probably familial, dyslipidemia with a markedly elevated LDL, although we have been able to control this well with Repatha and atorvastatin.  He also has a history of moderate aortic stenosis.      He then underwent coronary angiography on 07/21/2020 and was found to have significant stenosis of the ostium of the second obtuse marginal.  He underwent successful intervention with a drug-eluting stent at the time.  His other coronary arteries demonstrated mild-to-moderate nonobstructive disease in a left dominant system.      In January of 2021 he was experiencing significant fatigue and dyspnea on exertion and was found to have significant anemia in the context of dark stools for which a comprehensive gastrointestinal work-up was performed but was ultimately negative.      He was readmitted to [facility] in October 2021 with a non-ST elevation myocardial infarction.  He was found to have severe stenosis of the mid circumflex and underwent PCI of the mid circumflex at the time with a synergy drug-eluting stent.  He was found to have moderate to severe nonobstructive stenosis of the OM1 with a negative IFR.  An echocardiogram at the time demonstrated probable moderate aortic stenosis but normal left ventricular systolic function.  He was again placed on dual antiplatelet therapy prior to discharge.     Unfortunately he had subsequent GI bleeding  and anemia and underwent a repeat GI evaluation in 2022 that included capsule endoscopy.  He was found to have small bowel AVMs on his capsule study.  His EGD demonstrated esophagitis and a colonoscopy demonstrated diverticulosis but no evidence of active bleeding.  He was ultimately placed on iron supplementation and hemoglobin has been stable.    He has also undergone left-sided carotid endarterectomy in December 2023 for asymptomatic left carotid stenosis.    Today he feels well overall from a cardiovascular standpoint.  He injured his right hand while playing golf and recently underwent surgery.  He remains very active, walking regularly and taking care of his grandchildren without any cardiac limitations.  He specifically denies any chest discomfort, palpitations, syncope or presyncope.  He denies any PND or orthopnea.  He has been taking all his medications as prescribed.        PHYSICAL EXAMINATION:  Dictated below.      LABORATORY STUDIES:  Lipids on 3/6/2024 demonstrated total cholesterol 142, HDL 67, LDL of 63 and triglycerides of 59.    Hemoglobin on 02/12/2024 was 14.9.     An echocardiogram on 3/6/2024 demonstrated normal left ventricular systolic function with moderate aortic stenosis.  This appears to be stable compared to the previous study.     IMPRESSION:     1.  Coronary artery disease status post most recent PCI to the mid circumflex in October 2021 in the context of a non-ST elevation myocardial infarction.  He is status post revascularization of the second obtuse marginal in 07/2020 and prior circumflex/obtuse marginal revascularization in 1998.   2.  Significant dyslipidemia characterized by severe LDL elevation.  This has normalized with the use of Repatha and atorvastatin.   3.  History of moderate aortic stenosis.  Stable on most recent echocardiogram.  4.  Left internal carotid stenosis status post carotid endarterectomy as described above.  5.  Iron deficiency anemia as described above.   Hemoglobin within normal limits on 2/12/2024.  6.  Elevated blood pressure today without a formal diagnosis of hypertension.    PLAN    Mr. Navarro is doing well overall from a cardiovascular standpoint.  There is no evidence of angina or congestive heart failure.  His medications are appropriate and his risk factors are at goal.    Assuming his clinical status remains stable, I would plan on seeing him in approximately 1 year at which point an echocardiogram will be performed for reassessment of his aortic stenosis.    It was a pleasure seeing him today.      CURRENT MEDICATIONS:  Current Outpatient Medications   Medication Sig Dispense Refill    aspirin 81 MG EC tablet Take 81 mg by mouth daily      atorvastatin (LIPITOR) 80 MG tablet Take 1 tablet (80 mg) by mouth daily 90 tablet 3    diclofenac (VOLTAREN) 1 % topical gel APPLY 2 GM TO HANDS AND FINGERS FOUR TIMES DAILY USING DOSING CARD 100 g 0    evolocumab (REPATHA) 140 MG/ML prefilled autoinjector Inject 1 mL (140 mg) Subcutaneous every 14 days 6 mL 1    Ferrous Sulfate (IRON) 325 (65 Fe) MG tablet Take 1 tablet by mouth every other day      folic acid (FOLVITE) 1 MG tablet TAKE 1 TABLET(1 MG) BY MOUTH EVERY EVENING 90 tablet 3    gabapentin (NEURONTIN) 600 MG tablet TAKE 1 TABLET(600 MG) BY MOUTH TWICE DAILY 180 tablet 1    nitroGLYcerin (NITROSTAT) 0.4 MG sublingual tablet For chest pain place 1 tablet under the tongue every 5 minutes for 3 doses. If symptoms persist 5 minutes after 1st dose call 911. 30 tablet 0    sildenafil (REVATIO) 20 MG tablet TAKE 2 TO 5 TABLETS BY MOUTH AS NEEDED PRIOR TO INTERCOURSE. MAX 100MG(5 TABLETS)/DAY 90 tablet 0       ALLERGIES     Allergies   Allergen Reactions    Crestor [Rosuvastatin] GI Disturbance    Dust Mites     Other [No Clinical Screening - See Comments]      Goose feathers    Pollen Extract        PAST MEDICAL HISTORY:  Past Medical History:   Diagnosis Date    Aortic stenosis     Arthritis     hands    CAD  (coronary artery disease)     cardiac cath 1998: stents x 2 to circumflex    Carotid stenosis     left    Family history of early CAD     Hyperlipidaemia LDL goal < 70     familial hyperlipidemia with marked hypercholesterolemia before treatment; has been on some form of cholesterol-lowering medication since the 1970s    Prostate cancer (H) 2010    Sleep apnea     Syncope     Unstable angina (H) 1/6/2021       PAST SURGICAL HISTORY:  Past Surgical History:   Procedure Laterality Date    CARDIAC SURGERY  1998    coronary stents x2 placed 1998; angioplasty    CV HEART CATHETERIZATION WITH POSSIBLE INTERVENTION N/A 7/21/2020    Procedure: Heart Catheterization with Possible Intervention;  Surgeon: Alber Bentley MD;  Location:  HEART CARDIAC CATH LAB    CV HEART CATHETERIZATION WITH POSSIBLE INTERVENTION N/A 10/11/2021    Procedure: Heart Catheterization with Possible Intervention;  Surgeon: Patsy Wallace MD;  Location:  HEART CARDIAC CATH LAB    CV INSTANTANEOUS WAVE-FREE RATIO N/A 10/11/2021    Procedure: Instantaneous Wave-Free Ratio;  Surgeon: Patsy Wallace MD;  Location:  HEART CARDIAC CATH LAB    CV LEFT HEART CATH N/A 10/11/2021    Procedure: Left Heart Cath;  Surgeon: Patsy Wallace MD;  Location:  HEART CARDIAC CATH LAB    CV PCI STENT DRUG ELUTING N/A 10/11/2021    Procedure: Percutaneous Coronary Intervention Stent Drug Eluting;  Surgeon: Patsy Wallace MD;  Location:  HEART CARDIAC CATH LAB    DAVINCI PROSTATECTOMY      2010    ENDARTERECTOMY CAROTID Left 12/27/2022    Procedure: LEFT CAROTID ENDARTERECTOMY WITH BOVINE PERICARDIUM PATCH ANGIOPLASTY, AND INTRAOPERATIVE ELECTROENCEPHALOGRAM MONITORING;  Surgeon: Montez Aponte MD;  Location:  OR    ESOPHAGOSCOPY, GASTROSCOPY, DUODENOSCOPY (EGD), COMBINED N/A 1/6/2021    Procedure: ESOPHAGOGASTRODUODENOSCOPY (EGD);  Surgeon: Rosemarie Laws MD;  Location:  GI    EYE SURGERY Bilateral 2017    eyelids     GENITOURINARY SURGERY      ORTHOPEDIC SURGERY Right     achilles tendon; post football injury    ORTHOPEDIC SURGERY Right     hand; near thumb. has wires in there. cysts    REPAIR TENDON BICEPS DISTAL UPPER EXTREMITY Right 2018    Procedure: REPAIR TENDON BICEPS DISTAL UPPER EXTREMITY;  Right open biceps tendon repair  ;  Surgeon: Alber Zabala MD;  Location: [facility] OR    SURGICAL HISTORY OF -       Right hand Xanthoma removal    SURGICAL HISTORY OF -       Stress Echo (-)       FAMILY HISTORY:  Family History   Problem Relation Age of Onset    Lipids Mother     C.A.D. Mother          at age 49 of MI    Dementia Father     C.A.D. Sister         MI at age 48    Cancer - colorectal No family hx of     Prostate Cancer No family hx of     Colon Cancer No family hx of        SOCIAL HISTORY:  Social History     Socioeconomic History    Marital status:    Tobacco Use    Smoking status: Former     Packs/day: 0.25     Years: 20.00     Additional pack years: 0.00     Total pack years: 5.00     Types: Cigars, Cigarettes     Quit date:      Years since quittin.1    Smokeless tobacco: Current     Types: Snuff, Chew    Tobacco comments:     daily   Vaping Use    Vaping Use: Never used   Substance and Sexual Activity    Alcohol use: Not Currently     Comment: very rare, none for 8 years    Drug use: Yes     Types: Marijuana     Comment: daily     Sexual activity: Yes     Partners: Female   Other Topics Concern    Caffeine Concern Yes     Comment: coffee and soda    Special Diet No    Exercise Yes     Comment: regular     Seat Belt Yes    Parent/sibling w/ CABG, MI or angioplasty before 65F 55M? Yes     Comment: 49     Social Determinants of Health     Financial Resource Strain: Low Risk  (2023)    Financial Resource Strain     Within the past 12 months, have you or your family members you live with been unable to get utilities (heat, electricity) when it was really needed?: No   Food Insecurity:  Low Risk  (9/25/2023)    Food Insecurity     Within the past 12 months, did you worry that your food would run out before you got money to buy more?: No     Within the past 12 months, did the food you bought just not last and you didn t have money to get more?: No   Transportation Needs: Low Risk  (9/25/2023)    Transportation Needs     Within the past 12 months, has lack of transportation kept you from medical appointments, getting your medicines, non-medical meetings or appointments, work, or from getting things that you need?: No   Interpersonal Safety: Low Risk  (9/25/2023)    Interpersonal Safety     Do you feel physically and emotionally safe where you currently live?: Yes     Within the past 12 months, have you been hit, slapped, kicked or otherwise physically hurt by someone?: No     Within the past 12 months, have you been humiliated or emotionally abused in other ways by your partner or ex-partner?: No   Housing Stability: Low Risk  (9/25/2023)    Housing Stability     Do you have housing? : Yes     Are you worried about losing your housing?: No       Review of Systems:  Skin:          Eyes:         ENT:         Respiratory:          Cardiovascular:         Gastroenterology:        Genitourinary:         Musculoskeletal:         Neurologic:         Psychiatric:         Heme/Lymph/Imm:         Endocrine:           Physical Exam:  Vitals: There were no vitals taken for this visit.    Constitutional:  cooperative        Skin:  warm and dry to the touch          Head:  normocephalic        Eyes:  pupils equal and round        Lymph:      ENT:  no pallor or cyanosis        Neck:  JVP normal        Respiratory:  clear to auscultation         Cardiac: regular rhythm                pulses full and equal                                    systolic ejection murmur    GI:  abdomen soft        Extremities and Muscular Skeletal:  no edema              Neurological:  no gross motor deficits        Psych:  Alert and  Oriented x 3        CC  Sander Galicia MD  [address]  [place] [address]      Thank you for allowing me to participate in the care of your patient.      Sincerely,     Sander Galicia MD     [facility]

## 2024-03-11 ENCOUNTER — TRANSFERRED RECORDS (OUTPATIENT)
Dept: HEALTH INFORMATION MANAGEMENT | Facility: CLINIC | Age: 69
End: 2024-03-11
Payer: COMMERCIAL

## 2024-03-13 DIAGNOSIS — M19.249 SECONDARY LOCALIZED OSTEOARTHROSIS OF HAND, UNSPECIFIED LATERALITY: ICD-10-CM

## 2024-03-26 ENCOUNTER — LAB (OUTPATIENT)
Dept: LAB | Facility: CLINIC | Age: 69
End: 2024-03-26
Attending: INTERNAL MEDICINE
Payer: COMMERCIAL

## 2024-03-26 ENCOUNTER — APPOINTMENT (OUTPATIENT)
Dept: LAB | Facility: CLINIC | Age: 69
End: 2024-03-26
Payer: COMMERCIAL

## 2024-03-26 DIAGNOSIS — D50.9 IRON DEFICIENCY ANEMIA, UNSPECIFIED IRON DEFICIENCY ANEMIA TYPE: ICD-10-CM

## 2024-03-26 LAB
ANION GAP SERPL CALCULATED.3IONS-SCNC: 9 MMOL/L (ref 7–15)
BUN SERPL-MCNC: 17.4 MG/DL (ref 8–23)
CALCIUM SERPL-MCNC: 8.9 MG/DL (ref 8.8–10.2)
CHLORIDE SERPL-SCNC: 104 MMOL/L (ref 98–107)
CREAT SERPL-MCNC: 1.09 MG/DL (ref 0.67–1.17)
DEPRECATED HCO3 PLAS-SCNC: 28 MMOL/L (ref 22–29)
EGFRCR SERPLBLD CKD-EPI 2021: 73 ML/MIN/1.73M2
GLUCOSE SERPL-MCNC: 100 MG/DL (ref 70–99)
POTASSIUM SERPL-SCNC: 4.2 MMOL/L (ref 3.4–5.3)
SODIUM SERPL-SCNC: 141 MMOL/L (ref 135–145)

## 2024-03-26 PROCEDURE — 80048 BASIC METABOLIC PNL TOTAL CA: CPT

## 2024-03-26 PROCEDURE — 82728 ASSAY OF FERRITIN: CPT

## 2024-03-26 PROCEDURE — 83550 IRON BINDING TEST: CPT

## 2024-03-26 PROCEDURE — 36415 COLL VENOUS BLD VENIPUNCTURE: CPT

## 2024-03-28 DIAGNOSIS — D50.0 IRON DEFICIENCY ANEMIA DUE TO CHRONIC BLOOD LOSS: Primary | ICD-10-CM

## 2024-03-28 LAB
FERRITIN SERPL-MCNC: 49 NG/ML (ref 31–409)
IRON BINDING CAPACITY (ROCHE): 264 UG/DL (ref 240–430)
IRON SATN MFR SERPL: 26 % (ref 15–46)
IRON SERPL-MCNC: 68 UG/DL (ref 61–157)

## 2024-04-04 ENCOUNTER — VIRTUAL VISIT (OUTPATIENT)
Dept: ONCOLOGY | Facility: CLINIC | Age: 69
End: 2024-04-04
Attending: INTERNAL MEDICINE
Payer: COMMERCIAL

## 2024-04-04 VITALS — HEIGHT: 66 IN | BODY MASS INDEX: 24.91 KG/M2 | WEIGHT: 155 LBS

## 2024-04-04 DIAGNOSIS — D50.0 IRON DEFICIENCY ANEMIA DUE TO CHRONIC BLOOD LOSS: Primary | ICD-10-CM

## 2024-04-04 PROCEDURE — 99212 OFFICE O/P EST SF 10 MIN: CPT | Mod: 95 | Performed by: INTERNAL MEDICINE

## 2024-04-04 ASSESSMENT — PAIN SCALES - GENERAL: PAINLEVEL: NO PAIN (0)

## 2024-04-04 NOTE — NURSING NOTE
Is the patient currently in the state of [place]? YES    Visit mode:VIDEO    If the visit is dropped, the patient can be reconnected by: VIDEO VISIT: Send to e-mail at: [email or web address]    Will anyone else be joining the visit? NO  (If patient encounters technical issues they should call [phone]    How would you like to obtain your AVS? MyChart    Are changes needed to the allergy or medication list? No      Reason for visit: LOBO MCMILLAN

## 2024-04-04 NOTE — LETTER
4/4/2024         RE: Johan Navarro  [address]  [place] [address]        Dear Colleague,    Thank you for referring your patient, Johan Navarro, to the [facility] CANCER [facility]. Please see a copy of my visit note below.    Virtual Visit Details    Type of service:  Video Visit   Video Start Time:  8:30 AM  Video End Time: 8:34 AM    Originating Location (pt. Location): Home    Distant Location (provider location):  Off-site  Platform used for Video Visit: [facility] Hematology and Oncology Progress Note    Patient: Johan Navarro  MRN: [id number]  Date of Service: 08/10/2022        Reason for Visit    Chief Complaint   Patient presents with     RECHECK         Problem List Items Addressed This Visit          Hematologic    Iron deficiency anemia due to chronic blood loss - Primary           Assessment and Plan    Iron deficiency anemia secondary to chronic GI blood loss  Exact source of GI bleeding not determined despite repeat endoscopies.  He has not had any IV iron infusion for the last year and a half.  Labs from last month reviewed today.  Ferritin has improved to 49.  Total iron is normal at 68.  TIBC 264 and transferrin saturation is 26.  His hemoglobin in February was normal at 14.9.  Clinically doing well.  No recurrent GI bleeding issues.  Overall feeling well.    Discussed further management in this setting.  Since his hemoglobin and iron studies have stabilized I do not think he needs IV iron infusion now and hopefully for the foreseeable future.  I would probably continue oral iron supplementation as much as possible as long as he is tolerating it.  And continue to monitor his labs once every 6 months or so.  Check CBC with ferritin and iron studies.      I do not think he needs regular follow-ups with me in hematology clinic.  Labs can be done through his PCP and if there are any questions or if ferritin and iron studies start to go down then I am happy to  see him back in clinic.    He is agreeable with the plan.     Cancer Staging   No matching staging information was found for the patient.      ECOG Performance    0 - Independent         Problem List    Patient Active Problem List   Diagnosis     Depressive disorder, not elsewhere classified     Elevated prostate specific antigen (PSA)     Osteoarthritis of Hand      Hyperlipidemia LDL goal <100     Mixed hyperlipidemia     Family history of early CAD     Aortic stenosis     Carotid stenosis     Coronary artery disease involving native coronary artery of native heart without angina pectoris     Status post coronary angiogram     Abnormal cardiovascular stress test     Gastrointestinal hemorrhage, unspecified gastrointestinal hemorrhage type     History of prostate cancer     Iron deficiency anemia due to chronic blood loss     Post-operative state          Interval History   Johan Navarro is a 67 year old male with history of recurrent iron deficiency anemia secondary to chronic GI blood loss, status post IV iron infusions who is seen as a video visit for follow-up.    He has not had any IV iron infusions for the last year and a half.  Has been on oral iron supplementation and doing well.  No significant GI blood loss.  He is seen as a video visit with repeat labs.  Denies any new clinical symptoms today.        Review of Systems  A comprehensive review of systems was negative except for what is noted in the interval history    Current Outpatient Medications   Medication Sig Dispense Refill     aspirin 81 MG EC tablet Take 81 mg by mouth daily OTC       atorvastatin (LIPITOR) 80 MG tablet Take 1 tablet (80 mg) by mouth daily 90 tablet 3     diclofenac (VOLTAREN) 1 % topical gel APPLY 2 GRAMS TO HANDS AND FINGERS FOUR TIMES DAILY USING DOSING CARD 100 g 0     evolocumab (REPATHA) 140 MG/ML prefilled autoinjector Inject 1 mL (140 mg) Subcutaneous every 14 days 6 mL 1     Ferrous Sulfate (IRON) 325 (65 Fe) MG tablet  Take 1 tablet by mouth Once weekly       folic acid (FOLVITE) 1 MG tablet TAKE 1 TABLET(1 MG) BY MOUTH EVERY EVENING 90 tablet 3     gabapentin (NEURONTIN) 600 MG tablet TAKE 1 TABLET(600 MG) BY MOUTH TWICE DAILY 180 tablet 1     nitroGLYcerin (NITROSTAT) 0.4 MG sublingual tablet For chest pain place 1 tablet under the tongue every 5 minutes for 3 doses. If symptoms persist 5 minutes after 1st dose call 911. 30 tablet 0     sildenafil (REVATIO) 20 MG tablet TAKE 2 TO 5 TABLETS BY MOUTH AS NEEDED PRIOR TO INTERCOURSE. MAX 100MG(5 TABLETS)/DAY 90 tablet 0     HYDROcodone-acetaminophen (NORCO) 5-325 MG tablet Take 1 tablet by mouth every 6 hours as needed for pain       No current facility-administered medications for this visit.        Physical Exam    Failed to redirect to the Timeline version of the REV SmartLink.    General: alert and cooperative      Lab Results    No results found for this or any previous visit (from the past 168 hour(s)).      Imaging    Echocardiogram Complete    Result Date: 3/6/2024  [id number]^QUYEN^LATONYA^KAVON  [facility]chocardiography [facility] [address] [place] [address]  Name: MARLENY CHAPA MRN: [id number] : 1955 Study Date: 2024 08:43 AM Age: 69 yrs Gender: Male Patient Location: [facility] Reason For Study: Aortic stenosis Ordering Physician: LATONYA NAPIER Referring Physician: Washington Yang PA-C Performed By: Makayla Bertrand RDCS  BSA: 1.8 m2 Height: 65 in Weight: 151 lb HR: 66 ______________________________________________________________________________ Procedure Complete Echo Adult. ______________________________________________________________________________ Interpretation Summary  The mean AoV pressure gradient is 31mmHg. Moderate valvular aortic stenosis. The calculated aortic valve area is 1.0cm2. The study was technically difficult. Compared to prior study, there is no significant change.  ______________________________________________________________________________ Left Ventricle The left ventricle is normal in structure, function and size. Left ventricular diastolic function is normal.  Right Ventricle The right ventricle is normal in structure, function and size.  Atria Normal left atrial size. Right atrial size is normal.  Mitral Valve The mitral valve leaflets are mildly thickened.  Tricuspid Valve Normal tricuspid valve.  Aortic Valve The mean AoV pressure gradient is 31mmHg. Moderate valvular aortic stenosis. The calculated aortic valve area is 1.0cm2.  Pulmonic Valve Normal pulmonic valve.  Vessels The aortic root is normal size. Normal size ascending aorta. The inferior vena cava is normal.  Pericardium There is no pericardial effusion.  Rhythm Sinus rhythm was noted. ______________________________________________________________________________ MMode/2D Measurements & Calculations  IVSd: 1.1 cm LVIDd: 4.3 cm LVIDs: 2.6 cm LVPWd: 1.0 cm IVC diam: 0.79 cm FS: 39.8 % LV mass(C)d: 152.2 grams LV mass(C)dI: 86.7 grams/m2 Ao root diam: 3.0 cm LA dimension: 3.5 cm asc Aorta Diam: 2.7 cm LA/Ao: 1.1 LVOT diam: 2.2 cm LVOT area: 3.7 cm2 Ao root diam index Ht(cm/m): 1.8 Ao root diam index BSA (cm/m2): 1.7 Asc Ao diam index BSA (cm/m2): 1.5 Asc Ao diam index Ht(cm/m): 1.6 LA Volume (BP): 54.9 ml RV Base: 3.4 cm RWT: 0.48 TAPSE: 2.1 cm  Doppler Measurements & Calculations MV E max jero: 76.2 cm/sec MV A max jero: 74.9 cm/sec MV E/A: 1.0 MV dec slope: 411.8 cm/sec2 MV dec time: 0.19 sec Ao V2 max: 355.5 cm/sec Ao max P.0 mmHg Ao V2 mean: 262.9 cm/sec Ao mean P.9 mmHg Ao V2 VTI: 87.9 cm MANDEEP(I,D): 0.94 cm2 MANDEEP(V,D): 1.0 cm2 LV V1 max PG: 3.8 mmHg LV V1 max: 97.1 cm/sec LV V1 VTI: 22.4 cm SV(LVOT): 82.7 ml SI(LVOT): 47.1 ml/m2 PA acc time: 0.18 sec AV Jero Ratio (DI): 0.27 MANDEEP Index (cm2/m2): 0.54 E/E' av.8 Lateral E/e': 8.1 Medial E/e': 11.5 RV S Jero: 11.5 cm/sec   ______________________________________________________________________________ Report approved by: Al Littlejohn 03/06/2024 10:27 AM            Signed by: Praful Rousseau MD            Again, thank you for allowing me to participate in the care of your patient.        Sincerely,        Praful Rousseau MD

## 2024-04-04 NOTE — LETTER
4/4/2024         RE: Johan Navarro  [address]  [place] [address]        Dear Colleague,    Thank you for referring your patient, Johan Navarro, to the [facility]CER [facility]. Please see a copy of my visit note below.    Virtual Visit Details    Type of service:  Video Visit   Video Start Time:  8:30 AM  Video End Time: 8:34 AM    Originating Location (pt. Location): Home    Distant Location (provider location):  Off-site  Platform used for Video Visit: [facility] Hematology and Oncology Progress Note    Patient: Johan Navarro  MRN: [id number]  Date of Service: 08/10/2022        Reason for Visit    Chief Complaint   Patient presents with     RECHECK         Problem List Items Addressed This Visit          Hematologic    Iron deficiency anemia due to chronic blood loss - Primary           Assessment and Plan    Iron deficiency anemia secondary to chronic GI blood loss  Exact source of GI bleeding not determined despite repeat endoscopies.  He has not had any IV iron infusion for the last year and a half.  Labs from last month reviewed today.  Ferritin has improved to 49.  Total iron is normal at 68.  TIBC 264 and transferrin saturation is 26.  His hemoglobin in February was normal at 14.9.  Clinically doing well.  No recurrent GI bleeding issues.  Overall feeling well.    Discussed further management in this setting.  Since his hemoglobin and iron studies have stabilized I do not think he needs IV iron infusion now and hopefully for the foreseeable future.  I would probably continue oral iron supplementation as much as possible as long as he is tolerating it.  And continue to monitor his labs once every 6 months or so.  Check CBC with ferritin and iron studies.      I do not think he needs regular follow-ups with me in hematology clinic.  Labs can be done through his PCP and if there are any questions or if ferritin and iron studies start to go down then I am happy to  see him back in clinic.    He is agreeable with the plan.     Cancer Staging   No matching staging information was found for the patient.      ECOG Performance    0 - Independent         Problem List    Patient Active Problem List   Diagnosis     Depressive disorder, not elsewhere classified     Elevated prostate specific antigen (PSA)     Osteoarthritis of Hand      Hyperlipidemia LDL goal <100     Mixed hyperlipidemia     Family history of early CAD     Aortic stenosis     Carotid stenosis     Coronary artery disease involving native coronary artery of native heart without angina pectoris     Status post coronary angiogram     Abnormal cardiovascular stress test     Gastrointestinal hemorrhage, unspecified gastrointestinal hemorrhage type     History of prostate cancer     Iron deficiency anemia due to chronic blood loss     Post-operative state          Interval History   Johan Navarro is a 67 year old male with history of recurrent iron deficiency anemia secondary to chronic GI blood loss, status post IV iron infusions who is seen as a video visit for follow-up.    He has not had any IV iron infusions for the last year and a half.  Has been on oral iron supplementation and doing well.  No significant GI blood loss.  He is seen as a video visit with repeat labs.  Denies any new clinical symptoms today.        Review of Systems  A comprehensive review of systems was negative except for what is noted in the interval history    Current Outpatient Medications   Medication Sig Dispense Refill     aspirin 81 MG EC tablet Take 81 mg by mouth daily OTC       atorvastatin (LIPITOR) 80 MG tablet Take 1 tablet (80 mg) by mouth daily 90 tablet 3     diclofenac (VOLTAREN) 1 % topical gel APPLY 2 GRAMS TO HANDS AND FINGERS FOUR TIMES DAILY USING DOSING CARD 100 g 0     evolocumab (REPATHA) 140 MG/ML prefilled autoinjector Inject 1 mL (140 mg) Subcutaneous every 14 days 6 mL 1     Ferrous Sulfate (IRON) 325 (65 Fe) MG tablet  Take 1 tablet by mouth Once weekly       folic acid (FOLVITE) 1 MG tablet TAKE 1 TABLET(1 MG) BY MOUTH EVERY EVENING 90 tablet 3     gabapentin (NEURONTIN) 600 MG tablet TAKE 1 TABLET(600 MG) BY MOUTH TWICE DAILY 180 tablet 1     nitroGLYcerin (NITROSTAT) 0.4 MG sublingual tablet For chest pain place 1 tablet under the tongue every 5 minutes for 3 doses. If symptoms persist 5 minutes after 1st dose call 911. 30 tablet 0     sildenafil (REVATIO) 20 MG tablet TAKE 2 TO 5 TABLETS BY MOUTH AS NEEDED PRIOR TO INTERCOURSE. MAX 100MG(5 TABLETS)/DAY 90 tablet 0     HYDROcodone-acetaminophen (NORCO) 5-325 MG tablet Take 1 tablet by mouth every 6 hours as needed for pain       No current facility-administered medications for this visit.        Physical Exam    Failed to redirect to the Timeline version of the REV SmartLink.    General: alert and cooperative      Lab Results    No results found for this or any previous visit (from the past 168 hour(s)).      Imaging    Echocardiogram Complete    Result Date: 3/6/2024  [id number]^QUYEN^LATONYA^KAVON  [facility] Echocardiography Laboratory [address] [place] [address]  Name: MARLENY CHAPA MRN: [id number] : 1955 Study Date: 2024 08:43 AM Age: 69 yrs Gender: Male Patient Location: [facility] Reason For Study: Aortic stenosis Ordering Physician: LATONYA NAPIER Referring Physician: Washington Yang PA-C Performed By: Makayla Bertrand RDCS  BSA: 1.8 m2 Height: 65 in Weight: 151 lb HR: 66 ______________________________________________________________________________ Procedure Complete Echo Adult. ______________________________________________________________________________ Interpretation Summary  The mean AoV pressure gradient is 31mmHg. Moderate valvular aortic stenosis. The calculated aortic valve area is 1.0cm2. The study was technically difficult. Compared to prior study, there is no significant change.  ______________________________________________________________________________ Left Ventricle The left ventricle is normal in structure, function and size. Left ventricular diastolic function is normal.  Right Ventricle The right ventricle is normal in structure, function and size.  Atria Normal left atrial size. Right atrial size is normal.  Mitral Valve The mitral valve leaflets are mildly thickened.  Tricuspid Valve Normal tricuspid valve.  Aortic Valve The mean AoV pressure gradient is 31mmHg. Moderate valvular aortic stenosis. The calculated aortic valve area is 1.0cm2.  Pulmonic Valve Normal pulmonic valve.  Vessels The aortic root is normal size. Normal size ascending aorta. The inferior vena cava is normal.  Pericardium There is no pericardial effusion.  Rhythm Sinus rhythm was noted. ______________________________________________________________________________ MMode/2D Measurements & Calculations  IVSd: 1.1 cm LVIDd: 4.3 cm LVIDs: 2.6 cm LVPWd: 1.0 cm IVC diam: 0.79 cm FS: 39.8 % LV mass(C)d: 152.2 grams LV mass(C)dI: 86.7 grams/m2 Ao root diam: 3.0 cm LA dimension: 3.5 cm asc Aorta Diam: 2.7 cm LA/Ao: 1.1 LVOT diam: 2.2 cm LVOT area: 3.7 cm2 Ao root diam index Ht(cm/m): 1.8 Ao root diam index BSA (cm/m2): 1.7 Asc Ao diam index BSA (cm/m2): 1.5 Asc Ao diam index Ht(cm/m): 1.6 LA Volume (BP): 54.9 ml RV Base: 3.4 cm RWT: 0.48 TAPSE: 2.1 cm  Doppler Measurements & Calculations MV E max jero: 76.2 cm/sec MV A max jero: 74.9 cm/sec MV E/A: 1.0 MV dec slope: 411.8 cm/sec2 MV dec time: 0.19 sec Ao V2 max: 355.5 cm/sec Ao max P.0 mmHg Ao V2 mean: 262.9 cm/sec Ao mean P.9 mmHg Ao V2 VTI: 87.9 cm MANDEEP(I,D): 0.94 cm2 MANDEEP(V,D): 1.0 cm2 LV V1 max PG: 3.8 mmHg LV V1 max: 97.1 cm/sec LV V1 VTI: 22.4 cm SV(LVOT): 82.7 ml SI(LVOT): 47.1 ml/m2 PA acc time: 0.18 sec AV Jero Ratio (DI): 0.27 MANDEEP Index (cm2/m2): 0.54 E/E' av.8 Lateral E/e': 8.1 Medial E/e': 11.5 RV S Jero: 11.5 cm/sec   ______________________________________________________________________________ Report approved by: Al Littlejohn 03/06/2024 10:27 AM            Signed by: Praful Rousseau MD            Again, thank you for allowing me to participate in the care of your patient.        Sincerely,        Praful Rousseau MD

## 2024-04-04 NOTE — PROGRESS NOTES
Virtual Visit Details    Type of service:  Video Visit   Video Start Time:  8:30 AM  Video End Time: 8:34 AM    Originating Location (pt. Location): Home    Distant Location (provider location):  Off-site  Platform used for Video Visit: [facility] Hematology and Oncology Progress Note    Patient: Johan Navarro  MRN: [id number]  Date of Service: 08/10/2022        Reason for Visit    Chief Complaint   Patient presents with    RECHECK         Problem List Items Addressed This Visit          Hematologic    Iron deficiency anemia due to chronic blood loss - Primary           Assessment and Plan    Iron deficiency anemia secondary to chronic GI blood loss  Exact source of GI bleeding not determined despite repeat endoscopies.  He has not had any IV iron infusion for the last year and a half.  Labs from last month reviewed today.  Ferritin has improved to 49.  Total iron is normal at 68.  TIBC 264 and transferrin saturation is 26.  His hemoglobin in February was normal at 14.9.  Clinically doing well.  No recurrent GI bleeding issues.  Overall feeling well.    Discussed further management in this setting.  Since his hemoglobin and iron studies have stabilized I do not think he needs IV iron infusion now and hopefully for the foreseeable future.  I would probably continue oral iron supplementation as much as possible as long as he is tolerating it.  And continue to monitor his labs once every 6 months or so.  Check CBC with ferritin and iron studies.      I do not think he needs regular follow-ups with me in hematology clinic.  Labs can be done through his PCP and if there are any questions or if ferritin and iron studies start to go down then I am happy to see him back in clinic.    He is agreeable with the plan.     Cancer Staging   No matching staging information was found for the patient.      ECOG Performance    0 - Independent         Problem List    Patient Active Problem List   Diagnosis     Depressive disorder, not elsewhere classified    Elevated prostate specific antigen (PSA)    Osteoarthritis of Hand     Hyperlipidemia LDL goal <100    Mixed hyperlipidemia    Family history of early CAD    Aortic stenosis    Carotid stenosis    Coronary artery disease involving native coronary artery of native heart without angina pectoris    Status post coronary angiogram    Abnormal cardiovascular stress test    Gastrointestinal hemorrhage, unspecified gastrointestinal hemorrhage type    History of prostate cancer    Iron deficiency anemia due to chronic blood loss    Post-operative state          Interval History   Johan Navarro is a 67 year old male with history of recurrent iron deficiency anemia secondary to chronic GI blood loss, status post IV iron infusions who is seen as a video visit for follow-up.    He has not had any IV iron infusions for the last year and a half.  Has been on oral iron supplementation and doing well.  No significant GI blood loss.  He is seen as a video visit with repeat labs.  Denies any new clinical symptoms today.        Review of Systems  A comprehensive review of systems was negative except for what is noted in the interval history    Current Outpatient Medications   Medication Sig Dispense Refill    aspirin 81 MG EC tablet Take 81 mg by mouth daily OTC      atorvastatin (LIPITOR) 80 MG tablet Take 1 tablet (80 mg) by mouth daily 90 tablet 3    diclofenac (VOLTAREN) 1 % topical gel APPLY 2 GRAMS TO HANDS AND FINGERS FOUR TIMES DAILY USING DOSING CARD 100 g 0    evolocumab (REPATHA) 140 MG/ML prefilled autoinjector Inject 1 mL (140 mg) Subcutaneous every 14 days 6 mL 1    Ferrous Sulfate (IRON) 325 (65 Fe) MG tablet Take 1 tablet by mouth Once weekly      folic acid (FOLVITE) 1 MG tablet TAKE 1 TABLET(1 MG) BY MOUTH EVERY EVENING 90 tablet 3    gabapentin (NEURONTIN) 600 MG tablet TAKE 1 TABLET(600 MG) BY MOUTH TWICE DAILY 180 tablet 1    nitroGLYcerin (NITROSTAT) 0.4 MG  sublingual tablet For chest pain place 1 tablet under the tongue every 5 minutes for 3 doses. If symptoms persist 5 minutes after 1st dose call 911. 30 tablet 0    sildenafil (REVATIO) 20 MG tablet TAKE 2 TO 5 TABLETS BY MOUTH AS NEEDED PRIOR TO INTERCOURSE. MAX 100MG(5 TABLETS)/DAY 90 tablet 0    HYDROcodone-acetaminophen (NORCO) 5-325 MG tablet Take 1 tablet by mouth every 6 hours as needed for pain       No current facility-administered medications for this visit.        Physical Exam    Failed to redirect to the Timeline version of the REV SmartLink.    General: alert and cooperative      Lab Results    No results found for this or any previous visit (from the past 168 hour(s)).      Imaging    Echocardiogram Complete    Result Date: 3/6/2024  [id number]^QUYEN^LATONYA^KAVON  [facility] Echocardiography Laboratory [address] [place] [address]  Name: MARLENY CHAPA MRN: [id number] : 1955 Study Date: 2024 08:43 AM Age: 69 yrs Gender: Male Patient Location: [facility] Reason For Study: Aortic stenosis Ordering Physician: LATONYA NAPIER Referring Physician: Washington Yang PA-C Performed By: Makayla Bertrand RDCS  BSA: 1.8 m2 Height: 65 in Weight: 151 lb HR: 66 ______________________________________________________________________________ Procedure Complete Echo Adult. ______________________________________________________________________________ Interpretation Summary  The mean AoV pressure gradient is 31mmHg. Moderate valvular aortic stenosis. The calculated aortic valve area is 1.0cm2. The study was technically difficult. Compared to prior study, there is no significant change. ______________________________________________________________________________ Left Ventricle The left ventricle is normal in structure, function and size. Left ventricular diastolic function is normal.  Right Ventricle The right ventricle is normal in structure,  function and size.  Atria Normal left atrial size. Right atrial size is normal.  Mitral Valve The mitral valve leaflets are mildly thickened.  Tricuspid Valve Normal tricuspid valve.  Aortic Valve The mean AoV pressure gradient is 31mmHg. Moderate valvular aortic stenosis. The calculated aortic valve area is 1.0cm2.  Pulmonic Valve Normal pulmonic valve.  Vessels The aortic root is normal size. Normal size ascending aorta. The inferior vena cava is normal.  Pericardium There is no pericardial effusion.  Rhythm Sinus rhythm was noted. ______________________________________________________________________________ MMode/2D Measurements & Calculations  IVSd: 1.1 cm LVIDd: 4.3 cm LVIDs: 2.6 cm LVPWd: 1.0 cm IVC diam: 0.79 cm FS: 39.8 % LV mass(C)d: 152.2 grams LV mass(C)dI: 86.7 grams/m2 Ao root diam: 3.0 cm LA dimension: 3.5 cm asc Aorta Diam: 2.7 cm LA/Ao: 1.1 LVOT diam: 2.2 cm LVOT area: 3.7 cm2 Ao root diam index Ht(cm/m): 1.8 Ao root diam index BSA (cm/m2): 1.7 Asc Ao diam index BSA (cm/m2): 1.5 Asc Ao diam index Ht(cm/m): 1.6 LA Volume (BP): 54.9 ml RV Base: 3.4 cm RWT: 0.48 TAPSE: 2.1 cm  Doppler Measurements & Calculations MV E max jero: 76.2 cm/sec MV A max jero: 74.9 cm/sec MV E/A: 1.0 MV dec slope: 411.8 cm/sec2 MV dec time: 0.19 sec Ao V2 max: 355.5 cm/sec Ao max P.0 mmHg Ao V2 mean: 262.9 cm/sec Ao mean P.9 mmHg Ao V2 VTI: 87.9 cm MANDEEP(I,D): 0.94 cm2 MANDEEP(V,D): 1.0 cm2 LV V1 max PG: 3.8 mmHg LV V1 max: 97.1 cm/sec LV V1 VTI: 22.4 cm SV(LVOT): 82.7 ml SI(LVOT): 47.1 ml/m2 PA acc time: 0.18 sec AV Jero Ratio (DI): 0.27 MANDEEP Index (cm2/m2): 0.54 E/E' av.8 Lateral E/e': 8.1 Medial E/e': 11.5 RV S Jero: 11.5 cm/sec  ______________________________________________________________________________ Report approved by: Al Littlejohn 2024 10:27 AM            Signed by: Praful Rousseau MD

## 2024-04-09 ENCOUNTER — TELEPHONE (OUTPATIENT)
Dept: CARDIOLOGY | Facility: CLINIC | Age: 69
End: 2024-04-09
Payer: COMMERCIAL

## 2024-04-09 DIAGNOSIS — E78.2 MIXED HYPERLIPIDEMIA: ICD-10-CM

## 2024-04-09 DIAGNOSIS — I25.10 CORONARY ARTERY DISEASE INVOLVING NATIVE CORONARY ARTERY OF NATIVE HEART WITHOUT ANGINA PECTORIS: ICD-10-CM

## 2024-04-09 NOTE — TELEPHONE ENCOUNTER
Central Prior Authorization Team   Phone: [phone]    PA Initiation    Medication: Repatha 140mg/ml  Insurance Company: [employer] ([facility]) - Phone [phone] Fax [phone]  Pharmacy Filling the Rx: [facility] ([facility] - [place], [place] - [address] [facility]  Filling Pharmacy Phone: [phone]  Filling Pharmacy Fax:    Start Date: 4/9/2024

## 2024-04-09 NOTE — TELEPHONE ENCOUNTER
Prior Authorization Not Needed per Insurance    Medication: Repatha 140mg/ml  Insurance Company: [employer] ([facility]) - Phone [phone] Fax [phone]  Expected CoPay:      Pharmacy Filling the Rx: [facility] - [place], [place] - [address] [facility]  Pharmacy Notified:  yes  Patient Notified:  yes- Pharmacy will contact patient when ready to /ship    Medication previously approved through 12/31/2024

## 2024-04-24 ENCOUNTER — OFFICE VISIT (OUTPATIENT)
Dept: OTHER | Facility: CLINIC | Age: 69
End: 2024-04-24
Payer: COMMERCIAL

## 2024-04-24 ENCOUNTER — HOSPITAL ENCOUNTER (OUTPATIENT)
Dept: ULTRASOUND IMAGING | Facility: CLINIC | Age: 69
Discharge: HOME | End: 2024-04-24
Payer: COMMERCIAL

## 2024-04-24 VITALS — DIASTOLIC BLOOD PRESSURE: 76 MMHG | SYSTOLIC BLOOD PRESSURE: 118 MMHG | HEART RATE: 84 BPM

## 2024-04-24 DIAGNOSIS — I65.22 STENOSIS OF LEFT CAROTID ARTERY: Primary | ICD-10-CM

## 2024-04-24 DIAGNOSIS — I65.22 STENOSIS OF LEFT CAROTID ARTERY: ICD-10-CM

## 2024-04-24 PROCEDURE — 93880 EXTRACRANIAL BILAT STUDY: CPT

## 2024-04-24 PROCEDURE — 99213 OFFICE O/P EST LOW 20 MIN: CPT

## 2024-04-24 PROCEDURE — G0463 HOSPITAL OUTPT CLINIC VISIT: HCPCS | Mod: 25

## 2024-04-24 NOTE — PROGRESS NOTES
[facility]        Patient is here for a  follow up.    Pt is currently taking Aspirin and Statin.    /76 (BP Location: Right arm, Patient Position: Chair, Cuff Size: Adult Regular)   Pulse 84     The provider has been notified that the patient has no concerns.     Questions patient would like addressed today are: N/A.    Refills are needed: N/A    Has homecare services and agency name:  Cece Terry MA

## 2024-04-25 NOTE — PROGRESS NOTES
VASCULAR SURGERY PROGRESS NOTE    [facility]    Johan Navarro  Medical Record #: [id number]  YOB: 1955  Age: 69 year old     Date of Service: 4/24/2024    PRIMARY CARE PROVIDER: Washington Yang    Reason for visit:  Carotid surveillance    Otis Navarro is a pleasant 69 year old male who comes into clinic today for yearly surveillance of carotids, underwent a left carotid endarterectomy with Dr. Aponte on 12/28/2023 for asymptomatic left carotid stenosis.     He has a history of moderate aortic valve stenosis-status post coronary interventions with his most recent intervention being the placement of ANNETTE in mid-circumflex on 10/11/2021. The patient is known to our clinic for asymptomatic left carotid stenosis.     /76 P86  Denies amaurosis fugax, stroke, TIAs  No changes to health  Neurologically intact    BILATERAL CAROTID ULTRASOUND   4/24/2024 1:36 PM      HISTORY: Left CEA in 12/2023; Stenosis of left carotid artery.     COMPARISON: April 11, 2023, evaluation of the left carotid arterial  system and November 28, 2022, evaluation of the right carotid arterial  system.     RIGHT CAROTID FINDINGS:  There is mild mixed atherosclerotic plaque at  the carotid bifurcation and proximal internal carotid artery.  Right ICA PSV:  119 cm/sec.  Right ICA EDV:  39 cm/sec.  Right ICA/CCA PSV Ratio:  1.3    These indicate less than 50% diameter stenosis of the right ICA.    Right Vertebral: Antegrade flow.   Right ECA: Antegrade flow.      LEFT CAROTID FINDINGS:  There is minimal atherosclerotic plaque at the  carotid bifurcation and proximal internal carotid artery.  Left ICA PSV:  77 cm/sec.  Left ICA EDV:  58 cm/sec.  Left ICA/CCA PSV Ratio:  1.08    These indicate less than 50% diameter stenosis of the left ICA.    Left Vertebral: Antegrade flow.   Left ECA: Antegrade flow.      Causes of Decreased Accuracy:   None.                                                                        IMPRESSION:    1. Less than 50% diameter stenosis of the right ICA relative to the  distal ICA diameter.  2. Less than 50% diameter stenosis of the left ICA relative to the  distal ICA diameter.      RECOMMENDATION/RISKS: Continue on aspirin 81 mg and statin, Follow up in 1 year with myself for bilateral carotid duplex. Otis has no further questions or concerns.     REVIEW OF SYSTEMS:    A 12 point ROS was reviewed and is negative except for what is listed above in HPI.    PHH:    Past Medical History:   Diagnosis Date    Aortic stenosis     Arthritis     hands    CAD (coronary artery disease)     cardiac cath 1998: stents x 2 to circumflex    Carotid stenosis     left    Family history of early CAD     Hyperlipidaemia LDL goal < 70     familial hyperlipidemia with marked hypercholesterolemia before treatment; has been on some form of cholesterol-lowering medication since the 1970s    Prostate cancer (H) 2010    Sleep apnea     Syncope     Unstable angina (H) 1/6/2021          Past Surgical History:   Procedure Laterality Date    CARDIAC SURGERY  1998    coronary stents x2 placed 1998; angioplasty    CV HEART CATHETERIZATION WITH POSSIBLE INTERVENTION N/A 7/21/2020    Procedure: Heart Catheterization with Possible Intervention;  Surgeon: Alber Bentley MD;  Location:  HEART CARDIAC CATH LAB    CV HEART CATHETERIZATION WITH POSSIBLE INTERVENTION N/A 10/11/2021    Procedure: Heart Catheterization with Possible Intervention;  Surgeon: Patsy Wallace MD;  Location:  HEART CARDIAC CATH LAB    CV INSTANTANEOUS WAVE-FREE RATIO N/A 10/11/2021    Procedure: Instantaneous Wave-Free Ratio;  Surgeon: Patsy Wallace MD;  Location:  HEART CARDIAC CATH LAB    CV LEFT HEART CATH N/A 10/11/2021    Procedure: Left Heart Cath;  Surgeon: Patsy Wallace MD;  Location:  HEART CARDIAC CATH LAB    CV PCI STENT DRUG ELUTING N/A 10/11/2021    Procedure: Percutaneous Coronary Intervention Stent Drug Eluting;   Surgeon: Patsy Wallace MD;  Location:  HEART CARDIAC CATH LAB    DAVINCI PROSTATECTOMY      2010    ENDARTERECTOMY CAROTID Left 12/27/2022    Procedure: LEFT CAROTID ENDARTERECTOMY WITH BOVINE PERICARDIUM PATCH ANGIOPLASTY, AND INTRAOPERATIVE ELECTROENCEPHALOGRAM MONITORING;  Surgeon: Montez Aponte MD;  Location:  OR    ESOPHAGOSCOPY, GASTROSCOPY, DUODENOSCOPY (EGD), COMBINED N/A 1/6/2021    Procedure: ESOPHAGOGASTRODUODENOSCOPY (EGD);  Surgeon: Rosemarie Laws MD;  Location:  GI    EYE SURGERY Bilateral 2017    eyelids    GENITOURINARY SURGERY      ORTHOPEDIC SURGERY Right     achilles tendon; post football injury    ORTHOPEDIC SURGERY Right     hand; near thumb. has wires in there. cysts    REPAIR TENDON BICEPS DISTAL UPPER EXTREMITY Right 7/16/2018    Procedure: REPAIR TENDON BICEPS DISTAL UPPER EXTREMITY;  Right open biceps tendon repair  ;  Surgeon: Alber Zabala MD;  Location:  OR    SURGICAL HISTORY OF -       Right hand Xanthoma removal    SURGICAL HISTORY OF -       Stress Echo (-)       ALLERGIES:  Crestor [rosuvastatin], Dust mites, Other [no clinical screening - see comments], and Pollen extract    MEDS:    Current Outpatient Medications:     aspirin 81 MG EC tablet, Take 81 mg by mouth daily OTC, Disp: , Rfl:     atorvastatin (LIPITOR) 80 MG tablet, Take 1 tablet (80 mg) by mouth daily, Disp: 90 tablet, Rfl: 3    diclofenac (VOLTAREN) 1 % topical gel, APPLY 2 GRAMS TO HANDS AND FINGERS FOUR TIMES DAILY USING DOSING CARD, Disp: 100 g, Rfl: 0    evolocumab (REPATHA) 140 MG/ML prefilled autoinjector, Inject 1 mL (140 mg) Subcutaneous every 14 days, Disp: 6 mL, Rfl: 3    Ferrous Sulfate (IRON) 325 (65 Fe) MG tablet, Take 1 tablet by mouth Once weekly, Disp: , Rfl:     folic acid (FOLVITE) 1 MG tablet, TAKE 1 TABLET(1 MG) BY MOUTH EVERY EVENING, Disp: 90 tablet, Rfl: 3    gabapentin (NEURONTIN) 600 MG tablet, TAKE 1 TABLET(600 MG) BY MOUTH TWICE DAILY, Disp: 180 tablet,  Rfl: 1    nitroGLYcerin (NITROSTAT) 0.4 MG sublingual tablet, For chest pain place 1 tablet under the tongue every 5 minutes for 3 doses. If symptoms persist 5 minutes after 1st dose call 911., Disp: 30 tablet, Rfl: 0    sildenafil (REVATIO) 20 MG tablet, TAKE 2 TO 5 TABLETS BY MOUTH AS NEEDED PRIOR TO INTERCOURSE. MAX 100MG(5 TABLETS)/DAY, Disp: 90 tablet, Rfl: 0    HYDROcodone-acetaminophen (NORCO) 5-325 MG tablet, Take 1 tablet by mouth every 6 hours as needed for pain, Disp: , Rfl:     SOCIAL HABITS:    History   Smoking Status    Former    Packs/day: 0.25    Years: 20.00    Types: Cigars, Cigarettes    Quit date:    Smokeless Tobacco    Current    Types: Snuff, Chew     Social History    Substance and Sexual Activity      Alcohol use: Not Currently        Comment: none for 8 years- Quit      History   Drug Use    Types: Marijuana     Comment: daily        FAMILY HISTORY:    Family History   Problem Relation Age of Onset    Lipids Mother     C.A.D. Mother          at age 49 of MI    Dementia Father     C.A.D. Sister         MI at age 48    Cancer - colorectal No family hx of     Prostate Cancer No family hx of     Colon Cancer No family hx of        PE:  /76 (BP Location: Right arm, Patient Position: Chair, Cuff Size: Adult Regular)   Pulse 84   Wt Readings from Last 1 Encounters:   24 155 lb (70.3 kg)     There is no height or weight on file to calculate BMI.    EXAM:  GENERAL: well-developed 69 year old male who appears his stated age  CARDIAC: normal   CHEST/LUNG: normal respiratory effort   MUSCULOSKELETAL: grossly normal and both lower extremities are intact, no lower extremity edema  NEUROLOGIC: focally intact, alert and oriented x 3  PSYCH: appropriate affect  VASCULAR:     DIAGNOSTIC STUDIES:     Images:  US Carotid Bilateral    Result Date: 2024  BILATERAL CAROTID ULTRASOUND   2024 1:36 PM HISTORY: Left CEA in 2023; Stenosis of left carotid artery. COMPARISON:   2023, evaluation of the left carotid arterial system and November 28, 2022, evaluation of the right carotid arterial system. RIGHT CAROTID FINDINGS:  There is mild mixed atherosclerotic plaque at the carotid bifurcation and proximal internal carotid artery. Right ICA PSV:  119 cm/sec. Right ICA EDV:  39 cm/sec. Right ICA/CCA PSV Ratio:  1.3  These indicate less than 50% diameter stenosis of the right ICA.  Right Vertebral: Antegrade flow. Right ECA: Antegrade flow. LEFT CAROTID FINDINGS:  There is minimal atherosclerotic plaque at the carotid bifurcation and proximal internal carotid artery. Left ICA PSV:  77 cm/sec. Left ICA EDV:  58 cm/sec. Left ICA/CCA PSV Ratio:  1.08  These indicate less than 50% diameter stenosis of the left ICA.  Left Vertebral: Antegrade flow. Left ECA: Antegrade flow. Causes of Decreased Accuracy:   None.     IMPRESSION:  1. Less than 50% diameter stenosis of the right ICA relative to the distal ICA diameter. 2. Less than 50% diameter stenosis of the left ICA relative to the distal ICA diameter. LENIN ACEVEDO MD   SYSTEM ID:  [id number]    LABS:      Sodium   Date Value Ref Range Status   03/26/2024 141 135 - 145 mmol/L Final     Comment:     Reference intervals for this test were updated on 09/26/2023 to more accurately reflect our healthy population. There may be differences in the flagging of prior results with similar values performed with this method. Interpretation of those prior results can be made in the context of the updated reference intervals.    02/12/2024 140 135 - 145 mmol/L Final     Comment:     Reference intervals for this test were updated on 09/26/2023 to more accurately reflect our healthy population. There may be differences in the flagging of prior results with similar values performed with this method. Interpretation of those prior results can be made in the context of the updated reference intervals.    09/28/2023 138 135 - 145 mmol/L Final     Comment:      Reference intervals for this test were updated on 09/26/2023 to more accurately reflect our healthy population. There may be differences in the flagging of prior results with similar values performed with this method. Interpretation of those prior results can be made in the context of the updated reference intervals.    01/08/2021 136 133 - 144 mmol/L Final   01/07/2021 138 133 - 144 mmol/L Final   01/06/2021 136 133 - 144 mmol/L Final     Urea Nitrogen   Date Value Ref Range Status   03/26/2024 17.4 8.0 - 23.0 mg/dL Final   02/12/2024 12.7 8.0 - 23.0 mg/dL Final   09/28/2023 16.7 8.0 - 23.0 mg/dL Final   12/27/2022 18 7 - 30 mg/dL Final   05/17/2022 19 7 - 30 mg/dL Final   10/12/2021 13 7 - 30 mg/dL Final   01/08/2021 14 7 - 30 mg/dL Final   01/07/2021 15 7 - 30 mg/dL Final   01/06/2021 21 7 - 30 mg/dL Final     Hemoglobin   Date Value Ref Range Status   02/12/2024 14.9 13.3 - 17.7 g/dL Final   09/28/2023 14.2 13.3 - 17.7 g/dL Final   06/27/2023 14.5 13.3 - 17.7 g/dL Final   03/24/2021 11.2 (L) 13.3 - 17.7 g/dL Final     Comment:     Results confirmed by repeat test   01/21/2021 9.8 (L) 13.3 - 17.7 g/dL Final     Comment:     Results confirmed by repeat test   01/12/2021 9.4 (L) 13.3 - 17.7 g/dL Final     Comment:     Results confirmed by repeat test     Platelet Count   Date Value Ref Range Status   06/27/2023 225 150 - 450 10e3/uL Final   03/28/2023 240 150 - 450 10e3/uL Final   12/21/2022 211 150 - 450 10e3/uL Final   03/24/2021 206 150 - 450 10e9/L Final   01/21/2021 437 150 - 450 10e9/L Final   01/12/2021 184 150 - 450 10e9/L Final     INR   Date Value Ref Range Status   01/06/2021 1.03 0.86 - 1.14 Final   07/21/2020 0.96 0.86 - 1.14 Final       20 minutes spent on the day of encounter doing chart review, history and exam, documentation, and further activities as noted.       Stormy Bermudez NP  VASCULAR SURGERY

## 2024-05-20 ENCOUNTER — TRANSFERRED RECORDS (OUTPATIENT)
Dept: HEALTH INFORMATION MANAGEMENT | Facility: CLINIC | Age: 69
End: 2024-05-20
Payer: COMMERCIAL

## 2024-06-02 ENCOUNTER — HEALTH MAINTENANCE LETTER (OUTPATIENT)
Age: 69
End: 2024-06-02

## 2024-06-24 ENCOUNTER — TELEPHONE (OUTPATIENT)
Dept: FAMILY MEDICINE | Facility: CLINIC | Age: 69
End: 2024-06-24
Payer: COMMERCIAL

## 2024-06-24 NOTE — CONFIDENTIAL NOTE
Patient Quality Outreach    Patient is due for the following:   Colon Cancer Screening  IVD  -  IVD Follow-up Visit  Physical Annual Wellness Visit    Next Steps:   Schedule a Annual Wellness Visit    Type of outreach:    Sent [employer] message.      Questions for provider review:    None           Vince Nelson MA

## 2024-07-25 SDOH — HEALTH STABILITY: PHYSICAL HEALTH: ON AVERAGE, HOW MANY DAYS PER WEEK DO YOU ENGAGE IN MODERATE TO STRENUOUS EXERCISE (LIKE A BRISK WALK)?: 4 DAYS

## 2024-07-25 SDOH — HEALTH STABILITY: PHYSICAL HEALTH: ON AVERAGE, HOW MANY MINUTES DO YOU ENGAGE IN EXERCISE AT THIS LEVEL?: 40 MIN

## 2024-07-25 ASSESSMENT — SOCIAL DETERMINANTS OF HEALTH (SDOH): HOW OFTEN DO YOU GET TOGETHER WITH FRIENDS OR RELATIVES?: THREE TIMES A WEEK

## 2024-07-29 ENCOUNTER — OFFICE VISIT (OUTPATIENT)
Dept: FAMILY MEDICINE | Facility: CLINIC | Age: 69
End: 2024-07-29
Payer: COMMERCIAL

## 2024-07-29 VITALS
HEART RATE: 77 BPM | OXYGEN SATURATION: 96 % | BODY MASS INDEX: 24.6 KG/M2 | WEIGHT: 153.1 LBS | TEMPERATURE: 97.9 F | HEIGHT: 66 IN | DIASTOLIC BLOOD PRESSURE: 90 MMHG | RESPIRATION RATE: 20 BRPM | SYSTOLIC BLOOD PRESSURE: 150 MMHG

## 2024-07-29 DIAGNOSIS — Z85.46 HISTORY OF PROSTATE CANCER: ICD-10-CM

## 2024-07-29 DIAGNOSIS — R51.9 LEFT-SIDED HEADACHE: ICD-10-CM

## 2024-07-29 DIAGNOSIS — Z12.5 SCREENING FOR PROSTATE CANCER: ICD-10-CM

## 2024-07-29 DIAGNOSIS — R03.0 ELEVATED BLOOD PRESSURE READING WITHOUT DIAGNOSIS OF HYPERTENSION: ICD-10-CM

## 2024-07-29 DIAGNOSIS — Z00.00 ENCOUNTER FOR MEDICARE ANNUAL WELLNESS EXAM: Primary | ICD-10-CM

## 2024-07-29 DIAGNOSIS — I25.10 CORONARY ARTERY DISEASE INVOLVING NATIVE CORONARY ARTERY OF NATIVE HEART WITHOUT ANGINA PECTORIS: ICD-10-CM

## 2024-07-29 DIAGNOSIS — Z12.11 SCREEN FOR COLON CANCER: ICD-10-CM

## 2024-07-29 LAB
ERYTHROCYTE [SEDIMENTATION RATE] IN BLOOD BY WESTERGREN METHOD: 10 MM/HR (ref 0–20)
PSA SERPL DL<=0.01 NG/ML-MCNC: <0.01 NG/ML (ref 0–4.5)

## 2024-07-29 PROCEDURE — 36415 COLL VENOUS BLD VENIPUNCTURE: CPT | Performed by: PHYSICIAN ASSISTANT

## 2024-07-29 PROCEDURE — 85652 RBC SED RATE AUTOMATED: CPT | Performed by: PHYSICIAN ASSISTANT

## 2024-07-29 PROCEDURE — G0438 PPPS, INITIAL VISIT: HCPCS | Performed by: PHYSICIAN ASSISTANT

## 2024-07-29 PROCEDURE — 99213 OFFICE O/P EST LOW 20 MIN: CPT | Mod: 25 | Performed by: PHYSICIAN ASSISTANT

## 2024-07-29 PROCEDURE — G0103 PSA SCREENING: HCPCS | Performed by: PHYSICIAN ASSISTANT

## 2024-07-29 NOTE — PATIENT INSTRUCTIONS
Get a 2nd pneumonia and Tdap at your pharmacy    Schedule the colonoscopy    Start a HEADACHE journal -- looking at timing, possible triggers, associated symptoms, etc      Set up a nurse blood pressure here at the clinic WITH your cuff; start checking yours a bit more frequently      Patient Education   Preventive Care Advice   This is general advice given by our system to help you stay healthy. However, your care team may have specific advice just for you. Please talk to your care team about your preventive care needs.  Nutrition  Eat 5 or more servings of fruits and vegetables each day.  Try wheat bread, brown rice and whole grain pasta (instead of white bread, rice, and pasta).  Get enough calcium and vitamin D. Check the label on foods and aim for 100% of the RDA (recommended daily allowance).  Lifestyle  Exercise at least 150 minutes each week  (30 minutes a day, 5 days a week).  Do muscle strengthening activities 2 days a week. These help control your weight and prevent disease.  No smoking.  Wear sunscreen to prevent skin cancer.  Have a dental exam and cleaning every 6 months.  Yearly exams  See your health care team every year to talk about:  Any changes in your health.  Any medicines your care team has prescribed.  Preventive care, family planning, and ways to prevent chronic diseases.  Shots (vaccines)   HPV shots (up to age 26), if you've never had them before.  Hepatitis B shots (up to age 59), if you've never had them before.  COVID-19 shot: Get this shot when it's due.  Flu shot: Get a flu shot every year.  Tetanus shot: Get a tetanus shot every 10 years.  Pneumococcal, hepatitis A, and RSV shots: Ask your care team if you need these based on your risk.  Shingles shot (for age 50 and up)  General health tests  Diabetes screening:  Starting at age 35, Get screened for diabetes at least every 3 years.  If you are younger than age 35, ask your care team if you should be screened for  diabetes.  Cholesterol test: At age 39, start having a cholesterol test every 5 years, or more often if advised.  Bone density scan (DEXA): At age 50, ask your care team if you should have this scan for osteoporosis (brittle bones).  Hepatitis C: Get tested at least once in your life.  STIs (sexually transmitted infections)  Before age 24: Ask your care team if you should be screened for STIs.  After age 24: Get screened for STIs if you're at risk. You are at risk for STIs (including HIV) if:  You are sexually active with more than one person.  You don't use condoms every time.  You or a partner was diagnosed with a sexually transmitted infection.  If you are at risk for HIV, ask about PrEP medicine to prevent HIV.  Get tested for HIV at least once in your life, whether you are at risk for HIV or not.  Cancer screening tests  Cervical cancer screening: If you have a cervix, begin getting regular cervical cancer screening tests starting at age 21.  Breast cancer scan (mammogram): If you've ever had breasts, begin having regular mammograms starting at age 40. This is a scan to check for breast cancer.  Colon cancer screening: It is important to start screening for colon cancer at age 45.  Have a colonoscopy test every 10 years (or more often if you're at risk) Or, ask your provider about stool tests like a FIT test every year or Cologuard test every 3 years.  To learn more about your testing options, visit:   .  For help making a decision, visit:   https://bit.ly/qj24002.  Prostate cancer screening test: If you have a prostate, ask your care team if a prostate cancer screening test (PSA) at age 55 is right for you.  Lung cancer screening: If you are a current or former smoker ages 50 to 80, ask your care team if ongoing lung cancer screenings are right for you.  For informational purposes only. Not to replace the advice of your health care provider. Copyright   2023 [employer]. All rights reserved.  Clinically reviewed by the [facility] Transitions Program. [employer] [id number] - REV 01/24.

## 2024-07-29 NOTE — PROGRESS NOTES
Preventive Care Visit  [facility] JUAN A Yang PA-C, Family Medicine  Jul 29, 2024      Assessment & Plan     Encounter for Medicare annual wellness exam  Reviewed personal and family history. Reviewed age appropriate screenings. Recommended any needed vaccinations.    Screen for colon cancer  overdue  - Colonoscopy Screening  Referral; Future    Screening for prostate cancer  Updating. He has had prostatectomy 15+ years prior  - PSA, screen; Future  - PSA, screen    History of prostate cancer  As above  - PSA, screen; Future  - PSA, screen    Left-sided headache  This is new. No red flags. Perhaps following his cataract repair? No abnormality to exam. Unclear if related to elevated blood pressure? ESR normal. Have asked for journal of symptoms, monitor with BP, limit OTC medications. If still persisting in 2-4 weeks given the newness, recommend MRI  - ESR: Erythrocyte sedimentation rate; Future  - ESR: Erythrocyte sedimentation rate    Coronary artery disease involving native coronary artery of native heart without angina pectoris  Denies any chest pain/shortness of breath     Elevated blood pressure reading without diagnosis of hypertension  At home measures trend much better. He will return with cuff to compare to ours        Counseling  Appropriate preventive services were addressed with this patient via screening, questionnaire, or discussion as appropriate for fall prevention, nutrition, physical activity, Tobacco-use cessation, weight loss and cognition.  Checklist reviewing preventive services available has been given to the patient.  Reviewed patient's diet, addressing concerns and/or questions.           Subjective   Otis is a 69 year old, presenting for the following:  Physical        7/29/2024     8:12 AM   Additional Questions   Roomed by Ashley FATIMA         Health Care Directive  Patient does not have a Health Care Directive or Living Will: Discussed advance care  planning with patient; however, patient declined at this time.    HPI    Johan Navarro is a 69 year old male who presents today for annual med check  Overall feeling well; does mention NEW lft sided HA above the left eye ball   -improved with tylenol   -brightness makes worse   -no vision changes   -symptoms for at least one month   -never waking him up from sleep   -no dizziness or cognitive changes   -bilateral carotid ultrasound ok    -no hx of migraines   -lots of caffeine            7/25/2024   General Health   How would you rate your overall physical health? Good   Feel stress (tense, anxious, or unable to sleep) Not at all            7/25/2024   Nutrition   Diet: Regular (no restrictions)            7/25/2024   Exercise   Days per week of moderate/strenous exercise 4 days   Average minutes spent exercising at this level 40 min            7/25/2024   Social Factors   Frequency of gathering with friends or relatives Three times a week   Worry food won't last until get money to buy more No   Food not last or not have enough money for food? No   Do you have housing? (Housing is defined as stable permanent housing and does not include staying ouside in a car, in a tent, in an abandoned building, in an overnight shelter, or couch-surfing.) Yes   Are you worried about losing your housing? No   Lack of transportation? No   Unable to get utilities (heat,electricity)? No            7/25/2024   Fall Risk   Fallen 2 or more times in the past year? No   Trouble with walking or balance? No             7/25/2024   Activities of Daily Living- Home Safety   Needs help with the following daily activites None of the above   Safety concerns in the home None of the above            7/25/2024   Dental   Dentist two times every year? Yes            7/25/2024   Hearing Screening   Hearing concerns? None of the above            7/25/2024   Driving Risk Screening   Patient/family members have concerns about driving No             2024   General Alertness/Fatigue Screening   Have you been more tired than usual lately? No            2024   Urinary Incontinence Screening   Bothered by leaking urine in past 6 months No            2024   TB Screening   Were you born outside of the [place]? No            Today's PHQ-2 Score:       2024     8:12 AM   PHQ-2 (  [employer])   Q1: Little interest or pleasure in doing things 0   Q2: Feeling down, depressed or hopeless 0   PHQ-2 Score 0   Q1: Little interest or pleasure in doing things Not at all   Q2: Feeling down, depressed or hopeless Not at all   PHQ-2 Score 0           2024   Substance Use   Alcohol more than 3/day or more than 7/wk Not Applicable   Do you have a current opioid prescription? No   How severe/bad is pain from 1 to 10? 0/10 (No Pain)   Do you use any other substances recreationally? No        Social History     Tobacco Use    Smoking status: Former     Current packs/day: 0.00     Average packs/day: 0.3 packs/day for 20.0 years (5.0 ttl pk-yrs)     Types: Cigars, Cigarettes     Start date:      Quit date: 2000     Years since quittin.5    Smokeless tobacco: Current     Types: Snuff, Chew    Tobacco comments:     daily   Vaping Use    Vaping status: Never Used   Substance Use Topics    Alcohol use: Not Currently     Comment: none for 8 years- Quit    Drug use: Yes     Types: Marijuana     Comment: daily        Last PSA:   PSA   Date Value Ref Range Status   2021 <0.01 0 - 4 ug/L Final     Comment:     Assay Method:  Chemiluminescence using Siemens Vista analyzer     ASCVD Risk   The ASCVD Risk score (Mark RODRIGUEZ, et al., 2019) failed to calculate for the following reasons:    The patient has a prior MI or stroke diagnosis          Reviewed and updated as needed this visit by Provider                    Lab work is in process  Labs reviewed in EPIC  Current providers sharing in care for this patient include:  Patient Care Team:  Washington Yang  "MILANA Brunner as PCP - General (Physician Assistant)  Ivanna Steele [facility] as Pharmacist (Pharmacist)  Kathleen Trinidad DO as MD (Hematology & Oncology)  Praful Rousseau MD as Assigned Cancer Care Provider  Sander Galicia MD as MD (Cardiovascular Disease)  Washington Yang PA-C as Assigned PCP  Stormy Bermudez NP as Assigned Heart and Vascular Provider    The following health maintenance items are reviewed in Epic and correct as of today:  Health Maintenance   Topic Date Due    RSV VACCINE (Pregnancy & 60+) (1 - 1-dose 60+ series) Never done    Pneumococcal Vaccine: 65+ Years (2 of 2 - PCV) 08/31/2021    COLORECTAL CANCER SCREENING  05/21/2022    DTAP/TDAP/TD IMMUNIZATION (4 - Td or Tdap) 07/31/2023    COVID-19 Vaccine (6 - 2023-24 season) 09/01/2023    INFLUENZA VACCINE (1) 09/01/2024    ANNUAL REVIEW OF HM ORDERS  02/12/2025    LIPID  03/06/2025    MEDICARE ANNUAL WELLNESS VISIT  07/29/2025    FALL RISK ASSESSMENT  07/29/2025    GLUCOSE  03/26/2027    ADVANCE CARE PLANNING  02/12/2029    HEPATITIS C SCREENING  Completed    PHQ-2 (once per calendar year)  Completed    ZOSTER IMMUNIZATION  Completed    AORTIC ANEURYSM SCREENING (SYSTEM ASSIGNED)  Completed    IPV IMMUNIZATION  Aged Out    HPV IMMUNIZATION  Aged Out    MENINGITIS IMMUNIZATION  Aged Out    RSV MONOCLONAL ANTIBODY  Aged Out         Review of Systems  Constitutional, HEENT, cardiovascular, pulmonary, gi and gu systems are negative, except as otherwise noted.     Objective    Exam  BP (!) 150/90   Pulse 77   Temp 97.9  F (36.6  C) (Oral)   Resp 20   Ht 1.676 m (5' 6\")   Wt 69.4 kg (153 lb 1.6 oz)   SpO2 96%   BMI 24.71 kg/m     Estimated body mass index is 24.71 kg/m  as calculated from the following:    Height as of this encounter: 1.676 m (5' 6\").    Weight as of this encounter: 69.4 kg (153 lb 1.6 oz).    Physical Exam  GENERAL: alert and no distress  EYES: Eyes grossly normal to inspection, PERRL and conjunctivae and sclerae " normal  HENT: ear canals and TM's normal, nose and mouth without ulcers or lesions  NECK: no adenopathy, no asymmetry, masses, or scars  RESP: lungs clear to auscultation - no rales, rhonchi or wheezes  CV: regular rates and rhythm  ABDOMEN: soft, nontender, no hepatosplenomegaly, no masses and bowel sounds normal  NEURO: no chani abnormality to exam  MS: no gross musculoskeletal defects noted, no edema  SKIN: no suspicious lesions or rashes  PSYCH: mentation appears normal, affect normal/bright        7/29/2024   Mini Cog   Clock Draw Score 2 Normal   3 Item Recall 2 objects recalled   Mini Cog Total Score 4                 Signed Electronically by: Washington Yang PA-C

## 2024-09-18 ENCOUNTER — TRANSFERRED RECORDS (OUTPATIENT)
Dept: HEALTH INFORMATION MANAGEMENT | Facility: CLINIC | Age: 69
End: 2024-09-18

## 2024-09-22 DIAGNOSIS — D64.9 ANEMIA, UNSPECIFIED TYPE: ICD-10-CM

## 2024-09-23 RX ORDER — FOLIC ACID 1 MG/1
TABLET ORAL
Qty: 90 TABLET | Refills: 3 | Status: SHIPPED | OUTPATIENT
Start: 2024-09-23

## 2024-10-18 DIAGNOSIS — I25.10 CORONARY ARTERY DISEASE INVOLVING NATIVE CORONARY ARTERY OF NATIVE HEART WITHOUT ANGINA PECTORIS: ICD-10-CM

## 2024-10-18 RX ORDER — ATORVASTATIN CALCIUM 80 MG/1
80 TABLET, FILM COATED ORAL DAILY
Qty: 90 TABLET | Refills: 1 | Status: SHIPPED | OUTPATIENT
Start: 2024-10-18

## 2024-11-19 ENCOUNTER — TELEPHONE (OUTPATIENT)
Dept: CARDIOLOGY | Facility: CLINIC | Age: 69
End: 2024-11-19
Payer: COMMERCIAL

## 2024-11-19 NOTE — TELEPHONE ENCOUNTER
[facility] Call Center    Phone Message    May a detailed message be left on voicemail: yes     Reason for Call: Other: Amy called requesting to speak with Nicolle or a member of Otis's care team. Please reach out to Amy to discuss. Thank you!     Action Taken: Other: Cardiology    Travel Screening: Not Applicable    Thank you!  [facility]       Date of Service:

## 2024-11-19 NOTE — TELEPHONE ENCOUNTER
Return call to Amy who is wondering if patient is scheduled to see Dr. Galicia in March 2025. Informed patient is to have echocardiogram and 1 year follow up with Dr Galicia, however nothing is scheduled. Instructed to call SOC to schedule and provided with number.

## 2024-11-21 DIAGNOSIS — I25.10 CORONARY ARTERY DISEASE INVOLVING NATIVE CORONARY ARTERY OF NATIVE HEART WITHOUT ANGINA PECTORIS: ICD-10-CM

## 2024-11-21 RX ORDER — GABAPENTIN 600 MG/1
600 TABLET ORAL 2 TIMES DAILY
Qty: 180 TABLET | Refills: 1 | Status: SHIPPED | OUTPATIENT
Start: 2024-11-21

## 2025-01-15 ENCOUNTER — MYC REFILL (OUTPATIENT)
Dept: FAMILY MEDICINE | Facility: CLINIC | Age: 70
End: 2025-01-15
Payer: COMMERCIAL

## 2025-01-15 DIAGNOSIS — N52.9 ERECTILE DYSFUNCTION, UNSPECIFIED ERECTILE DYSFUNCTION TYPE: ICD-10-CM

## 2025-01-15 RX ORDER — SILDENAFIL CITRATE 20 MG/1
TABLET ORAL
Qty: 90 TABLET | Refills: 0 | Status: CANCELLED | OUTPATIENT
Start: 2025-01-15

## 2025-01-16 RX ORDER — SILDENAFIL CITRATE 20 MG/1
40-100 TABLET ORAL DAILY PRN
Qty: 30 TABLET | Refills: 2 | Status: SHIPPED | OUTPATIENT
Start: 2025-01-16

## 2025-01-23 ENCOUNTER — APPOINTMENT (OUTPATIENT)
Dept: CT IMAGING | Facility: CLINIC | Age: 70
End: 2025-01-23
Attending: STUDENT IN AN ORGANIZED HEALTH CARE EDUCATION/TRAINING PROGRAM
Payer: COMMERCIAL

## 2025-01-23 ENCOUNTER — HOSPITAL ENCOUNTER (EMERGENCY)
Facility: CLINIC | Age: 70
Discharge: HOME | End: 2025-01-23
Attending: STUDENT IN AN ORGANIZED HEALTH CARE EDUCATION/TRAINING PROGRAM
Payer: COMMERCIAL

## 2025-01-23 VITALS
DIASTOLIC BLOOD PRESSURE: 85 MMHG | RESPIRATION RATE: 23 BRPM | SYSTOLIC BLOOD PRESSURE: 140 MMHG | HEART RATE: 105 BPM | BODY MASS INDEX: 25.82 KG/M2 | TEMPERATURE: 97.9 F | OXYGEN SATURATION: 95 % | WEIGHT: 160 LBS
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
## 2025-01-23 DIAGNOSIS — S61.219A LACERATION OF FINGER OF LEFT HAND WITHOUT FOREIGN BODY WITHOUT DAMAGE TO NAIL, UNSPECIFIED FINGER, INITIAL ENCOUNTER: ICD-10-CM

## 2025-01-23 DIAGNOSIS — S00.83XA FACIAL CONTUSION, INITIAL ENCOUNTER: ICD-10-CM

## 2025-01-23 DIAGNOSIS — G44.209 ACUTE NON INTRACTABLE TENSION-TYPE HEADACHE: ICD-10-CM

## 2025-01-23 DIAGNOSIS — F10.929 ALCOHOLIC INTOXICATION WITH COMPLICATION: Primary | ICD-10-CM

## 2025-01-23 DIAGNOSIS — R45.89 DEPRESSED MOOD: ICD-10-CM

## 2025-01-23 LAB
ALBUMIN UR-MCNC: 20 MG/DL
ANION GAP SERPL CALCULATED.3IONS-SCNC: 18 MMOL/L (ref 7–15)
APPEARANCE UR: CLEAR
ATRIAL RATE - MUSE: 97 BPM
BASE EXCESS BLDV CALC-SCNC: -3 MMOL/L (ref -3–3)
BASOPHILS # BLD AUTO: 0.1 10E3/UL (ref 0–0.2)
BASOPHILS NFR BLD AUTO: 1 %
BILIRUB UR QL STRIP: NEGATIVE
BUN SERPL-MCNC: 15.9 MG/DL (ref 8–23)
CALCIUM SERPL-MCNC: 8.7 MG/DL (ref 8.8–10.4)
CHLORIDE SERPL-SCNC: 105 MMOL/L (ref 98–107)
COLOR UR AUTO: ABNORMAL
CREAT SERPL-MCNC: 0.91 MG/DL (ref 0.67–1.17)
DIASTOLIC BLOOD PRESSURE - MUSE: NORMAL MMHG
EGFRCR SERPLBLD CKD-EPI 2021: >90 ML/MIN/1.73M2
EOSINOPHIL # BLD AUTO: 0.1 10E3/UL (ref 0–0.7)
EOSINOPHIL NFR BLD AUTO: 1 %
ERYTHROCYTE [DISTWIDTH] IN BLOOD BY AUTOMATED COUNT: 13.7 % (ref 10–15)
ETHANOL SERPL-MCNC: 0.15 G/DL
FLUAV RNA SPEC QL NAA+PROBE: NEGATIVE
FLUBV RNA RESP QL NAA+PROBE: NEGATIVE
GLUCOSE SERPL-MCNC: 90 MG/DL (ref 70–99)
GLUCOSE UR STRIP-MCNC: NEGATIVE MG/DL
HCO3 BLDV-SCNC: 22 MMOL/L (ref 21–28)
HCO3 SERPL-SCNC: 20 MMOL/L (ref 22–29)
HCT VFR BLD AUTO: 45.7 % (ref 40–53)
HGB BLD-MCNC: 16 G/DL (ref 13.3–17.7)
HGB UR QL STRIP: NEGATIVE
IMM GRANULOCYTES # BLD: 0 10E3/UL
IMM GRANULOCYTES NFR BLD: 0 %
INTERPRETATION ECG - MUSE: NORMAL
KETONES UR STRIP-MCNC: ABNORMAL MG/DL
LACTATE BLD-SCNC: 3.6 MMOL/L
LEUKOCYTE ESTERASE UR QL STRIP: NEGATIVE
LYMPHOCYTES # BLD AUTO: 1.8 10E3/UL (ref 0.8–5.3)
LYMPHOCYTES NFR BLD AUTO: 15 %
MCH RBC QN AUTO: 28.8 PG (ref 26.5–33)
MCHC RBC AUTO-ENTMCNC: 35 G/DL (ref 31.5–36.5)
MCV RBC AUTO: 82 FL (ref 78–100)
MONOCYTES # BLD AUTO: 0.6 10E3/UL (ref 0–1.3)
MONOCYTES NFR BLD AUTO: 5 %
MUCOUS THREADS #/AREA URNS LPF: PRESENT /LPF
NEUTROPHILS # BLD AUTO: 9.5 10E3/UL (ref 1.6–8.3)
NEUTROPHILS NFR BLD AUTO: 78 %
NITRATE UR QL: NEGATIVE
NRBC # BLD AUTO: 0 10E3/UL
NRBC BLD AUTO-RTO: 0 /100
P AXIS - MUSE: 80 DEGREES
PCO2 BLDV: 38 MM HG (ref 40–50)
PH BLDV: 7.37 [PH] (ref 7.32–7.43)
PH UR STRIP: 6 [PH] (ref 5–7)
PLATELET # BLD AUTO: 292 10E3/UL (ref 150–450)
PO2 BLDV: 77 MM HG (ref 25–47)
POTASSIUM SERPL-SCNC: 3.8 MMOL/L (ref 3.4–5.3)
PR INTERVAL - MUSE: 144 MS
QRS DURATION - MUSE: 82 MS
QT - MUSE: 356 MS
QTC - MUSE: 452 MS
R AXIS - MUSE: 12 DEGREES
RBC # BLD AUTO: 5.56 10E6/UL (ref 4.4–5.9)
RBC URINE: 1 /HPF
RSV RNA SPEC NAA+PROBE: NEGATIVE
SAO2 % BLDV: 95 % (ref 70–75)
SARS-COV-2 RNA RESP QL NAA+PROBE: NEGATIVE
SODIUM SERPL-SCNC: 143 MMOL/L (ref 135–145)
SP GR UR STRIP: 1.02 (ref 1–1.03)
SYSTOLIC BLOOD PRESSURE - MUSE: NORMAL MMHG
T AXIS - MUSE: 71 DEGREES
TROPONIN T SERPL HS-MCNC: 16 NG/L
TROPONIN T SERPL HS-MCNC: 16 NG/L
UROBILINOGEN UR STRIP-MCNC: NORMAL MG/DL
VENTRICULAR RATE- MUSE: 97 BPM
WBC # BLD AUTO: 12.1 10E3/UL (ref 4–11)
WBC URINE: <1 /HPF

## 2025-01-23 PROCEDURE — 81003 URINALYSIS AUTO W/O SCOPE: CPT | Performed by: STUDENT IN AN ORGANIZED HEALTH CARE EDUCATION/TRAINING PROGRAM

## 2025-01-23 PROCEDURE — 84484 ASSAY OF TROPONIN QUANT: CPT | Performed by: STUDENT IN AN ORGANIZED HEALTH CARE EDUCATION/TRAINING PROGRAM

## 2025-01-23 PROCEDURE — 99285 EMERGENCY DEPT VISIT HI MDM: CPT | Mod: 25

## 2025-01-23 PROCEDURE — 250N000013 HC RX MED GY IP 250 OP 250 PS 637: Performed by: STUDENT IN AN ORGANIZED HEALTH CARE EDUCATION/TRAINING PROGRAM

## 2025-01-23 PROCEDURE — 82077 ASSAY SPEC XCP UR&BREATH IA: CPT | Performed by: STUDENT IN AN ORGANIZED HEALTH CARE EDUCATION/TRAINING PROGRAM

## 2025-01-23 PROCEDURE — 96361 HYDRATE IV INFUSION ADD-ON: CPT

## 2025-01-23 PROCEDURE — 258N000003 HC RX IP 258 OP 636: Performed by: STUDENT IN AN ORGANIZED HEALTH CARE EDUCATION/TRAINING PROGRAM

## 2025-01-23 PROCEDURE — 82803 BLOOD GASES ANY COMBINATION: CPT

## 2025-01-23 PROCEDURE — 82565 ASSAY OF CREATININE: CPT | Performed by: STUDENT IN AN ORGANIZED HEALTH CARE EDUCATION/TRAINING PROGRAM

## 2025-01-23 PROCEDURE — 85041 AUTOMATED RBC COUNT: CPT | Performed by: STUDENT IN AN ORGANIZED HEALTH CARE EDUCATION/TRAINING PROGRAM

## 2025-01-23 PROCEDURE — 85014 HEMATOCRIT: CPT | Performed by: STUDENT IN AN ORGANIZED HEALTH CARE EDUCATION/TRAINING PROGRAM

## 2025-01-23 PROCEDURE — 96375 TX/PRO/DX INJ NEW DRUG ADDON: CPT

## 2025-01-23 PROCEDURE — 80048 BASIC METABOLIC PNL TOTAL CA: CPT | Performed by: STUDENT IN AN ORGANIZED HEALTH CARE EDUCATION/TRAINING PROGRAM

## 2025-01-23 PROCEDURE — 87637 SARSCOV2&INF A&B&RSV AMP PRB: CPT | Performed by: STUDENT IN AN ORGANIZED HEALTH CARE EDUCATION/TRAINING PROGRAM

## 2025-01-23 PROCEDURE — 70450 CT HEAD/BRAIN W/O DYE: CPT

## 2025-01-23 PROCEDURE — 85004 AUTOMATED DIFF WBC COUNT: CPT | Performed by: STUDENT IN AN ORGANIZED HEALTH CARE EDUCATION/TRAINING PROGRAM

## 2025-01-23 PROCEDURE — 250N000011 HC RX IP 250 OP 636: Performed by: STUDENT IN AN ORGANIZED HEALTH CARE EDUCATION/TRAINING PROGRAM

## 2025-01-23 PROCEDURE — 96365 THER/PROPH/DIAG IV INF INIT: CPT | Mod: 59

## 2025-01-23 PROCEDURE — 93005 ELECTROCARDIOGRAM TRACING: CPT

## 2025-01-23 PROCEDURE — 87040 BLOOD CULTURE FOR BACTERIA: CPT | Performed by: STUDENT IN AN ORGANIZED HEALTH CARE EDUCATION/TRAINING PROGRAM

## 2025-01-23 PROCEDURE — 36415 COLL VENOUS BLD VENIPUNCTURE: CPT | Performed by: STUDENT IN AN ORGANIZED HEALTH CARE EDUCATION/TRAINING PROGRAM

## 2025-01-23 PROCEDURE — 80051 ELECTROLYTE PANEL: CPT | Performed by: STUDENT IN AN ORGANIZED HEALTH CARE EDUCATION/TRAINING PROGRAM

## 2025-01-23 RX ORDER — CEFTRIAXONE 2 G/1
2 INJECTION, POWDER, FOR SOLUTION INTRAMUSCULAR; INTRAVENOUS ONCE
Status: COMPLETED | OUTPATIENT
Start: 2025-01-23 | End: 2025-01-23

## 2025-01-23 RX ORDER — ACETAMINOPHEN 500 MG
1000 TABLET ORAL ONCE
Status: COMPLETED | OUTPATIENT
Start: 2025-01-23 | End: 2025-01-23

## 2025-01-23 RX ORDER — ONDANSETRON 2 MG/ML
4 INJECTION INTRAMUSCULAR; INTRAVENOUS ONCE
Status: COMPLETED | OUTPATIENT
Start: 2025-01-23 | End: 2025-01-23

## 2025-01-23 RX ADMIN — ACETAMINOPHEN 1000 MG: 500 TABLET, FILM COATED ORAL at 20:06

## 2025-01-23 RX ADMIN — SODIUM CHLORIDE 1000 ML: 9 INJECTION, SOLUTION INTRAVENOUS at 21:27

## 2025-01-23 RX ADMIN — ONDANSETRON 4 MG: 2 INJECTION, SOLUTION INTRAMUSCULAR; INTRAVENOUS at 20:05

## 2025-01-23 RX ADMIN — CEFTRIAXONE SODIUM 2 G: 2 INJECTION, POWDER, FOR SOLUTION INTRAMUSCULAR; INTRAVENOUS at 22:30

## 2025-01-23 ASSESSMENT — ACTIVITIES OF DAILY LIVING (ADL)
ADLS_ACUITY_SCORE: 43

## 2025-01-23 ASSESSMENT — COLUMBIA-SUICIDE SEVERITY RATING SCALE - C-SSRS
6. HAVE YOU EVER DONE ANYTHING, STARTED TO DO ANYTHING, OR PREPARED TO DO ANYTHING TO END YOUR LIFE?: NO
1. IN THE PAST MONTH, HAVE YOU WISHED YOU WERE DEAD OR WISHED YOU COULD GO TO SLEEP AND NOT WAKE UP?: NO
2. HAVE YOU ACTUALLY HAD ANY THOUGHTS OF KILLING YOURSELF IN THE PAST MONTH?: NO

## 2025-01-24 LAB
ATRIAL RATE - MUSE: 97 BPM
DIASTOLIC BLOOD PRESSURE - MUSE: NORMAL MMHG
INTERPRETATION ECG - MUSE: NORMAL
P AXIS - MUSE: 80 DEGREES
PR INTERVAL - MUSE: 144 MS
QRS DURATION - MUSE: 82 MS
QT - MUSE: 356 MS
QTC - MUSE: 452 MS
R AXIS - MUSE: 12 DEGREES
SYSTOLIC BLOOD PRESSURE - MUSE: NORMAL MMHG
T AXIS - MUSE: 71 DEGREES
VENTRICULAR RATE- MUSE: 97 BPM

## 2025-01-24 NOTE — ED TRIAGE NOTES
Patient arrives with wife after a fall last night around 2230. He does not remember the fall, wife found him behind the couch on carpeted floor, incontinent. Has abrasions on nose and hand. Pain 9/10. Takes a baby aspirin. Nestor in triage.

## 2025-01-24 NOTE — ED PROVIDER NOTES
"  Emergency Department Note      History of Present Illness     Chief Complaint   Fall      HPI   Johan Navarro is a pleasant 70 year old male with history of coronary artery disease, carotid stenosis, aortic stenosis, gastrointestinal hemorrhage, prostate cancer, hyperlipidemia, and iron deficiency anemia presenting with wife for a fall. The patient reports that he fell yesterday but does not recall what happened, noting that he has been experiencing a sever headache all day, prompting today's visit. The wife reports that the patient is normally a \"night-owl\" but when he did not come to bed last night she went to go check downstairs where she found him on the floor next to the couch/stairs incontinent with accompanied abrasions on his hands and face. She states that the patient was resistant to visiting the ED today due to the headache and fatigue. The patient endorses abnormal ambulation with instability. He denies any fever or changes in vision. Wife denies any patient chest pain or upper extremity pain immediately after the incident. Wife mentions that she has been taking the patient's blood pressure and pulse today, with normal reading other than fluctuations in pulse. She is concerned about a hemorrhage due to the constant headache and memory loss today.     Independent Historian   Wife as detailed above.    Review of External Notes   {Notes reviewed:651945::\"None.\"}    I personally reviewed the patient's chart, including available medication list and available past medical history, past surgical history, family history, and social history.    Physical Exam     Patient Vitals for the past 24 hrs:   BP Temp Temp src Pulse Resp SpO2 Weight   01/23/25 2016 -- -- -- -- -- 94 % --   01/23/25 2009 (!) 134/100 -- -- 96 -- 97 % --   01/23/25 1931 (!) 160/103 -- -- (!) 110 -- -- --   01/23/25 1913 (!) 161/97 97.9  F (36.6  C) Oral (!) 107 18 98 % 72.6 kg (160 lb)     Physical Exam  {List of [place] Exams:617531::\" " "\"}    Diagnostics     Lab Results   Labs Ordered and Resulted from Time of ED Arrival to Time of ED Departure   BASIC METABOLIC PANEL - Abnormal       Result Value    Sodium 143      Potassium 3.8      Chloride 105      Carbon Dioxide (CO2) 20 (*)     Anion Gap 18 (*)     Urea Nitrogen 15.9      Creatinine 0.91      GFR Estimate >90      Calcium 8.7 (*)     Glucose 90     CBC WITH PLATELETS AND DIFFERENTIAL - Abnormal    WBC Count 12.1 (*)     RBC Count 5.56      Hemoglobin 16.0      Hematocrit 45.7      MCV 82      MCH 28.8      MCHC 35.0      RDW 13.7      Platelet Count 292      % Neutrophils 78      % Lymphocytes 15      % Monocytes 5      % Eosinophils 1      % Basophils 1      % Immature Granulocytes 0      NRBCs per 100 WBC 0      Absolute Neutrophils 9.5 (*)     Absolute Lymphocytes 1.8      Absolute Monocytes 0.6      Absolute Eosinophils 0.1      Absolute Basophils 0.1      Absolute Immature Granulocytes 0.0      Absolute NRBCs 0.0     TROPONIN T, HIGH SENSITIVITY - Normal    Troponin T, High Sensitivity 16     ROUTINE UA WITH MICROSCOPIC REFLEX TO CULTURE   INFLUENZA A/B, RSV AND SARS-COV2 PCR       Imaging   CT Head w/o Contrast   Final Result   IMPRESSION:   1.  No acute intracranial process.          EKG   ECG taken at 2028, ECG read at 2044  Normal sinus rhythm  Normal ECG   No significant change as compared to prior, dated 12/27/22.  Rate 97 bpm. MO interval 144 ms. QRS duration 82 ms. QT/QTc 356/452 ms. P-R-T axes 80 12 71.     Independent Interpretation - See ED Course Below    ED Course      Medications Administered   Medications   acetaminophen (TYLENOL) tablet 1,000 mg (1,000 mg Oral $Given 1/23/25 2006)   ondansetron (ZOFRAN) injection 4 mg (4 mg Intravenous $Given 1/23/25 2005)       Procedures   Procedures   {Procedure Notes:699544::\"None performed\"}    Discussion of Management - See ED Course Below    ED Course   Independent Interpretation / Discussion of Management / Repeat Assessments  ED " "Course as of 01/23/25 2046   Thu Jan 23, 2025 1946 I obtained history and examined the patient as noted above     2026 CT Head w/o Contrast  I independently interpreted the patient's non-contrast head CT; reassuring against acute intracranial hemorrhage.         Additional Documentation  {EPPAAdditionalPhrase:958775::\"None\"}    Medical Decision Making / Diagnosis     CMS Diagnoses: {Sepsis/Septic Shock/Stemi/Stroke:357767::\"None\"}    MIPS       {EPPA MIPS:280818::\"None\"}    MDM   {MDM:889159}    {Decision Rule Calculators:681245}  {SDCCHECKLIST:669337}    {Critical care:737038::\" \"}    Disposition   {EPPAFV Dispo:999105}    Diagnosis   No diagnosis found.     Discharge Medications   New Prescriptions    No medications on file       Scribe Disclosure:  I, Augusto Mendoza, am serving as a scribe at 7:48 PM on 1/23/2025 to document services personally performed by Praveen Cook MD based on my observations and the provider's statements to me.    " Course      Medications Administered   Medications   acetaminophen (TYLENOL) tablet 1,000 mg (1,000 mg Oral $Given 1/23/25 2006)   ondansetron (ZOFRAN) injection 4 mg (4 mg Intravenous $Given 1/23/25 2005)   sodium chloride 0.9% BOLUS 1,000 mL (0 mLs Intravenous Stopped 1/23/25 2312)   cefTRIAXone (ROCEPHIN) 2 g vial to attach to  ml bag for ADULTS or NS 50 ml bag for PEDS (0 g Intravenous Stopped 1/23/25 2303)       Procedures   Procedures   None performed    Discussion of Management - See ED Course Below    ED Course   Independent Interpretation / Discussion of Management / Repeat Assessments  ED Course as of 01/24/25 0201   Thu Jan 23, 2025 1946 I obtained history and examined the patient as noted above     2026 CT Head w/o Contrast  I independently interpreted the patient's non-contrast head CT; reassuring against acute intracranial hemorrhage.     2132 I rechecked the patient and explained findings.         Additional Documentation  None    Medical Decision Making / Diagnosis     CMS Diagnoses: None    MIPS       None    MDM   Patient presenting with fall.  Vital signs are notable for tachycardia and elevated blood pressure on arrival.  With story of patient having been found down, incontinent of urine, facial trauma, and having headache today with irritability, initially considered differential including intracranial hemorrhage, seizure, occult infection, cardiac event, among others.  Initial workup was notable for nonischemic EKG, troponin within normal limits, CT head without intracranial hemorrhage, mild leukocytosis and lactic acidosis.  With a set of findings, my suspicion was raised for occult infection or sepsis, especially in light of the patient's persistent tachycardia.  I did give a fluid bolus and ordered ceftriaxone after obtaining blood cultures.  However, ultimately no source of infection was presenting itself.  Further discussion with the patient and his wife, including raising the  question of meningitis or encephalitis given patient's presentation, ultimately yielded patient admission that he been drinking a significant amount of whiskey yesterday and has also been drinking whiskey today.  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  Alcohol level is 0.15.  Ultimately, I feel this likely explains both the patient's presenting complaints, lab abnormalities here, and patient's tachycardia.  Patient states that depressed mood may be contributing to his relapse.  I did offer him mental health resources but he declined.  He wanted to be discharged.  I felt this was appropriate. Findings were discussed.  Additional verbal instructions were provided.  I discussed specific warning signs and instructed the patient to return to the emergency department if there are any concerns. Understanding of instructions was voiced, questions were answered and the patient was discharged.       Disposition   The patient was discharged.     Diagnosis     ICD-10-CM    1. Alcoholic intoxication with complication  F10.929       2. Fall at home, initial encounter  W19.XXXA     Y92.009       3. Acute non intractable tension-type headache  G44.209       4. Facial contusion, initial encounter  S00.83XA       5. Laceration of finger of left hand without foreign body without damage to nail, unspecified finger, initial encounter  S61.219A       6. Depressed mood  R45.89            Discharge Medications   Discharge Medication List as of 1/23/2025 11:12 PM          Scribe Disclosure:  I, Augusto Mendoza, am serving as a scribe at 7:48 PM on 1/23/2025 to document services personally performed by Praveen Cook MD based on my observations and the provider's statements to me.       Praveen Cook MD  01/24/25 0206

## 2025-01-24 NOTE — ED NOTES
"[facility]  ED Nurse Handoff Report    ED Chief complaint: Fall      ED Diagnosis:   Final diagnoses:   None       Code Status: Full Code    Allergies:   Allergies   Allergen Reactions    Crestor [Rosuvastatin] GI Disturbance    Dust Mites     Other [No Clinical Screening - See Comments]      Goose feathers    Pollen Extract        Patient Story:  Johan Navarro is a pleasant 70 year old male with history of coronary artery disease, carotid stenosis, aortic stenosis, gastrointestinal hemorrhage, prostate cancer, hyperlipidemia, and iron deficiency anemia presenting with wife for a fall. The patient reports that he fell yesterday but does not recall what happened, noting that he has been experiencing a sever headache all day, prompting today's visit. The wife reports that the patient is normally a \"night-owl\" but when he did not come to bed last night she went to go check downstairs where she found him on the floor next to the couch/stairs incontinent with accompanied abrasions on his hands and face. She states that the patient was resistant to visiting the ED today due to the headache and fatigue. The patient endorses abnormal ambulation with instability. He denies any fever or changes in vision. Wife denies any patient chest pain or upper extremity pain immediately after the incident. Wife mentions that she has been taking the patient's blood pressure and pulse today, with normal reading other than fluctuations in pulse. She is concerned about a hemorrhage due to the constant headache and memory loss today.     Focused Assessment:    A/Ox4. More fatigued and drowsy than baseline. Denies numbness and tingling. ST 's. Mildly hypertensive. Denies diarrhea or urinary issues. Afebrile.    Labs Ordered and Resulted from Time of ED Arrival to Time of ED Departure   BASIC METABOLIC PANEL - Abnormal       Result Value    Sodium 143      Potassium 3.8      Chloride 105      Carbon Dioxide (CO2) 20 (*)  "    Anion Gap 18 (*)     Urea Nitrogen 15.9      Creatinine 0.91      GFR Estimate >90      Calcium 8.7 (*)     Glucose 90     CBC WITH PLATELETS AND DIFFERENTIAL - Abnormal    WBC Count 12.1 (*)     RBC Count 5.56      Hemoglobin 16.0      Hematocrit 45.7      MCV 82      MCH 28.8      MCHC 35.0      RDW 13.7      Platelet Count 292      % Neutrophils 78      % Lymphocytes 15      % Monocytes 5      % Eosinophils 1      % Basophils 1      % Immature Granulocytes 0      NRBCs per 100 WBC 0      Absolute Neutrophils 9.5 (*)     Absolute Lymphocytes 1.8      Absolute Monocytes 0.6      Absolute Eosinophils 0.1      Absolute Basophils 0.1      Absolute Immature Granulocytes 0.0      Absolute NRBCs 0.0     ROUTINE UA WITH MICROSCOPIC REFLEX TO CULTURE - Abnormal    Color Urine Light Yellow      Appearance Urine Clear      Glucose Urine Negative      Bilirubin Urine Negative      Ketones Urine Trace (*)     Specific Gravity Urine 1.023      Blood Urine Negative      pH Urine 6.0      Protein Albumin Urine 20 (*)     Urobilinogen Urine Normal      Nitrite Urine Negative      Leukocyte Esterase Urine Negative      Mucus Urine Present (*)     RBC Urine 1      WBC Urine <1     ISTAT GASES LACTATE VENOUS POCT - Abnormal    Lactic Acid POCT 3.6 (*)     Bicarbonate Venous POCT 22      O2 Sat, Venous POCT 95 (*)     pCO2 Venous POCT 38 (*)     pH Venous POCT 7.37      pO2 Venous POCT 77 (*)     Base Excess/Deficit (+/-) POCT -3.0     TROPONIN T, HIGH SENSITIVITY - Normal    Troponin T, High Sensitivity 16     INFLUENZA A/B, RSV AND SARS-COV2 PCR - Normal    Influenza A PCR Negative      Influenza B PCR Negative      RSV PCR Negative      SARS CoV2 PCR Negative     TROPONIN T, HIGH SENSITIVITY - Normal    Troponin T, High Sensitivity 16     ETHYL ALCOHOL LEVEL   BLOOD CULTURE   BLOOD CULTURE       CT Head w/o Contrast   Final Result   IMPRESSION:   1.  No acute intracranial process.            Treatments and/or interventions  provided:    Medications   cefTRIAXone (ROCEPHIN) 2 g vial to attach to  ml bag for ADULTS or NS 50 ml bag for PEDS (2 g Intravenous $New Bag 1/23/25 2230)   acetaminophen (TYLENOL) tablet 1,000 mg (1,000 mg Oral $Given 1/23/25 2006)   ondansetron (ZOFRAN) injection 4 mg (4 mg Intravenous $Given 1/23/25 2005)   sodium chloride 0.9% BOLUS 1,000 mL (1,000 mLs Intravenous $New Bag 1/23/25 2127)       Patient's response to treatments and/or interventions:  Remains stable    To be done/followed up on inpatient unit:   See any in-patient orders    Does this patient have any cognitive concerns?:  intermittently confused per wife, answers orientation questions    Activity level - Baseline/Home:    Stand with Assist    Activity Level - Current:    Stand with Assist    Patient's Preferred language: English     Needed?: No    Isolation: None  Infection: Not Applicable  Patient tested for COVID 19 prior to admission: YES    Bariatric?: No    Vital Signs:   Vitals:    01/23/25 2009 01/23/25 2016 01/23/25 2205 01/23/25 2230   BP: (!) 134/100  (!) 166/101 140/85   Pulse: 96  93 105   Resp:   23    Temp:       TempSrc:       SpO2: 97% 94% 95% 95%   Weight:           Cardiac Rhythm:     Was the PSS-3 completed:   Yes  What interventions are required if any?                 Family Comments: at bedside    For the majority of the shift this patient's behavior was Green.  Behavioral interventions performed were calm environment.    ED NURSE PHONE NUMBER: *[phone]

## 2025-01-28 LAB
BACTERIA BLD CULT: NO GROWTH
BACTERIA BLD CULT: NO GROWTH

## 2025-01-29 DIAGNOSIS — E78.2 MIXED HYPERLIPIDEMIA: ICD-10-CM

## 2025-01-29 DIAGNOSIS — I25.10 CORONARY ARTERY DISEASE INVOLVING NATIVE CORONARY ARTERY OF NATIVE HEART WITHOUT ANGINA PECTORIS: ICD-10-CM

## 2025-02-03 ENCOUNTER — OFFICE VISIT (OUTPATIENT)
Dept: FAMILY MEDICINE | Facility: CLINIC | Age: 70
End: 2025-02-03
Payer: COMMERCIAL

## 2025-02-03 VITALS
HEART RATE: 95 BPM | RESPIRATION RATE: 18 BRPM | TEMPERATURE: 98 F | WEIGHT: 155 LBS | OXYGEN SATURATION: 96 % | DIASTOLIC BLOOD PRESSURE: 86 MMHG | HEIGHT: 66 IN | BODY MASS INDEX: 24.91 KG/M2 | SYSTOLIC BLOOD PRESSURE: 138 MMHG

## 2025-02-03 DIAGNOSIS — Z09 HOSPITAL DISCHARGE FOLLOW-UP: Primary | ICD-10-CM

## 2025-02-03 DIAGNOSIS — F10.21 ALCOHOL INTOXICATION IN RELAPSED ALCOHOLIC (H): ICD-10-CM

## 2025-02-03 DIAGNOSIS — Z23 NEED FOR VACCINATION: ICD-10-CM

## 2025-02-03 DIAGNOSIS — S09.90XA CLOSED HEAD INJURY, INITIAL ENCOUNTER: ICD-10-CM

## 2025-02-03 DIAGNOSIS — R09.81 SINUS CONGESTION: ICD-10-CM

## 2025-02-03 LAB
ALBUMIN SERPL BCG-MCNC: 4.4 G/DL (ref 3.5–5.2)
ALP SERPL-CCNC: 96 U/L (ref 40–150)
ALT SERPL W P-5'-P-CCNC: 28 U/L (ref 0–70)
ANION GAP SERPL CALCULATED.3IONS-SCNC: 11 MMOL/L (ref 7–15)
AST SERPL W P-5'-P-CCNC: 39 U/L (ref 0–45)
BILIRUB DIRECT SERPL-MCNC: <0.2 MG/DL (ref 0–0.3)
BILIRUB SERPL-MCNC: 0.3 MG/DL
BUN SERPL-MCNC: 15.4 MG/DL (ref 8–23)
CALCIUM SERPL-MCNC: 9.7 MG/DL (ref 8.8–10.4)
CHLORIDE SERPL-SCNC: 103 MMOL/L (ref 98–107)
CREAT SERPL-MCNC: 0.96 MG/DL (ref 0.67–1.17)
EGFRCR SERPLBLD CKD-EPI 2021: 85 ML/MIN/1.73M2
ERYTHROCYTE [DISTWIDTH] IN BLOOD BY AUTOMATED COUNT: 13.9 % (ref 10–15)
GLUCOSE SERPL-MCNC: 105 MG/DL (ref 70–99)
HCO3 SERPL-SCNC: 26 MMOL/L (ref 22–29)
HCT VFR BLD AUTO: 42.2 % (ref 40–53)
HGB BLD-MCNC: 14.3 G/DL (ref 13.3–17.7)
MCH RBC QN AUTO: 28.4 PG (ref 26.5–33)
MCHC RBC AUTO-ENTMCNC: 33.9 G/DL (ref 31.5–36.5)
MCV RBC AUTO: 84 FL (ref 78–100)
PLATELET # BLD AUTO: 242 10E3/UL (ref 150–450)
POTASSIUM SERPL-SCNC: 4 MMOL/L (ref 3.4–5.3)
PROT SERPL-MCNC: 7.4 G/DL (ref 6.4–8.3)
RBC # BLD AUTO: 5.03 10E6/UL (ref 4.4–5.9)
SODIUM SERPL-SCNC: 140 MMOL/L (ref 135–145)
WBC # BLD AUTO: 4.6 10E3/UL (ref 4–11)

## 2025-02-03 PROCEDURE — 82248 BILIRUBIN DIRECT: CPT | Performed by: PHYSICIAN ASSISTANT

## 2025-02-03 PROCEDURE — 85027 COMPLETE CBC AUTOMATED: CPT | Performed by: PHYSICIAN ASSISTANT

## 2025-02-03 PROCEDURE — 80053 COMPREHEN METABOLIC PANEL: CPT | Performed by: PHYSICIAN ASSISTANT

## 2025-02-03 PROCEDURE — 36415 COLL VENOUS BLD VENIPUNCTURE: CPT | Performed by: PHYSICIAN ASSISTANT

## 2025-02-03 RX ORDER — FLUTICASONE PROPIONATE 50 MCG
1 SPRAY, SUSPENSION (ML) NASAL DAILY
Qty: 16 G | Refills: 0 | Status: SHIPPED | OUTPATIENT
Start: 2025-02-03

## 2025-02-03 RX ORDER — NALTREXONE HYDROCHLORIDE 50 MG/1
50 TABLET, FILM COATED ORAL DAILY
Qty: 30 TABLET | Refills: 0 | Status: SHIPPED | OUTPATIENT
Start: 2025-02-03

## 2025-02-03 NOTE — PROGRESS NOTES
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 Slowly increasing in amount until an unwitnessed fall. He is recommitted to his sobriety. Previously he quit cold turkey and wants to try this again - mentions groups are not his style. We'll start below, discussed r/b/se. Can mychart if not tolerating and could consider antabuse or acamprosate.   - naltrexone (DEPADE/REVIA) 50 MG tablet; Take 1 tablet (50 mg) by mouth daily.  - CBC with platelets; Future  - Hepatic panel (Albumin, ALT, AST, Bili, Alk Phos, TP); Future  - Basic metabolic panel  (Ca, Cl, CO2, Creat, Gluc, K, Na, BUN); Future    Sinus congestion  Trial below  - fluticasone (FLONASE) 50 MCG/ACT nasal spray; Spray 1 spray into both nostrils daily.    Need for vaccination  - INFLUENZA HIGH DOSE, TRIVALENT, PF (FLUZONE)        MED REC REQUIRED  Post Medication Reconciliation Status:     BMI  Estimated body mass index is 25.02 kg/m  as calculated from the following:    Height as of this encounter: 1.676 m (5' 6\").    Weight as of this encounter: 70.3 kg (155 lb).         Subjective   Otis is a 70 year old, presenting for the following health issues:  ER F/U        2/3/2025     8:40 AM   Additional Questions   Roomed by eyal     HPI       ED/UC Followup:    Facility:  [facility]  Date of visit: 1/23/2025  Reason for visit: Fall at home, initial encounter  Alcoholic intoxication with complication  Acute non intractable tension-type headache  Facial contusion, initial encounter  Laceration of finger of left hand without foreign body without damage to nail, unspecified finger, initial encounter  Depressed mood  Current Status: stable    Johan Navarro is a 70 year old male who presents today for ED follow up after a ETOH relapse following 12+ years  He had slowly been relapsing here and there for the past 6 " "months   -usually would get only small shooters to limit        Review of Systems  Constitutional, HEENT, cardiovascular, pulmonary, gi and gu systems are negative, except as otherwise noted.      Objective    BP (!) 143/88   Pulse 95   Temp 98  F (36.7  C)   Resp 18   Ht 1.676 m (5' 6\")   Wt 70.3 kg (155 lb)   SpO2 96%   BMI 25.02 kg/m    Body mass index is 25.02 kg/m .  Physical Exam   GENERAL: alert and no distress  EYES: Eyes grossly normal to inspection, PERRL and conjunctivae and sclerae normal  HENT: ear canals and TM's normal, nose and mouth without ulcers or lesions  RESP: lungs clear to auscultation - no rales, rhonchi or wheezes  CV: regular rates and rhythm  ABDOMEN: soft, nontender, no hepatosplenomegaly, no masses and bowel sounds normal  SKIN: healing abrasions to forehead, nose and hands  PSYCH: mentation appears normal, affect normal/bright    Diagnostic: reviewed; updating        Signed Electronically by: Washington Yang PA-C    "

## 2025-04-07 DIAGNOSIS — R09.81 SINUS CONGESTION: ICD-10-CM

## 2025-04-07 RX ORDER — FLUTICASONE PROPIONATE 50 MCG
1 SPRAY, SUSPENSION (ML) NASAL DAILY
Qty: 16 G | Refills: 1 | Status: SHIPPED | OUTPATIENT
Start: 2025-04-07

## 2025-04-10 DIAGNOSIS — I25.10 CORONARY ARTERY DISEASE INVOLVING NATIVE CORONARY ARTERY OF NATIVE HEART WITHOUT ANGINA PECTORIS: ICD-10-CM

## 2025-04-10 RX ORDER — GABAPENTIN 600 MG/1
600 TABLET ORAL 2 TIMES DAILY
Qty: 180 TABLET | Refills: 1 | Status: SHIPPED | OUTPATIENT
Start: 2025-04-10

## 2025-04-14 DIAGNOSIS — I25.10 CORONARY ARTERY DISEASE INVOLVING NATIVE CORONARY ARTERY OF NATIVE HEART WITHOUT ANGINA PECTORIS: ICD-10-CM

## 2025-04-14 RX ORDER — ATORVASTATIN CALCIUM 80 MG/1
80 TABLET, FILM COATED ORAL DAILY
Qty: 90 TABLET | Refills: 0 | Status: SHIPPED | OUTPATIENT
Start: 2025-04-14

## 2025-05-04 NOTE — PROGRESS NOTES
HPI and Plan:       I had the pleasure of seeing Mr. Navarro in followup at the [facility] today.  He is a very pleasant 70-year-old gentleman who I last saw in 3/2024.  He has a history of coronary artery disease and is status post stenting of the distal and ostial circumflex as well as the first obtuse marginal in 1998.  He has severe, probably familial, dyslipidemia with a markedly elevated LDL, although we have been able to control this well with Repatha and atorvastatin.  He also has a history of moderate aortic stenosis.      He then underwent coronary angiography on 07/21/2020 and was found to have significant stenosis of the ostium of the second obtuse marginal.  He underwent successful intervention with a drug-eluting stent at the time.  His other coronary arteries demonstrated mild-to-moderate nonobstructive disease in a left dominant system.      In January of 2021 he was experiencing significant fatigue and dyspnea on exertion and was found to have significant anemia in the context of dark stools for which a comprehensive gastrointestinal work-up was performed but was ultimately negative.      He was readmitted to [facility] in October 2021 with a non-ST elevation myocardial infarction.  He was found to have severe stenosis of the mid circumflex and underwent PCI of the mid circumflex at the time with a synergy drug-eluting stent.  He was also found to have moderate to severe nonobstructive stenosis of the OM1 with a negative IFR.  An echocardiogram at the time demonstrated probable moderate aortic stenosis but normal left ventricular systolic function.  He was again placed on dual antiplatelet therapy prior to discharge.     Unfortunately he had subsequent GI bleeding and anemia and underwent a repeat GI evaluation in 2022 that included capsule endoscopy.  He was found to have small bowel AVMs on his capsule study.  His EGD demonstrated esophagitis and a colonoscopy  demonstrated diverticulosis but no evidence of active bleeding.  He was ultimately placed on iron supplementation and hemoglobin has been stable.    He has also undergone left-sided carotid endarterectomy in December 2023 for asymptomatic left carotid stenosis.  He has mild right internal carotid disease based on ultrasonography in 2024.    Today he feels well overall from a cardiovascular standpoint.  He denies any exertional chest discomfort, palpitations, dyspnea, syncope or presyncope.  He has, however, noticed some generalized fatigue which is a new symptom for him.    Fortunately he has not had any recurrent GI bleeding.      PHYSICAL EXAMINATION:  Dictated below.      LABORATORY STUDIES:  Lipids on 3/6/2024 neurocentral cholesterol 142, HDL 67, LDL 63 triglycerides of 59.    Hemoglobin on 2/3/2025 was 14.3.    He is echocardiogram dated 5/5/2025 was independently reviewed and interpreted.  He now has severe aortic stenosis with a calculated aortic valve area of 0.81 cm  and a peak pressure gradient of 49 mmHg.     IMPRESSION:     1.  Coronary artery disease status post most recent PCI to the mid circumflex in October 2021 in the context of a non-ST elevation myocardial infarction.  He is status post revascularization of the second obtuse marginal in 07/2020 and prior circumflex/obtuse marginal revascularization in 1998.   2.  Significant dyslipidemia characterized by severe LDL elevation.  This has normalized with the use of Repatha and atorvastatin.   3.  Severe aortic stenosis with interval progression on his most recent echocardiogram compared to the previous study from 2024.  4.  Left internal carotid stenosis status post carotid endarterectomy as described above.  5.  Iron deficiency anemia as described above.  Hemoglobin has normalized.    6.  Elevated blood pressure today without a formal diagnosis of hypertension.    PLAN    Mr. Navarro presents for follow-up with evidence of progression of aortic  stenosis on his most recent echocardiogram.  Although he is minimally symptomatic from a cardiac standpoint he is experiencing generalized fatigue which may be related to the progression of his aortic valve disease.    At this point in time I have recommended TAVR consultation to assess for aortic valve replacement.  I have placed a referral to that effect.  He will need repeat coronary angiography and a TAVR CT prior to planned aortic valve replacement.  Given his history of recurrent GI bleeding, I would recommend the use of aspirin only post TAVR.    I have also ordered an updated lipid panel for today as well as an LP(a).    It was a pleasure seeing him today.  I have educated him as to the cardinal symptoms of symptomatic severe aortic stenosis and asked him to contact us immediately if he experiences any of these symptoms.    The longitudinal plan of care for the diagnosis(es)/condition(s) as documented were addressed during this visit. Due to the added complexity in care, I will continue to support Otis in the subsequent management and with ongoing continuity of care.      CURRENT MEDICATIONS:  Current Outpatient Medications   Medication Sig Dispense Refill    aspirin 81 MG EC tablet Take 81 mg by mouth daily OTC      atorvastatin (LIPITOR) 80 MG tablet Take 1 tablet (80 mg) by mouth daily. 90 tablet 0    diclofenac (VOLTAREN) 1 % topical gel APPLY 2 GRAMS TO HANDS AND FINGERS FOUR TIMES DAILY USING DOSING CARD 100 g 0    evolocumab (REPATHA) 140 MG/ML prefilled autoinjector Inject 1 mL (140 mg) subcutaneously every 14 days. 6 mL 1    Ferrous Sulfate (IRON) 325 (65 Fe) MG tablet Take 1 tablet by mouth Once weekly      fluticasone (FLONASE) 50 MCG/ACT nasal spray SHAKE LIQUID AND USE 1 SPRAY IN EACH NOSTRIL DAILY 16 g 1    folic acid (FOLVITE) 1 MG tablet TAKE 1 TABLET(1 MG) BY MOUTH EVERY EVENING 90 tablet 3    gabapentin (NEURONTIN) 600 MG tablet TAKE 1 TABLET(600 MG) BY MOUTH TWICE DAILY 180 tablet 1     naltrexone (DEPADE/REVIA) 50 MG tablet Take 1 tablet (50 mg) by mouth daily. 30 tablet 0    nitroGLYcerin (NITROSTAT) 0.4 MG sublingual tablet For chest pain place 1 tablet under the tongue every 5 minutes for 3 doses. If symptoms persist 5 minutes after 1st dose call 911. 30 tablet 0    sildenafil (REVATIO) 20 MG tablet Take 2-5 tablets ( mg) by mouth daily as needed. 30 tablet 2       ALLERGIES     Allergies   Allergen Reactions    Crestor [Rosuvastatin] GI Disturbance    Dust Mites     Other [No Clinical Screening - See Comments]      Goose feathers    Pollen Extract        PAST MEDICAL HISTORY:  Past Medical History:   Diagnosis Date    Aortic stenosis     Arthritis     hands    CAD (coronary artery disease)     cardiac cath 1998: stents x 2 to circumflex    Carotid stenosis     left    Family history of early CAD     Hyperlipidaemia LDL goal < 70     familial hyperlipidemia with marked hypercholesterolemia before treatment; has been on some form of cholesterol-lowering medication since the 1970s    Prostate cancer (H) 2010    Sleep apnea     Syncope     Unstable angina (H) 1/6/2021       PAST SURGICAL HISTORY:  Past Surgical History:   Procedure Laterality Date    CARDIAC SURGERY  1998    coronary stents x2 placed 1998; angioplasty    CV HEART CATHETERIZATION WITH POSSIBLE INTERVENTION N/A 7/21/2020    Procedure: Heart Catheterization with Possible Intervention;  Surgeon: Alber Bentley MD;  Location:  HEART CARDIAC CATH LAB    CV HEART CATHETERIZATION WITH POSSIBLE INTERVENTION N/A 10/11/2021    Procedure: Heart Catheterization with Possible Intervention;  Surgeon: Patsy Wallace MD;  Location:  HEART CARDIAC CATH LAB    CV INSTANTANEOUS WAVE-FREE RATIO N/A 10/11/2021    Procedure: Instantaneous Wave-Free Ratio;  Surgeon: Patsy Wallace MD;  Location:  HEART CARDIAC CATH LAB    CV LEFT HEART CATH N/A 10/11/2021    Procedure: Left Heart Cath;  Surgeon: Patsy Wallace MD;  Location:   HEART CARDIAC CATH LAB    CV PCI STENT DRUG ELUTING N/A 10/11/2021    Procedure: Percutaneous Coronary Intervention Stent Drug Eluting;  Surgeon: Patsy Wallace MD;  Location:  HEART CARDIAC CATH LAB    DAVINCI PROSTATECTOMY      2010    ENDARTERECTOMY CAROTID Left 2022    Procedure: LEFT CAROTID ENDARTERECTOMY WITH BOVINE PERICARDIUM PATCH ANGIOPLASTY, AND INTRAOPERATIVE ELECTROENCEPHALOGRAM MONITORING;  Surgeon: Montez Aponte MD;  Location:  OR    ESOPHAGOSCOPY, GASTROSCOPY, DUODENOSCOPY (EGD), COMBINED N/A 2021    Procedure: ESOPHAGOGASTRODUODENOSCOPY (EGD);  Surgeon: Rosemarie Laws MD;  Location:  GI    EYE SURGERY Bilateral 2017    eyelids    GENITOURINARY SURGERY      ORTHOPEDIC SURGERY Right     achilles tendon; post football injury    ORTHOPEDIC SURGERY Right     hand; near thumb. has wires in there. cysts    REPAIR TENDON BICEPS DISTAL UPPER EXTREMITY Right 2018    Procedure: REPAIR TENDON BICEPS DISTAL UPPER EXTREMITY;  Right open biceps tendon repair  ;  Surgeon: Alber Zabala MD;  Location:  OR    SURGICAL HISTORY OF -       Right hand Xanthoma removal    SURGICAL HISTORY OF -       Stress Echo (-)       FAMILY HISTORY:  Family History   Problem Relation Age of Onset    Lipids Mother     C.A.D. Mother          at age 49 of MI    Dementia Father     C.A.D. Sister         MI at age 48    Cancer - colorectal No family hx of     Prostate Cancer No family hx of     Colon Cancer No family hx of        SOCIAL HISTORY:  Social History     Socioeconomic History    Marital status:    Tobacco Use    Smoking status: Former     Current packs/day: 0.00     Average packs/day: 0.3 packs/day for 20.0 years (5.0 ttl pk-yrs)     Types: Cigars, Cigarettes     Start date:      Quit date: 2000     Years since quittin.3    Smokeless tobacco: Current     Types: Snuff, Chew    Tobacco comments:     daily   Vaping Use    Vaping status: Never Used    Substance and Sexual Activity    Alcohol use: Not Currently     Comment: none for 8 years- Quit    Drug use: Yes     Types: Marijuana     Comment: daily     Sexual activity: Yes     Partners: Female   Other Topics Concern    Caffeine Concern Yes     Comment: coffee and soda    Special Diet No    Exercise Yes     Comment: regular     Seat Belt Yes    Parent/sibling w/ CABG, MI or angioplasty before 65F 55M? Yes     Comment: 49     Social Drivers of Health     Financial Resource Strain: Low Risk  (7/25/2024)    Financial Resource Strain     Within the past 12 months, have you or your family members you live with been unable to get utilities (heat, electricity) when it was really needed?: No   Food Insecurity: Low Risk  (7/25/2024)    Food Insecurity     Within the past 12 months, did you worry that your food would run out before you got money to buy more?: No     Within the past 12 months, did the food you bought just not last and you didn t have money to get more?: No   Transportation Needs: Low Risk  (7/25/2024)    Transportation Needs     Within the past 12 months, has lack of transportation kept you from medical appointments, getting your medicines, non-medical meetings or appointments, work, or from getting things that you need?: No   Physical Activity: Sufficiently Active (7/25/2024)    Exercise Vital Sign     Days of Exercise per Week: 4 days     Minutes of Exercise per Session: 40 min   Stress: No Stress Concern Present (7/25/2024)    [employer] [place] [employer] - Occupational Stress Questionnaire     Feeling of Stress : Not at all   Social Connections: Unknown (7/25/2024)    Social Connection and Isolation Panel [NHANES]     Frequency of Social Gatherings with Friends and Family: Three times a week   Interpersonal Safety: Low Risk  (9/25/2023)    Interpersonal Safety     Do you feel physically and emotionally safe where you currently live?: Yes     Within the past 12 months, have you been hit,  slapped, kicked or otherwise physically hurt by someone?: No     Within the past 12 months, have you been humiliated or emotionally abused in other ways by your partner or ex-partner?: No   Housing Stability: Low Risk  (7/25/2024)    Housing Stability     Do you have housing? : Yes     Are you worried about losing your housing?: No       Review of Systems:  Skin:          Eyes:         ENT:         Respiratory:          Cardiovascular:         Gastroenterology:        Genitourinary:         Musculoskeletal:         Neurologic:         Psychiatric:         Heme/Lymph/Imm:         Endocrine:           Physical Exam:  Vitals: There were no vitals taken for this visit.    Constitutional:           Skin:             Head:           Eyes:           Lymph:      ENT:           Neck:           Respiratory:        Clear to auscultation    Cardiac:                Regular rate and rhythm                                           GI:           Extremities and Muscular Skeletal:                 Neurological:           Psych:           CC  Sander Galicia MD  [id number]  [place] [id number]

## 2025-05-05 ENCOUNTER — HOSPITAL ENCOUNTER (OUTPATIENT)
Dept: CARDIOLOGY | Facility: CLINIC | Age: 70
Discharge: HOME | End: 2025-05-05
Attending: INTERNAL MEDICINE | Admitting: INTERNAL MEDICINE
Payer: COMMERCIAL

## 2025-05-05 DIAGNOSIS — I25.10 CORONARY ARTERY DISEASE INVOLVING NATIVE CORONARY ARTERY OF NATIVE HEART WITHOUT ANGINA PECTORIS: ICD-10-CM

## 2025-05-05 LAB — LVEF ECHO: NORMAL

## 2025-05-05 PROCEDURE — 93306 TTE W/DOPPLER COMPLETE: CPT | Mod: 26 | Performed by: INTERNAL MEDICINE

## 2025-05-05 PROCEDURE — 93306 TTE W/DOPPLER COMPLETE: CPT

## 2025-05-06 ENCOUNTER — OFFICE VISIT (OUTPATIENT)
Dept: CARDIOLOGY | Facility: CLINIC | Age: 70
End: 2025-05-06
Payer: COMMERCIAL

## 2025-05-06 ENCOUNTER — LAB (OUTPATIENT)
Dept: LAB | Facility: CLINIC | Age: 70
End: 2025-05-06
Payer: COMMERCIAL

## 2025-05-06 ENCOUNTER — TELEPHONE (OUTPATIENT)
Dept: CARDIOLOGY | Facility: CLINIC | Age: 70
End: 2025-05-06

## 2025-05-06 VITALS
BODY MASS INDEX: 25.55 KG/M2 | DIASTOLIC BLOOD PRESSURE: 69 MMHG | OXYGEN SATURATION: 100 % | SYSTOLIC BLOOD PRESSURE: 109 MMHG | WEIGHT: 159 LBS | RESPIRATION RATE: 18 BRPM | HEIGHT: 66 IN | HEART RATE: 75 BPM

## 2025-05-06 DIAGNOSIS — I25.10 CORONARY ARTERY DISEASE INVOLVING NATIVE CORONARY ARTERY OF NATIVE HEART WITHOUT ANGINA PECTORIS: ICD-10-CM

## 2025-05-06 DIAGNOSIS — I25.10 CORONARY ARTERY DISEASE INVOLVING NATIVE CORONARY ARTERY OF NATIVE HEART WITHOUT ANGINA PECTORIS: Primary | ICD-10-CM

## 2025-05-06 LAB
APO A-I SERPL-MCNC: 17 MG/DL
CHOLEST SERPL-MCNC: 149 MG/DL
FASTING STATUS PATIENT QL REPORTED: YES
HDLC SERPL-MCNC: 61 MG/DL
LDLC SERPL CALC-MCNC: 72 MG/DL
NONHDLC SERPL-MCNC: 88 MG/DL
TRIGL SERPL-MCNC: 79 MG/DL

## 2025-05-06 PROCEDURE — 80061 LIPID PANEL: CPT

## 2025-05-06 PROCEDURE — 83695 ASSAY OF LIPOPROTEIN(A): CPT

## 2025-05-06 PROCEDURE — 36415 COLL VENOUS BLD VENIPUNCTURE: CPT

## 2025-05-06 PROCEDURE — 3078F DIAST BP <80 MM HG: CPT | Performed by: INTERNAL MEDICINE

## 2025-05-06 PROCEDURE — 3074F SYST BP LT 130 MM HG: CPT | Performed by: INTERNAL MEDICINE

## 2025-05-06 PROCEDURE — G2211 COMPLEX E/M VISIT ADD ON: HCPCS | Performed by: INTERNAL MEDICINE

## 2025-05-06 PROCEDURE — 99214 OFFICE O/P EST MOD 30 MIN: CPT | Performed by: INTERNAL MEDICINE

## 2025-05-06 NOTE — LETTER
5/6/2025    [place] Yang PA-C  [address]  [facility] [id number]    RE: Johan FERNANDEZ Ramon       Dear Colleague,     I had the pleasure of seeing Johan Navarro in the [facility].  HPI and Plan:       I had the pleasure of seeing Mr. Navarro in followup at the [facility] today.  He is a very pleasant 70-year-old gentleman who I last saw in 3/2024.  He has a history of coronary artery disease and is status post stenting of the distal and ostial circumflex as well as the first obtuse marginal in 1998.  He has severe, probably familial, dyslipidemia with a markedly elevated LDL, although we have been able to control this well with Repatha and atorvastatin.  He also has a history of moderate aortic stenosis.      He then underwent coronary angiography on 07/21/2020 and was found to have significant stenosis of the ostium of the second obtuse marginal.  He underwent successful intervention with a drug-eluting stent at the time.  His other coronary arteries demonstrated mild-to-moderate nonobstructive disease in a left dominant system.      In January of 2021 he was experiencing significant fatigue and dyspnea on exertion and was found to have significant anemia in the context of dark stools for which a comprehensive gastrointestinal work-up was performed but was ultimately negative.      He was readmitted to [facility] in October 2021 with a non-ST elevation myocardial infarction.  He was found to have severe stenosis of the mid circumflex and underwent PCI of the mid circumflex at the time with a synergy drug-eluting stent.  He was also found to have moderate to severe nonobstructive stenosis of the OM1 with a negative IFR.  An echocardiogram at the time demonstrated probable moderate aortic stenosis but normal left ventricular systolic function.  He was again placed on dual antiplatelet therapy prior to discharge.     Unfortunately he had subsequent GI bleeding and  anemia and underwent a repeat GI evaluation in 2022 that included capsule endoscopy.  He was found to have small bowel AVMs on his capsule study.  His EGD demonstrated esophagitis and a colonoscopy demonstrated diverticulosis but no evidence of active bleeding.  He was ultimately placed on iron supplementation and hemoglobin has been stable.    He has also undergone left-sided carotid endarterectomy in December 2023 for asymptomatic left carotid stenosis.  He has mild right internal carotid disease based on ultrasonography in 2024.    Today he feels well overall from a cardiovascular standpoint.  He denies any exertional chest discomfort, palpitations, dyspnea, syncope or presyncope.  He has, however, noticed some generalized fatigue which is a new symptom for him.    Fortunately he has not had any recurrent GI bleeding.      PHYSICAL EXAMINATION:  Dictated below.      LABORATORY STUDIES:  Lipids on 3/6/2024 neurocentral cholesterol 142, HDL 67, LDL 63 triglycerides of 59.    Hemoglobin on 2/3/2025 was 14.3.    He is echocardiogram dated 5/5/2025 was independently reviewed and interpreted.  He now has severe aortic stenosis with a calculated aortic valve area of 0.81 cm  and a peak pressure gradient of 49 mmHg.     IMPRESSION:     1.  Coronary artery disease status post most recent PCI to the mid circumflex in October 2021 in the context of a non-ST elevation myocardial infarction.  He is status post revascularization of the second obtuse marginal in 07/2020 and prior circumflex/obtuse marginal revascularization in 1998.   2.  Significant dyslipidemia characterized by severe LDL elevation.  This has normalized with the use of Repatha and atorvastatin.   3.  Severe aortic stenosis with interval progression on his most recent echocardiogram compared to the previous study from 2024.  4.  Left internal carotid stenosis status post carotid endarterectomy as described above.  5.  Iron deficiency anemia as described above.   Hemoglobin has normalized.    6.  Elevated blood pressure today without a formal diagnosis of hypertension.    PLAN    Mr. Navarro presents for follow-up with evidence of progression of aortic stenosis on his most recent echocardiogram.  Although he is minimally symptomatic from a cardiac standpoint he is experiencing generalized fatigue which may be related to the progression of his aortic valve disease.    At this point in time I have recommended TAVR consultation to assess for aortic valve replacement.  I have placed a referral to that effect.  He will need repeat coronary angiography and a TAVR CT prior to planned aortic valve replacement.  Given his history of recurrent GI bleeding, I would recommend the use of aspirin only post TAVR.    I have also ordered an updated lipid panel for today as well as an LP(a).    It was a pleasure seeing him today.  I have educated him as to the cardinal symptoms of symptomatic severe aortic stenosis and asked him to contact us immediately if he experiences any of these symptoms.    The longitudinal plan of care for the diagnosis(es)/condition(s) as documented were addressed during this visit. Due to the added complexity in care, I will continue to support Otis in the subsequent management and with ongoing continuity of care.      CURRENT MEDICATIONS:  Current Outpatient Medications   Medication Sig Dispense Refill     aspirin 81 MG EC tablet Take 81 mg by mouth daily OTC       atorvastatin (LIPITOR) 80 MG tablet Take 1 tablet (80 mg) by mouth daily. 90 tablet 0     diclofenac (VOLTAREN) 1 % topical gel APPLY 2 GRAMS TO HANDS AND FINGERS FOUR TIMES DAILY USING DOSING CARD 100 g 0     evolocumab (REPATHA) 140 MG/ML prefilled autoinjector Inject 1 mL (140 mg) subcutaneously every 14 days. 6 mL 1     Ferrous Sulfate (IRON) 325 (65 Fe) MG tablet Take 1 tablet by mouth Once weekly       fluticasone (FLONASE) 50 MCG/ACT nasal spray SHAKE LIQUID AND USE 1 SPRAY IN EACH NOSTRIL DAILY 16 g  1     folic acid (FOLVITE) 1 MG tablet TAKE 1 TABLET(1 MG) BY MOUTH EVERY EVENING 90 tablet 3     gabapentin (NEURONTIN) 600 MG tablet TAKE 1 TABLET(600 MG) BY MOUTH TWICE DAILY 180 tablet 1     naltrexone (DEPADE/REVIA) 50 MG tablet Take 1 tablet (50 mg) by mouth daily. 30 tablet 0     nitroGLYcerin (NITROSTAT) 0.4 MG sublingual tablet For chest pain place 1 tablet under the tongue every 5 minutes for 3 doses. If symptoms persist 5 minutes after 1st dose call 911. 30 tablet 0     sildenafil (REVATIO) 20 MG tablet Take 2-5 tablets ( mg) by mouth daily as needed. 30 tablet 2       ALLERGIES     Allergies   Allergen Reactions     Crestor [Rosuvastatin] GI Disturbance     Dust Mites      Other [No Clinical Screening - See Comments]      Goose feathers     Pollen Extract        PAST MEDICAL HISTORY:  Past Medical History:   Diagnosis Date     Aortic stenosis      Arthritis     hands     CAD (coronary artery disease)     cardiac cath 1998: stents x 2 to circumflex     Carotid stenosis     left     Family history of early CAD      Hyperlipidaemia LDL goal < 70     familial hyperlipidemia with marked hypercholesterolemia before treatment; has been on some form of cholesterol-lowering medication since the 1970s     Prostate cancer (H) 2010     Sleep apnea      Syncope      Unstable angina (H) 1/6/2021       PAST SURGICAL HISTORY:  Past Surgical History:   Procedure Laterality Date     CARDIAC SURGERY  1998    coronary stents x2 placed 1998; angioplasty     CV HEART CATHETERIZATION WITH POSSIBLE INTERVENTION N/A 7/21/2020    Procedure: Heart Catheterization with Possible Intervention;  Surgeon: Alber Bentley MD;  Location:  HEART CARDIAC CATH LAB     CV HEART CATHETERIZATION WITH POSSIBLE INTERVENTION N/A 10/11/2021    Procedure: Heart Catheterization with Possible Intervention;  Surgeon: Patsy Wallace MD;  Location:  HEART CARDIAC CATH LAB     CV INSTANTANEOUS WAVE-FREE RATIO N/A 10/11/2021     Procedure: Instantaneous Wave-Free Ratio;  Surgeon: Patsy Wallace MD;  Location:  HEART CARDIAC CATH LAB     CV LEFT HEART CATH N/A 10/11/2021    Procedure: Left Heart Cath;  Surgeon: Patsy Wallace MD;  Location:  HEART CARDIAC CATH LAB     CV PCI STENT DRUG ELUTING N/A 10/11/2021    Procedure: Percutaneous Coronary Intervention Stent Drug Eluting;  Surgeon: Patsy Wallace MD;  Location:  HEART CARDIAC CATH LAB     DAVINCI PROSTATECTOMY      2010     ENDARTERECTOMY CAROTID Left 2022    Procedure: LEFT CAROTID ENDARTERECTOMY WITH BOVINE PERICARDIUM PATCH ANGIOPLASTY, AND INTRAOPERATIVE ELECTROENCEPHALOGRAM MONITORING;  Surgeon: Montez Aponte MD;  Location:  OR     ESOPHAGOSCOPY, GASTROSCOPY, DUODENOSCOPY (EGD), COMBINED N/A 2021    Procedure: ESOPHAGOGASTRODUODENOSCOPY (EGD);  Surgeon: Rosemarie Laws MD;  Location:  GI     EYE SURGERY Bilateral 2017    eyelids     GENITOURINARY SURGERY       ORTHOPEDIC SURGERY Right     achilles tendon; post football injury     ORTHOPEDIC SURGERY Right     hand; near thumb. has wires in there. cysts     REPAIR TENDON BICEPS DISTAL UPPER EXTREMITY Right 2018    Procedure: REPAIR TENDON BICEPS DISTAL UPPER EXTREMITY;  Right open biceps tendon repair  ;  Surgeon: Alber Zabala MD;  Location:  OR     SURGICAL HISTORY OF -       Right hand Xanthoma removal     SURGICAL HISTORY OF -       Stress Echo (-)       FAMILY HISTORY:  Family History   Problem Relation Age of Onset     Lipids Mother      C.A.D. Mother          at age 49 of MI     Dementia Father      C.A.D. Sister         MI at age 48     Cancer - colorectal No family hx of      Prostate Cancer No family hx of      Colon Cancer No family hx of        SOCIAL HISTORY:  Social History     Socioeconomic History     Marital status:    Tobacco Use     Smoking status: Former     Current packs/day: 0.00     Average packs/day: 0.3 packs/day for 20.0 years (5.0 ttl  pk-yrs)     Types: Cigars, Cigarettes     Start date:      Quit date: 2000     Years since quittin.3     Smokeless tobacco: Current     Types: Snuff, Chew     Tobacco comments:     daily   Vaping Use     Vaping status: Never Used   Substance and Sexual Activity     Alcohol use: Not Currently     Comment: none for 8 years- Quit     Drug use: Yes     Types: Marijuana     Comment: daily      Sexual activity: Yes     Partners: Female   Other Topics Concern     Caffeine Concern Yes     Comment: coffee and soda     Special Diet No     Exercise Yes     Comment: regular      Seat Belt Yes     Parent/sibling w/ CABG, MI or angioplasty before 65F 55M? Yes     Comment: 49     Social Drivers of Health     Financial Resource Strain: Low Risk  (2024)    Financial Resource Strain      Within the past 12 months, have you or your family members you live with been unable to get utilities (heat, electricity) when it was really needed?: No   Food Insecurity: Low Risk  (2024)    Food Insecurity      Within the past 12 months, did you worry that your food would run out before you got money to buy more?: No      Within the past 12 months, did the food you bought just not last and you didn t have money to get more?: No   Transportation Needs: Low Risk  (2024)    Transportation Needs      Within the past 12 months, has lack of transportation kept you from medical appointments, getting your medicines, non-medical meetings or appointments, work, or from getting things that you need?: No   Physical Activity: Sufficiently Active (2024)    Exercise Vital Sign      Days of Exercise per Week: 4 days      Minutes of Exercise per Session: 40 min   Stress: No Stress Concern Present (2024)    [employer] - Occupational Stress Questionnaire      Feeling of Stress : Not at all   Social Connections: Unknown (2024)    Social Connection and Isolation Panel [NHANES]      Frequency of Social  Gatherings with Friends and Family: Three times a week   Interpersonal Safety: Low Risk  (9/25/2023)    Interpersonal Safety      Do you feel physically and emotionally safe where you currently live?: Yes      Within the past 12 months, have you been hit, slapped, kicked or otherwise physically hurt by someone?: No      Within the past 12 months, have you been humiliated or emotionally abused in other ways by your partner or ex-partner?: No   Housing Stability: Low Risk  (7/25/2024)    Housing Stability      Do you have housing? : Yes      Are you worried about losing your housing?: No       Review of Systems:  Skin:          Eyes:         ENT:         Respiratory:          Cardiovascular:         Gastroenterology:        Genitourinary:         Musculoskeletal:         Neurologic:         Psychiatric:         Heme/Lymph/Imm:         Endocrine:           Physical Exam:  Vitals: There were no vitals taken for this visit.    Constitutional:           Skin:             Head:           Eyes:           Lymph:      ENT:           Neck:           Respiratory:        Clear to auscultation    Cardiac:                Regular rate and rhythm                                           GI:           Extremities and Muscular Skeletal:                 Neurological:           Psych:           CC  Sander Galicia MD  [address]  [place] [id number]                  Thank you for allowing me to participate in the care of your patient.      Sincerely,     Sander Galicia MD     [facility]  cc:   Sander Galicia MD  [address]  [place] [id number]

## 2025-05-06 NOTE — TELEPHONE ENCOUNTER
Structural Heart Clinic - [facility]    TAVR referral received from: Dr. Galicia     Chart and recent lab results reviewed.  Left voicemail for patient to schedule TAVR clinic visit. Direct call back number of [phone] provided.     Kathleen Yang RN  Structural Heart Coordinator  [facility]

## 2025-05-07 ENCOUNTER — TELEPHONE (OUTPATIENT)
Dept: CARDIOLOGY | Facility: CLINIC | Age: 70
End: 2025-05-07
Payer: COMMERCIAL

## 2025-05-07 ENCOUNTER — RESULTS FOLLOW-UP (OUTPATIENT)
Dept: CARDIOLOGY | Facility: CLINIC | Age: 70
End: 2025-05-07
Payer: COMMERCIAL

## 2025-05-07 DIAGNOSIS — E78.2 MIXED HYPERLIPIDEMIA: ICD-10-CM

## 2025-05-07 DIAGNOSIS — I25.10 CORONARY ARTERY DISEASE INVOLVING NATIVE CORONARY ARTERY OF NATIVE HEART WITHOUT ANGINA PECTORIS: Primary | ICD-10-CM

## 2025-05-07 NOTE — PROGRESS NOTES
VASCULAR SURGERY PROGRESS NOTE    [facility]    Johan Navarro  Medical Record #: [id number]  YOB: 1955  Age: 70 year old     Date of Service: 5/8/2025    PRIMARY CARE PROVIDER: Washington Yang    Reason for visit:  Carotid surveillance    Otis Navarro is a pleasant 69 year old male who comes into clinic today for yearly surveillance of carotids, underwent a left carotid endarterectomy with Dr. Aponte on 12/28/2023 for asymptomatic left carotid stenosis.      He has a history of moderate aortic valve stenosis-status post coronary interventions with his most recent intervention being the placement of ANNETTE in mid-circumflex on 10/11/2021. The patient is known to our clinic for asymptomatic left carotid stenosis.     Alert and oriented x4, neurologically intact  Denies stroke, TIA, amaurosis fugax   No changes to health, although found out recently he needs to have aortic valve surgery.     Carotid duplex shows widely patent carotids, Less than 60%.       RECOMMENDATION/RISKS: Continue on aspirin 81 mg. LDL is excellent control. Will be having valve surgery this upcoming year.     Follow-up in 1 year with bilateral carotid duplex.       REVIEW OF SYSTEMS:    A 12 point ROS was reviewed and is negative except for what is listed above in HPI.    PHH:    Past Medical History:   Diagnosis Date    Aortic stenosis     Arthritis     hands    CAD (coronary artery disease)     cardiac cath 1998: stents x 2 to circumflex    Carotid stenosis     left    Family history of early CAD     Hyperlipidaemia LDL goal < 70     familial hyperlipidemia with marked hypercholesterolemia before treatment; has been on some form of cholesterol-lowering medication since the 1970s    Prostate cancer (H) 2010    Sleep apnea     Syncope     Unstable angina (H) 1/6/2021          Past Surgical History:   Procedure Laterality Date    CARDIAC SURGERY  1998    coronary stents x2 placed 1998; angioplasty    CV HEART  CATHETERIZATION WITH POSSIBLE INTERVENTION N/A 7/21/2020    Procedure: Heart Catheterization with Possible Intervention;  Surgeon: Alber Bentley MD;  Location:  HEART CARDIAC CATH LAB    CV HEART CATHETERIZATION WITH POSSIBLE INTERVENTION N/A 10/11/2021    Procedure: Heart Catheterization with Possible Intervention;  Surgeon: Patsy Wallace MD;  Location:  HEART CARDIAC CATH LAB    CV INSTANTANEOUS WAVE-FREE RATIO N/A 10/11/2021    Procedure: Instantaneous Wave-Free Ratio;  Surgeon: Patsy Wallace MD;  Location:  HEART CARDIAC CATH LAB    CV LEFT HEART CATH N/A 10/11/2021    Procedure: Left Heart Cath;  Surgeon: Patsy Wallace MD;  Location:  HEART CARDIAC CATH LAB    CV PCI STENT DRUG ELUTING N/A 10/11/2021    Procedure: Percutaneous Coronary Intervention Stent Drug Eluting;  Surgeon: Patsy Wallace MD;  Location:  HEART CARDIAC CATH LAB    DAVINCI PROSTATECTOMY      2010    ENDARTERECTOMY CAROTID Left 12/27/2022    Procedure: LEFT CAROTID ENDARTERECTOMY WITH BOVINE PERICARDIUM PATCH ANGIOPLASTY, AND INTRAOPERATIVE ELECTROENCEPHALOGRAM MONITORING;  Surgeon: Montez Aponte MD;  Location:  OR    ESOPHAGOSCOPY, GASTROSCOPY, DUODENOSCOPY (EGD), COMBINED N/A 1/6/2021    Procedure: ESOPHAGOGASTRODUODENOSCOPY (EGD);  Surgeon: Rosemarie Laws MD;  Location:  GI    EYE SURGERY Bilateral 2017    eyelids    GENITOURINARY SURGERY      ORTHOPEDIC SURGERY Right     achilles tendon; post football injury    ORTHOPEDIC SURGERY Right     hand; near thumb. has wires in there. cysts    REPAIR TENDON BICEPS DISTAL UPPER EXTREMITY Right 7/16/2018    Procedure: REPAIR TENDON BICEPS DISTAL UPPER EXTREMITY;  Right open biceps tendon repair  ;  Surgeon: Alber Zabala MD;  Location:  OR    SURGICAL HISTORY OF -       Right hand Xanthoma removal    SURGICAL HISTORY OF -       Stress Echo (-)       ALLERGIES:  Crestor [rosuvastatin], Dust mites, Other [no clinical screening - see  comments], and Pollen extract    MEDS:    Current Outpatient Medications:     aspirin 81 MG EC tablet, Take 81 mg by mouth daily OTC, Disp: , Rfl:     atorvastatin (LIPITOR) 80 MG tablet, Take 1 tablet (80 mg) by mouth daily., Disp: 90 tablet, Rfl: 0    diclofenac (VOLTAREN) 1 % topical gel, APPLY 2 GRAMS TO HANDS AND FINGERS FOUR TIMES DAILY USING DOSING CARD, Disp: 100 g, Rfl: 0    evolocumab (REPATHA) 140 MG/ML prefilled autoinjector, Inject 1 mL (140 mg) subcutaneously every 14 days., Disp: 6 mL, Rfl: 1    Ferrous Sulfate (IRON) 325 (65 Fe) MG tablet, Take 1 tablet by mouth Once weekly, Disp: , Rfl:     fluticasone (FLONASE) 50 MCG/ACT nasal spray, SHAKE LIQUID AND USE 1 SPRAY IN EACH NOSTRIL DAILY, Disp: 16 g, Rfl: 1    folic acid (FOLVITE) 1 MG tablet, TAKE 1 TABLET(1 MG) BY MOUTH EVERY EVENING, Disp: 90 tablet, Rfl: 3    gabapentin (NEURONTIN) 600 MG tablet, TAKE 1 TABLET(600 MG) BY MOUTH TWICE DAILY, Disp: 180 tablet, Rfl: 1    naltrexone (DEPADE/REVIA) 50 MG tablet, Take 1 tablet (50 mg) by mouth daily., Disp: 30 tablet, Rfl: 0    nitroGLYcerin (NITROSTAT) 0.4 MG sublingual tablet, For chest pain place 1 tablet under the tongue every 5 minutes for 3 doses. If symptoms persist 5 minutes after 1st dose call 911., Disp: 30 tablet, Rfl: 0    sildenafil (REVATIO) 20 MG tablet, Take 2-5 tablets ( mg) by mouth daily as needed., Disp: 30 tablet, Rfl: 2    SOCIAL HABITS:    History   Smoking Status    Former    Packs/day: 0.25    Years: 20.00    Types: Cigars, Cigarettes    Quit date:    Smokeless Tobacco    Current    Types: Snuff, Chew     Social History    Substance and Sexual Activity      Alcohol use: Not Currently        Comment: none for 8 years- Quit      History   Drug Use    Types: Marijuana     Comment: daily        FAMILY HISTORY:    Family History   Problem Relation Age of Onset    Lipids Mother     C.A.D. Mother          at age 49 of MI    Dementia Father     C.A.D. Sister         MI at  age 48    Cancer - colorectal No family hx of     Prostate Cancer No family hx of     Colon Cancer No family hx of        PE:  There were no vitals taken for this visit.  Wt Readings from Last 1 Encounters:   25 159 lb (72.1 kg)     There is no height or weight on file to calculate BMI.    DIAGNOSTIC STUDIES:     Images:  Echocardiogram Complete  Result Date: 2025  [id number]^[id number]^LATONYA^KAVON  [facility] [address] [place] [address]  Name: MARLENY CHAPA MRN: [id number] : 1955 Study Date: 2025 09:45 AM Age: 70 yrs Gender: Male Patient Location: [facility] Reason For Study: Coronary artery disease involving native coronary artery Ordering Physician: LATONYA NAPIER Referring Physician: LATONYA NAPIER Performed By: Lizbeth Marques  BSA: 1.8 m2 Height: 66 in Weight: 155 lb HR: 69 BP: 182/101 mmHg ______________________________________________________________________________ Procedure Echocardiogram with two-dimensional, color and spectral Doppler. ______________________________________________________________________________ Interpretation Summary  1. The left ventricle is normal in size. Proximal septal thickening is noted. Left ventricular systolic function is normal. The visual ejection fraction is 55-60%. Diastolic Doppler findings (E/E' ratio and/or other parameters) suggest left ventricular filling pressures are normal. No regional wall motion abnormalities noted. 2. The right ventricle is normal size. The right ventricular systolic function is normal. 3. Severe valvular aortic stenosis. The mean AoV pressure gradient is 32.0 mmHg. The peak AoV pressure gradient is 49.0 mmHg. The calculated aortic valve are is 0.81 cm^2. There is mild (1+) aortic regurgitation. 4. No pericardial effusion. 5. In compairson to the previous report dated 2024, there has been an interval progression in the degree of  aortic stenosis. ______________________________________________________________________________ Left Ventricle The left ventricle is normal in size. Proximal septal thickening is noted. Left ventricular systolic function is normal. The visual ejection fraction is 55-60%. Diastolic Doppler findings (E/E' ratio and/or other parameters) suggest left ventricular filling pressures are normal. No regional wall motion abnormalities noted.  Right Ventricle The right ventricle is normal size. The right ventricular systolic function is normal.  Atria Normal left atrial size. Right atrial size is normal.  Mitral Valve There is trace mitral regurgitation.  Tricuspid Valve There is trace tricuspid regurgitation.  Aortic Valve The aortic valve is thickened and calcified. There is mild (1+) aortic regurgitation. Severe valvular aortic stenosis. The mean AoV pressure gradient is 32.0 mmHg. The peak AoV pressure gradient is 49.0 mmHg. The calculated aortic valve are is 0.81 cm^2.  Pulmonic Valve There is mild (1+) pulmonic valvular regurgitation. There is no pulmonic valvular stenosis.  Vessels The aortic root is normal size. Descending aortic velocity normal. The inferior vena cava is normal.  Pericardium There is no pericardial effusion.  Rhythm Sinus rhythm was noted. ______________________________________________________________________________ MMode/2D Measurements & Calculations IVSd: 0.99 cm  LVIDd: 4.9 cm LVIDs: 3.1 cm LVPWd: 1.0 cm IVC diam: 1.0 cm FS: 36.8 % LV mass(C)d: 177.8 grams LV mass(C)dI: 99.1 grams/m2 Ao root diam: 3.0 cm LVOT diam: 2.2 cm LVOT area: 3.8 cm2 Ao root diam index Ht(cm/m): 1.8 Ao root diam index BSA (cm/m2): 1.7 LA Volume (BP): 52.0 ml LA Volume Index (BP): 29.1 ml/m2 RV Base: 3.3 cm  RWT: 0.41 TAPSE: 2.0 cm  Doppler Measurements & Calculations MV E max momo: 38.8 cm/sec MV A max momo: 80.2 cm/sec MV E/A: 0.48 MV dec time: 0.23 sec Ao V2 max: 352.9 cm/sec Ao max P.0 mmHg Ao V2 mean: 267.4 cm/sec  Ao mean P.0 mmHg Ao V2 VTI: 89.5 cm MANDEEP(I,D): 0.81 cm2 MANDEEP(V,D): 0.93 cm2 LV V1 max P.9 mmHg LV V1 max: 85.4 cm/sec LV V1 VTI: 19.0 cm SV(LVOT): 72.6 ml SI(LVOT): 40.4 ml/m2 PA acc time: 0.11 sec AV Jero Ratio (DI): 0.24 MANDEEP Index (cm2/m2): 0.45 E/E' av.2 Lateral E/e': 8.7 Medial E/e': 7.8 RV S Jero: 12.2 cm/sec  ______________________________________________________________________________ Report approved by: Sander Galicia MD on 2025 10:36 AM         LABS:      Sodium   Date Value Ref Range Status   2025 140 135 - 145 mmol/L Final   2025 143 135 - 145 mmol/L Final   2024 141 135 - 145 mmol/L Final     Comment:     Reference intervals for this test were updated on 2023 to more accurately reflect our healthy population. There may be differences in the flagging of prior results with similar values performed with this method. Interpretation of those prior results can be made in the context of the updated reference intervals.    2021 136 133 - 144 mmol/L Final   2021 138 133 - 144 mmol/L Final   2021 136 133 - 144 mmol/L Final     Urea Nitrogen   Date Value Ref Range Status   2025 15.4 8.0 - 23.0 mg/dL Final   2025 15.9 8.0 - 23.0 mg/dL Final   2024 17.4 8.0 - 23.0 mg/dL Final   2022 18 7 - 30 mg/dL Final   2022 19 7 - 30 mg/dL Final   10/12/2021 13 7 - 30 mg/dL Final   2021 14 7 - 30 mg/dL Final   2021 15 7 - 30 mg/dL Final   2021 21 7 - 30 mg/dL Final     Hemoglobin   Date Value Ref Range Status   2025 14.3 13.3 - 17.7 g/dL Final   2025 16.0 13.3 - 17.7 g/dL Final   2024 14.9 13.3 - 17.7 g/dL Final   2021 11.2 (L) 13.3 - 17.7 g/dL Final     Comment:     Results confirmed by repeat test   2021 9.8 (L) 13.3 - 17.7 g/dL Final     Comment:     Results confirmed by repeat test   2021 9.4 (L) 13.3 - 17.7 g/dL Final     Comment:     Results confirmed by repeat test     Platelet Count    Date Value Ref Range Status   02/03/2025 242 150 - 450 10e3/uL Final   01/23/2025 292 150 - 450 10e3/uL Final   06/27/2023 225 150 - 450 10e3/uL Final   03/24/2021 206 150 - 450 10e9/L Final   01/21/2021 437 150 - 450 10e9/L Final   01/12/2021 184 150 - 450 10e9/L Final     INR   Date Value Ref Range Status   01/06/2021 1.03 0.86 - 1.14 Final   07/21/2020 0.96 0.86 - 1.14 Final       20 minutes spent on the day of encounter doing chart review, history and exam, documentation, and further activities as noted.       Stormy Bermudez, NP  VASCULAR SURGERY

## 2025-05-07 NOTE — TELEPHONE ENCOUNTER
Labs routed to Dr Galicia. Hx CAD/stents, dyslipidemia. Patient is on atorvastatin 80mg daily and repatha.       Component      Latest Ref Rng 3/6/2024  9:16 AM 5/6/2025  1:30 PM   Cholesterol      <200 mg/dL 142  149    Triglycerides      <150 mg/dL 59  79    HDL Cholesterol      >=40 mg/dL 67  61    LDL Cholesterol Calculated      <100 mg/dL 63  72    Non HDL Cholesterol      <130 mg/dL 75  88    Patient Fasting? Yes  Yes    Lipoprotein (a)      <30 mg/dL  17

## 2025-05-08 ENCOUNTER — HOSPITAL ENCOUNTER (OUTPATIENT)
Dept: ULTRASOUND IMAGING | Facility: CLINIC | Age: 70
Discharge: HOME | End: 2025-05-08
Payer: COMMERCIAL

## 2025-05-08 ENCOUNTER — TELEPHONE (OUTPATIENT)
Dept: CARDIOLOGY | Facility: CLINIC | Age: 70
End: 2025-05-08

## 2025-05-08 ENCOUNTER — OFFICE VISIT (OUTPATIENT)
Dept: OTHER | Facility: CLINIC | Age: 70
End: 2025-05-08
Payer: COMMERCIAL

## 2025-05-08 VITALS — HEART RATE: 71 BPM | DIASTOLIC BLOOD PRESSURE: 94 MMHG | SYSTOLIC BLOOD PRESSURE: 143 MMHG

## 2025-05-08 DIAGNOSIS — I65.22 STENOSIS OF LEFT CAROTID ARTERY: ICD-10-CM

## 2025-05-08 DIAGNOSIS — I65.22 STENOSIS OF LEFT CAROTID ARTERY: Primary | ICD-10-CM

## 2025-05-08 DIAGNOSIS — I35.0 NONRHEUMATIC AORTIC VALVE STENOSIS: Primary | ICD-10-CM

## 2025-05-08 PROCEDURE — 93880 EXTRACRANIAL BILAT STUDY: CPT

## 2025-05-08 PROCEDURE — G0463 HOSPITAL OUTPT CLINIC VISIT: HCPCS

## 2025-05-08 NOTE — TELEPHONE ENCOUNTER
Have we tried ezetimibe before on him?  If not, lets start 10 mg daily and repeat a lipid panel in 6 weeks.  Thanks.

## 2025-05-08 NOTE — TELEPHONE ENCOUNTER
[facility]ural [facility] Clinic - [facility]     TAVR referral received from: Dr. Galicia      Chart and recent lab results reviewed.  Left voicemail for patient to schedule TAVR clinic visit. Direct call back number of [phone] provided. [employer] message sent.     Marielena Overton RN  Structural Heart Coordinator  [facility]

## 2025-05-08 NOTE — TELEPHONE ENCOUNTER
Structural Heart Clinic [facility]    TAVR referral received from: Dr. Galicia    Chart and recent lab results reviewed.    TAVR CT: Ordered  Contrast allergy: No  Contacted patient to introduce self, provide education on aortic stenosis.   Support/living situation: Lives at home with wife Amy  Arranged TAVR consult with Dr. Benitez  Provided direct contact information.     Marielena Overton RN  Structural Heart Coordinator  [facility]

## 2025-05-08 NOTE — PROGRESS NOTES
[facility]        Patient is here for a  follow up.    Pt is currently taking Aspirin and Statin.    BP (!) 143/94 (BP Location: Left arm, Patient Position: Chair, Cuff Size: Adult Regular)   Pulse 71     The provider has been notified that the patient has no concerns.     Questions patient would like addressed today are: N/A.    Refills are needed: N/A    Has homecare services and agency name:  Cece Terry MA

## 2025-05-08 NOTE — TELEPHONE ENCOUNTER
Patient was on zetia prior to starting repatha.   Attempted to contact patient to review adding zetia and checking labs in 6 weeks. Left a message for patient to call back to discuss.    Left message for patient to call back to Team 2 R.N.s @ [phone]

## 2025-05-12 RX ORDER — EZETIMIBE 10 MG/1
10 TABLET ORAL DAILY
Qty: 90 TABLET | Refills: 3 | Status: SHIPPED | OUTPATIENT
Start: 2025-05-12

## 2025-05-12 NOTE — TELEPHONE ENCOUNTER
Lets restart ezetimibe.  Follow-up lipids in 6 weeks.  Lets also make sure he sees Dr. Benitez for TAVR consultation, we can add him into his regular clinic.  Thanks.

## 2025-05-12 NOTE — TELEPHONE ENCOUNTER
"Per Dr. Galicia's note on 7/26/17 after patient started Repatha \"he should stop the niacin and zetia, and cut back on the rosuvastatin to 20 MG daily. We can reassess this once his FLP is back.\"    Routed to Dr. Galicia for review.     "

## 2025-05-12 NOTE — TELEPHONE ENCOUNTER
Patient called to inform of Dr. Galicia's recommendation. Patient is already scheduling with Dr. Benitez on 5/29/25 for TAVR consult.  Patient

## 2025-05-29 ENCOUNTER — RESULTS FOLLOW-UP (OUTPATIENT)
Dept: CARDIOLOGY | Facility: CLINIC | Age: 70
End: 2025-05-29

## 2025-05-29 ENCOUNTER — OFFICE VISIT (OUTPATIENT)
Dept: CARDIOLOGY | Facility: CLINIC | Age: 70
End: 2025-05-29
Attending: INTERNAL MEDICINE
Payer: COMMERCIAL

## 2025-05-29 VITALS
HEIGHT: 66 IN | HEART RATE: 78 BPM | SYSTOLIC BLOOD PRESSURE: 142 MMHG | DIASTOLIC BLOOD PRESSURE: 80 MMHG | WEIGHT: 157 LBS | BODY MASS INDEX: 25.23 KG/M2

## 2025-05-29 DIAGNOSIS — I25.10 CORONARY ARTERY DISEASE INVOLVING NATIVE CORONARY ARTERY OF NATIVE HEART WITHOUT ANGINA PECTORIS: ICD-10-CM

## 2025-05-29 NOTE — LETTER
5/29/2025    Washington Yang PA-C  [id number]  [facility] [address]    RE: Johan Navarro       Dear Colleague,     I had the pleasure of seeing Johan Navarro in the [facility].      Cardiology Consultation       Assessment & Plan    1.  Severe Aortic stenosis  2.  History of atherosclerotic coronary artery disease with PCI in 1998, 2020 and 2021  3.  PAD status post left carotid endarterectomy  4.  Hypertension  5.  Hyperlipidemia  6.  History of GI bleed in 2022 with small bowel AVMs, treated with iron therapy  7.  Prostate cancer    Recommendation    1.  Severe aortic stenosis: We discussed the TAVR procedure in detail.  He will need to meet with our surgical colleagues.  His preference is to proceed with TAVR if the anatomy is favorable.  Risk benefits and potential complications were discussed with him.  We will review the data obtained today on the TAVR directed CT to assess iliofemoral sizing as well as annular sizing.  We will contact patient and provided these are favorable proceed with coronary angiography.  Risk benefits potential complications of coronary angiography were discussed with him as well.    2.  Following that we will review his case with our TAVR team for a consensus opinion    3.  Of note patient had significant GI bleed with dual antiplatelet therapy.  We will only want to revascularize meaningful areas on the angiogram.  Presently is able to tolerate low-dose aspirin without difficulty.        The longitudinal plan of care for the diagnosis(es)/condition(s) as documented were addressed during this visit. Due to the added complexity in care, I will continue to support Otis in the subsequent management and with ongoing continuity of care.       Zuhair Benitez MD, MD          History of present illness    Patient is a pleasant 70-year-old gentleman with the above-mentioned past medical history.  Recently seen by my colleague Dr. Galicia.  Given his recent progression  on his aortic stenosis he is sent to the structural heart clinic for evaluation.  In preparation for today's visit he underwent a TAVR directed CT, the results are still pending at the time of our discussion.    Patient is very active he tries to walk 10,000 steps per day.  Accomplish 15,000 steps earlier this week.  Does not feel much in the way of shortness of breath and has never had any chest pain PND orthopnea or endocardiac related concerns.  His recent echocardiogram is suggested progression of his aortic stenosis to the severe range.  He is interested in pursuing this via TAVR if the anatomy is appropriate.  Accompanied by his wife.    He does report that when he had PCI performed 5 years ago he unfortunately on dual endplate therapy had significant bleed with a hemoglobin that dropped down to 5.  He was transfused and fortunately has been able to tolerate a baby aspirin since that time without any further difficulty.      Echocardiogram 2025  1. The left ventricle is normal in size. Proximal septal thickening is noted.  Left ventricular systolic function is normal. The visual ejection fraction is  55-60%. Diastolic Doppler findings (E/E' ratio and/or other parameters)  suggest left ventricular filling pressures are normal. No regional wall motion  abnormalities noted.  2. The right ventricle is normal size. The right ventricular systolic function  is normal.  3. Severe valvular aortic stenosis. The mean AoV pressure gradient is 32.0  mmHg. The peak AoV pressure gradient is 49.0 mmHg. The calculated aortic valve  are is 0.81 cm^2. There is mild (1+) aortic regurgitation.  4. No pericardial effusion.  5. In compairson to the previous report dated 03/06/2024, there has been an  interval progression in the degree of aortic stenosis.      Primary Care Physician  Washington Yang      Patient Active Problem List   Diagnosis     Depressive disorder, not elsewhere classified     Elevated prostate specific antigen (PSA)      Osteoarthritis of Hand      Hyperlipidemia LDL goal <100     Mixed hyperlipidemia     Family history of early CAD     Aortic stenosis     Carotid stenosis     Coronary artery disease involving native coronary artery of native heart without angina pectoris     Status post coronary angiogram     Abnormal cardiovascular stress test     Gastrointestinal hemorrhage, unspecified gastrointestinal hemorrhage type     History of prostate cancer     Iron deficiency anemia due to chronic blood loss     Post-operative state       Past Medical History  I have reviewed this patient's medical history and updated it with pertinent information if needed.   Past Medical History:   Diagnosis Date     Aortic stenosis      Arthritis     hands     CAD (coronary artery disease)     cardiac cath 1998: stents x 2 to circumflex     Carotid stenosis     left     Family history of early CAD      Hyperlipidaemia LDL goal < 70     familial hyperlipidemia with marked hypercholesterolemia before treatment; has been on some form of cholesterol-lowering medication since the 1970s     Prostate cancer (H) 2010     Sleep apnea      Syncope      Unstable angina (H) 1/6/2021       Past Surgical History  I have reviewed this patient's surgical history and updated it with pertinent information if needed.  Past Surgical History:   Procedure Laterality Date     CARDIAC SURGERY  1998    coronary stents x2 placed 1998; angioplasty     CV HEART CATHETERIZATION WITH POSSIBLE INTERVENTION N/A 7/21/2020    Procedure: Heart Catheterization with Possible Intervention;  Surgeon: Alber Bentley MD;  Location: [facility] CARDIAC CATH LAB     CV HEART CATHETERIZATION WITH POSSIBLE INTERVENTION N/A 10/11/2021    Procedure: Heart Catheterization with Possible Intervention;  Surgeon: Patsy Wallace MD;  Location: [facility] CARDIAC CATH LAB     CV INSTANTANEOUS WAVE-FREE RATIO N/A 10/11/2021    Procedure: Instantaneous Wave-Free Ratio;  Surgeon: Patsy Wallace  MD;  Location:  HEART CARDIAC CATH LAB     CV LEFT HEART CATH N/A 10/11/2021    Procedure: Left Heart Cath;  Surgeon: Patsy Wallace MD;  Location:  HEART CARDIAC CATH LAB     CV PCI STENT DRUG ELUTING N/A 10/11/2021    Procedure: Percutaneous Coronary Intervention Stent Drug Eluting;  Surgeon: Patsy Wallace MD;  Location:  HEART CARDIAC CATH LAB     DAVINCI PROSTATECTOMY      2010     ENDARTERECTOMY CAROTID Left 12/27/2022    Procedure: LEFT CAROTID ENDARTERECTOMY WITH BOVINE PERICARDIUM PATCH ANGIOPLASTY, AND INTRAOPERATIVE ELECTROENCEPHALOGRAM MONITORING;  Surgeon: Montez Aponte MD;  Location:  OR     ESOPHAGOSCOPY, GASTROSCOPY, DUODENOSCOPY (EGD), COMBINED N/A 1/6/2021    Procedure: ESOPHAGOGASTRODUODENOSCOPY (EGD);  Surgeon: Rosemarie Laws MD;  Location:  GI     EYE SURGERY Bilateral 2017    eyelids     GENITOURINARY SURGERY       ORTHOPEDIC SURGERY Right     achilles tendon; post football injury     ORTHOPEDIC SURGERY Right     hand; near thumb. has wires in there. cysts     REPAIR TENDON BICEPS DISTAL UPPER EXTREMITY Right 7/16/2018    Procedure: REPAIR TENDON BICEPS DISTAL UPPER EXTREMITY;  Right open biceps tendon repair  ;  Surgeon: Alber Zabala MD;  Location:  OR     SURGICAL HISTORY OF -       Right hand Xanthoma removal     SURGICAL HISTORY OF -       Stress Echo (-)       Prior to Admission Medications  Cannot display prior to admission medications because the patient has not been admitted in this contact.     [unfilled]  [unfilled]  Allergies  Allergies   Allergen Reactions     Crestor [Rosuvastatin] GI Disturbance     Dust Mites      Other [No Clinical Screening - See Comments]      Goose feathers     Pollen Extract        Social History   reports that he quit smoking about 25 years ago. His smoking use included cigars and cigarettes. He started smoking about 45 years ago. He has a 5 pack-year smoking history. His smokeless tobacco use includes snuff  and chew. He reports that he does not currently use alcohol. He reports current drug use. Drug: Marijuana.    Family History  Family History   Problem Relation Age of Onset     Lipids Mother      JOEY.A.D. Mother          at age 49 of MI     Dementia Father      C.A.D. Sister         MI at age 48     Cancer - colorectal No family hx of      Prostate Cancer No family hx of      Colon Cancer No family hx of        Review of Systems  The comprehensive 10 point Review of Systems is negative other than noted in the HPI or here.     Physical Exam  Vital Signs with Ranges     Wt Readings from Last 4 Encounters:   25 72.1 kg (159 lb)   25 70.3 kg (155 lb)   25 72.6 kg (160 lb)   24 69.4 kg (153 lb 1.6 oz)     [unfilled]      Vitals: There were no vitals taken for this visit.      @LABRCNTIPR(tropi:5,troponinies:5)@    @LABRCNTIPR(wbc:3,hgb:3,mcv:3,plt:3,inr:3,na:3,potassium:3,chloride:3,co2:3,bun:3,cr:3,gfrestimated:3,gfrestblack:3,aniongap:3,zoë:3,glc:3,albumin:2,prottotal:2,bilitotal:2,alkphos:2,alt:2,ast:2,lipase:2,tropi:3)@  Recent Labs   Lab Test 25  1330 24  0916   CHOL 149 142   HDL 61 67   LDL 72 63   TRIG 79 59     @LABRCNTIP(wbc:3,hgb:3,hct:3,mcv:3,plt:3,iron:3,ironsat:3,reticabsct:3,retp:3,feb:3,ryan:3,b12:3,folic:3,epoe:3,morph:3)@  @LABRCNTIP(PH:3,PHV:3,PO2:3,PO2V:3,sat:3,PCO2:3,PCO2V:3,HCO3:3,HCO3V:3)@  @LABRCNTIP(NTBNPI:3,NTBNP:3)@  @LABRCNTIP(DD:1)@  @LABRCNTIP(sed:3,crp:3)@  @LABRCNTIP(PLT:3)@  @LABRCNTIP(TSH:3)@  @LABRCNTIP(color:1,appearance:1,urineg,urinebili:1,urineketone:1,s,ubld:1,urineph:1,protein:1,urobilinogen:1,nitrite:1,leukest:1,rbcu:1,wbcu:1)@    Imaging:  No results found for this or any previous visit (from the past 48 hours).    Echo:  No results found for this or any previous visit (from the past 4320 hours).    Clinically Significant Risk Factors Present on Admission                            # Overweight: Estimated body mass index is 25.66  "kg/m  as calculated from the following:    Height as of 5/6/25: 1.676 m (5' 6\").    Weight as of 5/6/25: 72.1 kg (159 lb).                    The longitudinal plan of care for the diagnosis(es)/condition(s) as documented were addressed during this visit. Due to the added complexity in care, I will continue to support Otis in the subsequent management and with ongoing continuity of care.             Completed frailty testing and KCCQ.   5 meter walk: 4.1 seconds             4.2 seconds             4.1 seconds  Frailty score: 0/5    KCCQ Results:   1a. 5  1b. 5  1c. 4  2. 5  3. 6  4. 6  5. 5  6. 5  7. 3  8a. 4  8b. 4  8c. 5    Preliminary STS Risk Score: 0.86%  NYHA Class: ll    Plan to proceed with coronary angiogram if anatomy is favorable on CT scan completed today. Patient stated he takes 2 hour naps everyday which is new over the past several months. Patient stated last year he was only needing to take 20 minute naps. Patient stated he goes to the dentist every 6 months with no concern of infection.     Marielena Overton, RN  Structural Heart Coordinator  [facility]      Thank you for allowing me to participate in the care of your patient.      Sincerely,     Zuhair Benitez MD     [facility]  cc:   Sander Galicia MD  [address]  [place] [address]      "

## 2025-05-29 NOTE — PROGRESS NOTES
Cardiology Consultation       Assessment & Plan     1.  Severe Aortic stenosis  2.  History of atherosclerotic coronary artery disease with PCI in 1998, 2020 and 2021  3.  PAD status post left carotid endarterectomy  4.  Hypertension  5.  Hyperlipidemia  6.  History of GI bleed in 2022 with small bowel AVMs, treated with iron therapy  7.  Prostate cancer    Recommendation    1.  Severe aortic stenosis: We discussed the TAVR procedure in detail.  He will need to meet with our surgical colleagues.  His preference is to proceed with TAVR if the anatomy is favorable.  Risk benefits and potential complications were discussed with him.  We will review the data obtained today on the TAVR directed CT to assess iliofemoral sizing as well as annular sizing.  We will contact patient and provided these are favorable proceed with coronary angiography.  Risk benefits potential complications of coronary angiography were discussed with him as well.    2.  Following that we will review his case with our TAVR team for a consensus opinion    3.  Of note patient had significant GI bleed with dual antiplatelet therapy.  We will only want to revascularize meaningful areas on the angiogram.  Presently is able to tolerate low-dose aspirin without difficulty.        The longitudinal plan of care for the diagnosis(es)/condition(s) as documented were addressed during this visit. Due to the added complexity in care, I will continue to support Otis in the subsequent management and with ongoing continuity of care.       Zuhair Benitez MD, MD          History of present illness    Patient is a pleasant 70-year-old gentleman with the above-mentioned past medical history.  Recently seen by my colleague Dr. Galicia.  Given his recent progression on his aortic stenosis he is sent to the structural heart clinic for evaluation.  In preparation for today's visit he underwent a TAVR directed CT, the results are still pending at the time of our  discussion.    Patient is very active he tries to walk 10,000 steps per day.  Accomplish 15,000 steps earlier this week.  Does not feel much in the way of shortness of breath and has never had any chest pain PND orthopnea or endocardiac related concerns.  His recent echocardiogram is suggested progression of his aortic stenosis to the severe range.  He is interested in pursuing this via TAVR if the anatomy is appropriate.  Accompanied by his wife.    He does report that when he had PCI performed 5 years ago he unfortunately on dual endplate therapy had significant bleed with a hemoglobin that dropped down to 5.  He was transfused and fortunately has been able to tolerate a baby aspirin since that time without any further difficulty.      Echocardiogram 2025  1. The left ventricle is normal in size. Proximal septal thickening is noted.  Left ventricular systolic function is normal. The visual ejection fraction is  55-60%. Diastolic Doppler findings (E/E' ratio and/or other parameters)  suggest left ventricular filling pressures are normal. No regional wall motion  abnormalities noted.  2. The right ventricle is normal size. The right ventricular systolic function  is normal.  3. Severe valvular aortic stenosis. The mean AoV pressure gradient is 32.0  mmHg. The peak AoV pressure gradient is 49.0 mmHg. The calculated aortic valve  are is 0.81 cm^2. There is mild (1+) aortic regurgitation.  4. No pericardial effusion.  5. In compairson to the previous report dated 03/06/2024, there has been an  interval progression in the degree of aortic stenosis.      Primary Care Physician   Washington Yang      Patient Active Problem List   Diagnosis    Depressive disorder, not elsewhere classified    Elevated prostate specific antigen (PSA)    Osteoarthritis of Hand     Hyperlipidemia LDL goal <100    Mixed hyperlipidemia    Family history of early CAD    Aortic stenosis    Carotid stenosis    Coronary artery disease involving native  coronary artery of native heart without angina pectoris    Status post coronary angiogram    Abnormal cardiovascular stress test    Gastrointestinal hemorrhage, unspecified gastrointestinal hemorrhage type    History of prostate cancer    Iron deficiency anemia due to chronic blood loss    Post-operative state       Past Medical History   I have reviewed this patient's medical history and updated it with pertinent information if needed.   Past Medical History:   Diagnosis Date    Aortic stenosis     Arthritis     hands    CAD (coronary artery disease)     cardiac cath 1998: stents x 2 to circumflex    Carotid stenosis     left    Family history of early CAD     Hyperlipidaemia LDL goal < 70     familial hyperlipidemia with marked hypercholesterolemia before treatment; has been on some form of cholesterol-lowering medication since the 1970s    Prostate cancer (H) 2010    Sleep apnea     Syncope     Unstable angina (H) 1/6/2021       Past Surgical History   I have reviewed this patient's surgical history and updated it with pertinent information if needed.  Past Surgical History:   Procedure Laterality Date    CARDIAC SURGERY  1998    coronary stents x2 placed 1998; angioplasty    CV HEART CATHETERIZATION WITH POSSIBLE INTERVENTION N/A 7/21/2020    Procedure: Heart Catheterization with Possible Intervention;  Surgeon: Alber Bentley MD;  Location: [facility] CARDIAC CATH LAB    CV HEART CATHETERIZATION WITH POSSIBLE INTERVENTION N/A 10/11/2021    Procedure: Heart Catheterization with Possible Intervention;  Surgeon: Patsy Wallace MD;  Location: [facility] CARDIAC CATH LAB    CV INSTANTANEOUS WAVE-FREE RATIO N/A 10/11/2021    Procedure: Instantaneous Wave-Free Ratio;  Surgeon: Patsy Wallace MD;  Location: [facility] CARDIAC CATH LAB    CV LEFT HEART CATH N/A 10/11/2021    Procedure: Left Heart Cath;  Surgeon: Patsy Wallace MD;  Location: [facility] CARDIAC CATH LAB    CV PCI STENT DRUG ELUTING N/A 10/11/2021     Procedure: Percutaneous Coronary Intervention Stent Drug Eluting;  Surgeon: Patsy Wallace MD;  Location:  HEART CARDIAC CATH LAB    DAVINCI PROSTATECTOMY      2010    ENDARTERECTOMY CAROTID Left 2022    Procedure: LEFT CAROTID ENDARTERECTOMY WITH BOVINE PERICARDIUM PATCH ANGIOPLASTY, AND INTRAOPERATIVE ELECTROENCEPHALOGRAM MONITORING;  Surgeon: Montez Aponte MD;  Location:  OR    ESOPHAGOSCOPY, GASTROSCOPY, DUODENOSCOPY (EGD), COMBINED N/A 2021    Procedure: ESOPHAGOGASTRODUODENOSCOPY (EGD);  Surgeon: Rosemarie Laws MD;  Location:  GI    EYE SURGERY Bilateral 2017    eyelids    GENITOURINARY SURGERY      ORTHOPEDIC SURGERY Right     achilles tendon; post football injury    ORTHOPEDIC SURGERY Right     hand; near thumb. has wires in there. cysts    REPAIR TENDON BICEPS DISTAL UPPER EXTREMITY Right 2018    Procedure: REPAIR TENDON BICEPS DISTAL UPPER EXTREMITY;  Right open biceps tendon repair  ;  Surgeon: Alber Zabala MD;  Location:  OR    SURGICAL HISTORY OF -       Right hand Xanthoma removal    SURGICAL HISTORY OF -       Stress Echo (-)       Prior to Admission Medications   Cannot display prior to admission medications because the patient has not been admitted in this contact.     [unfilled]  [unfilled]  Allergies   Allergies   Allergen Reactions    Crestor [Rosuvastatin] GI Disturbance    Dust Mites     Other [No Clinical Screening - See Comments]      Goose feathers    Pollen Extract        Social History    reports that he quit smoking about 25 years ago. His smoking use included cigars and cigarettes. He started smoking about 45 years ago. He has a 5 pack-year smoking history. His smokeless tobacco use includes snuff and chew. He reports that he does not currently use alcohol. He reports current drug use. Drug: Marijuana.    Family History   Family History   Problem Relation Age of Onset    Lipids Mother     C.A.D. Mother          at age 49 of  "MI    Dementia Father     C.A.D. Sister         MI at age 48    Cancer - colorectal No family hx of     Prostate Cancer No family hx of     Colon Cancer No family hx of        Review of Systems   The comprehensive 10 point Review of Systems is negative other than noted in the HPI or here.     Physical Exam   Vital Signs with Ranges     Wt Readings from Last 4 Encounters:   25 72.1 kg (159 lb)   25 70.3 kg (155 lb)   25 72.6 kg (160 lb)   24 69.4 kg (153 lb 1.6 oz)     [unfilled]      Vitals: There were no vitals taken for this visit.      @LABRCNTIPR(tropi:5,troponinies:5)@    @LABRCNTIPR(wbc:3,hgb:3,mcv:3,plt:3,inr:3,na:3,potassium:3,chloride:3,co2:3,bun:3,cr:3,gfrestimated:3,gfrestblack:3,aniongap:3,zoë:3,glc:3,albumin:2,prottotal:2,bilitotal:2,alkphos:2,alt:2,ast:2,lipase:2,tropi:3)@  Recent Labs   Lab Test 25  1330 24  0916   CHOL 149 142   HDL 61 67   LDL 72 63   TRIG 79 59     @LABRCNTIP(wbc:3,hgb:3,hct:3,mcv:3,plt:3,iron:3,ironsat:3,reticabsct:3,retp:3,feb:3,ryan:3,b12:3,folic:3,epoe:3,morph:3)@  @LABRCNTIP(PH:3,PHV:3,PO2:3,PO2V:3,sat:3,PCO2:3,PCO2V:3,HCO3:3,HCO3V:3)@  @LABRCNTIP(NTBNPI:3,NTBNP:3)@  @LABRCNTIP(DD:1)@  @LABRCNTIP(sed:3,crp:3)@  @LABRCNTIP(PLT:3)@  @LABRCNTIP(TSH:3)@  @LABRCNTIP(color:1,appearance:1,urineg,urinebili:1,urineketone:1,s,ubld:1,urineph:1,protein:1,urobilinogen:1,nitrite:1,leukest:1,rbcu:1,wbcu:1)@    Imaging:  No results found for this or any previous visit (from the past 48 hours).    Echo:  No results found for this or any previous visit (from the past 4320 hours).    Clinically Significant Risk Factors Present on Admission                             # Overweight: Estimated body mass index is 25.66 kg/m  as calculated from the following:    Height as of 25: 1.676 m (5' 6\").    Weight as of 25: 72.1 kg (159 lb).                    The longitudinal plan of care for the diagnosis(es)/condition(s) as documented were addressed " during this visit. Due to the added complexity in care, I will continue to support Otis in the subsequent management and with ongoing continuity of care.

## 2025-05-29 NOTE — PROGRESS NOTES
Completed frailty testing and KCCQ.   5 meter walk: 4.1 seconds             4.2 seconds             4.1 seconds  Frailty score: 0/5    KCCQ Results:   1a. 5  1b. 5  1c. 4  2. 5  3. 6  4. 6  5. 5  6. 5  7. 3  8a. 4  8b. 4  8c. 5    Preliminary STS Risk Score: 0.86%  NYHA Class: ll    Plan to proceed with coronary angiogram if anatomy is favorable on CT scan completed today. Patient stated he takes 2 hour naps everyday which is new over the past several months. Patient stated last year he was only needing to take 20 minute naps. Patient stated he goes to the dentist every 6 months with no concern of infection.     Marielena Overton RN  Structural Heart Coordinator  [facility]

## 2025-05-30 ENCOUNTER — RESULTS FOLLOW-UP (OUTPATIENT)
Dept: CARDIOLOGY | Facility: CLINIC | Age: 70
End: 2025-05-30

## 2025-05-30 NOTE — TELEPHONE ENCOUNTER
Routing to TC's please call pt to schedule VV to discuss imaging results    Hillary Rdz RN   [facility]

## 2025-06-03 ENCOUNTER — RESULTS FOLLOW-UP (OUTPATIENT)
Dept: CARDIOLOGY | Facility: CLINIC | Age: 70
End: 2025-06-03

## 2025-06-03 ENCOUNTER — HOSPITAL ENCOUNTER (OUTPATIENT)
Facility: CLINIC | Age: 70
Discharge: HOME | End: 2025-06-03
Attending: INTERNAL MEDICINE | Admitting: INTERNAL MEDICINE
Payer: COMMERCIAL

## 2025-06-03 VITALS
WEIGHT: 160 LBS | BODY MASS INDEX: 25.71 KG/M2 | TEMPERATURE: 97.8 F | HEART RATE: 59 BPM | SYSTOLIC BLOOD PRESSURE: 141 MMHG | HEIGHT: 66 IN | OXYGEN SATURATION: 95 % | RESPIRATION RATE: 8 BRPM | DIASTOLIC BLOOD PRESSURE: 87 MMHG

## 2025-06-03 DIAGNOSIS — I20.89 OTHER FORMS OF ANGINA PECTORIS: ICD-10-CM

## 2025-06-03 DIAGNOSIS — I35.0 NONRHEUMATIC AORTIC VALVE STENOSIS: ICD-10-CM

## 2025-06-03 DIAGNOSIS — D64.9 ANEMIA, UNSPECIFIED TYPE: ICD-10-CM

## 2025-06-03 PROBLEM — Z98.890 STATUS POST CORONARY ANGIOGRAM: Status: ACTIVE | Noted: 2020-07-21

## 2025-06-03 LAB
ABO + RH BLD: NORMAL
ANION GAP SERPL CALCULATED.3IONS-SCNC: 8 MMOL/L (ref 7–15)
APTT PPP: 28 SECONDS (ref 22–38)
BLD GP AB SCN SERPL QL: NEGATIVE
BUN SERPL-MCNC: 15.5 MG/DL (ref 8–23)
CALCIUM SERPL-MCNC: 8.8 MG/DL (ref 8.8–10.4)
CHLORIDE SERPL-SCNC: 107 MMOL/L (ref 98–107)
CREAT SERPL-MCNC: 0.87 MG/DL (ref 0.67–1.17)
EGFRCR SERPLBLD CKD-EPI 2021: >90 ML/MIN/1.73M2
ERYTHROCYTE [DISTWIDTH] IN BLOOD BY AUTOMATED COUNT: 13.4 % (ref 10–15)
GLUCOSE SERPL-MCNC: 92 MG/DL (ref 70–99)
HCO3 SERPL-SCNC: 25 MMOL/L (ref 22–29)
HCT VFR BLD AUTO: 35.7 % (ref 40–53)
HGB BLD-MCNC: 11.7 G/DL (ref 13.3–17.7)
INR PPP: 0.98 (ref 0.85–1.15)
MCH RBC QN AUTO: 27.3 PG (ref 26.5–33)
MCHC RBC AUTO-ENTMCNC: 32.8 G/DL (ref 31.5–36.5)
MCV RBC AUTO: 83 FL (ref 78–100)
PLATELET # BLD AUTO: 198 10E3/UL (ref 150–450)
POTASSIUM SERPL-SCNC: 4 MMOL/L (ref 3.4–5.3)
PROTHROMBIN TIME: 12.8 SECONDS (ref 11.8–14.8)
RBC # BLD AUTO: 4.28 10E6/UL (ref 4.4–5.9)
SODIUM SERPL-SCNC: 140 MMOL/L (ref 135–145)
SPECIMEN EXP DATE BLD: NORMAL
WBC # BLD AUTO: 3.7 10E3/UL (ref 4–11)

## 2025-06-03 PROCEDURE — 93005 ELECTROCARDIOGRAM TRACING: CPT

## 2025-06-03 PROCEDURE — 999N000184 HC STATISTIC TELEMETRY

## 2025-06-03 PROCEDURE — 83540 ASSAY OF IRON: CPT

## 2025-06-03 PROCEDURE — 250N000011 HC RX IP 250 OP 636: Performed by: INTERNAL MEDICINE

## 2025-06-03 PROCEDURE — 250N000009 HC RX 250: Performed by: INTERNAL MEDICINE

## 2025-06-03 PROCEDURE — C1894 INTRO/SHEATH, NON-LASER: HCPCS | Performed by: INTERNAL MEDICINE

## 2025-06-03 PROCEDURE — 272N000001 HC OR GENERAL SUPPLY STERILE: Performed by: INTERNAL MEDICINE

## 2025-06-03 PROCEDURE — C1769 GUIDE WIRE: HCPCS | Performed by: INTERNAL MEDICINE

## 2025-06-03 PROCEDURE — 93454 CORONARY ARTERY ANGIO S&I: CPT | Performed by: INTERNAL MEDICINE

## 2025-06-03 PROCEDURE — 85027 COMPLETE CBC AUTOMATED: CPT | Performed by: INTERNAL MEDICINE

## 2025-06-03 PROCEDURE — 36415 COLL VENOUS BLD VENIPUNCTURE: CPT | Performed by: INTERNAL MEDICINE

## 2025-06-03 PROCEDURE — 258N000003 HC RX IP 258 OP 636: Performed by: INTERNAL MEDICINE

## 2025-06-03 PROCEDURE — 82728 ASSAY OF FERRITIN: CPT

## 2025-06-03 PROCEDURE — 99204 OFFICE O/P NEW MOD 45 MIN: CPT | Performed by: STUDENT IN AN ORGANIZED HEALTH CARE EDUCATION/TRAINING PROGRAM

## 2025-06-03 PROCEDURE — 99152 MOD SED SAME PHYS/QHP 5/>YRS: CPT | Performed by: INTERNAL MEDICINE

## 2025-06-03 PROCEDURE — 999N000071 HC STATISTIC HEART CATH LAB OR EP LAB

## 2025-06-03 PROCEDURE — 85730 THROMBOPLASTIN TIME PARTIAL: CPT | Performed by: INTERNAL MEDICINE

## 2025-06-03 PROCEDURE — 82310 ASSAY OF CALCIUM: CPT | Performed by: INTERNAL MEDICINE

## 2025-06-03 PROCEDURE — 86900 BLOOD TYPING SEROLOGIC ABO: CPT | Performed by: INTERNAL MEDICINE

## 2025-06-03 PROCEDURE — 85610 PROTHROMBIN TIME: CPT | Performed by: INTERNAL MEDICINE

## 2025-06-03 RX ORDER — HEPARIN SODIUM 1000 [USP'U]/ML
INJECTION, SOLUTION INTRAVENOUS; SUBCUTANEOUS
Status: DISCONTINUED | OUTPATIENT
Start: 2025-06-03 | End: 2025-06-03 | Stop reason: HOSPADM

## 2025-06-03 RX ORDER — FENTANYL CITRATE 50 UG/ML
INJECTION, SOLUTION INTRAMUSCULAR; INTRAVENOUS
Status: DISCONTINUED | OUTPATIENT
Start: 2025-06-03 | End: 2025-06-03 | Stop reason: HOSPADM

## 2025-06-03 RX ORDER — SODIUM CHLORIDE 9 MG/ML
INJECTION, SOLUTION INTRAVENOUS CONTINUOUS
Status: DISCONTINUED | OUTPATIENT
Start: 2025-06-03 | End: 2025-06-03 | Stop reason: HOSPADM

## 2025-06-03 RX ORDER — LIDOCAINE 40 MG/G
CREAM TOPICAL
Status: DISCONTINUED | OUTPATIENT
Start: 2025-06-03 | End: 2025-06-03 | Stop reason: HOSPADM

## 2025-06-03 RX ORDER — VERAPAMIL HYDROCHLORIDE 2.5 MG/ML
INJECTION INTRAVENOUS
Status: DISCONTINUED | OUTPATIENT
Start: 2025-06-03 | End: 2025-06-03 | Stop reason: HOSPADM

## 2025-06-03 RX ORDER — POTASSIUM CHLORIDE 1500 MG/1
20 TABLET, EXTENDED RELEASE ORAL
Status: DISCONTINUED | OUTPATIENT
Start: 2025-06-03 | End: 2025-06-03 | Stop reason: HOSPADM

## 2025-06-03 RX ORDER — ASPIRIN 325 MG
325 TABLET ORAL ONCE
Status: COMPLETED | OUTPATIENT
Start: 2025-06-03 | End: 2025-06-03

## 2025-06-03 RX ORDER — ASPIRIN 81 MG/1
243 TABLET, CHEWABLE ORAL ONCE
Status: COMPLETED | OUTPATIENT
Start: 2025-06-03 | End: 2025-06-03

## 2025-06-03 RX ORDER — ACETAMINOPHEN 325 MG/1
650 TABLET ORAL EVERY 4 HOURS PRN
Status: DISCONTINUED | OUTPATIENT
Start: 2025-06-03 | End: 2025-06-03 | Stop reason: HOSPADM

## 2025-06-03 RX ORDER — IOPAMIDOL 755 MG/ML
INJECTION, SOLUTION INTRAVASCULAR
Status: DISCONTINUED | OUTPATIENT
Start: 2025-06-03 | End: 2025-06-03 | Stop reason: HOSPADM

## 2025-06-03 RX ADMIN — SODIUM CHLORIDE: 0.9 INJECTION, SOLUTION INTRAVENOUS at 11:00

## 2025-06-03 ASSESSMENT — ACTIVITIES OF DAILY LIVING (ADL)
ADLS_ACUITY_SCORE: 43

## 2025-06-03 NOTE — PROGRESS NOTES
Care Suites Post Procedure Note    Patient Information  Name: Johan Navarro  Age: 70 year old    Post Procedure  Time patient returned to Care Suites: 1320  Concerns/abnormal assessment: none at this time  If abnormal assessment, provider notified: N/A  Plan/Other: Monitor site, vitals and sedation    Holger Nguyen RN

## 2025-06-03 NOTE — PROGRESS NOTES
Cardiac Surgery Aortic Valve Clinic Note    Date of Service: 6/3/2025    REASON FOR CONSULTATION: severe, symptomatic aortic valve stenosis     SEEN IN CLINIC WITH: N/A, saw Dr. Benitez 5/29, now seen in care suites ahead of [facility] today with Dr. Sebastian    Mr. Johan Navarro is a 70 year old with severe symptomatic aortic stenosis. As a part of the multidisciplinary valve conference, we had a conversation about options for aortic valve intervention, which included TAVR and SAVR.    He follows with Dr. Galicia. He last saw him a year ago. PMH is notable for CAD s/p PCI in 2021 to the OM and Cx. As noted by Dr. Benitez, he remains very active, walking about 10k steps per day and looking forward to getting back out on the golf course soon. He has been having modest Blanco but has not had any chest pain nor unstable symptoms.  As Dr. Benitez noted on account of his AVM's he has been poorly tolerant of DAPT previously.    I discussed the technical details of the open surgical AVR with the patient, as well as the expected postoperative course and recovery. The risks include but are not limited to bleeding, infection, stroke, heart or graft failure, dysrhythmia, respiratory failure, kidney or liver injury, bowel or limb ischemia, and death.     Our consensus recommendation was for ongoing evaluation for transcatheter aortic valve replacement, taking into account the patient's wishes, their comorbidity status, and technical considerations. I discussed the technical details. Patient understands that there is a risk of bleeding, infection, stroke, heart block requiring permanent pacemaker, cardiac perforation, aortic root rupture and aortic dissection, in addition to risk of death.     We had a chani discussion regarding the rare but life threatening complications that can occur during TAVR such as aortic rupture or dissection. These would require emergency conversion to open surgery, which would carry a high risk of mortality and  morbidity. Johan Navarro indicated good insight into the matter and felt strongly that they would like all measures taken to rectify any such intra-operative complication. As such, they would be willing to undergo emergency conversion to open heart surgery in the event of procedural complication. I reassured them that we will proceed with full surgical backup.    1) Recommend ongoing evaluation for TAVR, to be scheduled by the valve clinic  2) CPB standby: full surgical bailout    Michael S. Mulvihill, M.D.  Cardiothoracic Surgery      CARDIAC CATHETERIZATION:    Repeat [facility] pending today    Cardiac Catheterization  Result Date: 10/11/2021  1.  Severe stenosis of the midLCx which was jailed by the previously   placed LCx/OM stent.  2.  PCI of the midLCx with placement of a 3.0 x 12 Synergy ANNETTE which   was-post-dilated to 4.0. The previously placed LCx OM stent was   pre-dilated to facilitate stent delivery at the bifurcation and   post-dilated to crush any protruding stent-edge.   3.  Stable moderate to severe stenosis of the ostial nondominant RCA.  4.  Moderate to severe non-obstructive stenosis of OM1 (iFR of OM1 0.94).  5.  LVEDP mildly elevated 17mmHg.  6.  There is approximately a 30mmHg peak-to-peak gradient across the   aortic valve consistent with moderate aortic stenosis.      Coronary Findings       Diagnostic  Dominance: Left              Left Main   The vessel is moderate in size.   Ost LM to Ost LAD lesion is 20% stenosed.       Left Anterior Descending   The vessel is large. Type 3 LAD.   Prox LAD lesion is 30% stenosed.       Left Circumflex   The vessel is moderate in size.   Prox Cx to Mid Cx lesion is 40% stenosed. The lesion was previously treated using a stent of unknown type.   Dist Cx-1 lesion is 75% stenosed. The lesion is type B2 - medium risk, located at the bifurcation and bifurcated. The lesion was not previously treated. Pressure wire/FFR was performed on the lesion.   Dist Cx-2 lesion is  50% stenosed. The lesion was previously treated using a stent of unknown type. Pressure wire/iFR used. Pre adenosine administration IFR: 1.       First Obtuse Marginal Branch   1st Mrg lesion is 60% stenosed. Pressure wire/FFR was performed on the lesion. Pre adenosine administration FFR: 0.94.       Second Obtuse Marginal Branch   2nd Mrg lesion is 20% stenosed. The lesion was previously treated using a stent of unknown type.       Right Coronary Artery   The vessel is small. Small, non-dominant RCA.   Ost RCA to Prox RCA lesion is 70% stenosed.   Prox RCA lesion is 40% stenosed.   Prox RCA to Dist RCA lesion is 20% stenosed.       Intervention               Dist Cx-1 lesion   Stent   Lesion length: 10 mm. A guide catheter was successfully placed. The crossed the lesion. The pre-interventional distal flow is normal (BRAYAN 3). A STENT CORONARY ANNETTE SYNERGY XD MR US 3.06U82IC [id number] drug eluting stent was successfully placed. Pre-stent angioplasty was performed using a CATH BALLOON NC EMERGE 2.88T23MS [id number] supply. An additional CATH BALLOON NC EMERGE 3.86M80GD [id number] supply was used. . Post-stent angioplasty was performed using a CATH BALLOON NC EMERGE 4.23C03RU [id number] supply. . The post-interventional distal flow is normal (BRAYAN 3). The intervention was successful.  baseline pd/pa of the midLCx lesion which was jailed by the previously placed LCx lesion was 0.76 and clearly abnormal.   The LCx stent was dilated with a 2.5 and 3.5mm balloon due to diffuculty passing a stent through the previously placed stent. A 6 Fr guidelier was also used and the bifurcation stents were post-dilated in a step-wise fashion using a 4.0mm balloon after deployment of the bifurcation stent wtih a mini-crush technique.   There is a 0% residual stenosis post intervention.       Dist Cx-2 lesion   Stent   Lesion length: 10 mm. A guide catheter was successfully placed. The crossed the lesion. The  pre-interventional distal flow is normal (BRAYAN 3). Pre-stent angioplasty was performed using a CATH BALLOON NC EMERGE 2.25L15IY [id number] supply. . The post-interventional distal flow is normal (BRAYAN 3). No complications occurred at this lesion.   There is a 0% residual stenosis post intervention.             CT TAVR Protocol    IMPRESSION:  1.  No acute aortic pathology like dissection, intramural hematoma or  contained rupture noted.  2.  Please review detailed radiology report, to follow separately, for  incidental non-cardiovascular findings.     FINDINGS:     1.  Tricuspid aortic valve. Aortic valve calcium score is 6093. The  ascending aorta is mildly calcified, aortic arch is  moderately  calcified, the descending thoracic aorta is mildly calcified.   2.  The arch vessel branching pattern is bovine.   3.  The suprarenal abdominal aorta is mildly calcified; infrarenal  abdominal aorta is severely calcified.   4.  There is no acute aortic pathology, such as dissection, intramural  hematoma, or contained rupture.      MEASUREMENTS:   Representative dimensions of the thoracoabdominal aorta are as  follows:     1. AORTIC ANNULUS MEASUREMENTS:     *  The average aortic annulus diameter is 25.7 mm,   *  Aortic annulus area is 5.2 cm2  *  Aortic annulus perimeter is 82.4 mm  *  The 3 cusp coaxial angle for aortic valve is (Malagasy 6 , CAU 13) these   angles will vary depending upon the patient's body position  *  Left main - Annulus distance: 11.7 mm, RCA - Annulus distance: 20.1  mm     2. LVOT MEASUREMENTS:     *  The average LVOT diameter is 25.3 mm  *  LVOT area is 5.05 cm2  *  LVOT perimeter is 82.8 mm  *  LVOT calcification mildly mm     3. AORTA MEASUREMENTS     1.  The aortic root at the sinuses of Valsalva (L/R/N) 29.9 mm x  27.8  mm 30.6 mm  2.  The sinotubular junction:  27.3 mm x 25.1 mm  3.  Sinotubular junction height: 20.4 mm x 20.6 mm  4.  Proximal ascending aorta: 35.7 mm x 35.5 mm  5.  Distal  abdominal aorta proximal to the bifurcation: 13.7 mm x 13.4  mm     4. ILIOFEMORAL MEASUREMENTS:     RIGHT:  1.  Right common iliac artery: 7.63 mm x 9.77 mm   2.  Right external iliac artery: 8.07 mm x 9.06 mm   3.  Right common femoral artery: 5.93 mm x 8.98 mm   4.  Tortuosity: mild   5.  Calcification: mild      LEFT:  1.  Left common iliac artery: 7.31 mm x  10.5 mm  2.  Left external iliac artery: 8.24 mm x 9.19 mm  3.  Left common femoral artery: 6.91 mm x 9.24 mm  4.  Tortuosity: mild   5.  Calcification: mild         7. Aortic Root Angle 39 degrees      TRANSTHORACIC ECHOCARDIOGRAPHY:  3/7/2024  Interpretation Summary     1. The left ventricle is normal in size. Proximal septal thickening is noted.  Left ventricular systolic function is normal. The visual ejection fraction is  55-60%. Diastolic Doppler findings (E/E' ratio and/or other parameters)  suggest left ventricular filling pressures are normal. No regional wall motion  abnormalities noted.  2. The right ventricle is normal size. The right ventricular systolic function  is normal.  3. Severe valvular aortic stenosis. The mean AoV pressure gradient is 32.0  mmHg. The peak AoV pressure gradient is 49.0 mmHg. The calculated aortic valve  are is 0.81 cm^2. There is mild (1+) aortic regurgitation.  4. No pericardial effusion.  5. In compairson to the previous report dated 03/06/2024, there has been an  interval progression in the degree of aortic stenosis.  ______________________________________________________________________________  Left Ventricle  The left ventricle is normal in size. Proximal septal thickening is noted.  Left ventricular systolic function is normal. The visual ejection fraction is  55-60%. Diastolic Doppler findings (E/E' ratio and/or other parameters)  suggest left ventricular filling pressures are normal. No regional wall motion  abnormalities noted.     Right Ventricle  The right ventricle is normal size. The right ventricular  systolic function is  normal.     Atria  Normal left atrial size. Right atrial size is normal.     Mitral Valve  There is trace mitral regurgitation.     Tricuspid Valve  There is trace tricuspid regurgitation.     Aortic Valve  The aortic valve is thickened and calcified. There is mild (1+) aortic  regurgitation. Severe valvular aortic stenosis. The mean AoV pressure gradient  is 32.0 mmHg. The peak AoV pressure gradient is 49.0 mmHg. The calculated  aortic valve are is 0.81 cm^2.     Pulmonic Valve  There is mild (1+) pulmonic valvular regurgitation. There is no pulmonic  valvular stenosis.     Vessels  The aortic root is normal size. Descending aortic velocity normal. The  inferior vena cava is normal.     Pericardium  There is no pericardial effusion.     Rhythm  Sinus rhythm was noted.

## 2025-06-03 NOTE — PRE-PROCEDURE
GENERAL PRE-PROCEDURE:   Procedure:  Coronary angiogram, possible angioplasty    Written consent obtained?: Yes    Risks and benefits: Risks, benefits and alternatives were discussed    Consent given by:  Patient  Patient states understanding of procedure being performed: Yes    Patient's understanding of procedure matches consent: Yes    Procedure consent matches procedure scheduled: Yes    Expected level of sedation:  Moderate  Appropriately NPO:  Yes  Mallampati  :  Grade 2- soft palate, base of uvula, tonsillar pillars, and portion of posterior pharyngeal wall visible  Lungs:  Lungs clear with good breath sounds bilaterally  Heart:  RRR and systolic murmur  History & Physical reviewed:  History and physical reviewed and no updates needed  Statement of review:  I have reviewed the lab findings, diagnostic data, medications, and the plan for sedation

## 2025-06-03 NOTE — Clinical Note
All images have been sent and verified in PACs Bed: 05  Expected date:   Expected time:   Means of arrival:   Comments:  [place]

## 2025-06-03 NOTE — H&P (VIEW-ONLY)
Cardiac Surgery Aortic Valve Clinic Note    Date of Service: 6/3/2025    REASON FOR CONSULTATION: severe, symptomatic aortic valve stenosis     SEEN IN CLINIC WITH: N/A, saw Dr. Benitez 5/29, now seen in care suites ahead of [facility] today with Dr. Sebastian    Mr. Johan Navarro is a 70 year old with severe symptomatic aortic stenosis. As a part of the multidisciplinary valve conference, we had a conversation about options for aortic valve intervention, which included TAVR and SAVR.    He follows with Dr. Galicia. He last saw him a year ago. PMH is notable for CAD s/p PCI in 2021 to the OM and Cx. As noted by Dr. Benitez, he remains very active, walking about 10k steps per day and looking forward to getting back out on the golf course soon. He has been having modest Blanco but has not had any chest pain nor unstable symptoms.  As Dr. Benitez noted on account of his AVM's he has been poorly tolerant of DAPT previously.    I discussed the technical details of the open surgical AVR with the patient, as well as the expected postoperative course and recovery. The risks include but are not limited to bleeding, infection, stroke, heart or graft failure, dysrhythmia, respiratory failure, kidney or liver injury, bowel or limb ischemia, and death.     Our consensus recommendation was for ongoing evaluation for transcatheter aortic valve replacement, taking into account the patient's wishes, their comorbidity status, and technical considerations. I discussed the technical details. Patient understands that there is a risk of bleeding, infection, stroke, heart block requiring permanent pacemaker, cardiac perforation, aortic root rupture and aortic dissection, in addition to risk of death.     We had a chani discussion regarding the rare but life threatening complications that can occur during TAVR such as aortic rupture or dissection. These would require emergency conversion to open surgery, which would carry a high risk of mortality and  morbidity. Johan Navarro indicated good insight into the matter and felt strongly that they would like all measures taken to rectify any such intra-operative complication. As such, they would be willing to undergo emergency conversion to open heart surgery in the event of procedural complication. I reassured them that we will proceed with full surgical backup.    1) Recommend ongoing evaluation for TAVR, to be scheduled by the valve clinic  2) CPB standby: full surgical bailout    Michael S. Mulvihill, M.D.  Cardiothoracic Surgery      CARDIAC CATHETERIZATION:    Repeat [facility] pending today    Cardiac Catheterization  Result Date: 10/11/2021  1.  Severe stenosis of the midLCx which was jailed by the previously   placed LCx/OM stent.  2.  PCI of the midLCx with placement of a 3.0 x 12 Synergy ANNETTE which   was-post-dilated to 4.0. The previously placed LCx OM stent was   pre-dilated to facilitate stent delivery at the bifurcation and   post-dilated to crush any protruding stent-edge.   3.  Stable moderate to severe stenosis of the ostial nondominant RCA.  4.  Moderate to severe non-obstructive stenosis of OM1 (iFR of OM1 0.94).  5.  LVEDP mildly elevated 17mmHg.  6.  There is approximately a 30mmHg peak-to-peak gradient across the   aortic valve consistent with moderate aortic stenosis.      Coronary Findings       Diagnostic  Dominance: Left              Left Main   The vessel is moderate in size.   Ost LM to Ost LAD lesion is 20% stenosed.       Left Anterior Descending   The vessel is large. Type 3 LAD.   Prox LAD lesion is 30% stenosed.       Left Circumflex   The vessel is moderate in size.   Prox Cx to Mid Cx lesion is 40% stenosed. The lesion was previously treated using a stent of unknown type.   Dist Cx-1 lesion is 75% stenosed. The lesion is type B2 - medium risk, located at the bifurcation and bifurcated. The lesion was not previously treated. Pressure wire/FFR was performed on the lesion.   Dist Cx-2 lesion is  50% stenosed. The lesion was previously treated using a stent of unknown type. Pressure wire/iFR used. Pre adenosine administration IFR: 1.       First Obtuse Marginal Branch   1st Mrg lesion is 60% stenosed. Pressure wire/FFR was performed on the lesion. Pre adenosine administration FFR: 0.94.       Second Obtuse Marginal Branch   2nd Mrg lesion is 20% stenosed. The lesion was previously treated using a stent of unknown type.       Right Coronary Artery   The vessel is small. Small, non-dominant RCA.   Ost RCA to Prox RCA lesion is 70% stenosed.   Prox RCA lesion is 40% stenosed.   Prox RCA to Dist RCA lesion is 20% stenosed.       Intervention               Dist Cx-1 lesion   Stent   Lesion length: 10 mm. A guide catheter was successfully placed. The crossed the lesion. The pre-interventional distal flow is normal (BRAYAN 3). A STENT CORONARY ANNETTE SYNERGY XD MR US 3.41M66RP [id number] drug eluting stent was successfully placed. Pre-stent angioplasty was performed using a CATH BALLOON NC EMERGE 2.09S57EV [id number] supply. An additional CATH BALLOON NC EMERGE 3.79M06EV [id number] supply was used. . Post-stent angioplasty was performed using a CATH BALLOON NC EMERGE 4.56M99DD [id number] supply. . The post-interventional distal flow is normal (BRAYAN 3). The intervention was successful.  baseline pd/pa of the midLCx lesion which was jailed by the previously placed LCx lesion was 0.76 and clearly abnormal.   The LCx stent was dilated with a 2.5 and 3.5mm balloon due to diffuculty passing a stent through the previously placed stent. A 6 Fr guidelier was also used and the bifurcation stents were post-dilated in a step-wise fashion using a 4.0mm balloon after deployment of the bifurcation stent wtih a mini-crush technique.   There is a 0% residual stenosis post intervention.       Dist Cx-2 lesion   Stent   Lesion length: 10 mm. A guide catheter was successfully placed. The crossed the lesion. The  pre-interventional distal flow is normal (BRAYAN 3). Pre-stent angioplasty was performed using a CATH BALLOON NC EMERGE 2.56V64BM [id number] supply. . The post-interventional distal flow is normal (BRAYAN 3). No complications occurred at this lesion.   There is a 0% residual stenosis post intervention.             CT TAVR Protocol    IMPRESSION:  1.  No acute aortic pathology like dissection, intramural hematoma or  contained rupture noted.  2.  Please review detailed radiology report, to follow separately, for  incidental non-cardiovascular findings.     FINDINGS:     1.  Tricuspid aortic valve. Aortic valve calcium score is 6093. The  ascending aorta is mildly calcified, aortic arch is  moderately  calcified, the descending thoracic aorta is mildly calcified.   2.  The arch vessel branching pattern is bovine.   3.  The suprarenal abdominal aorta is mildly calcified; infrarenal  abdominal aorta is severely calcified.   4.  There is no acute aortic pathology, such as dissection, intramural  hematoma, or contained rupture.      MEASUREMENTS:   Representative dimensions of the thoracoabdominal aorta are as  follows:     1. AORTIC ANNULUS MEASUREMENTS:     *  The average aortic annulus diameter is 25.7 mm,   *  Aortic annulus area is 5.2 cm2  *  Aortic annulus perimeter is 82.4 mm  *  The 3 cusp coaxial angle for aortic valve is (Lithuanian 6 , CAU 13) these   angles will vary depending upon the patient's body position  *  Left main - Annulus distance: 11.7 mm, RCA - Annulus distance: 20.1  mm     2. LVOT MEASUREMENTS:     *  The average LVOT diameter is 25.3 mm  *  LVOT area is 5.05 cm2  *  LVOT perimeter is 82.8 mm  *  LVOT calcification mildly mm     3. AORTA MEASUREMENTS     1.  The aortic root at the sinuses of Valsalva (L/R/N) 29.9 mm x  27.8  mm 30.6 mm  2.  The sinotubular junction:  27.3 mm x 25.1 mm  3.  Sinotubular junction height: 20.4 mm x 20.6 mm  4.  Proximal ascending aorta: 35.7 mm x 35.5 mm  5.  Distal  abdominal aorta proximal to the bifurcation: 13.7 mm x 13.4  mm     4. ILIOFEMORAL MEASUREMENTS:     RIGHT:  1.  Right common iliac artery: 7.63 mm x 9.77 mm   2.  Right external iliac artery: 8.07 mm x 9.06 mm   3.  Right common femoral artery: 5.93 mm x 8.98 mm   4.  Tortuosity: mild   5.  Calcification: mild      LEFT:  1.  Left common iliac artery: 7.31 mm x  10.5 mm  2.  Left external iliac artery: 8.24 mm x 9.19 mm  3.  Left common femoral artery: 6.91 mm x 9.24 mm  4.  Tortuosity: mild   5.  Calcification: mild         7. Aortic Root Angle 39 degrees      TRANSTHORACIC ECHOCARDIOGRAPHY:  3/7/2024  Interpretation Summary     1. The left ventricle is normal in size. Proximal septal thickening is noted.  Left ventricular systolic function is normal. The visual ejection fraction is  55-60%. Diastolic Doppler findings (E/E' ratio and/or other parameters)  suggest left ventricular filling pressures are normal. No regional wall motion  abnormalities noted.  2. The right ventricle is normal size. The right ventricular systolic function  is normal.  3. Severe valvular aortic stenosis. The mean AoV pressure gradient is 32.0  mmHg. The peak AoV pressure gradient is 49.0 mmHg. The calculated aortic valve  are is 0.81 cm^2. There is mild (1+) aortic regurgitation.  4. No pericardial effusion.  5. In compairson to the previous report dated 03/06/2024, there has been an  interval progression in the degree of aortic stenosis.  ______________________________________________________________________________  Left Ventricle  The left ventricle is normal in size. Proximal septal thickening is noted.  Left ventricular systolic function is normal. The visual ejection fraction is  55-60%. Diastolic Doppler findings (E/E' ratio and/or other parameters)  suggest left ventricular filling pressures are normal. No regional wall motion  abnormalities noted.     Right Ventricle  The right ventricle is normal size. The right ventricular  systolic function is  normal.     Atria  Normal left atrial size. Right atrial size is normal.     Mitral Valve  There is trace mitral regurgitation.     Tricuspid Valve  There is trace tricuspid regurgitation.     Aortic Valve  The aortic valve is thickened and calcified. There is mild (1+) aortic  regurgitation. Severe valvular aortic stenosis. The mean AoV pressure gradient  is 32.0 mmHg. The peak AoV pressure gradient is 49.0 mmHg. The calculated  aortic valve are is 0.81 cm^2.     Pulmonic Valve  There is mild (1+) pulmonic valvular regurgitation. There is no pulmonic  valvular stenosis.     Vessels  The aortic root is normal size. Descending aortic velocity normal. The  inferior vena cava is normal.     Pericardium  There is no pericardial effusion.     Rhythm  Sinus rhythm was noted.

## 2025-06-03 NOTE — PROGRESS NOTES
Care Suites Admission Nursing Note    Patient Information  Name: Johan Navarro  Age: 70 year old  Reason for admission: Angiogram  Care Suites arrival time: 1030    Visitor Information  Name: Amy     Patient Admission/Assessment   Pre-procedure assessment complete: Yes  If abnormal assessment/labs, provider notified: N/A  NPO: Yes  Medications held per instructions/orders: Yes  Consent: deferred  If applicable, pregnancy test status: deferred  Patient oriented to room: Yes  Education/questions answered: Yes  Plan/other: Prep for Angiogram    Discharge Planning  Discharge name/phone number: Amy  Discharge location: home    Holger Nguyen RN

## 2025-06-03 NOTE — DISCHARGE INSTRUCTIONS
Cardiac Angiogram Discharge Instructions - Radial    After you go home:    Have an adult stay with you until tomorrow.  Drink extra fluids for 2 days.  You may resume your normal diet.  No smoking       For 24 hours - due to the sedation you received:  Relax and take it easy.  Do NOT make any important or legal decisions.  Do NOT drive or operate machines at home or at work.  Do NOT drink alcohol.    Care of Wrist Puncture Site:    For the first 24 hrs - check the puncture site every 1-2 hours while awake.  It is normal to have soreness at the puncture site and mild tingling in your hand for up to 3 days.  Remove the bandaid after 24 hours. If there is minor oozing, apply another bandaid and remove it after 12 hours.  You may shower tomorrow.  Do NOT take a bath, or use a hot tub or pool for at least 3 days. Do NOT scrub the site. Do not use lotion or powder near the puncture site.           Activity:        For 2 days:   do not use your hand or arm to support your weight (such as rising from a chair)   do not bend your wrist (such as lifting a garage door).  do not lift more than 5 pounds or exercise your arm (such as tennis, golf or bowling).  Do NOT do any heavy activity such as exercise, lifting, or straining.     Bleeding:    If you start bleeding from the site in your wrist, sit down and press firmly on/above the site for 10 minutes.   Once bleeding stops, keep arm still for 2 hours.   Call [facility] as soon as you can.       Call 911 right away if you have heavy bleeding or bleeding that does not stop.      Medicines:    If you are taking an antiplatelet medication such as Plavix, Brilinta or Effient, do not stop taking it until you talk to your cardiologist.      If you are on Metformin (Glucophage), do not restart it until you have blood tests (within 2 to 3 days after discharge).  After you have your blood drawn, you may restart the Metformin.   Take your medications, including blood thinners, unless your  provider tells you not to.    If you take Coumadin (Warfarin), have your INR checked by your provider in  3-5 days. Call your clinic to schedule this.  If you have stopped any medicines, check with your provider about when to restart them.    Follow Up Appointments:    Follow up with [facility] Nurse Practitioner at [facility] of patient preference in 7-10 days.    Call the clinic if:    You have a large or growing hard lump around the site.  The site is red, swollen, hot or tender.  Blood or fluid is draining from the site.  You have chills or a fever greater than 101 F (38 C).  Your arm feels numb, cool or changes color.  You have hives, a rash or unusual itching.  Any questions or concerns.          [facility] [place]:    [phone] [facility] (7 days a week)    Or you may contact your provider via My Chart

## 2025-06-04 ENCOUNTER — TELEPHONE (OUTPATIENT)
Dept: CARDIOLOGY | Facility: CLINIC | Age: 70
End: 2025-06-04
Payer: COMMERCIAL

## 2025-06-04 LAB
ATRIAL RATE - MUSE: 69 BPM
DIASTOLIC BLOOD PRESSURE - MUSE: NORMAL MMHG
INTERPRETATION ECG - MUSE: NORMAL
P AXIS - MUSE: 74 DEGREES
PR INTERVAL - MUSE: 154 MS
QRS DURATION - MUSE: 84 MS
QT - MUSE: 402 MS
QTC - MUSE: 430 MS
R AXIS - MUSE: 36 DEGREES
SYSTOLIC BLOOD PRESSURE - MUSE: NORMAL MMHG
T AXIS - MUSE: 60 DEGREES
VENTRICULAR RATE- MUSE: 69 BPM

## 2025-06-04 NOTE — TELEPHONE ENCOUNTER
Patient was admitted to [facility] on 6/3/25 with severe aortic stenosis and CAD with history of multiple previous PCI here for coronary angiogram with possible PCI. He has had GI bleeding with DAPT in the past, and PCI recommended only for severe stenosis of major epicardial vessels if needed. He is being evaluated for TAVR vs, SAVR.     6/3/25: Coronary angiogram via RRA showed:      Ost LM to Ost LAD lesion is 20% stenosed.    Prox LAD lesion is 30% stenosed.    Prox Cx to Mid Cx lesion is 25% stenosed.    Ost RCA to Prox RCA lesion is 70% stenosed.    Prox RCA lesion is 40% stenosed.    Prox RCA to Dist RCA lesion is 20% stenosed.    2nd Mrg lesion is 20% stenosed.    Dist Cx-1 lesion is 40% stenosed.    1st Mrg lesion is 30% stenosed.     No significant obstructive CAD.  Previously placed stents are patent. 50% instent restenosis of midLCx stent.     No medication changes made.    Pt to follow up with TAVR.    Writer attempted to call pt for a cardiology post discharge phone call, but no answer. VM left to call back with any non emergent cardiac related questions. Reminder left that the RRA access site should be healing without signs of infection, swelling or bleeding. TAVR RN phone number provided to subsequent plan of care. MASHA Garza RN.

## 2025-06-05 ENCOUNTER — VIRTUAL VISIT (OUTPATIENT)
Dept: FAMILY MEDICINE | Facility: CLINIC | Age: 70
End: 2025-06-05
Payer: COMMERCIAL

## 2025-06-05 DIAGNOSIS — R93.2 ABNORMAL FINDING ON IMAGING OF LIVER: ICD-10-CM

## 2025-06-05 DIAGNOSIS — D64.9 ANEMIA, UNSPECIFIED TYPE: Primary | ICD-10-CM

## 2025-06-05 LAB
FERRITIN SERPL-MCNC: 9 NG/ML (ref 31–409)
IRON BINDING CAPACITY (ROCHE): 328 UG/DL (ref 240–430)
IRON SATN MFR SERPL: 13 % (ref 15–46)
IRON SERPL-MCNC: 41 UG/DL (ref 61–157)

## 2025-06-05 NOTE — PROGRESS NOTES
"Otis is a 70 year old who is being evaluated via a billable video visit.    How would you like to obtain your AVS? MyChart  If the video visit is dropped, the invitation should be resent by: Text to cell phone: [phone]  Will anyone else be joining your video visit? No      Assessment & Plan   Abnormal finding on imaging of liver  Noted during cardiac work up. Will plan to screen following TAVR though likely these are benign given chronicity on imaging  - MR Liver wo & w Contrast; Future      Anemia, unspecified type  Noted reduction on recent imaging prior to angiogram. WIll add iron/ferritin on to labs and he's restarted iron. Unclear. Denies any overt area of loss, has had pill cam study that showed AVM, possible contributor?  - Iron & Iron Binding Capacity; Future  - Ferritin; Future    The longitudinal plan of care for the diagnosis(es)/condition(s) as documented were addressed during this visit. Due to the added complexity in care, I will continue to support Otis in the subsequent management and with ongoing continuity of care.      BMI  Estimated body mass index is 25.82 kg/m  as calculated from the following:    Height as of 6/3/25: 1.676 m (5' 6\").    Weight as of 6/3/25: 72.6 kg (160 lb).           Subjective   Otis is a 70 year old, presenting for the following health issues:  Results      6/5/2025     3:25 PM   Additional Questions   Roomed by kb     Video Start Time: 3:42 PM    History of Present Illness       Vascular Disease:  He presents for follow up of vascular disease.      He takes daily aspirin.    He eats 4 or more servings of fruits and vegetables daily.He consumes 0 sweetened beverage(s) daily.He exercises with enough effort to increase his heart rate 20 to 29 minutes per day.  He exercises with enough effort to increase his heart rate 4 days per week.   He is taking medications regularly.                  Review of Systems  Constitutional, HEENT, cardiovascular, pulmonary, gi and gu systems " are negative, except as otherwise noted.      Objective           Vitals:  No vitals were obtained today due to virtual visit.    Physical Exam   GENERAL: alert and no distress  EYES: Eyes grossly normal to inspection.  No discharge or erythema, or obvious scleral/conjunctival abnormalities.  RESP: No audible wheeze, cough, or visible cyanosis.    SKIN: Visible skin clear. No significant rash, abnormal pigmentation or lesions.  NEURO: Cranial nerves grossly intact.  Mentation and speech appropriate for age.  PSYCH: Appropriate affect, tone, and pace of words          Video-Visit Details    Type of service:  Video Visit   Video End Time:4:01 PM  Originating Location (pt. Location): Home    Distant Location (provider location):  On-site  Platform used for Video Visit: Haseeb  Signed Electronically by: Washington Yang PA-C

## 2025-06-06 ENCOUNTER — RESULTS FOLLOW-UP (OUTPATIENT)
Dept: FAMILY MEDICINE | Facility: CLINIC | Age: 70
End: 2025-06-06
Payer: COMMERCIAL

## 2025-06-10 ENCOUNTER — TELEPHONE (OUTPATIENT)
Dept: CARDIOLOGY | Facility: CLINIC | Age: 70
End: 2025-06-10
Payer: COMMERCIAL

## 2025-06-11 ENCOUNTER — TELEPHONE (OUTPATIENT)
Dept: CARDIOLOGY | Facility: CLINIC | Age: 70
End: 2025-06-11
Payer: COMMERCIAL

## 2025-06-11 NOTE — TELEPHONE ENCOUNTER
Patient was presented at the multidisciplinary valve conference. Plan is to proceed with SAVR procedure.     Above information was called out to the patient. Message sent to surgery nurses.      Marielena Overton RN  Structural Heart Coordinator  [facility]      
Patient was presented at the multidisciplinary valve conference. Plan is to proceed with SAVR procedure.    Above information was called out to the patient. Patient did not answer. Voicemail left with direct contact information. Qudini message sent.     Marielena Overton RN  Structural Heart Coordinator  [facility]   
[phone]

## 2025-06-11 NOTE — TELEPHONE ENCOUNTER
Surgery Checklist   Patient Name: Johan Navarro Age: 70 year old, male,   1955 MRN: [id number]   Surgeon:    Procedure:    Cardiologist:  Mulvihill    AVR, CABG?    Dr. Galicia DOS:  *       Medications:    Allergies:   Zetia, atorvastatin, gabapentin, Flonase, Repatha, sildenafil, folic acid, aspirin    Rosuvastatin, dust mites, pollem, goose feathers/down      Patient History: severe symptomatic aortic stenosis, CAD s/p PCI in  to the OM and Cx, on account of his AVM's he has been poorly tolerant of DAPT previously,         [] H&P: Dr. Mulvihill clinic appt  at 3 pm  [] Pacer/ICD: no  [] Dialysis: no      Pre-Op Testing:         Date Test Epic Media/Scan Care Everywhere     MRI                                               N/A []    []  []   []    6/3  Dr. Wallace Angio             Right radial access               Ost LM to Ost LAD lesion is 20% stenosed.    Prox LAD lesion is 30% stenosed.    Prox Cx to Mid Cx lesion is 25% stenosed.    Ost RCA to Prox RCA lesion is 70% stenosed.    Prox RCA lesion is 40% stenosed.    Prox RCA to Dist RCA lesion is 20% stenosed.    2nd Mrg lesion is 20% stenosed.    Dist Cx-1 lesion is 40% stenosed.    1st Mrg lesion is 30% stenosed.                      [x] [] []    RHC                                              N/A [] [] [] []    Echo                                        - EF: 55-60%  - severe aortic stenosis  - 1+ AR  - trace MR, trace TR     - M mmHg [x] [] []   6/3 EKG                                                Sinus Rhythm [x] [] []    CT TAVR              -  The aortic root at the sinuses of Valsalva (L/R/N) 29.9 mm x  27.8  mm 30.6 mm  - Proximal ascending aorta: 35.7 mm x 35.5 mm   -  [x] [] []            Labs  [] CMP        [] Mag  [] CBC  [] Prealbumin  [] PTT         [] INR  [] A1C    [] Type and Screen   [] UA/UC  [] Other:    []  []   []      Chest XR                                      N/A []           []  []   []     Vein Mapping                                N/A []  []   []   []     Arterial US                                   N/A []  []  []  []     5/8/25 Carotid US                                 <50% bilat [x]   []   []     BRADLEY                                               N/A []  [] [] []     PFT                                               N/A []  []   []   []         Dental:                                          []   []   []          Medications Holds:   [] Trulicity, Ozempic, Wygovy, Rybelsus, Monjaro, Bydureon, Zepound = 7 days     [] Lisinopril = 2 days     [] Entresto = 3 days     [] JarVic cotter, Byparag = 2 days     [] Other:     [] Blood thinner:                     Last Dose:                        Lovenox Bridge:      Surgery Date:            Discharge:                                  [] Surgery Instructions                                                       [] Letter                                                         [] Call pt                                         [] Preop Orders         [] Post Op Appt:    [] Cardiology Appt: Dr. Galicia   [] PCP Appt: Washington Yang PA-C [phone]   [] 48 Hour Call

## 2025-06-12 ENCOUNTER — OFFICE VISIT (OUTPATIENT)
Dept: CARDIOLOGY | Facility: CLINIC | Age: 70
End: 2025-06-12
Payer: COMMERCIAL

## 2025-06-12 VITALS
HEIGHT: 66 IN | DIASTOLIC BLOOD PRESSURE: 81 MMHG | HEART RATE: 85 BPM | WEIGHT: 156.2 LBS | BODY MASS INDEX: 25.1 KG/M2 | SYSTOLIC BLOOD PRESSURE: 121 MMHG

## 2025-06-12 DIAGNOSIS — I35.0 NONRHEUMATIC AORTIC VALVE STENOSIS: Primary | ICD-10-CM

## 2025-06-12 PROCEDURE — 3074F SYST BP LT 130 MM HG: CPT | Performed by: STUDENT IN AN ORGANIZED HEALTH CARE EDUCATION/TRAINING PROGRAM

## 2025-06-12 PROCEDURE — 99214 OFFICE O/P EST MOD 30 MIN: CPT | Performed by: STUDENT IN AN ORGANIZED HEALTH CARE EDUCATION/TRAINING PROGRAM

## 2025-06-12 PROCEDURE — 3079F DIAST BP 80-89 MM HG: CPT | Performed by: STUDENT IN AN ORGANIZED HEALTH CARE EDUCATION/TRAINING PROGRAM

## 2025-06-12 NOTE — PROGRESS NOTES
Follow up - last met in care suites    70M severe aortic stenosis found to have multivessel disease on [facility] 5/30    Ost LM to Ost LAD lesion is 20% stenosed.    Prox LAD lesion is 30% stenosed.    Prox Cx to Mid Cx lesion is 25% stenosed.    Ost RCA to Prox RCA lesion is 70% stenosed.    Prox RCA lesion is 40% stenosed.    Prox RCA to Dist RCA lesion is 20% stenosed.    2nd Mrg lesion is 20% stenosed.    Dist Cx-1 lesion is 40% stenosed.    1st Mrg lesion is 30% stenosed.     No significant obstructive CAD.  Previously placed stents are patent.  50% instent restenosis of midLCx stent.     *check small nondominant R lesion   = 55%  Valve Disease: Aortic Stenosis  Prev. Cardiac Interv: Previous PCI: Not during this episode of care      Overall, I  him to be a very good candidate for surgical valve replacement. His annular anatomy is appropriate and we can work around the STJ calcium. He could be a suitable candidate for NGUYEN TAVR later. He and his wife would like to think on the matter more. They may decide to get another opinion regarding safety of TAVR. We will plan to touch base and would be happy to proceed when he would like.    Michael Mulvihill, MD  6/16/2025 @ 12:01 PM    Previous workup studies reviewed below:      TTE Interpretation Summary     1. The left ventricle is normal in size. Proximal septal thickening is noted.  Left ventricular systolic function is normal. The visual ejection fraction is  55-60%. Diastolic Doppler findings (E/E' ratio and/or other parameters)  suggest left ventricular filling pressures are normal. No regional wall motion  abnormalities noted.  2. The right ventricle is normal size. The right ventricular systolic function  is normal.  3. Severe valvular aortic stenosis. The mean AoV pressure gradient is 32.0  mmHg. The peak AoV pressure gradient is 49.0 mmHg. The calculated aortic valve  are is 0.81 cm^2. There is mild (1+) aortic regurgitation.  4. No pericardial effusion.  5. In compairson to the previous report dated 03/06/2024, there has been an  interval progression in the degree of aortic stenosis.      [facility] 5/30    Ost LM to Ost LAD lesion is 20% stenosed.    Prox LAD lesion is 30% stenosed.    Prox Cx to Mid Cx lesion is 25% stenosed.    Ost RCA to Prox RCA lesion is 70% stenosed.    Prox RCA lesion is 40% stenosed.    Prox RCA to Dist RCA lesion is 20% stenosed.    2nd Mrg lesion is 20% stenosed.    Dist Cx-1 lesion is 40% stenosed.    1st Mrg lesion is 30% stenosed.     No significant obstructive CAD.  Previously placed stents are patent.  50% instent restenosis of midLCx stent.       CT  TAVR Protocol     IMPRESSION:  1.  No acute aortic pathology like dissection, intramural hematoma or  contained rupture noted.  2.  Please review detailed radiology report, to follow separately, for  incidental non-cardiovascular findings.     FINDINGS:     1.  Tricuspid aortic valve. Aortic valve calcium score is 6093. The  ascending aorta is mildly calcified, aortic arch is  moderately  calcified, the descending thoracic aorta is mildly calcified.   2.  The arch vessel branching pattern is bovine.   3.  The suprarenal abdominal aorta is mildly calcified; infrarenal  abdominal aorta is severely calcified.   4.  There is no acute aortic pathology, such as dissection, intramural  hematoma, or contained rupture.      MEASUREMENTS:   Representative dimensions of the thoracoabdominal aorta are as  follows:     1. AORTIC ANNULUS MEASUREMENTS:     *  The average aortic annulus diameter is 25.7 mm,   *  Aortic annulus area is 5.2 cm2  *  Aortic annulus perimeter is 82.4 mm  *  The 3 cusp coaxial angle for aortic valve is (ANNI 6 , CAU 13) these   angles will vary depending upon the patient's body position  *  Left main - Annulus distance: 11.7 mm, RCA - Annulus distance: 20.1  mm     2. LVOT MEASUREMENTS:     *  The average LVOT diameter is 25.3 mm  *  LVOT area is 5.05 cm2  *  LVOT perimeter is 82.8 mm  *  LVOT calcification mildly mm     3. AORTA MEASUREMENTS     1.  The aortic root at the sinuses of Valsalva (L/R/N) 29.9 mm x  27.8  mm 30.6 mm  2.  The sinotubular junction:  27.3 mm x 25.1 mm  3.  Sinotubular junction height: 20.4 mm x 20.6 mm  4.  Proximal ascending aorta: 35.7 mm x 35.5 mm  5.  Distal abdominal aorta proximal to the bifurcation: 13.7 mm x 13.4  mm     4. ILIOFEMORAL MEASUREMENTS:     RIGHT:  1.  Right common iliac artery: 7.63 mm x 9.77 mm   2.  Right external iliac artery: 8.07 mm x 9.06 mm   3.  Right common femoral artery: 5.93 mm x 8.98 mm   4.  Tortuosity: mild   5.  Calcification: mild       LEFT:  1.  Left common iliac artery: 7.31 mm x  10.5 mm  2.  Left external iliac artery: 8.24 mm x 9.19 mm  3.  Left common femoral artery: 6.91 mm x 9.24 mm  4.  Tortuosity: mild   5.  Calcification: mild         7. Aortic Root Angle 39 degrees        TRANSTHORACIC ECHOCARDIOGRAPHY:  3/7/2024  Interpretation Summary     1. The left ventricle is normal in size. Proximal septal thickening is noted.  Left ventricular systolic function is normal. The visual ejection fraction is  55-60%. Diastolic Doppler findings (E/E' ratio and/or other parameters)  suggest left ventricular filling pressures are normal. No regional wall motion  abnormalities noted.  2. The right ventricle is normal size. The right ventricular systolic function  is normal.  3. Severe valvular aortic stenosis. The mean AoV pressure gradient is 32.0  mmHg. The peak AoV pressure gradient is 49.0 mmHg. The calculated aortic valve  are is 0.81 cm^2. There is mild (1+) aortic regurgitation.  4. No pericardial effusion.  5. In compairson to the previous report dated 03/06/2024, there has been an  interval progression in the degree of aortic stenosis.  ______________________________________________________________________________  Left Ventricle  The left ventricle is normal in size. Proximal septal thickening is noted.  Left ventricular systolic function is normal. The visual ejection fraction is  55-60%. Diastolic Doppler findings (E/E' ratio and/or other parameters)  suggest left ventricular filling pressures are normal. No regional wall motion  abnormalities noted.     Right Ventricle  The right ventricle is normal size. The right ventricular systolic function is  normal.     Atria  Normal left atrial size. Right atrial size is normal.     Mitral Valve  There is trace mitral regurgitation.     Tricuspid Valve  There is trace tricuspid regurgitation.     Aortic Valve  The aortic valve is thickened and calcified. There is mild (1+) aortic  regurgitation.  Severe valvular aortic stenosis. The mean AoV pressure gradient  is 32.0 mmHg. The peak AoV pressure gradient is 49.0 mmHg. The calculated  aortic valve are is 0.81 cm^2.     Pulmonic Valve  There is mild (1+) pulmonic valvular regurgitation. There is no pulmonic  valvular stenosis.     Vessels  The aortic root is normal size. Descending aortic velocity normal. The  inferior vena cava is normal.     Pericardium  There is no pericardial effusion.     Rhythm  Sinus rhythm was noted.

## 2025-06-17 ENCOUNTER — TELEPHONE (OUTPATIENT)
Dept: CARDIOLOGY | Facility: CLINIC | Age: 70
End: 2025-06-17
Payer: COMMERCIAL

## 2025-06-17 NOTE — TELEPHONE ENCOUNTER
Per task, pt needs to schedule surgery with Dr. Mulvihill. LM asking pt to call back. Will try again later

## 2025-06-18 ENCOUNTER — PREP FOR PROCEDURE (OUTPATIENT)
Dept: CARDIOLOGY | Facility: CLINIC | Age: 70
End: 2025-06-18
Payer: COMMERCIAL

## 2025-06-18 DIAGNOSIS — I35.0 AORTIC VALVE STENOSIS, ETIOLOGY OF CARDIAC VALVE DISEASE UNSPECIFIED: Primary | ICD-10-CM

## 2025-06-18 DIAGNOSIS — I35.0 AORTIC VALVE STENOSIS: Primary | ICD-10-CM

## 2025-06-18 DIAGNOSIS — R79.9 ABNORMAL FINDING OF BLOOD CHEMISTRY, UNSPECIFIED: ICD-10-CM

## 2025-06-18 DIAGNOSIS — Z01.818 PRE-OP EXAM: ICD-10-CM

## 2025-06-18 RX ORDER — ASPIRIN 81 MG/1
162 TABLET, CHEWABLE ORAL
OUTPATIENT
Start: 2025-06-18

## 2025-06-18 RX ORDER — CHLORHEXIDINE GLUCONATE ORAL RINSE 1.2 MG/ML
10 SOLUTION DENTAL ONCE
OUTPATIENT
Start: 2025-06-18 | End: 2025-06-18

## 2025-06-18 RX ORDER — ASPIRIN 81 MG/1
81 TABLET, CHEWABLE ORAL
OUTPATIENT
Start: 2025-06-18

## 2025-06-18 RX ORDER — LIDOCAINE 40 MG/G
CREAM TOPICAL
OUTPATIENT
Start: 2025-06-18

## 2025-06-18 RX ORDER — FAMOTIDINE 20 MG/1
20 TABLET, FILM COATED ORAL
OUTPATIENT
Start: 2025-06-18

## 2025-06-18 RX ORDER — ACETAMINOPHEN 325 MG/1
975 TABLET ORAL ONCE
OUTPATIENT
Start: 2025-06-18 | End: 2025-06-18

## 2025-06-19 ENCOUNTER — TELEPHONE (OUTPATIENT)
Dept: CARDIOLOGY | Facility: CLINIC | Age: 70
End: 2025-06-19
Payer: COMMERCIAL

## 2025-06-24 ENCOUNTER — DOCUMENTATION ONLY (OUTPATIENT)
Dept: CARDIOLOGY | Facility: CLINIC | Age: 70
End: 2025-06-24
Payer: COMMERCIAL

## 2025-06-24 LAB
ABO + RH BLD: NORMAL
BLD GP AB SCN SERPL QL: NEGATIVE
SPECIMEN EXP DATE BLD: NORMAL

## 2025-06-24 NOTE — PROGRESS NOTES
Received dental clearance form from Dr. Jethro Christianson at [facility] stating patient is cleared for heart surgery.

## 2025-06-25 ENCOUNTER — LAB (OUTPATIENT)
Dept: LAB | Facility: CLINIC | Age: 70
End: 2025-06-25
Payer: COMMERCIAL

## 2025-06-25 DIAGNOSIS — D50.0 IRON DEFICIENCY ANEMIA DUE TO CHRONIC BLOOD LOSS: ICD-10-CM

## 2025-06-25 DIAGNOSIS — Z01.818 PRE-OP EXAM: ICD-10-CM

## 2025-06-25 DIAGNOSIS — I35.0 AORTIC VALVE STENOSIS, ETIOLOGY OF CARDIAC VALVE DISEASE UNSPECIFIED: ICD-10-CM

## 2025-06-25 DIAGNOSIS — D64.9 ANEMIA, UNSPECIFIED TYPE: ICD-10-CM

## 2025-06-25 DIAGNOSIS — R79.9 ABNORMAL FINDING OF BLOOD CHEMISTRY, UNSPECIFIED: ICD-10-CM

## 2025-06-25 LAB
ALBUMIN SERPL BCG-MCNC: 4.1 G/DL (ref 3.5–5.2)
ALBUMIN UR-MCNC: NEGATIVE MG/DL
ALP SERPL-CCNC: 85 U/L (ref 40–150)
ALT SERPL W P-5'-P-CCNC: 23 U/L (ref 0–70)
ANION GAP SERPL CALCULATED.3IONS-SCNC: 11 MMOL/L (ref 7–15)
APPEARANCE UR: CLEAR
AST SERPL W P-5'-P-CCNC: 33 U/L (ref 0–45)
BILIRUB SERPL-MCNC: 0.4 MG/DL
BILIRUB UR QL STRIP: NEGATIVE
BLOOD BANK CHART COMMENT: NORMAL
BUN SERPL-MCNC: 16.8 MG/DL (ref 8–23)
CALCIUM SERPL-MCNC: 8.7 MG/DL (ref 8.8–10.4)
CHLORIDE SERPL-SCNC: 104 MMOL/L (ref 98–107)
COLOR UR AUTO: ABNORMAL
CREAT SERPL-MCNC: 1.01 MG/DL (ref 0.67–1.17)
EGFRCR SERPLBLD CKD-EPI 2021: 80 ML/MIN/1.73M2
EST. AVERAGE GLUCOSE BLD GHB EST-MCNC: 111 MG/DL
GLUCOSE SERPL-MCNC: 103 MG/DL (ref 70–99)
GLUCOSE UR STRIP-MCNC: NEGATIVE MG/DL
HBA1C MFR BLD: 5.5 %
HCO3 SERPL-SCNC: 24 MMOL/L (ref 22–29)
HGB UR QL STRIP: NEGATIVE
KETONES UR STRIP-MCNC: NEGATIVE MG/DL
LEUKOCYTE ESTERASE UR QL STRIP: NEGATIVE
MUCOUS THREADS #/AREA URNS LPF: PRESENT /LPF
NITRATE UR QL: NEGATIVE
PH UR STRIP: 5 [PH] (ref 5–7)
POTASSIUM SERPL-SCNC: 4 MMOL/L (ref 3.4–5.3)
PROT SERPL-MCNC: 6.5 G/DL (ref 6.4–8.3)
RBC URINE: 1 /HPF
SODIUM SERPL-SCNC: 139 MMOL/L (ref 135–145)
SP GR UR STRIP: 1.02 (ref 1–1.03)
SPECIMEN EXP DATE BLD: NORMAL
UROBILINOGEN UR STRIP-MCNC: NORMAL MG/DL
WBC URINE: 1 /HPF

## 2025-06-25 PROCEDURE — 86923 COMPATIBILITY TEST ELECTRIC: CPT | Performed by: STUDENT IN AN ORGANIZED HEALTH CARE EDUCATION/TRAINING PROGRAM

## 2025-06-25 PROCEDURE — 80053 COMPREHEN METABOLIC PANEL: CPT

## 2025-06-25 PROCEDURE — 82728 ASSAY OF FERRITIN: CPT

## 2025-06-25 PROCEDURE — 81001 URINALYSIS AUTO W/SCOPE: CPT

## 2025-06-25 PROCEDURE — 83550 IRON BINDING TEST: CPT

## 2025-06-25 PROCEDURE — 86900 BLOOD TYPING SEROLOGIC ABO: CPT

## 2025-06-25 PROCEDURE — 83036 HEMOGLOBIN GLYCOSYLATED A1C: CPT

## 2025-06-25 PROCEDURE — 85014 HEMATOCRIT: CPT

## 2025-06-25 PROCEDURE — 36415 COLL VENOUS BLD VENIPUNCTURE: CPT

## 2025-06-26 ENCOUNTER — RESULTS FOLLOW-UP (OUTPATIENT)
Dept: FAMILY MEDICINE | Facility: CLINIC | Age: 70
End: 2025-06-26

## 2025-06-26 ENCOUNTER — TELEPHONE (OUTPATIENT)
Dept: ONCOLOGY | Facility: HOSPITAL | Age: 70
End: 2025-06-26
Payer: COMMERCIAL

## 2025-06-26 ENCOUNTER — TELEPHONE (OUTPATIENT)
Dept: CARDIOLOGY | Facility: CLINIC | Age: 70
End: 2025-06-26
Payer: COMMERCIAL

## 2025-06-26 DIAGNOSIS — D50.0 IRON DEFICIENCY ANEMIA DUE TO CHRONIC BLOOD LOSS: Primary | ICD-10-CM

## 2025-06-26 LAB
ERYTHROCYTE [DISTWIDTH] IN BLOOD BY AUTOMATED COUNT: 15.2 % (ref 10–15)
FERRITIN SERPL-MCNC: 14 NG/ML (ref 31–409)
HCT VFR BLD AUTO: 34.5 % (ref 40–53)
HGB BLD-MCNC: 11.6 G/DL (ref 13.3–17.7)
IRON BINDING CAPACITY (ROCHE): 356 UG/DL (ref 240–430)
IRON SATN MFR SERPL: 8 % (ref 15–46)
IRON SERPL-MCNC: 29 UG/DL (ref 61–157)
MCH RBC QN AUTO: 27.8 PG (ref 26.5–33)
MCHC RBC AUTO-ENTMCNC: 33.6 G/DL (ref 31.5–36.5)
MCV RBC AUTO: 83 FL (ref 78–100)
PLATELET # BLD AUTO: 206 10E3/UL (ref 150–450)
RBC # BLD AUTO: 4.17 10E6/UL (ref 4.4–5.9)
WBC # BLD AUTO: 4.4 10E3/UL (ref 4–11)

## 2025-06-26 NOTE — TELEPHONE ENCOUNTER
I received a phone call today from Otis's wife, Amy.  She is calling to follow-up again on Otis's lab results.  She states that on 6/6 she had called but they have been waiting for a call back.  I let her know that JOHN Cheung did try calling twice and left messages.  She is requesting a call back on her phone number at [phone].    Per Johan Reyes scheduled to have heart surgery on 7/1.  He had labs drawn on 6/3 and his ferritin was 9.  He has been taking oral iron.  He had a CBC/differential drawn yesterday and his hemoglobin came back at 11.6.  She would like Dr. Rousseau to weigh in and help determine whether or not Otis would benefit from some IV iron.  I let her know that it would be difficult to get IV iron ordered and arranged prior to his surgery on Tuesday.  She is open to discussing whether or not a follow-up appointment with Dr. Rousseau should be scheduled and potential IV iron scheduled for after his surgery.  She states Otis has been symptomatic and is very tired and sleeping more frequently.  I let her know that I would pass this information over to Dr. Rousseau and JOHN Cheung.  She would like a call back today if possible.    Erika Vela RN on 6/26/2025 at 12:53 PM

## 2025-06-26 NOTE — TELEPHONE ENCOUNTER
Patient called to notify that his pre-op instruction letter was sent to his MyChart; pt said he saw it and had been reviewing it prior to my call.  Pre-op education was also done with patient and wife and questions answered.

## 2025-06-26 NOTE — TELEPHONE ENCOUNTER
Called wife back, the plan would be for IV Iron while continuing oral iron. Since pt will be having surgery next week we likely dont have a spot to get him in prior but pt could have Iron infusion in the hospital as well since he will be there for a bit. If he does not we will set him up upon discharge. Also set up a follow up appt with labs.     Wife in agreement with plan.     Skye Loco RN on 6/26/2025 at 2:08 PM

## 2025-06-27 RX ORDER — ASPIRIN 81 MG
100 TABLET, DELAYED RELEASE (ENTERIC COATED) ORAL DAILY PRN
Status: ON HOLD | COMMUNITY

## 2025-06-27 NOTE — PROGRESS NOTES
PTA medications updated by Medication Scribe prior to surgery via phone call with patient (last doses completed by Nurse)     Medication history sources: Patient, Surescripts, and H&P  In the past week, patient estimated taking medication this percent of the time: Greater than 90%      Significant changes made to the medication list:  None      Additional medication history information:   None    Medication reconciliation completed by provider prior to medication history? No    Time spent in this activity: 30 MINUTES    The information provided in this note is only as accurate as the sources available at the time of update(s)      Prior to Admission medications    Medication Sig Last Dose Taking? Auth Provider Long Term End Date   aspirin 81 MG EC tablet Take 81 mg by mouth daily OTC Morning Yes Reported, Patient     atorvastatin (LIPITOR) 80 MG tablet Take 1 tablet (80 mg) by mouth daily. Evening Yes Sander Galicia MD Yes    diclofenac (VOLTAREN) 1 % topical gel APPLY 2 GRAMS TO HANDS AND FINGERS FOUR TIMES DAILY USING DOSING CARD  Yes Washington Yang PA-C     docusate sodium (COLACE) 100 MG tablet Take 100 mg by mouth daily as needed for constipation. (Takes with Iron tabs)  Yes Reported, Patient     evolocumab (REPATHA) 140 MG/ML prefilled autoinjector Inject 1 mL (140 mg) subcutaneously every 14 days. Morning Yes Sander Galicia MD     ezetimibe (ZETIA) 10 MG tablet Take 1 tablet (10 mg) by mouth daily. Evening Yes Sander Galicia MD Yes    Ferrous Sulfate (IRON) 325 (65 Fe) MG tablet Take 1 tablet by mouth daily. Evening Yes Reported, Patient     fluticasone (FLONASE) 50 MCG/ACT nasal spray SHAKE LIQUID AND USE 1 SPRAY IN EACH NOSTRIL DAILY Morning Yes Washington Yang PA-C     folic acid (FOLVITE) 1 MG tablet TAKE 1 TABLET(1 MG) BY MOUTH EVERY EVENING Evening Yes Washington Yang PA-C     gabapentin (NEURONTIN) 600 MG tablet TAKE 1 TABLET(600 MG) BY MOUTH TWICE DAILY Noon Yes Washington Yang PA-C Yes     sildenafil (REVATIO) 20 MG tablet Take 2-5 tablets ( mg) by mouth daily as needed.  Yes Washington Yang PA-C Yes    nitroGLYcerin (NITROSTAT) 0.4 MG sublingual tablet For chest pain place 1 tablet under the tongue every 5 minutes for 3 doses. If symptoms persist 5 minutes after 1st dose call 911.   Sander Galicia MD Yes        Medication history completed by: Cici Brown

## 2025-06-30 ENCOUNTER — PATIENT OUTREACH (OUTPATIENT)
Dept: CARE COORDINATION | Facility: CLINIC | Age: 70
End: 2025-06-30
Payer: COMMERCIAL

## 2025-06-30 ENCOUNTER — ANESTHESIA EVENT (OUTPATIENT)
Dept: SURGERY | Facility: CLINIC | Age: 70
End: 2025-06-30
Payer: COMMERCIAL

## 2025-07-01 ENCOUNTER — HOSPITAL ENCOUNTER (INPATIENT)
Facility: CLINIC | Age: 70
End: 2025-07-01
Attending: STUDENT IN AN ORGANIZED HEALTH CARE EDUCATION/TRAINING PROGRAM | Admitting: STUDENT IN AN ORGANIZED HEALTH CARE EDUCATION/TRAINING PROGRAM
Payer: COMMERCIAL

## 2025-07-01 ENCOUNTER — ANESTHESIA (OUTPATIENT)
Dept: SURGERY | Facility: CLINIC | Age: 70
End: 2025-07-01
Payer: COMMERCIAL

## 2025-07-01 ENCOUNTER — APPOINTMENT (OUTPATIENT)
Dept: GENERAL RADIOLOGY | Facility: CLINIC | Age: 70
DRG: 219 | End: 2025-07-01
Attending: STUDENT IN AN ORGANIZED HEALTH CARE EDUCATION/TRAINING PROGRAM
Payer: COMMERCIAL

## 2025-07-01 DIAGNOSIS — Z95.2 S/P AVR (AORTIC VALVE REPLACEMENT): Primary | ICD-10-CM

## 2025-07-01 DIAGNOSIS — I35.0 NONRHEUMATIC AORTIC VALVE STENOSIS: Primary | ICD-10-CM

## 2025-07-01 DIAGNOSIS — I35.0 AORTIC VALVE STENOSIS, ETIOLOGY OF CARDIAC VALVE DISEASE UNSPECIFIED: ICD-10-CM

## 2025-07-01 DIAGNOSIS — Z95.2 S/P AVR (AORTIC VALVE REPLACEMENT) AND AORTOPLASTY: ICD-10-CM

## 2025-07-01 DIAGNOSIS — Z71.89 OTHER SPECIFIED COUNSELING: Chronic | ICD-10-CM

## 2025-07-01 LAB
ALBUMIN SERPL BCG-MCNC: 3 G/DL (ref 3.5–5.2)
ALLEN'S TEST: ABNORMAL
ALP SERPL-CCNC: 52 U/L (ref 40–150)
ALT SERPL W P-5'-P-CCNC: 16 U/L (ref 0–70)
ANION GAP SERPL CALCULATED.3IONS-SCNC: 8 MMOL/L (ref 7–15)
ANION GAP SERPL CALCULATED.3IONS-SCNC: 9 MMOL/L (ref 7–15)
APTT PPP: 30 SECONDS (ref 22–38)
APTT PPP: 34 SECONDS (ref 22–38)
AST SERPL W P-5'-P-CCNC: 40 U/L (ref 0–45)
BASE EXCESS BLDA CALC-SCNC: -0.5 MMOL/L (ref -3–3)
BASE EXCESS BLDA CALC-SCNC: -0.6 MMOL/L (ref -3–3)
BASE EXCESS BLDA CALC-SCNC: -1.5 MMOL/L (ref -3–3)
BASE EXCESS BLDA CALC-SCNC: -1.7 MMOL/L (ref -3–3)
BASE EXCESS BLDA CALC-SCNC: -2.3 MMOL/L (ref -3–3)
BASE EXCESS BLDA CALC-SCNC: -2.8 MMOL/L (ref -3–3)
BASE EXCESS BLDA CALC-SCNC: -2.9 MMOL/L (ref -3–3)
BASE EXCESS BLDA CALC-SCNC: -3.2 MMOL/L (ref -3–3)
BASE EXCESS BLDA CALC-SCNC: -4 MMOL/L (ref -3–3)
BASE EXCESS BLDA CALC-SCNC: 0.2 MMOL/L (ref -3–3)
BASE EXCESS BLDA CALC-SCNC: 0.8 MMOL/L (ref -3–3)
BASE EXCESS BLDA CALC-SCNC: 0.9 MMOL/L (ref -3–3)
BASE EXCESS BLDV CALC-SCNC: 1.8 MMOL/L (ref -3–3)
BILIRUB SERPL-MCNC: 0.5 MG/DL
BLD PROD TYP BPU: NORMAL
BLD PROD TYP BPU: NORMAL
BLOOD COMPONENT TYPE: NORMAL
BLOOD COMPONENT TYPE: NORMAL
BUN SERPL-MCNC: 10.5 MG/DL (ref 8–23)
BUN SERPL-MCNC: 9.9 MG/DL (ref 8–23)
CA-I BLD-MCNC: 3.8 MG/DL (ref 4.4–5.2)
CA-I BLD-MCNC: 3.8 MG/DL (ref 4.4–5.2)
CA-I BLD-MCNC: 4 MG/DL (ref 4.4–5.2)
CA-I BLD-MCNC: 4 MG/DL (ref 4.4–5.2)
CA-I BLD-MCNC: 4.1 MG/DL (ref 4.4–5.2)
CA-I BLD-MCNC: 4.2 MG/DL (ref 4.4–5.2)
CA-I BLD-MCNC: 4.2 MG/DL (ref 4.4–5.2)
CA-I BLD-MCNC: 4.4 MG/DL (ref 4.4–5.2)
CA-I BLD-MCNC: 4.5 MG/DL (ref 4.4–5.2)
CA-I BLD-MCNC: 5.1 MG/DL (ref 4.4–5.2)
CALCIUM SERPL-MCNC: 8.3 MG/DL (ref 8.8–10.4)
CALCIUM SERPL-MCNC: 9.2 MG/DL (ref 8.8–10.4)
CHLORIDE SERPL-SCNC: 110 MMOL/L (ref 98–107)
CHLORIDE SERPL-SCNC: 111 MMOL/L (ref 98–107)
CLOT INIT KAOL IND TO POST HEP NEUT TRTO: 1 {RATIO}
CLOT INIT KAOL IND TO POST HEP NEUT TRTO: 1 {RATIO}
CLOT INIT KAOLIN IND BLD US: 120 SEC (ref 113–166)
CLOT INIT KAOLIN IND BLD US: 163 SEC (ref 113–166)
CLOT INIT KAOLIN IND P HEP NEUT BLD US: 121 SEC (ref 103–153)
CLOT INIT KAOLIN IND P HEP NEUT BLD US: 156 SEC (ref 103–153)
CLOT STIFF PLT CONT BLD CALC: 15.1 HPA (ref 11.9–29.8)
CLOT STIFF PLT CONT BLD CALC: 19.8 HPA (ref 11.9–29.8)
CLOT STIFF TF IND P HEP NEUT BLD US: 17.3 HPA (ref 13–33.2)
CLOT STIFF TF IND P HEP NEUT BLD US: 22.7 HPA (ref 13–33.2)
CLOT STIFF TF IND+IIB-IIIA INH P HEP NEU: 2.2 HPA (ref 1–3.7)
CLOT STIFF TF IND+IIB-IIIA INH P HEP NEU: 2.9 HPA (ref 1–3.7)
CODING SYSTEM: NORMAL
CODING SYSTEM: NORMAL
CPB POCT: NO
CREAT SERPL-MCNC: 0.88 MG/DL (ref 0.67–1.17)
CREAT SERPL-MCNC: 0.88 MG/DL (ref 0.67–1.17)
CREAT SERPL-MCNC: 1.02 MG/DL (ref 0.67–1.17)
CROSSMATCH: NORMAL
CROSSMATCH: NORMAL
EGFRCR SERPLBLD CKD-EPI 2021: 79 ML/MIN/1.73M2
EGFRCR SERPLBLD CKD-EPI 2021: >90 ML/MIN/1.73M2
EGFRCR SERPLBLD CKD-EPI 2021: >90 ML/MIN/1.73M2
ERYTHROCYTE [DISTWIDTH] IN BLOOD BY AUTOMATED COUNT: 15.2 % (ref 10–15)
ERYTHROCYTE [DISTWIDTH] IN BLOOD BY AUTOMATED COUNT: 15.4 % (ref 10–15)
FIBRINOGEN PPP-MCNC: 229 MG/DL (ref 170–510)
GLUCOSE BLD-MCNC: 104 MG/DL (ref 70–99)
GLUCOSE BLD-MCNC: 106 MG/DL (ref 70–99)
GLUCOSE BLD-MCNC: 109 MG/DL (ref 70–99)
GLUCOSE BLD-MCNC: 116 MG/DL (ref 70–99)
GLUCOSE BLD-MCNC: 125 MG/DL (ref 70–99)
GLUCOSE BLD-MCNC: 129 MG/DL (ref 70–99)
GLUCOSE BLD-MCNC: 131 MG/DL (ref 70–99)
GLUCOSE BLD-MCNC: 145 MG/DL (ref 70–99)
GLUCOSE BLD-MCNC: 146 MG/DL (ref 70–99)
GLUCOSE BLD-MCNC: 149 MG/DL (ref 70–99)
GLUCOSE BLD-MCNC: 152 MG/DL (ref 70–99)
GLUCOSE BLDC GLUCOMTR-MCNC: 125 MG/DL (ref 70–99)
GLUCOSE BLDC GLUCOMTR-MCNC: 129 MG/DL (ref 70–99)
GLUCOSE SERPL-MCNC: 129 MG/DL (ref 70–99)
GLUCOSE SERPL-MCNC: 147 MG/DL (ref 70–99)
GLUCOSE SERPL-MCNC: 96 MG/DL (ref 70–99)
HCO3 BLD-SCNC: 21 MMOL/L (ref 21–28)
HCO3 BLDA-SCNC: 21 MMOL/L (ref 21–28)
HCO3 BLDA-SCNC: 22 MMOL/L (ref 21–28)
HCO3 BLDA-SCNC: 22 MMOL/L (ref 21–28)
HCO3 BLDA-SCNC: 24 MMOL/L (ref 21–28)
HCO3 BLDA-SCNC: 24 MMOL/L (ref 21–28)
HCO3 BLDA-SCNC: 25 MMOL/L (ref 21–28)
HCO3 BLDA-SCNC: 26 MMOL/L (ref 21–28)
HCO3 BLDA-SCNC: 26 MMOL/L (ref 21–28)
HCO3 BLDV-SCNC: 28 MMOL/L (ref 21–28)
HCO3 SERPL-SCNC: 20 MMOL/L (ref 22–29)
HCO3 SERPL-SCNC: 22 MMOL/L (ref 22–29)
HCT VFR BLD AUTO: 25.8 % (ref 40–53)
HCT VFR BLD AUTO: 26.7 % (ref 40–53)
HCT VFR BLD CALC: 25 % (ref 40–53)
HGB BLD-MCNC: 10.7 G/DL (ref 13.3–17.7)
HGB BLD-MCNC: 10.9 G/DL (ref 13.3–17.7)
HGB BLD-MCNC: 12.2 G/DL (ref 13.3–17.7)
HGB BLD-MCNC: 7.3 G/DL (ref 13.3–17.7)
HGB BLD-MCNC: 8 G/DL (ref 13.3–17.7)
HGB BLD-MCNC: 8.5 G/DL (ref 13.3–17.7)
HGB BLD-MCNC: 8.5 G/DL (ref 13.3–17.7)
HGB BLD-MCNC: 8.9 G/DL (ref 13.3–17.7)
HGB BLD-MCNC: 9.2 G/DL (ref 13.3–17.7)
HGB BLD-MCNC: 9.4 G/DL (ref 13.3–17.7)
HGB BLD-MCNC: 9.4 G/DL (ref 13.3–17.7)
HGB BLD-MCNC: 9.5 G/DL (ref 13.3–17.7)
HGB BLD-MCNC: 9.6 G/DL (ref 13.3–17.7)
HGB BLD-MCNC: 9.8 G/DL (ref 13.3–17.7)
HGB BLD-MCNC: 9.9 G/DL (ref 13.3–17.7)
INR PPP: 1.53 (ref 0.85–1.15)
INR PPP: 1.63 (ref 0.85–1.15)
ISSUE DATE AND TIME: NORMAL
ISSUE DATE AND TIME: NORMAL
LACTATE BLD-SCNC: 0.6 MMOL/L (ref 0.7–2)
LACTATE BLD-SCNC: 0.7 MMOL/L (ref 0.7–2)
LACTATE BLD-SCNC: 0.8 MMOL/L (ref 0.7–2)
LACTATE BLD-SCNC: 0.8 MMOL/L (ref 0.7–2)
LACTATE BLD-SCNC: 1.1 MMOL/L (ref 0.7–2)
LACTATE BLD-SCNC: 1.2 MMOL/L (ref 0.7–2)
LACTATE BLD-SCNC: 1.3 MMOL/L (ref 0.7–2)
LACTATE SERPL-SCNC: 0.9 MMOL/L (ref 0.7–2)
MAGNESIUM SERPL-MCNC: 2.2 MG/DL (ref 1.7–2.3)
MCH RBC QN AUTO: 28.2 PG (ref 26.5–33)
MCH RBC QN AUTO: 28.6 PG (ref 26.5–33)
MCHC RBC AUTO-ENTMCNC: 32.9 G/DL (ref 31.5–36.5)
MCHC RBC AUTO-ENTMCNC: 33.3 G/DL (ref 31.5–36.5)
MCV RBC AUTO: 82 FL (ref 78–100)
MCV RBC AUTO: 86 FL (ref 78–100)
MCV RBC AUTO: 86 FL (ref 78–100)
O2/TOTAL GAS SETTING VFR VENT: 100 %
O2/TOTAL GAS SETTING VFR VENT: 100 %
O2/TOTAL GAS SETTING VFR VENT: 50 %
O2/TOTAL GAS SETTING VFR VENT: 80 %
OXYHGB MFR BLDA: 99 % (ref 92–100)
OXYHGB MFR BLDV: 76 % (ref 70–75)
PCO2 BLD: 35 MM HG (ref 35–45)
PCO2 BLDA: 35 MM HG (ref 35–45)
PCO2 BLDA: 38 MM HG (ref 35–45)
PCO2 BLDA: 39 MM HG (ref 35–45)
PCO2 BLDA: 40 MM HG (ref 35–45)
PCO2 BLDA: 42 MM HG (ref 35–45)
PCO2 BLDA: 44 MM HG (ref 35–45)
PCO2 BLDA: 44 MM HG (ref 35–45)
PCO2 BLDA: 45 MM HG (ref 35–45)
PCO2 BLDA: 46 MM HG (ref 35–45)
PCO2 BLDA: 47 MM HG (ref 35–45)
PCO2 BLDA: 51 MM HG (ref 35–45)
PCO2 BLDV: 48 MM HG (ref 40–50)
PH BLD: 7.39 [PH] (ref 7.35–7.45)
PH BLDA: 7.3 [PH] (ref 7.35–7.45)
PH BLDA: 7.34 [PH] (ref 7.35–7.45)
PH BLDA: 7.36 [PH] (ref 7.35–7.45)
PH BLDA: 7.37 [PH] (ref 7.35–7.45)
PH BLDA: 7.37 [PH] (ref 7.35–7.45)
PH BLDA: 7.38 [PH] (ref 7.35–7.45)
PH BLDA: 7.41 [PH] (ref 7.35–7.45)
PH BLDV: 7.37 [PH] (ref 7.32–7.43)
PHOSPHATE SERPL-MCNC: 2.6 MG/DL (ref 2.5–4.5)
PLATELET # BLD AUTO: 120 10E3/UL (ref 150–450)
PLATELET # BLD AUTO: 136 10E3/UL (ref 150–450)
PO2 BLD: 222 MM HG (ref 80–105)
PO2 BLDA: 204 MM HG (ref 80–105)
PO2 BLDA: 213 MM HG (ref 80–105)
PO2 BLDA: 228 MM HG (ref 80–105)
PO2 BLDA: 324 MM HG (ref 80–105)
PO2 BLDA: 352 MM HG (ref 80–105)
PO2 BLDA: 360 MM HG (ref 80–105)
PO2 BLDA: 365 MM HG (ref 80–105)
PO2 BLDA: 373 MM HG (ref 80–105)
PO2 BLDA: 375 MM HG (ref 80–105)
PO2 BLDA: 378 MM HG (ref 80–105)
PO2 BLDA: 380 MM HG (ref 80–105)
PO2 BLDV: 43 MM HG (ref 25–47)
POTASSIUM BLD-SCNC: 3.7 MMOL/L (ref 3.4–5.3)
POTASSIUM BLD-SCNC: 4 MMOL/L (ref 3.4–5.3)
POTASSIUM BLD-SCNC: 4.3 MMOL/L (ref 3.4–5.3)
POTASSIUM BLD-SCNC: 4.5 MMOL/L (ref 3.4–5.3)
POTASSIUM BLD-SCNC: 4.5 MMOL/L (ref 3.4–5.3)
POTASSIUM BLD-SCNC: 4.6 MMOL/L (ref 3.4–5.3)
POTASSIUM BLD-SCNC: 4.6 MMOL/L (ref 3.4–5.3)
POTASSIUM BLD-SCNC: 4.8 MMOL/L (ref 3.4–5.3)
POTASSIUM BLD-SCNC: 5 MMOL/L (ref 3.4–5.3)
POTASSIUM BLD-SCNC: 5 MMOL/L (ref 3.4–5.3)
POTASSIUM BLD-SCNC: 5.2 MMOL/L (ref 3.4–5.3)
POTASSIUM BLD-SCNC: 5.3 MMOL/L (ref 3.4–5.3)
POTASSIUM SERPL-SCNC: 3.7 MMOL/L (ref 3.4–5.3)
POTASSIUM SERPL-SCNC: 4.2 MMOL/L (ref 3.4–5.3)
POTASSIUM SERPL-SCNC: 4.8 MMOL/L (ref 3.4–5.3)
PROT SERPL-MCNC: 4.5 G/DL (ref 6.4–8.3)
PROTHROMBIN TIME: 17.9 SECONDS (ref 11.8–14.8)
PROTHROMBIN TIME: 18.8 SECONDS (ref 11.8–14.8)
RBC # BLD AUTO: 3.01 10E6/UL (ref 4.4–5.9)
RBC # BLD AUTO: 3.11 10E6/UL (ref 4.4–5.9)
SAO2 % BLDA: 100 % (ref 95–96)
SAO2 % BLDA: >100 % (ref 95–96)
SAO2 % BLDV: 78 % (ref 70–75)
SODIUM BLD-SCNC: 139 MMOL/L (ref 135–145)
SODIUM BLD-SCNC: 140 MMOL/L (ref 135–145)
SODIUM BLD-SCNC: 141 MMOL/L (ref 135–145)
SODIUM BLD-SCNC: 142 MMOL/L (ref 135–145)
SODIUM BLD-SCNC: 142 MMOL/L (ref 135–145)
SODIUM SERPL-SCNC: 140 MMOL/L (ref 135–145)
SODIUM SERPL-SCNC: 140 MMOL/L (ref 135–145)
UNIT ABO/RH: NORMAL
UNIT ABO/RH: NORMAL
UNIT NUMBER: NORMAL
UNIT NUMBER: NORMAL
UNIT STATUS: NORMAL
UNIT STATUS: NORMAL
UNIT TYPE ISBT: 5100
UNIT TYPE ISBT: 5100
WBC # BLD AUTO: 10.4 10E3/UL (ref 4–11)
WBC # BLD AUTO: 12 10E3/UL (ref 4–11)

## 2025-07-01 PROCEDURE — 999N000157 HC STATISTIC RCP TIME EA 10 MIN

## 2025-07-01 PROCEDURE — 84295 ASSAY OF SERUM SODIUM: CPT

## 2025-07-01 PROCEDURE — 33268 EXCL LAA OPN OTH PX ANY METH: CPT | Mod: GC | Performed by: STUDENT IN AN ORGANIZED HEALTH CARE EDUCATION/TRAINING PROGRAM

## 2025-07-01 PROCEDURE — 86923 COMPATIBILITY TEST ELECTRIC: CPT | Performed by: NURSE PRACTITIONER

## 2025-07-01 PROCEDURE — C1889 IMPLANT/INSERT DEVICE, NOC: HCPCS | Performed by: STUDENT IN AN ORGANIZED HEALTH CARE EDUCATION/TRAINING PROGRAM

## 2025-07-01 PROCEDURE — C1768 GRAFT, VASCULAR: HCPCS | Performed by: STUDENT IN AN ORGANIZED HEALTH CARE EDUCATION/TRAINING PROGRAM

## 2025-07-01 PROCEDURE — 85730 THROMBOPLASTIN TIME PARTIAL: CPT | Performed by: STUDENT IN AN ORGANIZED HEALTH CARE EDUCATION/TRAINING PROGRAM

## 2025-07-01 PROCEDURE — 99291 CRITICAL CARE FIRST HOUR: CPT | Performed by: NURSE PRACTITIONER

## 2025-07-01 PROCEDURE — 84100 ASSAY OF PHOSPHORUS: CPT | Performed by: STUDENT IN AN ORGANIZED HEALTH CARE EDUCATION/TRAINING PROGRAM

## 2025-07-01 PROCEDURE — 88305 TISSUE EXAM BY PATHOLOGIST: CPT | Mod: TC | Performed by: STUDENT IN AN ORGANIZED HEALTH CARE EDUCATION/TRAINING PROGRAM

## 2025-07-01 PROCEDURE — 370N000017 HC ANESTHESIA TECHNICAL FEE, PER MIN: Performed by: STUDENT IN AN ORGANIZED HEALTH CARE EDUCATION/TRAINING PROGRAM

## 2025-07-01 PROCEDURE — C1760 CLOSURE DEV, VASC: HCPCS | Performed by: STUDENT IN AN ORGANIZED HEALTH CARE EDUCATION/TRAINING PROGRAM

## 2025-07-01 PROCEDURE — 85048 AUTOMATED LEUKOCYTE COUNT: CPT | Performed by: STUDENT IN AN ORGANIZED HEALTH CARE EDUCATION/TRAINING PROGRAM

## 2025-07-01 PROCEDURE — 82330 ASSAY OF CALCIUM: CPT | Performed by: STUDENT IN AN ORGANIZED HEALTH CARE EDUCATION/TRAINING PROGRAM

## 2025-07-01 PROCEDURE — 250N000009 HC RX 250: Performed by: NURSE ANESTHETIST, CERTIFIED REGISTERED

## 2025-07-01 PROCEDURE — 82310 ASSAY OF CALCIUM: CPT | Performed by: STUDENT IN AN ORGANIZED HEALTH CARE EDUCATION/TRAINING PROGRAM

## 2025-07-01 PROCEDURE — 02RF08Z REPLACEMENT OF AORTIC VALVE WITH ZOOPLASTIC TISSUE, OPEN APPROACH: ICD-10-PCS | Performed by: STUDENT IN AN ORGANIZED HEALTH CARE EDUCATION/TRAINING PROGRAM

## 2025-07-01 PROCEDURE — 85610 PROTHROMBIN TIME: CPT | Performed by: STUDENT IN AN ORGANIZED HEALTH CARE EDUCATION/TRAINING PROGRAM

## 2025-07-01 PROCEDURE — 85018 HEMOGLOBIN: CPT | Performed by: ANESTHESIOLOGY

## 2025-07-01 PROCEDURE — 5A1221Z PERFORMANCE OF CARDIAC OUTPUT, CONTINUOUS: ICD-10-PCS | Performed by: STUDENT IN AN ORGANIZED HEALTH CARE EDUCATION/TRAINING PROGRAM

## 2025-07-01 PROCEDURE — 999N000141 HC STATISTIC PRE-PROCEDURE NURSING ASSESSMENT: Performed by: STUDENT IN AN ORGANIZED HEALTH CARE EDUCATION/TRAINING PROGRAM

## 2025-07-01 PROCEDURE — 82374 ASSAY BLOOD CARBON DIOXIDE: CPT | Performed by: STUDENT IN AN ORGANIZED HEALTH CARE EDUCATION/TRAINING PROGRAM

## 2025-07-01 PROCEDURE — P9045 ALBUMIN (HUMAN), 5%, 250 ML: HCPCS | Mod: JZ

## 2025-07-01 PROCEDURE — 85384 FIBRINOGEN ACTIVITY: CPT | Performed by: STUDENT IN AN ORGANIZED HEALTH CARE EDUCATION/TRAINING PROGRAM

## 2025-07-01 PROCEDURE — 85396 CLOTTING ASSAY WHOLE BLOOD: CPT

## 2025-07-01 PROCEDURE — 84132 ASSAY OF SERUM POTASSIUM: CPT | Performed by: ANESTHESIOLOGY

## 2025-07-01 PROCEDURE — 250N000011 HC RX IP 250 OP 636: Performed by: STUDENT IN AN ORGANIZED HEALTH CARE EDUCATION/TRAINING PROGRAM

## 2025-07-01 PROCEDURE — 83605 ASSAY OF LACTIC ACID: CPT

## 2025-07-01 PROCEDURE — 83735 ASSAY OF MAGNESIUM: CPT | Performed by: STUDENT IN AN ORGANIZED HEALTH CARE EDUCATION/TRAINING PROGRAM

## 2025-07-01 PROCEDURE — 999N000259 HC STATISTIC EXTUBATION

## 2025-07-01 PROCEDURE — 250N000009 HC RX 250: Performed by: ANESTHESIOLOGY

## 2025-07-01 PROCEDURE — 410N000006: Performed by: STUDENT IN AN ORGANIZED HEALTH CARE EDUCATION/TRAINING PROGRAM

## 2025-07-01 PROCEDURE — 82330 ASSAY OF CALCIUM: CPT

## 2025-07-01 PROCEDURE — C1898 LEAD, PMKR, OTHER THAN TRANS: HCPCS | Performed by: STUDENT IN AN ORGANIZED HEALTH CARE EDUCATION/TRAINING PROGRAM

## 2025-07-01 PROCEDURE — 360N000079 HC SURGERY LEVEL 6, PER MIN: Performed by: STUDENT IN AN ORGANIZED HEALTH CARE EDUCATION/TRAINING PROGRAM

## 2025-07-01 PROCEDURE — 82947 ASSAY GLUCOSE BLOOD QUANT: CPT | Performed by: ANESTHESIOLOGY

## 2025-07-01 PROCEDURE — 258N000003 HC RX IP 258 OP 636: Performed by: ANESTHESIOLOGY

## 2025-07-01 PROCEDURE — 82803 BLOOD GASES ANY COMBINATION: CPT

## 2025-07-01 PROCEDURE — 250N000009 HC RX 250: Performed by: STUDENT IN AN ORGANIZED HEALTH CARE EDUCATION/TRAINING PROGRAM

## 2025-07-01 PROCEDURE — 84132 ASSAY OF SERUM POTASSIUM: CPT

## 2025-07-01 PROCEDURE — C1763 CONN TISS, NON-HUMAN: HCPCS | Performed by: STUDENT IN AN ORGANIZED HEALTH CARE EDUCATION/TRAINING PROGRAM

## 2025-07-01 PROCEDURE — 250N000011 HC RX IP 250 OP 636: Performed by: ANESTHESIOLOGY

## 2025-07-01 PROCEDURE — 272N000001 HC OR GENERAL SUPPLY STERILE: Performed by: STUDENT IN AN ORGANIZED HEALTH CARE EDUCATION/TRAINING PROGRAM

## 2025-07-01 PROCEDURE — 258N000003 HC RX IP 258 OP 636: Performed by: NURSE PRACTITIONER

## 2025-07-01 PROCEDURE — 82947 ASSAY GLUCOSE BLOOD QUANT: CPT | Performed by: STUDENT IN AN ORGANIZED HEALTH CARE EDUCATION/TRAINING PROGRAM

## 2025-07-01 PROCEDURE — 02RX0JZ REPLACEMENT OF THORACIC AORTA, ASCENDING/ARCH WITH SYNTHETIC SUBSTITUTE, OPEN APPROACH: ICD-10-PCS | Performed by: STUDENT IN AN ORGANIZED HEALTH CARE EDUCATION/TRAINING PROGRAM

## 2025-07-01 PROCEDURE — P9045 ALBUMIN (HUMAN), 5%, 250 ML: HCPCS | Mod: JZ | Performed by: NURSE ANESTHETIST, CERTIFIED REGISTERED

## 2025-07-01 PROCEDURE — 02L70CK OCCLUSION OF LEFT ATRIAL APPENDAGE WITH EXTRALUMINAL DEVICE, OPEN APPROACH: ICD-10-PCS | Performed by: STUDENT IN AN ORGANIZED HEALTH CARE EDUCATION/TRAINING PROGRAM

## 2025-07-01 PROCEDURE — 250N000009 HC RX 250

## 2025-07-01 PROCEDURE — 85018 HEMOGLOBIN: CPT | Performed by: STUDENT IN AN ORGANIZED HEALTH CARE EDUCATION/TRAINING PROGRAM

## 2025-07-01 PROCEDURE — 250N000013 HC RX MED GY IP 250 OP 250 PS 637: Performed by: STUDENT IN AN ORGANIZED HEALTH CARE EDUCATION/TRAINING PROGRAM

## 2025-07-01 PROCEDURE — 258N000003 HC RX IP 258 OP 636: Performed by: STUDENT IN AN ORGANIZED HEALTH CARE EDUCATION/TRAINING PROGRAM

## 2025-07-01 PROCEDURE — P9016 RBC LEUKOCYTES REDUCED: HCPCS | Performed by: STUDENT IN AN ORGANIZED HEALTH CARE EDUCATION/TRAINING PROGRAM

## 2025-07-01 PROCEDURE — 278N000051 HC OR IMPLANT GENERAL: Performed by: STUDENT IN AN ORGANIZED HEALTH CARE EDUCATION/TRAINING PROGRAM

## 2025-07-01 PROCEDURE — 80048 BASIC METABOLIC PNL TOTAL CA: CPT | Performed by: ANESTHESIOLOGY

## 2025-07-01 PROCEDURE — 200N000001 HC R&B ICU

## 2025-07-01 PROCEDURE — 76984 DX INTRAOP THORACIC AORTA US: CPT | Mod: 26 | Performed by: STUDENT IN AN ORGANIZED HEALTH CARE EDUCATION/TRAINING PROGRAM

## 2025-07-01 PROCEDURE — 258N000003 HC RX IP 258 OP 636

## 2025-07-01 PROCEDURE — 36415 COLL VENOUS BLD VENIPUNCTURE: CPT | Performed by: ANESTHESIOLOGY

## 2025-07-01 PROCEDURE — 85014 HEMATOCRIT: CPT | Performed by: STUDENT IN AN ORGANIZED HEALTH CARE EDUCATION/TRAINING PROGRAM

## 2025-07-01 PROCEDURE — 258N000003 HC RX IP 258 OP 636: Performed by: NURSE ANESTHETIST, CERTIFIED REGISTERED

## 2025-07-01 PROCEDURE — 94003 VENT MGMT INPAT SUBQ DAY: CPT

## 2025-07-01 PROCEDURE — 82805 BLOOD GASES W/O2 SATURATION: CPT | Performed by: STUDENT IN AN ORGANIZED HEALTH CARE EDUCATION/TRAINING PROGRAM

## 2025-07-01 PROCEDURE — 410N000005 HC PER-PERFUSION, SH ONLY, 1ST 30 MIN: Performed by: STUDENT IN AN ORGANIZED HEALTH CARE EDUCATION/TRAINING PROGRAM

## 2025-07-01 PROCEDURE — 33863 ASCENDING AORTIC GRAFT: CPT | Mod: GC | Performed by: STUDENT IN AN ORGANIZED HEALTH CARE EDUCATION/TRAINING PROGRAM

## 2025-07-01 PROCEDURE — 250N000011 HC RX IP 250 OP 636: Mod: JZ

## 2025-07-01 PROCEDURE — C1713 ANCHOR/SCREW BN/BN,TIS/BN: HCPCS | Performed by: STUDENT IN AN ORGANIZED HEALTH CARE EDUCATION/TRAINING PROGRAM

## 2025-07-01 PROCEDURE — 83605 ASSAY OF LACTIC ACID: CPT | Performed by: STUDENT IN AN ORGANIZED HEALTH CARE EDUCATION/TRAINING PROGRAM

## 2025-07-01 PROCEDURE — 250N000011 HC RX IP 250 OP 636: Performed by: NURSE ANESTHETIST, CERTIFIED REGISTERED

## 2025-07-01 PROCEDURE — 999N000253 HC STATISTIC WEANING TRIALS

## 2025-07-01 PROCEDURE — 999N000065 XR CHEST PORT 1 VIEW

## 2025-07-01 PROCEDURE — 250N000024 HC ISOFLURANE, PER MIN: Performed by: STUDENT IN AN ORGANIZED HEALTH CARE EDUCATION/TRAINING PROGRAM

## 2025-07-01 PROCEDURE — 93005 ELECTROCARDIOGRAM TRACING: CPT

## 2025-07-01 DEVICE — DECELLULARIZED BOVINE PERICARDIUM
Type: IMPLANTABLE DEVICE | Site: HEART | Status: FUNCTIONAL
Brand: PHOTOFIX DECELLULARIZED BOVINE PERICARDIUM

## 2025-07-01 DEVICE — INSPIRIS RESILIA AORTIC VALVE
Type: IMPLANTABLE DEVICE | Site: HEART | Status: FUNCTIONAL
Brand: INSPIRIS RESILIA AORTIC VALVE

## 2025-07-01 DEVICE — HEMASHIELD PLATINUM WOVEN STRAIGHT DOUBLE VELOUR VASCULAR GRAFT WITH GRAFT SIZER ACCESSORY
Type: IMPLANTABLE DEVICE | Site: HEART | Status: FUNCTIONAL
Brand: HEMASHIELD

## 2025-07-01 DEVICE — IMP ATRICLIP FLEX V DEVICE LAA EXLUSION 45MM ACHV45: Type: IMPLANTABLE DEVICE | Site: HEART | Status: FUNCTIONAL

## 2025-07-01 DEVICE — IMPLANTABLE DEVICE: Type: IMPLANTABLE DEVICE | Site: STERNUM | Status: FUNCTIONAL

## 2025-07-01 RX ORDER — VANCOMYCIN HYDROCHLORIDE 1 G/200ML
1000 INJECTION, SOLUTION INTRAVENOUS
Status: DISCONTINUED | OUTPATIENT
Start: 2025-07-01 | End: 2025-07-01 | Stop reason: HOSPADM

## 2025-07-01 RX ORDER — ASPIRIN 81 MG/1
81 TABLET, CHEWABLE ORAL DAILY
Status: DISPENSED | OUTPATIENT
Start: 2025-07-02

## 2025-07-01 RX ORDER — NOREPINEPHRINE BITARTRATE 1 MG/ML
INJECTION, SOLUTION INTRAVENOUS PRN
Status: DISCONTINUED | OUTPATIENT
Start: 2025-07-01 | End: 2025-07-01

## 2025-07-01 RX ORDER — VANCOMYCIN HYDROCHLORIDE 1 G/20ML
INJECTION, POWDER, LYOPHILIZED, FOR SOLUTION INTRAVENOUS PRN
Status: DISCONTINUED | OUTPATIENT
Start: 2025-07-01 | End: 2025-07-01 | Stop reason: HOSPADM

## 2025-07-01 RX ORDER — PANTOPRAZOLE SODIUM 40 MG/1
40 TABLET, DELAYED RELEASE ORAL DAILY
Status: DISPENSED | OUTPATIENT
Start: 2025-07-01

## 2025-07-01 RX ORDER — HYDROMORPHONE HCL IN WATER/PF 6 MG/30 ML
0.4 PATIENT CONTROLLED ANALGESIA SYRINGE INTRAVENOUS
Status: DISCONTINUED | OUTPATIENT
Start: 2025-07-01 | End: 2025-07-02

## 2025-07-01 RX ORDER — PROPOFOL 10 MG/ML
INJECTION, EMULSION INTRAVENOUS PRN
Status: DISCONTINUED | OUTPATIENT
Start: 2025-07-01 | End: 2025-07-01

## 2025-07-01 RX ORDER — NALOXONE HYDROCHLORIDE 0.4 MG/ML
0.4 INJECTION, SOLUTION INTRAMUSCULAR; INTRAVENOUS; SUBCUTANEOUS
Status: ACTIVE | OUTPATIENT
Start: 2025-07-01

## 2025-07-01 RX ORDER — VECURONIUM BROMIDE 1 MG/ML
INJECTION, POWDER, LYOPHILIZED, FOR SOLUTION INTRAVENOUS PRN
Status: DISCONTINUED | OUTPATIENT
Start: 2025-07-01 | End: 2025-07-01

## 2025-07-01 RX ORDER — HYDRALAZINE HYDROCHLORIDE 20 MG/ML
10 INJECTION INTRAMUSCULAR; INTRAVENOUS EVERY 30 MIN PRN
Status: DISCONTINUED | OUTPATIENT
Start: 2025-07-01 | End: 2025-07-03

## 2025-07-01 RX ORDER — MAGNESIUM HYDROXIDE 1200 MG/15ML
LIQUID ORAL PRN
Status: DISCONTINUED | OUTPATIENT
Start: 2025-07-01 | End: 2025-07-01 | Stop reason: HOSPADM

## 2025-07-01 RX ORDER — ASPIRIN 81 MG/1
81 TABLET, CHEWABLE ORAL
Status: COMPLETED | OUTPATIENT
Start: 2025-07-01 | End: 2025-07-01

## 2025-07-01 RX ORDER — ONDANSETRON 2 MG/ML
4 INJECTION INTRAMUSCULAR; INTRAVENOUS EVERY 6 HOURS PRN
Status: DISPENSED | OUTPATIENT
Start: 2025-07-01

## 2025-07-01 RX ORDER — ACETAMINOPHEN 325 MG/1
975 TABLET ORAL ONCE
Status: COMPLETED | OUTPATIENT
Start: 2025-07-01 | End: 2025-07-01

## 2025-07-01 RX ORDER — SODIUM CHLORIDE, SODIUM LACTATE, POTASSIUM CHLORIDE, CALCIUM CHLORIDE 600; 310; 30; 20 MG/100ML; MG/100ML; MG/100ML; MG/100ML
INJECTION, SOLUTION INTRAVENOUS CONTINUOUS PRN
Status: DISCONTINUED | OUTPATIENT
Start: 2025-07-01 | End: 2025-07-01

## 2025-07-01 RX ORDER — POLYETHYLENE GLYCOL 3350 17 G/17G
17 POWDER, FOR SOLUTION ORAL DAILY
Status: DISCONTINUED | OUTPATIENT
Start: 2025-07-02 | End: 2025-07-02

## 2025-07-01 RX ORDER — NALOXONE HYDROCHLORIDE 0.4 MG/ML
0.2 INJECTION, SOLUTION INTRAMUSCULAR; INTRAVENOUS; SUBCUTANEOUS
Status: ACTIVE | OUTPATIENT
Start: 2025-07-01

## 2025-07-01 RX ORDER — BISACODYL 10 MG
10 SUPPOSITORY, RECTAL RECTAL DAILY PRN
Status: ACTIVE | OUTPATIENT
Start: 2025-07-04

## 2025-07-01 RX ORDER — LIDOCAINE HYDROCHLORIDE 20 MG/ML
INJECTION, SOLUTION INFILTRATION; PERINEURAL PRN
Status: DISCONTINUED | OUTPATIENT
Start: 2025-07-01 | End: 2025-07-01

## 2025-07-01 RX ORDER — PROCHLORPERAZINE MALEATE 5 MG/1
5 TABLET ORAL EVERY 6 HOURS PRN
Status: ACTIVE | OUTPATIENT
Start: 2025-07-01

## 2025-07-01 RX ORDER — FENTANYL CITRATE 50 UG/ML
INJECTION, SOLUTION INTRAMUSCULAR; INTRAVENOUS PRN
Status: DISCONTINUED | OUTPATIENT
Start: 2025-07-01 | End: 2025-07-01

## 2025-07-01 RX ORDER — AMOXICILLIN 250 MG
1 CAPSULE ORAL 2 TIMES DAILY
Status: DISCONTINUED | OUTPATIENT
Start: 2025-07-01 | End: 2025-07-03

## 2025-07-01 RX ORDER — ASPIRIN 81 MG/1
162 TABLET, CHEWABLE ORAL
Status: COMPLETED | OUTPATIENT
Start: 2025-07-01 | End: 2025-07-01

## 2025-07-01 RX ORDER — CEFAZOLIN SODIUM/WATER 2 G/20 ML
2 SYRINGE (ML) INTRAVENOUS SEE ADMIN INSTRUCTIONS
Status: DISCONTINUED | OUTPATIENT
Start: 2025-07-01 | End: 2025-07-01 | Stop reason: HOSPADM

## 2025-07-01 RX ORDER — EPINEPHRINE IN 0.9 % SOD CHLOR 5 MG/250ML
.01-.1 PLASTIC BAG, INJECTION (ML) INTRAVENOUS CONTINUOUS
Status: DISCONTINUED | OUTPATIENT
Start: 2025-07-01 | End: 2025-07-02

## 2025-07-01 RX ORDER — LIDOCAINE 40 MG/G
CREAM TOPICAL
Status: ACTIVE | OUTPATIENT
Start: 2025-07-01

## 2025-07-01 RX ORDER — NICOTINE POLACRILEX 4 MG
15-30 LOZENGE BUCCAL
Status: DISCONTINUED | OUTPATIENT
Start: 2025-07-01 | End: 2025-07-02

## 2025-07-01 RX ORDER — PROTAMINE SULFATE 10 MG/ML
INJECTION, SOLUTION INTRAVENOUS PRN
Status: DISCONTINUED | OUTPATIENT
Start: 2025-07-01 | End: 2025-07-01

## 2025-07-01 RX ORDER — CALCIUM GLUCONATE 20 MG/ML
1 INJECTION, SOLUTION INTRAVENOUS
Status: DISCONTINUED | OUTPATIENT
Start: 2025-07-01 | End: 2025-07-02

## 2025-07-01 RX ORDER — NOREPINEPHRINE BITARTRATE 0.02 MG/ML
INJECTION, SOLUTION INTRAVENOUS CONTINUOUS PRN
Status: DISCONTINUED | OUTPATIENT
Start: 2025-07-01 | End: 2025-07-01

## 2025-07-01 RX ORDER — DEXTROSE MONOHYDRATE 25 G/50ML
25-50 INJECTION, SOLUTION INTRAVENOUS
Status: DISCONTINUED | OUTPATIENT
Start: 2025-07-01 | End: 2025-07-02

## 2025-07-01 RX ORDER — OXYCODONE HYDROCHLORIDE 5 MG/1
5 TABLET ORAL EVERY 4 HOURS PRN
Status: ACTIVE | OUTPATIENT
Start: 2025-07-01

## 2025-07-01 RX ORDER — CHLORHEXIDINE GLUCONATE ORAL RINSE 1.2 MG/ML
10 SOLUTION DENTAL ONCE
Status: COMPLETED | OUTPATIENT
Start: 2025-07-01 | End: 2025-07-01

## 2025-07-01 RX ORDER — OXYCODONE HYDROCHLORIDE 5 MG/1
10 TABLET ORAL EVERY 4 HOURS PRN
Status: DISPENSED | OUTPATIENT
Start: 2025-07-01

## 2025-07-01 RX ORDER — DEXMEDETOMIDINE HYDROCHLORIDE 4 UG/ML
.2-.7 INJECTION, SOLUTION INTRAVENOUS CONTINUOUS
Status: DISCONTINUED | OUTPATIENT
Start: 2025-07-01 | End: 2025-07-02

## 2025-07-01 RX ORDER — VANCOMYCIN HYDROCHLORIDE 1 G/200ML
1000 INJECTION, SOLUTION INTRAVENOUS EVERY 12 HOURS
Status: COMPLETED | OUTPATIENT
Start: 2025-07-01 | End: 2025-07-02

## 2025-07-01 RX ORDER — NOREPINEPHRINE BITARTRATE 0.02 MG/ML
.01-.15 INJECTION, SOLUTION INTRAVENOUS CONTINUOUS PRN
Status: DISCONTINUED | OUTPATIENT
Start: 2025-07-01 | End: 2025-07-02

## 2025-07-01 RX ORDER — SODIUM CHLORIDE, SODIUM LACTATE, POTASSIUM CHLORIDE, CALCIUM CHLORIDE 600; 310; 30; 20 MG/100ML; MG/100ML; MG/100ML; MG/100ML
INJECTION, SOLUTION INTRAVENOUS CONTINUOUS
Status: DISCONTINUED | OUTPATIENT
Start: 2025-07-01 | End: 2025-07-01 | Stop reason: HOSPADM

## 2025-07-01 RX ORDER — METHOCARBAMOL 500 MG/1
250 TABLET ORAL EVERY 6 HOURS PRN
Status: DISCONTINUED | OUTPATIENT
Start: 2025-07-01 | End: 2025-07-02

## 2025-07-01 RX ORDER — PROPOFOL 10 MG/ML
INJECTION, EMULSION INTRAVENOUS CONTINUOUS PRN
Status: DISCONTINUED | OUTPATIENT
Start: 2025-07-01 | End: 2025-07-01

## 2025-07-01 RX ORDER — HEPARIN SOD,PORCINE/0.9 % NACL 30K/1000ML
INTRAVENOUS SOLUTION INTRAVENOUS
Status: DISCONTINUED | OUTPATIENT
Start: 2025-07-01 | End: 2025-07-01 | Stop reason: HOSPADM

## 2025-07-01 RX ORDER — CEFAZOLIN SODIUM 1 G/3ML
1 INJECTION, POWDER, FOR SOLUTION INTRAMUSCULAR; INTRAVENOUS EVERY 8 HOURS
Status: COMPLETED | OUTPATIENT
Start: 2025-07-01 | End: 2025-07-02

## 2025-07-01 RX ORDER — ONDANSETRON 4 MG/1
4 TABLET, ORALLY DISINTEGRATING ORAL EVERY 6 HOURS PRN
Status: ACTIVE | OUTPATIENT
Start: 2025-07-01

## 2025-07-01 RX ORDER — CALCIUM CHLORIDE 100 MG/ML
INJECTION INTRAVENOUS; INTRAVENTRICULAR PRN
Status: DISCONTINUED | OUTPATIENT
Start: 2025-07-01 | End: 2025-07-01

## 2025-07-01 RX ORDER — HYDRALAZINE HYDROCHLORIDE 20 MG/ML
10 INJECTION INTRAMUSCULAR; INTRAVENOUS EVERY 30 MIN PRN
Status: DISCONTINUED | OUTPATIENT
Start: 2025-07-01 | End: 2025-07-01

## 2025-07-01 RX ORDER — SODIUM CHLORIDE 9 MG/ML
INJECTION, SOLUTION INTRAVENOUS CONTINUOUS PRN
Status: DISCONTINUED | OUTPATIENT
Start: 2025-07-01 | End: 2025-07-01

## 2025-07-01 RX ORDER — VASOPRESSIN IN 0.9 % NACL 2 UNIT/2ML
SYRINGE (ML) INTRAVENOUS PRN
Status: DISCONTINUED | OUTPATIENT
Start: 2025-07-01 | End: 2025-07-01

## 2025-07-01 RX ORDER — CEFAZOLIN SODIUM/WATER 2 G/20 ML
2 SYRINGE (ML) INTRAVENOUS
Status: COMPLETED | OUTPATIENT
Start: 2025-07-01 | End: 2025-07-01

## 2025-07-01 RX ORDER — FAMOTIDINE 20 MG/1
20 TABLET, FILM COATED ORAL
Status: COMPLETED | OUTPATIENT
Start: 2025-07-01 | End: 2025-07-01

## 2025-07-01 RX ORDER — PROPOFOL 10 MG/ML
5-75 INJECTION, EMULSION INTRAVENOUS CONTINUOUS
Status: DISCONTINUED | OUTPATIENT
Start: 2025-07-01 | End: 2025-07-02

## 2025-07-01 RX ORDER — HYDROMORPHONE HCL IN WATER/PF 6 MG/30 ML
0.2 PATIENT CONTROLLED ANALGESIA SYRINGE INTRAVENOUS
Status: DISCONTINUED | OUTPATIENT
Start: 2025-07-01 | End: 2025-07-03

## 2025-07-01 RX ORDER — CALCIUM GLUCONATE 20 MG/ML
2 INJECTION, SOLUTION INTRAVENOUS
Status: DISCONTINUED | OUTPATIENT
Start: 2025-07-01 | End: 2025-07-02

## 2025-07-01 RX ORDER — DIPHENHYDRAMINE HYDROCHLORIDE 50 MG/ML
INJECTION, SOLUTION INTRAMUSCULAR; INTRAVENOUS PRN
Status: DISCONTINUED | OUTPATIENT
Start: 2025-07-01 | End: 2025-07-01

## 2025-07-01 RX ORDER — HEPARIN SODIUM 5000 [USP'U]/.5ML
5000 INJECTION, SOLUTION INTRAVENOUS; SUBCUTANEOUS EVERY 8 HOURS
Status: DISPENSED | OUTPATIENT
Start: 2025-07-02

## 2025-07-01 RX ORDER — LIDOCAINE HYDROCHLORIDE 10 MG/ML
INJECTION, SOLUTION INFILTRATION; PERINEURAL
Status: COMPLETED | OUTPATIENT
Start: 2025-07-01 | End: 2025-07-01

## 2025-07-01 RX ORDER — HEPARIN SODIUM 1000 [USP'U]/ML
INJECTION, SOLUTION INTRAVENOUS; SUBCUTANEOUS PRN
Status: DISCONTINUED | OUTPATIENT
Start: 2025-07-01 | End: 2025-07-01

## 2025-07-01 RX ORDER — ACETAMINOPHEN 325 MG/1
975 TABLET ORAL EVERY 8 HOURS
Status: DISPENSED | OUTPATIENT
Start: 2025-07-01

## 2025-07-01 RX ORDER — LIDOCAINE 40 MG/G
CREAM TOPICAL
Status: DISCONTINUED | OUTPATIENT
Start: 2025-07-01 | End: 2025-07-01 | Stop reason: HOSPADM

## 2025-07-01 RX ADMIN — Medication 2 G: at 11:41

## 2025-07-01 RX ADMIN — HYDROMORPHONE HYDROCHLORIDE 0.4 MG: 0.2 INJECTION, SOLUTION INTRAMUSCULAR; INTRAVENOUS; SUBCUTANEOUS at 22:45

## 2025-07-01 RX ADMIN — FENTANYL CITRATE 100 MCG: 50 INJECTION INTRAMUSCULAR; INTRAVENOUS at 08:12

## 2025-07-01 RX ADMIN — VANCOMYCIN HYDROCHLORIDE 1000 MG: 1 INJECTION, SOLUTION INTRAVENOUS at 14:50

## 2025-07-01 RX ADMIN — SODIUM CHLORIDE: 900 INJECTION INTRAVENOUS at 12:07

## 2025-07-01 RX ADMIN — ONDANSETRON 4 MG: 2 INJECTION, SOLUTION INTRAMUSCULAR; INTRAVENOUS at 17:45

## 2025-07-01 RX ADMIN — LIDOCAINE HYDROCHLORIDE 100 MG: 20 INJECTION, SOLUTION INFILTRATION; PERINEURAL at 07:35

## 2025-07-01 RX ADMIN — MIDAZOLAM 2 MG: 1 INJECTION INTRAMUSCULAR; INTRAVENOUS at 06:51

## 2025-07-01 RX ADMIN — LIDOCAINE HYDROCHLORIDE 2 ML: 10 INJECTION, SOLUTION INFILTRATION; PERINEURAL at 06:54

## 2025-07-01 RX ADMIN — ACETAMINOPHEN 975 MG: 325 TABLET, FILM COATED ORAL at 21:23

## 2025-07-01 RX ADMIN — FENTANYL CITRATE 100 MCG: 50 INJECTION INTRAMUSCULAR; INTRAVENOUS at 08:24

## 2025-07-01 RX ADMIN — FENTANYL CITRATE 150 MCG: 50 INJECTION INTRAMUSCULAR; INTRAVENOUS at 12:16

## 2025-07-01 RX ADMIN — SODIUM CHLORIDE, SODIUM LACTATE, POTASSIUM CHLORIDE, AND CALCIUM CHLORIDE 500 ML: .6; .31; .03; .02 INJECTION, SOLUTION INTRAVENOUS at 16:15

## 2025-07-01 RX ADMIN — CHLORHEXIDINE GLUCONATE 10 ML: 1.2 SOLUTION ORAL at 06:23

## 2025-07-01 RX ADMIN — DIPHENHYDRAMINE HYDROCHLORIDE 25 MG: 50 INJECTION, SOLUTION INTRAMUSCULAR; INTRAVENOUS at 11:47

## 2025-07-01 RX ADMIN — NOREPINEPHRINE BITARTRATE 12.8 MCG: 1 INJECTION, SOLUTION, CONCENTRATE INTRAVENOUS at 07:56

## 2025-07-01 RX ADMIN — NICARDIPINE HYDROCHLORIDE 2.5 MG/HR: 0.2 INJECTION INTRAVENOUS at 14:14

## 2025-07-01 RX ADMIN — ACETAMINOPHEN 975 MG: 325 TABLET, FILM COATED ORAL at 06:20

## 2025-07-01 RX ADMIN — VECURONIUM BROMIDE 3 MG: 1 INJECTION, POWDER, LYOPHILIZED, FOR SOLUTION INTRAVENOUS at 11:19

## 2025-07-01 RX ADMIN — NOREPINEPHRINE BITARTRATE 8 MCG: 1 INJECTION, SOLUTION, CONCENTRATE INTRAVENOUS at 13:18

## 2025-07-01 RX ADMIN — PROTAMINE SULFATE 300 MG: 10 INJECTION, SOLUTION INTRAVENOUS at 11:43

## 2025-07-01 RX ADMIN — NOREPINEPHRINE BITARTRATE 8 MCG: 1 INJECTION, SOLUTION, CONCENTRATE INTRAVENOUS at 08:47

## 2025-07-01 RX ADMIN — PROTAMINE SULFATE 25 MG: 10 INJECTION, SOLUTION INTRAVENOUS at 12:42

## 2025-07-01 RX ADMIN — DEXMEDETOMIDINE HYDROCHLORIDE 0.2 MCG/KG/HR: 400 INJECTION INTRAVENOUS at 15:14

## 2025-07-01 RX ADMIN — FENTANYL CITRATE 100 MCG: 50 INJECTION INTRAMUSCULAR; INTRAVENOUS at 13:29

## 2025-07-01 RX ADMIN — HEPARIN SODIUM 25000 UNITS: 1000 INJECTION INTRAVENOUS; SUBCUTANEOUS at 11:58

## 2025-07-01 RX ADMIN — HYDROMORPHONE HYDROCHLORIDE 0.2 MG: 0.2 INJECTION, SOLUTION INTRAMUSCULAR; INTRAVENOUS; SUBCUTANEOUS at 20:07

## 2025-07-01 RX ADMIN — EPINEPHRINE 0.01 MCG/KG/MIN: 1 INJECTION INTRAMUSCULAR; INTRAVENOUS; SUBCUTANEOUS at 11:23

## 2025-07-01 RX ADMIN — SODIUM CHLORIDE, SODIUM LACTATE, POTASSIUM CHLORIDE, AND CALCIUM CHLORIDE: .6; .31; .03; .02 INJECTION, SOLUTION INTRAVENOUS at 08:07

## 2025-07-01 RX ADMIN — CALCIUM CHLORIDE INJECTION 1 G: 100 INJECTION, SOLUTION INTRAVENOUS at 13:43

## 2025-07-01 RX ADMIN — NOREPINEPHRINE BITARTRATE 8 MCG: 1 INJECTION, SOLUTION, CONCENTRATE INTRAVENOUS at 13:07

## 2025-07-01 RX ADMIN — METHOCARBAMOL 250 MG: 500 TABLET ORAL at 23:32

## 2025-07-01 RX ADMIN — MIDAZOLAM HYDROCHLORIDE 3 MG: 1 INJECTION, SOLUTION INTRAMUSCULAR; INTRAVENOUS at 07:35

## 2025-07-01 RX ADMIN — NOREPINEPHRINE BITARTRATE 8 MCG: 1 INJECTION, SOLUTION, CONCENTRATE INTRAVENOUS at 11:49

## 2025-07-01 RX ADMIN — FAMOTIDINE 20 MG: 20 TABLET, FILM COATED ORAL at 06:22

## 2025-07-01 RX ADMIN — FENTANYL CITRATE 100 MCG: 50 INJECTION INTRAMUSCULAR; INTRAVENOUS at 12:09

## 2025-07-01 RX ADMIN — Medication 40 MG: at 14:34

## 2025-07-01 RX ADMIN — NOREPINEPHRINE BITARTRATE 8 MCG: 1 INJECTION, SOLUTION, CONCENTRATE INTRAVENOUS at 12:31

## 2025-07-01 RX ADMIN — PROPOFOL 50 MCG/KG/MIN: 10 INJECTION, EMULSION INTRAVENOUS at 13:00

## 2025-07-01 RX ADMIN — HYDROMORPHONE HYDROCHLORIDE 0.2 MG: 0.2 INJECTION, SOLUTION INTRAMUSCULAR; INTRAVENOUS; SUBCUTANEOUS at 16:40

## 2025-07-01 RX ADMIN — ACETAMINOPHEN 975 MG: 325 TABLET, FILM COATED ORAL at 14:50

## 2025-07-01 RX ADMIN — PROPOFOL 40 MG: 10 INJECTION, EMULSION INTRAVENOUS at 08:24

## 2025-07-01 RX ADMIN — NOREPINEPHRINE BITARTRATE 12.8 MCG: 1 INJECTION, SOLUTION, CONCENTRATE INTRAVENOUS at 07:35

## 2025-07-01 RX ADMIN — PROPOFOL 120 MG: 10 INJECTION, EMULSION INTRAVENOUS at 07:35

## 2025-07-01 RX ADMIN — VECURONIUM BROMIDE 2 MG: 1 INJECTION, POWDER, LYOPHILIZED, FOR SOLUTION INTRAVENOUS at 12:03

## 2025-07-01 RX ADMIN — SODIUM CHLORIDE, SODIUM LACTATE, POTASSIUM CHLORIDE, AND CALCIUM CHLORIDE 250 ML: .6; .31; .03; .02 INJECTION, SOLUTION INTRAVENOUS at 14:34

## 2025-07-01 RX ADMIN — ROCURONIUM BROMIDE 100 MG: 50 INJECTION, SOLUTION INTRAVENOUS at 07:35

## 2025-07-01 RX ADMIN — NOREPINEPHRINE BITARTRATE 6.4 MCG: 1 INJECTION, SOLUTION, CONCENTRATE INTRAVENOUS at 08:27

## 2025-07-01 RX ADMIN — VECURONIUM BROMIDE 5 MG: 1 INJECTION, POWDER, LYOPHILIZED, FOR SOLUTION INTRAVENOUS at 09:36

## 2025-07-01 RX ADMIN — PROPOFOL 50 MG: 10 INJECTION, EMULSION INTRAVENOUS at 08:26

## 2025-07-01 RX ADMIN — NOREPINEPHRINE BITARTRATE 12.8 MCG: 1 INJECTION, SOLUTION, CONCENTRATE INTRAVENOUS at 07:44

## 2025-07-01 RX ADMIN — NOREPINEPHRINE BITARTRATE 8 MCG: 1 INJECTION, SOLUTION, CONCENTRATE INTRAVENOUS at 12:25

## 2025-07-01 RX ADMIN — CEFAZOLIN 1 G: 1 INJECTION, POWDER, FOR SOLUTION INTRAMUSCULAR; INTRAVENOUS at 19:27

## 2025-07-01 RX ADMIN — NOREPINEPHRINE BITARTRATE 8 MCG: 1 INJECTION, SOLUTION, CONCENTRATE INTRAVENOUS at 11:58

## 2025-07-01 RX ADMIN — NOREPINEPHRINE BITARTRATE 0.02 MCG/KG/MIN: 0.02 INJECTION, SOLUTION INTRAVENOUS at 08:07

## 2025-07-01 RX ADMIN — FENTANYL CITRATE 100 MCG: 50 INJECTION INTRAMUSCULAR; INTRAVENOUS at 11:50

## 2025-07-01 RX ADMIN — OXYCODONE HYDROCHLORIDE 10 MG: 5 TABLET ORAL at 17:22

## 2025-07-01 RX ADMIN — NOREPINEPHRINE BITARTRATE 8 MCG: 1 INJECTION, SOLUTION, CONCENTRATE INTRAVENOUS at 12:01

## 2025-07-01 RX ADMIN — METOPROLOL TARTRATE 12.5 MG: 25 TABLET, FILM COATED ORAL at 06:21

## 2025-07-01 RX ADMIN — Medication 2 G: at 07:35

## 2025-07-01 RX ADMIN — PROPOFOL 50 MG: 10 INJECTION, EMULSION INTRAVENOUS at 12:26

## 2025-07-01 RX ADMIN — Medication 0.5 UNITS: at 11:53

## 2025-07-01 RX ADMIN — ALBUMIN (HUMAN): 12.5 SOLUTION INTRAVENOUS at 12:06

## 2025-07-01 RX ADMIN — NOREPINEPHRINE BITARTRATE 6.4 MCG: 1 INJECTION, SOLUTION, CONCENTRATE INTRAVENOUS at 08:18

## 2025-07-01 RX ADMIN — Medication 0.25 UNITS: at 11:58

## 2025-07-01 RX ADMIN — SODIUM CHLORIDE: 900 INJECTION INTRAVENOUS at 08:07

## 2025-07-01 RX ADMIN — NOREPINEPHRINE BITARTRATE 12.8 MCG: 1 INJECTION, SOLUTION, CONCENTRATE INTRAVENOUS at 07:53

## 2025-07-01 RX ADMIN — NOREPINEPHRINE BITARTRATE 8 MCG: 1 INJECTION, SOLUTION, CONCENTRATE INTRAVENOUS at 12:22

## 2025-07-01 RX ADMIN — MIDAZOLAM HYDROCHLORIDE 1 MG: 1 INJECTION, SOLUTION INTRAMUSCULAR; INTRAVENOUS at 10:07

## 2025-07-01 RX ADMIN — SODIUM CHLORIDE, SODIUM LACTATE, POTASSIUM CHLORIDE, AND CALCIUM CHLORIDE: .6; .31; .03; .02 INJECTION, SOLUTION INTRAVENOUS at 06:42

## 2025-07-01 RX ADMIN — METHOCARBAMOL 250 MG: 500 TABLET ORAL at 17:23

## 2025-07-01 RX ADMIN — FENTANYL CITRATE 250 MCG: 50 INJECTION INTRAMUSCULAR; INTRAVENOUS at 07:35

## 2025-07-01 RX ADMIN — PROTAMINE SULFATE 250 MG: 10 INJECTION, SOLUTION INTRAVENOUS at 12:06

## 2025-07-01 RX ADMIN — DEXMEDETOMIDINE HYDROCHLORIDE 0.2 MCG/KG/HR: 400 INJECTION INTRAVENOUS at 20:07

## 2025-07-01 RX ADMIN — DIPHENHYDRAMINE HYDROCHLORIDE 25 MG: 50 INJECTION, SOLUTION INTRAMUSCULAR; INTRAVENOUS at 11:52

## 2025-07-01 RX ADMIN — NOREPINEPHRINE BITARTRATE 12.8 MCG: 1 INJECTION, SOLUTION, CONCENTRATE INTRAVENOUS at 07:47

## 2025-07-01 RX ADMIN — SODIUM CHLORIDE, SODIUM LACTATE, POTASSIUM CHLORIDE, AND CALCIUM CHLORIDE 500 ML: .6; .31; .03; .02 INJECTION, SOLUTION INTRAVENOUS at 23:10

## 2025-07-01 RX ADMIN — HEPARIN SODIUM 25000 UNITS: 1000 INJECTION INTRAVENOUS; SUBCUTANEOUS at 08:29

## 2025-07-01 RX ADMIN — MIDAZOLAM HYDROCHLORIDE 1 MG: 1 INJECTION, SOLUTION INTRAMUSCULAR; INTRAVENOUS at 11:43

## 2025-07-01 RX ADMIN — NOREPINEPHRINE BITARTRATE 12.8 MCG: 1 INJECTION, SOLUTION, CONCENTRATE INTRAVENOUS at 08:44

## 2025-07-01 RX ADMIN — FENTANYL CITRATE 100 MCG: 50 INJECTION INTRAMUSCULAR; INTRAVENOUS at 11:44

## 2025-07-01 RX ADMIN — HYDROMORPHONE HYDROCHLORIDE 0.4 MG: 0.2 INJECTION, SOLUTION INTRAMUSCULAR; INTRAVENOUS; SUBCUTANEOUS at 18:09

## 2025-07-01 RX ADMIN — SENNOSIDES AND DOCUSATE SODIUM 1 TABLET: 50; 8.6 TABLET ORAL at 21:23

## 2025-07-01 RX ADMIN — AMINOCAPROIC ACID 5 G/HR: 250 INJECTION, SOLUTION INTRAVENOUS at 08:11

## 2025-07-01 RX ADMIN — PROPOFOL 30 MG: 10 INJECTION, EMULSION INTRAVENOUS at 12:03

## 2025-07-01 RX ADMIN — FENTANYL CITRATE 100 MCG: 50 INJECTION INTRAMUSCULAR; INTRAVENOUS at 11:39

## 2025-07-01 RX ADMIN — OXYCODONE HYDROCHLORIDE 10 MG: 5 TABLET ORAL at 21:23

## 2025-07-01 RX ADMIN — NOREPINEPHRINE BITARTRATE 12.8 MCG: 1 INJECTION, SOLUTION, CONCENTRATE INTRAVENOUS at 08:07

## 2025-07-01 RX ADMIN — NOREPINEPHRINE BITARTRATE 6.4 MCG: 1 INJECTION, SOLUTION, CONCENTRATE INTRAVENOUS at 08:20

## 2025-07-01 RX ADMIN — NOREPINEPHRINE BITARTRATE 8 MCG: 1 INJECTION, SOLUTION, CONCENTRATE INTRAVENOUS at 12:15

## 2025-07-01 RX ADMIN — NOREPINEPHRINE BITARTRATE 6.4 MCG: 1 INJECTION, SOLUTION, CONCENTRATE INTRAVENOUS at 08:34

## 2025-07-01 RX ADMIN — NOREPINEPHRINE BITARTRATE 6.4 MCG: 1 INJECTION, SOLUTION, CONCENTRATE INTRAVENOUS at 08:29

## 2025-07-01 RX ADMIN — NOREPINEPHRINE BITARTRATE 6.4 MCG: 1 INJECTION, SOLUTION, CONCENTRATE INTRAVENOUS at 07:40

## 2025-07-01 RX ADMIN — Medication 0.5 UNITS: at 12:25

## 2025-07-01 RX ADMIN — Medication 200 MG: at 13:50

## 2025-07-01 RX ADMIN — NOREPINEPHRINE BITARTRATE 8 MCG: 1 INJECTION, SOLUTION, CONCENTRATE INTRAVENOUS at 11:41

## 2025-07-01 RX ADMIN — PROPOFOL 20 MG: 10 INJECTION, EMULSION INTRAVENOUS at 12:46

## 2025-07-01 ASSESSMENT — ACTIVITIES OF DAILY LIVING (ADL)
ADLS_ACUITY_SCORE: 17
ADLS_ACUITY_SCORE: 22
ADLS_ACUITY_SCORE: 17
ADLS_ACUITY_SCORE: 22
ADLS_ACUITY_SCORE: 22
ADLS_ACUITY_SCORE: 17
ADLS_ACUITY_SCORE: 17
ADLS_ACUITY_SCORE: 22
ADLS_ACUITY_SCORE: 17
ADLS_ACUITY_SCORE: 17
ADLS_ACUITY_SCORE: 22
ADLS_ACUITY_SCORE: 17
ADLS_ACUITY_SCORE: 22
ADLS_ACUITY_SCORE: 17

## 2025-07-01 ASSESSMENT — ENCOUNTER SYMPTOMS
SEIZURES: 0
DYSRHYTHMIAS: 0

## 2025-07-01 ASSESSMENT — LIFESTYLE VARIABLES: TOBACCO_USE: 1

## 2025-07-01 NOTE — ANESTHESIA PROCEDURE NOTES
Arterial Line Procedure Note    Pre-Procedure   Staff -        Anesthesiologist:  Malinda Lopez MD       Performed By: anesthesiologist       Location: pre-op       Pre-Anesthestic Checklist: patient identified, IV checked, risks and benefits discussed, informed consent, monitors and equipment checked, pre-op evaluation and at physician/surgeon's request  Timeout:       Correct Patient: Yes        Correct Procedure: Yes        Correct Site: Yes        Correct Position: Yes   Line Placement:   This line was placed Pre Induction starting at 7/1/2025 6:54 AM and ending at 7/1/2025 6:57 AM  Procedure   Procedure: arterial line       Diagnosis: aortic stenosis       Laterality: left       Insertion Site: radial.  Sterile Prep        Standard elements of sterile barrier followed       Skin prep: Chloraprep  Insertion/Injection        Technique: ultrasound guided        1. Ultrasound was used to evaluate the access site.       2. Artery evaluated via ultrasound for patency/adequacy.       3. Using real-time ultrasound the needle/catheter was observed entering the artery/vein.       4. Permanent image was captured and entered into the patient's record.       5. The visualized structures were anatomically normal.       6. There were no apparent abnormal pathologic findings.       Catheter Type/Size: 20 G, 1.75 in/4.5 cm quick cath (integral wire)  Narrative         Secured by: anchor securement device       Tegaderm dressing used.       Complications: None apparent,        Arterial waveform: Yes        IBP within 10% of NIBP: Yes

## 2025-07-01 NOTE — ANESTHESIA PROCEDURE NOTES
Central Line/PA Catheter Placement    Pre-Procedure   Staff -        Anesthesiologist:  Malinda Lopez MD       Performed By: anesthesiologist       Location: OR       Pre-Anesthestic Checklist: patient identified, IV checked, site marked, risks and benefits discussed, informed consent, monitors and equipment checked, pre-op evaluation and at physician/surgeon's request  Timeout:       Correct Patient: Yes        Correct Procedure: Yes        Correct Site: Yes        Correct Position: Yes        Correct Laterality: Yes   Line Placement:   This line was placed Post Induction    Procedure   Procedure: central line       Diagnosis: aortic stenoisis       Laterality: right       Insertion Site: internal jugular.       Patient Position: Trendelenburg  Sterile Prep        All elements of maximal sterile barrier technique followed       Patient Prep/Sterile Barriers: draped, hand hygiene, gloves , hat , mask , draped, gown, sterile gel and probe cover       Skin prep: Chloraprep  Insertion/Injection        Technique: ultrasound guided        1. Ultrasound was used to evaluate the access site.       2. Vein evaluated via ultrasound for patency/adequacy.       3. Using real-time ultrasound the needle/catheter was observed entering the artery/vein.       4. Permanent image was captured and entered into the patient's record.       5. The visualized structures were anatomically normal.       6. There were no apparent abnormal pathologic findings.       Introducer Type: 9 Fr, 2-lumen MAC (TLIC through  introducer)        Type: Introducer  Narrative         Secured by: suture       Tegaderm and Biopatch dressing used.       Complications: None apparent,        blood aspirated from all lumens,        All lumens flushed: Yes       Verification method: Ultrasound and BRADLEY       Tip termination: The catheter tip was threaded to reside in the superior vena cava subject to post surgical x-ray

## 2025-07-01 NOTE — BRIEF OP NOTE
[facility]    Brief Operative Note    Pre-operative diagnosis: Aortic valve stenosis [I35.0]  Post-operative diagnosis Same as pre-operative diagnosis    Procedure: AORTIC VALVE REPLACEMENT WITH TISSUE HEART VALVE INSPIRIS  RESILIA  AORTIC VALVE SIZE: 23MM AND BOVINE PERICARDIUM PATCH, LEFT ATRIAL APPENDAGE CLIPPING WITH ATRICURE ATRICLIP FLEX-V DEVICE SIZE: 45MM, ASCENDING AND NON-CORONARY SINUS REPLACEMENT WITH HEMASHIELD PLATINUM GRAFT SIZE: 28MM, STERNAL PLATE INSERTION, AND ON CARDIOPULMONARY PUMP OXYGENATOR (INTRAOPERATIVE TRANSESOPHAGEAL ECHOCARDIOGRAM BY ANESTHESIOLOGIST), N/A - Chest    Surgeon: Surgeons and Role:     * Mulvihill, Michael, MD - Primary     * Servando Hilton MD - Assisting     * Isabel Ashley PA-C - Assisting  Anesthesia: General   Estimated Blood Loss: 500 ml    Drains: Anterior and posterior mediastinal  Specimens:   ID Type Source Tests Collected by Time Destination   1 : AORTIC VALVE AND LEAFLETS, ASCENDING AORTA Tissue Heart Valve, Aortic SURGICAL PATHOLOGY EXAM Keesha Long APRN CRNA 7/1/2025 10:23 AM    A : STAT LABS S/P CARDIAC BYPASS Blood Line, arterial CBC WITH PLATELETS, BASIC METABOLIC PANEL, FIBRINOGEN ACTIVITY, PARTIAL THROMBOPLASTIN TIME, INR Keesha Long APRN CRNA 7/1/2025 11:54 AM      Findings:   Aortic valve was trileaflet with severe calcification. The ascending and root has significant calcium and difficulty with valve placement requiring non-coronary sinus replacement for safe valve placement.   .  Complications: None.  Implants:   Implant Name Type Inv. Item Serial No.  Lot No. LRB No. Used Action   IMP ATRICLIP FLEX V DEVICE SAUL EXLUSION 45MM ACHV45 - [id number] Clip IMP ATRICLIP FLEX V DEVICE SAUL EXLUSION 45MM ACHV45  [employer] INC [id number] N/A 1 Implanted   IMP PATCH PERICARDIUM PHOTOFIX BOVINE 6X8CM PFP6X8 - [id number] Bone/Tissue/Biologic IMP PATCH PERICARDIUM PHOTOFIX BOVINE 6X8CM PFP6X8 [id number] [facility]  [id number] N/A 1 Implanted   VALVE AORTIC INSPIRIS RESILLA [id number] SZ 23MM - [id number] Valve VALVE AORTIC INSPIRIS RESILLA [id number] SZ 23MM [id number] [employer]  N/A 1 Implanted   GRAFT HEMASHIELD PLATINUM [id number] - [id number] Graft GRAFT HEMASHIELD PLATINUM [id number] [employer] [id number] N/A 1 Implanted   SternaLock EZ PLATING SYSTEM CORE PLATES (SILVER) STERNALOCK EZ H PLATE, 6 HOLES - 115.102.06 Metallic Hardware/[place]   ANNA 42 09 [id number] N/A 2 Implanted   SternaLock EZ PLATING SYSTEM CORE PLATES (SILVER) STERNALOCK EZ O PLATE, 6 HOLES - 115.104.06 Metallic Hardware/[place]   ANNA 42 09 [id number] N/A 1 Implanted   SternaLock EZ PLATING SYSTEM 3.5 MM SELF LOCKING SCREWS BLUE 14 MM SCREW - 100.035.14 Metallic Hardware/[place]   ANNA 42 09 [id number] N/A 6 Implanted   SternaLock EZ PLATING SYSTEM 3.5 MM SELF LOCKING SCREWS GOLD 16 MM SCREW - 100.035.16 Metallic Hardware/[place]   ANNA 42 09 [id number] N/A 12 Implanted     Servando Hilton MD  Cardiothoracic Surgery Fellow

## 2025-07-01 NOTE — ANESTHESIA POSTPROCEDURE EVALUATION
Patient: Johan Navarro    Procedure: Procedure(s):  AORTIC VALVE REPLACEMENT WITH TISSUE HEART VALVE INSPIRIS  RESILIA  AORTIC VALVE SIZE: 23MM AND BOVINE PERICARDIUM PATCH, LEFT ATRIAL APPENDAGE CLIPPING WITH ATRICURE ATRICLIP FLEX-V DEVICE SIZE: 45MM, ASCENDING AND NON-CORONARY SINUS REPLACEMENT WITH HEMASHIELD PLATINUM GRAFT SIZE: 28MM, STERNAL PLATE INSERTION, AND ON CARDIOPULMONARY PUMP OXYGENATOR (INTRAOPERATIVE TRANSESOPHAGEAL ECHOCARDIOGRAM BY ANESTHESIOLOGIST)       Anesthesia Type:  General    Note:  Disposition: Admission; ICU            ICU Sign Out: Anesthesiologist/ICU physician sign out WAS performed   Postop Pain Control: Uneventful            Sign Out: Well controlled pain   PONV: No   Neuro/Psych: Uneventful            Sign Out: Acceptable/Baseline neuro status   Airway/Respiratory: Uneventful            Sign Out: Acceptable/Baseline resp. status; AIRWAY IN SITU/Resp. Support               Airway in situ/Resp. Support: ETT                 Reason: Planned Pre-op   CV/Hemodynamics: Uneventful            Sign Out: Detailed CV status               Blood Pressure: Pressors (NE @0.05)               Rate/Rhythm: Bradycardia (Underlying rhythm SB 50s. Currently A-paced @80. V-wires also in place and function, but not being used.)               Perfusion:  Adequate perfusion indices   Other NRE: NONE   DID A NON-ROUTINE EVENT OCCUR? No           Last vitals:  Vitals:    07/01/25 1639 07/01/25 1658 07/01/25 1700   BP:      Pulse: 88 86 85   Resp:      Temp: (!) 35.7  C (96.3  F)  36  C (96.8  F)   SpO2: 98% 100% 100%       Electronically Signed By: Malinda Lopez MD  July 1, 2025  5:33 PM

## 2025-07-01 NOTE — CONSULTS
CARDIAC SURGERY NUTRITION CONSULT    Received standing order to assess and educate patient.    Will follow and complete assessment once patient is extubated and/or is transferred to medical unit.    Patient will receive nutrition education during the Outpatient Cardiac Rehab Program (nutrition classes/dietitian counseling).    Natalie Shore RD, LD, [facility]   Clinical Dietitian - [facility]

## 2025-07-01 NOTE — PLAN OF CARE
Problem: Mechanical Ventilation Invasive  Goal: Effective Communication  Outcome: Met  Goal: Optimal Device Function  Outcome: Met  Goal: Mechanical Ventilation Liberation  Outcome: Met  Intervention: Promote Extubation and Mechanical Ventilation Liberation  Recent Flowsheet Documentation  Taken 7/1/2025 1600 by Victor Manuel Potts RN  Medication Review/Management: medications reviewed  Taken 7/1/2025 1500 by Victor Manuel Potts RN  Medication Review/Management: medications reviewed  Goal: Optimal Nutrition Delivery  Outcome: Met  Goal: Absence of Device-Related Skin and Tissue Injury  Outcome: Met  Goal: Absence of Ventilator-Induced Lung Injury  Outcome: Met  Intervention: Prevent Ventilator-Associated Pneumonia  Recent Flowsheet Documentation  Taken 7/1/2025 1800 by Victor Manuel Potts RN  Head of Bed (HOB) Positioning: HOB at 20-30 degrees  Taken 7/1/2025 1600 by Victor Manuel Potts RN  Head of Bed (HOB) Positioning: HOB at 20-30 degrees  Taken 7/1/2025 1500 by Victor Manuel Potts RN  Head of Bed (HOB) Positioning: HOB at 20-30 degrees   Goal Outcome Evaluation:       Assumed care at 1500, pt s/p AVR, was intubated and on nicardipine gtt. Patient was extubated at 1652 and in the chair at 1820. Received multiple prn pain medication, without improvement in pain per patient. Received 500cc lb bolus to help patient get off pressors.  Once in the chair need to restart levo 0.03 to keep map 65-70. Family updated with plan of care.

## 2025-07-01 NOTE — OP NOTE
Date of Surgery: 7/1/2025    Preop Diagnosis: Severe symptomatic AS  Postop Diagnosis: Same    Surgeon : Michael S. Mulvihill, M.D.  Assistant(s): Dr. Hilton and Lennie Ashley PA-C.    A first assistant actively participated and was necessary for one or more of the following: opening, exposure and visualization during the case, maintaining hemostasis, wound closure resulting in its safe and expeditious completion.     Procedures:  1. Aortic Valve Replacement (23mm Inspiris)  2. Ascending aortic and selective noncoronary sinus root replacement   3. Epiaortic Echo  4. Closure of the left atrial appendage (45mm Atriclip)    Anesthesia: GET  Approach : Median sternotomy  Drains: 2x mediastinal 32 fr  Pacing Wires: atrial and ventricular    Findings of Procedure:  Severe symptomatic aortic stenosis  Trileaflet aortic valve  Heavily calcified ascending aorta and noncoronary sinus    Estimated Blood Loss: 200 ml    Blood Products Administered : none  Disposition : ICU  Specimens : none  Indication : Severe, symptomatic aortic stenosis    Implant Name Type Inv. Item Serial No.  Lot No. LRB No. Used Action   IMP ATRICLIP FLEX V DEVICE SAUL EXLUSION 45MM ACHV45 - [id number] Clip IMP ATRICLIP FLEX V DEVICE SAUL EXLUSION 45MM ACHV45  [employer] INC [id number] N/A 1 Implanted   IMP PATCH PERICARDIUM PHOTOFIX BOVINE 6X8CM PFP6X8 - [id number] Bone/Tissue/Biologic IMP PATCH PERICARDIUM PHOTOFIX BOVINE 6X8CM PFP6X8 [id number] [facility] [id number] N/A 1 Implanted   VALVE AORTIC INSPIRIS RESILLA [id number] SZ 23MM - [id number] Valve VALVE AORTIC INSPIRIS RESILLA [id number] SZ 23MM [id number]  N/A 1 Implanted   GRAFT HEMASHIELD PLATINUM [id number] - [id number] Graft GRAFT HEMASHIELD PLATINUM [id number] [employer] [id number] N/A 1 Implanted   SternaLock EZ PLATING SYSTEM CORE PLATES (SILVER) STERNALOCK EZ H PLATE, 6 HOLES - 115.102.06 Metallic Hardware/[place]   ANNA 42 09 [id number]  N/A 2 Implanted   SternaLock EZ PLATING SYSTEM CORE PLATES (SILVER) STERNALOCK EZ O PLATE, 6 HOLES - 115.104.06 Metallic Hardware/[place]   ANNA 42 09 [id number] N/A 1 Implanted   SternaLock EZ PLATING SYSTEM 3.5 MM SELF LOCKING SCREWS BLUE 14 MM SCREW - 100.035.14 Metallic Hardware/[place]   ANNA 42 09 [id number] N/A 6 Implanted   SternaLock EZ PLATING SYSTEM 3.5 MM SELF LOCKING SCREWS GOLD 16 MM SCREW - 100.035.16 Metallic Hardware/[place]   ANNA 42 09 [id number] N/A 12 Implanted       Operative Indications / Brief History of Present Illness: This is a very pleasant 70 with severe, symptomatic aortic stenosis. He was evaluated by the multidisciplinary team. His anatomy is not suitable for a transcatheter procedure. In multidisciplinary review we offered surgical aortic valve replacement as the optimal therapy, and he concurred. He requested a tissue prosthesis. He follows with Dr. Galicia. He last saw him a year ago. [facility] is notable for CAD s/p PCI in 2021 to the OM and Cx. As noted by Dr. Benitez, he remains very active, walking about 10k steps per day and looking forward to getting back out on the golf course soon. He has been having modest Blanco but has not had any chest pain nor unstable symptoms.  As Dr. Benitez noted on account of his AVM's he has been poorly tolerant of DAPT previously. We discussed the rationale, risks, benefits, and expected outcomes associated with surgical valve replacement and he has asked to proceed.    Procedure in Detail:  The risks, benefits, complications, treatment options, and expected outcomes were discussed with the patient. The possibilities of reaction to medication, pulmonary aspiration, perforation of viscus, bleeding, recurrent infection, the need for additional procedures, failure to diagnose a condition, and creating a complication requiring transfusion or operation were discussed with the patient. The patient concurred with the proposed plan, giving informed consent. The  patient was taken to the operating room, identified as Johan Navarro and the procedure verified as aortic valve replacement. A Time Out was held and the above information confirmed.    Standard monitoring lines and Salvador catheter were placed. General anesthesia was induced. The patient was prepped and draped in a sterile fashion.     A standard median sternotomy was performed. The pericardium was then opened and the pericardial well was created.     Epiaortic echo was then completed. Images of the site for cannulation and cross clamp application were recorded and personally interpreted by me. There was significant plaque that was marked out to delineate appropriate locations for the requisite cannulas.    Concentric purse strings were placed and the aorta was cannulated with an 18Fr EOPA cannula. After adequate de-airing this cannula was connected to the CPB circuit. A purse string was placed around the right atrial appendage and it was cannulated with a dual stage venous cannula.     Cardiopulmonary bypass was then initiated.     The left atrial appendage was then occluded with a 50 mm Atriclip after verification by BRADLEY in the pre-bypass period that there was no evidence of clot in the SAUL.    An LV vent was placed through the right superior pulmonary vein. An antegrade vent/cardioplegia cannula was then placed. The aortic cross clamp was then applied and the heart was arrested with 1200 cc antegrade [place] cardioplegia. Cardioplegia was then administered intermittently to ensure adequate myocardial protection.     The field was flooded with CO2 during the duration of cardiotomy in order to displace air.    A transverse aortotomy was then made and the valve was inspected. To manage the high degree of ascending aortic calcium, I continued the aortotomy down into the noncoronary sinus and debrided the noncoronary sinus to adequately permit valve implantation.    The valve was trileflet and severely stenotic. The  valve leaflets were then excised and the LVOT was copiously irrigated of all debris. Pledgeted sub-annular 2-0 Ethibond sutures were then placed in the annulus in a horizontal mattress fashion. The annulus was then sized. A 23mm Inspiris valve was then implanted in the standard fashion. After implantation, the coronary ostia were found to be unobstructed.     The aorta was then sized and we elected to perform ascending and selective (noncoronary sinus) root replacement to reconstruct his aorta. An appropriately sized piece of dacron was brought into the field, fashioned, and run into place with two 4-0 prolene sutures. After completion of the proximal anastomosis, a root vent was placed into the graft and the graft pressurized with an additional dose of antegrade cardioplegia. This pressurized the root nicely and was found to be hemostatic. The distal anastomosis was then completed with a 4-0 prolene stitch.    After adequate deairing maneuvers, the cross clamp was removed and the heart was reperfused.    Examination of all surgical sites revealed good hemostasis. Atrial and ventricular epicardial pacing wires were then placed. The patient was fully ventilated and successfully weaned off of CPB.     The valve was inspected via BRADLEY and found to be functioning well with a mean gradient of 6mmHg and no paravalvular leak. There was preserved RV/LV function and no new wall motion abnormalities. As such, we proceeded with protamine administration.    After a test dose of protamine, the patient was fully decannulated. The heparin was fully reversed with protamine. Again, examination of all surgical sites were evaluated for hemostasis which was good.    The sternum was closed using stainless steel wires. After measurement of the depth of the sternum, appropriately-selected screws and plates were installed to bolster the sternal closure  The dermal and subcutaneous tissues were closed in layers and the skin was sewn closed  with a subcuticular stitch.    At the end of the operation, all sponge, instruments, and needle counts were correct.    The patient tolerated the procedure without complication and was transported to the CTICU in stable condition.    I was personally present and scrubbed for the entire procedure.     Michael Mulvihill, MD  7/1/2025 @ 2:02 PM

## 2025-07-01 NOTE — CONSULTS
[facility] ICU Consult     ASSESSMENT: Johan Navarro is a 71 y/o M with PMH of severe AORTIC STENOSIS, CAD s/p PCI 2021 to the OM and Cx. He underwent bioprosthetic AVR with Inspiris Resilia 22mm and bovine pericardium patch, SAUL clipping, ascending aorta and non coronary sinus replacement with Hemashield graft size 28 mm, closed with sternal plates and wires by Dr. Mulvihill on 7/1/2026.      PLAN:     Neuro/ pain/ sedation  #Acute post operative pain  - Monitor neurological status. Notify provider for any acute changes in exam.  - Pain control: Scheduled tylenol, PRN Oxycodone and PRN IV Hydralazine  - Sedation: Propofol  - RAAS goal 0 to -1  - Reversed  - Sedation vacation now     Pulmonary :   #Post operative ventilatory support  #OPAL  - PST with sedation vacation  - Plan for FASTRACK extubation if meets criteria.   - Ventilator bundle  - RCAT, Volume expansion and flutter valve post extubation.      Cardiovascular:    #Aortic stenosis s/p bioprosthetic AVR  #SAUL clipping  #Ascending aorta and non coronary sinus replacement with Hemashield graft  # Vasoplegia  #CAD s/p PCI 2021  - Wean off Norepinephrine  - SBP goal < 120 and MAP >60 (given low diastolics)  - Send lactate  - A and V wires  placed, currently A pace backup of 80.   - Meds x 2 with minimal sanguinous output.   - Normal Biventricular function pre and post CPB, trace MR.   - Aspirin 81 to resume tomorrow.   - Statin to resume tomorrow. Hold Repatha   - Pump time 2h50 minutes. Very calcified aorta.      GI/Nutrition:   - NPO  - Nutrition consulted. Appreciate recommendations.   - PPI  -  Bowel regimen     Renal/ Fluid Balance:    - Will monitor intake and output.  - Send labs now  - ICU electrolyte replacement protocol  - [place] in place for strict I&Os     Endocrine:    #Stress induced hyperglycemia  - Insulin gtt     ID:  - Ancef x 3 doses, Vancomycin x 2 doses     Hematology:     #Acute blood loss anemia  #Mild Thrombocytopenia  - . Got cellsaver  "back  - Aspirin and subcutaneous heparin to start tomorrow  - Send CBC and coags now.      MSK:    - PT and OT when extubated.      Lines/ tubes/ drains:  - RIJ MAC, arterial line, ETT, PIV, OG.      Prophylaxis:    - DVT prophylaxis: Mechanical.   - PUD prophylaxis: Protonix    Code status:  - Full      Disposition:  -  ICU.       Critical care time exclusive of procedures: 60 minutes    Marcela De La Mater    Clinically Significant Risk Factors Present on Admission          # Hyperchloremia: Highest Cl = 110 mmol/L in last 2 days, will monitor as appropriate      # Hypocalcemia: Lowest iCa = 3.8 mg/dL in last 2 days, will monitor and replace as appropriate      # Coagulation Defect: INR = 1.63 (Ref range: 0.85 - 1.15) and/or PTT = 30 Seconds (Ref range: 22 - 38 Seconds), will monitor for bleeding  # Drug Induced Platelet Defect: home medication list includes an antiplatelet medication        # Anemia: based on hgb <11       # Overweight: Estimated body mass index is 25.18 kg/m  as calculated from the following:    Height as of this encounter: 1.676 m (5' 6\").    Weight as of this encounter: 70.8 kg (156 lb).           # Anemia: based on hgb <11           [facility] ICU Rounding checklist    Assessment of central venous catheter access Central access present and needed   Assessment of urinary catheter use Salvador present and needed.  Indication:  STrict I&O   DVT prophylaxis Mechanical prophylaxis only (chemoprophy contraindicated)       - - - - - - - - - - - - - - - - - - - - - - - - - - - - - - - - - - - - - - - - - - - - - - - - - - - - - - - - - - - - - - - - - - - - - - - -     HISTORY PRESENTING ILLNESS: Johan Navarro is a 69 y/o M with PMH of severe AORTIC STENOSIS, CAD s/p PCI 2021 to the OM and Cx. He underwent bioprosthetic AVR with Inspiris Resilia 22mm and bovine pericardium patch, SAUL clipping, ascending aorta and non coronary sinus replacement with Hemashield graft size 28 mm, sternal plate insertion by Dr." Mulvihill on 7/1/2026.     REVIEW OF SYSTEMS: 10 point ROS neg other than the symptoms noted above in the HPI.    PAST MEDICAL HISTORY:   Past Medical History:   Diagnosis Date    Aortic stenosis     Arthritis     hands    CAD (coronary artery disease)     cardiac cath 1998: stents x 2 to circumflex    Carotid stenosis     left    Family history of early CAD     Hyperlipidaemia LDL goal < 70     familial hyperlipidemia with marked hypercholesterolemia before treatment; has been on some form of cholesterol-lowering medication since the 1970s    Prostate cancer (H) 2010    Sleep apnea     Syncope     Unstable angina (H) 1/6/2021       SURGICAL HISTORY:   Past Surgical History:   Procedure Laterality Date    CARDIAC SURGERY  1998    coronary stents x2 placed 1998; angioplasty    CV CORONARY ANGIOGRAM N/A 6/3/2025    Procedure: Coronary Angiogram;  Surgeon: Patsy Wallace MD;  Location: [facility] CARDIAC CATH LAB    CV HEART CATHETERIZATION WITH POSSIBLE INTERVENTION N/A 7/21/2020    Procedure: Heart Catheterization with Possible Intervention;  Surgeon: Alber Bentley MD;  Location: [facility] CARDIAC CATH LAB    CV HEART CATHETERIZATION WITH POSSIBLE INTERVENTION N/A 10/11/2021    Procedure: Heart Catheterization with Possible Intervention;  Surgeon: Patsy Wallace MD;  Location:  HEART CARDIAC CATH LAB    CV INSTANTANEOUS WAVE-FREE RATIO N/A 10/11/2021    Procedure: Instantaneous Wave-Free Ratio;  Surgeon: Patsy Wallace MD;  Location: [facility] CARDIAC CATH LAB    CV LEFT HEART CATH N/A 10/11/2021    Procedure: Left Heart Cath;  Surgeon: Patsy Wallace MD;  Location: [facility] CARDIAC CATH LAB    CV PCI STENT DRUG ELUTING N/A 10/11/2021    Procedure: Percutaneous Coronary Intervention Stent Drug Eluting;  Surgeon: Patsy Wallace MD;  Location: [facility] CARDIAC CATH LAB    DAVINCI PROSTATECTOMY      2010    ENDARTERECTOMY CAROTID Left 12/27/2022    Procedure: LEFT CAROTID ENDARTERECTOMY WITH BOVINE  PERICARDIUM PATCH ANGIOPLASTY, AND INTRAOPERATIVE ELECTROENCEPHALOGRAM MONITORING;  Surgeon: Montez Aponte MD;  Location:  OR    ESOPHAGOSCOPY, GASTROSCOPY, DUODENOSCOPY (EGD), COMBINED N/A 2021    Procedure: ESOPHAGOGASTRODUODENOSCOPY (EGD);  Surgeon: Rosemarie Laws MD;  Location:  GI    EYE SURGERY Bilateral 2017    eyelids    GENITOURINARY SURGERY      ORTHOPEDIC SURGERY Right     achilles tendon; post football injury    ORTHOPEDIC SURGERY Right     hand; near thumb. has wires in there. cysts    REPAIR TENDON BICEPS DISTAL UPPER EXTREMITY Right 2018    Procedure: REPAIR TENDON BICEPS DISTAL UPPER EXTREMITY;  Right open biceps tendon repair  ;  Surgeon: Alber Zabala MD;  Location:  OR    SURGICAL HISTORY OF -       Right hand Xanthoma removal    SURGICAL HISTORY OF -       Stress Echo (-)       SOCIAL HISTORY:   Social History     Socioeconomic History    Marital status:      Spouse name: None    Number of children: None    Years of education: None    Highest education level: None   Tobacco Use    Smoking status: Former     Current packs/day: 0.00     Average packs/day: 0.3 packs/day for 20.0 years (5.0 ttl pk-yrs)     Types: Cigars, Cigarettes     Start date:      Quit date: 2000     Years since quittin.5    Smokeless tobacco: Current     Types: Snuff, Chew    Tobacco comments:     daily   Vaping Use    Vaping status: Never Used   Substance and Sexual Activity    Alcohol use: Not Currently     Comment: none for 8 years- Quit    Drug use: Yes     Types: Marijuana     Comment: daily     Sexual activity: Yes     Partners: Female   Other Topics Concern    Caffeine Concern Yes     Comment: coffee and soda    Special Diet No    Exercise Yes     Comment: regular     Seat Belt Yes    Parent/sibling w/ CABG, MI or angioplasty before 65F 55M? Yes     Comment: 49     Social Drivers of Health     Financial Resource Strain: Low Risk  (2024)    Financial Resource  Strain     Within the past 12 months, have you or your family members you live with been unable to get utilities (heat, electricity) when it was really needed?: No   Food Insecurity: Low Risk  (7/25/2024)    Food Insecurity     Within the past 12 months, did you worry that your food would run out before you got money to buy more?: No     Within the past 12 months, did the food you bought just not last and you didn t have money to get more?: No   Transportation Needs: Low Risk  (7/25/2024)    Transportation Needs     Within the past 12 months, has lack of transportation kept you from medical appointments, getting your medicines, non-medical meetings or appointments, work, or from getting things that you need?: No   Physical Activity: Sufficiently Active (7/25/2024)    Exercise Vital Sign     Days of Exercise per Week: 4 days     Minutes of Exercise per Session: 40 min   Stress: No Stress Concern Present (7/25/2024)    [employer] [place] [employer] - Occupational Stress Questionnaire     Feeling of Stress : Not at all   Social Connections: Unknown (7/25/2024)    Social Connection and Isolation Panel [NHANES]     Frequency of Social Gatherings with Friends and Family: Three times a week   Interpersonal Safety: Low Risk  (7/1/2025)    Interpersonal Safety     Do you feel physically and emotionally safe where you currently live?: Yes     Within the past 12 months, have you been hit, slapped, kicked or otherwise physically hurt by someone?: No     Within the past 12 months, have you been humiliated or emotionally abused in other ways by your partner or ex-partner?: No   Housing Stability: Low Risk  (7/25/2024)    Housing Stability     Do you have housing? : Yes     Are you worried about losing your housing?: No       FAMILY HISTORY: No bleeding/clotting disorders nor problems with anesthesia.     ALLERGIES:      Allergies   Allergen Reactions    Crestor [Rosuvastatin] GI Disturbance    Dust Mites     Other [No  Clinical Screening - See Comments]      Goose feathers    Pollen Extract        MEDICATIONS:  Current Facility-Administered Medications   Medication Dose Route Frequency Provider Last Rate Last Admin    acetaminophen (TYLENOL) tablet 975 mg  975 mg Oral Q8H Servando Hilton MD        aminocaproic acid (AMICAR) 5 g in sodium chloride 0.9 % 100 mL infusion  1 g/hr Intravenous Continuous Servando Hilton MD        [START ON 7/2/2025] aspirin (ASA) chewable tablet 81 mg  81 mg Oral or NG Tube Daily Servando Hilton MD        [START ON 7/4/2025] bisacodyl (DULCOLAX) suppository 10 mg  10 mg Rectal Daily PRN Servando Hilton MD        calcium gluconate 1 g in 50 mL in sodium chloride intermittent infusion  1 g Intravenous Once PRN Servando Hilton MD        calcium gluconate 2 g in  mL intermittent infusion  2 g Intravenous Once PRN Servando Hilton MD        calcium gluconate 3 g in sodium chloride 0.9 % 100 mL intermittent infusion  3 g Intravenous Once PRN Servando Hilton MD        ceFAZolin (ANCEF) 1 g vial to attach to  ml bag for ADULT or 50 ml bag for PEDS  1 g Intravenous Q8H Servando Hilton MD        dexmedeTOMIDine (PRECEDEX) 4 mcg/mL in sodium chloride 0.9 % 100 mL infusion  0.2-0.7 mcg/kg/hr Intravenous Continuous Servando Hilton MD        glucose gel 15-30 g  15-30 g Oral Q15 Min PRN Servando Hilton MD        Or    dextrose 50 % injection 25-50 mL  25-50 mL Intravenous Q15 Min PRN Servando Hilton MD        Or    glucagon injection 1 mg  1 mg Subcutaneous Q15 Min PRN Servando Hilton MD        EPINEPHrine (ADRENALIN) 5 mg in  mL infusion  0.01-0.1 mcg/kg/min Intravenous Continuous Servando Hilton MD        [START ON 7/2/2025] heparin ANTICOAGULANT injection 5,000 Units  5,000 Units Subcutaneous Q8H Servando Hilton MD        hydrALAZINE (APRESOLINE) injection 10 mg  10 mg Intravenous Q30 Min PRN Servando Hilton MD        HYDROmorphone (DILAUDID) injection 0.2 mg  0.2 mg Intravenous Q2H  PRN Servando Hilton MD        Or    HYDROmorphone (DILAUDID) injection 0.4 mg  0.4 mg Intravenous Q2H PRN Servando Hilton MD        insulin regular (MYXREDLIN) 1 unit/mL infusion  0-24 Units/hr Intravenous Continuous Servando Hilton MD        lactated ringers BOLUS 250 mL  250 mL Intravenous Once Servando Hilton MD        lidocaine (LMX4) cream   Topical Q1H PRN Servando Hilton MD        lidocaine 1 % 0.1-1 mL  0.1-1 mL Other Q1H PRN Servando Hilton MD        [START ON 7/3/2025] magnesium hydroxide (MILK OF MAGNESIA) suspension 30 mL  30 mL Oral Daily PRN Servando Hilton MD        methocarbamol (ROBAXIN) half-tab 250 mg  250 mg Oral Q6H PRN Servando Hilton MD        naloxone (NARCAN) injection 0.2 mg  0.2 mg Intravenous Q2 Min PRN Mulvihill, Michael, MD        Or    naloxone (NARCAN) injection 0.4 mg  0.4 mg Intravenous Q2 Min PRN Mulvihill, Michael, MD        Or    naloxone (NARCAN) injection 0.2 mg  0.2 mg Intramuscular Q2 Min PRN Mulvihill, Michael, MD        Or    naloxone (NARCAN) injection 0.4 mg  0.4 mg Intramuscular Q2 Min PRN Mulvihill, Michael, MD        niCARdipine 40 mg in 200 mL NS (CARDENE) infusion  0.5-15 mg/hr Intravenous Continuous PRN Servando Hilton MD        norepinephrine (LEVOPHED) 4 mg in  mL infusion PREMIX  0.01-0.15 mcg/kg/min Intravenous Continuous PRN Servando Hilton MD        ondansetron (ZOFRAN ODT) ODT tab 4 mg  4 mg Oral Q6H PRN Servando Hilton MD        Or    ondansetron (ZOFRAN) injection 4 mg  4 mg Intravenous Q6H PRN Servando Hilton MD        oxyCODONE (ROXICODONE) tablet 5 mg  5 mg Oral Q4H PRN Servando Hilton MD        Or    oxyCODONE (ROXICODONE) tablet 10 mg  10 mg Oral Q4H PRN Servando Hilton MD        pantoprazole (PROTONIX) 2 mg/mL suspension 40 mg  40 mg Oral or NG Tube Daily Servando Hilton MD        Or    pantoprazole (PROTONIX) EC tablet 40 mg  40 mg Oral Daily Servando Hilton MD        [START ON 7/2/2025] polyethylene glycol (MIRALAX) Packet 17 g   17 g Oral Daily Servando Hilton MD        prochlorperazine (COMPAZINE) injection 5 mg  5 mg Intravenous Q6H PRN Servando Hilton MD        Or    prochlorperazine (COMPAZINE) tablet 5 mg  5 mg Oral Q6H PRN Servando Hilton MD        propofol (DIPRIVAN) infusion  5-75 mcg/kg/min Intravenous Continuous de La MaterMarcela CNP        Reason beta blocker order not selected   Does not apply DOES NOT GO TO Servando Hay MD        senna-docusate (SENOKOT-S/PERICOLACE) 8.6-50 MG per tablet 1 tablet  1 tablet Oral BID Servando Hilton MD        sodium chloride (PF) 0.9% PF flush 3 mL  3 mL Intracatheter Q8H Servando Hilton MD        sodium chloride (PF) 0.9% PF flush 3 mL  3 mL Intracatheter q1 min prn Servando Hilton MD        vancomycin (VANCOCIN) 1,000 mg in 200 mL dextrose intermittent infusion  1,000 mg Intravenous Q12H Servando Hilton MD           PHYSICAL EXAMINATION:  Temp:  [97.1  F (36.2  C)] 97.1  F (36.2  C)  Pulse:  [70-72] 72  Resp:  [12] 12  BP: (138-155)/(55-95) 138/55  FiO2 (%):  [50 %] 50 %  SpO2:  [95 %-100 %] 100 %    General: NAD  HEENT: normocephalic, atraumatic  Neuro: sedated, pinpoint pupils, no movement of extremities   CV: SR 70s, normal hear tones, mediastinal tubes with minimal sanguinous output, no leak  Pulm: clear  Abd: soft  : edward with yellow urine  Extremities: warm, well perfused  Skin: warm  Incisions: CDI  Psych: DIRK    LABS: Reviewed.   Arterial Blood Gases   Recent Labs   Lab 07/01/25  1334 07/01/25  1216 07/01/25  1205 07/01/25  1132   PH 7.38 7.37 7.36 7.34*   PCO2 35 38 40 44   PO2 204* 365* 360* 324*   HCO3 21 22 22 24     Complete Blood Count   Recent Labs   Lab 07/01/25  1334 07/01/25  1216 07/01/25  1205 07/01/25  1154 07/01/25  0550 06/25/25  1109   WBC  --   --   --  10.4  --  4.4   HGB 8.5* 7.3* 8.0* 8.5*   < > 11.6*   PLT  --   --   --  136*  --  206    < > = values in this interval not displayed.     Basic Metabolic Panel  Recent Labs   Lab  07/01/25  1334 07/01/25  1216 07/01/25  1205 07/01/25  1154 07/01/25  1132 07/01/25  0811 07/01/25  0550 06/25/25  1109    142 141 140 141   < >  --  139   POTASSIUM 4.5 4.3 4.6 4.8 5.3   < > 3.7 4.0   CHLORIDE  --   --   --  110*  --   --   --  104   CO2  --   --   --  22  --   --   --  24   BUN  --   --   --  10.5  --   --   --  16.8   CR  --   --   --  0.88  --   --  1.02 1.01   GLC  --  125* 131* 147* 145*   < > 96 103*    < > = values in this interval not displayed.     Liver Function Tests  Recent Labs   Lab 07/01/25  1154 06/25/25  1109   AST  --  33   ALT  --  23   ALKPHOS  --  85   BILITOTAL  --  0.4   ALBUMIN  --  4.1   INR 1.63*  --      Pancreatic Enzymes  No lab results found in last 7 days.  Coagulation Profile  Recent Labs   Lab 07/01/25  1154   INR 1.63*   PTT 30       IMAGING:  Recent Results (from the past 24 hours)   Echo BRADLEY - OR *Canceled*    Narrative    The following orders were created for panel order Echo BRADLEY - OR.  Procedure                               Abnormality         Status                     ---------                               -----------         ------                       Please view results for these tests on the individual orders.   Echo BRADLEY - OR *Canceled*    Narrative    The following orders were created for panel order Echo BRADLEY - OR.  Procedure                               Abnormality         Status                     ---------                               -----------         ------                       Please view results for these tests on the individual orders.

## 2025-07-01 NOTE — PROGRESS NOTES
Extubation Note    Successful completion of SBT (Yes or No):Yes   Extubation time:1651    Patient assessment:  Lung sounds:Diminished  Stridor Present (Yes or No): No  Patient tolerance: Fair    Oxygen device: Nasal Cannula  Liter flow:3L   SpO2:99%    Plan:Continue to follow    RT Zita 4:58 PM 07/01/25

## 2025-07-01 NOTE — ANESTHESIA PROCEDURE NOTES
Airway       Patient location during procedure: OR       Procedure Start/Stop Times: 7/1/2025 7:38 AM  Staff -        Anesthesiologist:  Malinda Lopez MD       CRNA: Keesha Long APRN CRNA       Performed By: CRNAIndications and Patient Condition       Indications for airway management: reddy-procedural       Induction type:intravenous       Mask difficulty assessment: 2 - vent by mask + OA or adjuvant +/- NMBA    Final Airway Details       Final airway type: endotracheal airway       Successful airway: ETT - single  Endotracheal Airway Details        ETT size (mm): 8.0       Cuffed: yes       Successful intubation technique: video laryngoscopy       VL Blade Size: Soto 4       Grade View of Cords: 2       Adjucts: stylet       Position: Right       Measured from: gums/teeth       Secured at (cm): 22       Bite block used: None    Post intubation assessment        Placement verified by: capnometry, equal breath sounds and chest rise        Number of attempts at approach: 1       Secured with: tape       Ease of procedure: easy       Dentition: Unchanged    Medication(s) Administered   Medication Administration Time: 7/1/2025 7:38 AM

## 2025-07-01 NOTE — ANESTHESIA CARE TRANSFER NOTE
Patient: Johan Navarro    Procedure: Procedure(s):  AORTIC VALVE REPLACEMENT WITH TISSUE HEART VALVE INSPIRIS  RESILIA  AORTIC VALVE SIZE: 23MM AND BOVINE PERICARDIUM PATCH, LEFT ATRIAL APPENDAGE CLIPPING WITH ATRICURE ATRICLIP FLEX-V DEVICE SIZE: 45MM, ASCENDING AND NON-CORONARY SINUS REPLACEMENT WITH HEMASHIELD PLATINUM GRAFT SIZE: 28MM, STERNAL PLATE INSERTION, AND ON CARDIOPULMONARY PUMP OXYGENATOR (INTRAOPERATIVE TRANSESOPHAGEAL ECHOCARDIOGRAM BY ANESTHESIOLOGIST)       Diagnosis: Aortic valve stenosis [I35.0]  Diagnosis Additional Information: No value filed.    Anesthesia Type:   General     Note:    Oropharynx: endotracheal tube in place and ventilatory support  Level of Consciousness: iatrogenic sedation  Patient oxygen source: BMV.  Level of Supplemental Oxygen (L/min / FiO2): 15  Independent Airway: airway patency not satisfactory and stable  Dentition: dentition unchanged  Vital Signs Stable: post-procedure vital signs reviewed and stable  Report to RN Given: handoff report given  Patient transferred to: ICU  Comments: Patient connected to SpO2, EKG, and arterial blood pressure transport monitors and accompanied by CRNA, anesthesiologist, circulating RN, surgeon (fellow) to ICU room. Patient ventilated by anesthesiologist with ambu via ETT with O2 at 15 liters per minute during transport.     On arrival to ICU, endotracheal tube position unchanged, equal, bilateral breath sounds auscultated in ICU room, patient placed on ICU ventilator by respiratory therapist, teeth and oral mucosa intact and unchanged at handoff of care. At anesthesia handoff of care, clinical monitors and alarms on and functioning, report on patient's clinical status given to ICU RN, report on patient's clinical status given to intensivist, ICU staff questions answered  ICU Handoff: Call for PAUSE to initiate/utilize ICU HANDOFF, Identified Patient, Identified Responsible Provider, Reviewed the Pertinent Medical History, Discussed  Surgical Course, Reviewed Intra-OP Anesthesia Management and Issues during Anesthesia, Set Expectations for Post Procedure Period and Allowed Opportunity for Questions and Acknowledgement of Understanding        Vitals shown include unfiled device data.    Electronically Signed By: ANGELINE Snell CRNA  July 1, 2025  1:54 PM

## 2025-07-01 NOTE — ANESTHESIA PREPROCEDURE EVALUATION
Anesthesia Pre-Procedure Evaluation    Patient: Johan Navarro   MRN: [id number] : 1955          Procedure : Procedure(s):  REPLACEMENT, AORTIC VALVE         Past Medical History:   Diagnosis Date    Aortic stenosis     Arthritis     hands    CAD (coronary artery disease)     cardiac cath 1998: stents x 2 to circumflex    Carotid stenosis     left    Family history of early CAD     Hyperlipidaemia LDL goal < 70     familial hyperlipidemia with marked hypercholesterolemia before treatment; has been on some form of cholesterol-lowering medication since the 1970s    Prostate cancer (H)     Sleep apnea     Syncope     Unstable angina (H) 2021      Past Surgical History:   Procedure Laterality Date    CARDIAC SURGERY      coronary stents x2 placed ; angioplasty    CV CORONARY ANGIOGRAM N/A 6/3/2025    Procedure: Coronary Angiogram;  Surgeon: Patsy Wallace MD;  Location: [facility] CARDIAC CATH LAB    CV HEART CATHETERIZATION WITH POSSIBLE INTERVENTION N/A 2020    Procedure: Heart Catheterization with Possible Intervention;  Surgeon: Alber Bentley MD;  Location: [facility] CARDIAC CATH LAB    CV HEART CATHETERIZATION WITH POSSIBLE INTERVENTION N/A 10/11/2021    Procedure: Heart Catheterization with Possible Intervention;  Surgeon: Patsy Wallace MD;  Location:  HEART CARDIAC CATH LAB    CV INSTANTANEOUS WAVE-FREE RATIO N/A 10/11/2021    Procedure: Instantaneous Wave-Free Ratio;  Surgeon: Patsy Wallace MD;  Location: [facility] CARDIAC CATH LAB    CV LEFT HEART CATH N/A 10/11/2021    Procedure: Left Heart Cath;  Surgeon: Patsy Wallace MD;  Location: [facility] CARDIAC CATH LAB    CV PCI STENT DRUG ELUTING N/A 10/11/2021    Procedure: Percutaneous Coronary Intervention Stent Drug Eluting;  Surgeon: Patsy Wallace MD;  Location: [facility] CARDIAC CATH LAB    DAVINCI PROSTATECTOMY      2010    ENDARTERECTOMY CAROTID Left 2022    Procedure: LEFT CAROTID ENDARTERECTOMY WITH  BOVINE PERICARDIUM PATCH ANGIOPLASTY, AND INTRAOPERATIVE ELECTROENCEPHALOGRAM MONITORING;  Surgeon: Montez Aponte MD;  Location:  OR    ESOPHAGOSCOPY, GASTROSCOPY, DUODENOSCOPY (EGD), COMBINED N/A 2021    Procedure: ESOPHAGOGASTRODUODENOSCOPY (EGD);  Surgeon: Rosemarie Laws MD;  Location:  GI    EYE SURGERY Bilateral 2017    eyelids    GENITOURINARY SURGERY      ORTHOPEDIC SURGERY Right     achilles tendon; post football injury    ORTHOPEDIC SURGERY Right     hand; near thumb. has wires in there. cysts    REPAIR TENDON BICEPS DISTAL UPPER EXTREMITY Right 2018    Procedure: REPAIR TENDON BICEPS DISTAL UPPER EXTREMITY;  Right open biceps tendon repair  ;  Surgeon: Alber Zabala MD;  Location:  OR    SURGICAL HISTORY OF -       Right hand Xanthoma removal    SURGICAL HISTORY OF -       Stress Echo (-)      Allergies   Allergen Reactions    Crestor [Rosuvastatin] GI Disturbance    Dust Mites     Other [No Clinical Screening - See Comments]      Goose feathers    Pollen Extract       Social History     Tobacco Use    Smoking status: Former     Current packs/day: 0.00     Average packs/day: 0.3 packs/day for 20.0 years (5.0 ttl pk-yrs)     Types: Cigars, Cigarettes     Start date:      Quit date: 2000     Years since quittin.5    Smokeless tobacco: Current     Types: Snuff, Chew    Tobacco comments:     daily   Substance Use Topics    Alcohol use: Not Currently     Comment: none for 8 years- Quit      Wt Readings from Last 1 Encounters:   25 70.8 kg (156 lb)        Anesthesia Evaluation            ROS/MED HX  ENT/Pulmonary:     (+) sleep apnea,               tobacco use, Past use,                    (-) asthma and recent URI   Neurologic:    (-) no seizures, no CVA and no TIA   Cardiovascular:     (+) Dyslipidemia - Peripheral Vascular Disease-- Carotid Stenosis.  CAD - past MI (2021) - stent-.           WRIGHT.                valvular problems/murmurs type:  AS     Previous cardiac testing   Echo: Date: 5/2025 Results:  Interpretation Summary     1. The left ventricle is normal in size. Proximal septal thickening is noted.  Left ventricular systolic function is normal. The visual ejection fraction is  55-60%. Diastolic Doppler findings (E/E' ratio and/or other parameters)  suggest left ventricular filling pressures are normal. No regional wall motion  abnormalities noted.  2. The right ventricle is normal size. The right ventricular systolic function  is normal.  3. Severe valvular aortic stenosis. The mean AoV pressure gradient is 32.0  mmHg. The peak AoV pressure gradient is 49.0 mmHg. The calculated aortic valve  are is 0.81 cm^2. There is mild (1+) aortic regurgitation.  4. No pericardial effusion.  5. In compairson to the previous report dated 03/06/2024, there has been an  interval progression in the degree of aortic stenosis.    Stress Test:  Date: Results:    ECG Reviewed:  Date: Results:    Cath:  Date: 6/2025 Results:     Ost LM to Ost LAD lesion is 20% stenosed.     Prox LAD lesion is 30% stenosed.     Prox Cx to Mid Cx lesion is 25% stenosed.     Ost RCA to Prox RCA lesion is 70% stenosed.     Prox RCA lesion is 40% stenosed.     Prox RCA to Dist RCA lesion is 20% stenosed.     2nd Mrg lesion is 20% stenosed.     Dist Cx-1 lesion is 40% stenosed.     1st Mrg lesion is 30% stenosed.     1. No significant obstructive CAD.  2. Previously placed stents are patent.  50% instent restenosis of midLCx stent.    (-) arrhythmias   METS/Exercise Tolerance: >4 METS    Hematologic:     (+)      anemia (Hgb 12.2),       (-) history of blood clots   Musculoskeletal:   (+)  arthritis,             GI/Hepatic: Comment: H/o GIB on anticoagulation   (-) GERD and liver disease   Renal/Genitourinary:    (-) renal disease   Endo:    (-) Type II DM, thyroid disease and obesity   Psychiatric/Substance Use:     (+) psychiatric history depression       Infectious Disease:    (-)  "Recent Fever   Malignancy:       Other:              Physical Exam  Airway  Mallampati: I  TM distance: >3 FB  Neck ROM: full  Mouth opening: >= 4 cm    Cardiovascular   Rhythm: regular  Rate: normal rate     Dental   (+) Completely normal teeth      Pulmonary Breath sounds clear to auscultation        Neurological   He appears awake, alert and oriented x3.    Other Findings       OUTSIDE LABS:  CBC:   Lab Results   Component Value Date    WBC 4.4 06/25/2025    WBC 3.7 (L) 06/03/2025    HGB 12.2 (L) 07/01/2025    HGB 11.6 (L) 06/25/2025    HCT 34.5 (L) 06/25/2025    HCT 35.7 (L) 06/03/2025     06/25/2025     06/03/2025     BMP:   Lab Results   Component Value Date     06/25/2025     06/03/2025    POTASSIUM 3.7 07/01/2025    POTASSIUM 4.0 06/25/2025    CHLORIDE 104 06/25/2025    CHLORIDE 107 06/03/2025    CO2 24 06/25/2025    CO2 25 06/03/2025    BUN 16.8 06/25/2025    BUN 15.5 06/03/2025    CR 1.02 07/01/2025    CR 1.01 06/25/2025    GLC 96 07/01/2025     (H) 06/25/2025     COAGS:   Lab Results   Component Value Date    PTT 28 06/03/2025    INR 0.98 06/03/2025     POC: No results found for: \"BGM\", \"HCG\", \"HCGS\"  HEPATIC:   Lab Results   Component Value Date    ALBUMIN 4.1 06/25/2025    PROTTOTAL 6.5 06/25/2025    ALT 23 06/25/2025    AST 33 06/25/2025    ALKPHOS 85 06/25/2025    BILITOTAL 0.4 06/25/2025     OTHER:   Lab Results   Component Value Date    LACT 3.6 (H) 01/23/2025    A1C 5.5 06/25/2025    ONDINA 8.7 (L) 06/25/2025    MAG 2.1 01/07/2021    TSH 3.21 06/29/2020    SED 10 07/29/2024       Anesthesia Plan    ASA Status:  4      NPO Status: NPO Appropriate   Anesthesia Type: General.  Airway: oral.  Induction: intravenous.  Maintenance: Inhalation.   Techniques and Equipment:       - Monitoring Plan: train of four monitoring, BRADLEY, cerebral oximetry, arterial line kit, central line kit     Consents    Anesthesia Plan(s) and associated risks, benefits, and realistic alternatives " "discussed. Questions answered and patient/representative(s) expressed understanding.     - Discussed:     - Discussed with:  Patient, family          Blood Consent:      - Discussed with: patient, family.     - Consented: consented to blood products     Postoperative Care    Pain management: multimodal analgesia.     Comments:                   Malinda Lopez MD    I have reviewed the pertinent notes and labs in the chart from the past 30 days and (re)examined the patient.  Any updates or changes from those notes are reflected in this note.    Clinically Significant Risk Factors Present on Admission                 # Drug Induced Platelet Defect: home medication list includes an antiplatelet medication             # Overweight: Estimated body mass index is 25.18 kg/m  as calculated from the following:    Height as of this encounter: 1.676 m (5' 6\").    Weight as of this encounter: 70.8 kg (156 lb).                    "

## 2025-07-02 ENCOUNTER — APPOINTMENT (OUTPATIENT)
Dept: GENERAL RADIOLOGY | Facility: CLINIC | Age: 70
DRG: 219 | End: 2025-07-02
Attending: NURSE PRACTITIONER
Payer: COMMERCIAL

## 2025-07-02 ENCOUNTER — APPOINTMENT (OUTPATIENT)
Dept: GENERAL RADIOLOGY | Facility: CLINIC | Age: 70
DRG: 219 | End: 2025-07-02
Attending: STUDENT IN AN ORGANIZED HEALTH CARE EDUCATION/TRAINING PROGRAM
Payer: COMMERCIAL

## 2025-07-02 LAB
ALBUMIN SERPL BCG-MCNC: 3.5 G/DL (ref 3.5–5.2)
ALP SERPL-CCNC: 51 U/L (ref 40–150)
ALT SERPL W P-5'-P-CCNC: 14 U/L (ref 0–70)
ANION GAP SERPL CALCULATED.3IONS-SCNC: 10 MMOL/L (ref 7–15)
AST SERPL W P-5'-P-CCNC: 49 U/L (ref 0–45)
ATRIAL RATE - MUSE: 81 BPM
ATRIAL RATE - MUSE: 94 BPM
BILIRUB SERPL-MCNC: 0.4 MG/DL
BUN SERPL-MCNC: 13.9 MG/DL (ref 8–23)
CA-I BLD-MCNC: 4.2 MG/DL (ref 4.4–5.2)
CALCIUM SERPL-MCNC: 7.6 MG/DL (ref 8.8–10.4)
CHLORIDE SERPL-SCNC: 107 MMOL/L (ref 98–107)
CREAT SERPL-MCNC: 0.98 MG/DL (ref 0.67–1.17)
DIASTOLIC BLOOD PRESSURE - MUSE: NORMAL MMHG
DIASTOLIC BLOOD PRESSURE - MUSE: NORMAL MMHG
EGFRCR SERPLBLD CKD-EPI 2021: 83 ML/MIN/1.73M2
ERYTHROCYTE [DISTWIDTH] IN BLOOD BY AUTOMATED COUNT: 15.5 % (ref 10–15)
GLUCOSE BLDC GLUCOMTR-MCNC: 113 MG/DL (ref 70–99)
GLUCOSE BLDC GLUCOMTR-MCNC: 115 MG/DL (ref 70–99)
GLUCOSE BLDC GLUCOMTR-MCNC: 118 MG/DL (ref 70–99)
GLUCOSE BLDC GLUCOMTR-MCNC: 118 MG/DL (ref 70–99)
GLUCOSE BLDC GLUCOMTR-MCNC: 125 MG/DL (ref 70–99)
GLUCOSE BLDC GLUCOMTR-MCNC: 134 MG/DL (ref 70–99)
GLUCOSE SERPL-MCNC: 119 MG/DL (ref 70–99)
HCO3 SERPL-SCNC: 23 MMOL/L (ref 22–29)
HCT VFR BLD AUTO: 24 % (ref 40–53)
HGB BLD-MCNC: 7.8 G/DL (ref 13.3–17.7)
INTERPRETATION ECG - MUSE: NORMAL
INTERPRETATION ECG - MUSE: NORMAL
MAGNESIUM SERPL-MCNC: 1.7 MG/DL (ref 1.7–2.3)
MCH RBC QN AUTO: 27.8 PG (ref 26.5–33)
MCHC RBC AUTO-ENTMCNC: 32.5 G/DL (ref 31.5–36.5)
MCV RBC AUTO: 85 FL (ref 78–100)
P AXIS - MUSE: 62 DEGREES
P AXIS - MUSE: 81 DEGREES
PATH REPORT.COMMENTS IMP SPEC: NORMAL
PATH REPORT.COMMENTS IMP SPEC: NORMAL
PATH REPORT.FINAL DX SPEC: NORMAL
PATH REPORT.GROSS SPEC: NORMAL
PATH REPORT.MICROSCOPIC SPEC OTHER STN: NORMAL
PATH REPORT.RELEVANT HX SPEC: NORMAL
PHOSPHATE SERPL-MCNC: 3.4 MG/DL (ref 2.5–4.5)
PHOTO IMAGE: NORMAL
PLATELET # BLD AUTO: 123 10E3/UL (ref 150–450)
POTASSIUM SERPL-SCNC: 4.2 MMOL/L (ref 3.4–5.3)
PR INTERVAL - MUSE: 142 MS
PR INTERVAL - MUSE: 158 MS
PROT SERPL-MCNC: 5.1 G/DL (ref 6.4–8.3)
QRS DURATION - MUSE: 82 MS
QRS DURATION - MUSE: 82 MS
QT - MUSE: 362 MS
QT - MUSE: 400 MS
QTC - MUSE: 452 MS
QTC - MUSE: 464 MS
R AXIS - MUSE: -2 DEGREES
R AXIS - MUSE: 13 DEGREES
RBC # BLD AUTO: 2.81 10E6/UL (ref 4.4–5.9)
SODIUM SERPL-SCNC: 140 MMOL/L (ref 135–145)
SYSTOLIC BLOOD PRESSURE - MUSE: NORMAL MMHG
SYSTOLIC BLOOD PRESSURE - MUSE: NORMAL MMHG
T AXIS - MUSE: 33 DEGREES
T AXIS - MUSE: 66 DEGREES
VENTRICULAR RATE- MUSE: 81 BPM
VENTRICULAR RATE- MUSE: 94 BPM
WBC # BLD AUTO: 7.7 10E3/UL (ref 4–11)

## 2025-07-02 PROCEDURE — 83735 ASSAY OF MAGNESIUM: CPT | Performed by: STUDENT IN AN ORGANIZED HEALTH CARE EDUCATION/TRAINING PROGRAM

## 2025-07-02 PROCEDURE — 84132 ASSAY OF SERUM POTASSIUM: CPT | Performed by: STUDENT IN AN ORGANIZED HEALTH CARE EDUCATION/TRAINING PROGRAM

## 2025-07-02 PROCEDURE — 99291 CRITICAL CARE FIRST HOUR: CPT | Mod: 24 | Performed by: PHYSICIAN ASSISTANT

## 2025-07-02 PROCEDURE — 250N000009 HC RX 250: Performed by: STUDENT IN AN ORGANIZED HEALTH CARE EDUCATION/TRAINING PROGRAM

## 2025-07-02 PROCEDURE — 200N000001 HC R&B ICU

## 2025-07-02 PROCEDURE — 88305 TISSUE EXAM BY PATHOLOGIST: CPT | Mod: 26 | Performed by: PATHOLOGY

## 2025-07-02 PROCEDURE — 84100 ASSAY OF PHOSPHORUS: CPT | Performed by: STUDENT IN AN ORGANIZED HEALTH CARE EDUCATION/TRAINING PROGRAM

## 2025-07-02 PROCEDURE — 250N000011 HC RX IP 250 OP 636: Performed by: STUDENT IN AN ORGANIZED HEALTH CARE EDUCATION/TRAINING PROGRAM

## 2025-07-02 PROCEDURE — 250N000011 HC RX IP 250 OP 636: Mod: JZ | Performed by: INTERNAL MEDICINE

## 2025-07-02 PROCEDURE — 250N000009 HC RX 250: Performed by: INTERNAL MEDICINE

## 2025-07-02 PROCEDURE — 3E043XZ INTRODUCTION OF VASOPRESSOR INTO CENTRAL VEIN, PERCUTANEOUS APPROACH: ICD-10-PCS | Performed by: STUDENT IN AN ORGANIZED HEALTH CARE EDUCATION/TRAINING PROGRAM

## 2025-07-02 PROCEDURE — 71045 X-RAY EXAM CHEST 1 VIEW: CPT | Mod: 77

## 2025-07-02 PROCEDURE — 93005 ELECTROCARDIOGRAM TRACING: CPT

## 2025-07-02 PROCEDURE — 84155 ASSAY OF PROTEIN SERUM: CPT | Performed by: STUDENT IN AN ORGANIZED HEALTH CARE EDUCATION/TRAINING PROGRAM

## 2025-07-02 PROCEDURE — 85014 HEMATOCRIT: CPT | Performed by: STUDENT IN AN ORGANIZED HEALTH CARE EDUCATION/TRAINING PROGRAM

## 2025-07-02 PROCEDURE — 250N000013 HC RX MED GY IP 250 OP 250 PS 637: Performed by: PHYSICIAN ASSISTANT

## 2025-07-02 PROCEDURE — 94660 CPAP INITIATION&MGMT: CPT

## 2025-07-02 PROCEDURE — 250N000013 HC RX MED GY IP 250 OP 250 PS 637: Performed by: STUDENT IN AN ORGANIZED HEALTH CARE EDUCATION/TRAINING PROGRAM

## 2025-07-02 PROCEDURE — P9045 ALBUMIN (HUMAN), 5%, 250 ML: HCPCS | Mod: JZ | Performed by: INTERNAL MEDICINE

## 2025-07-02 PROCEDURE — 74018 RADEX ABDOMEN 1 VIEW: CPT

## 2025-07-02 PROCEDURE — 82330 ASSAY OF CALCIUM: CPT | Performed by: STUDENT IN AN ORGANIZED HEALTH CARE EDUCATION/TRAINING PROGRAM

## 2025-07-02 PROCEDURE — 71045 X-RAY EXAM CHEST 1 VIEW: CPT

## 2025-07-02 PROCEDURE — 999N000157 HC STATISTIC RCP TIME EA 10 MIN

## 2025-07-02 PROCEDURE — 250N000013 HC RX MED GY IP 250 OP 250 PS 637: Performed by: NURSE PRACTITIONER

## 2025-07-02 PROCEDURE — 258N000003 HC RX IP 258 OP 636: Performed by: NURSE PRACTITIONER

## 2025-07-02 RX ORDER — LIDOCAINE 4 G/G
1-2 PATCH TOPICAL
Status: DISPENSED | OUTPATIENT
Start: 2025-07-02

## 2025-07-02 RX ORDER — FERROUS SULFATE 325(65) MG
325 TABLET ORAL DAILY
Status: DISPENSED | OUTPATIENT
Start: 2025-07-02

## 2025-07-02 RX ORDER — DEXTROSE MONOHYDRATE 25 G/50ML
25-50 INJECTION, SOLUTION INTRAVENOUS
Status: ACTIVE | OUTPATIENT
Start: 2025-07-02

## 2025-07-02 RX ORDER — EZETIMIBE 10 MG/1
10 TABLET ORAL DAILY
Status: DISPENSED | OUTPATIENT
Start: 2025-07-02

## 2025-07-02 RX ORDER — HYDROXYZINE HYDROCHLORIDE 25 MG/1
25 TABLET, FILM COATED ORAL EVERY 6 HOURS PRN
Status: ACTIVE | OUTPATIENT
Start: 2025-07-02

## 2025-07-02 RX ORDER — LORAZEPAM 0.5 MG/1
0.5 TABLET ORAL EVERY 4 HOURS PRN
Status: DISPENSED | OUTPATIENT
Start: 2025-07-02

## 2025-07-02 RX ORDER — POLYETHYLENE GLYCOL 3350 17 G/17G
17 POWDER, FOR SOLUTION ORAL 2 TIMES DAILY
Status: DISPENSED | OUTPATIENT
Start: 2025-07-02

## 2025-07-02 RX ORDER — BISACODYL 10 MG
10 SUPPOSITORY, RECTAL RECTAL ONCE
Status: COMPLETED | OUTPATIENT
Start: 2025-07-02 | End: 2025-07-02

## 2025-07-02 RX ORDER — LORAZEPAM 2 MG/ML
.5-1 INJECTION INTRAMUSCULAR EVERY 6 HOURS PRN
Status: DISCONTINUED | OUTPATIENT
Start: 2025-07-02 | End: 2025-07-02

## 2025-07-02 RX ORDER — HYDROXYZINE HYDROCHLORIDE 25 MG/1
50 TABLET, FILM COATED ORAL EVERY 6 HOURS PRN
Status: DISCONTINUED | OUTPATIENT
Start: 2025-07-02 | End: 2025-07-03

## 2025-07-02 RX ORDER — FOLIC ACID 0.4 MG
400 TABLET ORAL DAILY
Status: DISPENSED | OUTPATIENT
Start: 2025-07-02

## 2025-07-02 RX ORDER — SIMETHICONE 80 MG
80 TABLET,CHEWABLE ORAL 4 TIMES DAILY
Status: DISPENSED | OUTPATIENT
Start: 2025-07-02

## 2025-07-02 RX ORDER — MAGNESIUM HYDROXIDE/ALUMINUM HYDROXICE/SIMETHICONE 120; 1200; 1200 MG/30ML; MG/30ML; MG/30ML
15 SUSPENSION ORAL
Status: COMPLETED | OUTPATIENT
Start: 2025-07-02 | End: 2025-07-02

## 2025-07-02 RX ORDER — NICOTINE POLACRILEX 4 MG
15-30 LOZENGE BUCCAL
Status: ACTIVE | OUTPATIENT
Start: 2025-07-02

## 2025-07-02 RX ORDER — GABAPENTIN 100 MG/1
100 CAPSULE ORAL 3 TIMES DAILY
Status: DISCONTINUED | OUTPATIENT
Start: 2025-07-02 | End: 2025-07-03

## 2025-07-02 RX ORDER — DEXMEDETOMIDINE HYDROCHLORIDE 4 UG/ML
.1-1.2 INJECTION, SOLUTION INTRAVENOUS CONTINUOUS
Status: DISCONTINUED | OUTPATIENT
Start: 2025-07-02 | End: 2025-07-03

## 2025-07-02 RX ORDER — ATORVASTATIN CALCIUM 80 MG/1
80 TABLET, FILM COATED ORAL DAILY
Status: DISPENSED | OUTPATIENT
Start: 2025-07-02

## 2025-07-02 RX ORDER — MAGNESIUM SULFATE HEPTAHYDRATE 40 MG/ML
2 INJECTION, SOLUTION INTRAVENOUS ONCE
Status: COMPLETED | OUTPATIENT
Start: 2025-07-02 | End: 2025-07-02

## 2025-07-02 RX ORDER — METHOCARBAMOL 750 MG/1
750 TABLET, FILM COATED ORAL 4 TIMES DAILY
Status: DISPENSED | OUTPATIENT
Start: 2025-07-02

## 2025-07-02 RX ORDER — FLUTICASONE PROPIONATE 50 MCG
1 SPRAY, SUSPENSION (ML) NASAL DAILY
Status: DISPENSED | OUTPATIENT
Start: 2025-07-02

## 2025-07-02 RX ORDER — NOREPINEPHRINE BITARTRATE 0.02 MG/ML
.01-.6 INJECTION, SOLUTION INTRAVENOUS CONTINUOUS
Status: DISCONTINUED | OUTPATIENT
Start: 2025-07-02 | End: 2025-07-03

## 2025-07-02 RX ADMIN — HYDROMORPHONE HYDROCHLORIDE 0.2 MG: 0.2 INJECTION, SOLUTION INTRAMUSCULAR; INTRAVENOUS; SUBCUTANEOUS at 10:55

## 2025-07-02 RX ADMIN — PANTOPRAZOLE SODIUM 40 MG: 40 TABLET, DELAYED RELEASE ORAL at 08:07

## 2025-07-02 RX ADMIN — LORAZEPAM 0.5 MG: 0.5 TABLET ORAL at 18:52

## 2025-07-02 RX ADMIN — ALBUMIN HUMAN 25 G: 0.05 INJECTION, SOLUTION INTRAVENOUS at 02:46

## 2025-07-02 RX ADMIN — GABAPENTIN 100 MG: 100 CAPSULE ORAL at 08:07

## 2025-07-02 RX ADMIN — MAGNESIUM SULFATE HEPTAHYDRATE 2 G: 40 INJECTION, SOLUTION INTRAVENOUS at 06:30

## 2025-07-02 RX ADMIN — PROCHLORPERAZINE EDISYLATE 5 MG: 5 INJECTION INTRAMUSCULAR; INTRAVENOUS at 20:40

## 2025-07-02 RX ADMIN — ONDANSETRON 4 MG: 2 INJECTION, SOLUTION INTRAMUSCULAR; INTRAVENOUS at 05:15

## 2025-07-02 RX ADMIN — HYDROMORPHONE HYDROCHLORIDE 0.2 MG: 0.2 INJECTION, SOLUTION INTRAMUSCULAR; INTRAVENOUS; SUBCUTANEOUS at 06:42

## 2025-07-02 RX ADMIN — METHOCARBAMOL 750 MG: 750 TABLET ORAL at 18:52

## 2025-07-02 RX ADMIN — SODIUM CHLORIDE, SODIUM LACTATE, POTASSIUM CHLORIDE, AND CALCIUM CHLORIDE 1000 ML: .6; .31; .03; .02 INJECTION, SOLUTION INTRAVENOUS at 09:07

## 2025-07-02 RX ADMIN — OXYCODONE HYDROCHLORIDE 10 MG: 5 TABLET ORAL at 01:22

## 2025-07-02 RX ADMIN — GABAPENTIN 100 MG: 100 CAPSULE ORAL at 16:24

## 2025-07-02 RX ADMIN — PROCHLORPERAZINE EDISYLATE 5 MG: 5 INJECTION INTRAMUSCULAR; INTRAVENOUS at 12:12

## 2025-07-02 RX ADMIN — CALCIUM GLUCONATE 1 G: 20 INJECTION, SOLUTION INTRAVENOUS at 05:02

## 2025-07-02 RX ADMIN — SENNOSIDES AND DOCUSATE SODIUM 1 TABLET: 50; 8.6 TABLET ORAL at 10:21

## 2025-07-02 RX ADMIN — HEPARIN SODIUM 5000 UNITS: 5000 INJECTION, SOLUTION INTRAVENOUS; SUBCUTANEOUS at 15:01

## 2025-07-02 RX ADMIN — CEFAZOLIN 1 G: 1 INJECTION, POWDER, FOR SOLUTION INTRAMUSCULAR; INTRAVENOUS at 12:11

## 2025-07-02 RX ADMIN — HYDROXYZINE HYDROCHLORIDE 25 MG: 25 TABLET, FILM COATED ORAL at 08:08

## 2025-07-02 RX ADMIN — LORAZEPAM 0.5 MG: 0.5 TABLET ORAL at 14:49

## 2025-07-02 RX ADMIN — HYDROMORPHONE HYDROCHLORIDE 0.4 MG: 0.2 INJECTION, SOLUTION INTRAMUSCULAR; INTRAVENOUS; SUBCUTANEOUS at 04:23

## 2025-07-02 RX ADMIN — ACETAMINOPHEN 975 MG: 325 TABLET, FILM COATED ORAL at 15:04

## 2025-07-02 RX ADMIN — ONDANSETRON 4 MG: 2 INJECTION, SOLUTION INTRAMUSCULAR; INTRAVENOUS at 09:11

## 2025-07-02 RX ADMIN — METHOCARBAMOL 750 MG: 750 TABLET ORAL at 08:07

## 2025-07-02 RX ADMIN — SIMETHICONE 80 MG: 80 TABLET, CHEWABLE ORAL at 15:04

## 2025-07-02 RX ADMIN — SIMETHICONE 80 MG: 80 TABLET, CHEWABLE ORAL at 10:21

## 2025-07-02 RX ADMIN — DEXMEDETOMIDINE HYDROCHLORIDE 0.2 MCG/KG/HR: 400 INJECTION INTRAVENOUS at 12:49

## 2025-07-02 RX ADMIN — BISACODYL 10 MG: 10 SUPPOSITORY RECTAL at 12:48

## 2025-07-02 RX ADMIN — HYDROMORPHONE HYDROCHLORIDE 0.2 MG: 0.2 INJECTION, SOLUTION INTRAMUSCULAR; INTRAVENOUS; SUBCUTANEOUS at 18:50

## 2025-07-02 RX ADMIN — HYDROMORPHONE HYDROCHLORIDE 0.2 MG: 0.2 INJECTION, SOLUTION INTRAMUSCULAR; INTRAVENOUS; SUBCUTANEOUS at 16:22

## 2025-07-02 RX ADMIN — CEFAZOLIN 1 G: 1 INJECTION, POWDER, FOR SOLUTION INTRAMUSCULAR; INTRAVENOUS at 04:29

## 2025-07-02 RX ADMIN — ALUMINUM HYDROXIDE, MAGNESIUM HYDROXIDE, AND SIMETHICONE 15 ML: 200; 200; 20 SUSPENSION ORAL at 21:51

## 2025-07-02 RX ADMIN — POLYETHYLENE GLYCOL 3350 17 G: 17 POWDER, FOR SOLUTION ORAL at 10:21

## 2025-07-02 RX ADMIN — METHOCARBAMOL 750 MG: 750 TABLET ORAL at 14:49

## 2025-07-02 RX ADMIN — SENNOSIDES AND DOCUSATE SODIUM 1 TABLET: 50; 8.6 TABLET ORAL at 20:17

## 2025-07-02 RX ADMIN — HYDROMORPHONE HYDROCHLORIDE 0.2 MG: 0.2 INJECTION, SOLUTION INTRAMUSCULAR; INTRAVENOUS; SUBCUTANEOUS at 13:12

## 2025-07-02 RX ADMIN — HYDROMORPHONE HYDROCHLORIDE 0.4 MG: 0.2 INJECTION, SOLUTION INTRAMUSCULAR; INTRAVENOUS; SUBCUTANEOUS at 01:22

## 2025-07-02 RX ADMIN — VANCOMYCIN HYDROCHLORIDE 1000 MG: 1 INJECTION, SOLUTION INTRAVENOUS at 01:37

## 2025-07-02 RX ADMIN — HEPARIN SODIUM 5000 UNITS: 5000 INJECTION, SOLUTION INTRAVENOUS; SUBCUTANEOUS at 22:30

## 2025-07-02 RX ADMIN — ASPIRIN 81 MG CHEWABLE TABLET 81 MG: 81 TABLET CHEWABLE at 10:21

## 2025-07-02 RX ADMIN — LIDOCAINE 2 PATCH: 4 PATCH TOPICAL at 08:15

## 2025-07-02 RX ADMIN — NOREPINEPHRINE BITARTRATE 0.02 MCG/KG/MIN: 0.02 INJECTION, SOLUTION INTRAVENOUS at 21:30

## 2025-07-02 RX ADMIN — OXYCODONE HYDROCHLORIDE 10 MG: 5 TABLET ORAL at 05:17

## 2025-07-02 RX ADMIN — POLYETHYLENE GLYCOL 3350 17 G: 17 POWDER, FOR SOLUTION ORAL at 20:17

## 2025-07-02 RX ADMIN — HYDROMORPHONE HYDROCHLORIDE 0.2 MG: 0.2 INJECTION, SOLUTION INTRAMUSCULAR; INTRAVENOUS; SUBCUTANEOUS at 21:57

## 2025-07-02 RX ADMIN — ONDANSETRON 4 MG: 2 INJECTION, SOLUTION INTRAMUSCULAR; INTRAVENOUS at 20:22

## 2025-07-02 RX ADMIN — ACETAMINOPHEN 975 MG: 325 TABLET, FILM COATED ORAL at 05:17

## 2025-07-02 ASSESSMENT — ACTIVITIES OF DAILY LIVING (ADL)
ADLS_ACUITY_SCORE: 22
DEPENDENT_IADLS:: INDEPENDENT
ADLS_ACUITY_SCORE: 22

## 2025-07-02 NOTE — PLAN OF CARE
"CV  Pressors (which pressors and any increase/decrease in pressor needs): Currently offf all prressors  HR range: 90s-100s  Chest tube output: 20-30/hr on average    Neuro  Orientation: Aox4, but some intermittent confusion  Delirium present?(y/n): n  Sleep: 7 hours  Pain: hard to control, all prns being given    GI/  BM? (y/n): n  Urine output: 30-35/hr on average      Lines: CVC, A-line, Wires, and Chest tubes                Goal Outcome Evaluation:      Problem: Delirium  Goal: Optimal Coping  Outcome: Progressing  Goal: Improved Behavioral Control  Outcome: Progressing  Goal: Improved Attention and Thought Clarity  Outcome: Progressing  Goal: Improved Sleep  Outcome: Progressing     Problem: Adult Inpatient Plan of Care  Goal: Plan of Care Review  Description: The Plan of Care Review/Shift note should be completed every shift.  The Outcome Evaluation is a brief statement about your assessment that the patient is improving, declining, or no change.  This information will be displayed automatically on your shift  note.  Outcome: Progressing  Goal: Patient-Specific Goal (Individualized)  Description: You can add care plan individualizations to a care plan. Examples of Individualization might be:  \"Parent requests to be called daily at 9am for status\", \"I have a hard time hearing out of my right ear\", or \"Do not touch me to wake me up as it startles  me\".  Outcome: Progressing  Goal: Absence of Hospital-Acquired Illness or Injury  Outcome: Progressing  Intervention: Prevent Skin Injury  Recent Flowsheet Documentation  Taken 7/2/2025 0400 by John Houston RN  Body Position:   turned   heels elevated  Taken 7/2/2025 0200 by John Houston RN  Body Position:   turned   heels elevated  Taken 7/2/2025 0040 by John Houston RN  Body Position:   turned   heels elevated  Taken 7/2/2025 0000 by John Houston RN  Body Position:   turned   heels elevated  Taken 7/1/2025 2200 by John Houston RN  Body Position:   turned   " heels elevated  Taken 7/1/2025 2000 by John Houston RN  Body Position:   turned   heels elevated  Intervention: Prevent and Manage VTE (Venous Thromboembolism) Risk  Recent Flowsheet Documentation  Taken 7/2/2025 0040 by John Houston RN  VTE Prevention/Management: compression stockings on  Taken 7/2/2025 0000 by John Houston RN  VTE Prevention/Management: compression stockings on  Taken 7/1/2025 2000 by John Houston RN  VTE Prevention/Management: compression stockings on  Intervention: Prevent Infection  Recent Flowsheet Documentation  Taken 7/2/2025 0040 by John Houston RN  Infection Prevention:   visitors restricted/screened   rest/sleep promoted   hand hygiene promoted  Taken 7/2/2025 0000 by John Houston RN  Infection Prevention:   visitors restricted/screened   rest/sleep promoted   hand hygiene promoted  Taken 7/1/2025 2000 by John Houston RN  Infection Prevention:   visitors restricted/screened   rest/sleep promoted   hand hygiene promoted  Goal: Optimal Comfort and Wellbeing  Outcome: Progressing  Goal: Readiness for Transition of Care  Outcome: Progressing     Problem: Cardiovascular Surgery  Goal: Improved Activity Tolerance  Outcome: Progressing  Goal: Optimal Coping with Heart Surgery  Outcome: Progressing  Goal: Absence of Bleeding  Outcome: Progressing  Goal: Effective Bowel Elimination  Outcome: Progressing  Goal: Effective Cardiac Function  Outcome: Progressing  Goal: Optimal Cerebral Tissue Perfusion  Outcome: Progressing  Intervention: Protect and Optimize Cerebral Perfusion  Recent Flowsheet Documentation  Taken 7/2/2025 0400 by John Houston RN  Head of Bed (HOB) Positioning: HOB at 20-30 degrees  Taken 7/2/2025 0200 by John Houston RN  Head of Bed (HOB) Positioning: HOB at 20-30 degrees  Taken 7/2/2025 0040 by John Houston RN  Head of Bed (HOB) Positioning: HOB at 20-30 degrees  Taken 7/2/2025 0000 by John Houston RN  Head of Bed (HOB) Positioning: HOB at 20-30  degrees  Taken 7/1/2025 2200 by John Houston RN  Head of Bed (HOB) Positioning: HOB at 20-30 degrees  Taken 7/1/2025 2000 by John Houston RN  Head of Bed (HOB) Positioning: HOB at 20-30 degrees  Goal: Fluid and Electrolyte Balance  Outcome: Progressing  Goal: Blood Glucose Level Within Targeted Range  Outcome: Progressing  Goal: Absence of Infection Signs and Symptoms  Outcome: Progressing  Intervention: Prevent or Manage Infection  Recent Flowsheet Documentation  Taken 7/2/2025 0040 by John Houston RN  Infection Prevention:   visitors restricted/screened   rest/sleep promoted   hand hygiene promoted  Taken 7/2/2025 0000 by John Houston RN  Infection Prevention:   visitors restricted/screened   rest/sleep promoted   hand hygiene promoted  Taken 7/1/2025 2000 by John Houston RN  Infection Prevention:   visitors restricted/screened   rest/sleep promoted   hand hygiene promoted  Goal: Anesthesia/Sedation Recovery  Outcome: Progressing  Goal: Acceptable Pain Control  Outcome: Progressing  Goal: Nausea and Vomiting Relief  Outcome: Progressing  Goal: Effective Urinary Elimination  Outcome: Progressing  Goal: Effective Oxygenation and Ventilation  Outcome: Progressing

## 2025-07-02 NOTE — PLAN OF CARE
Goal Outcome Evaluation:      Plan of Care Reviewed With: patient          Outcome Evaluation: Discharge home with outpatient CR

## 2025-07-02 NOTE — PROGRESS NOTES
[facility]  Cardiovascular and Thoracic Surgery Daily Note      Assessment and Plan  Johan Navarro is a 70 year old male with a PMH of CAD s/p PCI in 2021 to OM/Circumflex, multiple AVMs, left CEA in 2023, prostate cancer s/p prostatectomy, and severe, symptomatic aortic stenosis. Presented for elective aortic valve and ascending aorta replacement.    POD # 1 s/p aortic Valve Replacement (23mm Inspiris), ascending aortic and selective noncoronary sinus root replacement and closure of the left atrial appendage on 7/1/25 with Dr. Michael Mulvihill    - CVS: Pre-op TTE with preserved biventricular function.   HD stable overnight in NSR. Currently on norepi drip to maintain ordered MAPs. CVP 4-8, CI 3.5/  Postop vasoplegic shock  Wean pressors as able to maintain MAPS ~65  1 L LR bolus this AM   Continue ASA 81 mg daily, defer on BB given need for pressor, PTA statin, zetia resumed. PTA repatha to resume at discharge.  Chest tubes: in place to suction with ssang output, leave in. TPW: capped   PTA meds on hold: repatha    - Resp: Postoperative mechanical ventilation, resolved. Extubated POD 0, now saturating well on NC. IS, pulmonary toilet. PTA flonase resumed    - Neuro: Neuros intact, pain controlled on current regimen. PTA dose of gabapentin on hold (600 mg BID, currently 100 mg TID).    - Renal: No history of significant renal disease. Cr stable. Trend BMP. Volume management as above.  Recent Labs   Lab 07/02/25  0400 07/01/25  1404 07/01/25  1154   CR 0.98 0.88 0.88       - GI: no BM, +flatus, continue bowel regimen. PTA colace on hold    - : Leave Salvador in place for strict I/O, remove tomorrow    - Endo: Postop stress hyperglycemia. Insulin infusion transitioned to sliding scale insulin    Hemoglobin A1C   Date Value Ref Range Status   06/25/2025 5.5 <5.7 % Final     Comment:     Normal <5.7%   Prediabetes 5.7-6.4%    Diabetes 6.5% or higher     Note: Adopted from ADA consensus  "guidelines.        - FEN: Replace electrolytes as needed. ADAT. Large gastric bubble on CXR, start QID simethicone    - ID: Postop leukocytosis, resolved. Afebrile. Periop abx prophylaxis completing. Trend CBC and fever curve.   Recent Labs   Lab 07/02/25  0400 07/01/25  1404 07/01/25  1154   WBC 7.7 12.0* 10.4       - Heme: Acute blood loss anemia and thrombocytopenia due to surgery. Hgb and PLT stable. Trend CBC, transfuse PRN. Hx of AVMs, PTA iron resumed.  Recent Labs   Lab 07/02/25  0400 07/01/25  1404 07/01/25  1334 07/01/25  1205 07/01/25  1154   HGB 7.8* 8.9* 8.5*   < > 8.5*   * 120*  --   --  136*    < > = values in this interval not displayed.       - Proph: SCD, subcutaneous heparin, PPI    - Other:  Clinically Significant Risk Factors          # Hyperchloremia: Highest Cl = 111 mmol/L in last 2 days, will monitor as appropriate      # Hypocalcemia: Lowest iCa = 3.8 mg/dL in last 2 days, will monitor and replace as appropriate     # Hypoalbuminemia: Lowest albumin = 3 g/dL at 7/1/2025  2:04 PM, will monitor as appropriate  # Coagulation Defect: INR = 1.53 (Ref range: 0.85 - 1.15) and/or PTT = 34 Seconds (Ref range: 22 - 38 Seconds), will monitor for bleeding  # Thrombocytopenia: Lowest platelets = 120 in last 2 days, will monitor for bleeding              # Overweight: Estimated body mass index is 26.19 kg/m  as calculated from the following:    Height as of this encounter: 1.676 m (5' 6\").    Weight as of this encounter: 73.6 kg (162 lb 4.1 oz)., PRESENT ON ADMISSION            - Dispo: ICU, wean pressors as able. Continue pulmonary hygiene, therapies and mobility.      Interval History  No acute events overnight. States pain is well managed on current regimen, slept well. Breathing is stable on NC, working with IS. Tolerating diet, is passing flatus, no BM, no n/v. Up OOB with assistance. Denies chest pain, palpitations, dizziness, syncopal symptoms, chills, myalgias, sternal " popping/clicking.    Medications  Current Facility-Administered Medications   Medication Dose Route Frequency Provider Last Rate Last Admin    acetaminophen (TYLENOL) tablet 975 mg  975 mg Oral Q8H Servando Hilton MD   975 mg at 07/02/25 0517    aspirin (ASA) chewable tablet 81 mg  81 mg Oral or NG Tube Daily Servando Hilton MD        ceFAZolin (ANCEF) 1 g vial to attach to  ml bag for ADULT or 50 ml bag for PEDS  1 g Intravenous Q8H Servando Hilton MD   1 g at 07/02/25 0429    gabapentin (NEURONTIN) capsule 100 mg  100 mg Oral TID Caryl Perez PA-C   100 mg at 07/02/25 0807    heparin ANTICOAGULANT injection 5,000 Units  5,000 Units Subcutaneous Q8H Servando Hilton MD        insulin aspart (NovoLOG) injection (RAPID ACTING)  1-7 Units Subcutaneous TID AC Gideon Lin PA-C        insulin aspart (NovoLOG) injection (RAPID ACTING)  1-5 Units Subcutaneous At Bedtime Gideon Lin PA-C        Lidocaine (LIDOCARE) 4 % Patch 1-2 patch  1-2 patch Transdermal Q24H Caryl Perez PA-C   2 patch at 07/02/25 0815    methocarbamol (ROBAXIN) tablet 750 mg  750 mg Oral 4x Daily Caryl Perez PA-C   750 mg at 07/02/25 0807    pantoprazole (PROTONIX) 2 mg/mL suspension 40 mg  40 mg Oral or NG Tube Daily Servando Hilton MD   40 mg at 07/01/25 1434    Or    pantoprazole (PROTONIX) EC tablet 40 mg  40 mg Oral Daily Servando Hilton MD   40 mg at 07/02/25 0807    polyethylene glycol (MIRALAX) Packet 17 g  17 g Oral Daily Servando Hilton MD        senna-docusate (SENOKOT-S/PERICOLACE) 8.6-50 MG per tablet 1 tablet  1 tablet Oral BID Servando Hilton MD   1 tablet at 07/01/25 2123    simethicone (MYLICON) chewable tablet 80 mg  80 mg Oral 4x Daily Cira Mi, NP        sodium chloride (PF) 0.9% PF flush 3 mL  3 mL Intracatheter Q8H Servando Hilton MD   3 mL at 07/02/25 0622     Current Facility-Administered Medications   Medication Dose Route Frequency Provider Last Rate Last Admin    [START ON  7/4/2025] bisacodyl (DULCOLAX) suppository 10 mg  10 mg Rectal Daily PRN Servando Hilton MD        calcium gluconate 1 g in 50 mL in sodium chloride intermittent infusion  1 g Intravenous Once PRN Servando Hilton MD   1 g at 07/02/25 0502    calcium gluconate 2 g in  mL intermittent infusion  2 g Intravenous Once PRN Servando Hilton MD        calcium gluconate 3 g in sodium chloride 0.9 % 100 mL intermittent infusion  3 g Intravenous Once PRN Servando Hilton MD        glucose gel 15-30 g  15-30 g Oral Q15 Min PRN Gideon Lin PA-C        Or    dextrose 50 % injection 25-50 mL  25-50 mL Intravenous Q15 Min PRN Gideon Lin PA-C        Or    glucagon injection 1 mg  1 mg Subcutaneous Q15 Min PRN Gideon Lin PA-C        hydrALAZINE (APRESOLINE) injection 10 mg  10 mg Intravenous Q30 Min PRN Marcela Morales CNP        HYDROmorphone (DILAUDID) injection 0.2 mg  0.2 mg Intravenous Q2H PRN Servando Hilton MD   0.2 mg at 07/02/25 0642    Or    HYDROmorphone (DILAUDID) injection 0.4 mg  0.4 mg Intravenous Q2H PRN Servando Hilton MD   0.4 mg at 07/02/25 0423    hydrOXYzine HCl (ATARAX) tablet 25 mg  25 mg Oral Q6H PRN Caryl Perez PA-C   25 mg at 07/02/25 0808    Or    hydrOXYzine HCl (ATARAX) tablet 50 mg  50 mg Oral Q6H PRN Caryl Perez PA-C        lidocaine (LMX4) cream   Topical Q1H PRN Servando Hilton MD        lidocaine 1 % 0.1-1 mL  0.1-1 mL Other Q1H PRN Servando Hilton MD        [START ON 7/3/2025] magnesium hydroxide (MILK OF MAGNESIA) suspension 30 mL  30 mL Oral Daily PRN Servando Hilton MD        naloxone (NARCAN) injection 0.2 mg  0.2 mg Intravenous Q2 Min PRN Mulvihill, Michael, MD        Or    naloxone (NARCAN) injection 0.4 mg  0.4 mg Intravenous Q2 Min PRN Mulvihill, Michael, MD        Or    naloxone (NARCAN) injection 0.2 mg  0.2 mg Intramuscular Q2 Min PRN Mulvihill, Michael, MD        Or    naloxone (NARCAN) injection 0.4 mg  0.4 mg Intramuscular Q2  "Min PRN Mulvihill, Michael, MD        niCARdipine 40 mg in 200 mL NS (CARDENE) infusion  0.5-15 mg/hr Intravenous Continuous PRN Marcela Morales CNP   Paused at 07/01/25 2055    norepinephrine (LEVOPHED) 4 mg in  mL infusion PREMIX  0.01-0.15 mcg/kg/min Intravenous Continuous PRN Servando Hilton MD 8 mL/hr at 07/02/25 0927 0.03 mcg/kg/min at 07/02/25 0927    ondansetron (ZOFRAN ODT) ODT tab 4 mg  4 mg Oral Q6H PRN Servando Hilton MD        Or    ondansetron (ZOFRAN) injection 4 mg  4 mg Intravenous Q6H PRN Servando Hilton MD   4 mg at 07/02/25 0911    oxyCODONE (ROXICODONE) tablet 5 mg  5 mg Oral Q4H PRN Servando Hliton MD        Or    oxyCODONE (ROXICODONE) tablet 10 mg  10 mg Oral Q4H PRN Servando Hilton MD   10 mg at 07/02/25 0517    prochlorperazine (COMPAZINE) injection 5 mg  5 mg Intravenous Q6H PRN Servando Hilton MD        Or    prochlorperazine (COMPAZINE) tablet 5 mg  5 mg Oral Q6H PRN Servando Hilotn MD        Reason beta blocker order not selected   Does not apply DOES NOT GO TO Servando Hay MD        sodium chloride (PF) 0.9% PF flush 3 mL  3 mL Intracatheter q1 min prn Servando Hilton MD             Physical Exam  Vitals were reviewed  Blood pressure 98/61, pulse 90, temperature 98.2  F (36.8  C), resp. rate 12, height 1.676 m (5' 6\"), weight 73.6 kg (162 lb 4.1 oz), SpO2 95%.  Rhythm: NSR    Lungs: diminished throughout on NC    Cardiovascular: Distant, S1, S2, RRR, no m/r/g    Abdomen: soft, NT, ND, +BS    Extremeties: warm, no LE edema    Incision: CDI    CT: serosang output, no air leak    Weight:   Vitals:    07/01/25 0602 07/02/25 0600   Weight: 70.8 kg (156 lb) 73.6 kg (162 lb 4.1 oz)         Data  Recent Labs   Lab 07/02/25  0748 07/02/25  0400 07/01/25  2205 07/01/25  1404 07/01/25  1402 07/01/25  1334 07/01/25  1205 07/01/25  1154   WBC  --  7.7  --  12.0*  --   --   --  10.4   HGB  --  7.8*  --  8.9*  --  8.5*   < > 8.5*   MCV  --  85  --  86  --   --   " --  86   PLT  --  123*  --  120*  --   --   --  136*   INR  --   --   --  1.53*  --   --   --  1.63*   NA  --  140  --  140  --  141   < > 140   POTASSIUM  --  4.2  --  4.2  --  4.5   < > 4.8   CHLORIDE  --  107  --  111*  --   --   --  110*   CO2  --  23  --  20*  --   --   --  22   BUN  --  13.9  --  9.9  --   --   --  10.5   CR  --  0.98  --  0.88  --   --   --  0.88   ANIONGAP  --  10  --  9  --   --   --  8   ONDINA  --  7.6*  --  9.2  --   --   --  8.3*   * 113*  119*   < > 129*   < >  --    < > 147*   ALBUMIN  --  3.5  --  3.0*  --   --   --   --    PROTTOTAL  --  5.1*  --  4.5*  --   --   --   --    BILITOTAL  --  0.4  --  0.5  --   --   --   --    ALKPHOS  --  51  --  52  --   --   --   --    ALT  --  14  --  16  --   --   --   --    AST  --  49*  --  40  --   --   --   --     < > = values in this interval not displayed.       Imaging:  Recent Results (from the past 24 hours)   XR Chest Port 1 View    Narrative    EXAM: XR CHEST PORT 1 VIEW  LOCATION: [facility]  DATE: 7/1/2025    INDICATION: Postop CVTS surgery.  COMPARISON: 5/29/2024      Impression    IMPRESSION: New postoperative changes of the sternal plates and wires, aVR, left atrial appendage clip, endotracheal tube above the sergio, NG tube in the stomach, right IJ central venous catheter tip in the upper SVC, and mediastinal drains. The lungs   are clear. No pneumothorax.   XR Chest Port 1 View    Narrative    EXAM: XR CHEST PORT 1 VIEW  LOCATION: [facility]  DATE: 7/2/2025    INDICATION: Post Op CVTS Surgery  COMPARISON: 07/01/2025      Impression    IMPRESSION: Status post sternotomy and cardiac valve replacement. Stable cardiomediastinal silhouette. Removal of endotracheal tube and enteric tube since prior study. Mediastinal drains and right IJ approach catheter and vascular sheath remain.    Increased left basilar parenchymal opacity, probable atelectasis. Right lung appears clear. No  evidence of pneumothorax or pleural effusion.         Patient seen and discussed with Dr. Mulvihill Leah Jensen, APRN, ACNPC-AG, CCRN  Nurse Practitioner  Cardiothoracic Surgery  Pager: [phone]    CV Surgery Rounding Pager: [phone]  After hours please page surgeon on-call

## 2025-07-02 NOTE — PROGRESS NOTES
[facility] ICU Progress Note     ASSESSMENT: Johan Navarro is a 71 y/o M with PMH of severe AORTIC STENOSIS, CAD s/p PCI 2021 to the OM and Cx. He underwent bioprosthetic AVR with Inspiris Resilia 22mm and bovine pericardium patch, SAUL clipping, ascending aorta and non coronary sinus replacement with Hemashield graft size 28 mm, closed with sternal plates and wires by Dr. Mulvihill on 7/1/2026. Fast track extubated.     PLAN:  - Interval events: Received albumin bolus, remains on low dose pressors   - Wean pressors as able, would target MAP of 60 if remains on low dose pressors throughout morning, has lower diastoics  - If remains off pressors can go to the floor      Neuro/ pain/ sedation  #Acute post operative pain  - Scheduled tylenol  - Scheduled Gabapentin 100 TID  - PRN Oxycodone and dilaudid    Pulmonary:   #OPAL  - Fast track extubated. On RA  - RCAT, Volume expansion and flutter valve post extubation.     Cardiovascular:    #Aortic stenosis s/p bioprosthetic AVR  #SAUL clipping  #Ascending aorta and non coronary sinus replacement with Hemashield graft  # Vasoplegia  #CAD s/p PCI 2021  - Normal Biventricular function pre and post CPB, trace MR, very calcified aorta   - Wean off Norepinephrine  - SBP goal < 120  - A/V wires (AAI 80 to backup)  - Normal Biventricular function pre and post CPB, trace MR.   - Aspirin 81 + statin. Hold Repatha     GI/Nutrition:   - ADAT  - PPI  - Bowel regimen: miralax daily and senna BID     Renal/ Fluid Balance:    - I&Os, daily weights  - ICU electrolyte replacement protocol  - Calcium replaced today.     Endocrine:    #Stress induced hyperglycemia  - Sliding scale, minimal needs     ID:  - Ancef x 3 doses, Vancomycin x 2 doses     Hematology:     #Acute blood loss anemia  #Mild Thrombocytopenia  - . Got cellsaver back  - Aspirin and subcutaneous heparin to start tomorrow  - Send CBC and coags now.      MSK  -   - PT and OT when extubated.      Lines/ tubes/ drains:  - RIJ  "MAC  - arterial line   - Salvador      Prophylaxis:    - DVT prophylaxis: Mechanical and SQH  - PUD prophylaxis: PPI    Code status: Full      Disposition: ICU, once off pressors can go to the floor    Critical care time exclusive of procedures: 35 minutes    Gdieon Lin PA-C    Clinically Significant Risk Factors          # Hyperchloremia: Highest Cl = 111 mmol/L in last 2 days, will monitor as appropriate      # Hypocalcemia: Lowest iCa = 3.8 mg/dL in last 2 days, will monitor and replace as appropriate     # Hypoalbuminemia: Lowest albumin = 3 g/dL at 7/1/2025  2:04 PM, will monitor as appropriate    # Coagulation Defect: INR = 1.53 (Ref range: 0.85 - 1.15) and/or PTT = 34 Seconds (Ref range: 22 - 38 Seconds), will monitor for bleeding  # Thrombocytopenia: Lowest platelets = 120 in last 2 days, will monitor for bleeding              # Overweight: Estimated body mass index is 26.19 kg/m  as calculated from the following:    Height as of this encounter: 1.676 m (5' 6\").    Weight as of this encounter: 73.6 kg (162 lb 4.1 oz)., PRESENT ON ADMISSION                   [facility] ICU Rounding checklist    Assessment of central venous catheter access Central access present and needed   Assessment of urinary catheter use Salvador present and needed.  Indication:  STrict I&O   DVT prophylaxis Subcutaneous heparin product (heparin or LMWH)       - - - - - - - - - - - - - - - - - - - - - - - - - - - - - - - - - - - - - - - - - - - - - - - - - - - - - - - - - - - - - - - - - - - - - - - -   PHYSICAL EXAMINATION:  Temp:  [95.5  F (35.3  C)-99  F (37.2  C)] 99  F (37.2  C)  Pulse:  [] 93  BP: ()/(54-89) 115/83  MAP:  [47 mmHg-107 mmHg] 62 mmHg  Arterial Line BP: ()/(30-75) 88/47  FiO2 (%):  [40 %-50 %] 40 %  SpO2:  [88 %-100 %] 97 %    General: NAD  HEENT: normocephalic, atraumatic  Neuro: sedated, pinpoint pupils, no movement of extremities   CV: SR 70s, normal hear tones, mediastinal tubes with minimal " sanguinous output, no leak  Pulm: clear  Abd: soft  : edward with yellow urine  Extremities: warm, well perfused  Skin: warm  Incisions: CDI  Psych: DIRK    LABS: Reviewed.   Arterial Blood Gases   Recent Labs   Lab 07/01/25  1404 07/01/25  1334 07/01/25  1216 07/01/25  1205   PH 7.39 7.38 7.37 7.36   PCO2 35 35 38 40   PO2 222* 204* 365* 360*   HCO3 21 21 22 22     Complete Blood Count   Recent Labs   Lab 07/02/25  0400 07/01/25  1404 07/01/25  1334 07/01/25  1216 07/01/25  1205 07/01/25  1154 07/01/25  0550 06/25/25  1109   WBC 7.7 12.0*  --   --   --  10.4  --  4.4   HGB 7.8* 8.9* 8.5* 7.3*   < > 8.5*   < > 11.6*   * 120*  --   --   --  136*  --  206    < > = values in this interval not displayed.     Basic Metabolic Panel  Recent Labs   Lab 07/02/25  0400 07/01/25  2205 07/01/25  1404 07/01/25  1402 07/01/25  1334 07/01/25  1216 07/01/25  1205 07/01/25  1154 07/01/25  0811 07/01/25  0550 06/25/25  1109     --  140  --  141 142   < > 140   < >  --  139   POTASSIUM 4.2  --  4.2  --  4.5 4.3   < > 4.8   < > 3.7 4.0   CHLORIDE 107  --  111*  --   --   --   --  110*  --   --  104   CO2 23  --  20*  --   --   --   --  22  --   --  24   BUN 13.9  --  9.9  --   --   --   --  10.5  --   --  16.8   CR 0.98  --  0.88  --   --   --   --  0.88  --  1.02 1.01   *  119* 125* 129*   < >  --  125*   < > 147*   < > 96 103*    < > = values in this interval not displayed.     Liver Function Tests  Recent Labs   Lab 07/02/25  0400 07/01/25  1404 07/01/25  1154 06/25/25  1109   AST 49* 40  --  33   ALT 14 16  --  23   ALKPHOS 51 52  --  85   BILITOTAL 0.4 0.5  --  0.4   ALBUMIN 3.5 3.0*  --  4.1   INR  --  1.53* 1.63*  --      Pancreatic Enzymes  No lab results found in last 7 days.  Coagulation Profile  Recent Labs   Lab 07/01/25  1404 07/01/25  1154   INR 1.53* 1.63*   PTT 34 30       IMAGING:  Recent Results (from the past 24 hours)   XR Chest Port 1 View    Narrative    EXAM: XR CHEST PORT 1 VIEW  LOCATION: [facility]  DATE: 7/1/2025    INDICATION: Postop CVTS surgery.  COMPARISON: 5/29/2024      Impression    IMPRESSION: New postoperative changes of the sternal plates and wires, aVR, left atrial appendage clip, endotracheal tube above the sergio, NG tube in the stomach, right IJ central venous catheter tip in the upper SVC, and mediastinal drains. The lungs   are clear. No pneumothorax.   XR Chest Port 1 View    Narrative    EXAM: XR CHEST PORT 1 VIEW  LOCATION: [facility]  DATE: 7/2/2025    INDICATION: Post Op CVTS Surgery  COMPARISON: 07/01/2025      Impression    IMPRESSION: Status post sternotomy and cardiac valve replacement. Stable cardiomediastinal silhouette. Removal of endotracheal tube and enteric tube since prior study. Mediastinal drains and right IJ approach catheter and vascular sheath remain.    Increased left basilar parenchymal opacity, probable atelectasis. Right lung appears clear. No evidence of pneumothorax or pleural effusion.

## 2025-07-02 NOTE — CONSULTS
Care Management Initial Consult    General Information  Assessment completed with: Patient, Otis  Type of CM/SW Visit: Initial Assessment    Primary Care Provider verified and updated as needed: Yes   Readmission within the last 30 days: no previous admission in last 30 days      Reason for Consult: other (see comments) (Elevated Risk Score)  Advance Care Planning:            Communication Assessment  Patient's communication style: spoken language (English or Bilingual)    Hearing Difficulty or Deaf: no   Wear Glasses or Blind: no    Cognitive  Cognitive/Neuro/Behavioral: .WDL except  Level of Consciousness: alert, intermittent confusion  Arousal Level: opens eyes spontaneously  Orientation: oriented x 4, other (see comments) (mostly oriented, some intermeittent confusion)  Mood/Behavior: restless     Speech: endotracheal tube    Living Environment:   People in home: spouse, child(santi), dependent  Amy  Current living Arrangements: house      Able to return to prior arrangements: yes       Family/Social Support:  Care provided by: self  Provides care for: child(santi)  Marital Status:   Support system: Wife, Sibling(s)  Amy       Description of Support System: Supportive, Involved         Current Resources:   Patient receiving home care services: No        Community Resources: None  Equipment currently used at home: none  Supplies currently used at home: None    Employment/Financial:  Employment Status: retired        Financial Concerns: none   Referral to Financial Worker: No       Does the patient's insurance plan have a 3 day qualifying hospital stay waiver?  Yes     Which insurance plan 3 day waiver is available? Alternative insurance waiver    Will the waiver be used for post-acute placement? No    Lifestyle & Psychosocial Needs:  Social Drivers of Health     Food Insecurity: Low Risk  (7/25/2024)    Food Insecurity     Within the past 12 months, did you worry that your food would run out before you got  money to buy more?: No     Within the past 12 months, did the food you bought just not last and you didn t have money to get more?: No   Depression: Not at risk (2/3/2025)    PHQ-2     PHQ-2 Score: 1   Housing Stability: Low Risk  (7/25/2024)    Housing Stability     Do you have housing? : Yes     Are you worried about losing your housing?: No   Tobacco Use: High Risk (7/1/2025)    Patient History     Smoking Tobacco Use: Former     Smokeless Tobacco Use: Current     Passive Exposure: Not on file   Financial Resource Strain: Low Risk  (7/25/2024)    Financial Resource Strain     Within the past 12 months, have you or your family members you live with been unable to get utilities (heat, electricity) when it was really needed?: No   Alcohol Use: Not At Risk (1/6/2021)    AUDIT-C     Frequency of Alcohol Consumption: Never     Average Number of Drinks: Not asked     Frequency of Binge Drinking: Never   Transportation Needs: Low Risk  (7/25/2024)    Transportation Needs     Within the past 12 months, has lack of transportation kept you from medical appointments, getting your medicines, non-medical meetings or appointments, work, or from getting things that you need?: No   Physical Activity: Sufficiently Active (7/25/2024)    Exercise Vital Sign     Days of Exercise per Week: 4 days     Minutes of Exercise per Session: 40 min   Interpersonal Safety: Low Risk  (7/1/2025)    Interpersonal Safety     Do you feel physically and emotionally safe where you currently live?: Yes     Within the past 12 months, have you been hit, slapped, kicked or otherwise physically hurt by someone?: No     Within the past 12 months, have you been humiliated or emotionally abused in other ways by your partner or ex-partner?: No   Stress: No Stress Concern Present (7/25/2024)    [place] [employer] - Occupational Stress Questionnaire     Feeling of Stress : Not at all   Social Connections: Unknown (7/25/2024)    Social  Connection and Isolation Panel [NHANES]     Frequency of Communication with Friends and Family: Not on file     Frequency of Social Gatherings with Friends and Family: Three times a week     Attends Anglican Services: Not on file     Active Member of Clubs or Organizations: Not on file     Attends Club or Organization Meetings: Not on file     Marital Status: Not on file   Health Literacy: Not on file       Functional Status:  Prior to admission patient needed assistance:   Dependent ADLs:: Independent  Dependent IADLs:: Independent       Mental Health Status:          Chemical Dependency Status:                Values/Beliefs:  Spiritual, Cultural Beliefs, Anglican Practices, Values that affect care: yes (Zoroastrianism)               Discussed  Partnership in Safe Discharge Planning  document with patient/family: No    Additional Information:  Per consult for Elevated Risk Score, met with patient.  Per patient he lives with his spouse and 2 dependent children.  Patient shared he is independent with his ADLs & IADLs.  Discussed PT evaluation for discharge recommendations.  Discussed outpatient CR at discharge.  Patient confirmed that his spouse will be able to provide assistance and transportation for him.    Next Steps: Await PT eval.  Acute Care Management will continue to follow for discharge planning.     LISSA Hyatt RN, BSN, OCN   Inpatient Care Coordination ICU  [facility]  Office: [phone]

## 2025-07-03 ENCOUNTER — APPOINTMENT (OUTPATIENT)
Dept: OCCUPATIONAL THERAPY | Facility: CLINIC | Age: 70
DRG: 219 | End: 2025-07-03
Attending: STUDENT IN AN ORGANIZED HEALTH CARE EDUCATION/TRAINING PROGRAM
Payer: COMMERCIAL

## 2025-07-03 VITALS
OXYGEN SATURATION: 90 % | RESPIRATION RATE: 17 BRPM | HEIGHT: 66 IN | TEMPERATURE: 98 F | DIASTOLIC BLOOD PRESSURE: 79 MMHG | SYSTOLIC BLOOD PRESSURE: 134 MMHG | BODY MASS INDEX: 27.07 KG/M2 | WEIGHT: 168.43 LBS | HEART RATE: 98 BPM

## 2025-07-03 LAB
ANION GAP SERPL CALCULATED.3IONS-SCNC: 9 MMOL/L (ref 7–15)
BLD PROD TYP BPU: NORMAL
BLOOD COMPONENT TYPE: NORMAL
BUN SERPL-MCNC: 23.8 MG/DL (ref 8–23)
CALCIUM SERPL-MCNC: 8.3 MG/DL (ref 8.8–10.4)
CHLORIDE SERPL-SCNC: 104 MMOL/L (ref 98–107)
CODING SYSTEM: NORMAL
CREAT SERPL-MCNC: 1.14 MG/DL (ref 0.67–1.17)
CROSSMATCH: NORMAL
EGFRCR SERPLBLD CKD-EPI 2021: 69 ML/MIN/1.73M2
ERYTHROCYTE [DISTWIDTH] IN BLOOD BY AUTOMATED COUNT: 15.9 % (ref 10–15)
GLUCOSE BLDC GLUCOMTR-MCNC: 111 MG/DL (ref 70–99)
GLUCOSE BLDC GLUCOMTR-MCNC: 117 MG/DL (ref 70–99)
GLUCOSE SERPL-MCNC: 122 MG/DL (ref 70–99)
HCO3 SERPL-SCNC: 23 MMOL/L (ref 22–29)
HCT VFR BLD AUTO: 21.5 % (ref 40–53)
HGB BLD-MCNC: 7.3 G/DL (ref 13.3–17.7)
ISSUE DATE AND TIME: NORMAL
MAGNESIUM SERPL-MCNC: 2 MG/DL (ref 1.7–2.3)
MCH RBC QN AUTO: 28.1 PG (ref 26.5–33)
MCHC RBC AUTO-ENTMCNC: 34 G/DL (ref 31.5–36.5)
MCV RBC AUTO: 83 FL (ref 78–100)
PHOSPHATE SERPL-MCNC: 2.4 MG/DL (ref 2.5–4.5)
PLATELET # BLD AUTO: 115 10E3/UL (ref 150–450)
POTASSIUM SERPL-SCNC: 4.3 MMOL/L (ref 3.4–5.3)
RBC # BLD AUTO: 2.6 10E6/UL (ref 4.4–5.9)
SODIUM SERPL-SCNC: 136 MMOL/L (ref 135–145)
UNIT ABO/RH: NORMAL
UNIT NUMBER: NORMAL
UNIT STATUS: NORMAL
UNIT TYPE ISBT: 5100
WBC # BLD AUTO: 7.6 10E3/UL (ref 4–11)

## 2025-07-03 PROCEDURE — 82435 ASSAY OF BLOOD CHLORIDE: CPT | Performed by: NURSE PRACTITIONER

## 2025-07-03 PROCEDURE — 83735 ASSAY OF MAGNESIUM: CPT | Performed by: NURSE PRACTITIONER

## 2025-07-03 PROCEDURE — 999N000157 HC STATISTIC RCP TIME EA 10 MIN

## 2025-07-03 PROCEDURE — 97535 SELF CARE MNGMENT TRAINING: CPT | Mod: GO | Performed by: OCCUPATIONAL THERAPIST

## 2025-07-03 PROCEDURE — 250N000013 HC RX MED GY IP 250 OP 250 PS 637: Performed by: NURSE PRACTITIONER

## 2025-07-03 PROCEDURE — 250N000013 HC RX MED GY IP 250 OP 250 PS 637: Performed by: STUDENT IN AN ORGANIZED HEALTH CARE EDUCATION/TRAINING PROGRAM

## 2025-07-03 PROCEDURE — 85041 AUTOMATED RBC COUNT: CPT | Performed by: NURSE PRACTITIONER

## 2025-07-03 PROCEDURE — 97166 OT EVAL MOD COMPLEX 45 MIN: CPT | Mod: GO | Performed by: OCCUPATIONAL THERAPIST

## 2025-07-03 PROCEDURE — 99291 CRITICAL CARE FIRST HOUR: CPT | Mod: 24 | Performed by: PHYSICIAN ASSISTANT

## 2025-07-03 PROCEDURE — 94660 CPAP INITIATION&MGMT: CPT

## 2025-07-03 PROCEDURE — 250N000011 HC RX IP 250 OP 636: Performed by: STUDENT IN AN ORGANIZED HEALTH CARE EDUCATION/TRAINING PROGRAM

## 2025-07-03 PROCEDURE — 250N000011 HC RX IP 250 OP 636: Performed by: NURSE PRACTITIONER

## 2025-07-03 PROCEDURE — 84100 ASSAY OF PHOSPHORUS: CPT | Performed by: STUDENT IN AN ORGANIZED HEALTH CARE EDUCATION/TRAINING PROGRAM

## 2025-07-03 PROCEDURE — 85018 HEMOGLOBIN: CPT | Performed by: NURSE PRACTITIONER

## 2025-07-03 PROCEDURE — P9016 RBC LEUKOCYTES REDUCED: HCPCS | Performed by: NURSE PRACTITIONER

## 2025-07-03 PROCEDURE — 120N000013 HC R&B IMCU

## 2025-07-03 PROCEDURE — 80048 BASIC METABOLIC PNL TOTAL CA: CPT | Performed by: NURSE PRACTITIONER

## 2025-07-03 PROCEDURE — 97530 THERAPEUTIC ACTIVITIES: CPT | Mod: GO | Performed by: OCCUPATIONAL THERAPIST

## 2025-07-03 PROCEDURE — 250N000013 HC RX MED GY IP 250 OP 250 PS 637: Performed by: PHYSICIAN ASSISTANT

## 2025-07-03 PROCEDURE — 250N000009 HC RX 250: Performed by: INTERNAL MEDICINE

## 2025-07-03 RX ORDER — FUROSEMIDE 10 MG/ML
40 INJECTION INTRAMUSCULAR; INTRAVENOUS ONCE
Status: COMPLETED | OUTPATIENT
Start: 2025-07-03 | End: 2025-07-03

## 2025-07-03 RX ORDER — AMOXICILLIN 250 MG
2 CAPSULE ORAL 2 TIMES DAILY
Status: DISPENSED | OUTPATIENT
Start: 2025-07-03

## 2025-07-03 RX ORDER — GABAPENTIN 300 MG/1
600 CAPSULE ORAL 2 TIMES DAILY
Status: DISPENSED | OUTPATIENT
Start: 2025-07-03

## 2025-07-03 RX ADMIN — SENNOSIDES AND DOCUSATE SODIUM 2 TABLET: 50; 8.6 TABLET ORAL at 20:36

## 2025-07-03 RX ADMIN — FUROSEMIDE 40 MG: 10 INJECTION, SOLUTION INTRAMUSCULAR; INTRAVENOUS at 11:44

## 2025-07-03 RX ADMIN — HYDROMORPHONE HYDROCHLORIDE 0.2 MG: 0.2 INJECTION, SOLUTION INTRAMUSCULAR; INTRAVENOUS; SUBCUTANEOUS at 02:15

## 2025-07-03 RX ADMIN — OXYCODONE HYDROCHLORIDE 10 MG: 5 TABLET ORAL at 11:51

## 2025-07-03 RX ADMIN — HEPARIN SODIUM 5000 UNITS: 5000 INJECTION, SOLUTION INTRAVENOUS; SUBCUTANEOUS at 13:59

## 2025-07-03 RX ADMIN — POTASSIUM & SODIUM PHOSPHATES POWDER PACK 280-160-250 MG 1 PACKET: 280-160-250 PACK at 11:45

## 2025-07-03 RX ADMIN — SIMETHICONE 80 MG: 80 TABLET, CHEWABLE ORAL at 22:11

## 2025-07-03 RX ADMIN — METHOCARBAMOL 750 MG: 750 TABLET ORAL at 13:59

## 2025-07-03 RX ADMIN — FOLIC ACID TAB 400 MCG 400 MCG: 400 TAB at 08:31

## 2025-07-03 RX ADMIN — LORAZEPAM 0.5 MG: 0.5 TABLET ORAL at 09:18

## 2025-07-03 RX ADMIN — OXYCODONE HYDROCHLORIDE 10 MG: 5 TABLET ORAL at 08:16

## 2025-07-03 RX ADMIN — HEPARIN SODIUM 5000 UNITS: 5000 INJECTION, SOLUTION INTRAVENOUS; SUBCUTANEOUS at 06:12

## 2025-07-03 RX ADMIN — POLYETHYLENE GLYCOL 3350 17 G: 17 POWDER, FOR SOLUTION ORAL at 08:38

## 2025-07-03 RX ADMIN — ACETAMINOPHEN 975 MG: 325 TABLET, FILM COATED ORAL at 05:05

## 2025-07-03 RX ADMIN — PANTOPRAZOLE SODIUM 40 MG: 40 TABLET, DELAYED RELEASE ORAL at 08:31

## 2025-07-03 RX ADMIN — POTASSIUM & SODIUM PHOSPHATES POWDER PACK 280-160-250 MG 1 PACKET: 280-160-250 PACK at 08:17

## 2025-07-03 RX ADMIN — HEPARIN SODIUM 5000 UNITS: 5000 INJECTION, SOLUTION INTRAVENOUS; SUBCUTANEOUS at 22:11

## 2025-07-03 RX ADMIN — METHOCARBAMOL 750 MG: 750 TABLET ORAL at 17:53

## 2025-07-03 RX ADMIN — GABAPENTIN 600 MG: 300 CAPSULE ORAL at 20:36

## 2025-07-03 RX ADMIN — LIDOCAINE 2 PATCH: 4 PATCH TOPICAL at 08:34

## 2025-07-03 RX ADMIN — GABAPENTIN 600 MG: 300 CAPSULE ORAL at 08:30

## 2025-07-03 RX ADMIN — EZETIMIBE 10 MG: 10 TABLET ORAL at 08:31

## 2025-07-03 RX ADMIN — LORAZEPAM 0.5 MG: 0.5 TABLET ORAL at 17:59

## 2025-07-03 RX ADMIN — ASPIRIN 81 MG CHEWABLE TABLET 81 MG: 81 TABLET CHEWABLE at 08:31

## 2025-07-03 RX ADMIN — OXYCODONE HYDROCHLORIDE 10 MG: 5 TABLET ORAL at 16:09

## 2025-07-03 RX ADMIN — POLYETHYLENE GLYCOL 3350 17 G: 17 POWDER, FOR SOLUTION ORAL at 20:36

## 2025-07-03 RX ADMIN — SENNOSIDES AND DOCUSATE SODIUM 2 TABLET: 50; 8.6 TABLET ORAL at 08:30

## 2025-07-03 RX ADMIN — POTASSIUM & SODIUM PHOSPHATES POWDER PACK 280-160-250 MG 1 PACKET: 280-160-250 PACK at 16:09

## 2025-07-03 RX ADMIN — DEXMEDETOMIDINE HYDROCHLORIDE 0.2 MCG/KG/HR: 400 INJECTION INTRAVENOUS at 07:17

## 2025-07-03 RX ADMIN — ACETAMINOPHEN 975 MG: 325 TABLET, FILM COATED ORAL at 22:11

## 2025-07-03 RX ADMIN — ATORVASTATIN CALCIUM 80 MG: 80 TABLET, FILM COATED ORAL at 08:31

## 2025-07-03 RX ADMIN — OXYCODONE HYDROCHLORIDE 10 MG: 5 TABLET ORAL at 03:20

## 2025-07-03 RX ADMIN — ONDANSETRON 4 MG: 2 INJECTION, SOLUTION INTRAMUSCULAR; INTRAVENOUS at 08:25

## 2025-07-03 RX ADMIN — ACETAMINOPHEN 975 MG: 325 TABLET, FILM COATED ORAL at 14:00

## 2025-07-03 RX ADMIN — METHOCARBAMOL 750 MG: 750 TABLET ORAL at 08:31

## 2025-07-03 RX ADMIN — HYDROMORPHONE HYDROCHLORIDE 0.2 MG: 0.2 INJECTION, SOLUTION INTRAMUSCULAR; INTRAVENOUS; SUBCUTANEOUS at 00:15

## 2025-07-03 RX ADMIN — METHOCARBAMOL 750 MG: 750 TABLET ORAL at 22:11

## 2025-07-03 RX ADMIN — ONDANSETRON 4 MG: 2 INJECTION, SOLUTION INTRAMUSCULAR; INTRAVENOUS at 03:04

## 2025-07-03 ASSESSMENT — ACTIVITIES OF DAILY LIVING (ADL)
ADLS_ACUITY_SCORE: 25
ADLS_ACUITY_SCORE: 48
ADLS_ACUITY_SCORE: 45
ADLS_ACUITY_SCORE: 49
ADLS_ACUITY_SCORE: 46
ADLS_ACUITY_SCORE: 49
ADLS_ACUITY_SCORE: 48
ADLS_ACUITY_SCORE: 25
ADLS_ACUITY_SCORE: 45
ADLS_ACUITY_SCORE: 25
ADLS_ACUITY_SCORE: 47
ADLS_ACUITY_SCORE: 49
ADLS_ACUITY_SCORE: 22
ADLS_ACUITY_SCORE: 25
ADLS_ACUITY_SCORE: 25
ADLS_ACUITY_SCORE: 49
ADLS_ACUITY_SCORE: 47
ADLS_ACUITY_SCORE: 46
ADLS_ACUITY_SCORE: 48
ADLS_ACUITY_SCORE: 49
ADLS_ACUITY_SCORE: 40
ADLS_ACUITY_SCORE: 40
PREVIOUS_RESPONSIBILITIES: MEAL PREP;HOUSEKEEPING;LAUNDRY;SHOPPING;YARDWORK;MEDICATION MANAGEMENT;FINANCES;DRIVING
ADLS_ACUITY_SCORE: 47

## 2025-07-03 NOTE — PLAN OF CARE
Problem: Cardiovascular Surgery  Goal: Effective Cardiac Function  Outcome: Progressing   Goal Outcome Evaluation:      CV  Pressors (which pressors and any increase/decrease in pressor needs): levo at 0.06    HR range: 70-90  Chest tube output: 60 mL per shift    Neuro  Orientation: x4. Very anxious at begining of shift. On dex at 0.4  Delirium present?(y/n): no  Sleep: some  Pain: yes. Too nauseous to take PO meds up until 0200. Managed w/ dilaudid nd then PO oxy    GI/  BM? (y/n): smear  Urine output: adequate

## 2025-07-03 NOTE — PROGRESS NOTES
[facility]  Cardiovascular and Thoracic Surgery Daily Note      Assessment and Plan  Johan Navarro is a 70 year old male with a PMH of CAD s/p PCI in 2021 to OM/Circumflex, multiple AVMs, left CEA in 2023, prostate cancer s/p prostatectomy, and severe, symptomatic aortic stenosis. Presented for elective aortic valve and ascending aorta replacement.    POD # 2 s/p aortic Valve Replacement (23mm Inspiris), ascending aortic and selective noncoronary sinus root replacement and closure of the left atrial appendage on 7/1/25 with Dr. Michael Mulvihill    - CVS: Pre-op TTE with preserved biventricular function.   HD stable overnight in NSR. Currently on norepi drip to maintain ordered MAPs  Postop vasoplegic shock  Wean pressors as able to maintain MAPS ~65  Will transfuse one unit pRBCs 7/3 for Hgb 7.3 followed by lasix 40 mg IV x1  Continue ASA 81 mg daily, defer on BB given need for pressor, PTA statin, zetia resumed. PTA repatha to resume at discharge.  Hypervolemic: lasix as above  Chest tubes and TPWs to be removed today, 7/3, CXR in AM  PTA meds on hold: repatha    - Resp: Postoperative mechanical ventilation, resolved. Extubated POD 0, now saturating well on NC. IS, pulmonary toilet. PTA flonase resumed    - Neuro: Neuros intact, pain controlled on current regimen. PTA dose of gabapentin resumed. Currently on precedex for anxiety, wean to off; PRN ativan and atarax available. Typically uses THC gummies for anxiety at home, denies alcohol use.    - Renal: No history of significant renal disease. Cr stable. Trend BMP. Volume management as above.  Recent Labs   Lab 07/03/25  0426 07/02/25  0400 07/01/25  1404   CR 1.14 0.98 0.88       - GI: no BM, +flatus, continue bowel regimen. PTA colace on hold    - : remove edward today     - Endo: Postop stress hyperglycemia. Insulin infusion transitioned to sliding scale insulin, discontinue given no needs  Hemoglobin A1C   Date Value Ref Range Status  "  06/25/2025 5.5 <5.7 % Final     Comment:     Normal <5.7%   Prediabetes 5.7-6.4%    Diabetes 6.5% or higher     Note: Adopted from ADA consensus guidelines.        - FEN: Replace electrolytes as needed. ADAT. Large gastric bubble on CXR, continue QID simethicone    - ID: Postop leukocytosis, resolved. Afebrile. Periop abx prophylaxis completing. Trend CBC and fever curve.   Recent Labs   Lab 07/03/25  0426 07/02/25  0400 07/01/25  1404   WBC 7.6 7.7 12.0*       - Heme: Acute blood loss anemia and thrombocytopenia due to surgery. Hgb and PLT stable. Transfuse one unit pRBCs today, 7/3, for HD support/hgb 7.3. Trend CBC, transfuse PRN. Hx of AVMs, PTA iron resumed.  Recent Labs   Lab 07/03/25  0426 07/02/25  0400 07/01/25  1404   HGB 7.3* 7.8* 8.9*   * 123* 120*       - Proph: SCD, subcutaneous heparin, PPI    - Other:  Clinically Significant Risk Factors          # Hyperchloremia: Highest Cl = 111 mmol/L in last 2 days, will monitor as appropriate      # Hypocalcemia: Lowest iCa = 3.8 mg/dL in last 2 days, will monitor and replace as appropriate     # Hypoalbuminemia: Lowest albumin = 3 g/dL at 7/1/2025  2:04 PM, will monitor as appropriate    # Coagulation Defect: INR = 1.53 (Ref range: 0.85 - 1.15) and/or PTT = 34 Seconds (Ref range: 22 - 38 Seconds), will monitor for bleeding  # Thrombocytopenia: Lowest platelets = 115 in last 2 days, will monitor for bleeding              # Overweight: Estimated body mass index is 27.19 kg/m  as calculated from the following:    Height as of this encounter: 1.676 m (5' 6\").    Weight as of this encounter: 76.4 kg (168 lb 6.9 oz)., PRESENT ON ADMISSION     # Financial/Environmental Concerns: none         - Dispo: ICU, wean pressors as able. Continue pulmonary hygiene, therapies and mobility. Therapies recommending discharge to home with OP CR, likely 2-4 days pending medical optimization as above.      Interval History  No acute events overnight. States pain is well " managed on current regimen, slept ok. Anxious requiring precedex, typically utilizes THC gummies for anxiety at home. Breathing is stable on NC, working with IS. Tolerating diet, is passing flatus, no BM, no n/v. States he lends to constipation but refused miralax this AM, agreeable to take after discussion. Up OOB with assistance. Denies chest pain, palpitations, dizziness, syncopal symptoms, chills, myalgias, sternal popping/clicking.    Medications  Current Facility-Administered Medications   Medication Dose Route Frequency Provider Last Rate Last Admin    acetaminophen (TYLENOL) tablet 975 mg  975 mg Oral Q8H Servando Hilton MD   975 mg at 07/03/25 0505    aspirin (ASA) chewable tablet 81 mg  81 mg Oral or NG Tube Daily Servando Hilton MD   81 mg at 07/02/25 1021    atorvastatin (LIPITOR) tablet 80 mg  80 mg Oral Daily Cira Mi NP        ezetimibe (ZETIA) tablet 10 mg  10 mg Oral Daily Cira Mi NP        ferrous sulfate (FEROSUL) tablet 325 mg  325 mg Oral Daily Cira Mi NP        fluticasone (FLONASE) 50 MCG/ACT spray 1 spray  1 spray Both Nostrils Daily Cira Mi NP        folic acid (FOLVITE) tablet 400 mcg  400 mcg Oral Daily Cira Mi NP        gabapentin (NEURONTIN) capsule 600 mg  600 mg Oral BID Cira Mi NP        heparin ANTICOAGULANT injection 5,000 Units  5,000 Units Subcutaneous Q8H Servando Hilton MD   5,000 Units at 07/03/25 0612    insulin aspart (NovoLOG) injection (RAPID ACTING)  1-7 Units Subcutaneous TID AC Gideon Lin PA-C        insulin aspart (NovoLOG) injection (RAPID ACTING)  1-5 Units Subcutaneous At Bedtime Gideon Lin PA-C        Lidocaine (LIDOCARE) 4 % Patch 1-2 patch  1-2 patch Transdermal Q24H Caryl Perez PA-C   2 patch at 07/02/25 0815    methocarbamol (ROBAXIN) tablet 750 mg  750 mg Oral 4x Daily Caryl Perez PA-C   750 mg at 07/02/25 1852    pantoprazole (PROTONIX) 2 mg/mL suspension 40 mg  40 mg Oral or NG Tube  Daily Servando Hilton MD   40 mg at 07/01/25 1434    Or    pantoprazole (PROTONIX) EC tablet 40 mg  40 mg Oral Daily Servando Hilton MD   40 mg at 07/02/25 0807    polyethylene glycol (MIRALAX) Packet 17 g  17 g Oral BID Cira Mi NP   17 g at 07/02/25 2017    potassium & sodium phosphates (NEUTRA-PHOS) Packet 1 packet  1 packet Oral or Feeding Tube Q4H Mulvihill, Michael, MD        senna-docusate (SENOKOT-S/PERICOLACE) 8.6-50 MG per tablet 1 tablet  1 tablet Oral BID Servando Hilton MD   1 tablet at 07/02/25 2017    simethicone (MYLICON) chewable tablet 80 mg  80 mg Oral 4x Daily Cira Mi NP   80 mg at 07/02/25 1504    sodium chloride (PF) 0.9% PF flush 3 mL  3 mL Intracatheter Q8H Servando Hilton MD   3 mL at 07/03/25 0612     Current Facility-Administered Medications   Medication Dose Route Frequency Provider Last Rate Last Admin    [START ON 7/4/2025] bisacodyl (DULCOLAX) suppository 10 mg  10 mg Rectal Daily PRN Servando Hilton MD        glucose gel 15-30 g  15-30 g Oral Q15 Min PRN Gideon Lin PA-C        Or    dextrose 50 % injection 25-50 mL  25-50 mL Intravenous Q15 Min PRN Gideon Lin PA-C        Or    glucagon injection 1 mg  1 mg Subcutaneous Q15 Min PRN Gideon Lin PA-C        diclofenac (VOLTAREN) 1 % topical gel 2 g  2 g Topical 4x Daily PRN Cira Mi NP        hydrALAZINE (APRESOLINE) injection 10 mg  10 mg Intravenous Q30 Min PRN Marcela Morales CNP        HYDROmorphone (DILAUDID) injection 0.2 mg  0.2 mg Intravenous Q2H PRN Servando Hilton MD   0.2 mg at 07/03/25 0215    hydrOXYzine HCl (ATARAX) tablet 25 mg  25 mg Oral Q6H PRN Caryl Perez PA-C   25 mg at 07/02/25 0808    Or    hydrOXYzine HCl (ATARAX) tablet 50 mg  50 mg Oral Q6H PRN Caryl Perez PA-C        lidocaine (LMX4) cream   Topical Q1H PRN Servando Hilton MD        lidocaine 1 % 0.1-1 mL  0.1-1 mL Other Q1H PRN Servando Hilton MD        LORazepam (ATIVAN) tablet 0.5  "mg  0.5 mg Oral Q4H PRN Cira Mi NP   0.5 mg at 07/02/25 1852    magnesium hydroxide (MILK OF MAGNESIA) suspension 30 mL  30 mL Oral Daily PRN Servando Hilton MD        naloxone (NARCAN) injection 0.2 mg  0.2 mg Intravenous Q2 Min PRN Mulvihill, Michael, MD        Or    naloxone (NARCAN) injection 0.4 mg  0.4 mg Intravenous Q2 Min PRN Mulvihill, Michael, MD        Or    naloxone (NARCAN) injection 0.2 mg  0.2 mg Intramuscular Q2 Min PRN Mulvihill, Michael, MD        Or    naloxone (NARCAN) injection 0.4 mg  0.4 mg Intramuscular Q2 Min PRN Mulvihill, Michael, MD        ondansetron (ZOFRAN ODT) ODT tab 4 mg  4 mg Oral Q6H PRN Servando Hilton MD        Or    ondansetron (ZOFRAN) injection 4 mg  4 mg Intravenous Q6H PRN Servando Hilton MD   4 mg at 07/03/25 0304    oxyCODONE (ROXICODONE) tablet 5 mg  5 mg Oral Q4H PRN Servando Hilton MD        Or    oxyCODONE (ROXICODONE) tablet 10 mg  10 mg Oral Q4H PRN Servando Hilton MD   10 mg at 07/03/25 0320    prochlorperazine (COMPAZINE) injection 5 mg  5 mg Intravenous Q6H PRN Servando Hilton MD   5 mg at 07/02/25 2040    Or    prochlorperazine (COMPAZINE) tablet 5 mg  5 mg Oral Q6H PRN Servando Hilton MD        Reason beta blocker order not selected   Does not apply DOES NOT GO TO Servando Hay MD        sodium chloride (PF) 0.9% PF flush 3 mL  3 mL Intracatheter q1 min prn Servando Hilton MD             Physical Exam  Vitals were reviewed  Blood pressure 95/72, pulse 89, temperature 99.7  F (37.6  C), resp. rate 18, height 1.676 m (5' 6\"), weight 76.4 kg (168 lb 6.9 oz), SpO2 (!) 91%.  Rhythm: NSR    Lungs: diminished throughout on NC    Cardiovascular: Distant, S1, S2, RRR, no m/r/g    Abdomen: soft, NT, ND, +BS    Extremeties: warm, no LE edema    Incision: CDI    CT: serosang output, no air leak    Weight:   Vitals:    07/01/25 0602 07/02/25 0600 07/03/25 0630   Weight: 70.8 kg (156 lb) 73.6 kg (162 lb 4.1 oz) 76.4 kg (168 lb 6.9 oz) "         Data  Recent Labs   Lab 07/03/25  0428 07/03/25  0426 07/02/25  2235 07/02/25  0748 07/02/25  0400 07/01/25  2205 07/01/25  1404 07/01/25  1205 07/01/25  1154   WBC  --  7.6  --   --  7.7  --  12.0*  --  10.4   HGB  --  7.3*  --   --  7.8*  --  8.9*   < > 8.5*   MCV  --  83  --   --  85  --  86  --  86   PLT  --  115*  --   --  123*  --  120*  --  136*   INR  --   --   --   --   --   --  1.53*  --  1.63*   NA  --  136  --   --  140  --  140   < > 140   POTASSIUM  --  4.3  --   --  4.2  --  4.2   < > 4.8   CHLORIDE  --  104  --   --  107  --  111*  --  110*   CO2  --  23  --   --  23  --  20*  --  22   BUN  --  23.8*  --   --  13.9  --  9.9  --  10.5   CR  --  1.14  --   --  0.98  --  0.88  --  0.88   ANIONGAP  --  9  --   --  10  --  9  --  8   ONDINA  --  8.3*  --   --  7.6*  --  9.2  --  8.3*   * 122* 115*   < > 113*  119*   < > 129*   < > 147*   ALBUMIN  --   --   --   --  3.5  --  3.0*  --   --    PROTTOTAL  --   --   --   --  5.1*  --  4.5*  --   --    BILITOTAL  --   --   --   --  0.4  --  0.5  --   --    ALKPHOS  --   --   --   --  51  --  52  --   --    ALT  --   --   --   --  14  --  16  --   --    AST  --   --   --   --  49*  --  40  --   --     < > = values in this interval not displayed.       Imaging:  Recent Results (from the past 24 hours)   XR Chest Port 1 View    Narrative    EXAM: XR CHEST PORT 1 VIEW  LOCATION: [facility]  DATE: 7/2/2025    INDICATION: increasing O2 needs  COMPARISON: Earlier today at 5:00 AM      Impression    IMPRESSION: Slight increase in mild patchy opacity in the left lung base likely due to atelectasis and/or pneumonia. Mild pulmonary edema. Mild linear atelectasis in the right midlung. Trace left pleural effusion. Stable cardiomegaly. No pneumothorax.   Right IJ port catheter tip at the SVC/right atrial junction. Median sternotomy, aortic valvular prosthesis, left atrial appendage closure device and mediastinal drains.   XR Abdomen  Port 1 View    Narrative    EXAM: XR ABDOMEN PORT 1 VIEW  LOCATION: [facility]  DATE: 7/2/2025    INDICATION: abdominal pain distention  COMPARISON: 5/29/2025 CT of the chest, abdomen and pelvis      Impression    IMPRESSION: Mild gastric distention. Nonobstructive bowel gas pattern. Moderate amount of formed stool in the colon. No definite free air on supine views. Mediastinal drains partly visualized. Salvador catheter.         Patient seen and discussed with Dr. Mulvihill Leah Jensen, APRN, ACNPC-AG, CCRN  Nurse Practitioner  Cardiothoracic Surgery  Pager: [phone]    CV Surgery Rounding Pager: [phone]  After hours please page surgeon on-call

## 2025-07-03 NOTE — PROGRESS NOTES
POD1 AVR  Oriented, very anxious today to the point he was trying to sit up and get OOB without assistance because he was panicking.  On precedex infusion (0.3) and also PRN PO ativan.    This AM was back on Levo for a couple hours, then got a fluid bolus and ended up hypertensive, requiring nicardipine.  That is now off and BP has been within parameters.    LS dim, IS only to 500 and needs much encouragement to use.  Has gas bubble in stomach, hypoactive BS and has had significant abd pain today.  Got a suppository, no stool.  Not belching.    Chest tube output serosang and decreasing in volume.  Salvador, marshall, about 20-25ml/hr.    Afebrile.  Sinus rhythm.

## 2025-07-03 NOTE — PROGRESS NOTES
07/03/25 0824   Appointment Info   Signing Clinician's Name / Credentials (OT) Ivanna Lim OTR/L   Rehab Comments (OT) Initial Evaluation   Living Environment   People in Home spouse   Current Living Arrangements house   Home Accessibility stairs to enter home;stairs within home   Number of Stairs, Main Entrance 2   Number of Stairs, Within Home, Primary greater than 10 stairs  (15 stairs to basement, pt does the laundry usually.)   Transportation Anticipated family or friend will provide   Self-Care   Usual Activity Tolerance good   Current Activity Tolerance fair   Regular Exercise Yes   Activity/Exercise Type walking   Exercise Amount/Frequency 3-5 times/wk   Equipment Currently Used at Home none   Fall history within last six months no   Activity/Exercise/Self-Care Comment tub/shower combo   Instrumental Activities of Daily Living (IADL)   Previous Responsibilities meal prep;housekeeping;laundry;shopping;yardwork;medication management;finances;driving  (pt is retired)   IADL Comments Pt does a some of the cooking, cleaning and usually does the laundry. drives. etc.   General Information   Onset of Illness/Injury or Date of Surgery 07/01/25   Referring Physician Servando Hilton MD   Patient/Family Therapy Goal Statement (OT) home, pt reports wife can A and also has 2 adult children that live nearby who can A with IADL's, etc   Additional Occupational Profile Info/Pertinent History of Current Problem Joahn Navarro is a 71 y/o M with PMH of severe AORTIC STENOSIS, CAD s/p PCI 2021 to the OM and Cx. He underwent bioprosthetic AVR with Inspiris Resilia 22mm and bovine pericardium patch, SAUL clipping, ascending aorta and non coronary sinus replacement with Hemashield graft size 28 mm, closed with sternal plates and wires by Dr. Mulvihill on 7/1/2026.   Existing Precautions/Restrictions fall;cardiac;sternal;other (see comments)  (CT, Catheter, many lines, sitter due to anxiety.)   Cognitive Status  Examination   Orientation Status orientation to person, place and time   Affect/Mental Status (Cognitive) WFL   Follows Commands WFL   Cognitive Status Comments pt anxious and has a sitter, cont to monitor cognition/safety   Pain Assessment   Patient Currently in Pain Yes, see Vital Sign flowsheet  (5/10 sternal pain)   Range of Motion Comprehensive   Comment, General Range of Motion B UE AROM WFL   Strength Comprehensive (MMT)   Comment, General Manual Muscle Testing (MMT) Assessment B UE strength NT due to precautions   Bed Mobility   Sit-Supine [place] (Bed Mobility) maximum assist (25% patient effort);2 person assist   Transfer Skill: Bed to Chair/Chair to Bed   Bed-Chair [place] (Transfers) contact guard;1 person to manage equipment   Sit-Stand Transfer   Sit-Stand [place] (Transfers) minimum assist (75% patient effort)   Upper Body Dressing Assessment/Training   [place] Level (Upper Body Dressing) moderate assist (50% patient effort)   Comment, (Upper Body Dressing) per clinical judgement   Lower Body Dressing Assessment/Training   [place] Level (Lower Body Dressing) maximum assist (25% patient effort)   Comment, (Lower Body Dressing) per clinical judgement   Clinical Impression   Criteria for Skilled Therapeutic Interventions Met (OT) Yes, treatment indicated   OT Diagnosis impaired I with ADL's and functional mobility.   OT Problem List-Impairments impacting ADL problems related to;activity tolerance impaired;balance;cognition;strength;pain;post-surgical precautions   Assessment of Occupational Performance 3-5 Performance Deficits   Identified Performance Deficits impaired I and safety with dressing, toilet and car transfers, tub/shower transfer, shopping, heavier cleaning and laundry,e tc   Planned Therapy Interventions (OT) ADL retraining;cognition;transfer training;home program guidelines;progressive activity/exercise   Clinical Decision Making Complexity (OT) detailed  assessment/moderate complexity   Risk & Benefits of therapy have been explained evaluation/treatment results reviewed;care plan/treatment goals reviewed;risks/benefits reviewed;current/potential barriers reviewed;participants voiced agreement with care plan;participants included;patient   OT Total Evaluation Time   OT Eval, Moderate Complexity Minutes (45672) 10   OT Goals   Therapy Frequency (OT) 2 times/day   OT Predicted Duration/Target Date for Goal Attainment 07/09/25   OT Goals Upper Body Dressing;Lower Body Dressing;Bed Mobility;Toilet Transfer/Toileting;Cardiac Phase 1   OT: Upper Body Dressing Modified independent;within precautions   OT: Lower Body Dressing Modified independent;within precautions;using adaptive equipment   OT: Bed Mobility Modified independent;supine to/from sitting;rolling;within precautions   OT: Toilet Transfer/Toileting Modified independent;toilet transfer;cleaning and garment management;using adaptive equipment;within precautions   OT: Understanding of cardiac education to maximize quality of life, condition management, and health outcomes Patient;Verbalize  (post CV sx ed)   OT: Perform aerobic activity with stable cardiovascular response continuous;15 minutes;ambulation;treadmill   OT: Functional/aerobic ambulation tolerance with stable cardiovascular response in order to return to home and community environment Independent;Modified independent;Greater than 300 feet  (AD if needed)   OT: Navigation of stairs simulating home set up with stable cardiovascular response in order to return to home and community environment Independent;Modified independent;Greater than 10 stairs  (15 stairs)   Self-Care/Home Management   Self-Care/Home Mgmt/ADL, Compensatory, Meal Prep Minutes (62047) 08   Treatment Detail/Skilled Intervention OT: Initiated ed in post CV sx info, see below for details.   Therapeutic Procedures/Exercise   Therapeutic Procedure: strength, endurance, ROM, flexibillity  minutes (05889) 3   Symptoms Noted During/After Treatment fatigue   Treatment Detail/Skilled Intervention OT: pt completed B LE seated ex, but did not want to do too many due to fatigue and pain.   Treatment Time Includes (CR Only) Monitoring of vital signs (see vital signs flowsheet for details);See specific exercise details intervention group(s)   Therapeutic Activities   Therapeutic Activity Minutes (58355) 15   Symptoms noted during/after treatment fatigue;increased pain   Treatment Detail/Skilled Intervention OT: pt seated in chair upon OT arrival, pt wanting to get back to bed. pt needing min/mod A to scoot to edge of chair and to bring UB forward. sit <> stand with cues for tech with CGA/min A with cues for tech and NA/R present to A wtih lines, pt transferred to EOB with CGA with step by step cues in order to navigate with lines and NA/R conts to A with managing lines. sit to supine with cues for PLB with moving as pt reporting increased pain with most change of positioning. sit to supine with mod/max A x 2, cues for PLB. max A x 2 to scoot to HOB and increased time to position pt comfortably in bed and increased cues to understand how to bridge glutes using BLEs to scoot to the L side. VS appeared stable throughout.   Calisthenics   Type Hip Flexor: kicks   Workload pt encouraged to do 10 seated LE ex, but did approx 5 due to fatigue and wanting to get back into bed.   Effort Scale 6   Symptoms Fatigue   Cardiovascular Response Normal   Cardiac Education   Education Provided Precautions;Resuming home activities;Outpatient Cardiac Rehab   Education Packet Given to Patient No   All Patient Education Handouts Reviewed with Patient and/or Family No   Cardiac Rehab Phase II Plan   Phase II Order Received Yes   Phase II Appointment Status Scheduled   Date/Time 7/16,   Location [facility]   OT Discharge Planning   OT Plan OT plan; bed mobiliyt, transfers, standing or seated CVC ex, timed ambulation when approp. bring  CV surgery handouts when ready. etc   OT Discharge Recommendation (DC Rec) home with assist;home with outpatient cardiac rehab   OT Rationale for DC Rec Pt independnet with all ADL/IADL's and functional mobility prior to admit, pt currently limited due to impaired strength, safety, activity tolerance, pain, sternal precautions. Pt reports his wife can A with ADL/IADl's as needed or also adult children live near by to A as needed. pt will likely require initial A with shopping, transportation, yard work, laundry, heavier cleaning ie vacuuming, etc and recommend [facility] for monitored progressive ex and cont'd AVR ed. Will cont to monitor safe discharge rec.   OT Brief overview of current status Goals of therapy will be to address safe mobility and make recs for d/c to next level of care. Pt and RN will continue to follow all falls risk precautions as documented by RN staff while hospitalized. CGA/MIN A sit <> stand and transfer to EOB, mod/max A x 2 sit to supine.   OT Total Distance Amb During Session (feet) 4

## 2025-07-03 NOTE — PROGRESS NOTES
[facility] ICU Progress Note     ASSESSMENT: Johan Navarro is a 69 y/o M with PMH of severe AORTIC STENOSIS, CAD s/p PCI 2021 to the OM and Cx. He underwent bioprosthetic AVR with Inspiris Resilia 22mm and bovine pericardium patch, SAUL clipping, ascending aorta and non coronary sinus replacement with Hemashield graft size 28 mm, closed with sternal plates and wires by Dr. Mulvihill on 7/1/2026. Fast track extubated. Remains on NE.      Interval events/Plan:  - No acute events overnight.Remains on NE, up to 0.08 this AM  - Precedex 0.2, can wean off throughout day.  - Slow drift down in Hgb. CT output has been minimal.   - Miralax BID, needs to have BM  - CVTS is primary.     Neuro/ pain/ sedation  #Acute post operative pain  - Scheduled tylenol and Robaxin   - Scheduled Gabapentin 100 TID  - PRN Oxycodone and dilaudid    Pulmonary:   #OPAL  - Fast track extubated. On RA  - RCAT, Volume expansion and flutter valve post extubation.     Cardiovascular:    #Aortic stenosis s/p bioprosthetic AVR  #SAUL clipping  #Ascending aorta and non coronary sinus replacement with Hemashield graft  # Vasoplegia  #CAD s/p PCI 2021  - Normal Biventricular function pre and post CPB, trace MR, very calcified aorta   - NE gtt, wean as tolerated  - SBP goal < 120  - A/V wires (AAI 80 to backup)  - Aspirin 81 + statin and Zetia. Hold Repatha     GI/Nutrition:   - ADAT  - PPI  - Bowel regimen: miralax BID and senna BID     Renal/ Fluid Balance:    - I&Os, daily weights  - ICU electrolyte replacement protocol  - Calcium replaced today.     Endocrine:    #Stress induced hyperglycemia  - Sliding scale - can likely discontinue as he has not required any sliding scale in 3 days. Defer to primary.     ID:  - To complete perioperative regimen.      Hematology:     #Acute blood loss anemia  #Mild Thrombocytopenia  - Continue to monitor   - Goal Hgb >7.0     MSK  - PT and OT when extubated.      Lines/ tubes/ drains:  - RIJ MAC  - arterial line   - Salvador       "Prophylaxis:    - DVT prophylaxis: Mechanical and SQH  - PUD prophylaxis: PPI    Code status: Full      Disposition: ICU    Critical care time exclusive of procedures: 35 minutes    Gideon Lin PA-C    Clinically Significant Risk Factors          # Hyperchloremia: Highest Cl = 111 mmol/L in last 2 days, will monitor as appropriate      # Hypocalcemia: Lowest iCa = 3.8 mg/dL in last 2 days, will monitor and replace as appropriate     # Hypoalbuminemia: Lowest albumin = 3 g/dL at 7/1/2025  2:04 PM, will monitor as appropriate    # Coagulation Defect: INR = 1.53 (Ref range: 0.85 - 1.15) and/or PTT = 34 Seconds (Ref range: 22 - 38 Seconds), will monitor for bleeding  # Thrombocytopenia: Lowest platelets = 115 in last 2 days, will monitor for bleeding              # Overweight: Estimated body mass index is 27.19 kg/m  as calculated from the following:    Height as of this encounter: 1.676 m (5' 6\").    Weight as of this encounter: 76.4 kg (168 lb 6.9 oz)., PRESENT ON ADMISSION     # Financial/Environmental Concerns: none                [facility] ICU Rounding checklist    Assessment of central venous catheter access Central access present and needed   Assessment of urinary catheter use Salvador present and needed.  Indication:  STrict I&O   DVT prophylaxis Subcutaneous heparin product (heparin or LMWH)       - - - - - - - - - - - - - - - - - - - - - - - - - - - - - - - - - - - - - - - - - - - - - - - - - - - - - - - - - - - - - - - - - - - - - - - -   PHYSICAL EXAMINATION:  Temp:  [97.9  F (36.6  C)-99.9  F (37.7  C)] 99.7  F (37.6  C)  Pulse:  [] 89  Resp:  [18] 18  BP: ()/(60-72) 95/72  MAP:  [48 mmHg-104 mmHg] 49 mmHg  Arterial Line BP: ()/(35-78) 72/35  SpO2:  [86 %-100 %] 91 %    General: NAD, awake, alert  HEENT: normocephalic, atraumatic  Neuro: alert and oriented, moves all extremities  CV: RRR,  mediastinal tubes with minimal serosanguinous output, no leak  Pulm: clear to auscultation " bilaterally, no wheeze or rhonchi  Abd: soft, non tender  : edward with yellow urine  Extremities: warm, well perfused  Skin: warm, dry. Incisions: CDI      LABS: Reviewed.   Arterial Blood Gases   Recent Labs   Lab 07/01/25  1404 07/01/25  1334 07/01/25  1216 07/01/25  1205   PH 7.39 7.38 7.37 7.36   PCO2 35 35 38 40   PO2 222* 204* 365* 360*   HCO3 21 21 22 22     Complete Blood Count   Recent Labs   Lab 07/03/25  0426 07/02/25  0400 07/01/25  1404 07/01/25  1334 07/01/25  1205 07/01/25  1154   WBC 7.6 7.7 12.0*  --   --  10.4   HGB 7.3* 7.8* 8.9* 8.5*   < > 8.5*   * 123* 120*  --   --  136*    < > = values in this interval not displayed.     Basic Metabolic Panel  Recent Labs   Lab 07/03/25  0428 07/03/25  0426 07/02/25 2235 07/02/25  2030 07/02/25  0748 07/02/25  0400 07/01/25  2205 07/01/25  1404 07/01/25  1402 07/01/25  1334 07/01/25  1205 07/01/25  1154   NA  --  136  --   --   --  140  --  140  --  141   < > 140   POTASSIUM  --  4.3  --   --   --  4.2  --  4.2  --  4.5   < > 4.8   CHLORIDE  --  104  --   --   --  107  --  111*  --   --   --  110*   CO2  --  23  --   --   --  23  --  20*  --   --   --  22   BUN  --  23.8*  --   --   --  13.9  --  9.9  --   --   --  10.5   CR  --  1.14  --   --   --  0.98  --  0.88  --   --   --  0.88   * 122* 115* 118*   < > 113*  119*   < > 129*   < >  --    < > 147*    < > = values in this interval not displayed.     Liver Function Tests  Recent Labs   Lab 07/02/25  0400 07/01/25  1404 07/01/25  1154   AST 49* 40  --    ALT 14 16  --    ALKPHOS 51 52  --    BILITOTAL 0.4 0.5  --    ALBUMIN 3.5 3.0*  --    INR  --  1.53* 1.63*     Pancreatic Enzymes  No lab results found in last 7 days.  Coagulation Profile  Recent Labs   Lab 07/01/25  1404 07/01/25  1154   INR 1.53* 1.63*   PTT 34 30       IMAGING:  Recent Results (from the past 24 hours)   XR Chest Port 1 View    Narrative    EXAM: XR CHEST PORT 1 VIEW  LOCATION: [facility]  DATE:  7/2/2025    INDICATION: increasing O2 needs  COMPARISON: Earlier today at 5:00 AM      Impression    IMPRESSION: Slight increase in mild patchy opacity in the left lung base likely due to atelectasis and/or pneumonia. Mild pulmonary edema. Mild linear atelectasis in the right midlung. Trace left pleural effusion. Stable cardiomegaly. No pneumothorax.   Right IJ port catheter tip at the SVC/right atrial junction. Median sternotomy, aortic valvular prosthesis, left atrial appendage closure device and mediastinal drains.   XR Abdomen Port 1 View    Narrative    EXAM: XR ABDOMEN PORT 1 VIEW  LOCATION: [facility]  DATE: 7/2/2025    INDICATION: abdominal pain distention  COMPARISON: 5/29/2025 CT of the chest, abdomen and pelvis      Impression    IMPRESSION: Mild gastric distention. Nonobstructive bowel gas pattern. Moderate amount of formed stool in the colon. No definite free air on supine views. Mediastinal drains partly visualized. Salvador catheter.

## 2025-07-03 NOTE — PLAN OF CARE
Problem: Adult Inpatient Plan of Care  Goal: Plan of Care Review  Description: The Plan of Care Review/Shift note should be completed every shift.  The Outcome Evaluation is a brief statement about your assessment that the patient is improving, declining, or no change.  This information will be displayed automatically on your shift  note.  Outcome: Met  Flowsheets (Taken 7/3/2025 1827)  Outcome Evaluation: pt a/o, very pleasant, able to make needs known, uses call light appropriately. levo weaned off this after after transfusion of 1 unit PRBC's. lasix given after blood with 1700ml of urine response. HR NSR, no ectopy noted. CT's d/c'd today.  nausea resolved this afternoon, very poor appetitte- nursing giving lots of encouragement to PO intake. UOP adequate- edward d/c'd at 1600. pain controlled patient states. oxycodone q4 hours, scheduled robaxin and tylenol. ativan given x 2 for anxiety. up to chair x 2 today, tolerated well, assist x 1. wife at bedside and updated with plan of care. [place] [facility] [place]     Problem: Adult Inpatient Plan of Care  Goal: Optimal Comfort and Wellbeing  Outcome: Met  Intervention: Monitor Pain and Promote Comfort  Recent Flowsheet Documentation  Taken 7/3/2025 1151 by Nel Lopez RN  Pain Management Interventions:   medication (see MAR)   rest   repositioned  Taken 7/3/2025 1000 by Nel Lopez RN  Pain Management Interventions:   rest   repositioned  Taken 7/3/2025 0816 by Nel Lopez RN  Pain Management Interventions: medication (see MAR)  Intervention: Provide Person-Centered Care  Recent Flowsheet Documentation  Taken 7/3/2025 1600 by Nel Lopez RN  Trust Relationship/Rapport:   choices provided   care explained   emotional support provided   empathic listening provided   questions answered   questions encouraged   reassurance provided   thoughts/feelings acknowledged  Taken 7/3/2025 1200 by Nel Lopez RN  Trust  Relationship/Rapport:   choices provided   care explained   emotional support provided   empathic listening provided   questions answered   questions encouraged   reassurance provided   thoughts/feelings acknowledged  Taken 7/3/2025 0800 by Nel Lopez RN  Trust Relationship/Rapport:   choices provided   care explained   emotional support provided   empathic listening provided   questions answered   questions encouraged   reassurance provided   thoughts/feelings acknowledged     Problem: Cardiovascular Surgery  Goal: Improved Activity Tolerance  Outcome: Met  Intervention: Optimize Tolerance for Activity  Recent Flowsheet Documentation  Taken 7/3/2025 1600 by Nel Lopez RN  Environmental Support:   calm environment promoted   caregiver consistency promoted  Taken 7/3/2025 1200 by Nel Lopez RN  Environmental Support:   calm environment promoted   caregiver consistency promoted  Taken 7/3/2025 0800 by Nel Lopez RN  Environmental Support:   calm environment promoted   caregiver consistency promoted     Problem: Adult Inpatient Plan of Care  Goal: Readiness for Transition of Care  Outcome: Met   Goal Outcome Evaluation:                 Outcome Evaluation: pt a/o, very pleasant, able to make needs known, uses call light appropriately. levo weaned off this after after transfusion of 1 unit PRBC's. lasix given after blood with 1700ml of urine response. HR NSR, no ectopy noted. CT's d/c'd today.  nausea resolved this afternoon, very poor appetitte- nursing giving lots of encouragement to PO intake. UOP adequate- edward d/c'd at 1600. pain controlled patient states. oxycodone q4 hours, scheduled robaxin and tylenol. ativan given x 2 for anxiety. up to chair x 2 today, tolerated well, assist x 1. wife at bedside and updated with plan of care. plan tx to [facility] this evening.

## 2025-07-04 ENCOUNTER — APPOINTMENT (OUTPATIENT)
Dept: GENERAL RADIOLOGY | Facility: CLINIC | Age: 70
DRG: 219 | End: 2025-07-04
Attending: NURSE PRACTITIONER
Payer: COMMERCIAL

## 2025-07-04 ENCOUNTER — APPOINTMENT (OUTPATIENT)
Dept: OCCUPATIONAL THERAPY | Facility: CLINIC | Age: 70
DRG: 219 | End: 2025-07-04
Attending: STUDENT IN AN ORGANIZED HEALTH CARE EDUCATION/TRAINING PROGRAM
Payer: COMMERCIAL

## 2025-07-04 LAB
ANION GAP SERPL CALCULATED.3IONS-SCNC: 10 MMOL/L (ref 7–15)
ATRIAL RATE - MUSE: 94 BPM
BUN SERPL-MCNC: 20.7 MG/DL (ref 8–23)
CALCIUM SERPL-MCNC: 8.1 MG/DL (ref 8.8–10.4)
CHLORIDE SERPL-SCNC: 99 MMOL/L (ref 98–107)
CREAT SERPL-MCNC: 0.91 MG/DL (ref 0.67–1.17)
DIASTOLIC BLOOD PRESSURE - MUSE: NORMAL MMHG
EGFRCR SERPLBLD CKD-EPI 2021: >90 ML/MIN/1.73M2
ERYTHROCYTE [DISTWIDTH] IN BLOOD BY AUTOMATED COUNT: 15.6 % (ref 10–15)
FERRITIN SERPL-MCNC: 45 NG/ML (ref 31–409)
GLUCOSE SERPL-MCNC: 89 MG/DL (ref 70–99)
HCO3 SERPL-SCNC: 27 MMOL/L (ref 22–29)
HCT VFR BLD AUTO: 26 % (ref 40–53)
HGB BLD-MCNC: 8.8 G/DL (ref 13.3–17.7)
INTERPRETATION ECG - MUSE: NORMAL
MAGNESIUM SERPL-MCNC: 1.7 MG/DL (ref 1.7–2.3)
MCH RBC QN AUTO: 27.8 PG (ref 26.5–33)
MCHC RBC AUTO-ENTMCNC: 33.8 G/DL (ref 31.5–36.5)
MCV RBC AUTO: 82 FL (ref 78–100)
P AXIS - MUSE: 62 DEGREES
PHOSPHATE SERPL-MCNC: 2.1 MG/DL (ref 2.5–4.5)
PLATELET # BLD AUTO: 115 10E3/UL (ref 150–450)
POTASSIUM SERPL-SCNC: 3.5 MMOL/L (ref 3.4–5.3)
PR INTERVAL - MUSE: 142 MS
QRS DURATION - MUSE: 82 MS
QT - MUSE: 362 MS
QTC - MUSE: 452 MS
R AXIS - MUSE: -2 DEGREES
RBC # BLD AUTO: 3.16 10E6/UL (ref 4.4–5.9)
SODIUM SERPL-SCNC: 136 MMOL/L (ref 135–145)
SYSTOLIC BLOOD PRESSURE - MUSE: NORMAL MMHG
T AXIS - MUSE: 33 DEGREES
VENTRICULAR RATE- MUSE: 94 BPM
WBC # BLD AUTO: 7.6 10E3/UL (ref 4–11)

## 2025-07-04 PROCEDURE — 83735 ASSAY OF MAGNESIUM: CPT | Performed by: NURSE PRACTITIONER

## 2025-07-04 PROCEDURE — 250N000013 HC RX MED GY IP 250 OP 250 PS 637: Performed by: PHYSICIAN ASSISTANT

## 2025-07-04 PROCEDURE — 36415 COLL VENOUS BLD VENIPUNCTURE: CPT | Performed by: NURSE PRACTITIONER

## 2025-07-04 PROCEDURE — 250N000013 HC RX MED GY IP 250 OP 250 PS 637: Performed by: NURSE PRACTITIONER

## 2025-07-04 PROCEDURE — 71046 X-RAY EXAM CHEST 2 VIEWS: CPT

## 2025-07-04 PROCEDURE — 999N000156 HC STATISTIC RCP CONSULT EA 30 MIN

## 2025-07-04 PROCEDURE — 120N000013 HC R&B IMCU

## 2025-07-04 PROCEDURE — 94660 CPAP INITIATION&MGMT: CPT

## 2025-07-04 PROCEDURE — 84100 ASSAY OF PHOSPHORUS: CPT | Performed by: NURSE PRACTITIONER

## 2025-07-04 PROCEDURE — 80048 BASIC METABOLIC PNL TOTAL CA: CPT | Performed by: NURSE PRACTITIONER

## 2025-07-04 PROCEDURE — 85041 AUTOMATED RBC COUNT: CPT | Performed by: NURSE PRACTITIONER

## 2025-07-04 PROCEDURE — 94799 UNLISTED PULMONARY SVC/PX: CPT

## 2025-07-04 PROCEDURE — 250N000011 HC RX IP 250 OP 636: Performed by: NURSE PRACTITIONER

## 2025-07-04 PROCEDURE — 82728 ASSAY OF FERRITIN: CPT | Performed by: PHYSICIAN ASSISTANT

## 2025-07-04 PROCEDURE — 97110 THERAPEUTIC EXERCISES: CPT | Mod: GO

## 2025-07-04 PROCEDURE — 999N000157 HC STATISTIC RCP TIME EA 10 MIN

## 2025-07-04 PROCEDURE — 97530 THERAPEUTIC ACTIVITIES: CPT | Mod: GO

## 2025-07-04 PROCEDURE — 250N000013 HC RX MED GY IP 250 OP 250 PS 637: Performed by: STUDENT IN AN ORGANIZED HEALTH CARE EDUCATION/TRAINING PROGRAM

## 2025-07-04 RX ORDER — MAGNESIUM OXIDE 400 MG/1
400 TABLET ORAL EVERY 4 HOURS
Status: COMPLETED | OUTPATIENT
Start: 2025-07-04 | End: 2025-07-04

## 2025-07-04 RX ORDER — TRAMADOL HYDROCHLORIDE 50 MG/1
50 TABLET ORAL EVERY 6 HOURS PRN
Status: DISCONTINUED | OUTPATIENT
Start: 2025-07-04 | End: 2025-07-05 | Stop reason: HOSPADM

## 2025-07-04 RX ORDER — METOPROLOL TARTRATE 25 MG/1
25 TABLET, FILM COATED ORAL 2 TIMES DAILY
Status: DISCONTINUED | OUTPATIENT
Start: 2025-07-04 | End: 2025-07-05 | Stop reason: HOSPADM

## 2025-07-04 RX ORDER — POTASSIUM CHLORIDE 1500 MG/1
20 TABLET, EXTENDED RELEASE ORAL ONCE
Status: COMPLETED | OUTPATIENT
Start: 2025-07-04 | End: 2025-07-04

## 2025-07-04 RX ADMIN — ACETAMINOPHEN 975 MG: 325 TABLET, FILM COATED ORAL at 21:38

## 2025-07-04 RX ADMIN — ATORVASTATIN CALCIUM 80 MG: 80 TABLET, FILM COATED ORAL at 09:18

## 2025-07-04 RX ADMIN — SIMETHICONE 80 MG: 80 TABLET, CHEWABLE ORAL at 09:18

## 2025-07-04 RX ADMIN — SENNOSIDES AND DOCUSATE SODIUM 2 TABLET: 50; 8.6 TABLET ORAL at 09:18

## 2025-07-04 RX ADMIN — OXYCODONE HYDROCHLORIDE 10 MG: 5 TABLET ORAL at 10:12

## 2025-07-04 RX ADMIN — Medication 400 MG: at 09:18

## 2025-07-04 RX ADMIN — SIMETHICONE 80 MG: 80 TABLET, CHEWABLE ORAL at 12:55

## 2025-07-04 RX ADMIN — MELATONIN TAB 3 MG 3 MG: 3 TAB at 21:39

## 2025-07-04 RX ADMIN — SIMETHICONE 80 MG: 80 TABLET, CHEWABLE ORAL at 18:36

## 2025-07-04 RX ADMIN — METHOCARBAMOL 750 MG: 750 TABLET ORAL at 21:38

## 2025-07-04 RX ADMIN — ACETAMINOPHEN 975 MG: 325 TABLET, FILM COATED ORAL at 16:15

## 2025-07-04 RX ADMIN — HEPARIN SODIUM 5000 UNITS: 5000 INJECTION, SOLUTION INTRAVENOUS; SUBCUTANEOUS at 16:27

## 2025-07-04 RX ADMIN — FERROUS SULFATE TAB 325 MG (65 MG ELEMENTAL FE) 325 MG: 325 (65 FE) TAB at 09:18

## 2025-07-04 RX ADMIN — METHOCARBAMOL 750 MG: 750 TABLET ORAL at 18:36

## 2025-07-04 RX ADMIN — Medication 400 MG: at 12:55

## 2025-07-04 RX ADMIN — HEPARIN SODIUM 5000 UNITS: 5000 INJECTION, SOLUTION INTRAVENOUS; SUBCUTANEOUS at 05:19

## 2025-07-04 RX ADMIN — ACETAMINOPHEN 975 MG: 325 TABLET, FILM COATED ORAL at 05:19

## 2025-07-04 RX ADMIN — FOLIC ACID TAB 400 MCG 400 MCG: 400 TAB at 09:18

## 2025-07-04 RX ADMIN — POLYETHYLENE GLYCOL 3350 17 G: 17 POWDER, FOR SOLUTION ORAL at 09:17

## 2025-07-04 RX ADMIN — POTASSIUM CHLORIDE 20 MEQ: 1500 TABLET, EXTENDED RELEASE ORAL at 09:18

## 2025-07-04 RX ADMIN — POTASSIUM & SODIUM PHOSPHATES POWDER PACK 280-160-250 MG 1 PACKET: 280-160-250 PACK at 12:55

## 2025-07-04 RX ADMIN — EZETIMIBE 10 MG: 10 TABLET ORAL at 09:18

## 2025-07-04 RX ADMIN — GABAPENTIN 600 MG: 300 CAPSULE ORAL at 09:18

## 2025-07-04 RX ADMIN — METOPROLOL TARTRATE 12.5 MG: 25 TABLET, FILM COATED ORAL at 09:18

## 2025-07-04 RX ADMIN — POTASSIUM & SODIUM PHOSPHATES POWDER PACK 280-160-250 MG 1 PACKET: 280-160-250 PACK at 16:16

## 2025-07-04 RX ADMIN — PANTOPRAZOLE SODIUM 40 MG: 40 TABLET, DELAYED RELEASE ORAL at 09:18

## 2025-07-04 RX ADMIN — METOPROLOL TARTRATE 25 MG: 25 TABLET, FILM COATED ORAL at 21:39

## 2025-07-04 RX ADMIN — ASPIRIN 81 MG CHEWABLE TABLET 81 MG: 81 TABLET CHEWABLE at 09:17

## 2025-07-04 RX ADMIN — GABAPENTIN 600 MG: 300 CAPSULE ORAL at 21:38

## 2025-07-04 RX ADMIN — HEPARIN SODIUM 5000 UNITS: 5000 INJECTION, SOLUTION INTRAVENOUS; SUBCUTANEOUS at 21:38

## 2025-07-04 RX ADMIN — SIMETHICONE 80 MG: 80 TABLET, CHEWABLE ORAL at 21:39

## 2025-07-04 RX ADMIN — METHOCARBAMOL 750 MG: 750 TABLET ORAL at 12:55

## 2025-07-04 RX ADMIN — METHOCARBAMOL 750 MG: 750 TABLET ORAL at 09:18

## 2025-07-04 RX ADMIN — POTASSIUM & SODIUM PHOSPHATES POWDER PACK 280-160-250 MG 1 PACKET: 280-160-250 PACK at 09:17

## 2025-07-04 ASSESSMENT — ACTIVITIES OF DAILY LIVING (ADL)
ADLS_ACUITY_SCORE: 40
ADLS_ACUITY_SCORE: 35
ADLS_ACUITY_SCORE: 40
ADLS_ACUITY_SCORE: 35
ADLS_ACUITY_SCORE: 40
ADLS_ACUITY_SCORE: 35
ADLS_ACUITY_SCORE: 40
ADLS_ACUITY_SCORE: 35
ADLS_ACUITY_SCORE: 40
ADLS_ACUITY_SCORE: 35
ADLS_ACUITY_SCORE: 40
ADLS_ACUITY_SCORE: 40
ADLS_ACUITY_SCORE: 35
ADLS_ACUITY_SCORE: 40
ADLS_ACUITY_SCORE: 40
ADLS_ACUITY_SCORE: 35
ADLS_ACUITY_SCORE: 35
ADLS_ACUITY_SCORE: 40
ADLS_ACUITY_SCORE: 35

## 2025-07-04 NOTE — PROGRESS NOTES
"NUTRITION ASSESSMENT      REASON FOR ASSESSMENT:  Cardiac Surgery Nutrition Consult    CURRENT DIET / INTAKE:  Diet: Regular    Visited with pt this afternoon  Tells me that he had some turkey, potatoes and a cookie for lunch  Fair appetite  \"I need to go number 2\"  Declined offer of supplements - \"would like to eat real food, those give me a stomach ache\"        ANTHROPOMETRICS:   Ht: 5'6\"  Wt: (7/1) 70.8 kg  BMI: 25.1  IBW: 64.5 kg  Weight Status: Overweight BMI 25-29.9  %IBW: 110%    MALNUTRITION:  Patient does not meet two of the following criteria necessary for diagnosing malnutrition:  significant weight loss, reduced intake, subcutaneous fat loss, muscle loss or fluid retention. Nutrition Focused Physical Assessment (NFPA) not appropriate at this time.    NUTRITION DIAGNOSIS:   Increased nutrient needs (protein) R/t higher demand for healing AEB pt is s/p AVR    INTERVENTIONS:    Nutrition Prescription:  Regular diet    Implementation:  Nutrition Education (Content):  Discussed the importance of adequate nutrition post-op for healing and energy, emphasizing protein foods, and encouraged patient to order a protein food at each meal.    Goals:  Patient to consume ~75% at meals in the next 3 - 5 days    Follow Up/Monitoring (InPatient):  Food and Fluid intake -  Monitor for adequacy    Follow Up/Monitoring (OutPatient):  Patient will participate in out-patient cardiac rehab and attend nutrition classes during the program    "

## 2025-07-04 NOTE — PROGRESS NOTES
[facility]  Cardiovascular and Thoracic Surgery Daily Note      Assessment and Plan  Johan Navarro is a 70 year old male with a PMH of CAD s/p PCI in 2021 to OM/Circumflex, multiple AVMs, left CEA in 2023, prostate cancer s/p prostatectomy, and severe, symptomatic aortic stenosis. Presented for elective aortic valve and ascending aorta replacement.    POD # 3 s/p aortic Valve Replacement (23mm Inspiris), ascending aortic and selective noncoronary sinus root replacement and closure of the left atrial appendage on 7/1/25 with Dr. Michael Mulvihill    - CVS: Pre-op TTE with preserved biventricular function.   HD stable overnight in NSR.  Postop vasoplegic shock resolved  Continue ASA 81 mg daily, initiated BB today with hold parameters at 12.5mg BID, increase to 25mg BID tonight.  PTA statin, zetia resumed. PTA repatha to resume at discharge.  Hypervolemic: diuresing well on own will hold diuretics today  Chest tubes and TPWs removed yesterday. CXR this am stable. Ordered TTE for tomorrow am to evaluate for post operative baseline.  PTA meds on hold: repatha    - Resp: Postoperative mechanical ventilation, resolved. Extubated POD 0, now saturating well on NC. IS, pulmonary toilet. PTA flonase resumed    - Neuro: Neuros intact, pain controlled on current regimen. PTA dose of gabapentin resumed. Anxiety better controlled today. PRN ativan and atarax available. Typically uses THC gummies for anxiety at home, denies alcohol use. Will add melatonin for sleep tonight.     - Renal: No history of significant renal disease. Cr stable. Trend BMP. Volume management as above.  Recent Labs   Lab 07/04/25  0535 07/03/25  0426 07/02/25  0400   CR 0.91 1.14 0.98       - GI: + BM, +flatus, continue bowel regimen. PTA colace on hold    - : voiding well on own    - Endo: Postop stress hyperglycemia. Insulin infusion transitioned to sliding scale insulin, discontinue given no needs  Hemoglobin A1C   Date Value Ref Range  "Status   06/25/2025 5.5 <5.7 % Final     Comment:     Normal <5.7%   Prediabetes 5.7-6.4%    Diabetes 6.5% or higher     Note: Adopted from ADA consensus guidelines.        - FEN: Replace electrolytes as needed. ADAT. Large gastric bubble on CXR, continue QID simethicone    - ID: Postop leukocytosis, resolved. Afebrile. Periop abx prophylaxis completing. Trend CBC and fever curve.   Recent Labs   Lab 07/04/25  0535 07/03/25  0426 07/02/25  0400   WBC 7.6 7.6 7.7       - Heme: Acute blood loss anemia and thrombocytopenia due to surgery. Hgb and PLT stable. Transfuse one unit pRBCs , 7/3, for HD support/hgb 7.3. good response. Will add ferritin per pt and wife request. Trend CBC, transfuse PRN. Hx of AVMs, PTA iron resumed.  Recent Labs   Lab 07/04/25  0535 07/03/25  0426 07/02/25  0400   HGB 8.8* 7.3* 7.8*   * 115* 123*       - Proph: SCD, subcutaneous heparin, PPI    - Other:  Clinically Significant Risk Factors               # Hypoalbuminemia: Lowest albumin = 3 g/dL at 7/1/2025  2:04 PM, will monitor as appropriate     # Thrombocytopenia: Lowest platelets = 115 in last 2 days, will monitor for bleeding              # Overweight: Estimated body mass index is 25.63 kg/m  as calculated from the following:    Height as of this encounter: 1.676 m (5' 6\").    Weight as of this encounter: 72 kg (158 lb 12.8 oz)., PRESENT ON ADMISSION     # Financial/Environmental Concerns: none         - Dispo: IMC. Therapies recommending discharge to home when medically ready. Anticipate medical readiness as early as tomorrow. Will order TTE to be done tomorrow prior to discharge.       Interval History  No acute events overnight. States pain is well managed on current regimen, slept ok overnight but hasn't slept much today. Pain controlled. Tolerating diet. +BM.   Medications  Current Facility-Administered Medications   Medication Dose Route Frequency Provider Last Rate Last Admin    acetaminophen (TYLENOL) tablet 975 mg  975 mg " Oral Q8H Cira Mi NP   975 mg at 07/04/25 0519    aspirin (ASA) chewable tablet 81 mg  81 mg Oral or NG Tube Daily Cira Mi NP   81 mg at 07/03/25 0831    atorvastatin (LIPITOR) tablet 80 mg  80 mg Oral Daily Cira Mi NP   80 mg at 07/03/25 0831    ezetimibe (ZETIA) tablet 10 mg  10 mg Oral Daily Cira Mi NP   10 mg at 07/03/25 0831    ferrous sulfate (FEROSUL) tablet 325 mg  325 mg Oral Daily Cira Mi NP        fluticasone (FLONASE) 50 MCG/ACT spray 1 spray  1 spray Both Nostrils Daily Cira Mi NP        folic acid (FOLVITE) tablet 400 mcg  400 mcg Oral Daily Cira Mi NP   400 mcg at 07/03/25 0831    gabapentin (NEURONTIN) capsule 600 mg  600 mg Oral BID Cira Mi NP   600 mg at 07/03/25 2036    heparin ANTICOAGULANT injection 5,000 Units  5,000 Units Subcutaneous Q8H Cira Mi NP   5,000 Units at 07/04/25 0519    Lidocaine (LIDOCARE) 4 % Patch 1-2 patch  1-2 patch Transdermal Q24H Cira Mi NP   2 patch at 07/03/25 0834    magnesium oxide (MAG-OX) tablet 400 mg  400 mg Oral Q4H Mulvihill, Michael, MD        methocarbamol (ROBAXIN) tablet 750 mg  750 mg Oral 4x Daily Cira Mi NP   750 mg at 07/03/25 2211    metoprolol tartrate (LOPRESSOR) half-tab 12.5 mg  12.5 mg Oral BID Valentina Guzman PA-C        pantoprazole (PROTONIX) 2 mg/mL suspension 40 mg  40 mg Oral or NG Tube Daily Cira Mi NP   40 mg at 07/01/25 1434    Or    pantoprazole (PROTONIX) EC tablet 40 mg  40 mg Oral Daily Cira Mi NP   40 mg at 07/03/25 0831    polyethylene glycol (MIRALAX) Packet 17 g  17 g Oral BID Cira Mi NP   17 g at 07/03/25 2036    potassium & sodium phosphates (NEUTRA-PHOS) Packet 1 packet  1 packet Oral or Feeding Tube Q4H Mulvihill, Michael, MD        potassium chloride roxanna ER (KLOR-CON M20) CR tablet 20 mEq  20 mEq Oral Once Mulvihill, Michael, MD        senna-docusate (SENOKOT-S/PERICOLACE) 8.6-50 MG per tablet 2 tablet  2 tablet  Oral BID Cira Mi NP   2 tablet at 07/03/25 2036    simethicone (MYLICON) chewable tablet 80 mg  80 mg Oral 4x Daily Cira Mi NP   80 mg at 07/03/25 2211    sodium chloride (PF) 0.9% PF flush 3 mL  3 mL Intracatheter Q8H Cira Mi NP   3 mL at 07/04/25 0520     Current Facility-Administered Medications   Medication Dose Route Frequency Provider Last Rate Last Admin    bisacodyl (DULCOLAX) suppository 10 mg  10 mg Rectal Daily PRN Cira Mi NP        glucose gel 15-30 g  15-30 g Oral Q15 Min PRN Cira Mi NP        Or    dextrose 50 % injection 25-50 mL  25-50 mL Intravenous Q15 Min PRN Cira Mi NP        Or    glucagon injection 1 mg  1 mg Subcutaneous Q15 Min PRN Cira Mi NP        diclofenac (VOLTAREN) 1 % topical gel 2 g  2 g Topical 4x Daily PRN Cira Mi NP        hydrOXYzine HCl (ATARAX) tablet 25 mg  25 mg Oral Q6H PRN Cira Mi NP   25 mg at 07/02/25 0808    lidocaine (LMX4) cream   Topical Q1H PRN Cira Mi NP        lidocaine 1 % 0.1-1 mL  0.1-1 mL Other Q1H PRN Cira Mi NP        LORazepam (ATIVAN) tablet 0.5 mg  0.5 mg Oral Q4H PRN Cira Mi NP   0.5 mg at 07/03/25 1759    magnesium hydroxide (MILK OF MAGNESIA) suspension 30 mL  30 mL Oral Daily PRN Cira Mi NP        naloxone (NARCAN) injection 0.2 mg  0.2 mg Intravenous Q2 Min PRN Cira Mi NP        Or    naloxone (NARCAN) injection 0.4 mg  0.4 mg Intravenous Q2 Min PRN Cira Mi NP        Or    naloxone (NARCAN) injection 0.2 mg  0.2 mg Intramuscular Q2 Min PRN Cira Mi NP        Or    naloxone (NARCAN) injection 0.4 mg  0.4 mg Intramuscular Q2 Min PRN Cira Mi NP        ondansetron (ZOFRAN ODT) ODT tab 4 mg  4 mg Oral Q6H PRN Cira Mi, JACQUIE        Or    ondansetron (ZOFRAN) injection 4 mg  4 mg Intravenous Q6H PRN Cira Mi, JACQUIE   4 mg at 07/03/25 0825    oxyCODONE (ROXICODONE) tablet 5 mg  5 mg Oral Q4H PRN Cira Mi, NP         "Or    oxyCODONE (ROXICODONE) tablet 10 mg  10 mg Oral Q4H PRN Cira Mi NP   10 mg at 07/03/25 1609    prochlorperazine (COMPAZINE) injection 5 mg  5 mg Intravenous Q6H PRN Cira Mi NP   5 mg at 07/02/25 2040    Or    prochlorperazine (COMPAZINE) tablet 5 mg  5 mg Oral Q6H PRN Cira Mi NP        Reason beta blocker order not selected   Does not apply DOES NOT GO TO Cira Ramires NP        sodium chloride (PF) 0.9% PF flush 3 mL  3 mL Intracatheter q1 min prn Cira Mi NP             Physical Exam  Vitals were reviewed  Blood pressure 133/65, pulse 101, temperature 98.2  F (36.8  C), temperature source Oral, resp. rate 14, height 1.676 m (5' 6\"), weight 72 kg (158 lb 12.8 oz), SpO2 93%.  Rhythm: NSR    Lungs: diminished throughout on NC    Cardiovascular: Distant, S1, S2, RRR, no m/r/g    Abdomen: soft, NT, ND, +BS    Extremeties: warm, no LE edema    Incision: CDI    CT: n/a    Weight:   Vitals:    07/01/25 0602 07/02/25 0600 07/03/25 0630 07/04/25 0521   Weight: 70.8 kg (156 lb) 73.6 kg (162 lb 4.1 oz) 76.4 kg (168 lb 6.9 oz) 72 kg (158 lb 12.8 oz)         Data  Recent Labs   Lab 07/04/25  0535 07/03/25  0828 07/03/25  0428 07/03/25  0426 07/02/25  0748 07/02/25  0400 07/01/25  2205 07/01/25  1404 07/01/25  1205 07/01/25  1154   WBC 7.6  --   --  7.6  --  7.7  --  12.0*  --  10.4   HGB 8.8*  --   --  7.3*  --  7.8*  --  8.9*   < > 8.5*   MCV 82  --   --  83  --  85  --  86  --  86   *  --   --  115*  --  123*  --  120*  --  136*   INR  --   --   --   --   --   --   --  1.53*  --  1.63*     --   --  136  --  140  --  140   < > 140   POTASSIUM 3.5  --   --  4.3  --  4.2  --  4.2   < > 4.8   CHLORIDE 99  --   --  104  --  107  --  111*  --  110*   CO2 27  --   --  23  --  23  --  20*  --  22   BUN 20.7  --   --  23.8*  --  13.9  --  9.9  --  10.5   CR 0.91  --   --  1.14  --  0.98  --  0.88  --  0.88   ANIONGAP 10  --   --  9  --  10  --  9  --  8   ONDINA 8.1*  --   --  8.3*  " --  7.6*  --  9.2  --  8.3*   GLC 89 117* 111* 122*   < > 113*  119*   < > 129*   < > 147*   ALBUMIN  --   --   --   --   --  3.5  --  3.0*  --   --    PROTTOTAL  --   --   --   --   --  5.1*  --  4.5*  --   --    BILITOTAL  --   --   --   --   --  0.4  --  0.5  --   --    ALKPHOS  --   --   --   --   --  51  --  52  --   --    ALT  --   --   --   --   --  14  --  16  --   --    AST  --   --   --   --   --  49*  --  40  --   --     < > = values in this interval not displayed.       Imaging:  No results found for this or any previous visit (from the past 24 hours).        Patient seen and discussed with Dr. oLuis Gzuman PA-C  Cardiothoracic Surgery  Pager: [phone]    CV Surgery Rounding Pager: [phone]  After hours please page surgeon on-call

## 2025-07-04 NOTE — PLAN OF CARE
Neuro: A&O x4  Tele/cardiac:  Respiration: 4L NC  Activity: SBA  Pain: well controled with regimen  Drips: PIV SL  Drains/tubes: None  Skin: sternal incision LAURA, chest tube dressing CDI  GI/: continent, no BM, passing gas, voiding in bathroom, adequate UOP  Aggression color: green  Isolation: none  Plan: chest xray today

## 2025-07-04 NOTE — PLAN OF CARE
"Goal Outcome Evaluation:    0700-1930    Patient Name: Josh  MRN: [id number]  Date of Admission: 7/1/2025  Reason for Admission: AVR  Level of Care: [facility]    Vitals:   BP Readings from Last 1 Encounters:   07/04/25 119/75     Pulse Readings from Last 1 Encounters:   07/04/25 101     Wt Readings from Last 1 Encounters:   07/04/25 72 kg (158 lb 12.8 oz)     Ht Readings from Last 1 Encounters:   07/01/25 1.676 m (5' 6\")     Estimated body mass index is 25.63 kg/m  as calculated from the following:    Height as of this encounter: 1.676 m (5' 6\").    Weight as of this encounter: 72 kg (158 lb 12.8 oz).  Temp Readings from Last 1 Encounters:   07/04/25 98.9  F (37.2  C) (Oral)       Pain: Pain goal 0 Pain Rating 3-5 Effective pain medication/regimen PRN oxycodone x1, scheduled tylenol     CV Surgery Patient: Yes Post Op Day #: 3    Assessment    General: Afebrile yes  Rhythm: normal sinus rhythm  Blood Pressure Medications Given Beta blocker   Resp: Oxygen Status: NC 4-5L via NC  Patient slept last night Yes   Incentive Spirometry Q 1-2 hour when awake: yes Volume: 2000  Neuro: Alert yes Orientation: self, person, time, and place  GI/:          Bowel Activity: no          Bowel Medications: yes          Urinary Catheter: no  Skin:          Incision: Incision status: healing well          Epicardial Pacing Wires: no         Assessment    Resp: 4-5L via NC. LS are dim/clear. WRIGHT.   Telemetry: SR  Neuro: A/O x4  GI/: Adequate UOP. No BM, passing flatus, BS audible  Skin/Wounds: Sternal incision, LAURA/WDL.   Lines/Drains: PIV x1 CDI/SK   Activity: SBA, up in chair for meals, ambulating hallways x2  Diet: regular diet, poor oral intake.   Abnormal Labs: K, mag and phos replacement protocols, replaced all x1 - rechecks in am.     Aggression Stop Light: Green            "

## 2025-07-05 ENCOUNTER — APPOINTMENT (OUTPATIENT)
Dept: CARDIOLOGY | Facility: CLINIC | Age: 70
DRG: 219 | End: 2025-07-05
Attending: PHYSICIAN ASSISTANT
Payer: COMMERCIAL

## 2025-07-05 ENCOUNTER — APPOINTMENT (OUTPATIENT)
Dept: OCCUPATIONAL THERAPY | Facility: CLINIC | Age: 70
DRG: 219 | End: 2025-07-05
Attending: STUDENT IN AN ORGANIZED HEALTH CARE EDUCATION/TRAINING PROGRAM
Payer: COMMERCIAL

## 2025-07-05 VITALS
DIASTOLIC BLOOD PRESSURE: 64 MMHG | HEART RATE: 95 BPM | HEIGHT: 66 IN | WEIGHT: 160 LBS | RESPIRATION RATE: 16 BRPM | OXYGEN SATURATION: 92 % | BODY MASS INDEX: 25.71 KG/M2 | SYSTOLIC BLOOD PRESSURE: 103 MMHG | TEMPERATURE: 98.2 F

## 2025-07-05 PROBLEM — Z95.2 S/P AVR (AORTIC VALVE REPLACEMENT) AND AORTOPLASTY: Status: ACTIVE | Noted: 2025-07-05

## 2025-07-05 PROBLEM — Z86.79 S/P ASCENDING AORTIC ANEURYSM REPAIR: Status: ACTIVE | Noted: 2025-07-05

## 2025-07-05 PROBLEM — D62 ACUTE BLOOD LOSS ANEMIA: Status: ACTIVE | Noted: 2025-07-05

## 2025-07-05 PROBLEM — Z98.890 S/P ASCENDING AORTIC ANEURYSM REPAIR: Status: ACTIVE | Noted: 2025-07-05

## 2025-07-05 PROBLEM — E87.70 FLUID OVERLOAD: Status: ACTIVE | Noted: 2025-07-05

## 2025-07-05 LAB
ANION GAP SERPL CALCULATED.3IONS-SCNC: 12 MMOL/L (ref 7–15)
BUN SERPL-MCNC: 15.5 MG/DL (ref 8–23)
CALCIUM SERPL-MCNC: 8.2 MG/DL (ref 8.8–10.4)
CHLORIDE SERPL-SCNC: 102 MMOL/L (ref 98–107)
CREAT SERPL-MCNC: 0.87 MG/DL (ref 0.67–1.17)
EGFRCR SERPLBLD CKD-EPI 2021: >90 ML/MIN/1.73M2
ERYTHROCYTE [DISTWIDTH] IN BLOOD BY AUTOMATED COUNT: 15.5 % (ref 10–15)
GLUCOSE SERPL-MCNC: 97 MG/DL (ref 70–99)
HCO3 SERPL-SCNC: 25 MMOL/L (ref 22–29)
HCT VFR BLD AUTO: 24.9 % (ref 40–53)
HGB BLD-MCNC: 8.3 G/DL (ref 13.3–17.7)
LVEF ECHO: NORMAL
MAGNESIUM SERPL-MCNC: 1.9 MG/DL (ref 1.7–2.3)
MCH RBC QN AUTO: 27.9 PG (ref 26.5–33)
MCHC RBC AUTO-ENTMCNC: 33.3 G/DL (ref 31.5–36.5)
MCV RBC AUTO: 84 FL (ref 78–100)
MCV RBC AUTO: 84 FL (ref 78–100)
PHOSPHATE SERPL-MCNC: 2.2 MG/DL (ref 2.5–4.5)
PLATELET # BLD AUTO: 157 10E3/UL (ref 150–450)
PLATELET # BLD AUTO: 166 10E3/UL (ref 150–450)
POTASSIUM SERPL-SCNC: 3.7 MMOL/L (ref 3.4–5.3)
POTASSIUM SERPL-SCNC: 3.7 MMOL/L (ref 3.4–5.3)
RBC # BLD AUTO: 2.98 10E6/UL (ref 4.4–5.9)
SODIUM SERPL-SCNC: 139 MMOL/L (ref 135–145)
WBC # BLD AUTO: 7.3 10E3/UL (ref 4–11)

## 2025-07-05 PROCEDURE — 999N000156 HC STATISTIC RCP CONSULT EA 30 MIN

## 2025-07-05 PROCEDURE — 250N000013 HC RX MED GY IP 250 OP 250 PS 637: Performed by: NURSE PRACTITIONER

## 2025-07-05 PROCEDURE — 84100 ASSAY OF PHOSPHORUS: CPT | Performed by: STUDENT IN AN ORGANIZED HEALTH CARE EDUCATION/TRAINING PROGRAM

## 2025-07-05 PROCEDURE — 97535 SELF CARE MNGMENT TRAINING: CPT | Mod: GO

## 2025-07-05 PROCEDURE — 250N000011 HC RX IP 250 OP 636: Performed by: NURSE PRACTITIONER

## 2025-07-05 PROCEDURE — 250N000013 HC RX MED GY IP 250 OP 250 PS 637: Performed by: STUDENT IN AN ORGANIZED HEALTH CARE EDUCATION/TRAINING PROGRAM

## 2025-07-05 PROCEDURE — 255N000002 HC RX 255 OP 636: Performed by: PHYSICIAN ASSISTANT

## 2025-07-05 PROCEDURE — 250N000011 HC RX IP 250 OP 636: Performed by: PHYSICIAN ASSISTANT

## 2025-07-05 PROCEDURE — 85027 COMPLETE CBC AUTOMATED: CPT | Performed by: PHYSICIAN ASSISTANT

## 2025-07-05 PROCEDURE — 94660 CPAP INITIATION&MGMT: CPT

## 2025-07-05 PROCEDURE — 85049 AUTOMATED PLATELET COUNT: CPT | Performed by: NURSE PRACTITIONER

## 2025-07-05 PROCEDURE — 80048 BASIC METABOLIC PNL TOTAL CA: CPT | Performed by: PHYSICIAN ASSISTANT

## 2025-07-05 PROCEDURE — 36415 COLL VENOUS BLD VENIPUNCTURE: CPT | Performed by: NURSE PRACTITIONER

## 2025-07-05 PROCEDURE — 999N000208 ECHOCARDIOGRAM COMPLETE

## 2025-07-05 PROCEDURE — 250N000013 HC RX MED GY IP 250 OP 250 PS 637: Performed by: PHYSICIAN ASSISTANT

## 2025-07-05 PROCEDURE — 84132 ASSAY OF SERUM POTASSIUM: CPT | Performed by: STUDENT IN AN ORGANIZED HEALTH CARE EDUCATION/TRAINING PROGRAM

## 2025-07-05 PROCEDURE — 999N000157 HC STATISTIC RCP TIME EA 10 MIN

## 2025-07-05 PROCEDURE — 97110 THERAPEUTIC EXERCISES: CPT | Mod: GO

## 2025-07-05 PROCEDURE — 93306 TTE W/DOPPLER COMPLETE: CPT | Mod: 26 | Performed by: INTERNAL MEDICINE

## 2025-07-05 PROCEDURE — 83735 ASSAY OF MAGNESIUM: CPT | Performed by: STUDENT IN AN ORGANIZED HEALTH CARE EDUCATION/TRAINING PROGRAM

## 2025-07-05 RX ORDER — FAMOTIDINE 20 MG/1
20 TABLET, FILM COATED ORAL 2 TIMES DAILY
Qty: 28 TABLET | Refills: 0 | Status: SHIPPED | OUTPATIENT
Start: 2025-07-05 | End: 2025-07-19

## 2025-07-05 RX ORDER — METHOCARBAMOL 750 MG/1
750 TABLET, FILM COATED ORAL 4 TIMES DAILY PRN
Qty: 60 TABLET | Refills: 1 | Status: SHIPPED | OUTPATIENT
Start: 2025-07-05

## 2025-07-05 RX ORDER — MAGNESIUM OXIDE 400 MG/1
400 TABLET ORAL EVERY 4 HOURS
Status: COMPLETED | OUTPATIENT
Start: 2025-07-05 | End: 2025-07-05

## 2025-07-05 RX ORDER — FUROSEMIDE 40 MG/1
40 TABLET ORAL DAILY
Qty: 3 TABLET | Refills: 0 | Status: SHIPPED | OUTPATIENT
Start: 2025-07-05 | End: 2025-07-08

## 2025-07-05 RX ORDER — POTASSIUM CHLORIDE 1500 MG/1
40 TABLET, EXTENDED RELEASE ORAL DAILY
Qty: 6 TABLET | Refills: 0 | Status: SHIPPED | OUTPATIENT
Start: 2025-07-05

## 2025-07-05 RX ORDER — AMOXICILLIN 250 MG
2 CAPSULE ORAL 2 TIMES DAILY PRN
Qty: 30 TABLET | Refills: 0 | Status: SHIPPED | OUTPATIENT
Start: 2025-07-05

## 2025-07-05 RX ORDER — METOPROLOL SUCCINATE 50 MG/1
50 TABLET, EXTENDED RELEASE ORAL DAILY
Qty: 30 TABLET | Refills: 3 | Status: SHIPPED | OUTPATIENT
Start: 2025-07-06

## 2025-07-05 RX ORDER — FUROSEMIDE 10 MG/ML
40 INJECTION INTRAMUSCULAR; INTRAVENOUS ONCE
Status: COMPLETED | OUTPATIENT
Start: 2025-07-05 | End: 2025-07-05

## 2025-07-05 RX ORDER — ACETAMINOPHEN 325 MG/1
650 TABLET ORAL EVERY 6 HOURS PRN
Qty: 60 TABLET | Refills: 0 | Status: SHIPPED | OUTPATIENT
Start: 2025-07-05

## 2025-07-05 RX ORDER — POTASSIUM CHLORIDE 1500 MG/1
20 TABLET, EXTENDED RELEASE ORAL ONCE
Status: COMPLETED | OUTPATIENT
Start: 2025-07-05 | End: 2025-07-05

## 2025-07-05 RX ADMIN — FOLIC ACID TAB 400 MCG 400 MCG: 400 TAB at 09:18

## 2025-07-05 RX ADMIN — METHOCARBAMOL 750 MG: 750 TABLET ORAL at 12:29

## 2025-07-05 RX ADMIN — SIMETHICONE 80 MG: 80 TABLET, CHEWABLE ORAL at 12:29

## 2025-07-05 RX ADMIN — PANTOPRAZOLE SODIUM 40 MG: 40 TABLET, DELAYED RELEASE ORAL at 09:18

## 2025-07-05 RX ADMIN — Medication 400 MG: at 09:17

## 2025-07-05 RX ADMIN — FERROUS SULFATE TAB 325 MG (65 MG ELEMENTAL FE) 325 MG: 325 (65 FE) TAB at 09:18

## 2025-07-05 RX ADMIN — PERFLUTREN 2 ML (DILUTED): 6.52 INJECTION, SUSPENSION INTRAVENOUS at 09:06

## 2025-07-05 RX ADMIN — ATORVASTATIN CALCIUM 80 MG: 80 TABLET, FILM COATED ORAL at 09:18

## 2025-07-05 RX ADMIN — SIMETHICONE 80 MG: 80 TABLET, CHEWABLE ORAL at 09:17

## 2025-07-05 RX ADMIN — POTASSIUM & SODIUM PHOSPHATES POWDER PACK 280-160-250 MG 1 PACKET: 280-160-250 PACK at 09:17

## 2025-07-05 RX ADMIN — METHOCARBAMOL 750 MG: 750 TABLET ORAL at 09:18

## 2025-07-05 RX ADMIN — POTASSIUM CHLORIDE 20 MEQ: 1500 TABLET, EXTENDED RELEASE ORAL at 09:18

## 2025-07-05 RX ADMIN — ACETAMINOPHEN 975 MG: 325 TABLET, FILM COATED ORAL at 06:30

## 2025-07-05 RX ADMIN — FUROSEMIDE 40 MG: 10 INJECTION, SOLUTION INTRAMUSCULAR; INTRAVENOUS at 09:17

## 2025-07-05 RX ADMIN — POTASSIUM & SODIUM PHOSPHATES POWDER PACK 280-160-250 MG 1 PACKET: 280-160-250 PACK at 12:29

## 2025-07-05 RX ADMIN — ASPIRIN 81 MG CHEWABLE TABLET 81 MG: 81 TABLET CHEWABLE at 09:17

## 2025-07-05 RX ADMIN — Medication 400 MG: at 12:29

## 2025-07-05 RX ADMIN — EZETIMIBE 10 MG: 10 TABLET ORAL at 09:17

## 2025-07-05 RX ADMIN — HEPARIN SODIUM 5000 UNITS: 5000 INJECTION, SOLUTION INTRAVENOUS; SUBCUTANEOUS at 06:30

## 2025-07-05 RX ADMIN — GABAPENTIN 600 MG: 300 CAPSULE ORAL at 09:18

## 2025-07-05 RX ADMIN — METOPROLOL TARTRATE 25 MG: 25 TABLET, FILM COATED ORAL at 09:18

## 2025-07-05 ASSESSMENT — ACTIVITIES OF DAILY LIVING (ADL)
ADLS_ACUITY_SCORE: 34
ADLS_ACUITY_SCORE: 34
ADLS_ACUITY_SCORE: 35
ADLS_ACUITY_SCORE: 34
ADLS_ACUITY_SCORE: 34
ADLS_ACUITY_SCORE: 35
ADLS_ACUITY_SCORE: 34
ADLS_ACUITY_SCORE: 35
ADLS_ACUITY_SCORE: 35

## 2025-07-05 NOTE — DISCHARGE INSTRUCTIONS
AFTER YOU GO HOME FROM YOUR HEART SURGERY  aortic Valve Replacement (23mm Inspiris), ascending aortic and selective noncoronary sinus root replacement and closure of the left atrial appendage on 7/1/25 with Dr. Alber La     You had a sternotomy, avoid lifting, pushing, or pulling anything greater than ten pounds for 8 weeks after surgery and then less than 20-30 pounds for an additional 4 weeks. Do not reach backwards or use arms to push out of chair. Do not let people pull on your arms to assist with standing. Avoid twisting or reaching too far across your body.  Avoid strenuous activities such as bowling, vacuuming, raking, shoveling, golf or tennis for 12 weeks after your surgery. Splint your chest incision by hugging a pillow or bringing your arms across your chest when coughing or sneezing. Please try to sleep on your back for the first 4-6 weeks to avoid extra stress on your sternum (breastbone) while it is healing.       No driving for 4 weeks after surgery or while on pain medication.     Shower or wash your incisions daily with antibacterial soap and water (or as instructed), pat dry. Keep wound clean and dry. No baths or swimming for 6 weeks and/or until incisions are completely healed over. Cover chest tube sites with gauze or a Bandaid until they stop draining, then leave open to air. It is normal for chest tube sites to drain fluid for up to 2-3 weeks after surgery. It is not normal to have drainage from other incisions.     Watch for signs of infection: increased redness, tenderness, warmth or any drainage from sternum incision.  Also a temperature > 100.5 F or chills. Call your surgeon or primary care provider's office immediately.     Exercise is very important in your recovery. Please follow the guidelines set up for you in your cardiac rehab classes at the hospital. If outpatient cardiac rehab was ordered for you, we highly recommend you participate. If you have problems arranging your  cardiac rehab, please call [phone] for all locations, with the exception of [place], please call [phone] and [place], please call [phone].    Avoid sitting for prolonged periods of time, try to walk every hour during the day. If you have a leg incision, elevate your leg often when you are not walking.  Your goal is to work up to 30 minutes of physical activity per day.     Check your weight when you get home from the hospital and continue to check it daily through your recovery for at least a month. If you notice a weight gain of 2-3 pounds overnight, or 5 pounds in a week, notify your primary care physician, cardiologist, or surgeon. Check your blood pressure daily about one hour after you have taken your morning medications. Call you primary care doctor, cardiologist, or cardiac surgeon if your systolic blood pressure (top number) is consistently over 140. Write down your daily weight and blood pressure and bring this information to your follow up appointment.     Bowel activity may be slow after surgery. If necessary, you may take an over the counter laxative such as Milk of Magnesia or Miralax. You may have stool softeners prescribed (docusate sodium, Senokot). We recommend using stool softeners while using narcotics for pain (oxycodone/percocet, hydrocodone/vicodin, hydromorphone/dilaudid).      Wean OFF of narcotics (oxycodone, dilaudid, hydrocodone) as soon as possible. You should continue taking acetaminophen as long as you have any surgical pain as the first choice for pain control and add narcotics as necessary for pain to be tolerable.      DENTAL VISITS AFTER SURGERY  If you have had your heart valve repaired or replaced, we do not recommend having any dental work done for 6 months and you will need to take an antibiotic prior to dental visits from now on.  Please notify your dentist before any procedure for the proper treatment needed. The antibiotic is taken by mouth one hour prior to  visit. This includes routine cleanings.      REGARDING PRESCRIPTION REFILLS.  If you need a refill on your pain medication contact us to discuss your pain and a possible one time refill.   All other medications will be adjusted, discontinued and re-filled by your primary care physician and/or your cardiologist as they were prior to your surgery. We have given you enough for one to three month with possibly one refill. You may have refills available to you for some of your medications through the [facility]. If you would like your refills sent to a different a different pharmacy, please contact your preferred pharmacy and let them know that you have prescriptions at [place] that you would like transferred.    POST-OPERATIVE CLINIC VISITS  CV Surgery Advance Care Practitioners on 7/21/25 at 2:15pm at the Heart Clinic at [facility] in [place].    Cardiac Rehab, 7/16/25 at 8am, at the Heart Center at [facility]  Cardiology, Sparkle Vargas, on 9/3/25 at 9:20am at the Heart Center at [facility]  If you need to cancel or reschedule your cardiology appointments please call [phone].     SURGICAL QUESTIONS  Please call our nurse coordinators, Antonio, at [phone] with questions or concerns related to surgery. For other non-urgent questions and requests, our nurse coordinators can also be reached via email or fax.   Email: [email or web address] or [email or web address]   Fax: [phone]    On weekends or after hours, please call [phone] and ask the  to page the Cardiothoracic Surgeon on-call.      Thank you,    Your Cardiothoracic Surgery Team

## 2025-07-05 NOTE — PLAN OF CARE
"Patient Name: Josh  MRN: [id number]  Date of Admission: 7/1/2025  Reason for Admission: POD5 aortic valve replacement  Level of Care: Medical    Vitals:   BP Readings from Last 1 Encounters:   07/05/25 115/76     Pulse Readings from Last 1 Encounters:   07/05/25 96     Wt Readings from Last 1 Encounters:   07/04/25 72 kg (158 lb 12.8 oz)     Ht Readings from Last 1 Encounters:   07/01/25 1.676 m (5' 6\")     Estimated body mass index is 25.63 kg/m  as calculated from the following:    Height as of this encounter: 1.676 m (5' 6\").    Weight as of this encounter: 72 kg (158 lb 12.8 oz).  Temp Readings from Last 1 Encounters:   07/05/25 99  F (37.2  C) (Oral)       Pain: Pain goal 2 Pain Rating 2 Effective pain medication/regimen Scheduled tylenol, robaxin    CV Surgery Patient: Yes Post Op Day #: 5    Assessment    General: Afebrile yes  Rhythm: normal sinus rhythm  Blood Pressure Medications given/held: Amio Held Beta blocker Given Other n/a  Resp: Oxygen Status: NC 1L NC  Patient slept last night Yes Approx hours slept 8  Incentive Spirometry Q 1-2 hour when awake: yes Volume: 2000  Neuro: Alert yes Orientation: self, person, time, and place  GI/: Regular diet, voiding adequately, +3 BM          Bowel Activity: yes if yes indicate when: 7/4          Bowel Medications: no          Urinary Catheter: no  Skin: Sternal incision LAURA, WNL. Old CT sites w/dressing CDI          Incision: Incision status: healing well          Epicardial Pacing Wires: no  Chest Tubes   Pleural: no Draining: no               Suction: no              Mediastinal: no Draining: no               Suction: no   Dressing Change Daily: no If no, why? CT and pacer wires out 7/3    Aggression Stop Light: Green          Patient Care Plan: Wean oxygen as able. Needs shower today post CT removal, echo ordered. Discharge home pending.      Goal Outcome Evaluation:      Plan of Care Reviewed With: patient    Overall Patient Progress: improvingOverall " Patient Progress: improving

## 2025-07-05 NOTE — PLAN OF CARE
Goal Outcome Evaluation:    A/O x4, pleasant and calm. VSS on RA. WRIGHT. LS are clear/dim. Pain managed w/ scheduled tylenol and robaxin. BM x1, BS audible, passing flatus. Adequate UOP. PIV removed for discharge.     Pt discharge to home. All discharge teaching completed w/ pt and wife at bedside by Valentina Guzman PA-C. Pt left floor via wheel chair at 1500 w/ all home belongings and discharge meds.

## 2025-07-05 NOTE — PROGRESS NOTES
Care Management Discharge Note    Discharge Date: 07/05/2025       Discharge Disposition: Home, Outpatient Rehab (PT, OT, SLP, Cardiac or Pulmonary)    Discharge Services:      Discharge DME:      Discharge Transportation: family or friend will provide    Private pay costs discussed: Not applicable    Does the patient's insurance plan have a 3 day qualifying hospital stay waiver?  No    PAS Confirmation Code:    Patient/family educated on Medicare website which has current facility and service quality ratings:      Education Provided on the Discharge Plan:    Persons Notified of Discharge Plans: Rn  Patient/Family in Agreement with the Plan:      Handoff Referral Completed: No, handoff not indicated or clinically appropriate    Additional Information:  Pt is discharging home today with OP CR.     pt reports wife can A and also has 2 adult children that live nearby who can A with IADL's, etc      Family will transport at discharge.          Moraima Smith, RN, BSN  RN Care Coordinator  [facility]   Contact via [employer]

## 2025-07-05 NOTE — DISCHARGE SUMMARY
"Discharge Summary    Johan Navarro MRN# [id number]   YOB: 1955 Age: 70 year old     Date of Admission:  7/1/2025  Date of Discharge:  7/5/2025  3:10 PM  Admitting Physician:  Michael Mulvihill, MD  Discharge Physician:  Valentina Guzman PA-C on behalf of Dr. Mulvihill  Discharging Service:  Cardiovascular and Thoracic Surgery     Home clinic:   Primary Address: [address] [place] [address]     Primary Phone: [phone] Primary Fax: [phone]     Primary Provider: Washington Yang          Admission Diagnoses:   Aortic valve stenosis [I35.0]  Aortic valve stenosis, etiology of cardiac valve disease unspecified [I35.0]          Discharge Diagnosis:     Patient Active Problem List   Diagnosis    Depressive disorder, not elsewhere classified    Elevated prostate specific antigen (PSA)    Osteoarthritis of Hand     Hyperlipidemia LDL goal <100    Mixed hyperlipidemia    Family history of early CAD    Aortic stenosis    Carotid stenosis    Coronary artery disease involving native coronary artery of native heart without angina pectoris    Status post coronary angiogram    Abnormal cardiovascular stress test    Gastrointestinal hemorrhage, unspecified gastrointestinal hemorrhage type    History of prostate cancer    Iron deficiency anemia due to chronic blood loss    Post-operative state    Nonrheumatic aortic valve stenosis    Other forms of angina pectoris    Aortic valve stenosis, etiology of cardiac valve disease unspecified    S/P AVR (aortic valve replacement) and aortoplasty    S/P ascending aortic aneurysm repair    Fluid overload    Acute blood loss anemia                 Discharge Disposition:     Discharged to home           Condition on Discharge:     Discharge condition: Stable   Discharge vitals: Blood pressure 103/64, pulse 95, temperature 98.2  F (36.8  C), temperature source Oral, resp. rate 16, height 1.676 m (5' 6\"), weight 72.6 kg (160 lb), SpO2 92%.   Code status on discharge: " Full Code           Procedures:   Johan Navarro underwent aortic Valve Replacement (23mm Inspiris), ascending aortic and selective noncoronary sinus root replacement and closure of the left atrial appendage on 7/1/25 with Dr. Michael Mulvihill           Medications Prior to Admission:     Prior to Admission Medications   Prescriptions Last Dose Informant Patient Reported? Taking?   Ferrous Sulfate (IRON) 325 (65 Fe) MG tablet 6/26/2025 Self Yes Yes   Sig: Take 1 tablet by mouth daily.   aspirin 81 MG EC tablet 6/30/2025 Morning Self Yes Yes   Sig: Take 81 mg by mouth daily OTC   atorvastatin (LIPITOR) 80 MG tablet 6/30/2025 Self No Yes   Sig: Take 1 tablet (80 mg) by mouth daily.   diclofenac (VOLTAREN) 1 % topical gel Past Month Self No Yes   Sig: APPLY 2 GRAMS TO HANDS AND FINGERS FOUR TIMES DAILY USING DOSING CARD   docusate sodium (COLACE) 100 MG tablet 6/26/2025 Self Yes Yes   Sig: Take 100 mg by mouth daily as needed for constipation. (Takes with Iron tabs)   evolocumab (REPATHA) 140 MG/ML prefilled autoinjector 6/27/2025 Self No Yes   Sig: Inject 1 mL (140 mg) subcutaneously every 14 days.   ezetimibe (ZETIA) 10 MG tablet 6/30/2025 Self No Yes   Sig: Take 1 tablet (10 mg) by mouth daily.   fluticasone (FLONASE) 50 MCG/ACT nasal spray 6/30/2025 Self No Yes   Sig: SHAKE LIQUID AND USE 1 SPRAY IN EACH NOSTRIL DAILY   folic acid (FOLVITE) 1 MG tablet Evening Self No Yes   Sig: TAKE 1 TABLET(1 MG) BY MOUTH EVERY EVENING   gabapentin (NEURONTIN) 600 MG tablet 6/30/2025 Noon Self No Yes   Sig: TAKE 1 TABLET(600 MG) BY MOUTH TWICE DAILY   nitroGLYcerin (NITROSTAT) 0.4 MG sublingual tablet  Self No No   Sig: For chest pain place 1 tablet under the tongue every 5 minutes for 3 doses. If symptoms persist 5 minutes after 1st dose call 911.   sildenafil (REVATIO) 20 MG tablet More than a month Self No Yes   Sig: Take 2-5 tablets ( mg) by mouth daily as needed.      Facility-Administered Medications: None                 Discharge Medications:     Discharge Medication List as of 7/5/2025  1:33 PM        START taking these medications    Details   acetaminophen (TYLENOL) 325 MG tablet Take 2 tablets (650 mg) by mouth every 6 hours as needed for mild pain., Disp-60 tablet, R-0, E-Prescribe      famotidine (PEPCID) 20 MG tablet Take 1 tablet (20 mg) by mouth 2 times daily for 14 days., Disp-28 tablet, R-0, E-Prescribe      furosemide (LASIX) 40 MG tablet Take 1 tablet (40 mg) by mouth daily for 3 days., Disp-3 tablet, R-0, E-Prescribe      methocarbamol (ROBAXIN) 750 MG tablet Take 1 tablet (750 mg) by mouth 4 times daily as needed for muscle spasms., Disp-60 tablet, R-1, E-Prescribe      metoprolol succinate ER (TOPROL XL) 50 MG 24 hr tablet Take 1 tablet (50 mg) by mouth daily., Disp-30 tablet, R-3, E-Prescribe      potassium chloride roxanna ER (KLOR-CON M20) 20 MEQ CR tablet Take 2 tablets (40 mEq) by mouth daily., Disp-6 tablet, R-0, E-Prescribe      senna-docusate (SENOKOT-S/PERICOLACE) 8.6-50 MG tablet Take 2 tablets by mouth 2 times daily as needed for constipation., Disp-30 tablet, R-0, E-Prescribe           CONTINUE these medications which have NOT CHANGED    Details   aspirin 81 MG EC tablet Take 81 mg by mouth daily OTC, Historical      atorvastatin (LIPITOR) 80 MG tablet Take 1 tablet (80 mg) by mouth daily., Disp-90 tablet, R-0, E-Prescribe      diclofenac (VOLTAREN) 1 % topical gel APPLY 2 GRAMS TO HANDS AND FINGERS FOUR TIMES DAILY USING DOSING CARD, Disp-100 g, R-0, E-Prescribe      docusate sodium (COLACE) 100 MG tablet Take 100 mg by mouth daily as needed for constipation. (Takes with Iron tabs), Historical      evolocumab (REPATHA) 140 MG/ML prefilled autoinjector Inject 1 mL (140 mg) subcutaneously every 14 days., Disp-6 mL, R-1, E-Prescribe      ezetimibe (ZETIA) 10 MG tablet Take 1 tablet (10 mg) by mouth daily., Disp-90 tablet, R-3, E-Prescribe      Ferrous Sulfate (IRON) 325 (65 Fe) MG tablet Take 1 tablet by  mouth daily., Historical      fluticasone (FLONASE) 50 MCG/ACT nasal spray SHAKE LIQUID AND USE 1 SPRAY IN EACH NOSTRIL DAILY, Disp-16 g, R-1, E-Prescribe      folic acid (FOLVITE) 1 MG tablet TAKE 1 TABLET(1 MG) BY MOUTH EVERY EVENING, Disp-90 tablet, R-3, E-Prescribe      gabapentin (NEURONTIN) 600 MG tablet TAKE 1 TABLET(600 MG) BY MOUTH TWICE DAILY, Disp-180 tablet, R-1, E-PrescribeZERO refills remain on this prescription. Your patient is requesting advance approval of refills for this medication to PREVENT ANY MISSED DOSES      sildenafil (REVATIO) 20 MG tablet Take 2-5 tablets ( mg) by mouth daily as needed., Disp-30 tablet, R-2, E-PrescribePaused since today until manually resumed           STOP taking these medications       nitroGLYcerin (NITROSTAT) 0.4 MG sublingual tablet Comments:   Reason for Stopping:                     Consultations:     Nutrition, Intensivist             Brief History of Illness:   Operative Indications / Brief History of Present Illness: This is a very pleasant 70 with severe, symptomatic aortic stenosis. He was evaluated by the multidisciplinary team. His anatomy is not suitable for a transcatheter procedure. In multidisciplinary review we offered surgical aortic valve replacement as the optimal therapy, and he concurred. He requested a tissue prosthesis. He follows with Dr. Galicia. He last saw him a year ago. [facility] is notable for CAD s/p PCI in 2021 to the OM and Cx. As noted by Dr. Benitez, he remains very active, walking about 10k steps per day and looking forward to getting back out on the golf course soon. He has been having modest Blanco but has not had any chest pain nor unstable symptoms.  As Dr. Benitez noted on account of his AVM's he has been poorly tolerant of DAPT previously. We discussed the rationale, risks, benefits, and expected outcomes associated with surgical valve replacement and he has asked to proceed.           Hospital Course:   Johan Navarro underwent aortic  Valve Replacement (23mm Inspiris), ascending aortic and selective noncoronary sinus root replacement and closure of the left atrial appendage on 7/1/25 with Dr. Michael Mulvihill     The patient was admitted post operatively to intensive care unit and intensivist was consulted to assist with management of mechanical ventilation and vasoplegic shock. The patient was extubated on POD#0. Their blood pressure was maintained on vasopressors and ionotropic agents which were continuously weaned until no longer needed. They were transferred to the intermediate care unit on POD#2    The patient was fluid overloaded and treated with diuretic therapies. At discharge he remains mildly hypervolemic with a small left pleural effusion on post removal CXR. Will discharge with 3 days of 40mg PO lasix with potassium supplement. The patient was instructed to monitor weight at home and call our office if weight and/or swelling increases.    Their chest tubes and pacing wires were removed when appropriate and post removal CXR with stable small left pleural effusion. They had return of bowel and bladder function and was weaned off oxygen. They worked well with therapies and was determined medically ready to discharge to home on POD#4. Follow up with PCP, cardiac surgery, cardiac rehab and cardiology were arranged. Discharge teaching materials, education and contact information was provided to patient and family at bedside. All questions and concerns were addressed.    Patient discharged on aspirin: yes  Patient discharged on beta blocker: yes  Patient discharged on ACE Inhibitor/ARB: no, BP not high enough   Patient discharged on statin: yes              Significant Results:     Lab Results   Component Value Date    WBC 7.3 07/05/2025    WBC 4.3 03/24/2021     Lab Results   Component Value Date    RBC 2.98 07/05/2025    RBC 4.46 03/24/2021     Lab Results   Component Value Date    HGB 8.3 07/05/2025    HGB 11.2 03/24/2021     Lab Results  "  Component Value Date    HCT 24.9 07/05/2025    HCT 35.6 03/24/2021     No components found for: \"MCT\"  Lab Results   Component Value Date    MCV 84 07/05/2025    MCV 84 07/05/2025    MCV 80 03/24/2021     Lab Results   Component Value Date    MCH 27.9 07/05/2025    MCH 25.1 03/24/2021     Lab Results   Component Value Date    MCHC 33.3 07/05/2025    MCHC 31.5 03/24/2021     Lab Results   Component Value Date    RDW 15.5 07/05/2025    RDW 19.4 03/24/2021     Lab Results   Component Value Date     07/05/2025     07/05/2025     03/24/2021       Last Basic Metabolic Panel:  Lab Results   Component Value Date     07/05/2025     01/08/2021      Lab Results   Component Value Date    POTASSIUM 3.7 07/05/2025    POTASSIUM 3.7 07/05/2025    POTASSIUM 4.5 07/01/2025    POTASSIUM 3.8 12/27/2022    POTASSIUM 3.5 01/08/2021     Lab Results   Component Value Date    CHLORIDE 102 07/05/2025    CHLORIDE 108 12/27/2022    CHLORIDE 105 01/08/2021     Lab Results   Component Value Date    ONDINA 8.2 07/05/2025    ONDINA 8.8 01/08/2021     Lab Results   Component Value Date    CO2 25 07/05/2025    CO2 23 12/27/2022    CO2 24 01/08/2021     Lab Results   Component Value Date    BUN 15.5 07/05/2025    BUN 18 12/27/2022    BUN 14 01/08/2021     Lab Results   Component Value Date    CR 0.87 07/05/2025    CR 0.97 01/08/2021     Lab Results   Component Value Date    GLC 97 07/05/2025     07/03/2025     12/27/2022    GLC 96 01/08/2021                  Pending Results:     None           Discharge Instructions and Follow-Up:     Discharge diet: Regular   Discharge activity: Daily weights: Call if weight gain 2-3 lbs over 24 hours or if greater than 5 lbs in 1 week.  No lifting more than 10 lbs for 8-12 weeks.  No driving for 1 month.  Call for pain medication refill.  Call for temperature greater than 101.0  Daily shower with antibacterial soap.   Discharge follow-up: Follow-up Appointments  Follow-up " Appointments  Follow-up and recommended labs and tests   *Follow up primary care provider in 7-10 days after discharge in order to review your medication, vital signs, obtain any necessary lab work your doctor may want, and to update them on your hospitalization and medical issues.  *Follow up with Kassandra/Valentina/ /Caryl/Cira/Kathi/Lesvia APPs with Dr Mulvihill, heart surgeon, on 7/21/25 at 2:15pm at [facility] at [facility] [address]. If any questions or concerns call [phone].  You will see us once at this visit and then if everything is going well you will not need to see us again.  You will follow long term with your cardiologist.  *Follow up with Sparkle Vargas, cardiology ERIC,on 9/3/25 at 9:20am. This is who you will follow with long term about your heart issues. [phone].  *Please follow up with outpatient cardiac rehab on 7/16/25 at 8am, call [phone] if you need to reschedule.      Outpatient therapy: Cardiac rehab   Home Care agency: None    Supplies and equipment: None   Lines and drains: None    Wound care: Wash incision daily with antibacterial soap   Other instructions: None

## 2025-07-05 NOTE — PROGRESS NOTES
Respiratory care note: SpO2 92 - 94% on R/A. Breath sounds are diminished but clear throughout. Performing IS and oscillatory device well. Working with PT and walking independently. Will discharge off RT services. RT can be contacted if needed.

## 2025-07-05 NOTE — PROGRESS NOTES
[facility]  Cardiovascular and Thoracic Surgery Daily Note      Assessment and Plan  Johan Navarro is a 70 year old male with a PMH of CAD s/p PCI in 2021 to OM/Circumflex, multiple AVMs, left CEA in 2023, prostate cancer s/p prostatectomy, and severe, symptomatic aortic stenosis. Presented for elective aortic valve and ascending aorta replacement.    POD # 4 s/p aortic Valve Replacement (23mm Inspiris), ascending aortic and selective noncoronary sinus root replacement and closure of the left atrial appendage on 7/1/25 with Dr. Michael Mulvihill    - CVS: Pre-op TTE with preserved biventricular function.   HD stable overnight in NSR.  Postop vasoplegic shock resolved  Continue ASA 81 mg daily, continue metoprolol tartrate 25mg BID, will transition to PO toprol tomorrow am.  PTA statin, zetia resumed. PTA repatha to resume at discharge.  Hypervolemic: weight gain overnight off diuretics, diuresed well with 40mg IV lasix this am, will discharge with lasix PO  Chest tubes and TPWs removed yesterday. CXR post removal stable. TTE this am stable.   PTA meds on hold: repatha    - Resp: Postoperative mechanical ventilation, resolved. Extubated POD 0, now saturating well on RA. IS, pulmonary toilet. PTA flonase resumed    - Neuro: Neuros intact, pain controlled on current regimen. PTA dose of gabapentin resumed. Anxiety better controlled today. PRN ativan and atarax available. Typically uses THC gummies for anxiety at home, denies alcohol use.     - Renal: No history of significant renal disease. Cr stable. Trend BMP. Volume management as above.  Recent Labs   Lab 07/05/25  0609 07/04/25  0535 07/03/25  0426   CR 0.87 0.91 1.14       - GI: + BM, +flatus, continue bowel regimen. PTA colace on hold    - : voiding well on own    - Endo: Postop stress hyperglycemia. Insulin infusion transitioned to sliding scale insulin, discontinue given no needs  Hemoglobin A1C   Date Value Ref Range Status   06/25/2025 5.5  "<5.7 % Final     Comment:     Normal <5.7%   Prediabetes 5.7-6.4%    Diabetes 6.5% or higher     Note: Adopted from ADA consensus guidelines.        - FEN: Replace electrolytes as needed. ADAT. Large gastric bubble on CXR, continue QID simethicone    - ID: Postop leukocytosis, resolved. Afebrile. Periop abx prophylaxis completing. Trend CBC and fever curve.   Recent Labs   Lab 07/05/25  0609 07/04/25  0535 07/03/25  0426   WBC 7.3 7.6 7.6       - Heme: Acute blood loss anemia and thrombocytopenia due to surgery. Hgb and PLT stable. Transfuse one unit pRBCs , 7/3, for HD support/hgb 7.3. good response. Will add ferritin per pt and wife request. Trend CBC, transfuse PRN. Hx of AVMs, PTA iron resumed.  Recent Labs   Lab 07/05/25  0609 07/04/25  0535 07/03/25  0426   HGB 8.3* 8.8* 7.3*     157 115* 115*       - Proph: SCD, subcutaneous heparin, PPI    - Other:  Clinically Significant Risk Factors               # Hypoalbuminemia: Lowest albumin = 3 g/dL at 7/1/2025  2:04 PM, will monitor as appropriate     # Thrombocytopenia: Lowest platelets = 115 in last 2 days, will monitor for bleeding              # Overweight: Estimated body mass index is 25.82 kg/m  as calculated from the following:    Height as of this encounter: 1.676 m (5' 6\").    Weight as of this encounter: 72.6 kg (160 lb).      # Financial/Environmental Concerns: none         - Dispo: Discharge to home today      Interval History  Weaned off oxygen this am, walked in halls and maintained saturations. Feels well. NO acute events overnight. Tolerating diet. +BM.       Medications  No current facility-administered medications for this encounter.     No current facility-administered medications for this encounter.         Physical Exam  Vitals were reviewed  Blood pressure 103/64, pulse 95, temperature 98.2  F (36.8  C), temperature source Oral, resp. rate 16, height 1.676 m (5' 6\"), weight 72.6 kg (160 lb), SpO2 92%.  Rhythm: NSR    Lungs: diminished " throughout on NC    Cardiovascular: Distant, S1, S2, RRR, no m/r/g    Abdomen: soft, NT, ND, +BS    Extremeties: warm, no LE edema    Incision: CDI    CT: n/a    Weight:   Vitals:    07/01/25 0602 07/02/25 0600 07/03/25 0630 07/04/25 0521   Weight: 70.8 kg (156 lb) 73.6 kg (162 lb 4.1 oz) 76.4 kg (168 lb 6.9 oz) 72 kg (158 lb 12.8 oz)    07/05/25 0600   Weight: 72.6 kg (160 lb)         Data  Recent Labs   Lab 07/05/25  0609 07/04/25  0535 07/03/25  0828 07/03/25  0428 07/03/25  0426 07/02/25  0748 07/02/25  0400 07/01/25  2205 07/01/25  1404 07/01/25  1205 07/01/25  1154   WBC 7.3 7.6  --   --  7.6  --  7.7  --  12.0*  --  10.4   HGB 8.3* 8.8*  --   --  7.3*  --  7.8*  --  8.9*   < > 8.5*   MCV 84  84 82  --   --  83  --  85  --  86  --  86     157 115*  --   --  115*  --  123*  --  120*  --  136*   INR  --   --   --   --   --   --   --   --  1.53*  --  1.63*    136  --   --  136  --  140  --  140   < > 140   POTASSIUM 3.7  3.7 3.5  --   --  4.3  --  4.2  --  4.2   < > 4.8   CHLORIDE 102 99  --   --  104  --  107  --  111*  --  110*   CO2 25 27  --   --  23  --  23  --  20*  --  22   BUN 15.5 20.7  --   --  23.8*  --  13.9  --  9.9  --  10.5   CR 0.87 0.91  --   --  1.14  --  0.98  --  0.88  --  0.88   ANIONGAP 12 10  --   --  9  --  10  --  9  --  8   ONDINA 8.2* 8.1*  --   --  8.3*  --  7.6*  --  9.2  --  8.3*   GLC 97 89 117*   < > 122*   < > 113*  119*   < > 129*   < > 147*   ALBUMIN  --   --   --   --   --   --  3.5  --  3.0*  --   --    PROTTOTAL  --   --   --   --   --   --  5.1*  --  4.5*  --   --    BILITOTAL  --   --   --   --   --   --  0.4  --  0.5  --   --    ALKPHOS  --   --   --   --   --   --  51  --  52  --   --    ALT  --   --   --   --   --   --  14  --  16  --   --    AST  --   --   --   --   --   --  49*  --  40  --   --     < > = values in this interval not displayed.       Imaging:  Recent Results (from the past 24 hours)   Echocardiogram Complete   Result Value    LVEF  65-70%     [facility]    [id number]  [id number]  [id number]  [id number]^DAMON^LUDIN^CAIT     [facility]  Echocardiography Laboratory  [address] [place] [address]     Name: MARLENY CHAPA  MRN: [id number]  : 1955  Study Date: 2025 08:09 AM  Age: 70 yrs  Gender: Male  [place] [facility] [place]  Reason For Study: Aortic Valve Replacement, Aortic Aneurysm  Ordering Physician: LUDIN JOSE  Performed By: YULISA Mckeon     BSA: 1.8 m2  Height: 66 in  Weight: 160 lb  HR: 98  BP: 124/76 mmHg  ______________________________________________________________________________  Procedure  Echocardiogram with two-dimensional, color and spectral Doppler. Definity (NDC  #64518-627) given intravenously. Contrast Definity. Technically difficult  study.  ______________________________________________________________________________  Interpretation Summary     This is a postoperative echo. Per history, the patient is status post aortic  valve replacement with a bioprosthetic Inspiris Resilia 23 mm valve, left  atrial appendage clipping with a 45 mm clip, ascending aortic root replacement  with a 28 mm Hemashield graft. Technically very difficult study.     There seems to be left-sided pleural effusion with probably a collapsed  segment of the lung. Correlate with alternative imaging such as chest x-ray or  CT.  LV is normal in size with hyperdynamic systolic function. EF is around 65 to  70%.  The aortic root is not well-visualized but measures about 3.3 cm.  The aortic valve is suboptimally visualized. There is no significant AI noted.  There is no significant aortic stenosis on Doppler interrogation of the valve.  Acceleration time is normal. Mean gradient across the bioprosthetic aortic  valve is 9 mmHg.  Normal RV size and mildly reduced RV systolic function based on mildly reduced  TAPSE.  There is small organized pericardial effusion noted around the  right  ventricle.  ______________________________________________________________________________  Left Ventricle  The left ventricle is normal in size. There is normal left ventricular wall  thickness. The visual ejection fraction is 65-70%.     Right Ventricle  The right ventricle is normal size. The right ventricular systolic function is  borderline reduced.     Atria  Normal left atrial size. Right atrial size is normal.     Mitral Valve  There is mild mitral annular calcification. There is trace mitral  regurgitation. The peak mitral valve gradient is 6.2 mmHg. The mean mitral  valve gradient is 2.9 mmHg.     Tricuspid Valve  The tricuspid valve is not well visualized, but is grossly normal. Right  ventricular systolic pressure could not be approximated due to inadequate  tricuspid regurgitation.     Aortic Valve  The aortic valve is not well visualized. No aortic regurgitation is present.  The peak AoV pressure gradient is 18.1 mmHg. The mean AoV pressure gradient is  9.3 mmHg. The calculated aortic valve are is 2.0 cm^2. There is a  bioprosthetic aortic valve.     Pulmonic Valve  The pulmonic valve is not well visualized.     Vessels  The aortic root is not well visualized but is probably normal size. Inferior  vena cava not well visualized for estimation of right atrial pressure.     Pericardium  Small anterior pericardial effusion. There are no echocardiographic  indications of cardiac tamponade.     ______________________________________________________________________________  MMode/2D Measurements & Calculations  IVSd: 0.87 cm  LVIDd: 3.8 cm  LVIDs: 2.5 cm  LVPWd: 0.85 cm  FS: 33.7 %     LV mass(C)d: 93.5 grams  LV mass(C)dI: 51.4 grams/m2  asc Aorta Diam: 3.3 cm  LVOT diam: 2.0 cm  LVOT area: 3.1 cm2  Asc Ao diam index BSA (cm/m2): 1.8  Asc Ao diam index Ht(cm/m): 2.0  RWT: 0.45  TAPSE: 1.1 cm     Doppler Measurements & Calculations  MV E max momo: 108.0 cm/sec  MV A max momo: 103.7 cm/sec  MV E/A:  1.0  MV max P.2 mmHg  MV mean P.9 mmHg  MV V2 VTI: 23.3 cm  MVA(VTI): 3.1 cm2  MV dec slope: 763.9 cm/sec2  MV dec time: 0.14 sec  Ao V2 max: 212.5 cm/sec  Ao max P.1 mmHg  Ao V2 mean: 143.7 cm/sec  Ao mean P.3 mmHg  Ao V2 VTI: 36.9 cm  MANDEEP(I,D): 2.0 cm2  MANDEEP(V,D): 1.8 cm2  Ao acc time: 0.07 sec  LV V1 max P.7 mmHg  LV V1 max: 119.1 cm/sec  LV V1 VTI: 23.4 cm  SV(LVOT): 73.2 ml  SI(LVOT): 40.2 ml/m2     PA acc time: 0.11 sec  AV Jero Ratio (DI): 0.56  MANDEEP Index (cm2/m2): 1.1  E/E' av.6  Lateral E/e': 17.1  Medial E/e': 18.0  RV S Jero: 8.5 cm/sec     ______________________________________________________________________________  Report approved by: Gabrielle Marshall MD on 2025 09:25 AM                 Patient seen and discussed with Dr. Louis Guzman PA-C  Cardiothoracic Surgery  Pager: [phone]    CV Surgery Rounding Pager: [phone]  After hours please page surgeon on-call

## 2025-07-06 NOTE — PLAN OF CARE
Occupational Therapy Discharge Summary    Reason for therapy discharge:    Discharged to home with outpatient therapy.    Progress towards therapy goal(s). See goals on Care Plan in [facility] electronic health record for goal details.  Goals not met.  Barriers to achieving goals:   discharge from facility.    Therapy recommendation(s):    Continued therapy is recommended.  Rationale/Recommendations:   Assist in strenuous I/ADLs (shopping, transportation, yard work, laundry etc) and [facility] ex and cont'd AVR ed..

## 2025-07-08 ENCOUNTER — TELEPHONE (OUTPATIENT)
Dept: OTHER | Facility: CLINIC | Age: 70
End: 2025-07-08
Payer: COMMERCIAL

## 2025-07-08 DIAGNOSIS — E78.2 MIXED HYPERLIPIDEMIA: ICD-10-CM

## 2025-07-08 DIAGNOSIS — I25.10 CORONARY ARTERY DISEASE INVOLVING NATIVE CORONARY ARTERY OF NATIVE HEART WITHOUT ANGINA PECTORIS: ICD-10-CM

## 2025-07-08 NOTE — TELEPHONE ENCOUNTER
48 hour post op call    ACTIVITY    How are you doing with activity? 4000 steps a day     Are you continuing to use your IS 3-4 times a day and do you have any shortness of breath. IS 2500 ml, Short of breath with stairs.     Are you weighing yourself daily and has it been stable?. Down to 155 lbs , baseline wt. Last dose of diuretic this am.     PAIN  How is your pain, what are you doing for your pain? I haven't been having much, cough or sneeze. Just splint my chest. I am not taking the Oxycodone, I am using THC gummies.     Are you still doing sternal precautions? Yes    Do you hear any clicking when you are moving or taking a deep breath? No      INCISION:   No redness or drainage     ASSISTANCE  Do you have someone at home to help you with your daily activities and transportation needs? Wife       MEDICATIONS  I would like to review your discharge medications and answer any questions you may have.   Reviewed in detail, Lasix/KCL  X 3 days , today last dose. All medications reviewed in detail with pt and his wife.      Are you on a blood thinner/Coumadin  Na     Who is managing your Coumadin dosing/INR? Na        FOLLOW UP       Scheduled for cardiac rehab? 7/16   Scheduled to see our PA or Surgeon? 7/21  Scheduled to see your cardiologist ? Dr. Galicia   Scheduled to see ERIC/Michael Villagran 9/3   Scheduled to see your primary care physician? Washington Yang , pt will follow up with him.   Dr Rousseau anemia follow up. Pt resumed his iron   Do you have our contact information? Yes

## 2025-07-14 ENCOUNTER — PATIENT OUTREACH (OUTPATIENT)
Dept: CARE COORDINATION | Facility: CLINIC | Age: 70
End: 2025-07-14
Payer: COMMERCIAL

## 2025-07-14 ENCOUNTER — TELEPHONE (OUTPATIENT)
Dept: OTHER | Facility: CLINIC | Age: 70
End: 2025-07-14
Payer: COMMERCIAL

## 2025-07-14 NOTE — TELEPHONE ENCOUNTER
Patient called with complaints of upper back pain. States tylenol and muscle relaxer's help but he is having trouble sleeping related to the pain. Recommended alternating heat,ice. Lidocaine patches, follow directions on package. Tylenol pm or Melatonin safe for over the counter sleep aide. Sternal precautions reinforced. Pt wanted to increase in activity, including riding . All questions addressed.

## 2025-07-16 ENCOUNTER — HOSPITAL ENCOUNTER (OUTPATIENT)
Dept: CARDIAC REHAB | Facility: CLINIC | Age: 70
Discharge: HOME | End: 2025-07-16
Attending: STUDENT IN AN ORGANIZED HEALTH CARE EDUCATION/TRAINING PROGRAM
Payer: COMMERCIAL

## 2025-07-16 DIAGNOSIS — Z95.2 S/P AVR (AORTIC VALVE REPLACEMENT): ICD-10-CM

## 2025-07-16 PROCEDURE — 93798 PHYS/QHP OP CAR RHAB W/ECG: CPT

## 2025-07-16 PROCEDURE — 93797 PHYS/QHP OP CAR RHAB WO ECG: CPT

## 2025-07-21 ENCOUNTER — OFFICE VISIT (OUTPATIENT)
Dept: CARDIOLOGY | Facility: CLINIC | Age: 70
End: 2025-07-21
Payer: COMMERCIAL

## 2025-07-21 VITALS
HEART RATE: 62 BPM | WEIGHT: 151 LBS | HEIGHT: 66 IN | SYSTOLIC BLOOD PRESSURE: 120 MMHG | DIASTOLIC BLOOD PRESSURE: 82 MMHG | OXYGEN SATURATION: 100 % | BODY MASS INDEX: 24.27 KG/M2

## 2025-07-21 DIAGNOSIS — Z95.2 S/P AVR (AORTIC VALVE REPLACEMENT) AND AORTOPLASTY: ICD-10-CM

## 2025-07-21 PROCEDURE — 3074F SYST BP LT 130 MM HG: CPT | Performed by: PHYSICIAN ASSISTANT

## 2025-07-21 PROCEDURE — 99024 POSTOP FOLLOW-UP VISIT: CPT | Performed by: PHYSICIAN ASSISTANT

## 2025-07-21 PROCEDURE — 3079F DIAST BP 80-89 MM HG: CPT | Performed by: PHYSICIAN ASSISTANT

## 2025-07-21 RX ORDER — METHOCARBAMOL 750 MG/1
750 TABLET, FILM COATED ORAL 4 TIMES DAILY PRN
Qty: 60 TABLET | Refills: 1 | Status: SHIPPED | OUTPATIENT
Start: 2025-07-21

## 2025-07-21 NOTE — PROGRESS NOTES
CV Surgery Post Op Follow Up Note:     S:  From discharge summary:     Johan Navarro underwent aortic Valve Replacement (23mm Inspiris), ascending aortic and selective noncoronary sinus root replacement and closure of the left atrial appendage on 7/1/25 with Dr. Michael Mulvihill      The patient was admitted post operatively to intensive care unit and intensivist was consulted to assist with management of mechanical ventilation and vasoplegic shock. The patient was extubated on POD#0. Their blood pressure was maintained on vasopressors and ionotropic agents which were continuously weaned until no longer needed. They were transferred to the intermediate care unit on POD#2     The patient was fluid overloaded and treated with diuretic therapies. At discharge he remains mildly hypervolemic with a small left pleural effusion on post removal CXR. Will discharge with 3 days of 40mg PO lasix with potassium supplement. The patient was instructed to monitor weight at home and call our office if weight and/or swelling increases.     Their chest tubes and pacing wires were removed when appropriate and post removal CXR with stable small left pleural effusion. They had return of bowel and bladder function and was weaned off oxygen. They worked well with therapies and was determined medically ready to discharge to home on POD#4. Follow up with PCP, cardiac surgery, cardiac rehab and cardiology were arranged. Discharge teaching materials, education and contact information was provided to patient and family at bedside. All questions and concerns were addressed.     Patient discharged on aspirin: yes  Patient discharged on beta blocker: yes  Patient discharged on ACE Inhibitor/ARB: no, BP not high enough   Patient discharged on statin: yes        Since being discharged from hospital, patient is recovering well at home. His pain is controlled with tylenol and robaxin alone, has not needed narcotics. He has been weighing himself and  his weight is stable. He has been increasing on his IS. He has been doing well in cardiac rehab and has been increasing his activity.  He has been following his sternal precautions and cleaning his incisions daily. We reviewed his medications. All questions and concerns addressed.     Allergies:   Allergies   Allergen Reactions    Crestor [Rosuvastatin] GI Disturbance    Dust Mites     Other [No Clinical Screening - See Comments]      Goose feathers    Pollen Extract        Medications:   Current Outpatient Medications:     acetaminophen (TYLENOL) 325 MG tablet, Take 2 tablets (650 mg) by mouth every 6 hours as needed for mild pain., Disp: 60 tablet, Rfl: 0    aspirin 81 MG EC tablet, Take 81 mg by mouth daily OTC, Disp: , Rfl:     atorvastatin (LIPITOR) 80 MG tablet, Take 1 tablet (80 mg) by mouth daily., Disp: 90 tablet, Rfl: 0    diclofenac (VOLTAREN) 1 % topical gel, APPLY 2 GRAMS TO HANDS AND FINGERS FOUR TIMES DAILY USING DOSING CARD, Disp: 100 g, Rfl: 0    docusate sodium (COLACE) 100 MG tablet, Take 100 mg by mouth daily as needed for constipation. (Takes with Iron tabs), Disp: , Rfl:     evolocumab (REPATHA) 140 MG/ML prefilled autoinjector, Inject 1 mL (140 mg) subcutaneously every 14 days., Disp: 6 mL, Rfl: 3    ezetimibe (ZETIA) 10 MG tablet, Take 1 tablet (10 mg) by mouth daily., Disp: 90 tablet, Rfl: 3    fluticasone (FLONASE) 50 MCG/ACT nasal spray, SHAKE LIQUID AND USE 1 SPRAY IN EACH NOSTRIL DAILY, Disp: 16 g, Rfl: 1    folic acid (FOLVITE) 1 MG tablet, TAKE 1 TABLET(1 MG) BY MOUTH EVERY EVENING, Disp: 90 tablet, Rfl: 3    gabapentin (NEURONTIN) 600 MG tablet, TAKE 1 TABLET(600 MG) BY MOUTH TWICE DAILY, Disp: 180 tablet, Rfl: 1    methocarbamol (ROBAXIN) 750 MG tablet, Take 1 tablet (750 mg) by mouth 4 times daily as needed for muscle spasms., Disp: 60 tablet, Rfl: 1    metoprolol succinate ER (TOPROL XL) 50 MG 24 hr tablet, Take 1 tablet (50 mg) by mouth daily., Disp: 30 tablet, Rfl: 3    potassium  "chloride roxanna ER (KLOR-CON M20) 20 MEQ CR tablet, Take 2 tablets (40 mEq) by mouth daily., Disp: 6 tablet, Rfl: 0    senna-docusate (SENOKOT-S/PERICOLACE) 8.6-50 MG tablet, Take 2 tablets by mouth 2 times daily as needed for constipation., Disp: 30 tablet, Rfl: 0    Ferrous Sulfate (IRON) 325 (65 Fe) MG tablet, Take 1 tablet by mouth daily. (Patient not taking: Reported on 7/21/2025), Disp: , Rfl:     furosemide (LASIX) 40 MG tablet, Take 1 tablet (40 mg) by mouth daily for 3 days. (Patient not taking: Reported on 7/21/2025), Disp: 3 tablet, Rfl: 0    [Paused] sildenafil (REVATIO) 20 MG tablet, Take 2-5 tablets ( mg) by mouth daily as needed. (Patient not taking: Reported on 7/21/2025), Disp: 30 tablet, Rfl: 2    Physical Exam:   /82 (BP Location: Left arm, Patient Position: Sitting)   Pulse 62   Ht 1.676 m (5' 6\")   Wt 68.5 kg (151 lb)   SpO2 100%   BMI 24.37 kg/m     Gen: NAD  CV: RRR normal s1 and s2  Lungs: CTAB  Sternum: CDI  Extremities: minimal edema    Assessment/Plan:     No medication changes today except will refill robaxin as he is almost out.   Continue cardiac rehab  Discussed timeline for restrictions.  Discussed normal expectations for recovery.  Follow up with cardiology as scheduled.     All of the patient's questions were answered. Our contact information was given to him/her if they should have any further questions/concerns. No further follow-up is needed with us.      Valentina Guzman PA-C  CV Surgery  [facility]  Pager: [phone]  "

## 2025-07-21 NOTE — PATIENT INSTRUCTIONS
Your surgery was on 7/1/25    Ok to start driving on 7/29/25 as long as you are no longer taking narcotic pain medications.    No lifting greater than 10 pounds until 8/26/25, then    No lifting greater than 20-30 pounds until 9/23/25    Do not soak your incisions until fully healed over with no scabbing; this includes hot tubs, baths, pools etc.    If you have questions or concerns, please call us:    Twila Infante or Kathi Zafar  [phone]     Cards follow up: 9/3/25 Sparkle Vargas

## 2025-07-22 ENCOUNTER — TELEPHONE (OUTPATIENT)
Dept: OTHER | Facility: CLINIC | Age: 70
End: 2025-07-22
Payer: COMMERCIAL

## 2025-07-22 ENCOUNTER — HOSPITAL ENCOUNTER (OUTPATIENT)
Dept: CARDIAC REHAB | Facility: CLINIC | Age: 70
Discharge: HOME | End: 2025-07-22
Attending: STUDENT IN AN ORGANIZED HEALTH CARE EDUCATION/TRAINING PROGRAM
Payer: COMMERCIAL

## 2025-07-22 ENCOUNTER — TELEPHONE (OUTPATIENT)
Dept: CARDIOLOGY | Facility: CLINIC | Age: 70
End: 2025-07-22
Payer: COMMERCIAL

## 2025-07-22 DIAGNOSIS — Z95.2 S/P AVR (AORTIC VALVE REPLACEMENT) AND AORTOPLASTY: Primary | ICD-10-CM

## 2025-07-22 PROCEDURE — 93798 PHYS/QHP OP CAR RHAB W/ECG: CPT

## 2025-07-22 NOTE — TELEPHONE ENCOUNTER
Patient returned our call to discus short episode of Atrial fibrillation in Cardiac rehabilitation. Pt was asymptomatic. BP and HR stable at home and has not had any alerts to a fast heart rat except today when he was outside in the heat so he came in and HR normalized quickly. Instructed pt to stay hydrated, avoid activity in high heat and humidity. If HR increased take BP and let us know. Continue to attend cardiac rehab. Pt has history of anemia. Requesting hgb check. Will get CBC on 7/25 at next rehab appointment and will follow up.

## 2025-07-22 NOTE — TELEPHONE ENCOUNTER
Left message for patient stating that cardiac rehab had reached out about his small run of A-fib earlier.  Writer notified provider, Valentina Guzman PA-C, and she said to continue to monitor at cardiac rehab but no interventions at this time.  Patient instructed to notify us if he becomes dizzy/lightheaded as we would then be concerned he's in A-fib and not tolerating it.  Call back number given in case patient has any questions.

## 2025-07-29 DIAGNOSIS — I25.10 CORONARY ARTERY DISEASE INVOLVING NATIVE CORONARY ARTERY OF NATIVE HEART WITHOUT ANGINA PECTORIS: ICD-10-CM

## 2025-07-29 DIAGNOSIS — E78.2 MIXED HYPERLIPIDEMIA: ICD-10-CM

## 2025-07-29 DIAGNOSIS — Z95.2 S/P AVR (AORTIC VALVE REPLACEMENT) AND AORTOPLASTY: ICD-10-CM

## 2025-07-29 RX ORDER — EZETIMIBE 10 MG/1
10 TABLET ORAL DAILY
Qty: 90 TABLET | Refills: 2 | Status: SHIPPED | OUTPATIENT
Start: 2025-07-29

## 2025-07-29 RX ORDER — METOPROLOL SUCCINATE 50 MG/1
50 TABLET, EXTENDED RELEASE ORAL DAILY
Qty: 90 TABLET | Refills: 2 | Status: SHIPPED | OUTPATIENT
Start: 2025-07-29

## 2025-07-29 RX ORDER — ATORVASTATIN CALCIUM 80 MG/1
80 TABLET, FILM COATED ORAL DAILY
Qty: 90 TABLET | Refills: 2 | Status: SHIPPED | OUTPATIENT
Start: 2025-07-29

## 2025-07-29 NOTE — TELEPHONE ENCOUNTER
Patient requesting refill of metoprolol, which was started during hospitalization earlier this month. Pended for cardiology review.     Thank you,    Twila Osman, PharmD, [facility]  Population Health Pharmacist  [phone]

## 2025-07-29 NOTE — TELEPHONE ENCOUNTER
Sent [employer] message to Johan Navarro in regards to their atorvastatin being overdue for refill. Per our records last filled 4/14/25 for 90 day supply and is 16 days late to refill. Prescription out of refills; refill sent per protocol.      Twila Osman, PharmD, [facility]  Population Health Pharmacist  [phone]

## 2025-08-05 ENCOUNTER — LAB (OUTPATIENT)
Dept: LAB | Facility: CLINIC | Age: 70
End: 2025-08-05
Payer: COMMERCIAL

## 2025-08-05 DIAGNOSIS — D50.0 IRON DEFICIENCY ANEMIA DUE TO CHRONIC BLOOD LOSS: ICD-10-CM

## 2025-08-05 LAB
BASOPHILS # BLD AUTO: 0 10E3/UL (ref 0–0.2)
BASOPHILS NFR BLD AUTO: 0 %
EOSINOPHIL # BLD AUTO: 0.3 10E3/UL (ref 0–0.7)
EOSINOPHIL NFR BLD AUTO: 4 %
ERYTHROCYTE [DISTWIDTH] IN BLOOD BY AUTOMATED COUNT: 15.7 % (ref 10–15)
HCT VFR BLD AUTO: 33.7 % (ref 40–53)
HGB BLD-MCNC: 10.2 G/DL (ref 13.3–17.7)
IMM GRANULOCYTES # BLD: 0 10E3/UL
IMM GRANULOCYTES NFR BLD: 0 %
LYMPHOCYTES # BLD AUTO: 1.7 10E3/UL (ref 0.8–5.3)
LYMPHOCYTES NFR BLD AUTO: 24 %
MCH RBC QN AUTO: 23.8 PG (ref 26.5–33)
MCHC RBC AUTO-ENTMCNC: 30.3 G/DL (ref 31.5–36.5)
MCV RBC AUTO: 79 FL (ref 78–100)
MONOCYTES # BLD AUTO: 0.8 10E3/UL (ref 0–1.3)
MONOCYTES NFR BLD AUTO: 11 %
NEUTROPHILS # BLD AUTO: 4.4 10E3/UL (ref 1.6–8.3)
NEUTROPHILS NFR BLD AUTO: 61 %
PLATELET # BLD AUTO: 221 10E3/UL (ref 150–450)
RBC # BLD AUTO: 4.28 10E6/UL (ref 4.4–5.9)
WBC # BLD AUTO: 7.1 10E3/UL (ref 4–11)

## 2025-08-05 PROCEDURE — 83540 ASSAY OF IRON: CPT

## 2025-08-05 PROCEDURE — 85025 COMPLETE CBC W/AUTO DIFF WBC: CPT

## 2025-08-05 PROCEDURE — 36415 COLL VENOUS BLD VENIPUNCTURE: CPT

## 2025-08-05 PROCEDURE — 83550 IRON BINDING TEST: CPT

## 2025-08-05 PROCEDURE — 82728 ASSAY OF FERRITIN: CPT

## 2025-08-06 LAB
FERRITIN SERPL-MCNC: 16 NG/ML (ref 31–409)
IRON BINDING CAPACITY (ROCHE): 364 UG/DL (ref 240–430)
IRON SATN MFR SERPL: 9 % (ref 15–46)
IRON SERPL-MCNC: 31 UG/DL (ref 61–157)

## 2025-08-07 ENCOUNTER — VIRTUAL VISIT (OUTPATIENT)
Dept: ONCOLOGY | Facility: CLINIC | Age: 70
End: 2025-08-07
Attending: INTERNAL MEDICINE
Payer: COMMERCIAL

## 2025-08-07 VITALS
HEIGHT: 66 IN | DIASTOLIC BLOOD PRESSURE: 86 MMHG | HEART RATE: 90 BPM | SYSTOLIC BLOOD PRESSURE: 125 MMHG | BODY MASS INDEX: 24.75 KG/M2 | WEIGHT: 154 LBS

## 2025-08-07 DIAGNOSIS — D50.0 IRON DEFICIENCY ANEMIA DUE TO CHRONIC BLOOD LOSS: Primary | ICD-10-CM

## 2025-08-07 RX ORDER — DIPHENHYDRAMINE HYDROCHLORIDE 50 MG/ML
50 INJECTION, SOLUTION INTRAMUSCULAR; INTRAVENOUS
Start: 2025-08-14

## 2025-08-07 RX ORDER — DIPHENHYDRAMINE HYDROCHLORIDE 50 MG/ML
25 INJECTION, SOLUTION INTRAMUSCULAR; INTRAVENOUS
Start: 2025-08-14

## 2025-08-07 RX ORDER — HEPARIN SODIUM,PORCINE 10 UNIT/ML
5-20 VIAL (ML) INTRAVENOUS DAILY PRN
OUTPATIENT
Start: 2025-08-14

## 2025-08-07 RX ORDER — HEPARIN SODIUM (PORCINE) LOCK FLUSH IV SOLN 100 UNIT/ML 100 UNIT/ML
5 SOLUTION INTRAVENOUS
OUTPATIENT
Start: 2025-08-14

## 2025-08-07 RX ORDER — MEPERIDINE HYDROCHLORIDE 25 MG/ML
25 INJECTION INTRAMUSCULAR; INTRAVENOUS; SUBCUTANEOUS
OUTPATIENT
Start: 2025-08-14

## 2025-08-07 RX ORDER — METHYLPREDNISOLONE SODIUM SUCCINATE 40 MG/ML
40 INJECTION INTRAMUSCULAR; INTRAVENOUS
Start: 2025-08-14

## 2025-08-07 RX ORDER — ALBUTEROL SULFATE 0.83 MG/ML
2.5 SOLUTION RESPIRATORY (INHALATION)
OUTPATIENT
Start: 2025-08-14

## 2025-08-07 RX ORDER — ALBUTEROL SULFATE 90 UG/1
1-2 INHALANT RESPIRATORY (INHALATION)
Start: 2025-08-14

## 2025-08-07 RX ORDER — EPINEPHRINE 1 MG/ML
0.3 INJECTION, SOLUTION, CONCENTRATE INTRAVENOUS EVERY 5 MIN PRN
OUTPATIENT
Start: 2025-08-14

## 2025-08-07 ASSESSMENT — PAIN SCALES - GENERAL: PAINLEVEL_OUTOF10: NO PAIN (0)

## 2025-08-26 ENCOUNTER — INFUSION THERAPY VISIT (OUTPATIENT)
Dept: INFUSION THERAPY | Facility: CLINIC | Age: 70
End: 2025-08-26
Attending: INTERNAL MEDICINE
Payer: COMMERCIAL

## 2025-08-26 VITALS
SYSTOLIC BLOOD PRESSURE: 129 MMHG | TEMPERATURE: 97.2 F | RESPIRATION RATE: 18 BRPM | DIASTOLIC BLOOD PRESSURE: 74 MMHG | OXYGEN SATURATION: 97 % | HEART RATE: 86 BPM

## 2025-08-26 DIAGNOSIS — D50.0 IRON DEFICIENCY ANEMIA DUE TO CHRONIC BLOOD LOSS: Primary | ICD-10-CM

## 2025-08-26 PROCEDURE — 250N000011 HC RX IP 250 OP 636: Performed by: INTERNAL MEDICINE

## 2025-08-26 PROCEDURE — 258N000003 HC RX IP 258 OP 636: Performed by: INTERNAL MEDICINE

## 2025-08-26 PROCEDURE — 96365 THER/PROPH/DIAG IV INF INIT: CPT

## 2025-08-26 RX ORDER — ALBUTEROL SULFATE 0.83 MG/ML
2.5 SOLUTION RESPIRATORY (INHALATION)
OUTPATIENT
Start: 2025-08-28

## 2025-08-26 RX ORDER — DIPHENHYDRAMINE HYDROCHLORIDE 50 MG/ML
50 INJECTION, SOLUTION INTRAMUSCULAR; INTRAVENOUS
Start: 2025-08-28

## 2025-08-26 RX ORDER — HEPARIN SODIUM (PORCINE) LOCK FLUSH IV SOLN 100 UNIT/ML 100 UNIT/ML
5 SOLUTION INTRAVENOUS
OUTPATIENT
Start: 2025-08-28

## 2025-08-26 RX ORDER — ALBUTEROL SULFATE 90 UG/1
1-2 INHALANT RESPIRATORY (INHALATION)
Start: 2025-08-28

## 2025-08-26 RX ORDER — METHYLPREDNISOLONE SODIUM SUCCINATE 40 MG/ML
40 INJECTION INTRAMUSCULAR; INTRAVENOUS
Start: 2025-08-28

## 2025-08-26 RX ORDER — DIPHENHYDRAMINE HYDROCHLORIDE 50 MG/ML
25 INJECTION, SOLUTION INTRAMUSCULAR; INTRAVENOUS
Start: 2025-08-28

## 2025-08-26 RX ORDER — MEPERIDINE HYDROCHLORIDE 25 MG/ML
25 INJECTION INTRAMUSCULAR; INTRAVENOUS; SUBCUTANEOUS
OUTPATIENT
Start: 2025-08-28

## 2025-08-26 RX ORDER — EPINEPHRINE 1 MG/ML
0.3 INJECTION, SOLUTION INTRAMUSCULAR; SUBCUTANEOUS EVERY 5 MIN PRN
OUTPATIENT
Start: 2025-08-28

## 2025-08-26 RX ORDER — HEPARIN SODIUM,PORCINE 10 UNIT/ML
5-20 VIAL (ML) INTRAVENOUS DAILY PRN
OUTPATIENT
Start: 2025-08-28

## 2025-08-26 RX ADMIN — SODIUM CHLORIDE 250 ML: 0.9 INJECTION, SOLUTION INTRAVENOUS at 14:05

## 2025-08-26 RX ADMIN — IRON SUCROSE 300 MG: 20 INJECTION, SOLUTION INTRAVENOUS at 14:05

## 2025-09-02 ENCOUNTER — HOSPITAL ENCOUNTER (OUTPATIENT)
Dept: CARDIAC REHAB | Facility: CLINIC | Age: 70
Discharge: HOME | End: 2025-09-02
Attending: STUDENT IN AN ORGANIZED HEALTH CARE EDUCATION/TRAINING PROGRAM
Payer: COMMERCIAL

## 2025-09-02 PROCEDURE — 93798 PHYS/QHP OP CAR RHAB W/ECG: CPT

## 2025-09-03 ENCOUNTER — INFUSION THERAPY VISIT (OUTPATIENT)
Dept: INFUSION THERAPY | Facility: CLINIC | Age: 70
End: 2025-09-03
Attending: INTERNAL MEDICINE
Payer: COMMERCIAL

## 2025-09-03 ENCOUNTER — OFFICE VISIT (OUTPATIENT)
Dept: CARDIOLOGY | Facility: CLINIC | Age: 70
End: 2025-09-03
Payer: COMMERCIAL

## 2025-09-03 VITALS
TEMPERATURE: 97 F | HEART RATE: 83 BPM | SYSTOLIC BLOOD PRESSURE: 125 MMHG | RESPIRATION RATE: 16 BRPM | OXYGEN SATURATION: 96 % | DIASTOLIC BLOOD PRESSURE: 76 MMHG

## 2025-09-03 VITALS
DIASTOLIC BLOOD PRESSURE: 72 MMHG | SYSTOLIC BLOOD PRESSURE: 107 MMHG | OXYGEN SATURATION: 98 % | HEIGHT: 66 IN | HEART RATE: 84 BPM | WEIGHT: 155.6 LBS | BODY MASS INDEX: 25.01 KG/M2

## 2025-09-03 DIAGNOSIS — D50.0 IRON DEFICIENCY ANEMIA DUE TO CHRONIC BLOOD LOSS: Primary | ICD-10-CM

## 2025-09-03 DIAGNOSIS — Z95.2 S/P AVR (AORTIC VALVE REPLACEMENT) AND AORTOPLASTY: Primary | ICD-10-CM

## 2025-09-03 PROCEDURE — 250N000011 HC RX IP 250 OP 636: Performed by: INTERNAL MEDICINE

## 2025-09-03 PROCEDURE — 258N000003 HC RX IP 258 OP 636: Performed by: INTERNAL MEDICINE

## 2025-09-03 PROCEDURE — 96365 THER/PROPH/DIAG IV INF INIT: CPT

## 2025-09-03 RX ORDER — METHYLPREDNISOLONE SODIUM SUCCINATE 40 MG/ML
40 INJECTION INTRAMUSCULAR; INTRAVENOUS
Start: 2025-09-04

## 2025-09-03 RX ORDER — DIPHENHYDRAMINE HYDROCHLORIDE 50 MG/ML
25 INJECTION INTRAMUSCULAR; INTRAVENOUS
Start: 2025-09-04

## 2025-09-03 RX ORDER — ALBUTEROL SULFATE 0.83 MG/ML
2.5 SOLUTION RESPIRATORY (INHALATION)
OUTPATIENT
Start: 2025-09-04

## 2025-09-03 RX ORDER — ALBUTEROL SULFATE 90 UG/1
1-2 INHALANT RESPIRATORY (INHALATION)
Start: 2025-09-04

## 2025-09-03 RX ORDER — DIPHENHYDRAMINE HYDROCHLORIDE 50 MG/ML
50 INJECTION INTRAMUSCULAR; INTRAVENOUS
Start: 2025-09-04

## 2025-09-03 RX ORDER — EPINEPHRINE 1 MG/ML
0.3 INJECTION, SOLUTION INTRAMUSCULAR; SUBCUTANEOUS EVERY 5 MIN PRN
OUTPATIENT
Start: 2025-09-04

## 2025-09-03 RX ORDER — MEPERIDINE HYDROCHLORIDE 25 MG/ML
25 INJECTION INTRAMUSCULAR; INTRAVENOUS; SUBCUTANEOUS
OUTPATIENT
Start: 2025-09-04

## 2025-09-03 RX ORDER — HEPARIN SODIUM (PORCINE) LOCK FLUSH IV SOLN 100 UNIT/ML 100 UNIT/ML
5 SOLUTION INTRAVENOUS
OUTPATIENT
Start: 2025-09-04

## 2025-09-03 RX ORDER — HEPARIN SODIUM,PORCINE 10 UNIT/ML
5-20 VIAL (ML) INTRAVENOUS DAILY PRN
OUTPATIENT
Start: 2025-09-04

## 2025-09-03 RX ADMIN — IRON SUCROSE 300 MG: 20 INJECTION, SOLUTION INTRAVENOUS at 13:58

## (undated) DEVICE — DEVICE ASSEMBLY SUCTION/ANTI COAG BTC93

## (undated) DEVICE — SU PROLENE 7-0 BV-1DA 24" 8702H

## (undated) DEVICE — GLOVE GAMMEX NEOPRENE ULTRA SZ 7.5 LF 8515

## (undated) DEVICE — PACK UNIVERSAL SPLIT 29131

## (undated) DEVICE — SU DEVICE COR-KNOT MINI 4X14MM 031350

## (undated) DEVICE — CATH BALLOON NC EMERGE 4.00X12MM H7493926712400

## (undated) DEVICE — CONNECTOR DRAIN CHEST Y EXTENSION SET 19909

## (undated) DEVICE — CATH BALLOON NC EMERGE 3.50X15MM H7493926715350

## (undated) DEVICE — DECANTER VIAL 2006S

## (undated) DEVICE — GUIDEWIRE VERRATA PLUS PRESSURE 185CM JTIP 10185JP

## (undated) DEVICE — BLADE KNIFE BEAVER MINI BEAVER6400

## (undated) DEVICE — ENDO TRAP POLYP QUICK CATCH 710201

## (undated) DEVICE — SYR 01ML 27GA 0.5" NDL TBC 309623

## (undated) DEVICE — IMM SHOULDER LG 79-84017

## (undated) DEVICE — DRSG STERI STRIP 1/2X4" R1547

## (undated) DEVICE — SU VICRYL 2-0 SH 27" J317H

## (undated) DEVICE — DRAPE IOBAN INCISE 23X17" 6650EZ

## (undated) DEVICE — TOURNIQUET CUFF 18" REPRO RED 60-7070-103

## (undated) DEVICE — SU VICRYL 3-0 CT-1 36" J338H

## (undated) DEVICE — SUCTION CANISTER MEDIVAC LINER 3000ML W/LID 65651-530

## (undated) DEVICE — SURGICEL HEMOSTAT 2X14" 1951

## (undated) DEVICE — CAST PADDING 4" UNSTERILE 9044

## (undated) DEVICE — GLOVE PROTEXIS POWDER FREE 7.5 ORTHOPEDIC 2D73ET75

## (undated) DEVICE — IOM SUPPLIES

## (undated) DEVICE — SU SILK 0 24" TIE SA76G

## (undated) DEVICE — PACK VASCULAR SCV15VAFSB

## (undated) DEVICE — SU STEEL 6 CCS 4X18" M654G

## (undated) DEVICE — ESU GROUND PAD ADULT W/CORD E7507

## (undated) DEVICE — LINEN TOWEL PACK X5 5464

## (undated) DEVICE — KIT HAND CONTROL ANGIOTOUCH ACIST 65CM AT-P65

## (undated) DEVICE — SYR 05ML SLIP TIP W/O NDL 309647

## (undated) DEVICE — DRAIN SUMP INTRACARDIAC 20FR 12" POOL TIP 12112

## (undated) DEVICE — ENDO SNARE POLYPECTOMY OVAL 15MM LOOP SD-240U-15

## (undated) DEVICE — STENT CORONARY DES SYNERGY XD MR US 3.50X12MM H7493941812350: Type: IMPLANTABLE DEVICE | Status: NON-FUNCTIONAL

## (undated) DEVICE — LINEN FULL SHEET 5511

## (undated) DEVICE — Device

## (undated) DEVICE — PREP CHLORAPREP W/ORANGE TINT 10.5ML 930715

## (undated) DEVICE — SU ETHIBOND 3-0 BBDA 36" X588H

## (undated) DEVICE — RAD G/W INQWIRE .035X260CM J-TIP EXCHANGE IQ35F260J1O5RS

## (undated) DEVICE — BAG CLEAR TRASH 1.3M 39X33" P4040C

## (undated) DEVICE — DEFIB PRO-PADZ LVP LQD GEL ADULT 8900-2105-01

## (undated) DEVICE — SU PLEDGET SOFT TFE 3/8"X3/26"X1/16" PCP40

## (undated) DEVICE — CANNULA PERFUSION ARTERIAL EOPA 18FR 12" 77418

## (undated) DEVICE — KIT WASH CELL SAVING ATL2001

## (undated) DEVICE — INFL DVC KIT W/10CC NITRO IN4530

## (undated) DEVICE — OXYGENATOR PERFUSION AFFINITY FUSION BB841

## (undated) DEVICE — CATH JACKY 5FR 3.5 CURVE 40-5023

## (undated) DEVICE — ESU PENCIL W/HOLSTER E2350H

## (undated) DEVICE — LEAD ELEC MYOCARDIO PACING TEMPORARY MEDTRONIC

## (undated) DEVICE — CAST PADDING 4" STERILE 9044S

## (undated) DEVICE — DRSG TELFA ISLAND 4X8" 7541

## (undated) DEVICE — SU ETHILON 4-0 PS-2 18" BLACK 1667H

## (undated) DEVICE — SU PROLENE 4-0 SHDA 36" 8521H

## (undated) DEVICE — RX SURGIFLO HEMOSTATIC MATRIX W/THROMBIN 8ML 2994

## (undated) DEVICE — CANNULA VENOUS 2 STAGE 32-40FR

## (undated) DEVICE — CATH BALLOON EMERGE 3.5X12MM H7493918912350

## (undated) DEVICE — MANIFOLD KIT ANGIO AUTOMATED 014613

## (undated) DEVICE — CATH BALLOON NC EMERGE 3.50X12MM H7493926712350

## (undated) DEVICE — SU PROLENE 7-0 BV-1DA 4X30" M8703

## (undated) DEVICE — CATH GUIDELINER 6FR 5571

## (undated) DEVICE — SLEEVE TR BAND RADIAL COMPRESSION DEVICE 24CM TRB24-REG

## (undated) DEVICE — CATH ANGIO JUDKINS JL3.5 6FRX100CM INFINITI 534618T

## (undated) DEVICE — TUBING SUCTION 12"X1/4" N612

## (undated) DEVICE — ESU GROUND PAD UNIVERSAL W/O CORD

## (undated) DEVICE — INTRO GLIDESHEATH SLENDER 6FR 10X45CM 60-1060

## (undated) DEVICE — GLOVE BIOGEL PI ORTHOPRO SZ 8.0 47680

## (undated) DEVICE — DRAIN CHEST TUBE 32FR STR 8032

## (undated) DEVICE — SYR 03ML LL W/O NDL 309657

## (undated) DEVICE — SYR ANGIOGRAPHY MULTIUSE KIT ACIST 014612

## (undated) DEVICE — SU DEVICE ENDO COR KNOT QUICK LOAD RELOAD 030874

## (undated) DEVICE — CATH BALLOON NC EMERGE 3.00X12MM H7493926712300

## (undated) DEVICE — SU ETHIBOND 5 V-37 4X30" MB66G

## (undated) DEVICE — CLIP ETHICON LIGACLIP SM BLUE LT100

## (undated) DEVICE — CANNULA PERFUSION 14FRX16" LEFT 12016

## (undated) DEVICE — SU MONOCRYL 4-0 PS-2 18" UND Y496G

## (undated) DEVICE — CANNULA PERFUSION 17FR OSTIAL 30317

## (undated) DEVICE — VALVE VRV 9156R2

## (undated) DEVICE — GUIDEWIRE VASC 0.014INX180CM RUNTHROUGH 25-1011

## (undated) DEVICE — CATH ANGIO INFINITI JR4 4FRX100CM 538421

## (undated) DEVICE — DRSG ADAPTIC 3X8" 6113

## (undated) DEVICE — LINEN TOWEL PACK X6 WHITE 5487

## (undated) DEVICE — LEAD PACER MYOCARDIAL BIPOLAR TEMPORARY 53CM 6495F

## (undated) DEVICE — BNDG ELASTIC 4" DBL LENGTH UNSTERILE 6611-14

## (undated) DEVICE — IONM UP TO 7 HOURS

## (undated) DEVICE — SOMASENSOR CEREBRAL OXIMETER ADULT SAFB-SM

## (undated) DEVICE — KIT TURNOVER FAIRVIEW SOUTHDALE FULL SP3889

## (undated) DEVICE — SOL WATER IRRIG 1000ML BOTTLE 2F7114

## (undated) DEVICE — DRAPE STOCKINETTE IMPERVIOUS 12" 1587

## (undated) DEVICE — SU SILK 3-0 TIE 24" SA74H

## (undated) DEVICE — TOTE ANGIO CORP PC15AT SAN32CC83O

## (undated) DEVICE — SOL NACL 0.9% IRRIG 1000ML BOTTLE 2F7124

## (undated) DEVICE — SPONGE RAY-TEC 4X8" 7318

## (undated) DEVICE — CATH BALLOON NC EMERGE 2.50X12MM H7493926712250

## (undated) DEVICE — VALVE HEMOSTASIS .096" COPILOT MECH 1003331

## (undated) DEVICE — GOWN IMPERVIOUS SPECIALTY XL/XLONG 39049

## (undated) DEVICE — SU ETHIBOND 2-0 V-5DA 10X30" PXX52

## (undated) DEVICE — DRSG ABDOMINAL 07 1/2X8" 7197D

## (undated) DEVICE — PATCH SURGICAL EVARREST FIBRIN SEALANT 4X2IN EVT5024

## (undated) DEVICE — PACK OPEN HEART PV12OH524

## (undated) DEVICE — GLOVE PROTEXIS BLUE W/NEU-THERA 8.0  2D73EB80

## (undated) DEVICE — DECANTER BAG 2002S

## (undated) DEVICE — COVER TABLE POLY 65X90" 8186

## (undated) DEVICE — 5FR X 100CM INFINITI TL DIAGNOSTIC CATHETER, JUDKINS LEFT CORONARY, JL 3.5, FEMORAL SELECTIVE THRULUMEN, SMALL, 0.038IN MAX GUIDEWIRE (EA/1)

## (undated) DEVICE — NDL ANGIOCATH 14GA 2" 4088

## (undated) DEVICE — CABLE MYO/LEAD PACING WHITE DISP 019-530

## (undated) DEVICE — STPL SKIN 35W APPOSE 8886803712

## (undated) DEVICE — TOURNIQUET VASCULAR

## (undated) DEVICE — BUR OVAL 4X48MM CARBIDE  03-C0901

## (undated) DEVICE — SU VICRYL 0 CTX 36" J370H

## (undated) DEVICE — DRAPE NEW SLUSH/WARMER HUSHSLUSH 2.0 ESD340 ESD340

## (undated) DEVICE — SU VICRYL 2-0 CP-2 27" UND J869H

## (undated) DEVICE — RESERVOIR CELL SAVING BLOOD COLLECTION EL2120

## (undated) DEVICE — DEVICE RETRIEVER HEWSON 71111579

## (undated) DEVICE — LINEN LEG ROLL 5489

## (undated) DEVICE — SU PROLENE 5-0 RB-1DA 36"  8556H

## (undated) DEVICE — SU PROLENE 4-0 RB-1DA 36" 8557H

## (undated) DEVICE — CAST BUCKET

## (undated) DEVICE — SU SILK 2-0 TIE 24" SA75H

## (undated) DEVICE — BAG DECANTER STERILE WHITE DYNJDEC09

## (undated) DEVICE — LINEN TOWEL PACK X30 5481

## (undated) DEVICE — CAST PLASTER SPLINT XTRA FAST 5X30" 7392

## (undated) DEVICE — CATH LAUNCHER 6FR EBU 3.5 LA6EBU35

## (undated) DEVICE — KIT LG BORE TOUHY ACCESS PLUS MAP152

## (undated) DEVICE — GW VASC OMNIWIRE J L185CM PRESSURE 89185J

## (undated) DEVICE — PACK SURGICAL PROCEDURE CUSTOM 3/8IN X 3/8IN BB11W21R

## (undated) DEVICE — SU STEEL MYO/WIRE II STERNOTOMY 8 BE-1 3X14" 048-217

## (undated) DEVICE — SU PROLENE 6-0 C-1DA 30" 8706H

## (undated) DEVICE — CONNECTOR PREFUSION REDUCER 3/8X1/2" W/O LL 6013

## (undated) DEVICE — CANNULA PERFUSION AORTIC ROOT 22FR 20012

## (undated) DEVICE — DRAIN CHEST TUBE RIGHT ANGLED 28FR 8128

## (undated) DEVICE — WIRE GUIDE 0.035"X260CM SAFE-T-J EXCHANGE G00517

## (undated) DEVICE — PROTECTOR ARM ONE-STEP TRENDELENBURG 40418

## (undated) DEVICE — SU ETHIBOND 2-0 SHDA 30" X563H

## (undated) DEVICE — KIT ENDO TURNOVER/PROCEDURE W/CLEAN A SCOPE LINERS 103888

## (undated) DEVICE — NDL 19GA 1.5"

## (undated) DEVICE — PREP CHLORAPREP 26ML TINTED ORANGE  260815

## (undated) DEVICE — SU MONOCRYL 4-0 PS-1 27'  UND Y935H

## (undated) DEVICE — CATH LAUNCHER 6FR EBU 30 LA6EBU30

## (undated) DEVICE — SOL NACL 0.9% INJ 250ML BAG 2B1322Q

## (undated) DEVICE — NDL BLUNT 19GA 1.5"

## (undated) DEVICE — GW VASC 190CM .014IN HI-TRQ 1009660J

## (undated) DEVICE — LINEN HALF SHEET 5512

## (undated) DEVICE — CABLE MYO/LEAD PACING BLUE DISP 019-535

## (undated) DEVICE — DRSG GAUZE 4X4" TRAY

## (undated) DEVICE — TUBING SUCTION MEDI-VAC SOFT 3/16"X20' N520A

## (undated) DEVICE — PUMP CP37 QUANTUM CENTRIFUGAL BLOOD CP37V-V0

## (undated) RX ORDER — DIPHENHYDRAMINE HYDROCHLORIDE 50 MG/ML
INJECTION, SOLUTION INTRAMUSCULAR; INTRAVENOUS
Status: DISPENSED
Start: 2025-07-01

## (undated) RX ORDER — HEPARIN SODIUM 1000 [USP'U]/ML
INJECTION, SOLUTION INTRAVENOUS; SUBCUTANEOUS
Status: DISPENSED
Start: 2025-07-01

## (undated) RX ORDER — FAMOTIDINE 20 MG/1
TABLET, FILM COATED ORAL
Status: DISPENSED
Start: 2025-07-01

## (undated) RX ORDER — NITROGLYCERIN 5 MG/ML
VIAL (ML) INTRAVENOUS
Status: DISPENSED
Start: 2020-07-21

## (undated) RX ORDER — HEPARIN SODIUM 1000 [USP'U]/ML
INJECTION, SOLUTION INTRAVENOUS; SUBCUTANEOUS
Status: DISPENSED
Start: 2022-12-27

## (undated) RX ORDER — VANCOMYCIN HYDROCHLORIDE 1 G/20ML
INJECTION, POWDER, LYOPHILIZED, FOR SOLUTION INTRAVENOUS
Status: DISPENSED
Start: 2025-07-01

## (undated) RX ORDER — FENTANYL CITRATE 50 UG/ML
INJECTION, SOLUTION INTRAMUSCULAR; INTRAVENOUS
Status: DISPENSED
Start: 2025-07-01

## (undated) RX ORDER — LIDOCAINE HYDROCHLORIDE 10 MG/ML
INJECTION, SOLUTION EPIDURAL; INFILTRATION; INTRACAUDAL; PERINEURAL
Status: DISPENSED
Start: 2021-10-11

## (undated) RX ORDER — FENTANYL CITRATE 50 UG/ML
INJECTION, SOLUTION INTRAMUSCULAR; INTRAVENOUS
Status: DISPENSED
Start: 2018-07-16

## (undated) RX ORDER — HYDROMORPHONE HYDROCHLORIDE 1 MG/ML
INJECTION, SOLUTION INTRAMUSCULAR; INTRAVENOUS; SUBCUTANEOUS
Status: DISPENSED
Start: 2018-07-16

## (undated) RX ORDER — NITROGLYCERIN 5 MG/ML
VIAL (ML) INTRAVENOUS
Status: DISPENSED
Start: 2021-10-11

## (undated) RX ORDER — HEPARIN SODIUM 200 [USP'U]/100ML
INJECTION, SOLUTION INTRAVENOUS
Status: DISPENSED
Start: 2021-10-11

## (undated) RX ORDER — HEPARIN SODIUM 200 [USP'U]/100ML
INJECTION, SOLUTION INTRAVENOUS
Status: DISPENSED
Start: 2020-07-21

## (undated) RX ORDER — FENTANYL CITRATE 0.05 MG/ML
INJECTION, SOLUTION INTRAMUSCULAR; INTRAVENOUS
Status: DISPENSED
Start: 2025-07-01

## (undated) RX ORDER — PROPOFOL 10 MG/ML
INJECTION, EMULSION INTRAVENOUS
Status: DISPENSED
Start: 2025-07-01

## (undated) RX ORDER — HEPARIN SODIUM 1000 [USP'U]/ML
INJECTION, SOLUTION INTRAVENOUS; SUBCUTANEOUS
Status: DISPENSED
Start: 2021-10-11

## (undated) RX ORDER — FENTANYL CITRATE 50 UG/ML
INJECTION, SOLUTION INTRAMUSCULAR; INTRAVENOUS
Status: DISPENSED
Start: 2025-06-03

## (undated) RX ORDER — BUPIVACAINE HYDROCHLORIDE 2.5 MG/ML
INJECTION, SOLUTION EPIDURAL; INFILTRATION; INTRACAUDAL
Status: DISPENSED
Start: 2022-12-27

## (undated) RX ORDER — DEXAMETHASONE SODIUM PHOSPHATE 4 MG/ML
INJECTION, SOLUTION INTRA-ARTICULAR; INTRALESIONAL; INTRAMUSCULAR; INTRAVENOUS; SOFT TISSUE
Status: DISPENSED
Start: 2018-07-16

## (undated) RX ORDER — HEPARIN SODIUM 1000 [USP'U]/ML
INJECTION, SOLUTION INTRAVENOUS; SUBCUTANEOUS
Status: DISPENSED

## (undated) RX ORDER — FENTANYL CITRATE 50 UG/ML
INJECTION, SOLUTION INTRAMUSCULAR; INTRAVENOUS
Status: DISPENSED
Start: 2021-10-11

## (undated) RX ORDER — EPINEPHRINE 1 MG/ML
INJECTION, SOLUTION INTRAMUSCULAR; SUBCUTANEOUS
Status: DISPENSED
Start: 2022-12-27

## (undated) RX ORDER — CEFAZOLIN SODIUM 1 G/3ML
INJECTION, POWDER, FOR SOLUTION INTRAMUSCULAR; INTRAVENOUS
Status: DISPENSED
Start: 2025-07-01

## (undated) RX ORDER — HEPARIN SODIUM 1000 [USP'U]/ML
INJECTION, SOLUTION INTRAVENOUS; SUBCUTANEOUS
Status: DISPENSED
Start: 2020-07-21

## (undated) RX ORDER — LIDOCAINE HYDROCHLORIDE 10 MG/ML
INJECTION, SOLUTION EPIDURAL; INFILTRATION; INTRACAUDAL; PERINEURAL
Status: DISPENSED
Start: 2020-07-21

## (undated) RX ORDER — VERAPAMIL HYDROCHLORIDE 2.5 MG/ML
INJECTION, SOLUTION INTRAVENOUS
Status: DISPENSED
Start: 2020-07-21

## (undated) RX ORDER — VERAPAMIL HYDROCHLORIDE 2.5 MG/ML
INJECTION, SOLUTION INTRAVENOUS
Status: DISPENSED
Start: 2021-10-11

## (undated) RX ORDER — EPHEDRINE SULFATE 50 MG/ML
INJECTION, SOLUTION INTRAMUSCULAR; INTRAVENOUS; SUBCUTANEOUS
Status: DISPENSED
Start: 2022-12-27

## (undated) RX ORDER — HEPARIN SODIUM 1000 [USP'U]/ML
INJECTION, SOLUTION INTRAVENOUS; SUBCUTANEOUS
Status: DISPENSED
Start: 2025-06-03

## (undated) RX ORDER — FENTANYL CITRATE 50 UG/ML
INJECTION, SOLUTION INTRAMUSCULAR; INTRAVENOUS
Status: DISPENSED
Start: 2020-07-21

## (undated) RX ORDER — PROTAMINE SULFATE 10 MG/ML
INJECTION, SOLUTION INTRAVENOUS
Status: DISPENSED
Start: 2025-07-01

## (undated) RX ORDER — ONDANSETRON 2 MG/ML
INJECTION INTRAMUSCULAR; INTRAVENOUS
Status: DISPENSED
Start: 2022-12-27

## (undated) RX ORDER — ASPIRIN 81 MG/1
TABLET, CHEWABLE ORAL
Status: DISPENSED
Start: 2022-12-27

## (undated) RX ORDER — LIDOCAINE HYDROCHLORIDE 10 MG/ML
INJECTION, SOLUTION EPIDURAL; INFILTRATION; INTRACAUDAL; PERINEURAL
Status: DISPENSED
Start: 2022-12-27

## (undated) RX ORDER — REGADENOSON 0.08 MG/ML
INJECTION, SOLUTION INTRAVENOUS
Status: DISPENSED
Start: 2020-07-15

## (undated) RX ORDER — CEFAZOLIN SODIUM 1 G/3ML
INJECTION, POWDER, FOR SOLUTION INTRAMUSCULAR; INTRAVENOUS
Status: DISPENSED
Start: 2022-12-27

## (undated) RX ORDER — LIDOCAINE HYDROCHLORIDE 10 MG/ML
INJECTION, SOLUTION EPIDURAL; INFILTRATION; INTRACAUDAL; PERINEURAL
Status: DISPENSED
Start: 2018-07-16

## (undated) RX ORDER — PHENYLEPHRINE HCL IN 0.9% NACL 1 MG/10 ML
SYRINGE (ML) INTRAVENOUS
Status: DISPENSED

## (undated) RX ORDER — ACETAMINOPHEN 325 MG/1
TABLET ORAL
Status: DISPENSED
Start: 2025-07-01

## (undated) RX ORDER — EPHEDRINE SULFATE 50 MG/ML
INJECTION, SOLUTION INTRAMUSCULAR; INTRAVENOUS; SUBCUTANEOUS
Status: DISPENSED
Start: 2018-07-16

## (undated) RX ORDER — CALCIUM CHLORIDE 100 MG/ML
INJECTION INTRAVENOUS; INTRAVENTRICULAR
Status: DISPENSED

## (undated) RX ORDER — ASPIRIN 300 MG/1
SUPPOSITORY RECTAL
Status: DISPENSED
Start: 2022-12-27

## (undated) RX ORDER — CLOPIDOGREL 300 MG/1
TABLET, FILM COATED ORAL
Status: DISPENSED
Start: 2021-10-11

## (undated) RX ORDER — REGADENOSON 0.08 MG/ML
INJECTION, SOLUTION INTRAVENOUS
Status: DISPENSED
Start: 2020-12-29

## (undated) RX ORDER — PROPOFOL 10 MG/ML
INJECTION, EMULSION INTRAVENOUS
Status: DISPENSED
Start: 2018-07-16

## (undated) RX ORDER — LIDOCAINE HYDROCHLORIDE 10 MG/ML
INJECTION, SOLUTION EPIDURAL; INFILTRATION; INTRACAUDAL; PERINEURAL
Status: DISPENSED
Start: 2025-06-03

## (undated) RX ORDER — PROPOFOL 10 MG/ML
INJECTION, EMULSION INTRAVENOUS
Status: DISPENSED
Start: 2022-12-27

## (undated) RX ORDER — FENTANYL CITRATE 50 UG/ML
INJECTION, SOLUTION INTRAMUSCULAR; INTRAVENOUS
Status: DISPENSED
Start: 2018-05-30

## (undated) RX ORDER — FENTANYL CITRATE 50 UG/ML
INJECTION, SOLUTION INTRAMUSCULAR; INTRAVENOUS
Status: DISPENSED
Start: 2022-12-27

## (undated) RX ORDER — CEFAZOLIN SODIUM 2 G/100ML
INJECTION, SOLUTION INTRAVENOUS
Status: DISPENSED
Start: 2018-07-16

## (undated) RX ORDER — CHLORHEXIDINE GLUCONATE ORAL RINSE 1.2 MG/ML
SOLUTION DENTAL
Status: DISPENSED
Start: 2025-07-01

## (undated) RX ORDER — OXYCODONE AND ACETAMINOPHEN 5; 325 MG/1; MG/1
TABLET ORAL
Status: DISPENSED
Start: 2018-07-16

## (undated) RX ORDER — DEXAMETHASONE SODIUM PHOSPHATE 4 MG/ML
INJECTION, SOLUTION INTRA-ARTICULAR; INTRALESIONAL; INTRAMUSCULAR; INTRAVENOUS; SOFT TISSUE
Status: DISPENSED
Start: 2022-12-27

## (undated) RX ORDER — PROTAMINE SULFATE 10 MG/ML
INJECTION, SOLUTION INTRAVENOUS
Status: DISPENSED
Start: 2022-12-27

## (undated) RX ORDER — SODIUM CHLORIDE, SODIUM GLUCONATE, SODIUM ACETATE, POTASSIUM CHLORIDE AND MAGNESIUM CHLORIDE 526; 502; 368; 37; 30 MG/100ML; MG/100ML; MG/100ML; MG/100ML; MG/100ML
INJECTION, SOLUTION INTRAVENOUS
Status: DISPENSED

## (undated) RX ORDER — SODIUM CHLORIDE 9 MG/ML
INJECTION, SOLUTION INTRAVENOUS
Status: DISPENSED

## (undated) RX ORDER — VERAPAMIL HYDROCHLORIDE 2.5 MG/ML
INJECTION INTRAVENOUS
Status: DISPENSED
Start: 2025-06-03

## (undated) RX ORDER — LIDOCAINE HYDROCHLORIDE AND EPINEPHRINE 10; 10 MG/ML; UG/ML
INJECTION, SOLUTION INFILTRATION; PERINEURAL
Status: DISPENSED
Start: 2018-07-16

## (undated) RX ORDER — HYDRALAZINE HYDROCHLORIDE 20 MG/ML
INJECTION INTRAMUSCULAR; INTRAVENOUS
Status: DISPENSED
Start: 2018-07-16

## (undated) RX ORDER — VANCOMYCIN HYDROCHLORIDE 1 G/200ML
INJECTION, SOLUTION INTRAVENOUS
Status: DISPENSED
Start: 2025-07-01

## (undated) RX ORDER — GLYCOPYRROLATE 0.2 MG/ML
INJECTION INTRAMUSCULAR; INTRAVENOUS
Status: DISPENSED
Start: 2018-07-16

## (undated) RX ORDER — ONDANSETRON 2 MG/ML
INJECTION INTRAMUSCULAR; INTRAVENOUS
Status: DISPENSED
Start: 2018-07-16

## (undated) RX ORDER — ASPIRIN 81 MG/1
TABLET ORAL
Status: DISPENSED
Start: 2020-07-21